# Patient Record
Sex: FEMALE | Race: WHITE | NOT HISPANIC OR LATINO | Employment: OTHER | URBAN - METROPOLITAN AREA
[De-identification: names, ages, dates, MRNs, and addresses within clinical notes are randomized per-mention and may not be internally consistent; named-entity substitution may affect disease eponyms.]

---

## 2017-03-14 ENCOUNTER — LAB (OUTPATIENT)
Dept: LAB | Facility: HOSPITAL | Age: 62
End: 2017-03-14

## 2017-03-14 ENCOUNTER — OFFICE VISIT (OUTPATIENT)
Dept: NEUROLOGY | Facility: CLINIC | Age: 62
End: 2017-03-14

## 2017-03-14 VITALS
SYSTOLIC BLOOD PRESSURE: 132 MMHG | BODY MASS INDEX: 39.68 KG/M2 | HEART RATE: 78 BPM | DIASTOLIC BLOOD PRESSURE: 86 MMHG | WEIGHT: 293 LBS | HEIGHT: 72 IN | OXYGEN SATURATION: 99 %

## 2017-03-14 DIAGNOSIS — G43.C0 PERIODIC HEADACHE SYNDROME, NOT INTRACTABLE: ICD-10-CM

## 2017-03-14 DIAGNOSIS — G35 MULTIPLE SCLEROSIS (HCC): Primary | ICD-10-CM

## 2017-03-14 DIAGNOSIS — F33.1 MODERATE EPISODE OF RECURRENT MAJOR DEPRESSIVE DISORDER (HCC): ICD-10-CM

## 2017-03-14 DIAGNOSIS — G25.81 RESTLESS LEGS SYNDROME: ICD-10-CM

## 2017-03-14 LAB
ALBUMIN SERPL-MCNC: 4.3 G/DL (ref 3.2–4.8)
ALBUMIN/GLOB SERPL: 1.4 G/DL (ref 1.5–2.5)
ALP SERPL-CCNC: 133 U/L (ref 25–100)
ALT SERPL W P-5'-P-CCNC: 22 U/L (ref 7–40)
ANION GAP SERPL CALCULATED.3IONS-SCNC: 10 MMOL/L (ref 3–11)
AST SERPL-CCNC: 25 U/L (ref 0–33)
BACTERIA UR QL AUTO: ABNORMAL /HPF
BASOPHILS # BLD AUTO: 0.05 10*3/MM3 (ref 0–0.2)
BASOPHILS NFR BLD AUTO: 0.8 % (ref 0–1)
BILIRUB SERPL-MCNC: 0.4 MG/DL (ref 0.3–1.2)
BILIRUB UR QL STRIP: NEGATIVE
BUN BLD-MCNC: 15 MG/DL (ref 9–23)
BUN/CREAT SERPL: 25 (ref 7–25)
CALCIUM SPEC-SCNC: 10.3 MG/DL (ref 8.7–10.4)
CHLORIDE SERPL-SCNC: 104 MMOL/L (ref 99–109)
CLARITY UR: CLEAR
CO2 SERPL-SCNC: 25 MMOL/L (ref 20–31)
COLOR UR: YELLOW
CREAT BLD-MCNC: 0.6 MG/DL (ref 0.6–1.3)
DEPRECATED RDW RBC AUTO: 50.1 FL (ref 37–54)
EOSINOPHIL # BLD AUTO: 0.19 10*3/MM3 (ref 0.1–0.3)
EOSINOPHIL NFR BLD AUTO: 3 % (ref 0–3)
ERYTHROCYTE [DISTWIDTH] IN BLOOD BY AUTOMATED COUNT: 15.3 % (ref 11.3–14.5)
GFR SERPL CREATININE-BSD FRML MDRD: 102 ML/MIN/1.73
GLOBULIN UR ELPH-MCNC: 3.1 GM/DL
GLUCOSE BLD-MCNC: 82 MG/DL (ref 70–100)
GLUCOSE UR STRIP-MCNC: NEGATIVE MG/DL
HAV IGM SERPL QL IA: NORMAL
HBV CORE IGM SERPL QL IA: NORMAL
HBV SURFACE AG SERPL QL IA: NORMAL
HCT VFR BLD AUTO: 41 % (ref 34.5–44)
HCV AB SER DONR QL: NORMAL
HGB BLD-MCNC: 13.2 G/DL (ref 11.5–15.5)
HGB UR QL STRIP.AUTO: NEGATIVE
HIV1+2 AB SER QL: NORMAL
HYALINE CASTS UR QL AUTO: ABNORMAL /LPF
IMM GRANULOCYTES # BLD: 0.01 10*3/MM3 (ref 0–0.03)
IMM GRANULOCYTES NFR BLD: 0.2 % (ref 0–0.6)
KETONES UR QL STRIP: NEGATIVE
LEUKOCYTE ESTERASE UR QL STRIP.AUTO: ABNORMAL
LYMPHOCYTES # BLD AUTO: 2.58 10*3/MM3 (ref 0.6–4.8)
LYMPHOCYTES NFR BLD AUTO: 40.3 % (ref 24–44)
MCH RBC QN AUTO: 28.9 PG (ref 27–31)
MCHC RBC AUTO-ENTMCNC: 32.2 G/DL (ref 32–36)
MCV RBC AUTO: 89.7 FL (ref 80–99)
MONOCYTES # BLD AUTO: 0.58 10*3/MM3 (ref 0–1)
MONOCYTES NFR BLD AUTO: 9.1 % (ref 0–12)
NEUTROPHILS # BLD AUTO: 2.99 10*3/MM3 (ref 1.5–8.3)
NEUTROPHILS NFR BLD AUTO: 46.6 % (ref 41–71)
NITRITE UR QL STRIP: NEGATIVE
PH UR STRIP.AUTO: 5.5 [PH] (ref 5–8)
PLATELET # BLD AUTO: 202 10*3/MM3 (ref 150–450)
PMV BLD AUTO: 11.1 FL (ref 6–12)
POTASSIUM BLD-SCNC: 3.7 MMOL/L (ref 3.5–5.5)
PROT SERPL-MCNC: 7.4 G/DL (ref 5.7–8.2)
PROT UR QL STRIP: NEGATIVE
RBC # BLD AUTO: 4.57 10*6/MM3 (ref 3.89–5.14)
RBC # UR: ABNORMAL /HPF
REF LAB TEST METHOD: ABNORMAL
SODIUM BLD-SCNC: 139 MMOL/L (ref 132–146)
SP GR UR STRIP: 1.01 (ref 1–1.03)
SQUAMOUS #/AREA URNS HPF: ABNORMAL /HPF
TSH SERPL DL<=0.05 MIU/L-ACNC: 1.67 MIU/ML (ref 0.35–5.35)
UROBILINOGEN UR QL STRIP: ABNORMAL
WBC NRBC COR # BLD: 6.4 10*3/MM3 (ref 3.5–10.8)
WBC UR QL AUTO: ABNORMAL /HPF

## 2017-03-14 PROCEDURE — 84443 ASSAY THYROID STIM HORMONE: CPT | Performed by: PSYCHIATRY & NEUROLOGY

## 2017-03-14 PROCEDURE — 86480 TB TEST CELL IMMUN MEASURE: CPT | Performed by: PSYCHIATRY & NEUROLOGY

## 2017-03-14 PROCEDURE — 99214 OFFICE O/P EST MOD 30 MIN: CPT | Performed by: PSYCHIATRY & NEUROLOGY

## 2017-03-14 PROCEDURE — 80074 ACUTE HEPATITIS PANEL: CPT | Performed by: PSYCHIATRY & NEUROLOGY

## 2017-03-14 PROCEDURE — G0432 EIA HIV-1/HIV-2 SCREEN: HCPCS | Performed by: PSYCHIATRY & NEUROLOGY

## 2017-03-14 PROCEDURE — 80053 COMPREHEN METABOLIC PANEL: CPT | Performed by: PSYCHIATRY & NEUROLOGY

## 2017-03-14 PROCEDURE — 36415 COLL VENOUS BLD VENIPUNCTURE: CPT | Performed by: PSYCHIATRY & NEUROLOGY

## 2017-03-14 PROCEDURE — 81001 URINALYSIS AUTO W/SCOPE: CPT | Performed by: PSYCHIATRY & NEUROLOGY

## 2017-03-14 PROCEDURE — 85025 COMPLETE CBC W/AUTO DIFF WBC: CPT | Performed by: PSYCHIATRY & NEUROLOGY

## 2017-03-14 RX ORDER — TIZANIDINE HYDROCHLORIDE 2 MG/1
CAPSULE, GELATIN COATED ORAL
Refills: 11 | COMMUNITY
Start: 2016-12-23 | End: 2018-01-23

## 2017-03-14 NOTE — ASSESSMENT & PLAN NOTE
Discussed change in JCV index and increased risk to 1.1 1000 for PML.  Provided with information on Lemtrada and risks and benefits discussed.      Continue Tysabri.  Draw screening labs for Lemtrada

## 2017-03-14 NOTE — PROGRESS NOTES
Subjective:     Patient ID: Rashad Carrillo is a 61 y.o. female.    History of Present Illness     59 yo woman with RRMS, migraines, MDD and RLS returns in follow up.  Last visit on 11/11/16 stopped TPM and renewed GBP, Tysabri, Cymbalta and Ampyra.  Referred to PT and for MBS and ordered labs.       MRI Brain, 6/9/16, no change in T2 lesion load since MRI 2015 but has moderate to severe T2 lesion burden and moderate atrophy.     JCV ab - 11/11/16 - 0.97; started 11/2014    MBS- regular diet, thin liquids    RRMS    Traveled to [place] end of Dec and upon return extreme exhaustion.  Slept 20 hours a day and vision was blurry.  Increased spasticity in limbs.  Rested for 2 weeks.  Eye sight improved but increased discomfort in legs lying down. Fatigue is severe.   mg po TID, 300 mg 1 to 3 times overnight for discomfort.    Ampyra helping walking endurance and speed.     Problem history:    25 ft timed walk increaesed 15.99 from 9.94.  Left hand 9 hole peg worsened.   Increasing swallowing difficulty.    Tolerating Tysabri.      Immediate and working memory are declining with SDMT 27/110..       MDD    Mood is stable on Cymbalta.    RLS    RLS controlled GBP.    MIgraines    Stopped TPM and HA are unchanged.  HA every other day.  Moderate severity.  Dizziness has decreased.       The following portions of the patient's history were reviewed and updated as appropriate: allergies, current medications, past medical history, past surgical history and problem list.    Review of Systems   Constitutional: Negative for activity change and unexpected weight change.   HENT: Negative for tinnitus and trouble swallowing.    Eyes: Negative for photophobia and visual disturbance.   Respiratory: Negative for apnea and choking.    Cardiovascular: Negative for leg swelling.   Endocrine: Positive for heat intolerance. Negative for cold intolerance.   Genitourinary: Negative for difficulty urinating, frequency, menstrual problem and  "urgency.   Musculoskeletal: Positive for gait problem. Negative for back pain.   Skin: Negative for color change.   Allergic/Immunologic: Negative for immunocompromised state.   Neurological: Positive for dizziness and tremors. Negative for seizures, syncope, facial asymmetry, speech difficulty and light-headedness.   Hematological: Negative for adenopathy. Does not bruise/bleed easily.   Psychiatric/Behavioral: Positive for decreased concentration, dysphoric mood and sleep disturbance. Negative for behavioral problems, confusion and hallucinations. The patient is nervous/anxious.         Objective:  Vitals:    03/14/17 1025   BP: 132/86   Pulse: 78   SpO2: 99%   Weight: (!) 310 lb 3.2 oz (141 kg)   Height: 72\" (182.9 cm)        Neurologic Exam     Mental Status   Registration: recalls 3 of 3 objects. Recall at 5 minutes: recalls 3 of 3 objects. Follows 3 step commands.   Attention: normal. Concentration: normal.   Level of consciousness: alert  Knowledge: good and consistent with education.   Able to name object. Able to read. Able to repeat. Able to write. Normal comprehension.        SDMT 27     Cranial Nerves     CN II   Visual fields full to confrontation.   Visual acuity: normal  Right visual field deficit: none  Left visual field deficit: none     CN III, IV, VI   Nystagmus: none   Diplopia: none  Ophthalmoparesis: none  Upgaze: normal  Downgaze: normal  Conjugate gaze: present  Vestibulo-ocular reflex: present    CN V   Facial sensation intact.   Right corneal reflex: normal  Left corneal reflex: normal    CN VII   Right facial weakness: none  Left facial weakness: none    CN VIII   Hearing: intact    CN IX, X   Palate: symmetric  Right gag reflex: normal  Left gag reflex: normal    CN XI   Right sternocleidomastoid strength: normal  Left sternocleidomastoid strength: normal    CN XII   Tongue: not atrophic  Fasciculations: absent  Tongue deviation: none    Motor Exam   Muscle bulk: normal  Overall muscle " tone: normal  Right arm tone: normal  Left arm tone: normal  Right leg tone: increased  Left leg tone: increased    Strength   Strength 5/5 except as noted.   Left quadriceps: 4/5  Left hamstrin/5  Left glutei: 4/5  Left anterior tibial: 4/5  Left posterior tibial: 4/5  Left peroneal: 4/5  Left gastroc: 4/5    Sensory Exam   Right arm light touch: normal  Right leg light touch: normal  Right arm vibration: normal  Right leg vibration: normal  Right arm proprioception: normal  Right leg proprioception: normal  Right arm pinprick: normal  Right leg pinprick: normal  Sensory deficit distribution on right: non-dermatomal distribution (decreased left face, arm and leg)    Gait, Coordination, and Reflexes     Gait  Gait: circumduction, shuffling and wide-based (left leg)    Coordination   Romberg: positive  Heel to shin coordination: abnormal  Tandem walking coordination: abnormal    Tremor   Resting tremor: absent  Intention tremor: absent  Action tremor: left arm and right arm    Reflexes   Right brachioradialis: 3+  Left brachioradialis: 3+  Right biceps: 3+  Left biceps: 3+  Right triceps: 3+  Left triceps: 3+  Right patellar: 3+  Left patellar: 3+  Right achilles: 3+  Left achilles: 3+  Right : 3+  Left : 3+  Right plantar: normal  Left plantar: normal      Physical Exam   Neurological: She has an abnormal Heel to Brown Test, an abnormal Romberg Test and an abnormal Tandem Gait Test.   Reflex Scores:       Tricep reflexes are 3+ on the right side and 3+ on the left side.       Bicep reflexes are 3+ on the right side and 3+ on the left side.       Brachioradialis reflexes are 3+ on the right side and 3+ on the left side.       Patellar reflexes are 3+ on the right side and 3+ on the left side.       Achilles reflexes are 3+ on the right side and 3+ on the left side.      Assessment/Plan:       Problems Addressed this Visit        Cardiovascular and Mediastinum    Periodic headache syndrome, not intractable      Headaches are unchanged.  Continue current treatment regimen.             Relevant Medications    TiZANidine (ZANAFLEX) 2 MG capsule    Other Relevant Orders    Hepatitis Panel, Acute    HIV-1 & HIV-2 Antibodies    QuantiFERON TB Client Incubated    Varicella Zoster Antibody, IgG    CBC & Differential    Comprehensive Metabolic Panel    TSH    Urinalysis With Microscopic       Nervous and Auditory    Multiple sclerosis - Primary     Discussed change in JCV index and increased risk to 1.1 1000 for PML.  Provided with information on Lemtrada and risks and benefits discussed.      Continue Tysabri.  Draw screening labs for Lemtrada         Relevant Orders    Hepatitis Panel, Acute    HIV-1 & HIV-2 Antibodies    QuantiFERON TB Client Incubated    Varicella Zoster Antibody, IgG    CBC & Differential    Comprehensive Metabolic Panel    TSH    Urinalysis With Microscopic       Other    Depression     Psychological condition is improving with treatment.  Continue current treatment regimen.  Psychological condition  will be reassessed at the next regular appointment.    Continue Cymbalta 60 mg qday         Relevant Orders    Hepatitis Panel, Acute    HIV-1 & HIV-2 Antibodies    QuantiFERON TB Client Incubated    Varicella Zoster Antibody, IgG    CBC & Differential    Comprehensive Metabolic Panel    TSH    Urinalysis With Microscopic    Restless legs syndrome     Continue  mg TID and 300 - 900 mg qhs         Relevant Orders    Hepatitis Panel, Acute    HIV-1 & HIV-2 Antibodies    QuantiFERON TB Client Incubated    Varicella Zoster Antibody, IgG    CBC & Differential    Comprehensive Metabolic Panel    TSH    Urinalysis With Microscopic

## 2017-03-14 NOTE — ASSESSMENT & PLAN NOTE
Psychological condition is improving with treatment.  Continue current treatment regimen.  Psychological condition  will be reassessed at the next regular appointment.    Continue Cymbalta 60 mg qday

## 2017-03-15 ENCOUNTER — TELEPHONE (OUTPATIENT)
Dept: NEUROLOGY | Facility: CLINIC | Age: 62
End: 2017-03-15

## 2017-03-15 LAB — VZV IGG SER IA-ACNC: 2207 INDEX

## 2017-03-15 RX ORDER — LEVOFLOXACIN 500 MG/1
500 TABLET, FILM COATED ORAL DAILY
Qty: 5 TABLET | Refills: 0 | Status: SHIPPED | OUTPATIENT
Start: 2017-03-15 | End: 2022-02-16 | Stop reason: SDUPTHER

## 2017-03-15 NOTE — TELEPHONE ENCOUNTER
----- Message from Adonay Gilbert MD sent at 3/15/2017  8:00 AM EDT -----  Notify pt she has a UTI and called in antibx

## 2017-03-18 LAB
INTERPRETATION: ABNORMAL
M TB TUBERC IFN-G BLD QL: POSITIVE
QFT TB AG MINUS NIL VALUE: 0.52 IU/ML
QUANTIFERON CRITERIA: ABNORMAL
QUANTIFERON MITOGEN VALUE: >10 IU/ML
QUANTIFERON NIL VALUE: 0.08 IU/ML
QUANTIFERON TB AG VALUE: 0.6 IU/ML

## 2017-05-23 ENCOUNTER — TELEPHONE (OUTPATIENT)
Dept: NEUROLOGY | Facility: CLINIC | Age: 62
End: 2017-05-23

## 2017-05-23 ENCOUNTER — OFFICE VISIT (OUTPATIENT)
Dept: NEUROLOGY | Facility: CLINIC | Age: 62
End: 2017-05-23

## 2017-05-23 VITALS
BODY MASS INDEX: 39.68 KG/M2 | HEART RATE: 69 BPM | OXYGEN SATURATION: 95 % | SYSTOLIC BLOOD PRESSURE: 126 MMHG | HEIGHT: 72 IN | DIASTOLIC BLOOD PRESSURE: 80 MMHG | WEIGHT: 293 LBS

## 2017-05-23 DIAGNOSIS — G25.81 RESTLESS LEGS SYNDROME: ICD-10-CM

## 2017-05-23 DIAGNOSIS — F33.1 MODERATE EPISODE OF RECURRENT MAJOR DEPRESSIVE DISORDER (HCC): ICD-10-CM

## 2017-05-23 DIAGNOSIS — G47.61 PERIODIC LIMB MOVEMENT DISORDER: ICD-10-CM

## 2017-05-23 DIAGNOSIS — G35 MULTIPLE SCLEROSIS (HCC): ICD-10-CM

## 2017-05-23 DIAGNOSIS — G35 MS (MULTIPLE SCLEROSIS) (HCC): Primary | ICD-10-CM

## 2017-05-23 PROCEDURE — 99214 OFFICE O/P EST MOD 30 MIN: CPT | Performed by: PSYCHIATRY & NEUROLOGY

## 2017-05-23 RX ORDER — METHYLPREDNISOLONE 4 MG/1
TABLET ORAL
Qty: 1 EACH | Refills: 0 | Status: SHIPPED | OUTPATIENT
Start: 2017-05-23 | End: 2017-09-20

## 2017-05-31 ENCOUNTER — HOSPITAL ENCOUNTER (OUTPATIENT)
Dept: PHYSICAL THERAPY | Facility: HOSPITAL | Age: 62
Setting detail: THERAPIES SERIES
Discharge: HOME | End: 2017-05-31
Attending: PSYCHIATRY & NEUROLOGY

## 2017-05-31 ENCOUNTER — TRANSCRIBE ORDERS (OUTPATIENT)
Dept: PHYSICAL THERAPY | Facility: HOSPITAL | Age: 62
End: 2017-05-31

## 2017-05-31 DIAGNOSIS — G35 MULTIPLE SCLEROSIS (HCC): Primary | ICD-10-CM

## 2017-05-31 PROCEDURE — 97163 PT EVAL HIGH COMPLEX 45 MIN: CPT | Performed by: PHYSICAL THERAPIST

## 2017-06-06 DIAGNOSIS — A31.9: Primary | ICD-10-CM

## 2017-06-08 ENCOUNTER — TELEPHONE (OUTPATIENT)
Dept: NEUROLOGY | Facility: CLINIC | Age: 62
End: 2017-06-08

## 2017-06-08 DIAGNOSIS — A15.9 TUBERCULOSIS: Primary | ICD-10-CM

## 2017-06-08 NOTE — TELEPHONE ENCOUNTER
Pt needs to have Chest Xray before referral can be made.  Can you put in order and I will notified pt.  Thanks

## 2017-06-12 ENCOUNTER — HOSPITAL ENCOUNTER (OUTPATIENT)
Dept: GENERAL RADIOLOGY | Facility: HOSPITAL | Age: 62
Discharge: HOME | End: 2017-06-12
Attending: PSYCHIATRY & NEUROLOGY | Admitting: PSYCHIATRY & NEUROLOGY

## 2017-06-12 DIAGNOSIS — A15.9 TUBERCULOSIS: ICD-10-CM

## 2017-06-12 PROCEDURE — 71020 HC CHEST PA AND LATERAL: CPT

## 2017-06-15 ENCOUNTER — HOSPITAL ENCOUNTER (OUTPATIENT)
Dept: OCCUPATIONAL THERAPY | Facility: HOSPITAL | Age: 62
Setting detail: THERAPIES SERIES
Discharge: HOME | End: 2017-06-15
Attending: PSYCHIATRY & NEUROLOGY

## 2017-06-15 ENCOUNTER — HOSPITAL ENCOUNTER (OUTPATIENT)
Dept: PHYSICAL THERAPY | Facility: HOSPITAL | Age: 62
Setting detail: THERAPIES SERIES
Discharge: HOME | End: 2017-06-15
Attending: PSYCHIATRY & NEUROLOGY

## 2017-06-15 DIAGNOSIS — G35 MULTIPLE SCLEROSIS (HCC): Primary | ICD-10-CM

## 2017-06-15 DIAGNOSIS — R26.89 POOR BALANCE: ICD-10-CM

## 2017-06-15 DIAGNOSIS — R27.8 DECREASED COORDINATION: Primary | ICD-10-CM

## 2017-06-15 DIAGNOSIS — R29.898 WEAKNESS OF BOTH UPPER EXTREMITIES: ICD-10-CM

## 2017-06-15 DIAGNOSIS — G35 MULTIPLE SCLEROSIS (HCC): ICD-10-CM

## 2017-06-15 PROCEDURE — 97167 OT EVAL HIGH COMPLEX 60 MIN: CPT | Performed by: OCCUPATIONAL THERAPIST

## 2017-06-15 PROCEDURE — 97112 NEUROMUSCULAR REEDUCATION: CPT | Performed by: PHYSICAL THERAPIST

## 2017-06-15 PROCEDURE — 97110 THERAPEUTIC EXERCISES: CPT | Performed by: PHYSICAL THERAPIST

## 2017-06-15 NOTE — THERAPY TREATMENT NOTE
Outpatient Physical Therapy Neuro Treatment Note  [facility]     Patient Name: Rashad Carrillo  : 1955  MRN: [id number]  Today's Date: 6/15/2017      Visit Date: 06/15/2017    Visit Dx:    ICD-10-CM ICD-9-CM   1. Multiple sclerosis G35 340       Patient Active Problem List   Diagnosis   • Vitamin D deficiency   • Depression   • Multiple sclerosis   • Periodic limb movement disorder   • Restless legs syndrome   • Muscle spasticity   • Periodic headache syndrome, not intractable                 PT Neuro       06/15/17 0830          Transfers    Transfer, Comment Sit to stands from hi-lo table with cues for scooting out, feet underneath and pushing from table to stand x 6   -KL      Balance Skills Training    Training Strategies (Balance Skills) In // bars: attempting to take UE's off of  bars in order to work on standing balance, however too unstable this treatment to remove hands  -KL        User Key  (r) = Recorded By, (t) = Taken By, (c) = Cosigned By    Initials Name Provider Type    STANTON Aguilar, PT Physical Therapist                        PT Assessment/Plan       06/15/17 1047       PT Assessment    Assessment Comments Patient with decreased balance today from L LE fatigue. She was given prescription from physician for rollator, as well as LE and core strengthening HEP.   -KL     PT Plan    PT Plan Comments Continue per POC.   -KL       User Key  (r) = Recorded By, (t) = Taken By, (c) = Cosigned By    Initials Name Provider Type    STANTON Aguilar, PT Physical Therapist                     Exercises       06/15/17 0830          Exercise 1    Exercise Name 1 Nu-Step L5  -KL      Time (Minutes) 1 6  -KL      Exercise 2    Exercise Name 2 PPT  -KL      Reps 2 10  -KL      Exercise 3    Exercise Name 3 bridges  -KL      Reps 3 8  -KL      Exercise 4    Exercise Name 4 SLR  -KL      Reps 4 --   5 on L and 10 on R   -KL      Exercise 5    Exercise Name 5 supine hip abd   -KL      Resistance  5 Red  -KL      Reps 5 10  -KL        User Key  (r) = Recorded By, (t) = Taken By, (c) = Cosigned By    Initials Name Provider Type    STANTON Aguilar PT Physical Therapist                                  Therapy Education       06/15/17 1046          Therapy Education    Given HEP  -KL      Program New  -KL      How Provided Verbal;Demonstration;Written  -KL      Provided to Patient  -KL      Level of Understanding Verbalized;Demonstrated  -KL        User Key  (r) = Recorded By, (t) = Taken By, (c) = Cosigned By    Initials Name Provider Type    STANTON Aguilar PT Physical Therapist                Time Calculation:   Start Time: 0830  Total Timed Code Minutes- PT: 45 minute(s)     Therapy Charges for Today     Code Description Service Date Service Provider Modifiers Qty    51831492973  PT THER PROC EA 15 MIN 6/15/2017 Kat Aguilar PT GP 2    48815753291 HC PT NEUROMUSC RE EDUCATION EA 15 MIN 6/15/2017 Kat Aguilar PT GP 1                    Kat Aguilar PT  6/15/2017

## 2017-06-15 NOTE — THERAPY EVALUATION
Outpatient Occupational Therapy Neuro Initial Evaluation  [facility]     Patient Name: Rashad Carrillo  : 1955  MRN: [id number]  Today's Date: 6/15/2017      Visit Date: 06/15/2017    Patient Active Problem List   Diagnosis   • Vitamin D deficiency   • Depression   • Multiple sclerosis   • Periodic limb movement disorder   • Restless legs syndrome   • Muscle spasticity   • Periodic headache syndrome, not intractable        Past Medical History:   Diagnosis Date   • Hypertension    • Multiple sclerosis         History reviewed. No pertinent surgical history.      Visit Dx:      ICD-10-CM ICD-9-CM   1. Decreased coordination R27.9 781.3   2. Weakness of both upper extremities M62.81 729.89   3. Multiple sclerosis G35 340   4. Poor balance R26.89 781.99             Patient History       06/15/17 0945          History    Chief Complaint Balance Problems;Difficulty Walking;Difficulty with daily activities;Dizziness;Falls/history of falls;Fatigue/poor endurance;Impaired sensation;Muscle weakness;Numbness;Other 1 (comment)   difficulty with coordination  -EM      Date Current Problem(s) Began 17  -EM      Brief Description of Current Complaint Pt presents to clinic with complaints related to MS. She reports she falls everyday and has seriously injured herself. She uses 1-2 trekking poles for ambulation and does not own a walker. She recently had a pelvic exam and her MD stated she had very weak pelvic muscles, along with incontinence. She states that she walks everyday and does stretches everyday. Pt has random numbness and tingling throughout the day on the L side but most is at night. Pt realizes she needs to do something in order to prevent falls everyday.  She tries to be as independent as she can, but struggles.   -EM      Onset Date- OT 6/15/2017  -EM      Patient/Caregiver Goals Improve strength;Improve mobility;Other (comment)   improve functional use of B hands  -EM      Current Tobacco Use no  -EM       Smoking Status no  -EM      Hand Dominance right-handed  -EM      Occupation/sports/leisure activities garden, used to love to dance, walk, considering swimming because she misses exercising  -EM      How has patient tried to help current problem? rest, exercise, medication  -EM      Pain     Pain Location Leg   neuropathy pain  -EM      Pain at Present 0  -EM      Pain at Best 0  -EM      Pain at Worst 10  -EM      Fall Risk Assessment    Any falls in the past year: Yes  -EM      Number of falls reported in the last 12 months everyday  -EM      Factors that contributed to the fall: Lost balance;Fatigue;Tripped;Didn't use assistive device;Uneven surface  -EM      Does patient have a fear of falling Yes (comment)   avoids activities  -EM      Daily Activities    Primary Language English  -EM      Are you able to read Yes  -EM      Are you able to write Yes  -EM      How does patient learn best? Demonstration  -EM      Teaching needs identified Home Exercise Program;Falls Prevention;Home Safety  -EM      Patient is concerned about/has problems with Difficulty with self care (i.e. bathing, dressing, toileting:;Coordination;Grasping objects lifting;Performing home management (household chores, shopping, care of dependents);Performing sports, recreation, and play activities;Walking;Writing/grasping items with hand(s)  -EM      Does patient have problems with the following? Depression  -EM      Recommended Referrals --  -EM      Pt Participated in POC and Goals Yes  -EM      Safety    Are you being hurt, hit, or frightened by anyone at home or in your life? No  -EM      Are you being neglected by a caregiver No  -EM        User Key  (r) = Recorded By, (t) = Taken By, (c) = Cosigned By    Initials Name Provider Type    EM Christa Villanueva OTR Occupational Therapist                OT Neuro       06/15/17 9777          Home Living    Living Arrangements house  -EM      Home Accessibility stairs to enter home;tub/shower is  not walk in;bed and bath on same level  -EM      Number of Stairs to Enter Home 2  -EM      Home Equipment Cane   trekking poles  -EM      Living Environment Comment Pt lives alone and contacts family several times a day to check-in for safety  -EM      Vision- Basic    Current Vision Wears glasses only for reading  -EM      Sensation    Sensation WNL? WFL   in UEs  -EM      Additional Comments neuropathy in LEs and genital/pelvic area  -EM      ROM (Range of Motion)    General ROM no range of motion deficits identified  -EM      MMT (Manual Muscle Testing)    General MMT Assessment upper extremity strength deficits identified  -EM      Left Shoulder    Flexion Gross Movement (3/5) fair  -EM      ABduction Gross Movement (3/5) fair  -EM      Right Shoulder    Flexion Gross Movement (3+/5) fair plus  -EM      ABduction Gross Movement (3+/5) fair plus  -EM      Upper Extremity    Upper Ext Manual Muscle Testing left shoulder strength deficit;right shoulder strength deficit;left elbow/forearm strength deficit;right elbow/forearm strength deficit;left wrist strength deficit;right wrist strength deficit  -EM      Left Elbow/Forearm    Elbow Flexion Gross Movement (3/5) fair  -EM      Elbow Extension Gross Movement (3-/5) fair minus  -EM      Right Elbow/Forearm    Elbow Flexion Gross Movement (3/5) fair  -EM      Elbow Extension Gross Movement (3/5) fair  -EM      Left Wrist    Wrist Flexion Gross Movement (4/5) good  -EM      Wrist Extension Gross Movement (4/5) good  -EM      Right Wrist    Wrist Flexion Gross Movement (4/5) good  -EM      Wrist Extension Gross Movement (4/5) good  -EM      ADL Assessment/Intervention    IADL Assess/Train, Comment Pt reports she has to really take her time with ADL tasks. She uses freezer meals and protein shakes and does very small grocery trips.   -EM      Additional Documentation IADL Assess/Train, Comment (Row)  -EM        User Key  (r) = Recorded By, (t) = Taken By, (c) = Cosigned  By    Initials Name Provider Type    EM Christa GILBERT , OTR Occupational Therapist             Hand Therapy (last 24 hours)      Hand Eval       06/15/17 1105 06/15/17 0945       Subjective Comments    Subjective Comments ,  -EM      Hand  Strength     Strength Affected Side  Bilateral  -EM      Strength Right    # Reps  3  -EM     Right Rung  2  -EM     Right  Test 1  55  -EM     Right  Test 2  55  -EM     Right  Test 3  54  -EM      Strength Average Right  54.67  -EM      Strength Left    # Reps  3  -EM     Left Rung  2  -EM     Left  Test 1  30  -EM     Left  Test 2  35  -EM     Left  Test 3  34  -EM      Strength Average Left  33  -EM     Pinch Strength    Number of Reps  3  -EM     Affected Side  Bilateral  -EM     Right Hand Strength - Pinch (lbs)    Lateral  14.7 lbs  -EM     Left Hand Strength - Pinch (lbs)    Lateral  8.7 lbs  -EM       User Key  (r) = Recorded By, (t) = Taken By, (c) = Cosigned By    Initials Name Provider Type    EM Christa GILBERT GODFREY Villanueva Occupational Therapist                    Therapy Education       06/15/17 1105 06/15/17 1046       Therapy Education    Given Other (comment)   role of OT and POC  -EM HEP  -KL     Program New  -EM New  -KL     How Provided Verbal  -EM Verbal;Demonstration;Written  -KL     Provided to Patient  -EM Patient  -KL     Level of Understanding Verbalized  -EM Verbalized;Demonstrated  -KL       User Key  (r) = Recorded By, (t) = Taken By, (c) = Cosigned By    Initials Name Provider Type    STANTON Aguilar, PT Physical Therapist    EM Christa GILBERT , OTR Occupational Therapist                OT Goals       06/15/17 0945       OT Short Term Goals    STG Date to Achieve 07/13/17  -EM     STG 1 Patient will participate in hand and UE strengthening and be educated in HEP to increase functional use  -EM     STG 1 Progress New  -EM     STG 2 Patient will participate in education for adapted technique and AE for increased  safety and satisfaction with ADL tasks.   -EM     STG 2 Progress New  -EM     STG 3 Patient will participate and be educated in HEP to increase coordination for ADL tasks bilaterally  -EM     STG 3 Progress New  -EM     Long Term Goals    LTG Date to Achieve 08/10/17  -EM     LTG 1 Patient will be independent with UE and hand strengthening HEP  -EM     LTG 1 Progress New  -EM     LTG 2 Pt will score average >5 on Modified falls efficacy scale indicating greater confidence with balance during ADL tasks  -EM     LTG 2 Progress New  -EM     LTG 3 Patient will score <60 on Modified Fatigue Impact Scale to indicate greater energy for ADL tasks.  -EM     LTG 3 Progress New  -EM     Time Calculation    OT Goal Re-Cert Due Date 07/13/17  -EM       User Key  (r) = Recorded By, (t) = Taken By, (c) = Cosigned By    Initials Name Provider Type    EM Christa Villanueva OTR Occupational Therapist                OT Assessment/Plan       06/15/17 1116       OT Assessment    Functional Limitations Decreased safety during functional activities;Limitation in home management;Limitations in community activities;Limitations in functional capacity and performance;Performance in leisure activities;Performance in self-care ADL  -EM     Impairments Balance;Coordination;Dexterity;Endurance;Sensation;Muscle strength;Motor function  -EM     Assessment Comments Decreased independence, safety, and satisfaction with ADL tasks and engaging in desired occupations d/t above listed impairments.  -EM     Please refer to paper survey for additional self-reported information Yes  -EM     OT Rehab Potential Good  -EM     Patient/caregiver participated in establishment of treatment plan and goals Yes  -EM     Patient would benefit from skilled therapy intervention Yes  -EM     OT Plan    OT Frequency 2x/week  -EM     Predicted Duration of Therapy Intervention (days/wks) 12 weeks  -EM     Planned CPT's? OT EVAL HIGH COMPLEXITY: 13511;OT THER ACT EA 15 MIN:  [id number];OT THER PROC EA 15 MIN: [id number];OT NEUROMUSC RE EDUCATION EA 15 MIN: 40369;OT SELF CARE/MGMT/TRAIN 15 MIN: 18075  -EM     Planned Therapy Interventions (Optional Details) balance training;home exercise program;motor coordination training;neuromuscular re-education;patient/family education;strengthening;other (see comments)   ADL retraining and AE/Adaptive techniques, WS/EC techniques  -EM     OT Plan Comments Recommend skilled OT services as specified to address established goals.  -EM       User Key  (r) = Recorded By, (t) = Taken By, (c) = Cosigned By    Initials Name Provider Type    EM GODFREY Villarreal Occupational Therapist                        Outcome Measures       06/15/17 0945          9 Hole Peg    9-Hole Peg Left 36.86  -EM      9-Hole Peg Right 26.61  -EM      Other Outcome Measure Tool Used    Other Outcome Measure Tool Comments Falls - 4.9 avg; MFIS total 65/84  -EM      Functional Assessment    Outcome Measure Options 9 Hole Peg;Other Outcome Measure   MFIS, Modified Falls Efficacy Scale  -EM        User Key  (r) = Recorded By, (t) = Taken By, (c) = Cosigned By    Initials Name Provider Type    EM GODFREY Villarreal Occupational Therapist            Time Calculation:   OT Start Time: 0945     Therapy Charges for Today     Code Description Service Date Service Provider Modifiers Qty    [id number]  OT EVAL HIGH COMPLEXITY 4 6/15/2017 GODFREY Villarreal GO 1                     GODFREY Eller  6/15/2017

## 2017-06-19 ENCOUNTER — TRANSCRIBE ORDERS (OUTPATIENT)
Dept: ADMINISTRATIVE | Facility: HOSPITAL | Age: 62
End: 2017-06-19

## 2017-06-19 ENCOUNTER — LAB (OUTPATIENT)
Dept: LAB | Facility: HOSPITAL | Age: 62
End: 2017-06-19
Attending: INTERNAL MEDICINE

## 2017-06-19 ENCOUNTER — HOSPITAL ENCOUNTER (OUTPATIENT)
Dept: PHYSICAL THERAPY | Facility: HOSPITAL | Age: 62
Setting detail: THERAPIES SERIES
Discharge: HOME | End: 2017-06-19
Attending: PSYCHIATRY & NEUROLOGY

## 2017-06-19 ENCOUNTER — HOSPITAL ENCOUNTER (OUTPATIENT)
Dept: OCCUPATIONAL THERAPY | Facility: HOSPITAL | Age: 62
Setting detail: THERAPIES SERIES
Discharge: HOME | End: 2017-06-19
Attending: PSYCHIATRY & NEUROLOGY

## 2017-06-19 DIAGNOSIS — G35 MULTIPLE SCLEROSIS (HCC): ICD-10-CM

## 2017-06-19 DIAGNOSIS — G35 MULTIPLE SCLEROSIS (HCC): Primary | ICD-10-CM

## 2017-06-19 DIAGNOSIS — R05.9 COUGH: Primary | ICD-10-CM

## 2017-06-19 DIAGNOSIS — R76.12 NONSPECIFIC REACTION TO CELL MEDIATED IMMUNITY MEASUREMENT OF GAMMA INTERFERON ANTIGEN RESPONSE WITHOUT ACTIVE TUBERCULOSIS: ICD-10-CM

## 2017-06-19 DIAGNOSIS — R26.89 POOR BALANCE: ICD-10-CM

## 2017-06-19 DIAGNOSIS — R29.898 WEAKNESS OF BOTH UPPER EXTREMITIES: ICD-10-CM

## 2017-06-19 DIAGNOSIS — R05.3 CHRONIC COUGH: Primary | ICD-10-CM

## 2017-06-19 DIAGNOSIS — R27.8 DECREASED COORDINATION: Primary | ICD-10-CM

## 2017-06-19 PROCEDURE — 97112 NEUROMUSCULAR REEDUCATION: CPT | Performed by: OCCUPATIONAL THERAPIST

## 2017-06-19 PROCEDURE — 86481 TB AG RESPONSE T-CELL SUSP: CPT | Performed by: INTERNAL MEDICINE

## 2017-06-19 PROCEDURE — 97110 THERAPEUTIC EXERCISES: CPT | Performed by: PHYSICAL THERAPIST

## 2017-06-19 PROCEDURE — 97112 NEUROMUSCULAR REEDUCATION: CPT | Performed by: PHYSICAL THERAPIST

## 2017-06-19 PROCEDURE — 97110 THERAPEUTIC EXERCISES: CPT | Performed by: OCCUPATIONAL THERAPIST

## 2017-06-19 PROCEDURE — 36415 COLL VENOUS BLD VENIPUNCTURE: CPT

## 2017-06-19 NOTE — THERAPY TREATMENT NOTE
Outpatient Occupational Therapy Neuro Treatment Note  [facility]     Patient Name: Rashad Carrillo  : 1955  MRN: [id number]  Today's Date: 2017       Visit Date: 2017    Patient Active Problem List   Diagnosis   • Vitamin D deficiency   • Depression   • Multiple sclerosis   • Periodic limb movement disorder   • Restless legs syndrome   • Muscle spasticity   • Periodic headache syndrome, not intractable        Past Medical History:   Diagnosis Date   • Hypertension    • Multiple sclerosis         No past surgical history on file.      Visit Dx:    ICD-10-CM ICD-9-CM   1. Decreased coordination R27.9 781.3   2. Weakness of both upper extremities M62.81 729.89   3. Multiple sclerosis G35 340   4. Poor balance R26.89 781.99                         OT Assessment/Plan       17 1421 17 1419    OT Assessment    Assessment Comments  Good motivation and participation throughout.  Good understanding of new HEPs.   -EM    OT Plan    OT Plan Comments Continue per POC  -EM       User Key  (r) = Recorded By, (t) = Taken By, (c) = Cosigned By    Initials Name Provider Type    LAMONT Villanueva, OTR Occupational Therapist                    Therapy Education       17 1419          Therapy Education    Given HEP  -EM      Program New  -EM      How Provided Verbal;Demonstration;Written  -EM      Provided to Patient  -EM      Level of Understanding Verbalized;Demonstrated  -EM        User Key  (r) = Recorded By, (t) = Taken By, (c) = Cosigned By    Initials Name Provider Type    LAMONT Villanueva, OTR Occupational Therapist                    OT Exercises       17 0935          Subjective Comments    Subjective Comments Pt states she has to use her hands a lot during transfers  -EM      Subjective Pain    Able to rate subjective pain? yes  -EM      Pre-Treatment Pain Level 0  -EM      Post-Treatment Pain Level 0  -EM      Exercise 1    Exercise Name 1 Energy and Strength in UEs  -EM      Equipment  1 UE Ergometer  -EM      Time (Minutes) 1 3 mins each direction at level 4  -EM      Exercise 2    Exercise Name 2 Educated and completed theraputty HEP using firm putty.  See HEP for details.   -EM      Exercise 3    Exercise Name 3 Educated and  completed theraband HEP using green theraband with min VC for new exercises and techniques - See HEP for details.   -EM      Exercise 4    Exercise Name 4 Educated and completed rolling in bed to increase independence with bed mobility.  Pt demo good technique; however weakness noted.  Discussed with PT and PT is addressing weakness in hips.   -EM        User Key  (r) = Recorded By, (t) = Taken By, (c) = Cosigned By    Initials Name Provider Type    EM GODFREY Villarreal Occupational Therapist                    Time Calculation:   OT Start Time: 0945  Total Timed Code Minutes- OT: 45 minute(s)     Therapy Charges for Today     Code Description Service Date Service Provider Modifiers Qty    86575467506  OT THER PROC EA 15 MIN 6/19/2017 GODFREY Villarreal GO 1    [id number]  OT NEUROMUSC RE EDUCATION EA 15 MIN 6/19/2017 GODFREY Villarreal GO 2                    GODFREY Eller  6/19/2017

## 2017-06-19 NOTE — THERAPY PROGRESS REPORT/RE-CERT
Outpatient Physical Therapy Neuro Progress Note  [facility]     Patient Name: Rashad Carrillo  : 1955  MRN: [id number]  Today's Date: 2017      Visit Date: 2017    Patient Active Problem List   Diagnosis   • Vitamin D deficiency   • Depression   • Multiple sclerosis   • Periodic limb movement disorder   • Restless legs syndrome   • Muscle spasticity   • Periodic headache syndrome, not intractable        Past Medical History:   Diagnosis Date   • Hypertension    • Multiple sclerosis         No past surgical history on file.      Visit Dx:     ICD-10-CM ICD-9-CM   1. Multiple sclerosis G35 340                     PT Neuro       17 0832          Subjective Comments    Subjective Comments Pt said she is so tired she could sleep all day. She will be seeing an infectious disease doctor soon.   -KL      Subjective Pain    Able to rate subjective pain? yes  -KL      Pre-Treatment Pain Level 7  -KL      Post-Treatment Pain Level 5  -KL      Transfers    Transfer, Comment Discussion of sit to stands - decreasing width of LE's with increased forward lean  -      Balance Skills Training    Training Strategies (Balance Skills) In // bars: lateral weight shifting and forward step over half foam roll, progressed to alternating with as minimal use of UE's as possible. This was also advanced to stepping over 2 consecutive foam rolls   -        User Key  (r) = Recorded By, (t) = Taken By, (c) = Cosigned By    Initials Name Provider Type    STANTON Aguilar PT Physical Therapist                                PT Assessment/Plan       17 6584       PT Assessment    Assessment Comments Patient with decreased left knee flexion and extension during standing dyanmic activities. Hip extensors also notably weak in gravity eliminated positioning. Next visit continue with hip extension strengthening with addition of habituation exercises for dizziness. Pt would like to come 2x/wk until her new  insurance goes into effect.   -KL     PT Plan    PT Frequency 2x/week  -KL     PT Plan Comments Continue with current treatment plan with 2x/wk until new insurance is effective.   -KL       User Key  (r) = Recorded By, (t) = Taken By, (c) = Cosigned By    Initials Name Provider Type    STANTON Aguilar PT Physical Therapist                   Exercises       06/19/17 0900 06/19/17 0832       Subjective Comments    Subjective Comments  Pt said she is so tired she could sleep all day. She will be seeing an infectious disease doctor soon.   -     Subjective Pain    Able to rate subjective pain?  yes  -KL     Pre-Treatment Pain Level  7  -KL     Post-Treatment Pain Level  5  -KL     Exercise 1    Exercise Name 1 Nu-Step L5  -KL      Time (Minutes) 1 8  -KL      Exercise 2    Exercise Name 2 sidelying hip flexion-->extension  -KL      Reps 2 12  -KL        User Key  (r) = Recorded By, (t) = Taken By, (c) = Cosigned By    Initials Name Provider Type    STANTON Aguilar PT Physical Therapist                          Time Calculation:   Start Time: 0832  Total Timed Code Minutes- PT: 43 minute(s)     Therapy Charges for Today     Code Description Service Date Service Provider Modifiers Qty    47340719273 HC PT NEUROMUSC RE EDUCATION EA 15 MIN 6/19/2017 Kat Aguilar, PT GP 1    17947504056 HC PT THER PROC EA 15 MIN 6/19/2017 Kat Aguilar PT GP 2                    Kat Aguilar, PT  6/19/2017

## 2017-06-20 ENCOUNTER — HOSPITAL ENCOUNTER (OUTPATIENT)
Dept: PHYSICAL THERAPY | Facility: HOSPITAL | Age: 62
Setting detail: THERAPIES SERIES
Discharge: HOME | End: 2017-06-20
Attending: PSYCHIATRY & NEUROLOGY

## 2017-06-20 DIAGNOSIS — G35 MULTIPLE SCLEROSIS (HCC): Primary | ICD-10-CM

## 2017-06-20 PROCEDURE — 97110 THERAPEUTIC EXERCISES: CPT | Performed by: PHYSICAL THERAPIST

## 2017-06-20 PROCEDURE — 97112 NEUROMUSCULAR REEDUCATION: CPT | Performed by: PHYSICAL THERAPIST

## 2017-06-20 NOTE — THERAPY TREATMENT NOTE
Outpatient Physical Therapy Neuro Treatment Note  [facility]     Patient Name: Rashad Carrillo  : 1955  MRN: [id number]  Today's Date: 2017      Visit Date: 2017    Visit Dx:    ICD-10-CM ICD-9-CM   1. Multiple sclerosis G35 340       Patient Active Problem List   Diagnosis   • Vitamin D deficiency   • Depression   • Multiple sclerosis   • Periodic limb movement disorder   • Restless legs syndrome   • Muscle spasticity   • Periodic headache syndrome, not intractable                 PT Neuro       17 0899          Subjective Comments    Subjective Comments Pt reports she is feeling better today.   -      Subjective Pain    Able to rate subjective pain? yes  -KL      Pre-Treatment Pain Level 4  -KL      Post-Treatment Pain Level 4  -KL      Bed Mobility, Assessment/Treatment    Bed Mobility, Comment quadruped activities on mat table, as well as prone hip extension with and without knee flexed. Quadruped to tall kneeling, along with kick backs  -      Balance Skills Training    Training Strategies (Balance Skills) Vestibular exercises in sitting: fixed gaze with horizontal and vertical head movements, targets horizontally and vertically, head circles eyes open and eyes closed  -        User Key  (r) = Recorded By, (t) = Taken By, (c) = Cosigned By    Initials Name Provider Type    STANTON Aguilar, PT Physical Therapist                        PT Assessment/Plan       17 0294       PT Assessment    Assessment Comments Patient with substantial amount of dizziness with vertical head movements with and without fixed gaze. Eyes closed does make sensation of dizziness more intense. She was administered 3 habituation exercises to perform at home to improve these symptoms.   -     PT Plan    PT Plan Comments Continue per POC.   -       User Key  (r) = Recorded By, (t) = Taken By, (c) = Cosigned By    Initials Name Provider Type    STANTON Aguilar, PT Physical Therapist                      Exercises       06/20/17 0827          Subjective Comments    Subjective Comments Pt reports she is feeling better today.   -KL      Subjective Pain    Able to rate subjective pain? yes  -KL      Pre-Treatment Pain Level 4  -KL      Post-Treatment Pain Level 4  -KL      Exercise 1    Exercise Name 1 Nu-Step L5  -KL      Time (Minutes) 1 8  -KL        User Key  (r) = Recorded By, (t) = Taken By, (c) = Cosigned By    Initials Name Provider Type    STANTON Aguilar, ADONIS Physical Therapist                                  Therapy Education       06/20/17 0949 06/19/17 1419       Therapy Education    Given HEP  -KL HEP  -EM     Program New  -KL New  -EM     How Provided Verbal;Demonstration;Written  -KL Verbal;Demonstration;Written  -EM     Provided to Patient  -KL Patient  -EM     Level of Understanding Verbalized;Demonstrated  -KL Verbalized;Demonstrated  -EM       User Key  (r) = Recorded By, (t) = Taken By, (c) = Cosigned By    Initials Name Provider Type    STANTON Aguilar PT Physical Therapist    EM Christa Villanueva, OTR Occupational Therapist                Time Calculation:   Start Time: 0827  Total Timed Code Minutes- PT: 45 minute(s)     Therapy Charges for Today     Code Description Service Date Service Provider Modifiers Qty    44862629718 HC PT THER PROC EA 15 MIN 6/20/2017 Kat Aguilar, PT GP 1    53257461667 HC PT NEUROMUSC RE EDUCATION EA 15 MIN 6/20/2017 Kat Aguilar, PT GP 2                    Kat Aguilar, PT  6/20/2017

## 2017-06-21 ENCOUNTER — HOSPITAL ENCOUNTER (OUTPATIENT)
Dept: CT IMAGING | Facility: HOSPITAL | Age: 62
Discharge: HOME | End: 2017-06-21
Attending: INTERNAL MEDICINE | Admitting: INTERNAL MEDICINE

## 2017-06-21 ENCOUNTER — TELEPHONE (OUTPATIENT)
Dept: NEUROLOGY | Facility: CLINIC | Age: 62
End: 2017-06-21

## 2017-06-21 DIAGNOSIS — R05.3 CHRONIC COUGH: ICD-10-CM

## 2017-06-21 PROCEDURE — 71260 CT THORAX DX C+: CPT

## 2017-06-21 PROCEDURE — 82565 ASSAY OF CREATININE: CPT

## 2017-06-21 PROCEDURE — 0 IOPAMIDOL 61 % SOLUTION: Performed by: INTERNAL MEDICINE

## 2017-06-21 RX ADMIN — IOPAMIDOL 100 ML: 612 INJECTION, SOLUTION INTRAVENOUS at 10:29

## 2017-06-21 NOTE — TELEPHONE ENCOUNTER
----- Message from Nuvia Lynn sent at 6/21/2017 11:05 AM EDT -----  Regarding: SCRIPT  Contact: [phone]  Patient request script for walker.

## 2017-06-22 LAB
CREAT BLDA-MCNC: 0.8 MG/DL (ref 0.6–1.3)
REF LAB TEST METHOD: NORMAL

## 2017-06-28 ENCOUNTER — HOSPITAL ENCOUNTER (OUTPATIENT)
Dept: PHYSICAL THERAPY | Facility: HOSPITAL | Age: 62
Setting detail: THERAPIES SERIES
Discharge: HOME | End: 2017-06-28
Attending: PSYCHIATRY & NEUROLOGY

## 2017-06-28 ENCOUNTER — APPOINTMENT (OUTPATIENT)
Dept: LAB | Facility: HOSPITAL | Age: 62
End: 2017-06-28

## 2017-06-28 ENCOUNTER — TRANSCRIBE ORDERS (OUTPATIENT)
Dept: LAB | Facility: HOSPITAL | Age: 62
End: 2017-06-28

## 2017-06-28 DIAGNOSIS — G35 MULTIPLE SCLEROSIS (HCC): Primary | ICD-10-CM

## 2017-06-28 DIAGNOSIS — R05.9 COUGH: Primary | ICD-10-CM

## 2017-06-28 LAB
ALBUMIN SERPL-MCNC: 4.2 G/DL (ref 3.2–4.8)
ALBUMIN/GLOB SERPL: 1.4 G/DL (ref 1.5–2.5)
ALP SERPL-CCNC: 131 U/L (ref 25–100)
ALT SERPL W P-5'-P-CCNC: 20 U/L (ref 7–40)
ANION GAP SERPL CALCULATED.3IONS-SCNC: 8 MMOL/L (ref 3–11)
AST SERPL-CCNC: 24 U/L (ref 0–33)
BILIRUB SERPL-MCNC: 0.6 MG/DL (ref 0.3–1.2)
BUN BLD-MCNC: 16 MG/DL (ref 9–23)
BUN/CREAT SERPL: 20 (ref 7–25)
CALCIUM SPEC-SCNC: 10.1 MG/DL (ref 8.7–10.4)
CHLORIDE SERPL-SCNC: 103 MMOL/L (ref 99–109)
CO2 SERPL-SCNC: 31 MMOL/L (ref 20–31)
CREAT BLD-MCNC: 0.8 MG/DL (ref 0.6–1.3)
GFR SERPL CREATININE-BSD FRML MDRD: 73 ML/MIN/1.73
GLOBULIN UR ELPH-MCNC: 3 GM/DL
GLUCOSE BLD-MCNC: 86 MG/DL (ref 70–100)
POTASSIUM BLD-SCNC: 4.6 MMOL/L (ref 3.5–5.5)
PROT SERPL-MCNC: 7.2 G/DL (ref 5.7–8.2)
SODIUM BLD-SCNC: 142 MMOL/L (ref 132–146)

## 2017-06-28 PROCEDURE — 97110 THERAPEUTIC EXERCISES: CPT | Performed by: PHYSICAL THERAPIST

## 2017-06-28 PROCEDURE — 80053 COMPREHEN METABOLIC PANEL: CPT | Performed by: INTERNAL MEDICINE

## 2017-06-28 PROCEDURE — 36415 COLL VENOUS BLD VENIPUNCTURE: CPT | Performed by: INTERNAL MEDICINE

## 2017-06-28 PROCEDURE — 97112 NEUROMUSCULAR REEDUCATION: CPT | Performed by: PHYSICAL THERAPIST

## 2017-06-29 ENCOUNTER — HOSPITAL ENCOUNTER (OUTPATIENT)
Dept: OCCUPATIONAL THERAPY | Facility: HOSPITAL | Age: 62
Setting detail: THERAPIES SERIES
Discharge: HOME | End: 2017-06-29
Attending: PSYCHIATRY & NEUROLOGY

## 2017-06-29 ENCOUNTER — HOSPITAL ENCOUNTER (OUTPATIENT)
Dept: PHYSICAL THERAPY | Facility: HOSPITAL | Age: 62
Setting detail: THERAPIES SERIES
Discharge: HOME | End: 2017-06-29
Attending: PSYCHIATRY & NEUROLOGY

## 2017-06-29 DIAGNOSIS — G35 MULTIPLE SCLEROSIS (HCC): ICD-10-CM

## 2017-06-29 DIAGNOSIS — R29.898 WEAKNESS OF BOTH UPPER EXTREMITIES: ICD-10-CM

## 2017-06-29 DIAGNOSIS — R26.89 POOR BALANCE: ICD-10-CM

## 2017-06-29 DIAGNOSIS — R27.8 DECREASED COORDINATION: Primary | ICD-10-CM

## 2017-06-29 DIAGNOSIS — G35 MULTIPLE SCLEROSIS (HCC): Primary | ICD-10-CM

## 2017-06-29 PROCEDURE — 97112 NEUROMUSCULAR REEDUCATION: CPT | Performed by: OCCUPATIONAL THERAPIST

## 2017-06-29 PROCEDURE — 97110 THERAPEUTIC EXERCISES: CPT

## 2017-06-29 PROCEDURE — 97530 THERAPEUTIC ACTIVITIES: CPT | Performed by: OCCUPATIONAL THERAPIST

## 2017-06-29 NOTE — THERAPY DISCHARGE NOTE
Outpatient Occupational Therapy Neuro Treatment Note/Discharge Summary   [facility]     Patient Name: Rashad Carrillo  : 1955  MRN: [id number]  Today's Date: 2017      Visit Date: 2017    Patient Active Problem List   Diagnosis   • Vitamin D deficiency   • Depression   • Multiple sclerosis   • Periodic limb movement disorder   • Restless legs syndrome   • Muscle spasticity   • Periodic headache syndrome, not intractable        Past Medical History:   Diagnosis Date   • Diabetes mellitus    • Hypertension    • Multiple sclerosis         Past Surgical History:   Procedure Laterality Date   • CHOLECYSTECTOMY           Visit Dx:    ICD-10-CM ICD-9-CM   1. Decreased coordination R27.9 781.3   2. Weakness of both upper extremities M62.81 729.89   3. Multiple sclerosis G35 340   4. Poor balance R26.89 781.99             OT Neuro       17 0915          Subjective Comments    Subjective Comments Pt states she is feeling really fatigued  -EM      Subjective Pain    Able to rate subjective pain? yes  -EM      Post-Treatment Pain Level 0  -EM      Subjective Pain Comment 0  -EM      Coordination    9-Hole Peg Left 30.63  -EM      9-Hole Peg Right 27.01  -EM      Left Shoulder    Flexion Gross Movement (3/5) fair  -EM      ABduction Gross Movement (3/5) fair  -EM      Right Shoulder    Flexion Gross Movement (3/5) fair  -EM      ABduction Gross Movement (3/5) fair  -EM      Left Elbow/Forearm    Elbow Flexion Gross Movement (3/5) fair  -EM      Elbow Extension Gross Movement (3/5) fair  -EM      Right Elbow/Forearm    Elbow Flexion Gross Movement (3/5) fair  -EM      Elbow Extension Gross Movement (3/5) fair  -EM      Left Wrist    Wrist Flexion Gross Movement (3/5) fair  -EM      Wrist Extension Gross Movement (3/5) fair  -EM      Right Wrist    Wrist Flexion Gross Movement (3/5) fair  -EM      Wrist Extension Gross Movement (3/5) fair  -EM      ADL Assessment/Intervention    IADL Assess/Train, Comment  no changes since initial evaluation  -EM        User Key  (r) = Recorded By, (t) = Taken By, (c) = Cosigned By    Initials Name Provider Type    EM Christa OFELIA Villanueva OTR Occupational Therapist             Hand Therapy (last 24 hours)      Hand Eval       06/29/17 0915           Strength Right    # Reps 3  -EM      Right Rung 2  -EM      Right  Test 1 41  -EM      Right  Test 2 46  -EM      Right  Test 3 45  -EM       Strength Average Right 44  -EM       Strength Left    # Reps 3  -EM      Left Rung 2  -EM      Left  Test 1 31  -EM      Left  Test 2 26  -EM      Left  Test 3 25  -EM       Strength Average Left 27.33  -EM      Right Hand Strength - Pinch (lbs)    Lateral 8 lbs  -EM      Left Hand Strength - Pinch (lbs)    Lateral 6.3 lbs  -EM        User Key  (r) = Recorded By, (t) = Taken By, (c) = Cosigned By    Initials Name Provider Type    EM Christa OFELIA Villanueva OTR Occupational Therapist                    OT Assessment/Plan       06/29/17 1523       OT Assessment    Assessment Comments Pt very fatigued this date and discharging d/t insurance.  Limited changes with some declines in scores.   -EM     OT Plan    OT Plan Comments discharge this date  -EM       User Key  (r) = Recorded By, (t) = Taken By, (c) = Cosigned By    Initials Name Provider Type    EM Christa M , OTR Occupational Therapist                 OT Goals       06/29/17 0915       OT Short Term Goals    STG Date to Achieve 07/13/17  -EM     STG 1 Patient will participate in hand and UE strengthening and be educated in HEP to increase functional use  -EM     STG 1 Progress Met  -EM     STG 2 Patient will participate in education for adapted technique and AE for increased safety and satisfaction with ADL tasks.   -EM     STG 2 Progress Met  -EM     STG 3 Patient will participate and be educated in HEP to increase coordination for ADL tasks bilaterally  -EM     STG 3 Progress Met  -EM     Long Term Goals    LTG Date to  Achieve 08/10/17  -EM     LTG 1 Patient will be independent with UE and hand strengthening HEP  -EM     LTG 1 Progress Met  -EM     LTG 2 Pt will score average >5 on Modified falls efficacy scale indicating greater confidence with balance during ADL tasks  -EM     LTG 2 Progress Not Met  -EM     LTG 2 Progress Comments dropped to 3.9  -EM     LTG 3 Patient will score <60 on Modified Fatigue Impact Scale to indicate greater energy for ADL tasks.  -EM     LTG 3 Progress Not Met  -EM     LTG 3 Progress Comments improved to 60; however not less than  -EM       User Key  (r) = Recorded By, (t) = Taken By, (c) = Cosigned By    Initials Name Provider Type    EM Christa M GODFREY Villanueva Occupational Therapist                Therapy Education       06/29/17 1516 06/29/17 0919       Therapy Education    Given HEP  -EM HEP  -KL (r) AB (t) KL (c)     Program Reinforced  -EM Reinforced  -KL (r) AB (t) KL (c)     How Provided Verbal  -EM Verbal;Demonstration  -KL (r) AB (t) KL (c)     Provided to Patient  -EM Patient  -KL (r) AB (t) KL (c)     Level of Understanding Verbalized  -EM Verbalized;Demonstrated  -KL (r) AB (t) KL (c)       User Key  (r) = Recorded By, (t) = Taken By, (c) = Cosigned By    Initials Name Provider Type    STANTON Aguilar, PT Physical Therapist    EM Christa GODFREY Cage Occupational Therapist    AB Stephanie Pérez, PT Student PT Student                    OT Exercises       06/29/17 0915          Exercise 1    Exercise Name 1 wrist and forearm strengthening for ext/flexion and supination/pronation  -EM      Equipment 1 Dumbell  -EM      Weights/Plates 1 3  -EM      Sets 1 2  -EM      Reps 1 10  -EM        User Key  (r) = Recorded By, (t) = Taken By, (c) = Cosigned By    Initials Name Provider Type    EM Christa GILBERT GODFREY Villanueva Occupational Therapist                    Outcome Measures       06/29/17 0915 06/29/17 0800       25 Foot Walk    Walk #1  11.7 seconds  -KL (r) AB (t) KL (c)     Timed Up and Go (TUG)     TUG Test 1  17.25 seconds  -STANTON (r) AB (t) STANTON (c)     Other Outcome Measure Tool Used    Other Outcome Measure Tool Comments Falls - 3.9; Modified Fatigue Impact Scale - total 60/84  -EM        User Key  (r) = Recorded By, (t) = Taken By, (c) = Cosigned By    Initials Name Provider Type    STANTON Aguilar, PT Physical Therapist    LAMONT Villanueva, OTR Occupational Therapist    AB Stephanie Pérez, PT Student PT Student            Time Calculation:   OT Start Time: 0915  Total Timed Code Minutes- OT: 45 minute(s)     Therapy Charges for Today     Code Description Service Date Service Provider Modifiers Qty    17504770473  OT NEUROMUSC RE EDUCATION EA 15 MIN 6/29/2017 GODFREY Villarreal GO 2    [id number]  OT THERAPEUTIC ACT EA 15 MIN 6/29/2017 GODFREY Villarreal GO 1                OP OT Discharge Summary  Date of Discharge: 06/29/17  Reason for Discharge: Patient/Caregiver request, Unable to pay/insurance denied care  Outcomes Achieved: Patient able to partially acheive established goals  Discharge Destination: Home with home program  Discharge Instructions: Continue with HEPs daily        GODFREY Eller  6/29/2017

## 2017-06-29 NOTE — THERAPY DISCHARGE NOTE
Outpatient Physical Therapy Neuro Treatment Note/Discharge Summary  [facility]     Patient Name: Rashad Carrillo  : 1955  MRN: [id number]  Today's Date: 2017      Visit Date: 2017    Visit Dx:    ICD-10-CM ICD-9-CM   1. Multiple sclerosis G35 340       Patient Active Problem List   Diagnosis   • Vitamin D deficiency   • Depression   • Multiple sclerosis   • Periodic limb movement disorder   • Restless legs syndrome   • Muscle spasticity   • Periodic headache syndrome, not intractable                  PT Neuro       17 0800          Subjective Comments    Subjective Comments (P)  Patient reports she is a little sore today. States she is going to get her rollator today.   -AB      Subjective Pain    Able to rate subjective pain? (P)  yes  -AB      Pre-Treatment Pain Level (P)  6  -AB      Subjective Pain Comment (P)  whole body  -AB        User Key  (r) = Recorded By, (t) = Taken By, (c) = Cosigned By    Initials Name Provider Type    AB Stephanie Pérez, PT Student PT Student                        PT Assessment/Plan       17 0920 17 0935    PT Assessment    Assessment Comments (P)  Patient met LTG for the TUG and 25 feet walk with the use of one trekking pole. Reviewed HEP with patient on this date. Patient will continue with HEP, instructed to call if she has any questions.   -AB Patient with improved sit to  clinic, however complaints of upper gluteal soreness. She was administered several stretches to perform at home to decrease the soreness. Next visit will be her DC due to insurance change on .   -KL    Please refer to paper survey for additional self-reported information (P)  Yes  -AB     PT Plan    PT Plan Comments (P)  Discharge on this date, patient will continue with HEP.   -AB Continue per POC.   -KL      User Key  (r) = Recorded By, (t) = Taken By, (c) = Cosigned By    Initials Name Provider Type    STANTON Aguilar, PT Physical Therapist    AB  Stephanie Pérez, PT Student PT Student                     Exercises       06/29/17 0800          Subjective Comments    Subjective Comments (P)  Patient reports she is a little sore today. States she is going to get her rollator today.   -AB      Subjective Pain    Able to rate subjective pain? (P)  yes  -AB      Pre-Treatment Pain Level (P)  6  -AB      Subjective Pain Comment (P)  whole body  -AB      Exercise 1    Exercise Name 1 (P)  NuStep L6  -AB      Time (Minutes) 1 (P)  10  -AB      Exercise 2    Exercise Name 2 (P)  Review HEP see for details  -AB        User Key  (r) = Recorded By, (t) = Taken By, (c) = Cosigned By    Initials Name Provider Type    AB Stephanie Pérez, PT Student PT Student                              PT OP Goals       06/29/17 0800       PT Short Term Goals    STG Date to Achieve (P)  06/29/17  -AB     STG 1 (P)  Pt will be compliant with HEP.   -AB     STG 1 Progress (P)  Met  -AB     STG 2 (P)  Patient to improve GRIMALDO balance score to >/= 20/56 to decrease client's risk of falls.  -AB     STG 2 Progress (P)  Not Met  -AB     STG 3 (P)  Patient to ambulate 25 feet within 14 sec without LOB at a regular pace for improved gait cinthia.  -AB     STG 3 Progress (P)  Met  -AB     STG 4 (P)  Patient to perform TUG within 27 sec without LOB for improved functional mobility.  -AB     STG 4 Progress (P)  Met  -AB     Long Term Goals    LTG Date to Achieve (P)  07/27/17  -AB     LTG 1 (P)  Pt will be I with HEP.   -AB     LTG 1 Progress (P)  Met  -AB     LTG 2 (P)  Patient to improve GRIMALDO balance score to >/= 24/56 to decrease client's risk of falls.  -AB     LTG 2 Progress (P)  Not Met  -AB     LTG 3 (P)  Patient to ambulate 25 feet within 12 sec without LOB at a regular pace for improved gait cinthia.  -AB     LTG 3 Progress (P)  Met  -AB     LTG 4 (P)  Patient to perform TUG within 22 sec without LOB for improved functional mobility.  -AB     LTG 4 Progress (P)  Met  -AB       User Key   (r) = Recorded By, (t) = Taken By, (c) = Cosigned By    Initials Name Provider Type    AB Stephanie Pérez PT Student PT Student                Therapy Education       06/29/17 0919 06/28/17 0935       Therapy Education    Given (P)  HEP  -AB HEP  -KL     Program (P)  Reinforced  -AB New  -KL     How Provided (P)  Verbal;Demonstration  -AB Verbal;Demonstration;Written  -KL     Provided to (P)  Patient  -AB Patient  -KL     Level of Understanding (P)  Verbalized;Demonstrated  -AB Verbalized;Demonstrated  -KL       User Key  (r) = Recorded By, (t) = Taken By, (c) = Cosigned By    Initials Name Provider Type    STANTON Aguilar, PT Physical Therapist    AB Stephanie Pérez, PT Student PT Student                Outcome Measures       06/29/17 0800          25 Foot Walk    Walk #1 (P)  11.7 seconds  -AB      Timed Up and Go (TUG)    TUG Test 1 (P)  17.25 seconds  -AB        User Key  (r) = Recorded By, (t) = Taken By, (c) = Cosigned By    Initials Name Provider Type    AB Stephanie Pérez, PT Student PT Student          Time Calculation:   Start Time: (P) 0830  Total Timed Code Minutes- PT: (P) 45 minute(s)     Therapy Charges for Today     Code Description Service Date Service Provider Modifiers Qty    80470000027 HC PT THER PROC EA 15 MIN 6/29/2017 Stephanie Pérez PT Student GP 3                OP PT Discharge Summary  Date of Discharge: (P) 06/29/17  Reason for Discharge: (P)  (Changing insurance at the end of the month)  Outcomes Achieved: (P) Patient able to partially acheive established goals  Discharge Destination: (P) Home with home program  Discharge Instructions: (P) Patient instructed to call if she has any questions.        Stephanie Pérez PT Student  6/29/2017

## 2017-07-26 DIAGNOSIS — G35 MULTIPLE SCLEROSIS (HCC): ICD-10-CM

## 2017-07-26 RX ORDER — DALFAMPRIDINE 10 MG/1
TABLET, FILM COATED, EXTENDED RELEASE ORAL
Qty: 180 TABLET | Refills: 0 | Status: SHIPPED | OUTPATIENT
Start: 2017-07-26 | End: 2017-07-28 | Stop reason: SDUPTHER

## 2017-07-28 ENCOUNTER — TELEPHONE (OUTPATIENT)
Dept: NEUROLOGY | Facility: CLINIC | Age: 62
End: 2017-07-28

## 2017-07-28 DIAGNOSIS — G35 MULTIPLE SCLEROSIS (HCC): Primary | ICD-10-CM

## 2017-07-28 NOTE — TELEPHONE ENCOUNTER
----- Message from Nuvia Lynn sent at 7/27/2017  2:23 PM EDT -----  Regarding: refill  Patient has new insuranc. Please send new script for Ampyra to [facility]

## 2017-07-28 NOTE — TELEPHONE ENCOUNTER
Let pt know that we have updated her insurance and pharmacy in the system and that she will have to call [facility] at [phone] and see what type of discount programs they have and to make sure that she updates her insurance with them. Pt stated understanding.

## 2017-07-28 NOTE — TELEPHONE ENCOUNTER
----- Message from Nuvia Lynn sent at 7/28/2017 11:55 AM EDT -----  Regarding: NEW SCRIPT  Please have Dr. Gilbert, write a new script for Ampyra.     Please fax it to [phone]

## 2017-07-31 RX ORDER — DALFAMPRIDINE 10 MG/1
10 TABLET, FILM COATED, EXTENDED RELEASE ORAL EVERY 12 HOURS
Qty: 180 TABLET | Refills: 3 | Status: SHIPPED | OUTPATIENT
Start: 2017-07-31 | End: 2017-07-31 | Stop reason: SDUPTHER

## 2017-07-31 RX ORDER — DALFAMPRIDINE 10 MG/1
10 TABLET, FILM COATED, EXTENDED RELEASE ORAL EVERY 12 HOURS
Qty: 180 TABLET | Refills: 3 | Status: SHIPPED | OUTPATIENT
Start: 2017-07-31 | End: 2018-03-20 | Stop reason: SDUPTHER

## 2017-08-25 DIAGNOSIS — G43.C0 PERIODIC HEADACHE SYNDROME, NOT INTRACTABLE: Primary | ICD-10-CM

## 2017-08-25 RX ORDER — TOPIRAMATE 100 MG/1
100 TABLET, FILM COATED ORAL 2 TIMES DAILY
Qty: 60 TABLET | Refills: 11 | Status: SHIPPED | OUTPATIENT
Start: 2017-08-25 | End: 2017-12-12

## 2017-09-18 ENCOUNTER — LAB REQUISITION (OUTPATIENT)
Dept: LAB | Facility: HOSPITAL | Age: 62
End: 2017-09-18

## 2017-09-18 ENCOUNTER — TRANSCRIBE ORDERS (OUTPATIENT)
Dept: LAB | Facility: HOSPITAL | Age: 62
End: 2017-09-18

## 2017-09-18 ENCOUNTER — APPOINTMENT (OUTPATIENT)
Dept: LAB | Facility: HOSPITAL | Age: 62
End: 2017-09-18

## 2017-09-18 DIAGNOSIS — R05.9 COUGH: Primary | ICD-10-CM

## 2017-09-18 DIAGNOSIS — R76.12 NONSPECIFIC REACTION TO CELL MEDIATED IMMUNITY MEASUREMENT OF GAMMA INTERFERON ANTIGEN RESPONSE WITHOUT ACTIVE TUBERCULOSIS: ICD-10-CM

## 2017-09-18 DIAGNOSIS — R76.11 NONSPECIFIC REACTION TO TUBERCULIN SKIN TEST WITHOUT ACTIVE TUBERCULOSIS: ICD-10-CM

## 2017-09-18 DIAGNOSIS — R05.9 COUGH: ICD-10-CM

## 2017-09-18 DIAGNOSIS — R76.12 RESPONSE TO CELL-MEDIATED GAMMA INTERFERON ANTIGEN WITHOUT ACTIVE TUBERCULOSIS: ICD-10-CM

## 2017-09-18 DIAGNOSIS — G35 MULTIPLE SCLEROSIS (HCC): ICD-10-CM

## 2017-09-18 LAB
ALBUMIN SERPL-MCNC: 4 G/DL (ref 3.2–4.8)
ALBUMIN/GLOB SERPL: 1.3 G/DL (ref 1.5–2.5)
ALP SERPL-CCNC: 146 U/L (ref 25–100)
ALT SERPL W P-5'-P-CCNC: 23 U/L (ref 7–40)
ANION GAP SERPL CALCULATED.3IONS-SCNC: 6 MMOL/L (ref 3–11)
AST SERPL-CCNC: 28 U/L (ref 0–33)
BILIRUB SERPL-MCNC: 0.6 MG/DL (ref 0.3–1.2)
BILIRUB UR QL STRIP: NEGATIVE
BILIRUB UR QL STRIP: NEGATIVE
BUN BLD-MCNC: 12 MG/DL (ref 9–23)
BUN/CREAT SERPL: 15 (ref 7–25)
CALCIUM SPEC-SCNC: 9.5 MG/DL (ref 8.7–10.4)
CHLORIDE SERPL-SCNC: 104 MMOL/L (ref 99–109)
CLARITY UR: CLEAR
CLARITY UR: CLEAR
CO2 SERPL-SCNC: 30 MMOL/L (ref 20–31)
COLOR UR: YELLOW
COLOR UR: YELLOW
CREAT BLD-MCNC: 0.8 MG/DL (ref 0.6–1.3)
GFR SERPL CREATININE-BSD FRML MDRD: 73 ML/MIN/1.73
GLOBULIN UR ELPH-MCNC: 3.1 GM/DL
GLUCOSE BLD-MCNC: 81 MG/DL (ref 70–100)
GLUCOSE UR STRIP-MCNC: NEGATIVE MG/DL
GLUCOSE UR STRIP-MCNC: NEGATIVE MG/DL
HGB UR QL STRIP.AUTO: NEGATIVE
HGB UR QL STRIP.AUTO: NEGATIVE
KETONES UR QL STRIP: NEGATIVE
KETONES UR QL STRIP: NEGATIVE
LEUKOCYTE ESTERASE UR QL STRIP.AUTO: NEGATIVE
LEUKOCYTE ESTERASE UR QL STRIP.AUTO: NEGATIVE
NITRITE UR QL STRIP: NEGATIVE
NITRITE UR QL STRIP: NEGATIVE
PH UR STRIP.AUTO: 7 [PH] (ref 5–8)
PH UR STRIP.AUTO: 7 [PH] (ref 5–8)
POTASSIUM BLD-SCNC: 4.3 MMOL/L (ref 3.5–5.5)
PROT SERPL-MCNC: 7.1 G/DL (ref 5.7–8.2)
PROT UR QL STRIP: NEGATIVE
PROT UR QL STRIP: NEGATIVE
SODIUM BLD-SCNC: 140 MMOL/L (ref 132–146)
SP GR UR STRIP: 1.01 (ref 1–1.03)
SP GR UR STRIP: 1.01 (ref 1–1.03)
UROBILINOGEN UR QL STRIP: NORMAL
UROBILINOGEN UR QL STRIP: NORMAL

## 2017-09-18 PROCEDURE — 87086 URINE CULTURE/COLONY COUNT: CPT | Performed by: INTERNAL MEDICINE

## 2017-09-18 PROCEDURE — 36415 COLL VENOUS BLD VENIPUNCTURE: CPT | Performed by: INTERNAL MEDICINE

## 2017-09-18 PROCEDURE — 80053 COMPREHEN METABOLIC PANEL: CPT | Performed by: INTERNAL MEDICINE

## 2017-09-18 PROCEDURE — 81015 MICROSCOPIC EXAM OF URINE: CPT | Performed by: INTERNAL MEDICINE

## 2017-09-18 PROCEDURE — 81003 URINALYSIS AUTO W/O SCOPE: CPT | Performed by: INTERNAL MEDICINE

## 2017-09-19 LAB
BACTERIA UR QL AUTO: NORMAL /HPF
HYALINE CASTS UR QL AUTO: NORMAL /LPF
RBC # UR: NORMAL /HPF
REF LAB TEST METHOD: NORMAL
SQUAMOUS #/AREA URNS HPF: NORMAL /HPF
WBC UR QL AUTO: NORMAL /HPF

## 2017-09-20 ENCOUNTER — OFFICE VISIT (OUTPATIENT)
Dept: NEUROLOGY | Facility: CLINIC | Age: 62
End: 2017-09-20

## 2017-09-20 VITALS
WEIGHT: 293 LBS | HEIGHT: 72 IN | DIASTOLIC BLOOD PRESSURE: 80 MMHG | HEART RATE: 69 BPM | BODY MASS INDEX: 39.68 KG/M2 | OXYGEN SATURATION: 99 % | SYSTOLIC BLOOD PRESSURE: 130 MMHG

## 2017-09-20 DIAGNOSIS — G35 MULTIPLE SCLEROSIS (HCC): Primary | ICD-10-CM

## 2017-09-20 DIAGNOSIS — G25.81 RESTLESS LEGS SYNDROME: ICD-10-CM

## 2017-09-20 DIAGNOSIS — G43.C0 PERIODIC HEADACHE SYNDROME, NOT INTRACTABLE: ICD-10-CM

## 2017-09-20 DIAGNOSIS — F33.1 MODERATE EPISODE OF RECURRENT MAJOR DEPRESSIVE DISORDER (HCC): ICD-10-CM

## 2017-09-20 LAB
BACTERIA SPEC AEROBE CULT: NORMAL
BACTERIA SPEC AEROBE CULT: NORMAL

## 2017-09-20 PROCEDURE — 99214 OFFICE O/P EST MOD 30 MIN: CPT | Performed by: PSYCHIATRY & NEUROLOGY

## 2017-09-20 RX ORDER — ISONIAZID 300 MG/1
TABLET ORAL
Refills: 8 | COMMUNITY
Start: 2017-08-21 | End: 2018-01-25 | Stop reason: HOSPADM

## 2017-09-20 RX ORDER — OMEPRAZOLE 20 MG/1
CAPSULE, DELAYED RELEASE ORAL
Refills: 11 | COMMUNITY
Start: 2017-08-21 | End: 2022-11-11 | Stop reason: HOSPADM

## 2017-09-20 RX ORDER — GABAPENTIN 300 MG/1
CAPSULE ORAL
Refills: 1 | COMMUNITY
Start: 2017-09-07 | End: 2018-01-23

## 2017-09-20 RX ORDER — DULOXETIN HYDROCHLORIDE 30 MG/1
CAPSULE, DELAYED RELEASE ORAL
Refills: 5 | COMMUNITY
Start: 2017-08-21 | End: 2020-04-27

## 2017-09-20 RX ORDER — AZELASTINE HCL 205.5 UG/1
SPRAY NASAL
Refills: 3 | COMMUNITY
Start: 2017-06-20

## 2017-09-20 RX ORDER — GABAPENTIN 100 MG/1
CAPSULE ORAL
Refills: 1 | COMMUNITY
Start: 2017-09-07 | End: 2018-01-23

## 2017-09-20 RX ORDER — BUPROPION HYDROCHLORIDE 150 MG/1
TABLET ORAL
Refills: 3 | COMMUNITY
Start: 2017-08-30 | End: 2018-11-21

## 2017-09-20 NOTE — ASSESSMENT & PLAN NOTE
Slight worsening off Tysabri for 3 months    Continue on Tysabri for next 6 months until treatment for TB is finished    JCV ab index  Continue Ampyra

## 2017-09-20 NOTE — PROGRESS NOTES
Subjective:     Patient ID: Rashad Carrillo is a 61 y.o. female.    History of Present Illness     61 y.o.  woman with RRMS, migraines, MDD and RLS returns in follow up.  Last visit on 5/23/17 renewed GBP, Tysabri, Cymbalta and Ampyra.  Discussed changing to [place] and but had low positive TB.      Referred to ID and dx with latent TB.  Recommended isoniazid and pyridoxine for 9 months.       MRI Brain, 6/9/16, no change in T2 lesion load since MRI 2015 but has moderate to severe T2 lesion burden and moderate atrophy.     JCV ab - 11/11/16 - 0.97; started 11/2014 33 total infusions     RRMS    Unsteady gait continues and using walking stick.  Heat intolerance and fatigue are severe.  Memory and att/concentration are worsening.  3 months without an infusion. Last infusion in August.  Sx worsened off treatment.      Ampyra helping walking endurance and speed.     Problem history:    25 ft timed walk increaesed 15.99 from 9.94.  Left hand 9 hole peg worsened.   Increasing swallowing difficulty.        Immediate and working memory are declining with SDMT 27/110..       MDD    Cymbalta helping mood.     RLS    RLS decreased with GBP.   mg po TID, 300 mg 1 to 3 times overnight for discomfort.    MIgraines    HA frequency is daily.  Moderate severity.  TPM of minimal benefit.       The following portions of the patient's history were reviewed and updated as appropriate: allergies, current medications, past medical history, past surgical history and problem list.    Review of Systems   Constitutional: Positive for fatigue. Negative for activity change and unexpected weight change.   HENT: Negative for tinnitus and trouble swallowing.    Eyes: Negative for photophobia and visual disturbance.   Respiratory: Negative for apnea and choking.    Cardiovascular: Negative for leg swelling.   Endocrine: Positive for heat intolerance. Negative for cold intolerance.   Genitourinary: Negative for difficulty urinating, frequency,  "menstrual problem and urgency.   Musculoskeletal: Positive for gait problem. Negative for back pain.   Skin: Negative for color change.   Allergic/Immunologic: Negative for immunocompromised state.   Neurological: Positive for dizziness, tremors, numbness and headaches. Negative for seizures, syncope, facial asymmetry, speech difficulty and light-headedness.   Hematological: Negative for adenopathy. Does not bruise/bleed easily.   Psychiatric/Behavioral: Positive for decreased concentration, dysphoric mood and sleep disturbance. Negative for behavioral problems, confusion and hallucinations. The patient is nervous/anxious.         Objective:  Vitals:    09/20/17 0947   BP: 130/80   Pulse: 69   SpO2: 99%   Weight: (!) 323 lb (147 kg)   Height: 72\" (182.9 cm)        Neurologic Exam     Mental Status   Registration: recalls 3 of 3 objects. Recall at 5 minutes: recalls 3 of 3 objects. Follows 3 step commands.   Attention: normal. Concentration: normal.   Level of consciousness: alert  Knowledge: good and consistent with education.   Able to name object. Able to read. Able to repeat. Able to write. Normal comprehension.              Cranial Nerves     CN II   Visual fields full to confrontation.   Visual acuity: normal  Right visual field deficit: none  Left visual field deficit: none     CN III, IV, VI   Nystagmus: none   Diplopia: none  Ophthalmoparesis: none  Upgaze: normal  Downgaze: normal  Conjugate gaze: present  Vestibulo-ocular reflex: present    CN V   Facial sensation intact.   Right corneal reflex: normal  Left corneal reflex: normal    CN VII   Right facial weakness: none  Left facial weakness: none    CN VIII   Hearing: intact    CN IX, X   Palate: symmetric  Right gag reflex: normal  Left gag reflex: normal    CN XI   Right sternocleidomastoid strength: normal  Left sternocleidomastoid strength: normal    CN XII   Tongue: not atrophic  Fasciculations: absent  Tongue deviation: none    Motor Exam   Muscle " bulk: normal  Overall muscle tone: normal  Right arm tone: normal  Left arm tone: normal  Right leg tone: increased  Left leg tone: increased    Strength   Strength 5/5 except as noted.   Left quadriceps: 4/5  Left hamstrin/5  Left glutei: 4/5  Left anterior tibial: 4/5  Left posterior tibial: 4/5  Left peroneal: 4/5  Left gastroc: 4/5    Sensory Exam   Right arm light touch: normal  Right leg light touch: normal  Right arm vibration: normal  Right leg vibration: normal  Right arm proprioception: normal  Right leg proprioception: normal  Right arm pinprick: normal  Right leg pinprick: normal  Sensory deficit distribution on right: non-dermatomal distribution (decreased left face, arm and leg)    Gait, Coordination, and Reflexes     Gait  Gait: circumduction, shuffling and wide-based (left leg)    Coordination   Romberg: positive  Heel to shin coordination: abnormal  Tandem walking coordination: abnormal    Tremor   Resting tremor: absent  Intention tremor: absent  Action tremor: left arm and right arm    Reflexes   Right brachioradialis: 3+  Left brachioradialis: 3+  Right biceps: 3+  Left biceps: 3+  Right triceps: 3+  Left triceps: 3+  Right patellar: 3+  Left patellar: 3+  Right achilles: 3+  Left achilles: 3+  Right : 3+  Left : 3+  Right plantar: normal  Left plantar: normal      Physical Exam   Constitutional: She appears well-developed and well-nourished.   Neurological: She has an abnormal Heel to Brown Test, an abnormal Romberg Test and an abnormal Tandem Gait Test.   Reflex Scores:       Tricep reflexes are 3+ on the right side and 3+ on the left side.       Bicep reflexes are 3+ on the right side and 3+ on the left side.       Brachioradialis reflexes are 3+ on the right side and 3+ on the left side.       Patellar reflexes are 3+ on the right side and 3+ on the left side.       Achilles reflexes are 3+ on the right side and 3+ on the left side.  Nursing note and vitals reviewed.    Lab  Requisition on 09/18/2017   Component Date Value Ref Range Status   • Color, UA 09/18/2017 Yellow  Yellow, Straw Final   • Appearance, UA 09/18/2017 Clear  Clear Final   • pH, UA 09/18/2017 7.0  5.0 - 8.0 Final   • Specific Gravity, UA 09/18/2017 1.007  1.001 - 1.030 Final   • Glucose, UA 09/18/2017 Negative  Negative Final   • Ketones, UA 09/18/2017 Negative  Negative Final   • Bilirubin, UA 09/18/2017 Negative  Negative Final   • Blood, UA 09/18/2017 Negative  Negative Final   • Protein, UA 09/18/2017 Negative  Negative Final   • Leuk Esterase, UA 09/18/2017 Negative  Negative Final   • Nitrite, UA 09/18/2017 Negative  Negative Final   • Urobilinogen, UA 09/18/2017 1.0 E.U./dL  0.2 - 1.0 E.U./dL Final   • Urine Culture 09/18/2017 >100,000 CFU/mL Normal Urogenital Veronika   Final   • Color, UA 09/18/2017 Yellow  Yellow, Straw Final   • Appearance, UA 09/18/2017 Clear  Clear Final   • pH, UA 09/18/2017 7.0  5.0 - 8.0 Final   • Specific Gravity, UA 09/18/2017 1.007  1.001 - 1.030 Final   • Glucose, UA 09/18/2017 Negative  Negative Final   • Ketones, UA 09/18/2017 Negative  Negative Final   • Bilirubin, UA 09/18/2017 Negative  Negative Final   • Blood, UA 09/18/2017 Negative  Negative Final   • Protein, UA 09/18/2017 Negative  Negative Final   • Leuk Esterase, UA 09/18/2017 Negative  Negative Final   • Nitrite, UA 09/18/2017 Negative  Negative Final   • Urobilinogen, UA 09/18/2017 1.0 E.U./dL  0.2 - 1.0 E.U./dL Final   • RBC, UA 09/18/2017 0-2  None Seen, 0-2 /HPF Final   • WBC, UA 09/18/2017 None Seen  None Seen /HPF Final   • Bacteria, UA 09/18/2017 None Seen  None Seen, Trace /HPF Final   • Squamous Epithelial Cells, UA 09/18/2017 0-2  None Seen, 0-2 /HPF Final   • Hyaline Casts, UA 09/18/2017 None Seen  0 - 6 /LPF Final   • Methodology 09/18/2017 Automated Microscopy   Final       Assessment/Plan:       Problems Addressed this Visit        Cardiovascular and Mediastinum    Periodic headache syndrome, not intractable      Headaches are unchanged.  Continue current treatment regimen.             Relevant Medications    gabapentin (NEURONTIN) 100 MG capsule    gabapentin (NEURONTIN) 300 MG capsule    buPROPion XL (WELLBUTRIN XL) 150 MG 24 hr tablet    DULoxetine (CYMBALTA) 30 MG capsule       Nervous and Auditory    Multiple sclerosis - Primary     Slight worsening off Tysabri for 3 months    Continue on Tysabri for next 6 months until treatment for TB is finished    JCV ab index  Continue Ampyra          Relevant Orders    Stratify JCV, Antibody ISSA With / Reflex To Inhibition Assay    MRI Brain With & Without Contrast       Other    Depression    Relevant Medications    buPROPion XL (WELLBUTRIN XL) 150 MG 24 hr tablet    DULoxetine (CYMBALTA) 30 MG capsule    Restless legs syndrome     Controlled on GBP

## 2017-09-25 ENCOUNTER — RESULTS ENCOUNTER (OUTPATIENT)
Dept: NEUROLOGY | Facility: CLINIC | Age: 62
End: 2017-09-25

## 2017-09-25 DIAGNOSIS — G35 MULTIPLE SCLEROSIS (HCC): ICD-10-CM

## 2017-09-27 ENCOUNTER — APPOINTMENT (OUTPATIENT)
Dept: MRI IMAGING | Facility: HOSPITAL | Age: 62
End: 2017-09-27
Attending: PSYCHIATRY & NEUROLOGY

## 2017-10-10 ENCOUNTER — APPOINTMENT (OUTPATIENT)
Dept: MRI IMAGING | Facility: HOSPITAL | Age: 62
End: 2017-10-10
Attending: PSYCHIATRY & NEUROLOGY

## 2017-10-30 ENCOUNTER — TRANSCRIBE ORDERS (OUTPATIENT)
Dept: LAB | Facility: HOSPITAL | Age: 62
End: 2017-10-30

## 2017-10-30 ENCOUNTER — LAB (OUTPATIENT)
Dept: LAB | Facility: HOSPITAL | Age: 62
End: 2017-10-30

## 2017-10-30 DIAGNOSIS — Z22.7 INACTIVE TUBERCULOSIS: ICD-10-CM

## 2017-10-30 DIAGNOSIS — R76.12 NONSPECIFIC REACTION TO CELL MEDIATED IMMUNITY MEASUREMENT OF GAMMA INTERFERON ANTIGEN RESPONSE WITHOUT ACTIVE TUBERCULOSIS: ICD-10-CM

## 2017-10-30 DIAGNOSIS — G35 MULTIPLE SCLEROSIS (HCC): ICD-10-CM

## 2017-10-30 DIAGNOSIS — R05.9 COUGH: ICD-10-CM

## 2017-10-30 DIAGNOSIS — Z22.7 INACTIVE TUBERCULOSIS: Primary | ICD-10-CM

## 2017-10-30 LAB
ALBUMIN SERPL-MCNC: 4.1 G/DL (ref 3.2–4.8)
ALBUMIN/GLOB SERPL: 1.4 G/DL (ref 1.5–2.5)
ALP SERPL-CCNC: 147 U/L (ref 25–100)
ALT SERPL W P-5'-P-CCNC: 34 U/L (ref 7–40)
ANION GAP SERPL CALCULATED.3IONS-SCNC: 4 MMOL/L (ref 3–11)
AST SERPL-CCNC: 30 U/L (ref 0–33)
BACTERIA UR QL AUTO: ABNORMAL /HPF
BILIRUB SERPL-MCNC: 0.7 MG/DL (ref 0.3–1.2)
BILIRUB UR QL STRIP: NEGATIVE
BUN BLD-MCNC: 19 MG/DL (ref 9–23)
BUN/CREAT SERPL: 23.8 (ref 7–25)
CALCIUM SPEC-SCNC: 9.4 MG/DL (ref 8.7–10.4)
CHLORIDE SERPL-SCNC: 105 MMOL/L (ref 99–109)
CLARITY UR: CLEAR
CO2 SERPL-SCNC: 31 MMOL/L (ref 20–31)
COLOR UR: YELLOW
CREAT BLD-MCNC: 0.8 MG/DL (ref 0.6–1.3)
GFR SERPL CREATININE-BSD FRML MDRD: 73 ML/MIN/1.73
GLOBULIN UR ELPH-MCNC: 2.9 GM/DL
GLUCOSE BLD-MCNC: 106 MG/DL (ref 70–100)
GLUCOSE UR STRIP-MCNC: NEGATIVE MG/DL
HGB UR QL STRIP.AUTO: NEGATIVE
HYALINE CASTS UR QL AUTO: ABNORMAL /LPF
KETONES UR QL STRIP: NEGATIVE
LEUKOCYTE ESTERASE UR QL STRIP.AUTO: ABNORMAL
NITRITE UR QL STRIP: NEGATIVE
PH UR STRIP.AUTO: 6.5 [PH] (ref 5–8)
POTASSIUM BLD-SCNC: 4.2 MMOL/L (ref 3.5–5.5)
PROT SERPL-MCNC: 7 G/DL (ref 5.7–8.2)
PROT UR QL STRIP: NEGATIVE
RBC # UR: ABNORMAL /HPF
REF LAB TEST METHOD: ABNORMAL
SODIUM BLD-SCNC: 140 MMOL/L (ref 132–146)
SP GR UR STRIP: 1.01 (ref 1–1.03)
SQUAMOUS #/AREA URNS HPF: ABNORMAL /HPF
TRANS CELLS #/AREA URNS HPF: ABNORMAL /HPF
UROBILINOGEN UR QL STRIP: ABNORMAL
WAXY CASTS #/AREA URNS LPF: ABNORMAL /LPF
WBC UR QL AUTO: ABNORMAL /HPF

## 2017-10-30 PROCEDURE — 36415 COLL VENOUS BLD VENIPUNCTURE: CPT | Performed by: INTERNAL MEDICINE

## 2017-10-30 PROCEDURE — 81001 URINALYSIS AUTO W/SCOPE: CPT | Performed by: INTERNAL MEDICINE

## 2017-10-30 PROCEDURE — 80053 COMPREHEN METABOLIC PANEL: CPT | Performed by: INTERNAL MEDICINE

## 2017-10-30 PROCEDURE — 87147 CULTURE TYPE IMMUNOLOGIC: CPT | Performed by: INTERNAL MEDICINE

## 2017-10-30 PROCEDURE — 87086 URINE CULTURE/COLONY COUNT: CPT | Performed by: INTERNAL MEDICINE

## 2017-10-31 LAB
BACTERIA SPEC AEROBE CULT: ABNORMAL
STREP GROUPING: ABNORMAL

## 2017-11-21 ENCOUNTER — TELEPHONE (OUTPATIENT)
Dept: NEUROLOGY | Facility: CLINIC | Age: 62
End: 2017-11-21

## 2017-11-21 DIAGNOSIS — G35 MULTIPLE SCLEROSIS (HCC): Primary | ICD-10-CM

## 2017-11-21 NOTE — TELEPHONE ENCOUNTER
----- Message from Nuvia Lynn sent at 11/21/2017  1:03 PM EST -----  Regarding: MRI  Contact: [phone]  Patient states insurance will pay for MRI. Please place order for MRI back in system.

## 2017-11-22 ENCOUNTER — TRANSCRIBE ORDERS (OUTPATIENT)
Dept: ADMINISTRATIVE | Facility: HOSPITAL | Age: 62
End: 2017-11-22

## 2017-11-22 DIAGNOSIS — Z12.31 VISIT FOR SCREENING MAMMOGRAM: Primary | ICD-10-CM

## 2017-11-27 ENCOUNTER — TRANSCRIBE ORDERS (OUTPATIENT)
Dept: ADMINISTRATIVE | Facility: HOSPITAL | Age: 62
End: 2017-11-27

## 2017-11-27 ENCOUNTER — TRANSCRIBE ORDERS (OUTPATIENT)
Dept: LAB | Facility: HOSPITAL | Age: 62
End: 2017-11-27

## 2017-11-27 ENCOUNTER — HOSPITAL ENCOUNTER (OUTPATIENT)
Dept: GENERAL RADIOLOGY | Facility: HOSPITAL | Age: 62
Discharge: HOME | End: 2017-11-27
Attending: INTERNAL MEDICINE | Admitting: INTERNAL MEDICINE

## 2017-11-27 ENCOUNTER — APPOINTMENT (OUTPATIENT)
Dept: MRI IMAGING | Facility: HOSPITAL | Age: 62
End: 2017-11-27
Attending: PSYCHIATRY & NEUROLOGY

## 2017-11-27 ENCOUNTER — APPOINTMENT (OUTPATIENT)
Dept: LAB | Facility: HOSPITAL | Age: 62
End: 2017-11-27

## 2017-11-27 ENCOUNTER — LAB (OUTPATIENT)
Dept: LAB | Facility: HOSPITAL | Age: 62
End: 2017-11-27

## 2017-11-27 DIAGNOSIS — Z22.7 INACTIVE TUBERCULOSIS: Primary | ICD-10-CM

## 2017-11-27 DIAGNOSIS — G35 MULTIPLE SCLEROSIS (HCC): ICD-10-CM

## 2017-11-27 DIAGNOSIS — R05.9 COUGH: Primary | ICD-10-CM

## 2017-11-27 DIAGNOSIS — R05.9 COUGH: ICD-10-CM

## 2017-11-27 DIAGNOSIS — Z22.7 INACTIVE TUBERCULOSIS: ICD-10-CM

## 2017-11-27 DIAGNOSIS — R76.12 NONSPECIFIC REACTION TO CELL MEDIATED IMMUNITY MEASUREMENT OF GAMMA INTERFERON ANTIGEN RESPONSE WITHOUT ACTIVE TUBERCULOSIS: ICD-10-CM

## 2017-11-27 LAB
ALBUMIN SERPL-MCNC: 4.1 G/DL (ref 3.2–4.8)
ALBUMIN/GLOB SERPL: 1.4 G/DL (ref 1.5–2.5)
ALP SERPL-CCNC: 164 U/L (ref 25–100)
ALT SERPL W P-5'-P-CCNC: 58 U/L (ref 7–40)
ANION GAP SERPL CALCULATED.3IONS-SCNC: 3 MMOL/L (ref 3–11)
AST SERPL-CCNC: 32 U/L (ref 0–33)
BILIRUB SERPL-MCNC: 0.4 MG/DL (ref 0.3–1.2)
BUN BLD-MCNC: 22 MG/DL (ref 9–23)
BUN/CREAT SERPL: 24.4 (ref 7–25)
CALCIUM SPEC-SCNC: 9.1 MG/DL (ref 8.7–10.4)
CHLORIDE SERPL-SCNC: 109 MMOL/L (ref 99–109)
CO2 SERPL-SCNC: 31 MMOL/L (ref 20–31)
CREAT BLD-MCNC: 0.9 MG/DL (ref 0.6–1.3)
GFR SERPL CREATININE-BSD FRML MDRD: 64 ML/MIN/1.73
GLOBULIN UR ELPH-MCNC: 2.9 GM/DL
GLUCOSE BLD-MCNC: 93 MG/DL (ref 70–100)
POTASSIUM BLD-SCNC: 4.4 MMOL/L (ref 3.5–5.5)
PROT SERPL-MCNC: 7 G/DL (ref 5.7–8.2)
SODIUM BLD-SCNC: 143 MMOL/L (ref 132–146)

## 2017-11-27 PROCEDURE — 71020 HC CHEST PA AND LATERAL: CPT

## 2017-11-27 PROCEDURE — 87798 DETECT AGENT NOS DNA AMP: CPT

## 2017-11-27 PROCEDURE — 80053 COMPREHEN METABOLIC PANEL: CPT | Performed by: INTERNAL MEDICINE

## 2017-11-27 PROCEDURE — 87581 M.PNEUMON DNA AMP PROBE: CPT

## 2017-11-27 PROCEDURE — 87633 RESP VIRUS 12-25 TARGETS: CPT

## 2017-11-27 PROCEDURE — 36415 COLL VENOUS BLD VENIPUNCTURE: CPT | Performed by: INTERNAL MEDICINE

## 2017-11-27 PROCEDURE — 87486 CHLMYD PNEUM DNA AMP PROBE: CPT

## 2017-11-28 LAB — REF LAB TEST RESULTS: NORMAL

## 2017-12-04 ENCOUNTER — HOSPITAL ENCOUNTER (OUTPATIENT)
Dept: MRI IMAGING | Facility: HOSPITAL | Age: 62
Discharge: HOME | End: 2017-12-04
Attending: PSYCHIATRY & NEUROLOGY | Admitting: PSYCHIATRY & NEUROLOGY

## 2017-12-04 DIAGNOSIS — G35 MULTIPLE SCLEROSIS (HCC): ICD-10-CM

## 2017-12-04 PROCEDURE — A9577 INJ MULTIHANCE: HCPCS | Performed by: PSYCHIATRY & NEUROLOGY

## 2017-12-04 PROCEDURE — 70553 MRI BRAIN STEM W/O & W/DYE: CPT

## 2017-12-04 PROCEDURE — 0 GADOBENATE DIMEGLUMINE 529 MG/ML SOLUTION: Performed by: PSYCHIATRY & NEUROLOGY

## 2017-12-04 RX ADMIN — GADOBENATE DIMEGLUMINE 20 ML: 529 INJECTION, SOLUTION INTRAVENOUS at 09:20

## 2017-12-12 ENCOUNTER — OFFICE VISIT (OUTPATIENT)
Dept: NEUROLOGY | Facility: CLINIC | Age: 62
End: 2017-12-12

## 2017-12-12 VITALS
SYSTOLIC BLOOD PRESSURE: 122 MMHG | BODY MASS INDEX: 39.28 KG/M2 | WEIGHT: 290 LBS | DIASTOLIC BLOOD PRESSURE: 74 MMHG | HEART RATE: 80 BPM | HEIGHT: 72 IN | RESPIRATION RATE: 20 BRPM

## 2017-12-12 DIAGNOSIS — G47.61 PERIODIC LIMB MOVEMENT DISORDER: ICD-10-CM

## 2017-12-12 DIAGNOSIS — G43.C0 PERIODIC HEADACHE SYNDROME, NOT INTRACTABLE: ICD-10-CM

## 2017-12-12 DIAGNOSIS — G35 MULTIPLE SCLEROSIS (HCC): Primary | ICD-10-CM

## 2017-12-12 DIAGNOSIS — G25.81 RESTLESS LEGS SYNDROME: ICD-10-CM

## 2017-12-12 DIAGNOSIS — F33.1 MODERATE EPISODE OF RECURRENT MAJOR DEPRESSIVE DISORDER (HCC): ICD-10-CM

## 2017-12-12 PROCEDURE — 99214 OFFICE O/P EST MOD 30 MIN: CPT | Performed by: PSYCHIATRY & NEUROLOGY

## 2017-12-12 NOTE — PROGRESS NOTES
Subjective:     Patient ID: Rashad Carrillo is a 61 y.o. female.    History of Present Illness     61 y.o.  woman with RRMS, migraines, MDD and RLS returns in follow up.  Last visit on 9/20/17 renewed GBP, Tysabri, Cymbalta and Ampyra, ordered MRI Brain and labs..       Referred to ID and dx with latent TB.  Recommended isoniazid and pyridoxine for 9 months.     MRI Brain, my review of films 12/4/17 as compared to 6/9/16, no change in T2 lesion load, moderate to severe T2 lesion burden and moderate atrophy.     JCV ab - 10/17/17 - 0.76; started 11/2014 11/27/17:  CMP - ALT 58,     RRMS    Unsteady gait using walking stick.  Falls frequently.  Fatigue is severe.  Memory and att/concentration are worsening.  Continues on Tysabri without side effects.  5 months remain of treatment for TB.       Ampyra helping walking endurance and speed.     Problem history:    25 ft timed walk increaesed 15.99 from 9.94.  Left hand 9 hole peg worsened.   Increasing swallowing difficulty.        Immediate and working memory are declining with SDMT 27/110..       MDD    Cymbalta is keeping mood stable.     RLS    RLS controlled with GBP.   mg po TID, 300 mg 1 to 3 times overnight for discomfort.  Dr Newman taking over prescription.      MIgraines    Stopped TPM as she did not feel like it was helping.  HA frequency is daily.  Moderate to  Severe intensity.   Location moves around head.      The following portions of the patient's history were reviewed and updated as appropriate: allergies, current medications, past medical history, past surgical history and problem list.    Review of Systems   Constitutional: Positive for fatigue. Negative for activity change and unexpected weight change.   HENT: Negative for tinnitus and trouble swallowing.    Eyes: Negative for photophobia and visual disturbance.   Respiratory: Negative for apnea and choking.    Cardiovascular: Negative for leg swelling.   Endocrine: Positive for heat  "intolerance. Negative for cold intolerance.   Genitourinary: Negative for difficulty urinating, frequency, menstrual problem and urgency.   Musculoskeletal: Positive for gait problem. Negative for back pain.   Skin: Negative for color change.   Allergic/Immunologic: Negative for immunocompromised state.   Neurological: Positive for dizziness, tremors, numbness and headaches. Negative for seizures, syncope, facial asymmetry, speech difficulty and light-headedness.   Hematological: Negative for adenopathy. Does not bruise/bleed easily.   Psychiatric/Behavioral: Positive for decreased concentration, dysphoric mood and sleep disturbance. Negative for behavioral problems, confusion and hallucinations. The patient is nervous/anxious.         Objective:  Vitals:    12/12/17 0852   BP: 122/74   BP Location: Left arm   Patient Position: Sitting   Cuff Size: Adult   Pulse: 80   Resp: 20   Weight: 132 kg (290 lb)   Height: 182.9 cm (72\")        Neurologic Exam     Mental Status   Registration: recalls 3 of 3 objects. Recall at 5 minutes: recalls 3 of 3 objects. Follows 3 step commands.   Attention: normal. Concentration: normal.   Level of consciousness: alert  Knowledge: good and consistent with education.   Able to name object. Able to read. Able to repeat. Able to write. Normal comprehension.              Cranial Nerves     CN II   Visual fields full to confrontation.   Visual acuity: normal  Right visual field deficit: none  Left visual field deficit: none     CN III, IV, VI   Nystagmus: none   Diplopia: none  Ophthalmoparesis: none  Upgaze: normal  Downgaze: normal  Conjugate gaze: present  Vestibulo-ocular reflex: present    CN V   Facial sensation intact.   Right corneal reflex: normal  Left corneal reflex: normal    CN VII   Right facial weakness: none  Left facial weakness: none    CN VIII   Hearing: intact    CN IX, X   Palate: symmetric  Right gag reflex: normal  Left gag reflex: normal    CN XI   Right " sternocleidomastoid strength: normal  Left sternocleidomastoid strength: normal    CN XII   Tongue: not atrophic  Fasciculations: absent  Tongue deviation: none    Motor Exam   Muscle bulk: normal  Overall muscle tone: normal  Right arm tone: normal  Left arm tone: normal  Right leg tone: increased  Left leg tone: increased    Strength   Strength 5/5 except as noted.   Left quadriceps: 4/5  Left hamstrin/5  Left glutei: 4/5  Left anterior tibial: 4/5  Left posterior tibial: 4/5  Left peroneal: 4/5  Left gastroc: 4/5    Sensory Exam   Right arm light touch: normal  Right leg light touch: normal  Right arm vibration: normal  Right leg vibration: normal  Right arm proprioception: normal  Right leg proprioception: normal  Right arm pinprick: normal  Right leg pinprick: normal  Sensory deficit distribution on right: non-dermatomal distribution (decreased left face, arm and leg)    Gait, Coordination, and Reflexes     Gait  Gait: circumduction, shuffling and wide-based (left leg)    Coordination   Romberg: positive  Heel to shin coordination: abnormal  Tandem walking coordination: abnormal    Tremor   Resting tremor: absent  Intention tremor: absent  Action tremor: left arm and right arm    Reflexes   Right brachioradialis: 3+  Left brachioradialis: 3+  Right biceps: 3+  Left biceps: 3+  Right triceps: 3+  Left triceps: 3+  Right patellar: 3+  Left patellar: 3+  Right achilles: 3+  Left achilles: 3+  Right : 3+  Left : 3+  Right plantar: normal  Left plantar: normal      Physical Exam   Constitutional: She appears well-developed and well-nourished.   Neurological: She has an abnormal Heel to Brown Test, an abnormal Romberg Test and an abnormal Tandem Gait Test.   Reflex Scores:       Tricep reflexes are 3+ on the right side and 3+ on the left side.       Bicep reflexes are 3+ on the right side and 3+ on the left side.       Brachioradialis reflexes are 3+ on the right side and 3+ on the left side.        Patellar reflexes are 3+ on the right side and 3+ on the left side.       Achilles reflexes are 3+ on the right side and 3+ on the left side.  Nursing note and vitals reviewed.    Lab on 11/27/2017   Component Date Value Ref Range Status   • Miscellaneous Lab Test Result 11/27/2017 See attached report   Final       Assessment/Plan:       Problems Addressed this Visit        Cardiovascular and Mediastinum    Periodic headache syndrome, not intractable     Headaches are unchanged.  Continue current treatment regimen.                Nervous and Auditory    Multiple sclerosis - Primary     Sx stable on Tysabri    JCV 0.76  MRI Brain is unchanged    Continue Tysabri until Tb tx is completed            Other    Depression     Psychological condition is improving with treatment.  Continue current treatment regimen.  Psychological condition  will be reassessed at the next regular appointment.         Restless legs syndrome     Dr Newman taking over rx for GBP     mg TID and  mg 1 to 3 tablets overnight            RESOLVED: Periodic limb movement disorder

## 2017-12-12 NOTE — ASSESSMENT & PLAN NOTE
Sx stable on Tysabri    JCV 0.76  MRI Brain is unchanged    Continue Tysabri until Tb tx is completed

## 2018-01-23 ENCOUNTER — HOSPITAL ENCOUNTER (INPATIENT)
Facility: HOSPITAL | Age: 63
LOS: 2 days | Discharge: HOME | End: 2018-01-25
Attending: EMERGENCY MEDICINE | Admitting: INTERNAL MEDICINE

## 2018-01-23 ENCOUNTER — APPOINTMENT (OUTPATIENT)
Dept: GENERAL RADIOLOGY | Facility: HOSPITAL | Age: 63
End: 2018-01-23

## 2018-01-23 DIAGNOSIS — Z78.9 IMPAIRED MOBILITY AND ADLS: ICD-10-CM

## 2018-01-23 DIAGNOSIS — Z74.09 IMPAIRED MOBILITY AND ADLS: ICD-10-CM

## 2018-01-23 DIAGNOSIS — Z74.09 IMPAIRED FUNCTIONAL MOBILITY, BALANCE, GAIT, AND ENDURANCE: ICD-10-CM

## 2018-01-23 DIAGNOSIS — R06.02 SHORTNESS OF BREATH: ICD-10-CM

## 2018-01-23 DIAGNOSIS — D64.9 SYMPTOMATIC ANEMIA: Primary | ICD-10-CM

## 2018-01-23 DIAGNOSIS — G35 MULTIPLE SCLEROSIS (HCC): ICD-10-CM

## 2018-01-23 PROBLEM — E11.9 T2DM (TYPE 2 DIABETES MELLITUS): Status: ACTIVE | Noted: 2018-01-23

## 2018-01-23 PROBLEM — Z22.7 TB LUNG, LATENT: Status: ACTIVE | Noted: 2018-01-23

## 2018-01-23 PROBLEM — R29.6 FREQUENT FALLS: Status: ACTIVE | Noted: 2018-01-23

## 2018-01-23 PROBLEM — I10 ESSENTIAL HYPERTENSION: Status: ACTIVE | Noted: 2018-01-23

## 2018-01-23 PROBLEM — Z98.890 STATUS POST BALLOON DILATATION OF ESOPHAGEAL STRICTURE: Status: ACTIVE | Noted: 2018-01-23

## 2018-01-23 LAB
ABO GROUP BLD: NORMAL
ABO GROUP BLD: NORMAL
ALBUMIN SERPL-MCNC: 4.3 G/DL (ref 3.2–4.8)
ALBUMIN/GLOB SERPL: 1.6 G/DL (ref 1.5–2.5)
ALP SERPL-CCNC: 136 U/L (ref 25–100)
ALT SERPL W P-5'-P-CCNC: 33 U/L (ref 7–40)
ANION GAP SERPL CALCULATED.3IONS-SCNC: 7 MMOL/L (ref 3–11)
APTT PPP: 24.1 SECONDS (ref 24–31)
AST SERPL-CCNC: 26 U/L (ref 0–33)
BASOPHILS # BLD AUTO: 0.12 10*3/MM3 (ref 0–0.2)
BASOPHILS NFR BLD AUTO: 1.4 % (ref 0–1)
BILIRUB SERPL-MCNC: 0.3 MG/DL (ref 0.3–1.2)
BILIRUB UR QL STRIP: NEGATIVE
BLD GP AB SCN SERPL QL: NEGATIVE
BNP SERPL-MCNC: 33 PG/ML (ref 0–100)
BUN BLD-MCNC: 24 MG/DL (ref 9–23)
BUN/CREAT SERPL: 30 (ref 7–25)
CALCIUM SPEC-SCNC: 9.3 MG/DL (ref 8.7–10.4)
CHLORIDE SERPL-SCNC: 103 MMOL/L (ref 99–109)
CLARITY UR: CLEAR
CO2 SERPL-SCNC: 27 MMOL/L (ref 20–31)
COLOR UR: YELLOW
CREAT BLD-MCNC: 0.8 MG/DL (ref 0.6–1.3)
CRP SERPL-MCNC: 0.4 MG/DL (ref 0–1)
DEPRECATED RDW RBC AUTO: 46.4 FL (ref 37–54)
DEVELOPER EXPIRATION DATE: NORMAL
DEVELOPER LOT NUMBER: NORMAL
EOSINOPHIL # BLD AUTO: 0.39 10*3/MM3 (ref 0–0.3)
EOSINOPHIL NFR BLD AUTO: 4.6 % (ref 0–3)
ERYTHROCYTE [DISTWIDTH] IN BLOOD BY AUTOMATED COUNT: 16 % (ref 11.3–14.5)
ERYTHROCYTE [SEDIMENTATION RATE] IN BLOOD: 32 MM/HR (ref 0–30)
EXPIRATION DATE: NORMAL
FECAL OCCULT BLOOD SCREEN, POC: NEGATIVE
FOLATE SERPL-MCNC: 16.89 NG/ML (ref 3.2–20)
GFR SERPL CREATININE-BSD FRML MDRD: 73 ML/MIN/1.73
GLOBULIN UR ELPH-MCNC: 2.7 GM/DL
GLUCOSE BLD-MCNC: 111 MG/DL (ref 70–100)
GLUCOSE UR STRIP-MCNC: NEGATIVE MG/DL
HCT VFR BLD AUTO: 16.9 % (ref 34.5–44)
HGB BLD-MCNC: 5.6 G/DL (ref 11.5–15.5)
HGB UR QL STRIP.AUTO: NEGATIVE
HOLD SPECIMEN: NORMAL
HOLD SPECIMEN: NORMAL
IMM GRANULOCYTES # BLD: 0.03 10*3/MM3 (ref 0–0.03)
IMM GRANULOCYTES NFR BLD: 0.4 % (ref 0–0.6)
INR PPP: 0.97
IRON 24H UR-MRATE: 268 MCG/DL (ref 50–175)
IRON SATN MFR SERPL: 81 % (ref 15–50)
KETONES UR QL STRIP: NEGATIVE
LEUKOCYTE ESTERASE UR QL STRIP.AUTO: NEGATIVE
LYMPHOCYTES # BLD AUTO: 2.64 10*3/MM3 (ref 0.6–4.8)
LYMPHOCYTES NFR BLD AUTO: 31.5 % (ref 24–44)
Lab: NORMAL
MCH RBC QN AUTO: 28 PG (ref 27–31)
MCHC RBC AUTO-ENTMCNC: 33.1 G/DL (ref 32–36)
MCV RBC AUTO: 84.5 FL (ref 80–99)
MONOCYTES # BLD AUTO: 0.74 10*3/MM3 (ref 0–1)
MONOCYTES NFR BLD AUTO: 8.8 % (ref 0–12)
NEGATIVE CONTROL: NEGATIVE
NEUTROPHILS # BLD AUTO: 4.47 10*3/MM3 (ref 1.5–8.3)
NEUTROPHILS NFR BLD AUTO: 53.3 % (ref 41–71)
NITRITE UR QL STRIP: NEGATIVE
PH UR STRIP.AUTO: 6 [PH] (ref 5–8)
PLATELET # BLD AUTO: 417 10*3/MM3 (ref 150–450)
PMV BLD AUTO: 9.7 FL (ref 6–12)
POSITIVE CONTROL: POSITIVE
POTASSIUM BLD-SCNC: 3.9 MMOL/L (ref 3.5–5.5)
PROT SERPL-MCNC: 7 G/DL (ref 5.7–8.2)
PROT UR QL STRIP: NEGATIVE
PROTHROMBIN TIME: 10.6 SECONDS (ref 9.6–11.5)
RBC # BLD AUTO: 2 10*6/MM3 (ref 3.89–5.14)
RH BLD: POSITIVE
RH BLD: POSITIVE
SODIUM BLD-SCNC: 137 MMOL/L (ref 132–146)
SP GR UR STRIP: 1.02 (ref 1–1.03)
TIBC SERPL-MCNC: 329 MCG/DL (ref 250–450)
TROPONIN I SERPL-MCNC: <0.006 NG/ML
UROBILINOGEN UR QL STRIP: NORMAL
VIT B12 BLD-MCNC: 487 PG/ML (ref 211–911)
WBC NRBC COR # BLD: 8.39 10*3/MM3 (ref 3.5–10.8)
WHOLE BLOOD HOLD SPECIMEN: NORMAL
WHOLE BLOOD HOLD SPECIMEN: NORMAL

## 2018-01-23 PROCEDURE — 86923 COMPATIBILITY TEST ELECTRIC: CPT

## 2018-01-23 PROCEDURE — 85730 THROMBOPLASTIN TIME PARTIAL: CPT | Performed by: EMERGENCY MEDICINE

## 2018-01-23 PROCEDURE — 86900 BLOOD TYPING SEROLOGIC ABO: CPT

## 2018-01-23 PROCEDURE — 99223 1ST HOSP IP/OBS HIGH 75: CPT | Performed by: HOSPITALIST

## 2018-01-23 PROCEDURE — 99285 EMERGENCY DEPT VISIT HI MDM: CPT

## 2018-01-23 PROCEDURE — 85610 PROTHROMBIN TIME: CPT | Performed by: EMERGENCY MEDICINE

## 2018-01-23 PROCEDURE — 36430 TRANSFUSION BLD/BLD COMPNT: CPT

## 2018-01-23 PROCEDURE — 85652 RBC SED RATE AUTOMATED: CPT | Performed by: HOSPITALIST

## 2018-01-23 PROCEDURE — 83880 ASSAY OF NATRIURETIC PEPTIDE: CPT | Performed by: EMERGENCY MEDICINE

## 2018-01-23 PROCEDURE — 82607 VITAMIN B-12: CPT | Performed by: EMERGENCY MEDICINE

## 2018-01-23 PROCEDURE — 85025 COMPLETE CBC W/AUTO DIFF WBC: CPT | Performed by: EMERGENCY MEDICINE

## 2018-01-23 PROCEDURE — P9016 RBC LEUKOCYTES REDUCED: HCPCS

## 2018-01-23 PROCEDURE — 86900 BLOOD TYPING SEROLOGIC ABO: CPT | Performed by: EMERGENCY MEDICINE

## 2018-01-23 PROCEDURE — 86901 BLOOD TYPING SEROLOGIC RH(D): CPT

## 2018-01-23 PROCEDURE — 83540 ASSAY OF IRON: CPT | Performed by: EMERGENCY MEDICINE

## 2018-01-23 PROCEDURE — 93005 ELECTROCARDIOGRAM TRACING: CPT

## 2018-01-23 PROCEDURE — 71045 X-RAY EXAM CHEST 1 VIEW: CPT

## 2018-01-23 PROCEDURE — 84484 ASSAY OF TROPONIN QUANT: CPT | Performed by: EMERGENCY MEDICINE

## 2018-01-23 PROCEDURE — 80053 COMPREHEN METABOLIC PANEL: CPT | Performed by: EMERGENCY MEDICINE

## 2018-01-23 PROCEDURE — 81003 URINALYSIS AUTO W/O SCOPE: CPT | Performed by: EMERGENCY MEDICINE

## 2018-01-23 PROCEDURE — 82746 ASSAY OF FOLIC ACID SERUM: CPT | Performed by: EMERGENCY MEDICINE

## 2018-01-23 PROCEDURE — 86901 BLOOD TYPING SEROLOGIC RH(D): CPT | Performed by: EMERGENCY MEDICINE

## 2018-01-23 PROCEDURE — 85060 BLOOD SMEAR INTERPRETATION: CPT | Performed by: NURSE PRACTITIONER

## 2018-01-23 PROCEDURE — 82270 OCCULT BLOOD FECES: CPT | Performed by: EMERGENCY MEDICINE

## 2018-01-23 PROCEDURE — 86140 C-REACTIVE PROTEIN: CPT | Performed by: EMERGENCY MEDICINE

## 2018-01-23 PROCEDURE — 86850 RBC ANTIBODY SCREEN: CPT | Performed by: EMERGENCY MEDICINE

## 2018-01-23 PROCEDURE — 83550 IRON BINDING TEST: CPT | Performed by: EMERGENCY MEDICINE

## 2018-01-23 RX ORDER — SODIUM CHLORIDE AND POTASSIUM CHLORIDE 150; 900 MG/100ML; MG/100ML
75 INJECTION, SOLUTION INTRAVENOUS CONTINUOUS
Status: ACTIVE | OUTPATIENT
Start: 2018-01-23 | End: 2018-01-24

## 2018-01-23 RX ORDER — DULOXETIN HYDROCHLORIDE 30 MG/1
30 CAPSULE, DELAYED RELEASE ORAL DAILY
Status: DISCONTINUED | OUTPATIENT
Start: 2018-01-24 | End: 2018-01-25 | Stop reason: HOSPADM

## 2018-01-23 RX ORDER — ONDANSETRON 4 MG/1
4 TABLET, FILM COATED ORAL EVERY 6 HOURS PRN
Status: DISCONTINUED | OUTPATIENT
Start: 2018-01-23 | End: 2018-01-25 | Stop reason: HOSPADM

## 2018-01-23 RX ORDER — GABAPENTIN 100 MG/1
300 CAPSULE ORAL EVERY MORNING
COMMUNITY
End: 2018-01-23

## 2018-01-23 RX ORDER — PANTOPRAZOLE SODIUM 40 MG/1
40 TABLET, DELAYED RELEASE ORAL EVERY MORNING
Status: DISCONTINUED | OUTPATIENT
Start: 2018-01-24 | End: 2018-01-25 | Stop reason: HOSPADM

## 2018-01-23 RX ORDER — GABAPENTIN 300 MG/1
600 CAPSULE ORAL NIGHTLY
Status: DISCONTINUED | OUTPATIENT
Start: 2018-01-23 | End: 2018-01-25 | Stop reason: HOSPADM

## 2018-01-23 RX ORDER — TIZANIDINE 4 MG/1
2 TABLET ORAL EVERY 6 HOURS PRN
Status: DISCONTINUED | OUTPATIENT
Start: 2018-01-23 | End: 2018-01-25 | Stop reason: HOSPADM

## 2018-01-23 RX ORDER — HYDROCODONE BITARTRATE AND ACETAMINOPHEN 5; 325 MG/1; MG/1
1 TABLET ORAL EVERY 6 HOURS PRN
Status: DISCONTINUED | OUTPATIENT
Start: 2018-01-23 | End: 2018-01-25 | Stop reason: HOSPADM

## 2018-01-23 RX ORDER — ATORVASTATIN CALCIUM 10 MG/1
10 TABLET, FILM COATED ORAL NIGHTLY
Status: DISCONTINUED | OUTPATIENT
Start: 2018-01-23 | End: 2018-01-25 | Stop reason: HOSPADM

## 2018-01-23 RX ORDER — GABAPENTIN 100 MG/1
100 CAPSULE ORAL EVERY 12 HOURS SCHEDULED
Status: DISCONTINUED | OUTPATIENT
Start: 2018-01-24 | End: 2018-01-25 | Stop reason: HOSPADM

## 2018-01-23 RX ORDER — ONDANSETRON 2 MG/ML
4 INJECTION INTRAMUSCULAR; INTRAVENOUS EVERY 6 HOURS PRN
Status: DISCONTINUED | OUTPATIENT
Start: 2018-01-23 | End: 2018-01-25 | Stop reason: HOSPADM

## 2018-01-23 RX ORDER — AZELASTINE 1 MG/ML
2 SPRAY, METERED NASAL DAILY
Status: DISCONTINUED | OUTPATIENT
Start: 2018-01-24 | End: 2018-01-25 | Stop reason: HOSPADM

## 2018-01-23 RX ORDER — GABAPENTIN 100 MG/1
100 CAPSULE ORAL EVERY 12 HOURS SCHEDULED
Status: DISCONTINUED | OUTPATIENT
Start: 2018-01-23 | End: 2018-01-23

## 2018-01-23 RX ORDER — ACETAMINOPHEN 500 MG
1000 TABLET ORAL ONCE
Status: COMPLETED | OUTPATIENT
Start: 2018-01-23 | End: 2018-01-23

## 2018-01-23 RX ORDER — BISACODYL 10 MG
10 SUPPOSITORY, RECTAL RECTAL DAILY PRN
Status: DISCONTINUED | OUTPATIENT
Start: 2018-01-23 | End: 2018-01-25 | Stop reason: HOSPADM

## 2018-01-23 RX ORDER — POTASSIUM CHLORIDE 750 MG/1
10 CAPSULE, EXTENDED RELEASE ORAL DAILY
Status: DISCONTINUED | OUTPATIENT
Start: 2018-01-24 | End: 2018-01-25 | Stop reason: HOSPADM

## 2018-01-23 RX ORDER — DALFAMPRIDINE 10 MG/1
10 TABLET, FILM COATED, EXTENDED RELEASE ORAL EVERY 12 HOURS SCHEDULED
Status: DISCONTINUED | OUTPATIENT
Start: 2018-01-23 | End: 2018-01-25 | Stop reason: HOSPADM

## 2018-01-23 RX ORDER — SODIUM CHLORIDE 0.9 % (FLUSH) 0.9 %
10 SYRINGE (ML) INJECTION AS NEEDED
Status: DISCONTINUED | OUTPATIENT
Start: 2018-01-23 | End: 2018-01-25 | Stop reason: HOSPADM

## 2018-01-23 RX ORDER — IRBESARTAN 150 MG/1
150 TABLET ORAL DAILY
Status: DISCONTINUED | OUTPATIENT
Start: 2018-01-24 | End: 2018-01-25 | Stop reason: HOSPADM

## 2018-01-23 RX ORDER — BISACODYL 5 MG/1
5 TABLET, DELAYED RELEASE ORAL DAILY PRN
Status: DISCONTINUED | OUTPATIENT
Start: 2018-01-23 | End: 2018-01-25 | Stop reason: HOSPADM

## 2018-01-23 RX ORDER — ACETAMINOPHEN 325 MG/1
650 TABLET ORAL EVERY 4 HOURS PRN
Status: DISCONTINUED | OUTPATIENT
Start: 2018-01-23 | End: 2018-01-25 | Stop reason: HOSPADM

## 2018-01-23 RX ORDER — BUPROPION HYDROCHLORIDE 150 MG/1
150 TABLET ORAL DAILY
Status: DISCONTINUED | OUTPATIENT
Start: 2018-01-24 | End: 2018-01-25 | Stop reason: HOSPADM

## 2018-01-23 RX ORDER — DIPHENHYDRAMINE HCL 25 MG
25 CAPSULE ORAL EVERY 6 HOURS PRN
Status: DISCONTINUED | OUTPATIENT
Start: 2018-01-23 | End: 2018-01-25 | Stop reason: HOSPADM

## 2018-01-23 RX ORDER — SODIUM CHLORIDE 0.9 % (FLUSH) 0.9 %
1-10 SYRINGE (ML) INJECTION AS NEEDED
Status: DISCONTINUED | OUTPATIENT
Start: 2018-01-23 | End: 2018-01-25 | Stop reason: HOSPADM

## 2018-01-23 RX ORDER — GABAPENTIN 100 MG/1
300 CAPSULE ORAL EVERY MORNING
COMMUNITY
End: 2018-06-08 | Stop reason: SDUPTHER

## 2018-01-23 RX ORDER — DIPHENOXYLATE HYDROCHLORIDE AND ATROPINE SULFATE 2.5; .025 MG/1; MG/1
1 TABLET ORAL DAILY
Status: DISCONTINUED | OUTPATIENT
Start: 2018-01-24 | End: 2018-01-25 | Stop reason: HOSPADM

## 2018-01-23 RX ADMIN — HYDROCODONE BITARTRATE AND ACETAMINOPHEN 1 TABLET: 5; 325 TABLET ORAL at 22:24

## 2018-01-23 RX ADMIN — GABAPENTIN 600 MG: 300 CAPSULE ORAL at 22:23

## 2018-01-23 RX ADMIN — ACETAMINOPHEN 1000 MG: 500 TABLET ORAL at 18:02

## 2018-01-23 RX ADMIN — ATORVASTATIN CALCIUM 10 MG: 10 TABLET, FILM COATED ORAL at 22:24

## 2018-01-24 DIAGNOSIS — D61.1 APLASTIC ANEMIA DUE TO DRUGS (HCC): Primary | ICD-10-CM

## 2018-01-24 LAB
ABO + RH BLD: NORMAL
ABO + RH BLD: NORMAL
ALBUMIN SERPL-MCNC: 3.5 G/DL (ref 3.2–4.8)
ALBUMIN/GLOB SERPL: 1.3 G/DL (ref 1.5–2.5)
ALP SERPL-CCNC: 118 U/L (ref 25–100)
ALT SERPL W P-5'-P-CCNC: 30 U/L (ref 7–40)
ANION GAP SERPL CALCULATED.3IONS-SCNC: 2 MMOL/L (ref 3–11)
AST SERPL-CCNC: 27 U/L (ref 0–33)
BASOPHILS # BLD AUTO: 0.1 10*3/MM3 (ref 0–0.2)
BASOPHILS NFR BLD AUTO: 1.5 % (ref 0–1)
BH BB BLOOD EXPIRATION DATE: NORMAL
BH BB BLOOD EXPIRATION DATE: NORMAL
BH BB BLOOD TYPE BARCODE: 6200
BH BB BLOOD TYPE BARCODE: 6200
BH BB DISPENSE STATUS: NORMAL
BH BB DISPENSE STATUS: NORMAL
BH BB PRODUCT CODE: NORMAL
BH BB PRODUCT CODE: NORMAL
BH BB UNIT NUMBER: NORMAL
BH BB UNIT NUMBER: NORMAL
BILIRUB SERPL-MCNC: 0.9 MG/DL (ref 0.3–1.2)
BUN BLD-MCNC: 21 MG/DL (ref 9–23)
BUN/CREAT SERPL: 35 (ref 7–25)
CALCIUM SPEC-SCNC: 8.9 MG/DL (ref 8.7–10.4)
CHLORIDE SERPL-SCNC: 111 MMOL/L (ref 99–109)
CO2 SERPL-SCNC: 26 MMOL/L (ref 20–31)
CREAT BLD-MCNC: 0.6 MG/DL (ref 0.6–1.3)
CYTOLOGIST CVX/VAG CYTO: NORMAL
DEPRECATED RDW RBC AUTO: 47.1 FL (ref 37–54)
EOSINOPHIL # BLD AUTO: 0.31 10*3/MM3 (ref 0–0.3)
EOSINOPHIL NFR BLD AUTO: 4.8 % (ref 0–3)
ERYTHROCYTE [DISTWIDTH] IN BLOOD BY AUTOMATED COUNT: 15.5 % (ref 11.3–14.5)
GFR SERPL CREATININE-BSD FRML MDRD: 102 ML/MIN/1.73
GLOBULIN UR ELPH-MCNC: 2.7 GM/DL
GLUCOSE BLD-MCNC: 91 MG/DL (ref 70–100)
HCT VFR BLD AUTO: 20.2 % (ref 34.5–44)
HCT VFR BLD AUTO: 20.2 % (ref 34.5–44)
HCT VFR BLD AUTO: 21.6 % (ref 34.5–44)
HGB BLD-MCNC: 6.7 G/DL (ref 11.5–15.5)
HGB BLD-MCNC: 6.7 G/DL (ref 11.5–15.5)
HGB BLD-MCNC: 7.3 G/DL (ref 11.5–15.5)
IMM GRANULOCYTES # BLD: 0.01 10*3/MM3 (ref 0–0.03)
IMM GRANULOCYTES NFR BLD: 0.2 % (ref 0–0.6)
LYMPHOCYTES # BLD AUTO: 2.95 10*3/MM3 (ref 0.6–4.8)
LYMPHOCYTES NFR BLD AUTO: 45.5 % (ref 24–44)
MCH RBC QN AUTO: 28.6 PG (ref 27–31)
MCHC RBC AUTO-ENTMCNC: 33.2 G/DL (ref 32–36)
MCV RBC AUTO: 86.3 FL (ref 80–99)
MONOCYTES # BLD AUTO: 0.56 10*3/MM3 (ref 0–1)
MONOCYTES NFR BLD AUTO: 8.6 % (ref 0–12)
NEUTROPHILS # BLD AUTO: 2.56 10*3/MM3 (ref 1.5–8.3)
NEUTROPHILS NFR BLD AUTO: 39.4 % (ref 41–71)
PATH INTERP BLD-IMP: NORMAL
PLATELET # BLD AUTO: 325 10*3/MM3 (ref 150–450)
PMV BLD AUTO: 10.4 FL (ref 6–12)
POTASSIUM BLD-SCNC: 4 MMOL/L (ref 3.5–5.5)
PROT SERPL-MCNC: 6.2 G/DL (ref 5.7–8.2)
RBC # BLD AUTO: 2.34 10*6/MM3 (ref 3.89–5.14)
RETICS/RBC NFR AUTO: 0.49 % (ref 0.5–1.5)
SODIUM BLD-SCNC: 139 MMOL/L (ref 132–146)
UNIT  ABO: NORMAL
UNIT  ABO: NORMAL
UNIT  RH: NORMAL
UNIT  RH: NORMAL
WBC NRBC COR # BLD: 6.49 10*3/MM3 (ref 3.5–10.8)

## 2018-01-24 PROCEDURE — 99221 1ST HOSP IP/OBS SF/LOW 40: CPT | Performed by: PHYSICIAN ASSISTANT

## 2018-01-24 PROCEDURE — 85018 HEMOGLOBIN: CPT | Performed by: NURSE PRACTITIONER

## 2018-01-24 PROCEDURE — 97162 PT EVAL MOD COMPLEX 30 MIN: CPT

## 2018-01-24 PROCEDURE — P9016 RBC LEUKOCYTES REDUCED: HCPCS

## 2018-01-24 PROCEDURE — 86747 PARVOVIRUS ANTIBODY: CPT | Performed by: INTERNAL MEDICINE

## 2018-01-24 PROCEDURE — 83010 ASSAY OF HAPTOGLOBIN QUANT: CPT | Performed by: INTERNAL MEDICINE

## 2018-01-24 PROCEDURE — 99233 SBSQ HOSP IP/OBS HIGH 50: CPT | Performed by: INTERNAL MEDICINE

## 2018-01-24 PROCEDURE — 97116 GAIT TRAINING THERAPY: CPT

## 2018-01-24 PROCEDURE — 25810000003 SODIUM CHLORIDE 0.9 % WITH KCL 20 MEQ 20-0.9 MEQ/L-% SOLUTION: Performed by: NURSE PRACTITIONER

## 2018-01-24 PROCEDURE — 97110 THERAPEUTIC EXERCISES: CPT

## 2018-01-24 PROCEDURE — 85045 AUTOMATED RETICULOCYTE COUNT: CPT | Performed by: PHYSICIAN ASSISTANT

## 2018-01-24 PROCEDURE — 85025 COMPLETE CBC W/AUTO DIFF WBC: CPT | Performed by: NURSE PRACTITIONER

## 2018-01-24 PROCEDURE — 36430 TRANSFUSION BLD/BLD COMPNT: CPT

## 2018-01-24 PROCEDURE — 97166 OT EVAL MOD COMPLEX 45 MIN: CPT

## 2018-01-24 PROCEDURE — 07DR3ZX EXTRACTION OF ILIAC BONE MARROW, PERCUTANEOUS APPROACH, DIAGNOSTIC: ICD-10-PCS | Performed by: PATHOLOGY

## 2018-01-24 PROCEDURE — 99254 IP/OBS CNSLTJ NEW/EST MOD 60: CPT | Performed by: INTERNAL MEDICINE

## 2018-01-24 PROCEDURE — 85014 HEMATOCRIT: CPT | Performed by: NURSE PRACTITIONER

## 2018-01-24 PROCEDURE — 80053 COMPREHEN METABOLIC PANEL: CPT | Performed by: NURSE PRACTITIONER

## 2018-01-24 PROCEDURE — 86900 BLOOD TYPING SEROLOGIC ABO: CPT

## 2018-01-24 PROCEDURE — 83036 HEMOGLOBIN GLYCOSYLATED A1C: CPT | Performed by: NURSE PRACTITIONER

## 2018-01-24 RX ADMIN — BUPROPION HYDROCHLORIDE 150 MG: 150 TABLET, FILM COATED, EXTENDED RELEASE ORAL at 08:40

## 2018-01-24 RX ADMIN — ATORVASTATIN CALCIUM 10 MG: 10 TABLET, FILM COATED ORAL at 20:18

## 2018-01-24 RX ADMIN — GABAPENTIN 100 MG: 100 CAPSULE ORAL at 05:52

## 2018-01-24 RX ADMIN — GABAPENTIN 600 MG: 300 CAPSULE ORAL at 20:18

## 2018-01-24 RX ADMIN — ACETAMINOPHEN 650 MG: 325 TABLET, FILM COATED ORAL at 08:41

## 2018-01-24 RX ADMIN — HYDROCODONE BITARTRATE AND ACETAMINOPHEN 1 TABLET: 5; 325 TABLET ORAL at 14:53

## 2018-01-24 RX ADMIN — Medication 1 TABLET: at 08:42

## 2018-01-24 RX ADMIN — POTASSIUM CHLORIDE 10 MEQ: 750 CAPSULE, EXTENDED RELEASE ORAL at 08:41

## 2018-01-24 RX ADMIN — POTASSIUM CHLORIDE AND SODIUM CHLORIDE 75 ML/HR: 900; 150 INJECTION, SOLUTION INTRAVENOUS at 01:28

## 2018-01-24 RX ADMIN — DULOXETINE HYDROCHLORIDE 30 MG: 30 CAPSULE, DELAYED RELEASE ORAL at 08:40

## 2018-01-24 RX ADMIN — PANTOPRAZOLE SODIUM 40 MG: 40 TABLET, DELAYED RELEASE ORAL at 05:50

## 2018-01-25 VITALS
OXYGEN SATURATION: 97 % | HEART RATE: 80 BPM | DIASTOLIC BLOOD PRESSURE: 53 MMHG | HEIGHT: 72 IN | WEIGHT: 293 LBS | BODY MASS INDEX: 39.68 KG/M2 | RESPIRATION RATE: 16 BRPM | SYSTOLIC BLOOD PRESSURE: 119 MMHG | TEMPERATURE: 97.9 F

## 2018-01-25 PROBLEM — D64.9 SYMPTOMATIC ANEMIA: Status: RESOLVED | Noted: 2018-01-23 | Resolved: 2018-01-25

## 2018-01-25 LAB
ABO + RH BLD: NORMAL
ABO + RH BLD: NORMAL
ANION GAP SERPL CALCULATED.3IONS-SCNC: 6 MMOL/L (ref 3–11)
BH BB BLOOD EXPIRATION DATE: NORMAL
BH BB BLOOD EXPIRATION DATE: NORMAL
BH BB BLOOD TYPE BARCODE: 6200
BH BB BLOOD TYPE BARCODE: 6200
BH BB DISPENSE STATUS: NORMAL
BH BB DISPENSE STATUS: NORMAL
BH BB PRODUCT CODE: NORMAL
BH BB PRODUCT CODE: NORMAL
BH BB UNIT NUMBER: NORMAL
BH BB UNIT NUMBER: NORMAL
BUN BLD-MCNC: 24 MG/DL (ref 9–23)
BUN/CREAT SERPL: 34.3 (ref 7–25)
CALCIUM SPEC-SCNC: 9 MG/DL (ref 8.7–10.4)
CHLORIDE SERPL-SCNC: 104 MMOL/L (ref 99–109)
CO2 SERPL-SCNC: 24 MMOL/L (ref 20–31)
CREAT BLD-MCNC: 0.7 MG/DL (ref 0.6–1.3)
DEPRECATED RDW RBC AUTO: 46.7 FL (ref 37–54)
ERYTHROCYTE [DISTWIDTH] IN BLOOD BY AUTOMATED COUNT: 15.2 % (ref 11.3–14.5)
EST. AVERAGE GLUCOSE BLD GHB EST-MCNC: 140 MG/DL
GFR SERPL CREATININE-BSD FRML MDRD: 85 ML/MIN/1.73
GLUCOSE BLD-MCNC: 90 MG/DL (ref 70–100)
HAPTOGLOB SERPL-MCNC: 157 MG/DL (ref 34–200)
HBA1C MFR BLD: 6.5 % (ref 4.8–5.6)
HCT VFR BLD AUTO: 24.4 % (ref 34.5–44)
HCT VFR BLD AUTO: 24.4 % (ref 34.5–44)
HGB BLD-MCNC: 8.3 G/DL (ref 11.5–15.5)
HGB BLD-MCNC: 8.3 G/DL (ref 11.5–15.5)
MCH RBC QN AUTO: 29.6 PG (ref 27–31)
MCHC RBC AUTO-ENTMCNC: 34 G/DL (ref 32–36)
MCV RBC AUTO: 87.1 FL (ref 80–99)
PLATELET # BLD AUTO: 291 10*3/MM3 (ref 150–450)
PMV BLD AUTO: 10.2 FL (ref 6–12)
POTASSIUM BLD-SCNC: 4.9 MMOL/L (ref 3.5–5.5)
RBC # BLD AUTO: 2.8 10*6/MM3 (ref 3.89–5.14)
SODIUM BLD-SCNC: 134 MMOL/L (ref 132–146)
UNIT  ABO: NORMAL
UNIT  ABO: NORMAL
UNIT  RH: NORMAL
UNIT  RH: NORMAL
WBC NRBC COR # BLD: 7.05 10*3/MM3 (ref 3.5–10.8)

## 2018-01-25 PROCEDURE — 88341 IMHCHEM/IMCYTCHM EA ADD ANTB: CPT | Performed by: INTERNAL MEDICINE

## 2018-01-25 PROCEDURE — 88305 TISSUE EXAM BY PATHOLOGIST: CPT | Performed by: INTERNAL MEDICINE

## 2018-01-25 PROCEDURE — 88313 SPECIAL STAINS GROUP 2: CPT | Performed by: INTERNAL MEDICINE

## 2018-01-25 PROCEDURE — 99239 HOSP IP/OBS DSCHRG MGMT >30: CPT | Performed by: NURSE PRACTITIONER

## 2018-01-25 PROCEDURE — 85014 HEMATOCRIT: CPT | Performed by: NURSE PRACTITIONER

## 2018-01-25 PROCEDURE — 88311 DECALCIFY TISSUE: CPT | Performed by: INTERNAL MEDICINE

## 2018-01-25 PROCEDURE — 85097 BONE MARROW INTERPRETATION: CPT | Performed by: INTERNAL MEDICINE

## 2018-01-25 PROCEDURE — 88342 IMHCHEM/IMCYTCHM 1ST ANTB: CPT | Performed by: INTERNAL MEDICINE

## 2018-01-25 PROCEDURE — 88365 INSITU HYBRIDIZATION (FISH): CPT

## 2018-01-25 PROCEDURE — 85018 HEMOGLOBIN: CPT | Performed by: NURSE PRACTITIONER

## 2018-01-25 PROCEDURE — 80048 BASIC METABOLIC PNL TOTAL CA: CPT | Performed by: INTERNAL MEDICINE

## 2018-01-25 PROCEDURE — 88364 INSITU HYBRIDIZATION (FISH): CPT

## 2018-01-25 PROCEDURE — 85027 COMPLETE CBC AUTOMATED: CPT | Performed by: INTERNAL MEDICINE

## 2018-01-25 RX ORDER — TIZANIDINE 2 MG/1
2 TABLET ORAL EVERY 6 HOURS PRN
Start: 2018-01-25 | End: 2018-11-21

## 2018-01-25 RX ADMIN — ACETAMINOPHEN 650 MG: 325 TABLET, FILM COATED ORAL at 09:01

## 2018-01-25 RX ADMIN — PANTOPRAZOLE SODIUM 40 MG: 40 TABLET, DELAYED RELEASE ORAL at 05:14

## 2018-01-25 RX ADMIN — AZELASTINE HYDROCHLORIDE 2 SPRAY: 137 SPRAY, METERED NASAL at 08:41

## 2018-01-25 RX ADMIN — IRBESARTAN 150 MG: 150 TABLET ORAL at 08:40

## 2018-01-25 RX ADMIN — DULOXETINE HYDROCHLORIDE 30 MG: 30 CAPSULE, DELAYED RELEASE ORAL at 08:40

## 2018-01-25 RX ADMIN — POTASSIUM CHLORIDE 10 MEQ: 750 CAPSULE, EXTENDED RELEASE ORAL at 08:40

## 2018-01-25 RX ADMIN — Medication 1 TABLET: at 08:50

## 2018-01-25 RX ADMIN — GABAPENTIN 100 MG: 100 CAPSULE ORAL at 05:14

## 2018-01-25 RX ADMIN — BUPROPION HYDROCHLORIDE 150 MG: 150 TABLET, FILM COATED, EXTENDED RELEASE ORAL at 08:40

## 2018-01-29 ENCOUNTER — TRANSCRIBE ORDERS (OUTPATIENT)
Dept: LAB | Facility: HOSPITAL | Age: 63
End: 2018-01-29

## 2018-01-29 ENCOUNTER — LAB (OUTPATIENT)
Dept: LAB | Facility: HOSPITAL | Age: 63
End: 2018-01-29

## 2018-01-29 DIAGNOSIS — Z00.00 ROUTINE GENERAL MEDICAL EXAMINATION AT A HEALTH CARE FACILITY: ICD-10-CM

## 2018-01-29 DIAGNOSIS — Z00.00 ROUTINE GENERAL MEDICAL EXAMINATION AT A HEALTH CARE FACILITY: Primary | ICD-10-CM

## 2018-01-29 LAB
B19V IGG SER IA-ACNC: 7.8 INDEX (ref 0–0.8)
B19V IGM SER IA-ACNC: 0.4 INDEX (ref 0–0.8)
DEPRECATED RDW RBC AUTO: 53.5 FL (ref 37–54)
ERYTHROCYTE [DISTWIDTH] IN BLOOD BY AUTOMATED COUNT: 16.5 % (ref 11.3–14.5)
HCT VFR BLD AUTO: 28.6 % (ref 34.5–44)
HGB BLD-MCNC: 9.2 G/DL (ref 11.5–15.5)
MCH RBC QN AUTO: 28.9 PG (ref 27–31)
MCHC RBC AUTO-ENTMCNC: 32.2 G/DL (ref 32–36)
MCV RBC AUTO: 89.9 FL (ref 80–99)
PLATELET # BLD AUTO: 360 10*3/MM3 (ref 150–450)
PMV BLD AUTO: 11.2 FL (ref 6–12)
RBC # BLD AUTO: 3.18 10*6/MM3 (ref 3.89–5.14)
WBC NRBC COR # BLD: 6.7 10*3/MM3 (ref 3.5–10.8)

## 2018-01-29 PROCEDURE — 36415 COLL VENOUS BLD VENIPUNCTURE: CPT

## 2018-01-29 PROCEDURE — 85027 COMPLETE CBC AUTOMATED: CPT

## 2018-01-30 ENCOUNTER — TELEPHONE (OUTPATIENT)
Dept: ONCOLOGY | Facility: CLINIC | Age: 63
End: 2018-01-30

## 2018-01-30 NOTE — TELEPHONE ENCOUNTER
Returned patient call.  Explained that Dr Valentin will go over lab work at follow up appointment on Friday 2/2/18.  Patient verbalized understanding.

## 2018-01-30 NOTE — TELEPHONE ENCOUNTER
----- Message from Tara Alvarez sent at 1/29/2018  3:34 PM EST -----  Regarding: JERRY-LAB RESULTS  Contact: [phone]  Patient would like to get lab results.

## 2018-01-31 ENCOUNTER — LAB (OUTPATIENT)
Dept: LAB | Facility: HOSPITAL | Age: 63
End: 2018-01-31

## 2018-01-31 ENCOUNTER — TELEPHONE (OUTPATIENT)
Dept: ONCOLOGY | Facility: CLINIC | Age: 63
End: 2018-01-31

## 2018-01-31 ENCOUNTER — OFFICE VISIT (OUTPATIENT)
Dept: ONCOLOGY | Facility: CLINIC | Age: 63
End: 2018-01-31

## 2018-01-31 VITALS
TEMPERATURE: 97.6 F | SYSTOLIC BLOOD PRESSURE: 169 MMHG | WEIGHT: 293 LBS | HEART RATE: 98 BPM | OXYGEN SATURATION: 100 % | DIASTOLIC BLOOD PRESSURE: 74 MMHG | RESPIRATION RATE: 18 BRPM | HEIGHT: 72 IN | BODY MASS INDEX: 39.68 KG/M2

## 2018-01-31 DIAGNOSIS — R07.1 CHEST PAIN ON BREATHING: Primary | ICD-10-CM

## 2018-01-31 DIAGNOSIS — R07.1 CHEST PAIN ON BREATHING: ICD-10-CM

## 2018-01-31 DIAGNOSIS — D61.1 APLASTIC ANEMIA DUE TO DRUGS (HCC): Primary | ICD-10-CM

## 2018-01-31 DIAGNOSIS — D61.1 APLASTIC ANEMIA DUE TO DRUGS (HCC): ICD-10-CM

## 2018-01-31 PROBLEM — R07.9 CHEST PAIN: Status: ACTIVE | Noted: 2018-01-31

## 2018-01-31 LAB
ERYTHROCYTE [DISTWIDTH] IN BLOOD BY AUTOMATED COUNT: 17.8 % (ref 11.3–14.5)
HCT VFR BLD AUTO: 27 % (ref 34.5–44)
HGB BLD-MCNC: 8.7 G/DL (ref 11.5–15.5)
LYMPHOCYTES # BLD AUTO: 3.1 10*3/MM3 (ref 0.6–4.8)
LYMPHOCYTES NFR BLD AUTO: 32.8 % (ref 24–44)
MCH RBC QN AUTO: 28.8 PG (ref 27–31)
MCHC RBC AUTO-ENTMCNC: 32.3 G/DL (ref 32–36)
MCV RBC AUTO: 89.2 FL (ref 80–99)
MONOCYTES # BLD AUTO: 0.7 10*3/MM3 (ref 0–1)
MONOCYTES NFR BLD AUTO: 7.5 % (ref 0–12)
NEUTROPHILS # BLD AUTO: 5.7 10*3/MM3 (ref 1.5–8.3)
NEUTROPHILS NFR BLD AUTO: 59.7 % (ref 41–71)
PLATELET # BLD AUTO: 368 10*3/MM3 (ref 150–450)
PMV BLD AUTO: 8.6 FL (ref 6–12)
RBC # BLD AUTO: 3.03 10*6/MM3 (ref 3.89–5.14)
TROPONIN I SERPL-MCNC: <0.006 NG/ML
WBC NRBC COR # BLD: 9.5 10*3/MM3 (ref 3.5–10.8)

## 2018-01-31 PROCEDURE — 85025 COMPLETE CBC W/AUTO DIFF WBC: CPT

## 2018-01-31 PROCEDURE — 36415 COLL VENOUS BLD VENIPUNCTURE: CPT

## 2018-01-31 PROCEDURE — 84484 ASSAY OF TROPONIN QUANT: CPT

## 2018-01-31 PROCEDURE — 99214 OFFICE O/P EST MOD 30 MIN: CPT | Performed by: INTERNAL MEDICINE

## 2018-01-31 NOTE — PROGRESS NOTES
"PROBLEM LIST:    1.  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  2.  Bone marrow biopsy shows no erythroid precursors.  3.  Multiple sclerosis undertreatment with Dr. Gilbert  4.  Latent TB treated with isoniazid by Dr. Lora    REASON FOR VISIT: Anemia    HISTORY OF PRESENT ILLNESS:   62 y.o.  female presents today for follow-up of her transfusion requiring anemia.  She was admitted to the hospital recently where I had seen her.  I was suspecting.  Blood cell aplasia secondary to isoniazid.  She underwent a bone marrow biopsy which revealed no red blood cell precursors.  Clinically she is fatigued but overall is doing reasonably well.  She had some symptoms of epigastric pain radiating to her neck.  Likely related to reflux.  She denies any recent infection.    Past medical history, social history and family history was reviewed and unchanged from prior visit.    Review of Systems:    Review of Systems - Oncology   A comprehensive 14 point review of systems was performed and was negative except as mentioned.      Medications:  The current medication list was reviewed in the EMR    ALLERGIES:    Allergies   Allergen Reactions   • Sulfa Antibiotics    • Tetanus Toxoid          Physical Exam    VITAL SIGNS:  /74  Pulse 98  Temp 97.6 °F (36.4 °C) (Temporal Artery )   Resp 18  Ht 182.9 cm (72.01\")  Wt (!) 151 kg (332 lb 3.2 oz)  SpO2 100%  BMI 45.04 kg/m2    Wt Readings from Last 3 Encounters:   01/31/18 (!) 151 kg (332 lb 3.2 oz)   01/23/18 (!) 151 kg (333 lb 3.2 oz)   12/12/17 132 kg (290 lb)        Performance Status: 1    General: well appearing, in no acute distress  HEENT: sclera anicteric, oropharynx clear, neck is supple  Lymphatics: no cervical, supraclavicular, or axillary adenopathy  Cardiovascular: regular rate and rhythm, no murmurs, rubs or gallops  Lungs: clear to auscultation bilaterally  Abdomen: soft, nontender, nondistended.  No palpable organomegaly  Extremities: no lower " extremity edema  Skin: no rashes, lesions, bruising, or petechiae  Msk:  Weakness in her legs  Psych: Mood is stable        RECENT LABS:    Lab Results   Component Value Date    HGB 8.7 (L) 01/31/2018    HCT 27.0 (L) 01/31/2018    MCV 89.2 01/31/2018     01/31/2018    WBC 9.50 01/31/2018    NEUTROABS 5.70 01/31/2018    LYMPHSABS 3.10 01/31/2018    MONOSABS 0.70 01/31/2018    EOSABS 0.31 (H) 01/24/2018    BASOSABS 0.10 01/24/2018       Lab Results   Component Value Date    GLUCOSE 90 01/25/2018    BUN 24 (H) 01/25/2018    CREATININE 0.70 01/25/2018     01/25/2018    K 4.9 01/25/2018     01/25/2018    CO2 24.0 01/25/2018    CALCIUM 9.0 01/25/2018    PROTEINTOT 6.2 01/24/2018    ALBUMIN 3.50 01/24/2018    BILITOT 0.9 01/24/2018    ALKPHOS 118 (H) 01/24/2018    AST 27 01/24/2018    ALT 30 01/24/2018       Final Diagnosis   PERIPHERAL BLOOD SMEAR, BONE MARROW ASPIRATE, CLOT SECTION BIOPSY WITH FLOW CYTOMETRY,     IMMUNOHISTOCHEMISTRY, AND IN-SITU HYBRIDIZATION STUDIES:     PERIPHERAL BLOOD SMEAR AND CBC DATA:  Moderate/severe anemia.     BONE MARROW ASPIRATE, CLOT SECTION AND BIOPSY:  Normocellular marrow with numerous lymphoid aggregates appearing reactive.  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  Stainable iron present.  Negative for tumor and granulomata.  PCC/dlb      Electronically signed by Jimmy Olivia MD on 1/31/2018 at 1644   Clinical Information See result below   The working history is anemia. Procedure performed by Dr. Mcgarry.      Bone Marrow Procedure Note  Date: 1/25/18.               No known allergy to Betadine or Lidocaine     The patient was identified by wrist band, verbally and by the nurse. The bone marrow procedure was explained and the potential complications of bleeding, bruising and infection were discussed. The patient agrees to proceed. The patient is rolled onto the right side with knees tucked toward their chin and  the left posterior iliac crest is palpated and the bone prominence located for the puncture site. The area was cleaned 3x with betadine then draped with sterile towels, anesthetized with Lidocaine by infiltration to the periosteal surface. A small incision made through which the marrow needle is introduced. Marrow aspiration was collected and a marrow core was obtained. The needle was withdrawn and pressure applied to the site. The area was cleaned with alcohol after drapes were removed and a sterile bandage placed over the site. The patient was rolled onto their back with a towel roll placed to provide pressure to the site for hemostasis. The patient tolerated the procedure well and no complications were encountered.       The marrow was submitted for:  Routine exam                                                     Flow cytometry                                                     Cytogenetics      Dr. Olivia for Dr. Mcgarry, Department of Pathology    Comment See result below   There is a marked decrease in erythropoiesis.  Findings are consistent with red cell aplasia.  Correlation with clinical history including medication history would be essential.  If this has been a longstanding process, a diagnosis of pure red cell aplasia may be warranted.  There is no evidence of a lymphoid or myeloid neoplasm.     These results were discussed with Dr. Kate by Dr. Olivia on 1/30/18.   Gross Description See result below   Received are bone aspirate smears.      Received in formalin labeled as bone marrow is a portion of blood clot and possible embedded marrow particles.  The specimen is filtered and submitted entirely in cassette 1.     Received in formalin labeled as bone marrow biopsy is an apparent core biopsy of bone and associated blood which is submitted in toto in cassette 2, following decalcification. PCC   CBC and Differential See result below   Santizo bone marrow aspirate smear and accompanying CBC data is  available for review.  There is a normal total white blood cell count with differential as indicated.  No abnormal/immature white blood cells are noted.  Red blood cells show mild anisopoikilocytosis including occasional elliptocytes.  There is moderate/severe anemia.  Platelets are present in adequate numbers with normal morphologically.   Aspirate Smear See result below   Santizo stained bone marrow aspirate smears containing adequate cellularity and particles are available for review.    Myelopoiesis appears overall unremarkable.  Erythropoiesis is markedly suppressed with at most rare erythroid  precursors identified.  Myeloid to erythroid ratio is therefore markedly increased estimated at greater than 100:1.   Megakaryocytes showing normal cytologic features are present in adequate numbers.  No cellular elements foreign to the  marrow are identified.  No increase in histiocytes or evidence of hemaphagocytosis is noted.  No increase in blasts, lymphocytes or plasma cells is noted.  Iron stain, with appropriate control, shows stainable iron to  be present with no ringed sideroblasts identified.        Core Biopsy  See result below   Bone marrow core biopsy containing approximately 5mm of marrow space is available for review.  The marrow is approximately 33% cellular (roughly normocellular for patient's age).  Cellular composition is similar to that seen in the clot section including a small lymphoid aggregate consisting of small cytologically unremarkable lymphocytes.  Again, essentially no erythroid precursors are noted on routine stain.  Iron stain, with appropriate control, shows stainable iron to be present with no ring sideroblasts identified.      Clot Section See result below   Bone marrow clot section containing numerous particles is available for review.  Particles are overall 33% cellular   (roughly normocellular for patient's age).  There are several medium sized lymphoid aggregates present consisting  of small   cytologically unremarkable lymphocytes.  The aggregates appear well circumscribed on routine stains.  No definitive   erythroid precursors are seen on routine stains.  To further evaluate the marrow composition, including the lymphoid   aggregates, immunohistochemical staining was undertaken.  Stains for CD3 and CD20 show the lymphoid aggregates to  be a mixture of CD3+ T-cells and CD20+ B-cells, although the largest aggregate seen on routine stains are  not present on the immunostain section.   stain highlights plasma cells which are not significantly increased  (approximately 2% of marrow cellularity) and show a normal distribution.  In-situ hybridization studies for  kappa and lambda light chain show the plasma cells present to polytypic.  Glycophorin A stain shows essentially no  identifiable erythroid precursors.  Iron stain, with appropriate control, shows stainable iron to be present with no ring  sideroblasts identified.     Flow Cytometry Summary See result below   Flow Cytometry Summary   INTERPRETATION:  No increase in blasts.  No specific immunophenotypic abnormalities identified.  No clonal B-cell population or aberrant T-cell population identified.       The following antibodies were evaluated by flow cytometry: CD2, CD3, CD4, CD5, CD7, CD8, CD10, CD11b, CD13, CD14, CD16, CD19, CD20, CD33, CD34, CD38, CD45, CD56, CD57, CD64, , HLA-DR, kappa, lambda     Gaiting on CD45 versus side scatter as well as incorporation other immunophenotypic data shows a distribution of cells of 6.4% lymphocytes, 0.4% hematogones, 80.2% neutrophils and neutrophil precursors, 4.9% eosinophils, 0.5% basophils, 0.6% blasts, 3.0% monocytes and monocytic precursors, 0.1% plasma cells and 1.6% CD45 negative events including nucleated erythroid precursors and debris.  No specific immunophenotypic abnormalities are identified in the maturing myeloid or monocytic elements.  There is no increase in blasts.  A  population of CD10+/CD19+ cells is identified showing a spectrum of CD20 expression compatible with maturing hematogones.  In the lymphocyte population, T-cells are 75% of cellularity, B-cells 15% of cellularity and natural killer cells 12% of cellularity.  The T-cells show a CD4:CD8 ratio of 1.6:1.  No aberrant T-cell antigen expression is detected.  There is no evidence of clonality in the B-cell population.     Technical component performed at [facility], [address], [address], [place] [address].      These tests use analyte specific reagents. The performance of these analyte specific reagents was determined by [facility]. These tests have not been cleared or approved by the U.S. Food & Drug Administration (FDA). The FDA has determined that such approval is not necessary. These tests are used for clinical purposes and should not be considered experimental or research.     Professional interpretation of flow cytometric results performed by Dr. Jimmy Olivia.       Resulting Agency Count includes the [facility]      Specimen Collected: 01/25/18  1:57 PM Last Resulted: 01/31/18  4:44 PM                         Related Result Highlights           Assessment/Plan    62-year-old lady with pure red cell aplasia.  I suspect this is medication related.  Though chronicity is going to tell us if this is going to improve on its own or require admission for ATG infusion at [facility].  I like to check her labs once a week.  I suspect she is going to require transfusion support.  If she does not recall her in 1 month and I'm going to refer her to [facility].  I went over the differential with her today and answered all the questions and expectations going forward.      I will see her in my clinic in 3 weeks.  My parameters for transfusion would be hgb less than 7.        Aj Kate MD  [facility]    1/31/2018         Please note that portions  of this note may have been completed with a voice recognition program. Efforts were made to edit the dictations, but occasionally words are mistranscribed.

## 2018-01-31 NOTE — TELEPHONE ENCOUNTER
----- Message from Tara Alvarez sent at 1/31/2018  4:11 PM EST -----  Regarding: [facility]-LABS  Contact: [phone]  Patient was told to call back to today about her labs? It was the lab text about her heart.

## 2018-01-31 NOTE — TELEPHONE ENCOUNTER
Returned patient call.  Reported that lab were still processing.  Patient verbalized understanding.

## 2018-02-06 ENCOUNTER — LAB (OUTPATIENT)
Dept: LAB | Facility: HOSPITAL | Age: 63
End: 2018-02-06

## 2018-02-06 DIAGNOSIS — D50.9 IRON DEFICIENCY ANEMIA, UNSPECIFIED IRON DEFICIENCY ANEMIA TYPE: ICD-10-CM

## 2018-02-06 DIAGNOSIS — R07.1 CHEST PAIN ON BREATHING: ICD-10-CM

## 2018-02-06 LAB
BNP SERPL-MCNC: 52 PG/ML (ref 0–100)
ERYTHROCYTE [DISTWIDTH] IN BLOOD BY AUTOMATED COUNT: 16.8 % (ref 11.3–14.5)
HCT VFR BLD AUTO: 24.6 % (ref 34.5–44)
HGB BLD-MCNC: 8 G/DL (ref 11.5–15.5)
LAB AP ASPIRATE SMEAR: NORMAL
LAB AP CASE REPORT: NORMAL
LAB AP CBC AND DIFFERENTIAL: NORMAL
LAB AP CLINICAL INFORMATION: NORMAL
LAB AP CLOT SECTION: NORMAL
LAB AP CORE BIOPSY: NORMAL
LAB AP DIAGNOSIS COMMENT: NORMAL
LAB AP FLOW CYTOMETRY REPORT,ADDENDUM: NORMAL
LAB AP FLOW CYTOMETRY SUMMARY: NORMAL
LYMPHOCYTES # BLD AUTO: 3 10*3/MM3 (ref 0.6–4.8)
LYMPHOCYTES NFR BLD AUTO: 39.1 % (ref 24–44)
Lab: NORMAL
MCH RBC QN AUTO: 28.6 PG (ref 27–31)
MCHC RBC AUTO-ENTMCNC: 32.5 G/DL (ref 32–36)
MCV RBC AUTO: 88.1 FL (ref 80–99)
MONOCYTES # BLD AUTO: 0.7 10*3/MM3 (ref 0–1)
MONOCYTES NFR BLD AUTO: 9 % (ref 0–12)
NEUTROPHILS # BLD AUTO: 3.9 10*3/MM3 (ref 1.5–8.3)
NEUTROPHILS NFR BLD AUTO: 51.9 % (ref 41–71)
PATH REPORT.FINAL DX SPEC: NORMAL
PATH REPORT.GROSS SPEC: NORMAL
PLATELET # BLD AUTO: 429 10*3/MM3 (ref 150–450)
PMV BLD AUTO: 8.5 FL (ref 6–12)
RBC # BLD AUTO: 2.79 10*6/MM3 (ref 3.89–5.14)
WBC NRBC COR # BLD: 7.6 10*3/MM3 (ref 3.5–10.8)

## 2018-02-06 PROCEDURE — 83880 ASSAY OF NATRIURETIC PEPTIDE: CPT

## 2018-02-06 PROCEDURE — 85025 COMPLETE CBC W/AUTO DIFF WBC: CPT

## 2018-02-06 PROCEDURE — 36415 COLL VENOUS BLD VENIPUNCTURE: CPT

## 2018-02-07 ENCOUNTER — TELEPHONE (OUTPATIENT)
Dept: ONCOLOGY | Facility: CLINIC | Age: 63
End: 2018-02-07

## 2018-02-07 NOTE — TELEPHONE ENCOUNTER
----- Message from Lisa Short sent at 2/7/2018 10:28 AM EST -----  Regarding: [facility] - LAB RESULTS  Contact: [phone]  PATIENT CALLED FOR LAB RESULTS

## 2018-02-08 ENCOUNTER — APPOINTMENT (OUTPATIENT)
Dept: ONCOLOGY | Facility: HOSPITAL | Age: 63
End: 2018-02-08

## 2018-02-12 ENCOUNTER — LAB (OUTPATIENT)
Dept: LAB | Facility: HOSPITAL | Age: 63
End: 2018-02-12

## 2018-02-12 DIAGNOSIS — D61.1 APLASTIC ANEMIA DUE TO DRUGS (HCC): Primary | ICD-10-CM

## 2018-02-12 DIAGNOSIS — R07.1 CHEST PAIN ON BREATHING: ICD-10-CM

## 2018-02-12 LAB
ERYTHROCYTE [DISTWIDTH] IN BLOOD BY AUTOMATED COUNT: 17 % (ref 11.3–14.5)
HCT VFR BLD AUTO: 22.9 % (ref 34.5–44)
HGB BLD-MCNC: 7.5 G/DL (ref 11.5–15.5)
LYMPHOCYTES # BLD AUTO: 3.5 10*3/MM3 (ref 0.6–4.8)
LYMPHOCYTES NFR BLD AUTO: 41 % (ref 24–44)
MCH RBC QN AUTO: 29.1 PG (ref 27–31)
MCHC RBC AUTO-ENTMCNC: 32.6 G/DL (ref 32–36)
MCV RBC AUTO: 89.2 FL (ref 80–99)
MONOCYTES # BLD AUTO: 0.6 10*3/MM3 (ref 0–1)
MONOCYTES NFR BLD AUTO: 6.8 % (ref 0–12)
NEUTROPHILS # BLD AUTO: 4.4 10*3/MM3 (ref 1.5–8.3)
NEUTROPHILS NFR BLD AUTO: 52.2 % (ref 41–71)
PLATELET # BLD AUTO: 469 10*3/MM3 (ref 150–450)
PMV BLD AUTO: 8.6 FL (ref 6–12)
RBC # BLD AUTO: 2.57 10*6/MM3 (ref 3.89–5.14)
WBC NRBC COR # BLD: 8.5 10*3/MM3 (ref 3.5–10.8)

## 2018-02-12 PROCEDURE — 36415 COLL VENOUS BLD VENIPUNCTURE: CPT

## 2018-02-12 PROCEDURE — 85025 COMPLETE CBC W/AUTO DIFF WBC: CPT

## 2018-02-12 RX ORDER — SODIUM CHLORIDE 9 MG/ML
250 INJECTION, SOLUTION INTRAVENOUS AS NEEDED
Status: CANCELLED | OUTPATIENT
Start: 2018-02-12

## 2018-02-12 RX ORDER — ACETAMINOPHEN 325 MG/1
650 TABLET ORAL ONCE
Status: CANCELLED | OUTPATIENT
Start: 2018-02-12 | End: 2018-02-12

## 2018-02-12 RX ORDER — DIPHENHYDRAMINE HCL 25 MG
25 CAPSULE ORAL ONCE
Status: CANCELLED | OUTPATIENT
Start: 2018-02-12 | End: 2018-02-12

## 2018-02-14 ENCOUNTER — INFUSION (OUTPATIENT)
Dept: ONCOLOGY | Facility: HOSPITAL | Age: 63
End: 2018-02-14

## 2018-02-14 VITALS
BODY MASS INDEX: 39.32 KG/M2 | RESPIRATION RATE: 18 BRPM | HEART RATE: 85 BPM | SYSTOLIC BLOOD PRESSURE: 117 MMHG | WEIGHT: 290 LBS | TEMPERATURE: 98.1 F | DIASTOLIC BLOOD PRESSURE: 41 MMHG

## 2018-02-14 DIAGNOSIS — D61.1 APLASTIC ANEMIA DUE TO DRUGS (HCC): ICD-10-CM

## 2018-02-14 LAB
ABO GROUP BLD: NORMAL
BLD GP AB SCN SERPL QL: NEGATIVE
RETICS/RBC NFR AUTO: 0.45 % (ref 0.5–1.5)
RH BLD: POSITIVE

## 2018-02-14 PROCEDURE — 36415 COLL VENOUS BLD VENIPUNCTURE: CPT

## 2018-02-14 PROCEDURE — 86900 BLOOD TYPING SEROLOGIC ABO: CPT

## 2018-02-14 PROCEDURE — P9016 RBC LEUKOCYTES REDUCED: HCPCS

## 2018-02-14 PROCEDURE — 85045 AUTOMATED RETICULOCYTE COUNT: CPT

## 2018-02-14 PROCEDURE — 86850 RBC ANTIBODY SCREEN: CPT

## 2018-02-14 PROCEDURE — 36430 TRANSFUSION BLD/BLD COMPNT: CPT

## 2018-02-14 PROCEDURE — 63710000001 DIPHENHYDRAMINE PER 50 MG: Performed by: INTERNAL MEDICINE

## 2018-02-14 PROCEDURE — 86901 BLOOD TYPING SEROLOGIC RH(D): CPT

## 2018-02-14 PROCEDURE — 86923 COMPATIBILITY TEST ELECTRIC: CPT

## 2018-02-14 RX ORDER — DIPHENHYDRAMINE HCL 25 MG
25 CAPSULE ORAL ONCE
Status: COMPLETED | OUTPATIENT
Start: 2018-02-14 | End: 2018-02-14

## 2018-02-14 RX ORDER — SODIUM CHLORIDE 9 MG/ML
250 INJECTION, SOLUTION INTRAVENOUS AS NEEDED
Status: DISCONTINUED | OUTPATIENT
Start: 2018-02-14 | End: 2018-02-14 | Stop reason: HOSPADM

## 2018-02-14 RX ORDER — ACETAMINOPHEN 325 MG/1
650 TABLET ORAL ONCE
Status: COMPLETED | OUTPATIENT
Start: 2018-02-14 | End: 2018-02-14

## 2018-02-14 RX ADMIN — ACETAMINOPHEN 650 MG: 325 TABLET, FILM COATED ORAL at 08:46

## 2018-02-14 RX ADMIN — DIPHENHYDRAMINE HYDROCHLORIDE 25 MG: 25 CAPSULE ORAL at 08:46

## 2018-02-15 ENCOUNTER — APPOINTMENT (OUTPATIENT)
Dept: ONCOLOGY | Facility: HOSPITAL | Age: 63
End: 2018-02-15

## 2018-02-15 LAB
ABO + RH BLD: NORMAL
ABO + RH BLD: NORMAL
BH BB BLOOD EXPIRATION DATE: NORMAL
BH BB BLOOD EXPIRATION DATE: NORMAL
BH BB BLOOD TYPE BARCODE: 6200
BH BB BLOOD TYPE BARCODE: 6200
BH BB DISPENSE STATUS: NORMAL
BH BB DISPENSE STATUS: NORMAL
BH BB PRODUCT CODE: NORMAL
BH BB PRODUCT CODE: NORMAL
BH BB UNIT NUMBER: NORMAL
BH BB UNIT NUMBER: NORMAL
UNIT  ABO: NORMAL
UNIT  ABO: NORMAL
UNIT  RH: NORMAL
UNIT  RH: NORMAL

## 2018-02-20 ENCOUNTER — LAB (OUTPATIENT)
Dept: LAB | Facility: HOSPITAL | Age: 63
End: 2018-02-20

## 2018-02-20 DIAGNOSIS — R07.1 CHEST PAIN ON BREATHING: ICD-10-CM

## 2018-02-20 LAB
ERYTHROCYTE [DISTWIDTH] IN BLOOD BY AUTOMATED COUNT: 15.7 % (ref 11.3–14.5)
HCT VFR BLD AUTO: 25.7 % (ref 34.5–44)
HGB BLD-MCNC: 8.2 G/DL (ref 11.5–15.5)
LYMPHOCYTES # BLD AUTO: 2.9 10*3/MM3 (ref 0.6–4.8)
LYMPHOCYTES NFR BLD AUTO: 37.3 % (ref 24–44)
MCH RBC QN AUTO: 28.3 PG (ref 27–31)
MCHC RBC AUTO-ENTMCNC: 31.8 G/DL (ref 32–36)
MCV RBC AUTO: 89.1 FL (ref 80–99)
MONOCYTES # BLD AUTO: 0.5 10*3/MM3 (ref 0–1)
MONOCYTES NFR BLD AUTO: 7 % (ref 0–12)
NEUTROPHILS # BLD AUTO: 4.3 10*3/MM3 (ref 1.5–8.3)
NEUTROPHILS NFR BLD AUTO: 55.7 % (ref 41–71)
PLATELET # BLD AUTO: 362 10*3/MM3 (ref 150–450)
PMV BLD AUTO: 8 FL (ref 6–12)
RBC # BLD AUTO: 2.89 10*6/MM3 (ref 3.89–5.14)
WBC NRBC COR # BLD: 7.8 10*3/MM3 (ref 3.5–10.8)

## 2018-02-20 PROCEDURE — 85025 COMPLETE CBC W/AUTO DIFF WBC: CPT

## 2018-02-20 PROCEDURE — 36415 COLL VENOUS BLD VENIPUNCTURE: CPT

## 2018-02-23 ENCOUNTER — LAB (OUTPATIENT)
Dept: LAB | Facility: HOSPITAL | Age: 63
End: 2018-02-23

## 2018-02-23 ENCOUNTER — OFFICE VISIT (OUTPATIENT)
Dept: ONCOLOGY | Facility: CLINIC | Age: 63
End: 2018-02-23

## 2018-02-23 VITALS
DIASTOLIC BLOOD PRESSURE: 55 MMHG | TEMPERATURE: 97.3 F | RESPIRATION RATE: 18 BRPM | BODY MASS INDEX: 39.68 KG/M2 | SYSTOLIC BLOOD PRESSURE: 141 MMHG | WEIGHT: 293 LBS | HEIGHT: 72 IN | HEART RATE: 87 BPM

## 2018-02-23 DIAGNOSIS — D61.1 APLASTIC ANEMIA DUE TO DRUGS (HCC): Primary | ICD-10-CM

## 2018-02-23 DIAGNOSIS — D61.1 APLASTIC ANEMIA DUE TO DRUGS (HCC): ICD-10-CM

## 2018-02-23 LAB
ERYTHROCYTE [DISTWIDTH] IN BLOOD BY AUTOMATED COUNT: 16.7 % (ref 11.3–14.5)
HCT VFR BLD AUTO: 24.8 % (ref 34.5–44)
HGB BLD-MCNC: 7.9 G/DL (ref 11.5–15.5)
LYMPHOCYTES # BLD AUTO: 2.8 10*3/MM3 (ref 0.6–4.8)
LYMPHOCYTES NFR BLD AUTO: 36.7 % (ref 24–44)
MCH RBC QN AUTO: 28.5 PG (ref 27–31)
MCHC RBC AUTO-ENTMCNC: 31.7 G/DL (ref 32–36)
MCV RBC AUTO: 90 FL (ref 80–99)
MONOCYTES # BLD AUTO: 0.7 10*3/MM3 (ref 0–1)
MONOCYTES NFR BLD AUTO: 9.5 % (ref 0–12)
NEUTROPHILS # BLD AUTO: 4 10*3/MM3 (ref 1.5–8.3)
NEUTROPHILS NFR BLD AUTO: 53.8 % (ref 41–71)
PLATELET # BLD AUTO: 358 10*3/MM3 (ref 150–450)
PMV BLD AUTO: 8 FL (ref 6–12)
RBC # BLD AUTO: 2.76 10*6/MM3 (ref 3.89–5.14)
RETICS/RBC NFR AUTO: 3.24 % (ref 0.5–1.5)
WBC NRBC COR # BLD: 7.5 10*3/MM3 (ref 3.5–10.8)

## 2018-02-23 PROCEDURE — 36415 COLL VENOUS BLD VENIPUNCTURE: CPT

## 2018-02-23 PROCEDURE — 85045 AUTOMATED RETICULOCYTE COUNT: CPT

## 2018-02-23 PROCEDURE — 85025 COMPLETE CBC W/AUTO DIFF WBC: CPT

## 2018-02-23 PROCEDURE — 99214 OFFICE O/P EST MOD 30 MIN: CPT | Performed by: INTERNAL MEDICINE

## 2018-02-23 RX ORDER — SODIUM CHLORIDE 9 MG/ML
250 INJECTION, SOLUTION INTRAVENOUS AS NEEDED
Status: CANCELLED | OUTPATIENT
Start: 2018-02-23

## 2018-02-23 RX ORDER — DIPHENHYDRAMINE HCL 25 MG
25 CAPSULE ORAL ONCE
Status: CANCELLED | OUTPATIENT
Start: 2018-02-23 | End: 2018-02-23

## 2018-02-23 RX ORDER — ACETAMINOPHEN 325 MG/1
650 TABLET ORAL ONCE
Status: CANCELLED | OUTPATIENT
Start: 2018-02-23 | End: 2018-02-23

## 2018-02-23 NOTE — PROGRESS NOTES
"PROBLEM LIST:    1.  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  2.  Bone marrow biopsy shows no erythroid precursors.  3.  Multiple sclerosis undertreatment with Dr. Gilbert  4.  Latent TB treated with isoniazid by Dr. Lora    REASON FOR VISIT: Anemia    HISTORY OF PRESENT ILLNESS:   62 y.o.  female presents today for follow-up of her transfusion requiring anemia.  She has been requiring transfusion.  She continues to have significant fatigue.  Overall she seems to be doing reasonably well.  It's been almost 4 weeks since she stopped isoniazid.  She is also stopped the MS drug.    Past medical history, social history and family history was reviewed and unchanged from prior visit.    Review of Systems:    Review of Systems   Constitutional: Positive for fatigue.   HENT:  Negative.    Eyes: Negative.    Respiratory: Positive for shortness of breath.    Cardiovascular: Negative.    Gastrointestinal: Negative.    Endocrine: Negative.    Genitourinary: Negative.     Neurological: Positive for extremity weakness and numbness.   Hematological: Negative.    Psychiatric/Behavioral: Negative.       A comprehensive 14 point review of systems was performed and was negative except as mentioned.      Medications:  The current medication list was reviewed in the EMR    ALLERGIES:    Allergies   Allergen Reactions   • Sulfa Antibiotics    • Tetanus Toxoid          Physical Exam    VITAL SIGNS:  /55 Comment: LUE  Pulse 87  Temp 97.3 °F (36.3 °C) (Temporal Artery )   Resp 18  Ht 182.9 cm (72\")  Wt (!) 152 kg (334 lb)  BMI 45.3 kg/m2    Wt Readings from Last 3 Encounters:   02/23/18 (!) 152 kg (334 lb)   02/14/18 132 kg (290 lb)   01/31/18 (!) 151 kg (332 lb 3.2 oz)        Performance Status: 1    General: well appearing, in no acute distress  HEENT: sclera anicteric, oropharynx clear, neck is supple  Lymphatics: no cervical, supraclavicular, or axillary adenopathy  Cardiovascular: regular rate and rhythm, " no murmurs, rubs or gallops  Lungs: clear to auscultation bilaterally  Abdomen: soft, nontender, nondistended.  No palpable organomegaly  Extremities: no lower extremity edema  Skin: no rashes, lesions, bruising, or petechiae  Msk:  Weakness in her legs  Psych: Mood is stable        RECENT LABS:    Lab Results   Component Value Date    HGB 7.9 (L) 02/23/2018    HCT 24.8 (L) 02/23/2018    MCV 90.0 02/23/2018     02/23/2018    WBC 7.50 02/23/2018    NEUTROABS 4.00 02/23/2018    LYMPHSABS 2.80 02/23/2018    MONOSABS 0.70 02/23/2018    EOSABS 0.31 (H) 01/24/2018    BASOSABS 0.10 01/24/2018       Lab Results   Component Value Date    GLUCOSE 90 01/25/2018    BUN 24 (H) 01/25/2018    CREATININE 0.70 01/25/2018     01/25/2018    K 4.9 01/25/2018     01/25/2018    CO2 24.0 01/25/2018    CALCIUM 9.0 01/25/2018    PROTEINTOT 6.2 01/24/2018    ALBUMIN 3.50 01/24/2018    BILITOT 0.9 01/24/2018    ALKPHOS 118 (H) 01/24/2018    AST 27 01/24/2018    ALT 30 01/24/2018     Component      Latest Ref Rng & Units 1/24/2018 2/14/2018 2/23/2018   Reticulocyte %      0.50 - 1.50 % 0.49 (L) 0.45 (L) 3.24 (H)       Final Diagnosis   PERIPHERAL BLOOD SMEAR, BONE MARROW ASPIRATE, CLOT SECTION BIOPSY WITH FLOW CYTOMETRY,     IMMUNOHISTOCHEMISTRY, AND IN-SITU HYBRIDIZATION STUDIES:     PERIPHERAL BLOOD SMEAR AND CBC DATA:  Moderate/severe anemia.     BONE MARROW ASPIRATE, CLOT SECTION AND BIOPSY:  Normocellular marrow with numerous lymphoid aggregates appearing reactive.  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  Stainable iron present.  Negative for tumor and granulomata.  PCC/dlb      Electronically signed by Jimmy Olivia MD on 1/31/2018 at 1644   Clinical Information See result below   The working history is anemia. Procedure performed by Dr. Mcgarry.      Bone Marrow Procedure Note  Date: 1/25/18.               No known allergy to Betadine or Lidocaine     The  patient was identified by wrist band, verbally and by the nurse. The bone marrow procedure was explained and the potential complications of bleeding, bruising and infection were discussed. The patient agrees to proceed. The patient is rolled onto the right side with knees tucked toward their chin and the left posterior iliac crest is palpated and the bone prominence located for the puncture site. The area was cleaned 3x with betadine then draped with sterile towels, anesthetized with Lidocaine by infiltration to the periosteal surface. A small incision made through which the marrow needle is introduced. Marrow aspiration was collected and a marrow core was obtained. The needle was withdrawn and pressure applied to the site. The area was cleaned with alcohol after drapes were removed and a sterile bandage placed over the site. The patient was rolled onto their back with a towel roll placed to provide pressure to the site for hemostasis. The patient tolerated the procedure well and no complications were encountered.       The marrow was submitted for:  Routine exam                                                     Flow cytometry                                                     Cytogenetics      Dr. Olivia for Dr. Mcgarry, Department of Pathology    Comment See result below   There is a marked decrease in erythropoiesis.  Findings are consistent with red cell aplasia.  Correlation with clinical history including medication history would be essential.  If this has been a longstanding process, a diagnosis of pure red cell aplasia may be warranted.  There is no evidence of a lymphoid or myeloid neoplasm.     These results were discussed with Dr. Kate by Dr. Olivia on 1/30/18.   Gross Description See result below   Received are bone aspirate smears.      Received in formalin labeled as bone marrow is a portion of blood clot and possible embedded marrow particles.  The specimen is filtered and submitted entirely in  cassette 1.     Received in formalin labeled as bone marrow biopsy is an apparent core biopsy of bone and associated blood which is submitted in toto in cassette 2, following decalcification. PCC   CBC and Differential See result below   Santizo bone marrow aspirate smear and accompanying CBC data is available for review.  There is a normal total white blood cell count with differential as indicated.  No abnormal/immature white blood cells are noted.  Red blood cells show mild anisopoikilocytosis including occasional elliptocytes.  There is moderate/severe anemia.  Platelets are present in adequate numbers with normal morphologically.   Aspirate Smear See result below   Santizo stained bone marrow aspirate smears containing adequate cellularity and particles are available for review.    Myelopoiesis appears overall unremarkable.  Erythropoiesis is markedly suppressed with at most rare erythroid  precursors identified.  Myeloid to erythroid ratio is therefore markedly increased estimated at greater than 100:1.   Megakaryocytes showing normal cytologic features are present in adequate numbers.  No cellular elements foreign to the  marrow are identified.  No increase in histiocytes or evidence of hemaphagocytosis is noted.  No increase in blasts, lymphocytes or plasma cells is noted.  Iron stain, with appropriate control, shows stainable iron to  be present with no ringed sideroblasts identified.        Core Biopsy  See result below   Bone marrow core biopsy containing approximately 5mm of marrow space is available for review.  The marrow is approximately 33% cellular (roughly normocellular for patient's age).  Cellular composition is similar to that seen in the clot section including a small lymphoid aggregate consisting of small cytologically unremarkable lymphocytes.  Again, essentially no erythroid precursors are noted on routine stain.  Iron stain, with appropriate control, shows stainable iron to be present with no  ring sideroblasts identified.      Clot Section See result below   Bone marrow clot section containing numerous particles is available for review.  Particles are overall 33% cellular   (roughly normocellular for patient's age).  There are several medium sized lymphoid aggregates present consisting of small   cytologically unremarkable lymphocytes.  The aggregates appear well circumscribed on routine stains.  No definitive   erythroid precursors are seen on routine stains.  To further evaluate the marrow composition, including the lymphoid   aggregates, immunohistochemical staining was undertaken.  Stains for CD3 and CD20 show the lymphoid aggregates to  be a mixture of CD3+ T-cells and CD20+ B-cells, although the largest aggregate seen on routine stains are  not present on the immunostain section.   stain highlights plasma cells which are not significantly increased  (approximately 2% of marrow cellularity) and show a normal distribution.  In-situ hybridization studies for  kappa and lambda light chain show the plasma cells present to polytypic.  Glycophorin A stain shows essentially no  identifiable erythroid precursors.  Iron stain, with appropriate control, shows stainable iron to be present with no ring  sideroblasts identified.     Flow Cytometry Summary See result below   Flow Cytometry Summary   INTERPRETATION:  No increase in blasts.  No specific immunophenotypic abnormalities identified.  No clonal B-cell population or aberrant T-cell population identified.       The following antibodies were evaluated by flow cytometry: CD2, CD3, CD4, CD5, CD7, CD8, CD10, CD11b, CD13, CD14, CD16, CD19, CD20, CD33, CD34, CD38, CD45, CD56, CD57, CD64, , HLA-DR, kappa, lambda     Gaiting on CD45 versus side scatter as well as incorporation other immunophenotypic data shows a distribution of cells of 6.4% lymphocytes, 0.4% hematogones, 80.2% neutrophils and neutrophil precursors, 4.9% eosinophils, 0.5% basophils,  0.6% blasts, 3.0% monocytes and monocytic precursors, 0.1% plasma cells and 1.6% CD45 negative events including nucleated erythroid precursors and debris.  No specific immunophenotypic abnormalities are identified in the maturing myeloid or monocytic elements.  There is no increase in blasts.  A population of CD10+/CD19+ cells is identified showing a spectrum of CD20 expression compatible with maturing hematogones.  In the lymphocyte population, T-cells are 75% of cellularity, B-cells 15% of cellularity and natural killer cells 12% of cellularity.  The T-cells show a CD4:CD8 ratio of 1.6:1.  No aberrant T-cell antigen expression is detected.  There is no evidence of clonality in the B-cell population.     Technical component performed at [facility], [address] [place] [address], [facility] [address] [address].      These tests use analyte specific reagents. The performance of these analyte specific reagents was determined by [facility]. These tests have not been cleared or approved by the U.S. Food & Drug Administration (FDA). The FDA has determined that such approval is not necessary. These tests are used for clinical purposes and should not be considered experimental or research.     Professional interpretation of flow cytometric results performed by Dr. Jimmy Olivia.       [facility]      Specimen Collected: 01/25/18  1:57 PM Last Resulted: 01/31/18  4:44 PM                         Related Result Highlights           Assessment/Plan    62-year-old lady with pure red cell aplasia.  I suspect this is medication related. Her reticulocyte count has improved.  She continues be fairly anemic.  I'll give her 1 unit of blood since she is so symptomatic.  Otherwise I like to continue with weekly labs to see if there is any recovery.  I'm going to give her another month since the reticulocyte count is slowly increasing.  If she has no changes in that time and  may have to refer her to  for possible treatment for aplastic anemia.    I will see her in my clinic in 3 weeks.          Aj Kate MD  [facility]    2/23/2018         Please note that portions of this note may have been completed with a voice recognition program. Efforts were made to edit the dictations, but occasionally words are mistranscribed.

## 2018-03-01 ENCOUNTER — INFUSION (OUTPATIENT)
Dept: ONCOLOGY | Facility: HOSPITAL | Age: 63
End: 2018-03-01

## 2018-03-01 VITALS
SYSTOLIC BLOOD PRESSURE: 118 MMHG | BODY MASS INDEX: 39.68 KG/M2 | RESPIRATION RATE: 16 BRPM | TEMPERATURE: 97.7 F | HEIGHT: 72 IN | WEIGHT: 293 LBS | HEART RATE: 69 BPM | DIASTOLIC BLOOD PRESSURE: 50 MMHG

## 2018-03-01 DIAGNOSIS — R07.9 CHEST PAIN, UNSPECIFIED TYPE: ICD-10-CM

## 2018-03-01 DIAGNOSIS — D50.8 OTHER IRON DEFICIENCY ANEMIA: ICD-10-CM

## 2018-03-01 DIAGNOSIS — D61.1 APLASTIC ANEMIA DUE TO DRUGS (HCC): ICD-10-CM

## 2018-03-01 DIAGNOSIS — E66.01 MORBID OBESITY (HCC): Primary | ICD-10-CM

## 2018-03-01 LAB
ABO GROUP BLD: NORMAL
ANION GAP SERPL CALCULATED.3IONS-SCNC: 6 MMOL/L (ref 3–11)
BLD GP AB SCN SERPL QL: NEGATIVE
BUN BLD-MCNC: 16 MG/DL (ref 9–23)
BUN/CREAT SERPL: 17.8 (ref 7–25)
CALCIUM SPEC-SCNC: 9.2 MG/DL (ref 8.7–10.4)
CHLORIDE SERPL-SCNC: 106 MMOL/L (ref 99–109)
CO2 SERPL-SCNC: 28 MMOL/L (ref 20–31)
CREAT BLD-MCNC: 0.9 MG/DL (ref 0.6–1.3)
ERYTHROCYTE [DISTWIDTH] IN BLOOD BY AUTOMATED COUNT: 21 % (ref 11.3–14.5)
GFR SERPL CREATININE-BSD FRML MDRD: 64 ML/MIN/1.73
GLUCOSE BLD-MCNC: 128 MG/DL (ref 70–100)
HCT VFR BLD AUTO: 26.1 % (ref 34.5–44)
HGB BLD-MCNC: 7.9 G/DL (ref 11.5–15.5)
LYMPHOCYTES # BLD AUTO: 2.1 10*3/MM3 (ref 0.6–4.8)
LYMPHOCYTES NFR BLD AUTO: 34.3 % (ref 24–44)
MCH RBC QN AUTO: 28.1 PG (ref 27–31)
MCHC RBC AUTO-ENTMCNC: 30.3 G/DL (ref 32–36)
MCV RBC AUTO: 92.6 FL (ref 80–99)
MONOCYTES # BLD AUTO: 0.6 10*3/MM3 (ref 0–1)
MONOCYTES NFR BLD AUTO: 9.5 % (ref 0–12)
NEUTROPHILS # BLD AUTO: 3.5 10*3/MM3 (ref 1.5–8.3)
NEUTROPHILS NFR BLD AUTO: 56.2 % (ref 41–71)
PLATELET # BLD AUTO: 257 10*3/MM3 (ref 150–450)
PMV BLD AUTO: 7.7 FL (ref 6–12)
POTASSIUM BLD-SCNC: 3.8 MMOL/L (ref 3.5–5.5)
RBC # BLD AUTO: 2.82 10*6/MM3 (ref 3.89–5.14)
RH BLD: POSITIVE
SODIUM BLD-SCNC: 140 MMOL/L (ref 132–146)
WBC NRBC COR # BLD: 6.2 10*3/MM3 (ref 3.5–10.8)

## 2018-03-01 PROCEDURE — 85025 COMPLETE CBC W/AUTO DIFF WBC: CPT

## 2018-03-01 PROCEDURE — 86923 COMPATIBILITY TEST ELECTRIC: CPT

## 2018-03-01 PROCEDURE — 86850 RBC ANTIBODY SCREEN: CPT

## 2018-03-01 PROCEDURE — 86900 BLOOD TYPING SEROLOGIC ABO: CPT

## 2018-03-01 PROCEDURE — 63710000001 DIPHENHYDRAMINE PER 50 MG: Performed by: INTERNAL MEDICINE

## 2018-03-01 PROCEDURE — 36415 COLL VENOUS BLD VENIPUNCTURE: CPT

## 2018-03-01 PROCEDURE — 86901 BLOOD TYPING SEROLOGIC RH(D): CPT

## 2018-03-01 PROCEDURE — 80048 BASIC METABOLIC PNL TOTAL CA: CPT

## 2018-03-01 PROCEDURE — 36430 TRANSFUSION BLD/BLD COMPNT: CPT

## 2018-03-01 PROCEDURE — P9016 RBC LEUKOCYTES REDUCED: HCPCS

## 2018-03-01 RX ORDER — DIPHENHYDRAMINE HCL 25 MG
25 CAPSULE ORAL ONCE
Status: COMPLETED | OUTPATIENT
Start: 2018-03-01 | End: 2018-03-01

## 2018-03-01 RX ORDER — SODIUM CHLORIDE 9 MG/ML
250 INJECTION, SOLUTION INTRAVENOUS AS NEEDED
Status: DISCONTINUED | OUTPATIENT
Start: 2018-03-01 | End: 2018-03-01 | Stop reason: HOSPADM

## 2018-03-01 RX ORDER — ACETAMINOPHEN 325 MG/1
650 TABLET ORAL ONCE
Status: COMPLETED | OUTPATIENT
Start: 2018-03-01 | End: 2018-03-01

## 2018-03-01 RX ADMIN — ACETAMINOPHEN 650 MG: 325 TABLET, FILM COATED ORAL at 10:56

## 2018-03-01 RX ADMIN — DIPHENHYDRAMINE HYDROCHLORIDE 25 MG: 25 CAPSULE ORAL at 10:56

## 2018-03-02 LAB
ABO + RH BLD: NORMAL
BH BB BLOOD EXPIRATION DATE: NORMAL
BH BB BLOOD TYPE BARCODE: 6200
BH BB DISPENSE STATUS: NORMAL
BH BB PRODUCT CODE: NORMAL
BH BB UNIT NUMBER: NORMAL
UNIT  ABO: NORMAL
UNIT  RH: NORMAL

## 2018-03-06 ENCOUNTER — LAB (OUTPATIENT)
Dept: LAB | Facility: HOSPITAL | Age: 63
End: 2018-03-06

## 2018-03-06 DIAGNOSIS — D61.1 APLASTIC ANEMIA DUE TO DRUGS (HCC): ICD-10-CM

## 2018-03-06 LAB
ERYTHROCYTE [DISTWIDTH] IN BLOOD BY AUTOMATED COUNT: 20.5 % (ref 11.3–14.5)
HCT VFR BLD AUTO: 34 % (ref 34.5–44)
HGB BLD-MCNC: 10.6 G/DL (ref 11.5–15.5)
LYMPHOCYTES # BLD AUTO: 2.6 10*3/MM3 (ref 0.6–4.8)
LYMPHOCYTES NFR BLD AUTO: 40.1 % (ref 24–44)
MCH RBC QN AUTO: 29 PG (ref 27–31)
MCHC RBC AUTO-ENTMCNC: 31.2 G/DL (ref 32–36)
MCV RBC AUTO: 93.1 FL (ref 80–99)
MONOCYTES # BLD AUTO: 0.6 10*3/MM3 (ref 0–1)
MONOCYTES NFR BLD AUTO: 8.9 % (ref 0–12)
NEUTROPHILS # BLD AUTO: 3.4 10*3/MM3 (ref 1.5–8.3)
NEUTROPHILS NFR BLD AUTO: 51 % (ref 41–71)
PLATELET # BLD AUTO: 288 10*3/MM3 (ref 150–450)
PMV BLD AUTO: 8 FL (ref 6–12)
RBC # BLD AUTO: 3.66 10*6/MM3 (ref 3.89–5.14)
RETICS/RBC NFR AUTO: 5.23 % (ref 0.5–1.5)
WBC NRBC COR # BLD: 6.6 10*3/MM3 (ref 3.5–10.8)

## 2018-03-06 PROCEDURE — 85045 AUTOMATED RETICULOCYTE COUNT: CPT

## 2018-03-06 PROCEDURE — 36415 COLL VENOUS BLD VENIPUNCTURE: CPT

## 2018-03-06 PROCEDURE — 85025 COMPLETE CBC W/AUTO DIFF WBC: CPT

## 2018-03-14 ENCOUNTER — LAB (OUTPATIENT)
Dept: LAB | Facility: HOSPITAL | Age: 63
End: 2018-03-14

## 2018-03-14 DIAGNOSIS — D61.1 APLASTIC ANEMIA DUE TO DRUGS (HCC): ICD-10-CM

## 2018-03-14 LAB
ERYTHROCYTE [DISTWIDTH] IN BLOOD BY AUTOMATED COUNT: 19.9 % (ref 11.3–14.5)
HCT VFR BLD AUTO: 38.2 % (ref 34.5–44)
HGB BLD-MCNC: 11.6 G/DL (ref 11.5–15.5)
LYMPHOCYTES # BLD AUTO: 3.9 10*3/MM3 (ref 0.6–4.8)
LYMPHOCYTES NFR BLD AUTO: 41.3 % (ref 24–44)
MCH RBC QN AUTO: 28.7 PG (ref 27–31)
MCHC RBC AUTO-ENTMCNC: 30.4 G/DL (ref 32–36)
MCV RBC AUTO: 94.4 FL (ref 80–99)
MONOCYTES # BLD AUTO: 0.5 10*3/MM3 (ref 0–1)
MONOCYTES NFR BLD AUTO: 5.4 % (ref 0–12)
NEUTROPHILS # BLD AUTO: 5.1 10*3/MM3 (ref 1.5–8.3)
NEUTROPHILS NFR BLD AUTO: 53.3 % (ref 41–71)
PLATELET # BLD AUTO: 292 10*3/MM3 (ref 150–450)
PMV BLD AUTO: 8.1 FL (ref 6–12)
RBC # BLD AUTO: 4.05 10*6/MM3 (ref 3.89–5.14)
RETICS/RBC NFR AUTO: 4.35 % (ref 0.5–1.5)
WBC NRBC COR # BLD: 9.5 10*3/MM3 (ref 3.5–10.8)

## 2018-03-14 PROCEDURE — 85045 AUTOMATED RETICULOCYTE COUNT: CPT

## 2018-03-14 PROCEDURE — 85025 COMPLETE CBC W/AUTO DIFF WBC: CPT

## 2018-03-14 PROCEDURE — 36415 COLL VENOUS BLD VENIPUNCTURE: CPT

## 2018-03-19 ENCOUNTER — LAB (OUTPATIENT)
Dept: LAB | Facility: HOSPITAL | Age: 63
End: 2018-03-19

## 2018-03-19 ENCOUNTER — OFFICE VISIT (OUTPATIENT)
Dept: ONCOLOGY | Facility: CLINIC | Age: 63
End: 2018-03-19

## 2018-03-19 VITALS
BODY MASS INDEX: 39.68 KG/M2 | SYSTOLIC BLOOD PRESSURE: 124 MMHG | DIASTOLIC BLOOD PRESSURE: 73 MMHG | WEIGHT: 293 LBS | TEMPERATURE: 97.6 F | HEART RATE: 87 BPM | HEIGHT: 72 IN | RESPIRATION RATE: 18 BRPM

## 2018-03-19 DIAGNOSIS — D61.1 APLASTIC ANEMIA DUE TO DRUGS (HCC): Primary | ICD-10-CM

## 2018-03-19 DIAGNOSIS — D61.1 APLASTIC ANEMIA DUE TO DRUGS (HCC): ICD-10-CM

## 2018-03-19 LAB
ERYTHROCYTE [DISTWIDTH] IN BLOOD BY AUTOMATED COUNT: 18 % (ref 11.3–14.5)
HCT VFR BLD AUTO: 37.9 % (ref 34.5–44)
HGB BLD-MCNC: 11.4 G/DL (ref 11.5–15.5)
LYMPHOCYTES # BLD AUTO: 3 10*3/MM3 (ref 0.6–4.8)
LYMPHOCYTES NFR BLD AUTO: 42.3 % (ref 24–44)
MCH RBC QN AUTO: 27.9 PG (ref 27–31)
MCHC RBC AUTO-ENTMCNC: 30.1 G/DL (ref 32–36)
MCV RBC AUTO: 92.8 FL (ref 80–99)
MONOCYTES # BLD AUTO: 0.4 10*3/MM3 (ref 0–1)
MONOCYTES NFR BLD AUTO: 6 % (ref 0–12)
NEUTROPHILS # BLD AUTO: 3.6 10*3/MM3 (ref 1.5–8.3)
NEUTROPHILS NFR BLD AUTO: 51.7 % (ref 41–71)
PLATELET # BLD AUTO: 270 10*3/MM3 (ref 150–450)
PMV BLD AUTO: 7.9 FL (ref 6–12)
RBC # BLD AUTO: 4.08 10*6/MM3 (ref 3.89–5.14)
RETICS/RBC NFR AUTO: 3.42 % (ref 0.5–1.5)
WBC NRBC COR # BLD: 7 10*3/MM3 (ref 3.5–10.8)

## 2018-03-19 PROCEDURE — 36415 COLL VENOUS BLD VENIPUNCTURE: CPT

## 2018-03-19 PROCEDURE — 85025 COMPLETE CBC W/AUTO DIFF WBC: CPT

## 2018-03-19 PROCEDURE — 85045 AUTOMATED RETICULOCYTE COUNT: CPT

## 2018-03-19 PROCEDURE — 99213 OFFICE O/P EST LOW 20 MIN: CPT | Performed by: INTERNAL MEDICINE

## 2018-03-20 DIAGNOSIS — G35 MULTIPLE SCLEROSIS (HCC): ICD-10-CM

## 2018-03-20 RX ORDER — DALFAMPRIDINE 10 MG/1
10 TABLET, FILM COATED, EXTENDED RELEASE ORAL EVERY 12 HOURS
Qty: 180 TABLET | Refills: 3 | Status: SHIPPED | OUTPATIENT
Start: 2018-03-20 | End: 2018-06-08

## 2018-03-20 NOTE — TELEPHONE ENCOUNTER
----- Message from Nuvia Lynn sent at 3/20/2018  3:02 PM EDT -----  Regarding: refill  Contact: [phone]  Patient needs new script sent to [facility] 10 mg    [phone] fax number

## 2018-03-23 ENCOUNTER — TELEPHONE (OUTPATIENT)
Dept: NEUROLOGY | Facility: CLINIC | Age: 63
End: 2018-03-23

## 2018-03-23 RX ORDER — METHYLPREDNISOLONE 4 MG/1
TABLET ORAL
Qty: 1 EACH | Refills: 0 | Status: SHIPPED | OUTPATIENT
Start: 2018-03-23 | End: 2018-06-08

## 2018-03-23 NOTE — TELEPHONE ENCOUNTER
----- Message from Nuviajacques Lynn sent at 3/23/2018  1:24 PM EDT -----  Regarding: DIFFICULTY WALKING  Contact: [phone]  Patient request steroids to help with walking. She is out of her ampyra due to, no available funding.    [facility]

## 2018-03-23 NOTE — TELEPHONE ENCOUNTER
Spoke with the provider, gave verbal order to send in a medrolpak for the patient. Sent in the script to the patient pharmacy listed in the chart.

## 2018-04-10 ENCOUNTER — TELEPHONE (OUTPATIENT)
Dept: NEUROLOGY | Facility: CLINIC | Age: 63
End: 2018-04-10

## 2018-04-10 NOTE — TELEPHONE ENCOUNTER
Patient contacted our office on 3/29/18 to report that oncology has released her to resume her Tysabri infusions.  Patient to contact [facility] to set up infusion.  Patient infused as of 4/2/18.

## 2018-04-23 ENCOUNTER — LAB (OUTPATIENT)
Dept: LAB | Facility: HOSPITAL | Age: 63
End: 2018-04-23

## 2018-04-23 ENCOUNTER — OFFICE VISIT (OUTPATIENT)
Dept: ONCOLOGY | Facility: CLINIC | Age: 63
End: 2018-04-23

## 2018-04-23 VITALS
TEMPERATURE: 97.3 F | HEIGHT: 72 IN | BODY MASS INDEX: 39.68 KG/M2 | RESPIRATION RATE: 20 BRPM | HEART RATE: 76 BPM | SYSTOLIC BLOOD PRESSURE: 160 MMHG | DIASTOLIC BLOOD PRESSURE: 74 MMHG | OXYGEN SATURATION: 98 % | WEIGHT: 293 LBS

## 2018-04-23 DIAGNOSIS — D61.1 APLASTIC ANEMIA DUE TO DRUGS (HCC): ICD-10-CM

## 2018-04-23 DIAGNOSIS — D61.1 APLASTIC ANEMIA DUE TO DRUGS (HCC): Primary | ICD-10-CM

## 2018-04-23 LAB
ERYTHROCYTE [DISTWIDTH] IN BLOOD BY AUTOMATED COUNT: 16.1 % (ref 11.3–14.5)
HCT VFR BLD AUTO: 40.2 % (ref 34.5–44)
HGB BLD-MCNC: 12.2 G/DL (ref 11.5–15.5)
LYMPHOCYTES # BLD AUTO: 2.6 10*3/MM3 (ref 0.6–4.8)
LYMPHOCYTES NFR BLD AUTO: 34.7 % (ref 24–44)
MCH RBC QN AUTO: 27 PG (ref 27–31)
MCHC RBC AUTO-ENTMCNC: 30.4 G/DL (ref 32–36)
MCV RBC AUTO: 88.8 FL (ref 80–99)
MONOCYTES # BLD AUTO: 0.6 10*3/MM3 (ref 0–1)
MONOCYTES NFR BLD AUTO: 8.6 % (ref 0–12)
NEUTROPHILS # BLD AUTO: 4.2 10*3/MM3 (ref 1.5–8.3)
NEUTROPHILS NFR BLD AUTO: 56.7 % (ref 41–71)
PLATELET # BLD AUTO: 260 10*3/MM3 (ref 150–450)
PMV BLD AUTO: 8 FL (ref 6–12)
RBC # BLD AUTO: 4.53 10*6/MM3 (ref 3.89–5.14)
RETICS/RBC NFR AUTO: 2.11 % (ref 0.5–1.5)
WBC NRBC COR # BLD: 7.4 10*3/MM3 (ref 3.5–10.8)

## 2018-04-23 PROCEDURE — 85045 AUTOMATED RETICULOCYTE COUNT: CPT

## 2018-04-23 PROCEDURE — 85025 COMPLETE CBC W/AUTO DIFF WBC: CPT

## 2018-04-23 PROCEDURE — 36415 COLL VENOUS BLD VENIPUNCTURE: CPT

## 2018-04-23 PROCEDURE — 99213 OFFICE O/P EST LOW 20 MIN: CPT | Performed by: INTERNAL MEDICINE

## 2018-04-23 NOTE — PROGRESS NOTES
PROBLEM LIST:    1.  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  2.  Bone marrow biopsy shows no erythroid precursors.  3.  Multiple sclerosis undertreatment with Dr. Gilbert  4.  Latent TB treated with isoniazid by Dr. Lora    REASON FOR VISIT: Anemia    HISTORY OF PRESENT ILLNESS:   62 y.o.  female presents today for follow-up of her Aplatic anemia.  She has done remarkably well in the last 2 months.  Clinically doing reasonably well.  No worsening of her symptoms.  She does have MS that has always a fair bit of difficulty with ambulation.    Past medical history, social history and family history was reviewed and unchanged from prior visit.    Review of Systems:    Review of Systems   Constitutional: Positive for fatigue.   HENT:  Negative.    Eyes: Negative.    Respiratory: Negative.    Cardiovascular: Negative.    Gastrointestinal: Negative.    Endocrine: Negative.    Genitourinary: Negative.     Neurological: Positive for extremity weakness and numbness.   Hematological: Negative.    Psychiatric/Behavioral: Negative.       A comprehensive 14 point review of systems was performed and was negative except as mentioned.      Medications:  The current medication list was reviewed in the EMR    ALLERGIES:    Allergies   Allergen Reactions   • Sulfa Antibiotics    • Tetanus Toxoid          Physical Exam    VITAL SIGNS:  There were no vitals taken for this visit.    Wt Readings from Last 3 Encounters:   03/19/18 (!) 151 kg (332 lb)   03/01/18 (!) 152 kg (334 lb)   02/23/18 (!) 152 kg (334 lb)        Performance Status: 1    General: well appearing, in no acute distress  HEENT: sclera anicteric, oropharynx clear, neck is supple  Lymphatics: no cervical, supraclavicular, or axillary adenopathy  Cardiovascular: regular rate and rhythm, no murmurs, rubs or gallops  Lungs: clear to auscultation bilaterally  Abdomen: soft, nontender, nondistended.  No palpable organomegaly  Extremities: no lower extremity  edema  Skin: no rashes, lesions, bruising, or petechiae  Msk:  Weakness in her legs  Psych: Mood is stable        RECENT LABS:    Lab Results   Component Value Date    HGB 12.2 04/23/2018    HCT 40.2 04/23/2018    MCV 88.8 04/23/2018     04/23/2018    WBC 7.40 04/23/2018    NEUTROABS 4.20 04/23/2018    LYMPHSABS 2.60 04/23/2018    MONOSABS 0.60 04/23/2018    EOSABS 0.31 (H) 01/24/2018    BASOSABS 0.10 01/24/2018       Lab Results   Component Value Date    GLUCOSE 128 (H) 03/01/2018    BUN 16 03/01/2018    CREATININE 0.90 03/01/2018     03/01/2018    K 3.8 03/01/2018     03/01/2018    CO2 28.0 03/01/2018    CALCIUM 9.2 03/01/2018    PROTEINTOT 6.2 01/24/2018    ALBUMIN 3.50 01/24/2018    BILITOT 0.9 01/24/2018    ALKPHOS 118 (H) 01/24/2018    AST 27 01/24/2018    ALT 30 01/24/2018         Final Diagnosis   PERIPHERAL BLOOD SMEAR, BONE MARROW ASPIRATE, CLOT SECTION BIOPSY WITH FLOW CYTOMETRY,     IMMUNOHISTOCHEMISTRY, AND IN-SITU HYBRIDIZATION STUDIES:     PERIPHERAL BLOOD SMEAR AND CBC DATA:  Moderate/severe anemia.     BONE MARROW ASPIRATE, CLOT SECTION AND BIOPSY:  Normocellular marrow with numerous lymphoid aggregates appearing reactive.  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  Stainable iron present.  Negative for tumor and granulomata.  PCC/dlb      Electronically signed by Jimmy Olivia MD on 1/31/2018 at 1644   Clinical Information See result below   The working history is anemia. Procedure performed by Dr. Mcgarry.      Bone Marrow Procedure Note  Date: 1/25/18.               No known allergy to Betadine or Lidocaine     The patient was identified by wrist band, verbally and by the nurse. The bone marrow procedure was explained and the potential complications of bleeding, bruising and infection were discussed. The patient agrees to proceed. The patient is rolled onto the right side with knees tucked toward their chin and the left  posterior iliac crest is palpated and the bone prominence located for the puncture site. The area was cleaned 3x with betadine then draped with sterile towels, anesthetized with Lidocaine by infiltration to the periosteal surface. A small incision made through which the marrow needle is introduced. Marrow aspiration was collected and a marrow core was obtained. The needle was withdrawn and pressure applied to the site. The area was cleaned with alcohol after drapes were removed and a sterile bandage placed over the site. The patient was rolled onto their back with a towel roll placed to provide pressure to the site for hemostasis. The patient tolerated the procedure well and no complications were encountered.       The marrow was submitted for:  Routine exam                                                     Flow cytometry                                                     Cytogenetics      Dr. Olivia for Dr. Mcgarry, Department of Pathology    Comment See result below   There is a marked decrease in erythropoiesis.  Findings are consistent with red cell aplasia.  Correlation with clinical history including medication history would be essential.  If this has been a longstanding process, a diagnosis of pure red cell aplasia may be warranted.  There is no evidence of a lymphoid or myeloid neoplasm.     These results were discussed with Dr. Kate by Dr. Olivia on 1/30/18.   Gross Description See result below   Received are bone aspirate smears.      Received in formalin labeled as bone marrow is a portion of blood clot and possible embedded marrow particles.  The specimen is filtered and submitted entirely in cassette 1.     Received in formalin labeled as bone marrow biopsy is an apparent core biopsy of bone and associated blood which is submitted in toto in cassette 2, following decalcification. PCC   CBC and Differential See result below   Santizo bone marrow aspirate smear and accompanying CBC data is available  for review.  There is a normal total white blood cell count with differential as indicated.  No abnormal/immature white blood cells are noted.  Red blood cells show mild anisopoikilocytosis including occasional elliptocytes.  There is moderate/severe anemia.  Platelets are present in adequate numbers with normal morphologically.   Aspirate Smear See result below   Santizo stained bone marrow aspirate smears containing adequate cellularity and particles are available for review.    Myelopoiesis appears overall unremarkable.  Erythropoiesis is markedly suppressed with at most rare erythroid  precursors identified.  Myeloid to erythroid ratio is therefore markedly increased estimated at greater than 100:1.   Megakaryocytes showing normal cytologic features are present in adequate numbers.  No cellular elements foreign to the  marrow are identified.  No increase in histiocytes or evidence of hemaphagocytosis is noted.  No increase in blasts, lymphocytes or plasma cells is noted.  Iron stain, with appropriate control, shows stainable iron to  be present with no ringed sideroblasts identified.        Core Biopsy  See result below   Bone marrow core biopsy containing approximately 5mm of marrow space is available for review.  The marrow is approximately 33% cellular (roughly normocellular for patient's age).  Cellular composition is similar to that seen in the clot section including a small lymphoid aggregate consisting of small cytologically unremarkable lymphocytes.  Again, essentially no erythroid precursors are noted on routine stain.  Iron stain, with appropriate control, shows stainable iron to be present with no ring sideroblasts identified.      Clot Section See result below   Bone marrow clot section containing numerous particles is available for review.  Particles are overall 33% cellular   (roughly normocellular for patient's age).  There are several medium sized lymphoid aggregates present consisting of small    cytologically unremarkable lymphocytes.  The aggregates appear well circumscribed on routine stains.  No definitive   erythroid precursors are seen on routine stains.  To further evaluate the marrow composition, including the lymphoid   aggregates, immunohistochemical staining was undertaken.  Stains for CD3 and CD20 show the lymphoid aggregates to  be a mixture of CD3+ T-cells and CD20+ B-cells, although the largest aggregate seen on routine stains are  not present on the immunostain section.   stain highlights plasma cells which are not significantly increased  (approximately 2% of marrow cellularity) and show a normal distribution.  In-situ hybridization studies for  kappa and lambda light chain show the plasma cells present to polytypic.  Glycophorin A stain shows essentially no  identifiable erythroid precursors.  Iron stain, with appropriate control, shows stainable iron to be present with no ring  sideroblasts identified.     Flow Cytometry Summary See result below   Flow Cytometry Summary   INTERPRETATION:  No increase in blasts.  No specific immunophenotypic abnormalities identified.  No clonal B-cell population or aberrant T-cell population identified.       The following antibodies were evaluated by flow cytometry: CD2, CD3, CD4, CD5, CD7, CD8, CD10, CD11b, CD13, CD14, CD16, CD19, CD20, CD33, CD34, CD38, CD45, CD56, CD57, CD64, , HLA-DR, kappa, lambda     Gaiting on CD45 versus side scatter as well as incorporation other immunophenotypic data shows a distribution of cells of 6.4% lymphocytes, 0.4% hematogones, 80.2% neutrophils and neutrophil precursors, 4.9% eosinophils, 0.5% basophils, 0.6% blasts, 3.0% monocytes and monocytic precursors, 0.1% plasma cells and 1.6% CD45 negative events including nucleated erythroid precursors and debris.  No specific immunophenotypic abnormalities are identified in the maturing myeloid or monocytic elements.  There is no increase in blasts.  A population of  CD10+/CD19+ cells is identified showing a spectrum of CD20 expression compatible with maturing hematogones.  In the lymphocyte population, T-cells are 75% of cellularity, B-cells 15% of cellularity and natural killer cells 12% of cellularity.  The T-cells show a CD4:CD8 ratio of 1.6:1.  No aberrant T-cell antigen expression is detected.  There is no evidence of clonality in the B-cell population.     Technical component performed at [email or web address], [address], [address] [address].      These tests use analyte specific reagents. The performance of these analyte specific reagents was determined by [email or web address]. These tests have not been cleared or approved by the U.S. Food & Drug Administration (FDA). The FDA has determined that such approval is not necessary. These tests are used for clinical purposes and should not be considered experimental or research.     Professional interpretation of flow cytometric results performed by Dr. Jimmy Olivia.       Resulting Agency [facility]      Specimen Collected: 01/25/18  1:57 PM Last Resulted: 01/31/18  4:44 PM                         Related Result Highlights           Assessment/Plan    1.  Pure red cell aplasia likely due to her isoniazide.  This has resolved at this time.  I like to see how she does over the next  3 month or so.  She has reinitiated her Tysambri for her MS.  She does not need any transfusions.    2. Multiple Sclerosis: She can resume her tysambri with Dr. Gilbert    If her subsequent labs are stable then can be discharged from the clinic.      I spent 15 minutes on the patient's plan and care with more than 50% spent on counseling the patient.    Return to clinic in 6 weeks.        Aj Kate MD  [facility]    4/23/2018         Please note that portions of this note may have been completed with a voice recognition program. Efforts were made to edit  the dictations, but occasionally words are mistranscribed.

## 2018-06-08 ENCOUNTER — OFFICE VISIT (OUTPATIENT)
Dept: NEUROLOGY | Facility: CLINIC | Age: 63
End: 2018-06-08

## 2018-06-08 VITALS
DIASTOLIC BLOOD PRESSURE: 74 MMHG | WEIGHT: 293 LBS | SYSTOLIC BLOOD PRESSURE: 122 MMHG | HEIGHT: 72 IN | BODY MASS INDEX: 39.68 KG/M2 | HEART RATE: 70 BPM | RESPIRATION RATE: 18 BRPM

## 2018-06-08 DIAGNOSIS — G43.C0 PERIODIC HEADACHE SYNDROME, NOT INTRACTABLE: ICD-10-CM

## 2018-06-08 DIAGNOSIS — F33.1 MODERATE EPISODE OF RECURRENT MAJOR DEPRESSIVE DISORDER (HCC): ICD-10-CM

## 2018-06-08 DIAGNOSIS — G25.81 RESTLESS LEGS SYNDROME: ICD-10-CM

## 2018-06-08 DIAGNOSIS — G35 MULTIPLE SCLEROSIS (HCC): Primary | ICD-10-CM

## 2018-06-08 PROCEDURE — 99214 OFFICE O/P EST MOD 30 MIN: CPT | Performed by: PSYCHIATRY & NEUROLOGY

## 2018-06-08 RX ORDER — GABAPENTIN 100 MG/1
300 CAPSULE ORAL 3 TIMES DAILY
Qty: 90 CAPSULE | Refills: 5 | Status: SHIPPED | OUTPATIENT
Start: 2018-06-08

## 2018-06-08 RX ORDER — RIFAMPIN 300 MG/1
CAPSULE ORAL
COMMUNITY
Start: 2018-04-30 | End: 2018-10-01

## 2018-06-08 NOTE — PROGRESS NOTES
Subjective:   Chief Complaint   Patient presents with   • Multiple Sclerosis       Patient ID: Rashad Carrillo is a 62 y.o. female.    History of Present Illness     62 y.o.  woman with RRMS, migraines, MDD and RLS returns in follow up.  Last visit on 9/20/17 renewed GBP, Tysabri, Cymbalta and Ampyra, ordered MRI Brain and labs..       Referred to ID and dx with latent TB.  Recommended isoniazid and pyridoxine for 9 months.  Subsequently developed aplastic anemia secondary to INH and followed by Dr Valentin.  Received multiple transfusions.  3/29/18 able to resume Tysabri and has had 3 infusions  Treated with rifampin.      4/23/18 Retic 2.11, CBC ncs    MRI Brain 12/4/17 as compared to 6/9/16, no change in T2 lesion load, moderate to severe T2 lesion burden and moderate atrophy.     JCV ab - 10/17/17 - 0.76; started 11/2014      RRMS    Restarted Tysabri and had 3 infusions and tolerating.      MSFC T25FW slowed from 9.94 sec to 24.72 stopping Ampyra noticeable worsening.  9 hole PEG stable    SDMT 36/110  Decreased attention and concentration becoming more bothersome.       Fatigue is severe.  Heat intolerance is severe.      Problem history:    25 ft timed walk increaesed 15.99 from 9.94.  Left hand 9 hole peg worsened.   Increasing swallowing difficulty.        Immediate and working memory are declining with SDMT 27/110..       MDD    Cymbalta and buproprion is keeping mood stable.     RLS    Increasing  mg po TID, 300 mg 2 times overnight for discomfort.  Dr Newman taking over prescription.      MIgraines    HA frequency is 3 times a week.  Moderate to severe intensity.   Location moves around head.  Last for up to a day.  Tx with OTC meds.     The following portions of the patient's history were reviewed and updated as appropriate: allergies, current medications, past medical history, past surgical history and problem list.    Review of Systems   Constitutional: Positive for fatigue. Negative for activity change  "and unexpected weight change.   HENT: Negative for tinnitus and trouble swallowing.    Eyes: Negative for photophobia and visual disturbance.   Respiratory: Negative for apnea and choking.    Cardiovascular: Negative for leg swelling.   Endocrine: Positive for heat intolerance. Negative for cold intolerance.   Genitourinary: Negative for difficulty urinating, frequency, menstrual problem and urgency.   Musculoskeletal: Positive for gait problem. Negative for back pain.   Skin: Negative for color change.   Allergic/Immunologic: Negative for immunocompromised state.   Neurological: Positive for dizziness, tremors, numbness and headaches. Negative for seizures, syncope, facial asymmetry, speech difficulty and light-headedness.   Hematological: Negative for adenopathy. Does not bruise/bleed easily.   Psychiatric/Behavioral: Positive for decreased concentration, dysphoric mood and sleep disturbance. Negative for behavioral problems, confusion and hallucinations. The patient is nervous/anxious.         Objective:  Vitals:    06/08/18 0859   BP: 122/74   BP Location: Left arm   Patient Position: Sitting   Cuff Size: Adult   Pulse: 70   Resp: 18   Weight: (!) 155 kg (342 lb)   Height: 182.9 cm (72\")        Neurologic Exam     Mental Status   Registration: recalls 3 of 3 objects. Recall at 5 minutes: recalls 3 of 3 objects. Follows 3 step commands.   Attention: normal. Concentration: normal.   Level of consciousness: alert  Knowledge: good and consistent with education.   Able to name object. Able to read. Able to repeat. Able to write. Normal comprehension.         Cranial Nerves     CN II   Visual fields full to confrontation.   Visual acuity: normal  Right visual field deficit: none  Left visual field deficit: none     CN III, IV, VI   Nystagmus: none   Diplopia: none  Ophthalmoparesis: none  Upgaze: normal  Downgaze: normal  Conjugate gaze: present  Vestibulo-ocular reflex: present    CN V   Facial sensation intact. "   Right corneal reflex: normal  Left corneal reflex: normal    CN VII   Right facial weakness: none  Left facial weakness: none    CN VIII   Hearing: intact    CN IX, X   Palate: symmetric  Right gag reflex: normal  Left gag reflex: normal    CN XI   Right sternocleidomastoid strength: normal  Left sternocleidomastoid strength: normal    CN XII   Tongue: not atrophic  Fasciculations: absent  Tongue deviation: none    Motor Exam   Muscle bulk: normal  Overall muscle tone: normal  Right arm tone: normal  Left arm tone: normal  Right leg tone: increased  Left leg tone: increased    Strength   Strength 5/5 except as noted.   Left quadriceps: 4/5  Left hamstrin/5  Left glutei: 4/5  Left anterior tibial: 4/5  Left posterior tibial: 4/5  Left peroneal: 4/5  Left gastroc: 4/5    Sensory Exam   Right arm light touch: normal  Right leg light touch: normal  Right arm vibration: normal  Right leg vibration: normal  Right arm proprioception: normal  Right leg proprioception: normal  Right arm pinprick: normal  Right leg pinprick: normal  Sensory deficit distribution on right: non-dermatomal distribution (decreased left face, arm and leg)    Gait, Coordination, and Reflexes     Gait  Gait: circumduction, shuffling and wide-based (left leg)    Coordination   Romberg: positive  Heel to shin coordination: abnormal  Tandem walking coordination: abnormal    Tremor   Resting tremor: absent  Intention tremor: absent  Action tremor: left arm and right arm    Reflexes   Right brachioradialis: 3+  Left brachioradialis: 3+  Right biceps: 3+  Left biceps: 3+  Right triceps: 3+  Left triceps: 3+  Right patellar: 3+  Left patellar: 3+  Right achilles: 3+  Left achilles: 3+  Right : 3+  Left : 3+  Right plantar: normal  Left plantar: normal      Physical Exam   Constitutional: She appears well-developed and well-nourished.   Neurological: She has an abnormal Heel to Brown Test, an abnormal Romberg Test and an abnormal Tandem Gait  Test.   Reflex Scores:       Tricep reflexes are 3+ on the right side and 3+ on the left side.       Bicep reflexes are 3+ on the right side and 3+ on the left side.       Brachioradialis reflexes are 3+ on the right side and 3+ on the left side.       Patellar reflexes are 3+ on the right side and 3+ on the left side.       Achilles reflexes are 3+ on the right side and 3+ on the left side.  Nursing note and vitals reviewed.    Lab on 04/23/2018   Component Date Value Ref Range Status   • Reticulocyte % 04/23/2018 2.11* 0.50 - 1.50 % Final   • WBC 04/23/2018 7.40  3.50 - 10.80 10*3/mm3 Final   • RBC 04/23/2018 4.53  3.89 - 5.14 10*6/mm3 Final   • Hemoglobin 04/23/2018 12.2  11.5 - 15.5 g/dL Final   • Hematocrit 04/23/2018 40.2  34.5 - 44.0 % Final   • RDW 04/23/2018 16.1* 11.3 - 14.5 % Final   • MCV 04/23/2018 88.8  80.0 - 99.0 fL Final   • MCH 04/23/2018 27.0  27.0 - 31.0 pg Final   • MCHC 04/23/2018 30.4* 32.0 - 36.0 g/dL Final   • MPV 04/23/2018 8.0  6.0 - 12.0 fL Final   • Platelets 04/23/2018 260  150 - 450 10*3/mm3 Final   • Neutrophil % 04/23/2018 56.7  41.0 - 71.0 % Final   • Lymphocyte % 04/23/2018 34.7  24.0 - 44.0 % Final   • Monocyte % 04/23/2018 8.6  0.0 - 12.0 % Final   • Neutrophils, Absolute 04/23/2018 4.20  1.50 - 8.30 10*3/mm3 Final   • Lymphocytes, Absolute 04/23/2018 2.60  0.60 - 4.80 10*3/mm3 Final   • Monocytes, Absolute 04/23/2018 0.60  0.00 - 1.00 10*3/mm3 Final       Assessment/Plan:       Problems Addressed this Visit        Cardiovascular and Mediastinum    Periodic headache syndrome, not intractable     Headaches are improving with treatment.  Continue current treatment regimen.             Relevant Medications    gabapentin (NEURONTIN) 100 MG capsule       Nervous and Auditory    Multiple sclerosis - Primary     Continue Tysabri until TB treatment finished then switch to Lemtrada                Other    Depression     Psychological condition is unchanged.  Continue current treatment  regimen.  Psychological condition  will be reassessed at the next regular appointment.         Restless legs syndrome     Sx controlled on  mg TID and  mg qhs

## 2018-06-11 ENCOUNTER — APPOINTMENT (OUTPATIENT)
Dept: LAB | Facility: HOSPITAL | Age: 63
End: 2018-06-11

## 2018-06-11 ENCOUNTER — TRANSCRIBE ORDERS (OUTPATIENT)
Dept: LAB | Facility: HOSPITAL | Age: 63
End: 2018-06-11

## 2018-06-11 ENCOUNTER — LAB (OUTPATIENT)
Dept: LAB | Facility: HOSPITAL | Age: 63
End: 2018-06-11

## 2018-06-11 DIAGNOSIS — E66.09 EXOGENOUS OBESITY: ICD-10-CM

## 2018-06-11 DIAGNOSIS — Z22.7 INACTIVE TUBERCULOSIS: ICD-10-CM

## 2018-06-11 DIAGNOSIS — E11.8 TYPE 2 DIABETES MELLITUS WITH COMPLICATION, UNSPECIFIED LONG TERM INSULIN USE STATUS: ICD-10-CM

## 2018-06-11 DIAGNOSIS — D64.9 ANEMIA, UNSPECIFIED TYPE: ICD-10-CM

## 2018-06-11 DIAGNOSIS — E13.8 DIABETES MELLITUS OF OTHER TYPE WITH COMPLICATION, UNSPECIFIED LONG TERM INSULIN USE STATUS: ICD-10-CM

## 2018-06-11 DIAGNOSIS — R76.12 NONSPECIFIC REACTION TO CELL MEDIATED IMMUNITY MEASUREMENT OF GAMMA INTERFERON ANTIGEN RESPONSE WITHOUT ACTIVE TUBERCULOSIS: ICD-10-CM

## 2018-06-11 DIAGNOSIS — I10 ESSENTIAL HYPERTENSION, MALIGNANT: ICD-10-CM

## 2018-06-11 DIAGNOSIS — R11.0 NAUSEA: ICD-10-CM

## 2018-06-11 DIAGNOSIS — R05.9 COUGH: ICD-10-CM

## 2018-06-11 DIAGNOSIS — G35 MULTIPLE SCLEROSIS (HCC): ICD-10-CM

## 2018-06-11 DIAGNOSIS — R11.0 NAUSEA: Primary | ICD-10-CM

## 2018-06-11 DIAGNOSIS — Z22.7 INACTIVE TUBERCULOSIS: Primary | ICD-10-CM

## 2018-06-11 LAB
ALBUMIN SERPL-MCNC: 4.15 G/DL (ref 3.2–4.8)
ALBUMIN/GLOB SERPL: 1.6 G/DL (ref 1.5–2.5)
ALP SERPL-CCNC: 132 U/L (ref 25–100)
ALT SERPL W P-5'-P-CCNC: 22 U/L (ref 7–40)
ANION GAP SERPL CALCULATED.3IONS-SCNC: 6 MMOL/L (ref 3–11)
AST SERPL-CCNC: 22 U/L (ref 0–33)
BILIRUB SERPL-MCNC: 0.6 MG/DL (ref 0.3–1.2)
BUN BLD-MCNC: 17 MG/DL (ref 9–23)
BUN/CREAT SERPL: 22.4 (ref 7–25)
C DIFF TOX GENS STL QL NAA+PROBE: NOT DETECTED
CALCIUM SPEC-SCNC: 9.2 MG/DL (ref 8.7–10.4)
CHLORIDE SERPL-SCNC: 109 MMOL/L (ref 99–109)
CO2 SERPL-SCNC: 30 MMOL/L (ref 20–31)
CREAT BLD-MCNC: 0.76 MG/DL (ref 0.6–1.3)
DEPRECATED RDW RBC AUTO: 46.9 FL (ref 37–54)
ERYTHROCYTE [DISTWIDTH] IN BLOOD BY AUTOMATED COUNT: 14.9 % (ref 11.3–14.5)
GFR SERPL CREATININE-BSD FRML MDRD: 78 ML/MIN/1.73
GLOBULIN UR ELPH-MCNC: 2.7 GM/DL
GLUCOSE BLD-MCNC: 95 MG/DL (ref 70–100)
HCT VFR BLD AUTO: 38.9 % (ref 34.5–44)
HGB BLD-MCNC: 12.2 G/DL (ref 11.5–15.5)
MCH RBC QN AUTO: 26.9 PG (ref 27–31)
MCHC RBC AUTO-ENTMCNC: 31.4 G/DL (ref 32–36)
MCV RBC AUTO: 85.9 FL (ref 80–99)
PLATELET # BLD AUTO: 207 10*3/MM3 (ref 150–450)
PMV BLD AUTO: 10.5 FL (ref 6–12)
POTASSIUM BLD-SCNC: 4.2 MMOL/L (ref 3.5–5.5)
PROT SERPL-MCNC: 6.8 G/DL (ref 5.7–8.2)
RBC # BLD AUTO: 4.53 10*6/MM3 (ref 3.89–5.14)
SODIUM BLD-SCNC: 145 MMOL/L (ref 132–146)
WBC NRBC COR # BLD: 6.87 10*3/MM3 (ref 3.5–10.8)

## 2018-06-11 PROCEDURE — 80053 COMPREHEN METABOLIC PANEL: CPT | Performed by: INTERNAL MEDICINE

## 2018-06-11 PROCEDURE — 36415 COLL VENOUS BLD VENIPUNCTURE: CPT | Performed by: INTERNAL MEDICINE

## 2018-06-11 PROCEDURE — 85027 COMPLETE CBC AUTOMATED: CPT | Performed by: INTERNAL MEDICINE

## 2018-06-11 PROCEDURE — 87493 C DIFF AMPLIFIED PROBE: CPT

## 2018-07-10 ENCOUNTER — TELEPHONE (OUTPATIENT)
Dept: NEUROLOGY | Facility: CLINIC | Age: 63
End: 2018-07-10

## 2018-07-10 NOTE — TELEPHONE ENCOUNTER
----- Message from Nuvia Lynn sent at 7/9/2018  3:20 PM EDT -----  Regarding: GABAPENTIN  Contact: [phone]  Patient states she signed a contract with Dr. Newman and he will take over writing her gabapentin. However, [facility] will need to phone their office and give them the script info.    [phone] office number

## 2018-07-17 NOTE — TELEPHONE ENCOUNTER
Called Dr Newman office, was transferred to [facility], left message for a call back.

## 2018-07-19 NOTE — TELEPHONE ENCOUNTER
CALLED DR LUCIO OFFICE, WAS TRANSFERRED TO [facility] LINE DUE TO THEM BEING BUSY IN [facility], LEFT MESSAGE FOR A CALL BACK. 3RD ATTEMPT.

## 2018-07-23 ENCOUNTER — LAB (OUTPATIENT)
Dept: LAB | Facility: HOSPITAL | Age: 63
End: 2018-07-23

## 2018-07-23 ENCOUNTER — TRANSCRIBE ORDERS (OUTPATIENT)
Dept: LAB | Facility: HOSPITAL | Age: 63
End: 2018-07-23

## 2018-07-23 ENCOUNTER — OFFICE VISIT (OUTPATIENT)
Dept: ONCOLOGY | Facility: CLINIC | Age: 63
End: 2018-07-23

## 2018-07-23 VITALS
WEIGHT: 293 LBS | DIASTOLIC BLOOD PRESSURE: 71 MMHG | BODY MASS INDEX: 39.68 KG/M2 | RESPIRATION RATE: 20 BRPM | HEIGHT: 72 IN | TEMPERATURE: 97 F | HEART RATE: 80 BPM | SYSTOLIC BLOOD PRESSURE: 137 MMHG

## 2018-07-23 DIAGNOSIS — G35 MULTIPLE SCLEROSIS (HCC): ICD-10-CM

## 2018-07-23 DIAGNOSIS — D61.1 APLASTIC ANEMIA DUE TO DRUGS (HCC): Primary | ICD-10-CM

## 2018-07-23 DIAGNOSIS — R76.12 NONSPECIFIC REACTION TO CELL MEDIATED IMMUNITY MEASUREMENT OF GAMMA INTERFERON ANTIGEN RESPONSE WITHOUT ACTIVE TUBERCULOSIS: ICD-10-CM

## 2018-07-23 DIAGNOSIS — Z22.7 INACTIVE TUBERCULOSIS: Primary | ICD-10-CM

## 2018-07-23 DIAGNOSIS — Z22.7 INACTIVE TUBERCULOSIS: ICD-10-CM

## 2018-07-23 DIAGNOSIS — D64.9 ANEMIA, UNSPECIFIED TYPE: ICD-10-CM

## 2018-07-23 DIAGNOSIS — D61.1 APLASTIC ANEMIA DUE TO DRUGS (HCC): ICD-10-CM

## 2018-07-23 LAB
ALBUMIN SERPL-MCNC: 4.09 G/DL (ref 3.2–4.8)
ALBUMIN/GLOB SERPL: 1.6 G/DL (ref 1.5–2.5)
ALP SERPL-CCNC: 132 U/L (ref 25–100)
ALT SERPL W P-5'-P-CCNC: 20 U/L (ref 7–40)
ANION GAP SERPL CALCULATED.3IONS-SCNC: 8 MMOL/L (ref 3–11)
AST SERPL-CCNC: 19 U/L (ref 0–33)
BILIRUB SERPL-MCNC: 1.1 MG/DL (ref 0.3–1.2)
BUN BLD-MCNC: 16 MG/DL (ref 9–23)
BUN/CREAT SERPL: 21.9 (ref 7–25)
CALCIUM SPEC-SCNC: 9.1 MG/DL (ref 8.7–10.4)
CHLORIDE SERPL-SCNC: 108 MMOL/L (ref 99–109)
CO2 SERPL-SCNC: 23 MMOL/L (ref 20–31)
CREAT BLD-MCNC: 0.73 MG/DL (ref 0.6–1.3)
ERYTHROCYTE [DISTWIDTH] IN BLOOD BY AUTOMATED COUNT: 16.9 % (ref 11.3–14.5)
GFR SERPL CREATININE-BSD FRML MDRD: 81 ML/MIN/1.73
GLOBULIN UR ELPH-MCNC: 2.6 GM/DL
GLUCOSE BLD-MCNC: 104 MG/DL (ref 70–100)
HCT VFR BLD AUTO: 37.1 % (ref 34.5–44)
HGB BLD-MCNC: 11.6 G/DL (ref 11.5–15.5)
LYMPHOCYTES # BLD AUTO: 3.2 10*3/MM3 (ref 0.6–4.8)
LYMPHOCYTES NFR BLD AUTO: 36.4 % (ref 24–44)
MCH RBC QN AUTO: 25.6 PG (ref 27–31)
MCHC RBC AUTO-ENTMCNC: 31.4 G/DL (ref 32–36)
MCV RBC AUTO: 81.6 FL (ref 80–99)
MONOCYTES # BLD AUTO: 0.8 10*3/MM3 (ref 0–1)
MONOCYTES NFR BLD AUTO: 9.1 % (ref 0–12)
NEUTROPHILS # BLD AUTO: 4.7 10*3/MM3 (ref 1.5–8.3)
NEUTROPHILS NFR BLD AUTO: 54.5 % (ref 41–71)
PLATELET # BLD AUTO: 240 10*3/MM3 (ref 150–450)
PMV BLD AUTO: 8.2 FL (ref 6–12)
POTASSIUM BLD-SCNC: 3.8 MMOL/L (ref 3.5–5.5)
PROT SERPL-MCNC: 6.7 G/DL (ref 5.7–8.2)
RBC # BLD AUTO: 4.54 10*6/MM3 (ref 3.89–5.14)
RETICS/RBC NFR AUTO: 2.17 % (ref 0.5–1.5)
SODIUM BLD-SCNC: 139 MMOL/L (ref 132–146)
WBC NRBC COR # BLD: 8.7 10*3/MM3 (ref 3.5–10.8)

## 2018-07-23 PROCEDURE — 85045 AUTOMATED RETICULOCYTE COUNT: CPT

## 2018-07-23 PROCEDURE — 80053 COMPREHEN METABOLIC PANEL: CPT

## 2018-07-23 PROCEDURE — 99213 OFFICE O/P EST LOW 20 MIN: CPT | Performed by: INTERNAL MEDICINE

## 2018-07-23 PROCEDURE — 36415 COLL VENOUS BLD VENIPUNCTURE: CPT

## 2018-07-23 PROCEDURE — 85025 COMPLETE CBC W/AUTO DIFF WBC: CPT

## 2018-07-23 NOTE — PROGRESS NOTES
"PROBLEM LIST:    1.  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  2.  Bone marrow biopsy shows no erythroid precursors.  3.  Multiple sclerosis undertreatment with Dr. Gilbert  4.  Latent TB treated with isoniazid by Dr. Lora    REASON FOR VISIT: Anemia    HISTORY OF PRESENT ILLNESS:   62 y.o.  female presents today for follow-up of her Aplatic anemia.  She has done remarkably well in the last 3 months.  Clinically doing reasonably well.  No worsening of her symptoms.  She does have MS that has always a fair bit of difficulty with ambulation.    Past medical history, social history and family history was reviewed and unchanged from prior visit.    Review of Systems:    Review of Systems   Constitutional: Positive for fatigue.   HENT:  Negative.    Eyes: Negative.    Respiratory: Negative.    Cardiovascular: Negative.    Gastrointestinal: Negative.    Endocrine: Negative.    Genitourinary: Negative.     Neurological: Positive for extremity weakness and numbness.   Hematological: Negative.    Psychiatric/Behavioral: Negative.       A comprehensive 14 point review of systems was performed and was negative except as mentioned.      Medications:  The current medication list was reviewed in the EMR    ALLERGIES:    Allergies   Allergen Reactions   • Isoniazid Other (See Comments)     Body stopped producing blood   • Sulfa Antibiotics    • Tetanus Toxoid          Physical Exam    VITAL SIGNS:  /71 Comment: Right Wrist  Pulse 80   Temp 97 °F (36.1 °C) (Temporal Artery )   Resp 20   Ht 182.9 cm (72\")   Wt (!) 159 kg (351 lb)   BMI 47.60 kg/m²     Wt Readings from Last 3 Encounters:   07/23/18 (!) 159 kg (351 lb)   06/08/18 (!) 155 kg (342 lb)   04/23/18 (!) 153 kg (338 lb)        Performance Status: 1    General: well appearing, in no acute distress  HEENT: sclera anicteric, oropharynx clear, neck is supple  Lymphatics: no cervical, supraclavicular, or axillary adenopathy  Cardiovascular: regular " rate and rhythm, no murmurs, rubs or gallops  Lungs: clear to auscultation bilaterally  Abdomen: soft, nontender, nondistended.  No palpable organomegaly  Extremities: no lower extremity edema  Skin: no rashes, lesions, bruising, or petechiae  Msk:  Weakness in her legs  Psych: Mood is stable        RECENT LABS:    Lab Results   Component Value Date    HGB 11.6 07/23/2018    HCT 37.1 07/23/2018    MCV 81.6 07/23/2018     07/23/2018    WBC 8.70 07/23/2018    NEUTROABS 4.70 07/23/2018    LYMPHSABS 3.20 07/23/2018    MONOSABS 0.80 07/23/2018    EOSABS 0.31 (H) 01/24/2018    BASOSABS 0.10 01/24/2018       Lab Results   Component Value Date    GLUCOSE 95 06/11/2018    BUN 17 06/11/2018    CREATININE 0.76 06/11/2018     06/11/2018    K 4.2 06/11/2018     06/11/2018    CO2 30.0 06/11/2018    CALCIUM 9.2 06/11/2018    PROTEINTOT 6.8 06/11/2018    ALBUMIN 4.15 06/11/2018    BILITOT 0.6 06/11/2018    ALKPHOS 132 (H) 06/11/2018    AST 22 06/11/2018    ALT 22 06/11/2018         Final Diagnosis   PERIPHERAL BLOOD SMEAR, BONE MARROW ASPIRATE, CLOT SECTION BIOPSY WITH FLOW CYTOMETRY,     IMMUNOHISTOCHEMISTRY, AND IN-SITU HYBRIDIZATION STUDIES:     PERIPHERAL BLOOD SMEAR AND CBC DATA:  Moderate/severe anemia.     BONE MARROW ASPIRATE, CLOT SECTION AND BIOPSY:  Normocellular marrow with numerous lymphoid aggregates appearing reactive.  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  Stainable iron present.  Negative for tumor and granulomata.  PCC/dlb      Electronically signed by Jimmy Olivia MD on 1/31/2018 at 1644   Clinical Information See result below   The working history is anemia. Procedure performed by Dr. Mcgarry.      Bone Marrow Procedure Note  Date: 1/25/18.               No known allergy to Betadine or Lidocaine     The patient was identified by wrist band, verbally and by the nurse. The bone marrow procedure was explained and the potential  complications of bleeding, bruising and infection were discussed. The patient agrees to proceed. The patient is rolled onto the right side with knees tucked toward their chin and the left posterior iliac crest is palpated and the bone prominence located for the puncture site. The area was cleaned 3x with betadine then draped with sterile towels, anesthetized with Lidocaine by infiltration to the periosteal surface. A small incision made through which the marrow needle is introduced. Marrow aspiration was collected and a marrow core was obtained. The needle was withdrawn and pressure applied to the site. The area was cleaned with alcohol after drapes were removed and a sterile bandage placed over the site. The patient was rolled onto their back with a towel roll placed to provide pressure to the site for hemostasis. The patient tolerated the procedure well and no complications were encountered.       The marrow was submitted for:  Routine exam                                                     Flow cytometry                                                     Cytogenetics      Dr. Olivia for Dr. Mcgarry, Department of Pathology    Comment See result below   There is a marked decrease in erythropoiesis.  Findings are consistent with red cell aplasia.  Correlation with clinical history including medication history would be essential.  If this has been a longstanding process, a diagnosis of pure red cell aplasia may be warranted.  There is no evidence of a lymphoid or myeloid neoplasm.     These results were discussed with Dr. Kate by Dr. Olivia on 1/30/18.   Gross Description See result below   Received are bone aspirate smears.      Received in formalin labeled as bone marrow is a portion of blood clot and possible embedded marrow particles.  The specimen is filtered and submitted entirely in cassette 1.     Received in formalin labeled as bone marrow biopsy is an apparent core biopsy of bone and associated blood  which is submitted in toto in cassette 2, following decalcification. PCC   CBC and Differential See result below   Santizo bone marrow aspirate smear and accompanying CBC data is available for review.  There is a normal total white blood cell count with differential as indicated.  No abnormal/immature white blood cells are noted.  Red blood cells show mild anisopoikilocytosis including occasional elliptocytes.  There is moderate/severe anemia.  Platelets are present in adequate numbers with normal morphologically.   Aspirate Smear See result below   Santizo stained bone marrow aspirate smears containing adequate cellularity and particles are available for review.    Myelopoiesis appears overall unremarkable.  Erythropoiesis is markedly suppressed with at most rare erythroid  precursors identified.  Myeloid to erythroid ratio is therefore markedly increased estimated at greater than 100:1.   Megakaryocytes showing normal cytologic features are present in adequate numbers.  No cellular elements foreign to the  marrow are identified.  No increase in histiocytes or evidence of hemaphagocytosis is noted.  No increase in blasts, lymphocytes or plasma cells is noted.  Iron stain, with appropriate control, shows stainable iron to  be present with no ringed sideroblasts identified.        Core Biopsy  See result below   Bone marrow core biopsy containing approximately 5mm of marrow space is available for review.  The marrow is approximately 33% cellular (roughly normocellular for patient's age).  Cellular composition is similar to that seen in the clot section including a small lymphoid aggregate consisting of small cytologically unremarkable lymphocytes.  Again, essentially no erythroid precursors are noted on routine stain.  Iron stain, with appropriate control, shows stainable iron to be present with no ring sideroblasts identified.      Clot Section See result below   Bone marrow clot section containing numerous particles  is available for review.  Particles are overall 33% cellular   (roughly normocellular for patient's age).  There are several medium sized lymphoid aggregates present consisting of small   cytologically unremarkable lymphocytes.  The aggregates appear well circumscribed on routine stains.  No definitive   erythroid precursors are seen on routine stains.  To further evaluate the marrow composition, including the lymphoid   aggregates, immunohistochemical staining was undertaken.  Stains for CD3 and CD20 show the lymphoid aggregates to  be a mixture of CD3+ T-cells and CD20+ B-cells, although the largest aggregate seen on routine stains are  not present on the immunostain section.   stain highlights plasma cells which are not significantly increased  (approximately 2% of marrow cellularity) and show a normal distribution.  In-situ hybridization studies for  kappa and lambda light chain show the plasma cells present to polytypic.  Glycophorin A stain shows essentially no  identifiable erythroid precursors.  Iron stain, with appropriate control, shows stainable iron to be present with no ring  sideroblasts identified.     Flow Cytometry Summary See result below   Flow Cytometry Summary   INTERPRETATION:  No increase in blasts.  No specific immunophenotypic abnormalities identified.  No clonal B-cell population or aberrant T-cell population identified.       The following antibodies were evaluated by flow cytometry: CD2, CD3, CD4, CD5, CD7, CD8, CD10, CD11b, CD13, CD14, CD16, CD19, CD20, CD33, CD34, CD38, CD45, CD56, CD57, CD64, , HLA-DR, kappa, lambda     Gaiting on CD45 versus side scatter as well as incorporation other immunophenotypic data shows a distribution of cells of 6.4% lymphocytes, 0.4% hematogones, 80.2% neutrophils and neutrophil precursors, 4.9% eosinophils, 0.5% basophils, 0.6% blasts, 3.0% monocytes and monocytic precursors, 0.1% plasma cells and 1.6% CD45 negative events including nucleated  erythroid precursors and debris.  No specific immunophenotypic abnormalities are identified in the maturing myeloid or monocytic elements.  There is no increase in blasts.  A population of CD10+/CD19+ cells is identified showing a spectrum of CD20 expression compatible with maturing hematogones.  In the lymphocyte population, T-cells are 75% of cellularity, B-cells 15% of cellularity and natural killer cells 12% of cellularity.  The T-cells show a CD4:CD8 ratio of 1.6:1.  No aberrant T-cell antigen expression is detected.  There is no evidence of clonality in the B-cell population.     Technical component performed at [facility], [address], [address], [place] [address].      These tests use analyte specific reagents. The performance of these analyte specific reagents was determined by [facility]. These tests have not been cleared or approved by the U.S. Food & Drug Administration (FDA). The FDA has determined that such approval is not necessary. These tests are used for clinical purposes and should not be considered experimental or research.     Professional interpretation of flow cytometric results performed by Dr. Jimmy Olivia.       Resulting Agency [facility]      Specimen Collected: 01/25/18  1:57 PM Last Resulted: 01/31/18  4:44 PM                         Related Result Highlights           Assessment/Plan    1.  Pure red cell aplasia likely due to her isoniazide.  This has resolved at this time. No further intervention at this time. She may be getting a little iron deficient.  Recommend increase iron containing foods.      2. Multiple Sclerosis: She can resume her tysambri with Dr. Gilbert    She can be discharged from my clinic for now.  Return if her symptoms recur.      I spent 15 minutes on the patient's plan and care with more than 50% spent on counseling the patient.    Return to clinic in 6 weeks.        Aj Kate MD  [facility], [place], [facility]  [facility]ematology [facility] On[facility]    7/23/2018     Return in (Approximately): PRN    No orders of the defined types were placed in this encounter.        Please note that portions of this note may have been completed with a voice recognition program. Efforts were made to edit the dictations, but occasionally words are mistranscribed.

## 2018-08-28 ENCOUNTER — TRANSCRIBE ORDERS (OUTPATIENT)
Dept: ADMINISTRATIVE | Facility: HOSPITAL | Age: 63
End: 2018-08-28

## 2018-08-28 DIAGNOSIS — Z12.31 VISIT FOR SCREENING MAMMOGRAM: Primary | ICD-10-CM

## 2018-09-04 ENCOUNTER — HOSPITAL ENCOUNTER (OUTPATIENT)
Dept: MAMMOGRAPHY | Facility: HOSPITAL | Age: 63
Discharge: HOME | End: 2018-09-04
Attending: INTERNAL MEDICINE

## 2018-09-12 ENCOUNTER — TRANSCRIBE ORDERS (OUTPATIENT)
Dept: LAB | Facility: HOSPITAL | Age: 63
End: 2018-09-12

## 2018-09-12 ENCOUNTER — LAB (OUTPATIENT)
Dept: LAB | Facility: HOSPITAL | Age: 63
End: 2018-09-12

## 2018-09-12 DIAGNOSIS — Z22.7 INACTIVE TUBERCULOSIS: ICD-10-CM

## 2018-09-12 DIAGNOSIS — R11.0 NAUSEA: ICD-10-CM

## 2018-09-12 DIAGNOSIS — R05.9 COUGH: ICD-10-CM

## 2018-09-12 DIAGNOSIS — E11.8 TYPE 2 DIABETES MELLITUS WITH COMPLICATION, UNSPECIFIED LONG TERM INSULIN USE STATUS: ICD-10-CM

## 2018-09-12 DIAGNOSIS — R11.0 NAUSEA: Primary | ICD-10-CM

## 2018-09-12 LAB
ALBUMIN SERPL-MCNC: 4.27 G/DL (ref 3.2–4.8)
ALBUMIN/GLOB SERPL: 1.8 G/DL (ref 1.5–2.5)
ALP SERPL-CCNC: 131 U/L (ref 25–100)
ALT SERPL W P-5'-P-CCNC: 20 U/L (ref 7–40)
ANION GAP SERPL CALCULATED.3IONS-SCNC: 7 MMOL/L (ref 3–11)
AST SERPL-CCNC: 21 U/L (ref 0–33)
BASOPHILS # BLD AUTO: 0.04 10*3/MM3 (ref 0–0.2)
BASOPHILS NFR BLD AUTO: 0.6 % (ref 0–1)
BILIRUB SERPL-MCNC: 0.6 MG/DL (ref 0.3–1.2)
BUN BLD-MCNC: 13 MG/DL (ref 9–23)
BUN/CREAT SERPL: 16.5 (ref 7–25)
CALCIUM SPEC-SCNC: 9.3 MG/DL (ref 8.7–10.4)
CHLORIDE SERPL-SCNC: 104 MMOL/L (ref 99–109)
CO2 SERPL-SCNC: 28 MMOL/L (ref 20–31)
CREAT BLD-MCNC: 0.79 MG/DL (ref 0.6–1.3)
DEPRECATED RDW RBC AUTO: 52.3 FL (ref 37–54)
EOSINOPHIL # BLD AUTO: 0.26 10*3/MM3 (ref 0–0.3)
EOSINOPHIL NFR BLD AUTO: 3.7 % (ref 0–3)
ERYTHROCYTE [DISTWIDTH] IN BLOOD BY AUTOMATED COUNT: 16.6 % (ref 11.3–14.5)
GFR SERPL CREATININE-BSD FRML MDRD: 74 ML/MIN/1.73
GLOBULIN UR ELPH-MCNC: 2.3 GM/DL
GLUCOSE BLD-MCNC: 102 MG/DL (ref 70–100)
HCT VFR BLD AUTO: 38.8 % (ref 34.5–44)
HGB BLD-MCNC: 12.1 G/DL (ref 11.5–15.5)
IMM GRANULOCYTES # BLD: 0.02 10*3/MM3 (ref 0–0.03)
IMM GRANULOCYTES NFR BLD: 0.3 % (ref 0–0.6)
LYMPHOCYTES # BLD AUTO: 2.98 10*3/MM3 (ref 0.6–4.8)
LYMPHOCYTES NFR BLD AUTO: 42.6 % (ref 24–44)
MCH RBC QN AUTO: 26.7 PG (ref 27–31)
MCHC RBC AUTO-ENTMCNC: 31.2 G/DL (ref 32–36)
MCV RBC AUTO: 85.5 FL (ref 80–99)
MONOCYTES # BLD AUTO: 0.57 10*3/MM3 (ref 0–1)
MONOCYTES NFR BLD AUTO: 8.2 % (ref 0–12)
NEUTROPHILS # BLD AUTO: 3.14 10*3/MM3 (ref 1.5–8.3)
NEUTROPHILS NFR BLD AUTO: 44.9 % (ref 41–71)
PLATELET # BLD AUTO: 208 10*3/MM3 (ref 150–450)
PMV BLD AUTO: 10.4 FL (ref 6–12)
POTASSIUM BLD-SCNC: 4.1 MMOL/L (ref 3.5–5.5)
PROT SERPL-MCNC: 6.6 G/DL (ref 5.7–8.2)
RBC # BLD AUTO: 4.54 10*6/MM3 (ref 3.89–5.14)
SODIUM BLD-SCNC: 139 MMOL/L (ref 132–146)
WBC NRBC COR # BLD: 6.99 10*3/MM3 (ref 3.5–10.8)

## 2018-09-12 PROCEDURE — 85025 COMPLETE CBC W/AUTO DIFF WBC: CPT

## 2018-09-12 PROCEDURE — 80053 COMPREHEN METABOLIC PANEL: CPT

## 2018-09-12 PROCEDURE — 36415 COLL VENOUS BLD VENIPUNCTURE: CPT

## 2018-09-28 ENCOUNTER — TELEPHONE (OUTPATIENT)
Dept: NEUROLOGY | Facility: CLINIC | Age: 63
End: 2018-09-28

## 2018-09-28 RX ORDER — METHYLPREDNISOLONE 4 MG/1
TABLET ORAL
Qty: 1 EACH | Refills: 0 | Status: SHIPPED | OUTPATIENT
Start: 2018-09-28 | End: 2018-10-01

## 2018-10-01 ENCOUNTER — LAB (OUTPATIENT)
Dept: LAB | Facility: HOSPITAL | Age: 63
End: 2018-10-01

## 2018-10-01 ENCOUNTER — OFFICE VISIT (OUTPATIENT)
Dept: NEUROLOGY | Facility: CLINIC | Age: 63
End: 2018-10-01

## 2018-10-01 VITALS
WEIGHT: 293 LBS | HEIGHT: 72 IN | SYSTOLIC BLOOD PRESSURE: 138 MMHG | OXYGEN SATURATION: 97 % | HEART RATE: 85 BPM | BODY MASS INDEX: 39.68 KG/M2 | DIASTOLIC BLOOD PRESSURE: 78 MMHG

## 2018-10-01 DIAGNOSIS — G25.81 RESTLESS LEGS SYNDROME: ICD-10-CM

## 2018-10-01 DIAGNOSIS — G35 MULTIPLE SCLEROSIS (HCC): Primary | ICD-10-CM

## 2018-10-01 DIAGNOSIS — G93.31 POSTVIRAL FATIGUE SYNDROME: ICD-10-CM

## 2018-10-01 DIAGNOSIS — G35 MULTIPLE SCLEROSIS (HCC): ICD-10-CM

## 2018-10-01 DIAGNOSIS — F33.1 MODERATE EPISODE OF RECURRENT MAJOR DEPRESSIVE DISORDER (HCC): ICD-10-CM

## 2018-10-01 DIAGNOSIS — G43.C0 PERIODIC HEADACHE SYNDROME, NOT INTRACTABLE: ICD-10-CM

## 2018-10-01 LAB
ALBUMIN SERPL-MCNC: 4.2 G/DL (ref 3.2–4.8)
ALBUMIN/GLOB SERPL: 1.8 G/DL (ref 1.5–2.5)
ALP SERPL-CCNC: 126 U/L (ref 25–100)
ALT SERPL W P-5'-P-CCNC: 17 U/L (ref 7–40)
ANION GAP SERPL CALCULATED.3IONS-SCNC: 6 MMOL/L (ref 3–11)
AST SERPL-CCNC: 21 U/L (ref 0–33)
BACTERIA UR QL AUTO: NORMAL /HPF
BASOPHILS # BLD AUTO: 0.05 10*3/MM3 (ref 0–0.2)
BASOPHILS NFR BLD AUTO: 0.6 % (ref 0–1)
BILIRUB SERPL-MCNC: 0.3 MG/DL (ref 0.3–1.2)
BILIRUB UR QL STRIP: NEGATIVE
BUN BLD-MCNC: 20 MG/DL (ref 9–23)
BUN/CREAT SERPL: 25.6 (ref 7–25)
CALCIUM SPEC-SCNC: 9.1 MG/DL (ref 8.7–10.4)
CHLORIDE SERPL-SCNC: 108 MMOL/L (ref 99–109)
CLARITY UR: CLEAR
CO2 SERPL-SCNC: 28 MMOL/L (ref 20–31)
COD CRY URNS QL: NORMAL /HPF
COLOR UR: ABNORMAL
CREAT BLD-MCNC: 0.78 MG/DL (ref 0.6–1.3)
DEPRECATED RDW RBC AUTO: 51.9 FL (ref 37–54)
EOSINOPHIL # BLD AUTO: 0.28 10*3/MM3 (ref 0–0.3)
EOSINOPHIL NFR BLD AUTO: 3.2 % (ref 0–3)
ERYTHROCYTE [DISTWIDTH] IN BLOOD BY AUTOMATED COUNT: 16.5 % (ref 11.3–14.5)
GFR SERPL CREATININE-BSD FRML MDRD: 75 ML/MIN/1.73
GLOBULIN UR ELPH-MCNC: 2.4 GM/DL
GLUCOSE BLD-MCNC: 78 MG/DL (ref 70–100)
GLUCOSE UR STRIP-MCNC: NEGATIVE MG/DL
HCT VFR BLD AUTO: 39.8 % (ref 34.5–44)
HGB BLD-MCNC: 12.4 G/DL (ref 11.5–15.5)
HGB UR QL STRIP.AUTO: NEGATIVE
HYALINE CASTS UR QL AUTO: NORMAL /LPF
IMM GRANULOCYTES # BLD: 0.03 10*3/MM3 (ref 0–0.03)
IMM GRANULOCYTES NFR BLD: 0.3 % (ref 0–0.6)
KETONES UR QL STRIP: NEGATIVE
LEUKOCYTE ESTERASE UR QL STRIP.AUTO: NEGATIVE
LYMPHOCYTES # BLD AUTO: 2.69 10*3/MM3 (ref 0.6–4.8)
LYMPHOCYTES NFR BLD AUTO: 31.1 % (ref 24–44)
MCH RBC QN AUTO: 27 PG (ref 27–31)
MCHC RBC AUTO-ENTMCNC: 31.2 G/DL (ref 32–36)
MCV RBC AUTO: 86.5 FL (ref 80–99)
MONOCYTES # BLD AUTO: 0.88 10*3/MM3 (ref 0–1)
MONOCYTES NFR BLD AUTO: 10.2 % (ref 0–12)
MUCOUS THREADS URNS QL MICRO: NORMAL /HPF
NEUTROPHILS # BLD AUTO: 4.74 10*3/MM3 (ref 1.5–8.3)
NEUTROPHILS NFR BLD AUTO: 54.9 % (ref 41–71)
NITRITE UR QL STRIP: NEGATIVE
PH UR STRIP.AUTO: <=5 [PH] (ref 5–8)
PLATELET # BLD AUTO: 239 10*3/MM3 (ref 150–450)
PMV BLD AUTO: 11.3 FL (ref 6–12)
POTASSIUM BLD-SCNC: 3.9 MMOL/L (ref 3.5–5.5)
PROT SERPL-MCNC: 6.6 G/DL (ref 5.7–8.2)
PROT UR QL STRIP: NEGATIVE
RBC # BLD AUTO: 4.6 10*6/MM3 (ref 3.89–5.14)
RBC # UR: NORMAL /HPF
REF LAB TEST METHOD: NORMAL
SODIUM BLD-SCNC: 142 MMOL/L (ref 132–146)
SP GR UR STRIP: >=1.03 (ref 1–1.03)
SQUAMOUS #/AREA URNS HPF: NORMAL /HPF
TSH SERPL DL<=0.05 MIU/L-ACNC: 1.39 MIU/ML (ref 0.35–5.35)
UROBILINOGEN UR QL STRIP: ABNORMAL
WBC NRBC COR # BLD: 8.64 10*3/MM3 (ref 3.5–10.8)
WBC UR QL AUTO: NORMAL /HPF

## 2018-10-01 PROCEDURE — 85025 COMPLETE CBC W/AUTO DIFF WBC: CPT

## 2018-10-01 PROCEDURE — 84703 CHORIONIC GONADOTROPIN ASSAY: CPT

## 2018-10-01 PROCEDURE — 99214 OFFICE O/P EST MOD 30 MIN: CPT | Performed by: PSYCHIATRY & NEUROLOGY

## 2018-10-01 PROCEDURE — 36415 COLL VENOUS BLD VENIPUNCTURE: CPT

## 2018-10-01 PROCEDURE — 84443 ASSAY THYROID STIM HORMONE: CPT

## 2018-10-01 PROCEDURE — 80053 COMPREHEN METABOLIC PANEL: CPT

## 2018-10-01 PROCEDURE — 81001 URINALYSIS AUTO W/SCOPE: CPT

## 2018-10-01 RX ORDER — ONDANSETRON 2 MG/ML
8 INJECTION INTRAMUSCULAR; INTRAVENOUS EVERY 8 HOURS PRN
Status: CANCELLED | OUTPATIENT
Start: 2018-10-01

## 2018-10-01 RX ORDER — DIPHENHYDRAMINE HYDROCHLORIDE 50 MG/ML
25 INJECTION INTRAMUSCULAR; INTRAVENOUS ONCE
Status: CANCELLED | OUTPATIENT
Start: 2018-10-01

## 2018-10-01 RX ORDER — FAMOTIDINE 10 MG/ML
20 INJECTION, SOLUTION INTRAVENOUS EVERY 6 HOURS PRN
Status: CANCELLED | OUTPATIENT
Start: 2018-10-01

## 2018-10-01 RX ORDER — SODIUM CHLORIDE 9 MG/ML
250 INJECTION, SOLUTION INTRAVENOUS ONCE
Status: CANCELLED | OUTPATIENT
Start: 2018-10-01

## 2018-10-01 RX ORDER — ACETAMINOPHEN 325 MG/1
650 TABLET ORAL ONCE
Status: CANCELLED | OUTPATIENT
Start: 2018-10-01

## 2018-10-01 NOTE — PROGRESS NOTES
Subjective:   Chief Complaint   Patient presents with   • Multiple Sclerosis     f/u       Patient ID: Rashad Carrillo is a 62 y.o. female.    History of Present Illness     62 y.o.  woman with RRMS, migraines, MDD and RLS returns in follow up.  Last visit on 6/8/18 renewed GBP, Tysabri, Cymbalta and Ampyra.    9/12/18   CBC, CMP ncs    MRI Brain 12/4/17 as compared to 6/9/16, no change in T2 lesion load, moderate to severe T2 lesion burden and moderate atrophy.     JCV ab - 6/11/18 - 0.45; started 11/2014      RRMS    Called on 9/28/18 states she feels weak and fatigued, right leg with pins and needles.  Called in a medrol dose pack.      Aplastic anemia resolved followed by Dr Valentin.      TB treatment completed by Dr Lora.  Stopped Rifampin on 9/30/18.     Recent steroid pack helped with walking.     Continued Tysabri and tolerating.  Last Tysabri 9/20/18.       Fatigue is severe.  Heat intolerance is severe.      Problem history:    25 ft timed walk increaesed 15.99 from 9.94.  Left hand 9 hole peg worsened.   Increasing swallowing difficulty.        Immediate and working memory are declining with SDMT 27/110..       Referred to ID and dx with latent TB.  Recommended isoniazid and pyridoxine for 9 months.  Subsequently developed aplastic anemia secondary to INH and followed by Dr Valentin.  Received multiple transfusions.  3/29/18 able to resume Tysabri and has had 3 infusions  Treated with rifampin.      MDD    Cymbalta and buproprion is keeping mood stable.     RLS     mg po BID, 300 mg 2 times overnight for discomfort.       MIgraines    HA frequency is 4 times a week.  Moderate to severe intensity.   Location moves around head.  Last for up to a day.  Tx with OTC meds.     The following portions of the patient's history were reviewed and updated as appropriate: allergies, current medications, past medical history, past surgical history and problem list.    Review of Systems   Constitutional: Positive for  "fatigue. Negative for activity change and unexpected weight change.   HENT: Negative for tinnitus and trouble swallowing.    Eyes: Negative for photophobia and visual disturbance.   Respiratory: Negative for apnea and choking.    Cardiovascular: Negative for leg swelling.   Endocrine: Positive for heat intolerance. Negative for cold intolerance.   Genitourinary: Negative for difficulty urinating, frequency, menstrual problem and urgency.   Musculoskeletal: Positive for gait problem. Negative for back pain.   Skin: Negative for color change.   Allergic/Immunologic: Negative for immunocompromised state.   Neurological: Positive for dizziness, tremors, numbness and headaches. Negative for seizures, syncope, facial asymmetry, speech difficulty and light-headedness.   Hematological: Negative for adenopathy. Does not bruise/bleed easily.   Psychiatric/Behavioral: Positive for decreased concentration, dysphoric mood and sleep disturbance. Negative for behavioral problems, confusion and hallucinations. The patient is nervous/anxious.         Objective:  Vitals:    10/01/18 1049   BP: 138/78   Pulse: 85   SpO2: 97%   Weight: (!) 160 kg (353 lb)   Height: 182.9 cm (72.01\")        Neurologic Exam     Mental Status   Registration: recalls 3 of 3 objects. Recall at 5 minutes: recalls 3 of 3 objects. Follows 3 step commands.   Attention: normal. Concentration: normal.   Level of consciousness: alert  Knowledge: good and consistent with education.   Able to name object. Able to read. Able to repeat. Able to write. Normal comprehension.         Cranial Nerves     CN II   Visual fields full to confrontation.   Visual acuity: normal  Right visual field deficit: none  Left visual field deficit: none     CN III, IV, VI   Nystagmus: none   Diplopia: none  Ophthalmoparesis: none  Upgaze: normal  Downgaze: normal  Conjugate gaze: present  Vestibulo-ocular reflex: present    CN V   Facial sensation intact.   Right corneal reflex: " normal  Left corneal reflex: normal    CN VII   Right facial weakness: none  Left facial weakness: none    CN VIII   Hearing: intact    CN IX, X   Palate: symmetric  Right gag reflex: normal  Left gag reflex: normal    CN XI   Right sternocleidomastoid strength: normal  Left sternocleidomastoid strength: normal    CN XII   Tongue: not atrophic  Fasciculations: absent  Tongue deviation: none    Motor Exam   Muscle bulk: normal  Overall muscle tone: normal  Right arm tone: normal  Left arm tone: normal  Right leg tone: increased  Left leg tone: increased    Strength   Strength 5/5 except as noted.   Left iliopsoas: 3/5  Left quadriceps: 3/5  Left hamstring: 3/5  Left glutei: 3/5  Left anterior tibial: 3/5  Left posterior tibial: 3/5  Left peroneal: 3/5  Left gastroc: 3/5    Sensory Exam   Right arm light touch: normal  Right leg light touch: normal  Right arm vibration: normal  Right leg vibration: normal  Right arm proprioception: normal  Right leg proprioception: normal  Right arm pinprick: normal  Right leg pinprick: normal  Sensory deficit distribution on right: non-dermatomal distribution (decreased left face, arm and leg)    Gait, Coordination, and Reflexes     Gait  Gait: circumduction, shuffling and wide-based (left leg)    Coordination   Romberg: positive  Heel to shin coordination: abnormal  Tandem walking coordination: abnormal    Tremor   Resting tremor: absent  Intention tremor: absent  Action tremor: left arm and right arm    Reflexes   Right brachioradialis: 3+  Left brachioradialis: 3+  Right biceps: 3+  Left biceps: 3+  Right triceps: 3+  Left triceps: 3+  Right patellar: 3+  Left patellar: 3+  Right achilles: 3+  Left achilles: 3+  Right : 3+  Left : 3+  Right plantar: normal  Left plantar: normal      Physical Exam   Constitutional: She appears well-developed and well-nourished.   Neurological: She has an abnormal Heel to Brown Test, an abnormal Romberg Test and an abnormal Tandem Gait Test.    Reflex Scores:       Tricep reflexes are 3+ on the right side and 3+ on the left side.       Bicep reflexes are 3+ on the right side and 3+ on the left side.       Brachioradialis reflexes are 3+ on the right side and 3+ on the left side.       Patellar reflexes are 3+ on the right side and 3+ on the left side.       Achilles reflexes are 3+ on the right side and 3+ on the left side.  Nursing note and vitals reviewed.    Lab on 09/12/2018   Component Date Value Ref Range Status   • Glucose 09/12/2018 102* 70 - 100 mg/dL Final   • BUN 09/12/2018 13  9 - 23 mg/dL Final   • Creatinine 09/12/2018 0.79  0.60 - 1.30 mg/dL Final   • Sodium 09/12/2018 139  132 - 146 mmol/L Final   • Potassium 09/12/2018 4.1  3.5 - 5.5 mmol/L Final   • Chloride 09/12/2018 104  99 - 109 mmol/L Final   • CO2 09/12/2018 28.0  20.0 - 31.0 mmol/L Final   • Calcium 09/12/2018 9.3  8.7 - 10.4 mg/dL Final   • Total Protein 09/12/2018 6.6  5.7 - 8.2 g/dL Final   • Albumin 09/12/2018 4.27  3.20 - 4.80 g/dL Final   • ALT (SGPT) 09/12/2018 20  7 - 40 U/L Final   • AST (SGOT) 09/12/2018 21  0 - 33 U/L Final   • Alkaline Phosphatase 09/12/2018 131* 25 - 100 U/L Final   • Total Bilirubin 09/12/2018 0.6  0.3 - 1.2 mg/dL Final   • eGFR Non African Amer 09/12/2018 74  >60 mL/min/1.73 Final   • Globulin 09/12/2018 2.3  gm/dL Final   • A/G Ratio 09/12/2018 1.8  1.5 - 2.5 g/dL Final   • BUN/Creatinine Ratio 09/12/2018 16.5  7.0 - 25.0 Final   • Anion Gap 09/12/2018 7.0  3.0 - 11.0 mmol/L Final   • WBC 09/12/2018 6.99  3.50 - 10.80 10*3/mm3 Final   • RBC 09/12/2018 4.54  3.89 - 5.14 10*6/mm3 Final   • Hemoglobin 09/12/2018 12.1  11.5 - 15.5 g/dL Final   • Hematocrit 09/12/2018 38.8  34.5 - 44.0 % Final   • MCV 09/12/2018 85.5  80.0 - 99.0 fL Final   • MCH 09/12/2018 26.7* 27.0 - 31.0 pg Final   • MCHC 09/12/2018 31.2* 32.0 - 36.0 g/dL Final   • RDW 09/12/2018 16.6* 11.3 - 14.5 % Final   • RDW-SD 09/12/2018 52.3  37.0 - 54.0 fl Final   • MPV 09/12/2018 10.4  6.0  - 12.0 fL Final   • Platelets 09/12/2018 208  150 - 450 10*3/mm3 Final   • Neutrophil % 09/12/2018 44.9  41.0 - 71.0 % Final   • Lymphocyte % 09/12/2018 42.6  24.0 - 44.0 % Final   • Monocyte % 09/12/2018 8.2  0.0 - 12.0 % Final   • Eosinophil % 09/12/2018 3.7* 0.0 - 3.0 % Final   • Basophil % 09/12/2018 0.6  0.0 - 1.0 % Final   • Immature Grans % 09/12/2018 0.3  0.0 - 0.6 % Final   • Neutrophils, Absolute 09/12/2018 3.14  1.50 - 8.30 10*3/mm3 Final   • Lymphocytes, Absolute 09/12/2018 2.98  0.60 - 4.80 10*3/mm3 Final   • Monocytes, Absolute 09/12/2018 0.57  0.00 - 1.00 10*3/mm3 Final   • Eosinophils, Absolute 09/12/2018 0.26  0.00 - 0.30 10*3/mm3 Final   • Basophils, Absolute 09/12/2018 0.04  0.00 - 0.20 10*3/mm3 Final   • Immature Grans, Absolute 09/12/2018 0.02  0.00 - 0.03 10*3/mm3 Final       Assessment/Plan:       Problems Addressed this Visit        Cardiovascular and Mediastinum    Periodic headache syndrome, not intractable     Headaches are unchanged.  Continue current treatment regimen.                Nervous and Auditory    Multiple sclerosis (CMS/HCC) - Primary     Worsening gait disability on Tysabri   JCV positive     Finished tx for TB    Willing to proceed with Lemtrada           Relevant Orders    MRI Brain With & Without Contrast    CBC & Differential    Comprehensive Metabolic Panel    hCG, Serum, Qualitative    TSH    Urinalysis With Microscopic - Urine, Clean Catch       Other    Depression     Psychological condition is improving with treatment.  Continue current treatment regimen.  Psychological condition  will be reassessed at the next regular appointment.         Restless legs syndrome     Sx controlled on GBP            Other Visit Diagnoses     Postviral fatigue syndrome         Relevant Orders    TSH

## 2018-10-01 NOTE — ASSESSMENT & PLAN NOTE
Worsening gait disability on Tysabri   JCV positive     Finished tx for TB    Willing to proceed with Lemtrada

## 2018-10-02 ENCOUNTER — TRANSCRIBE ORDERS (OUTPATIENT)
Dept: ADMINISTRATIVE | Facility: HOSPITAL | Age: 63
End: 2018-10-02

## 2018-10-02 DIAGNOSIS — Z12.31 VISIT FOR SCREENING MAMMOGRAM: Primary | ICD-10-CM

## 2018-10-02 LAB — B-HCG SERPL QL: NEGATIVE MIU/ML

## 2018-10-08 ENCOUNTER — HOSPITAL ENCOUNTER (OUTPATIENT)
Dept: MRI IMAGING | Facility: HOSPITAL | Age: 63
Discharge: HOME | End: 2018-10-08
Attending: PSYCHIATRY & NEUROLOGY | Admitting: PSYCHIATRY & NEUROLOGY

## 2018-10-08 DIAGNOSIS — G35 MULTIPLE SCLEROSIS (HCC): ICD-10-CM

## 2018-10-08 PROCEDURE — A9577 INJ MULTIHANCE: HCPCS | Performed by: PSYCHIATRY & NEUROLOGY

## 2018-10-08 PROCEDURE — 70553 MRI BRAIN STEM W/O & W/DYE: CPT

## 2018-10-08 PROCEDURE — 0 GADOBENATE DIMEGLUMINE 529 MG/ML SOLUTION: Performed by: PSYCHIATRY & NEUROLOGY

## 2018-10-08 RX ADMIN — GADOBENATE DIMEGLUMINE 20 ML: 529 INJECTION, SOLUTION INTRAVENOUS at 09:05

## 2018-10-22 ENCOUNTER — HOSPITAL ENCOUNTER (OUTPATIENT)
Dept: MAMMOGRAPHY | Facility: HOSPITAL | Age: 63
Discharge: HOME | End: 2018-10-22
Attending: INTERNAL MEDICINE | Admitting: INTERNAL MEDICINE

## 2018-10-22 DIAGNOSIS — Z12.31 VISIT FOR SCREENING MAMMOGRAM: ICD-10-CM

## 2018-10-22 DIAGNOSIS — G35 MULTIPLE SCLEROSIS (HCC): ICD-10-CM

## 2018-10-22 PROCEDURE — 77067 SCR MAMMO BI INCL CAD: CPT

## 2018-10-22 PROCEDURE — 77063 BREAST TOMOSYNTHESIS BI: CPT

## 2018-10-22 PROCEDURE — 77063 BREAST TOMOSYNTHESIS BI: CPT | Performed by: RADIOLOGY

## 2018-10-22 PROCEDURE — 77067 SCR MAMMO BI INCL CAD: CPT | Performed by: RADIOLOGY

## 2018-10-22 RX ORDER — DALFAMPRIDINE 10 MG/1
10 TABLET, FILM COATED, EXTENDED RELEASE ORAL EVERY 12 HOURS
Qty: 60 TABLET | Refills: 11 | Status: SHIPPED | OUTPATIENT
Start: 2018-10-22 | End: 2018-10-24

## 2018-10-24 ENCOUNTER — SPECIALTY PHARMACY (OUTPATIENT)
Dept: ONCOLOGY | Facility: HOSPITAL | Age: 63
End: 2018-10-24

## 2018-10-24 DIAGNOSIS — G35 MULTIPLE SCLEROSIS (HCC): ICD-10-CM

## 2018-10-24 RX ORDER — DALFAMPRIDINE 10 MG/1
10 TABLET, FILM COATED, EXTENDED RELEASE ORAL 2 TIMES DAILY
Qty: 60 TABLET | Refills: 11 | Status: SHIPPED | OUTPATIENT
Start: 2018-10-24 | End: 2019-01-10

## 2018-10-25 ENCOUNTER — TELEPHONE (OUTPATIENT)
Dept: NEUROLOGY | Facility: CLINIC | Age: 63
End: 2018-10-25

## 2018-10-25 RX ORDER — ACYCLOVIR 400 MG/1
400 TABLET ORAL 2 TIMES DAILY
Qty: 60 TABLET | Refills: 11 | Status: SHIPPED | OUTPATIENT
Start: 2018-10-25 | End: 2018-11-24

## 2018-10-25 RX ORDER — METHYLPREDNISOLONE 4 MG/1
TABLET ORAL
Qty: 1 EACH | Refills: 0 | Status: SHIPPED | OUTPATIENT
Start: 2018-10-25 | End: 2018-11-21

## 2018-10-25 NOTE — TELEPHONE ENCOUNTER
----- Message from Cuca Perez RN sent at 10/25/2018  9:43 AM EDT -----  Regarding: Lemtrada pre-meds  Please send in acyclovir and medrol giovany for Lemtrada infusion on 10/29.  Thanks

## 2018-10-29 ENCOUNTER — INFUSION (OUTPATIENT)
Dept: ONCOLOGY | Facility: HOSPITAL | Age: 63
End: 2018-10-29

## 2018-10-29 ENCOUNTER — TELEPHONE (OUTPATIENT)
Dept: NEUROLOGY | Facility: CLINIC | Age: 63
End: 2018-10-29

## 2018-10-29 VITALS
HEART RATE: 88 BPM | WEIGHT: 293 LBS | SYSTOLIC BLOOD PRESSURE: 157 MMHG | TEMPERATURE: 98.9 F | DIASTOLIC BLOOD PRESSURE: 69 MMHG | HEIGHT: 72 IN | RESPIRATION RATE: 20 BRPM | BODY MASS INDEX: 39.68 KG/M2

## 2018-10-29 DIAGNOSIS — G35 MULTIPLE SCLEROSIS (HCC): ICD-10-CM

## 2018-10-29 DIAGNOSIS — G35 MULTIPLE SCLEROSIS (HCC): Primary | ICD-10-CM

## 2018-10-29 DIAGNOSIS — D61.1 APLASTIC ANEMIA DUE TO DRUGS (HCC): ICD-10-CM

## 2018-10-29 LAB
ERYTHROCYTE [DISTWIDTH] IN BLOOD BY AUTOMATED COUNT: 17.4 % (ref 11.3–14.5)
HCT VFR BLD AUTO: 36.9 % (ref 34.5–44)
HGB BLD-MCNC: 11.9 G/DL (ref 11.5–15.5)
LYMPHOCYTES # BLD AUTO: 0.6 10*3/MM3 (ref 0.6–4.8)
LYMPHOCYTES NFR BLD AUTO: 10.2 % (ref 24–44)
MCH RBC QN AUTO: 27.4 PG (ref 27–31)
MCHC RBC AUTO-ENTMCNC: 32.2 G/DL (ref 32–36)
MCV RBC AUTO: 85 FL (ref 80–99)
MONOCYTES # BLD AUTO: 0.1 10*3/MM3 (ref 0–1)
MONOCYTES NFR BLD AUTO: 1.7 % (ref 0–12)
NEUTROPHILS # BLD AUTO: 4.8 10*3/MM3 (ref 1.5–8.3)
NEUTROPHILS NFR BLD AUTO: 88.1 % (ref 41–71)
PLATELET # BLD AUTO: 171 10*3/MM3 (ref 150–450)
PMV BLD AUTO: 8.1 FL (ref 6–12)
RBC # BLD AUTO: 4.34 10*6/MM3 (ref 3.89–5.14)
WBC NRBC COR # BLD: 5.4 10*3/MM3 (ref 3.5–10.8)

## 2018-10-29 PROCEDURE — 96367 TX/PROPH/DG ADDL SEQ IV INF: CPT

## 2018-10-29 PROCEDURE — 25010000002 ALEMTUZUMAB 12 MG/1.2ML SOLUTION 1.2 ML VIAL: Performed by: PSYCHIATRY & NEUROLOGY

## 2018-10-29 PROCEDURE — 96375 TX/PRO/DX INJ NEW DRUG ADDON: CPT

## 2018-10-29 PROCEDURE — 25010000003 METHYLPREDNISOLONE PER 125 MG: Performed by: PSYCHIATRY & NEUROLOGY

## 2018-10-29 PROCEDURE — 96413 CHEMO IV INFUSION 1 HR: CPT

## 2018-10-29 PROCEDURE — 25010000002 DIPHENHYDRAMINE PER 50 MG: Performed by: PSYCHIATRY & NEUROLOGY

## 2018-10-29 PROCEDURE — 96415 CHEMO IV INFUSION ADDL HR: CPT

## 2018-10-29 PROCEDURE — 25010000002 ONDANSETRON PER 1 MG: Performed by: PSYCHIATRY & NEUROLOGY

## 2018-10-29 PROCEDURE — 96366 THER/PROPH/DIAG IV INF ADDON: CPT

## 2018-10-29 PROCEDURE — 85025 COMPLETE CBC W/AUTO DIFF WBC: CPT

## 2018-10-29 PROCEDURE — 96365 THER/PROPH/DIAG IV INF INIT: CPT

## 2018-10-29 RX ORDER — ONDANSETRON 2 MG/ML
8 INJECTION INTRAMUSCULAR; INTRAVENOUS EVERY 8 HOURS PRN
Status: CANCELLED | OUTPATIENT
Start: 2018-10-29

## 2018-10-29 RX ORDER — FAMOTIDINE 10 MG/ML
20 INJECTION, SOLUTION INTRAVENOUS EVERY 6 HOURS PRN
Status: CANCELLED | OUTPATIENT
Start: 2018-10-29

## 2018-10-29 RX ORDER — FAMOTIDINE 10 MG/ML
20 INJECTION, SOLUTION INTRAVENOUS EVERY 6 HOURS PRN
Status: DISCONTINUED | OUTPATIENT
Start: 2018-10-29 | End: 2018-10-29 | Stop reason: HOSPADM

## 2018-10-29 RX ORDER — SODIUM CHLORIDE 9 MG/ML
250 INJECTION, SOLUTION INTRAVENOUS ONCE
Status: DISCONTINUED | OUTPATIENT
Start: 2018-10-29 | End: 2018-10-29 | Stop reason: HOSPADM

## 2018-10-29 RX ORDER — ACETAMINOPHEN 325 MG/1
650 TABLET ORAL ONCE
Status: COMPLETED | OUTPATIENT
Start: 2018-10-29 | End: 2018-10-29

## 2018-10-29 RX ORDER — SODIUM CHLORIDE 9 MG/ML
250 INJECTION, SOLUTION INTRAVENOUS ONCE
Status: CANCELLED | OUTPATIENT
Start: 2018-10-29

## 2018-10-29 RX ORDER — ACETAMINOPHEN 325 MG/1
650 TABLET ORAL ONCE
Status: CANCELLED | OUTPATIENT
Start: 2018-10-29

## 2018-10-29 RX ORDER — HYDROCODONE BITARTRATE AND ACETAMINOPHEN 10; 325 MG/1; MG/1
1 TABLET ORAL EVERY 6 HOURS PRN
Qty: 20 TABLET | Refills: 0 | Status: SHIPPED | OUTPATIENT
Start: 2018-10-29 | End: 2022-04-11

## 2018-10-29 RX ORDER — HYDROCODONE BITARTRATE AND ACETAMINOPHEN 10; 325 MG/1; MG/1
1 TABLET ORAL ONCE
Status: COMPLETED | OUTPATIENT
Start: 2018-10-29 | End: 2018-10-29

## 2018-10-29 RX ORDER — DIPHENHYDRAMINE HYDROCHLORIDE 50 MG/ML
25 INJECTION INTRAMUSCULAR; INTRAVENOUS ONCE
Status: COMPLETED | OUTPATIENT
Start: 2018-10-29 | End: 2018-10-29

## 2018-10-29 RX ORDER — HYDROCODONE BITARTRATE AND ACETAMINOPHEN 10; 325 MG/1; MG/1
1 TABLET ORAL EVERY 6 HOURS PRN
Qty: 10 TABLET | Refills: 0 | Status: SHIPPED | OUTPATIENT
Start: 2018-10-29 | End: 2018-10-29 | Stop reason: SDUPTHER

## 2018-10-29 RX ORDER — ACETAMINOPHEN 325 MG/1
650 TABLET ORAL EVERY 6 HOURS PRN
Status: DISCONTINUED | OUTPATIENT
Start: 2018-10-29 | End: 2018-10-29 | Stop reason: HOSPADM

## 2018-10-29 RX ORDER — ONDANSETRON 2 MG/ML
8 INJECTION INTRAMUSCULAR; INTRAVENOUS EVERY 8 HOURS PRN
Status: DISCONTINUED | OUTPATIENT
Start: 2018-10-29 | End: 2018-10-29 | Stop reason: HOSPADM

## 2018-10-29 RX ORDER — DIPHENHYDRAMINE HYDROCHLORIDE 50 MG/ML
25 INJECTION INTRAMUSCULAR; INTRAVENOUS ONCE
Status: CANCELLED | OUTPATIENT
Start: 2018-10-29

## 2018-10-29 RX ADMIN — RENAGEL 12 MG: 400 TABLET ORAL at 11:01

## 2018-10-29 RX ADMIN — FAMOTIDINE 20 MG: 10 INJECTION, SOLUTION INTRAVENOUS at 12:50

## 2018-10-29 RX ADMIN — SODIUM CHLORIDE 1000 MG: 900 INJECTION INTRAVENOUS at 09:29

## 2018-10-29 RX ADMIN — HYDROCODONE BITARTRATE AND ACETAMINOPHEN 1 TABLET: 10; 325 TABLET ORAL at 15:45

## 2018-10-29 RX ADMIN — DIPHENHYDRAMINE HYDROCHLORIDE 25 MG: 50 INJECTION INTRAMUSCULAR; INTRAVENOUS at 09:15

## 2018-10-29 RX ADMIN — ACETAMINOPHEN 650 MG: 325 TABLET ORAL at 08:42

## 2018-10-29 RX ADMIN — ACETAMINOPHEN 650 MG: 325 TABLET ORAL at 13:05

## 2018-10-29 RX ADMIN — ONDANSETRON HYDROCHLORIDE 8 MG: 2 INJECTION, SOLUTION INTRAMUSCULAR; INTRAVENOUS at 12:49

## 2018-10-29 NOTE — TELEPHONE ENCOUNTER
----- Message from Nuvia Lynn sent at 10/29/2018 12:42 PM EDT -----  Regarding: order  Contact: [phone]  Patient request we send an order to [facility] [phone] (fax) for a commode chair. ORDER MUST STATE PATIENT IS OVER 300 LBS.

## 2018-10-30 ENCOUNTER — INFUSION (OUTPATIENT)
Dept: ONCOLOGY | Facility: HOSPITAL | Age: 63
End: 2018-10-30

## 2018-10-30 ENCOUNTER — TELEPHONE (OUTPATIENT)
Dept: NEUROLOGY | Facility: CLINIC | Age: 63
End: 2018-10-30

## 2018-10-30 VITALS
SYSTOLIC BLOOD PRESSURE: 168 MMHG | HEART RATE: 58 BPM | TEMPERATURE: 97.4 F | RESPIRATION RATE: 20 BRPM | DIASTOLIC BLOOD PRESSURE: 86 MMHG

## 2018-10-30 DIAGNOSIS — G35 MULTIPLE SCLEROSIS (HCC): Primary | ICD-10-CM

## 2018-10-30 PROCEDURE — 96415 CHEMO IV INFUSION ADDL HR: CPT

## 2018-10-30 PROCEDURE — 96413 CHEMO IV INFUSION 1 HR: CPT

## 2018-10-30 PROCEDURE — 25010000002 DIPHENHYDRAMINE PER 50 MG: Performed by: PSYCHIATRY & NEUROLOGY

## 2018-10-30 PROCEDURE — 25010000003 METHYLPREDNISOLONE PER 125 MG: Performed by: PSYCHIATRY & NEUROLOGY

## 2018-10-30 PROCEDURE — 96375 TX/PRO/DX INJ NEW DRUG ADDON: CPT

## 2018-10-30 PROCEDURE — 25010000002 ALEMTUZUMAB 12 MG/1.2ML SOLUTION 1.2 ML VIAL: Performed by: PSYCHIATRY & NEUROLOGY

## 2018-10-30 PROCEDURE — 96367 TX/PROPH/DG ADDL SEQ IV INF: CPT

## 2018-10-30 RX ORDER — ACETAMINOPHEN 325 MG/1
650 TABLET ORAL ONCE
Status: CANCELLED | OUTPATIENT
Start: 2018-10-30

## 2018-10-30 RX ORDER — DIPHENHYDRAMINE HYDROCHLORIDE 50 MG/ML
25 INJECTION INTRAMUSCULAR; INTRAVENOUS ONCE
Status: CANCELLED | OUTPATIENT
Start: 2018-10-30

## 2018-10-30 RX ORDER — ACETAMINOPHEN 325 MG/1
650 TABLET ORAL ONCE
Status: COMPLETED | OUTPATIENT
Start: 2018-10-30 | End: 2018-10-30

## 2018-10-30 RX ORDER — ACETAMINOPHEN 325 MG/1
650 TABLET ORAL ONCE
Status: CANCELLED
Start: 2018-10-31 | End: 2018-10-31

## 2018-10-30 RX ORDER — FAMOTIDINE 10 MG/ML
20 INJECTION, SOLUTION INTRAVENOUS EVERY 6 HOURS PRN
Status: CANCELLED | OUTPATIENT
Start: 2018-10-30

## 2018-10-30 RX ORDER — SODIUM CHLORIDE 9 MG/ML
250 INJECTION, SOLUTION INTRAVENOUS ONCE
Status: CANCELLED | OUTPATIENT
Start: 2018-10-30

## 2018-10-30 RX ORDER — DIPHENHYDRAMINE HYDROCHLORIDE 50 MG/ML
25 INJECTION INTRAMUSCULAR; INTRAVENOUS ONCE
Status: COMPLETED | OUTPATIENT
Start: 2018-10-30 | End: 2018-10-30

## 2018-10-30 RX ORDER — ONDANSETRON 2 MG/ML
8 INJECTION INTRAMUSCULAR; INTRAVENOUS EVERY 8 HOURS PRN
Status: CANCELLED | OUTPATIENT
Start: 2018-10-30

## 2018-10-30 RX ADMIN — RENAGEL 12 MG: 400 TABLET ORAL at 09:10

## 2018-10-30 RX ADMIN — SODIUM CHLORIDE 1000 MG: 9 INJECTION, SOLUTION INTRAVENOUS at 08:35

## 2018-10-30 RX ADMIN — ACETAMINOPHEN 650 MG: 325 TABLET ORAL at 08:30

## 2018-10-30 RX ADMIN — ACETAMINOPHEN 650 MG: 325 TABLET ORAL at 11:18

## 2018-10-30 RX ADMIN — DIPHENHYDRAMINE HYDROCHLORIDE 25 MG: 50 INJECTION INTRAMUSCULAR; INTRAVENOUS at 08:30

## 2018-10-31 ENCOUNTER — INFUSION (OUTPATIENT)
Dept: ONCOLOGY | Facility: HOSPITAL | Age: 63
End: 2018-10-31

## 2018-10-31 VITALS
DIASTOLIC BLOOD PRESSURE: 70 MMHG | RESPIRATION RATE: 20 BRPM | TEMPERATURE: 97.5 F | HEART RATE: 72 BPM | SYSTOLIC BLOOD PRESSURE: 147 MMHG

## 2018-10-31 DIAGNOSIS — G35 MULTIPLE SCLEROSIS (HCC): Primary | ICD-10-CM

## 2018-10-31 PROCEDURE — 96367 TX/PROPH/DG ADDL SEQ IV INF: CPT

## 2018-10-31 PROCEDURE — 96375 TX/PRO/DX INJ NEW DRUG ADDON: CPT

## 2018-10-31 PROCEDURE — 25010000002 ALEMTUZUMAB 12 MG/1.2ML SOLUTION 1.2 ML VIAL: Performed by: PSYCHIATRY & NEUROLOGY

## 2018-10-31 PROCEDURE — 96376 TX/PRO/DX INJ SAME DRUG ADON: CPT

## 2018-10-31 PROCEDURE — 25010000002 DIPHENHYDRAMINE PER 50 MG: Performed by: PSYCHIATRY & NEUROLOGY

## 2018-10-31 PROCEDURE — 25010000003 METHYLPREDNISOLONE PER 125 MG: Performed by: PSYCHIATRY & NEUROLOGY

## 2018-10-31 PROCEDURE — 96413 CHEMO IV INFUSION 1 HR: CPT

## 2018-10-31 PROCEDURE — 96415 CHEMO IV INFUSION ADDL HR: CPT

## 2018-10-31 PROCEDURE — 25010000002 ONDANSETRON PER 1 MG: Performed by: PSYCHIATRY & NEUROLOGY

## 2018-10-31 PROCEDURE — 96411 CHEMO IV PUSH ADDL DRUG: CPT

## 2018-10-31 RX ORDER — DIPHENHYDRAMINE HYDROCHLORIDE 50 MG/ML
25 INJECTION INTRAMUSCULAR; INTRAVENOUS ONCE
Status: COMPLETED | OUTPATIENT
Start: 2018-10-31 | End: 2018-10-31

## 2018-10-31 RX ORDER — FAMOTIDINE 10 MG/ML
20 INJECTION, SOLUTION INTRAVENOUS ONCE
Status: CANCELLED
Start: 2018-11-01 | End: 2018-11-01

## 2018-10-31 RX ORDER — ACETAMINOPHEN 325 MG/1
650 TABLET ORAL ONCE
Status: COMPLETED | OUTPATIENT
Start: 2018-10-31 | End: 2018-10-31

## 2018-10-31 RX ORDER — SODIUM CHLORIDE 9 MG/ML
250 INJECTION, SOLUTION INTRAVENOUS ONCE
Status: DISCONTINUED | OUTPATIENT
Start: 2018-10-31 | End: 2018-10-31 | Stop reason: HOSPADM

## 2018-10-31 RX ORDER — ONDANSETRON 2 MG/ML
8 INJECTION INTRAMUSCULAR; INTRAVENOUS EVERY 8 HOURS PRN
Status: CANCELLED | OUTPATIENT
Start: 2018-10-31

## 2018-10-31 RX ORDER — SODIUM CHLORIDE 9 MG/ML
250 INJECTION, SOLUTION INTRAVENOUS ONCE
Status: CANCELLED | OUTPATIENT
Start: 2018-10-31

## 2018-10-31 RX ORDER — FAMOTIDINE 10 MG/ML
20 INJECTION, SOLUTION INTRAVENOUS EVERY 6 HOURS PRN
Status: CANCELLED | OUTPATIENT
Start: 2018-10-31

## 2018-10-31 RX ORDER — FAMOTIDINE 10 MG/ML
20 INJECTION, SOLUTION INTRAVENOUS EVERY 6 HOURS PRN
Status: DISCONTINUED | OUTPATIENT
Start: 2018-10-31 | End: 2018-10-31 | Stop reason: HOSPADM

## 2018-10-31 RX ORDER — ONDANSETRON 2 MG/ML
8 INJECTION INTRAMUSCULAR; INTRAVENOUS EVERY 8 HOURS PRN
Status: DISCONTINUED | OUTPATIENT
Start: 2018-10-31 | End: 2018-10-31 | Stop reason: HOSPADM

## 2018-10-31 RX ORDER — DIPHENHYDRAMINE HYDROCHLORIDE 50 MG/ML
25 INJECTION INTRAMUSCULAR; INTRAVENOUS ONCE
Status: CANCELLED | OUTPATIENT
Start: 2018-10-31

## 2018-10-31 RX ORDER — ACETAMINOPHEN 325 MG/1
650 TABLET ORAL ONCE
Status: CANCELLED
Start: 2018-10-31 | End: 2018-10-31

## 2018-10-31 RX ORDER — FAMOTIDINE 10 MG/ML
20 INJECTION, SOLUTION INTRAVENOUS ONCE
Status: COMPLETED | OUTPATIENT
Start: 2018-10-31 | End: 2018-10-31

## 2018-10-31 RX ORDER — ACETAMINOPHEN 325 MG/1
650 TABLET ORAL ONCE
Status: CANCELLED | OUTPATIENT
Start: 2018-10-31

## 2018-10-31 RX ADMIN — DIPHENHYDRAMINE HYDROCHLORIDE 25 MG: 50 INJECTION INTRAMUSCULAR; INTRAVENOUS at 08:29

## 2018-10-31 RX ADMIN — FAMOTIDINE 20 MG: 10 INJECTION, SOLUTION INTRAVENOUS at 14:24

## 2018-10-31 RX ADMIN — ACETAMINOPHEN 650 MG: 325 TABLET ORAL at 08:37

## 2018-10-31 RX ADMIN — ACETAMINOPHEN 650 MG: 325 TABLET ORAL at 13:06

## 2018-10-31 RX ADMIN — RENAGEL 12 MG: 400 TABLET ORAL at 09:15

## 2018-10-31 RX ADMIN — ONDANSETRON 8 MG: 2 INJECTION INTRAMUSCULAR; INTRAVENOUS at 08:29

## 2018-10-31 RX ADMIN — SODIUM CHLORIDE 1000 MG: 9 INJECTION, SOLUTION INTRAVENOUS at 08:45

## 2018-10-31 RX ADMIN — FAMOTIDINE 20 MG: 10 INJECTION, SOLUTION INTRAVENOUS at 08:29

## 2018-11-01 ENCOUNTER — INFUSION (OUTPATIENT)
Dept: ONCOLOGY | Facility: HOSPITAL | Age: 63
End: 2018-11-01

## 2018-11-01 VITALS
TEMPERATURE: 97.6 F | SYSTOLIC BLOOD PRESSURE: 162 MMHG | RESPIRATION RATE: 20 BRPM | DIASTOLIC BLOOD PRESSURE: 89 MMHG | HEART RATE: 69 BPM

## 2018-11-01 DIAGNOSIS — G35 MULTIPLE SCLEROSIS (HCC): Primary | ICD-10-CM

## 2018-11-01 PROCEDURE — 96367 TX/PROPH/DG ADDL SEQ IV INF: CPT

## 2018-11-01 PROCEDURE — 96376 TX/PRO/DX INJ SAME DRUG ADON: CPT

## 2018-11-01 PROCEDURE — 25010000002 DIPHENHYDRAMINE PER 50 MG: Performed by: PSYCHIATRY & NEUROLOGY

## 2018-11-01 PROCEDURE — 96375 TX/PRO/DX INJ NEW DRUG ADDON: CPT

## 2018-11-01 PROCEDURE — 25010000003 METHYLPREDNISOLONE PER 125 MG: Performed by: PSYCHIATRY & NEUROLOGY

## 2018-11-01 PROCEDURE — 25010000002 ALEMTUZUMAB 12 MG/1.2ML SOLUTION 1.2 ML VIAL: Performed by: PSYCHIATRY & NEUROLOGY

## 2018-11-01 PROCEDURE — 96415 CHEMO IV INFUSION ADDL HR: CPT

## 2018-11-01 PROCEDURE — 96413 CHEMO IV INFUSION 1 HR: CPT

## 2018-11-01 RX ORDER — FAMOTIDINE 10 MG/ML
20 INJECTION, SOLUTION INTRAVENOUS EVERY 6 HOURS PRN
Status: COMPLETED | OUTPATIENT
Start: 2018-11-01 | End: 2018-11-01

## 2018-11-01 RX ORDER — ACETAMINOPHEN 325 MG/1
650 TABLET ORAL ONCE
Status: CANCELLED
Start: 2018-11-01 | End: 2018-11-01

## 2018-11-01 RX ORDER — FAMOTIDINE 10 MG/ML
20 INJECTION, SOLUTION INTRAVENOUS EVERY 12 HOURS SCHEDULED
Status: DISCONTINUED | OUTPATIENT
Start: 2018-11-01 | End: 2018-11-01 | Stop reason: HOSPADM

## 2018-11-01 RX ORDER — FAMOTIDINE 10 MG/ML
20 INJECTION, SOLUTION INTRAVENOUS EVERY 6 HOURS PRN
Status: CANCELLED | OUTPATIENT
Start: 2018-11-01

## 2018-11-01 RX ORDER — ACETAMINOPHEN 325 MG/1
650 TABLET ORAL ONCE
Status: CANCELLED | OUTPATIENT
Start: 2018-11-01

## 2018-11-01 RX ORDER — DIPHENHYDRAMINE HYDROCHLORIDE 50 MG/ML
25 INJECTION INTRAMUSCULAR; INTRAVENOUS ONCE
Status: CANCELLED | OUTPATIENT
Start: 2018-11-01

## 2018-11-01 RX ORDER — ACETAMINOPHEN 325 MG/1
650 TABLET ORAL ONCE
Status: COMPLETED | OUTPATIENT
Start: 2018-11-01 | End: 2018-11-01

## 2018-11-01 RX ORDER — DIPHENHYDRAMINE HYDROCHLORIDE 50 MG/ML
25 INJECTION INTRAMUSCULAR; INTRAVENOUS ONCE
Status: COMPLETED | OUTPATIENT
Start: 2018-11-01 | End: 2018-11-01

## 2018-11-01 RX ORDER — SODIUM CHLORIDE 9 MG/ML
250 INJECTION, SOLUTION INTRAVENOUS ONCE
Status: CANCELLED | OUTPATIENT
Start: 2018-11-01

## 2018-11-01 RX ORDER — ONDANSETRON 2 MG/ML
8 INJECTION INTRAMUSCULAR; INTRAVENOUS EVERY 8 HOURS PRN
Status: CANCELLED | OUTPATIENT
Start: 2018-11-01

## 2018-11-01 RX ADMIN — SODIUM CHLORIDE 1000 MG: 9 INJECTION, SOLUTION INTRAVENOUS at 08:37

## 2018-11-01 RX ADMIN — RENAGEL 12 MG: 400 TABLET ORAL at 09:20

## 2018-11-01 RX ADMIN — DIPHENHYDRAMINE HYDROCHLORIDE 25 MG: 50 INJECTION INTRAMUSCULAR; INTRAVENOUS at 08:30

## 2018-11-01 RX ADMIN — ACETAMINOPHEN 650 MG: 325 TABLET ORAL at 08:30

## 2018-11-01 RX ADMIN — FAMOTIDINE 20 MG: 10 INJECTION, SOLUTION INTRAVENOUS at 15:12

## 2018-11-01 RX ADMIN — FAMOTIDINE 20 MG: 10 INJECTION, SOLUTION INTRAVENOUS at 09:13

## 2018-11-02 ENCOUNTER — INFUSION (OUTPATIENT)
Dept: ONCOLOGY | Facility: HOSPITAL | Age: 63
End: 2018-11-02

## 2018-11-02 VITALS
HEART RATE: 75 BPM | TEMPERATURE: 97.5 F | DIASTOLIC BLOOD PRESSURE: 83 MMHG | SYSTOLIC BLOOD PRESSURE: 165 MMHG | RESPIRATION RATE: 20 BRPM

## 2018-11-02 DIAGNOSIS — G35 MULTIPLE SCLEROSIS (HCC): Primary | ICD-10-CM

## 2018-11-02 PROCEDURE — 96366 THER/PROPH/DIAG IV INF ADDON: CPT

## 2018-11-02 PROCEDURE — 96376 TX/PRO/DX INJ SAME DRUG ADON: CPT

## 2018-11-02 PROCEDURE — 96413 CHEMO IV INFUSION 1 HR: CPT

## 2018-11-02 PROCEDURE — 96375 TX/PRO/DX INJ NEW DRUG ADDON: CPT

## 2018-11-02 PROCEDURE — 25010000002 DIPHENHYDRAMINE PER 50 MG: Performed by: PSYCHIATRY & NEUROLOGY

## 2018-11-02 PROCEDURE — 96367 TX/PROPH/DG ADDL SEQ IV INF: CPT

## 2018-11-02 PROCEDURE — 96415 CHEMO IV INFUSION ADDL HR: CPT

## 2018-11-02 PROCEDURE — 25010000002 ALEMTUZUMAB 12 MG/1.2ML SOLUTION 1.2 ML VIAL: Performed by: PSYCHIATRY & NEUROLOGY

## 2018-11-02 PROCEDURE — 25010000003 METHYLPREDNISOLONE PER 125 MG: Performed by: PSYCHIATRY & NEUROLOGY

## 2018-11-02 RX ORDER — FAMOTIDINE 10 MG/ML
20 INJECTION, SOLUTION INTRAVENOUS EVERY 6 HOURS PRN
Status: CANCELLED | OUTPATIENT
Start: 2018-11-02

## 2018-11-02 RX ORDER — DIPHENHYDRAMINE HYDROCHLORIDE 50 MG/ML
25 INJECTION INTRAMUSCULAR; INTRAVENOUS ONCE
Status: CANCELLED | OUTPATIENT
Start: 2018-11-02

## 2018-11-02 RX ORDER — FAMOTIDINE 10 MG/ML
20 INJECTION, SOLUTION INTRAVENOUS EVERY 6 HOURS PRN
Status: COMPLETED | OUTPATIENT
Start: 2018-11-02 | End: 2018-11-02

## 2018-11-02 RX ORDER — SODIUM CHLORIDE 9 MG/ML
250 INJECTION, SOLUTION INTRAVENOUS ONCE
Status: CANCELLED | OUTPATIENT
Start: 2018-11-02

## 2018-11-02 RX ORDER — SODIUM CHLORIDE 9 MG/ML
250 INJECTION, SOLUTION INTRAVENOUS ONCE
Status: DISCONTINUED | OUTPATIENT
Start: 2018-11-02 | End: 2018-11-02 | Stop reason: HOSPADM

## 2018-11-02 RX ORDER — ACETAMINOPHEN 325 MG/1
650 TABLET ORAL ONCE
Status: CANCELLED
Start: 2018-11-02 | End: 2018-11-02

## 2018-11-02 RX ORDER — DIPHENHYDRAMINE HYDROCHLORIDE 50 MG/ML
25 INJECTION INTRAMUSCULAR; INTRAVENOUS ONCE
Status: COMPLETED | OUTPATIENT
Start: 2018-11-02 | End: 2018-11-02

## 2018-11-02 RX ORDER — ONDANSETRON 2 MG/ML
8 INJECTION INTRAMUSCULAR; INTRAVENOUS EVERY 8 HOURS PRN
Status: CANCELLED | OUTPATIENT
Start: 2018-11-02

## 2018-11-02 RX ORDER — ACETAMINOPHEN 325 MG/1
650 TABLET ORAL ONCE
Status: COMPLETED | OUTPATIENT
Start: 2018-11-02 | End: 2018-11-02

## 2018-11-02 RX ORDER — ACETAMINOPHEN 325 MG/1
650 TABLET ORAL ONCE
Status: CANCELLED | OUTPATIENT
Start: 2018-11-02

## 2018-11-02 RX ADMIN — DIPHENHYDRAMINE HYDROCHLORIDE 25 MG: 50 INJECTION INTRAMUSCULAR; INTRAVENOUS at 08:35

## 2018-11-02 RX ADMIN — SODIUM CHLORIDE 1000 MG: 9 INJECTION, SOLUTION INTRAVENOUS at 08:38

## 2018-11-02 RX ADMIN — FAMOTIDINE 20 MG: 10 INJECTION, SOLUTION INTRAVENOUS at 14:41

## 2018-11-02 RX ADMIN — ACETAMINOPHEN 650 MG: 325 TABLET ORAL at 08:33

## 2018-11-02 RX ADMIN — FAMOTIDINE 20 MG: 10 INJECTION, SOLUTION INTRAVENOUS at 08:32

## 2018-11-02 RX ADMIN — RENAGEL 12 MG: 400 TABLET ORAL at 09:11

## 2018-11-21 ENCOUNTER — OFFICE VISIT (OUTPATIENT)
Dept: NEUROLOGY | Facility: CLINIC | Age: 63
End: 2018-11-21

## 2018-11-21 VITALS
OXYGEN SATURATION: 99 % | HEART RATE: 62 BPM | RESPIRATION RATE: 20 BRPM | BODY MASS INDEX: 46.51 KG/M2 | WEIGHT: 293 LBS

## 2018-11-21 DIAGNOSIS — G43.C0 PERIODIC HEADACHE SYNDROME, NOT INTRACTABLE: ICD-10-CM

## 2018-11-21 DIAGNOSIS — G35 MULTIPLE SCLEROSIS (HCC): Primary | ICD-10-CM

## 2018-11-21 DIAGNOSIS — F33.1 MODERATE EPISODE OF RECURRENT MAJOR DEPRESSIVE DISORDER (HCC): ICD-10-CM

## 2018-11-21 DIAGNOSIS — G25.81 RESTLESS LEGS SYNDROME: ICD-10-CM

## 2018-11-21 PROCEDURE — 99214 OFFICE O/P EST MOD 30 MIN: CPT | Performed by: PSYCHIATRY & NEUROLOGY

## 2018-11-21 NOTE — ASSESSMENT & PLAN NOTE
Psychological condition is improving with treatment.  Continue current treatment regimen.  Psychological condition  will be reassessed in 3 months.

## 2018-11-21 NOTE — PROGRESS NOTES
Subjective:   Chief Complaint   Patient presents with   • Multiple Sclerosis   • Post Lemtrada       Patient ID: Rashad Carrillo is a 62 y.o. female.    History of Present Illness     62 y.o.  woman with RRMS, migraines, MDD and RLS returns in follow up.  Last visit on 10/1/18 ordered Lemtrada. renewed GBP, Cymbalta and Ampyra.    10/1/18:  Lemtrada pre labs NCS     MRI Brain, my review of films, 10/8/18 as compared to 12/4/17, no change in T2 lesion load, moderate to severe T2 lesion burden and moderate atrophy.     JCV ab - 6/11/18 - 0.45; started 11/2014      RRMS    S/P Lemtrada 10/29/18 - 11/2/18    TB treatment completed by Dr Lora.  Stopped Rifampin on 9/30/18.     Gait endurance and stability improved since Lemtrada.     Fatigue is moderate to severe.  Heat intolerance is severe.    HA are resolving post Lemtrada.   GERD improving and continues on Prilosec.    Stopped Ampyra due to high out of pocket expenses.     Problem history:    25 ft timed walk increaesed 15.99 from 9.94.  Left hand 9 hole peg worsened.   Increasing swallowing difficulty.        Immediate and working memory are declining with SDMT 27/110..       Referred to ID and dx with latent TB.  Recommended isoniazid and pyridoxine for 9 months.  Subsequently developed aplastic anemia secondary to INH and followed by Dr Valentin.  Received multiple transfusions.  3/29/18 able to resume Tysabri and has had 3 infusions  Treated with rifampin.      MDD    Cymbalta and buproprion mood stable.     RLS    Sx controlled on GBP.      mg po BID, 300 mg 2 times overnight for discomfort.       MIgraines    HA frequency is 3 times a week.  Moderate to severe intensity.   Location moves around head.  Last for up to a day.  Tx with OTC meds.     The following portions of the patient's history were reviewed and updated as appropriate: allergies, current medications, past medical history, past surgical history and problem list.    Review of Systems    Constitutional: Positive for fatigue. Negative for activity change and unexpected weight change.   HENT: Negative for tinnitus and trouble swallowing.    Eyes: Negative for photophobia and visual disturbance.   Respiratory: Negative for apnea and choking.    Cardiovascular: Negative for leg swelling.   Endocrine: Positive for heat intolerance. Negative for cold intolerance.   Genitourinary: Negative for difficulty urinating, frequency, menstrual problem and urgency.   Musculoskeletal: Positive for gait problem. Negative for back pain.   Skin: Negative for color change.   Allergic/Immunologic: Negative for immunocompromised state.   Neurological: Positive for dizziness, tremors, numbness and headaches. Negative for seizures, syncope, facial asymmetry, speech difficulty and light-headedness.   Hematological: Negative for adenopathy. Does not bruise/bleed easily.   Psychiatric/Behavioral: Positive for decreased concentration, dysphoric mood and sleep disturbance. Negative for behavioral problems, confusion and hallucinations. The patient is nervous/anxious.         Objective:  Vitals:    11/21/18 1416   Pulse: 62   Resp: 20   SpO2: 99%   Weight: (!) 156 kg (343 lb)        Neurologic Exam     Mental Status   Registration: recalls 3 of 3 objects. Recall at 5 minutes: recalls 3 of 3 objects. Follows 3 step commands.   Attention: normal. Concentration: normal.   Level of consciousness: alert  Knowledge: good and consistent with education.   Able to name object. Able to read. Able to repeat. Able to write. Normal comprehension.         Cranial Nerves     CN II   Visual fields full to confrontation.   Visual acuity: normal  Right visual field deficit: none  Left visual field deficit: none     CN III, IV, VI   Nystagmus: none   Diplopia: none  Ophthalmoparesis: none  Upgaze: normal  Downgaze: normal  Conjugate gaze: present  Vestibulo-ocular reflex: present    CN V   Facial sensation intact.   Right corneal reflex:  normal  Left corneal reflex: normal    CN VII   Right facial weakness: none  Left facial weakness: none    CN VIII   Hearing: intact    CN IX, X   Palate: symmetric  Right gag reflex: normal  Left gag reflex: normal    CN XI   Right sternocleidomastoid strength: normal  Left sternocleidomastoid strength: normal    CN XII   Tongue: not atrophic  Fasciculations: absent  Tongue deviation: none    Motor Exam   Muscle bulk: normal  Overall muscle tone: normal  Right arm tone: normal  Left arm tone: normal  Right leg tone: increased  Left leg tone: increased    Strength   Strength 5/5 except as noted.   Left iliopsoas: 3/5  Left quadriceps: 3/5  Left hamstring: 3/5  Left glutei: 3/5  Left anterior tibial: 3/5  Left posterior tibial: 3/5  Left peroneal: 3/5  Left gastroc: 3/5    Sensory Exam   Right arm light touch: normal  Right leg light touch: normal  Right arm vibration: normal  Right leg vibration: normal  Right arm proprioception: normal  Right leg proprioception: normal  Right arm pinprick: normal  Right leg pinprick: normal  Sensory deficit distribution on right: non-dermatomal distribution (decreased left face, arm and leg)    Gait, Coordination, and Reflexes     Gait  Gait: circumduction, shuffling and wide-based (left leg)    Coordination   Romberg: positive  Heel to shin coordination: abnormal  Tandem walking coordination: abnormal    Tremor   Resting tremor: absent  Intention tremor: absent  Action tremor: left arm and right arm    Reflexes   Right brachioradialis: 3+  Left brachioradialis: 3+  Right biceps: 3+  Left biceps: 3+  Right triceps: 3+  Left triceps: 3+  Right patellar: 3+  Left patellar: 3+  Right achilles: 3+  Left achilles: 3+  Right : 3+  Left : 3+  Right plantar: normal  Left plantar: normal      Physical Exam   Constitutional: She appears well-developed and well-nourished.   Neurological: She has an abnormal Heel to Brown Test, an abnormal Romberg Test and an abnormal Tandem Gait Test.    Reflex Scores:       Tricep reflexes are 3+ on the right side and 3+ on the left side.       Bicep reflexes are 3+ on the right side and 3+ on the left side.       Brachioradialis reflexes are 3+ on the right side and 3+ on the left side.       Patellar reflexes are 3+ on the right side and 3+ on the left side.       Achilles reflexes are 3+ on the right side and 3+ on the left side.  Nursing note and vitals reviewed.    Infusion on 10/29/2018   Component Date Value Ref Range Status   • WBC 10/29/2018 5.40  3.50 - 10.80 10*3/mm3 Final    Verified by repeat analysis.    • RBC 10/29/2018 4.34  3.89 - 5.14 10*6/mm3 Final   • Hemoglobin 10/29/2018 11.9  11.5 - 15.5 g/dL Final   • Hematocrit 10/29/2018 36.9  34.5 - 44.0 % Final   • RDW 10/29/2018 17.4* 11.3 - 14.5 % Final   • MCV 10/29/2018 85.0  80.0 - 99.0 fL Final   • MCH 10/29/2018 27.4  27.0 - 31.0 pg Final   • MCHC 10/29/2018 32.2  32.0 - 36.0 g/dL Final   • MPV 10/29/2018 8.1  6.0 - 12.0 fL Final   • Platelets 10/29/2018 171  150 - 450 10*3/mm3 Final   • Neutrophil % 10/29/2018 88.1* 41.0 - 71.0 % Final   • Lymphocyte % 10/29/2018 10.2* 24.0 - 44.0 % Final   • Monocyte % 10/29/2018 1.7  0.0 - 12.0 % Final   • Neutrophils, Absolute 10/29/2018 4.80  1.50 - 8.30 10*3/mm3 Final   • Lymphocytes, Absolute 10/29/2018 0.60  0.60 - 4.80 10*3/mm3 Final   • Monocytes, Absolute 10/29/2018 0.10  0.00 - 1.00 10*3/mm3 Final       Assessment/Plan:       Problems Addressed this Visit        Cardiovascular and Mediastinum    Periodic headache syndrome, not intractable     Headaches are improving with treatment.  Continue current treatment regimen.                Nervous and Auditory    Multiple sclerosis (CMS/HCC) - Primary     Sx stable after Lemtrada 1st cycle    Start monthly labs.  Continue Acyclovir               Other    Depression     Psychological condition is improving with treatment.  Continue current treatment regimen.  Psychological condition  will be reassessed in 3  months.         Restless legs syndrome     Continues on GBP

## 2018-12-26 ENCOUNTER — TELEPHONE (OUTPATIENT)
Dept: NEUROLOGY | Facility: CLINIC | Age: 63
End: 2018-12-26

## 2018-12-26 NOTE — TELEPHONE ENCOUNTER
Patient called, reported that she has cellulitis on her leg. Patient stated that she just wanted to make sure the antibiotics that they placed her on would not interact with what she is already on. Advised the patient that the provider is not in the office. Encouraged her to ask her PCP to check her medication interaction. Patient verbalized understanding. Advised her that if she has any questions or any other issues to give us a call back.

## 2019-01-03 DIAGNOSIS — G25.81 RESTLESS LEGS SYNDROME: ICD-10-CM

## 2019-01-03 RX ORDER — GABAPENTIN 300 MG/1
600 CAPSULE ORAL NIGHTLY
Qty: 60 CAPSULE | Refills: 5 | Status: SHIPPED | OUTPATIENT
Start: 2019-01-03 | End: 2022-11-07

## 2019-01-10 ENCOUNTER — SPECIALTY PHARMACY (OUTPATIENT)
Dept: ONCOLOGY | Facility: HOSPITAL | Age: 64
End: 2019-01-10

## 2019-01-10 RX ORDER — DALFAMPRIDINE 10 MG/1
10 TABLET, FILM COATED, EXTENDED RELEASE ORAL 2 TIMES DAILY
Qty: 60 TABLET | Refills: 11 | Status: SHIPPED | OUTPATIENT
Start: 2019-01-10 | End: 2020-01-10 | Stop reason: SDUPTHER

## 2019-03-21 ENCOUNTER — OFFICE VISIT (OUTPATIENT)
Dept: NEUROLOGY | Facility: CLINIC | Age: 64
End: 2019-03-21

## 2019-03-21 VITALS
OXYGEN SATURATION: 99 % | BODY MASS INDEX: 39.68 KG/M2 | HEIGHT: 72 IN | DIASTOLIC BLOOD PRESSURE: 88 MMHG | HEART RATE: 70 BPM | WEIGHT: 293 LBS | RESPIRATION RATE: 18 BRPM | SYSTOLIC BLOOD PRESSURE: 146 MMHG

## 2019-03-21 DIAGNOSIS — F33.1 MODERATE EPISODE OF RECURRENT MAJOR DEPRESSIVE DISORDER (HCC): ICD-10-CM

## 2019-03-21 DIAGNOSIS — G43.C0 PERIODIC HEADACHE SYNDROME, NOT INTRACTABLE: ICD-10-CM

## 2019-03-21 DIAGNOSIS — G35 MULTIPLE SCLEROSIS (HCC): Primary | ICD-10-CM

## 2019-03-21 DIAGNOSIS — G25.81 RESTLESS LEGS SYNDROME: ICD-10-CM

## 2019-03-21 PROCEDURE — 99214 OFFICE O/P EST MOD 30 MIN: CPT | Performed by: PSYCHIATRY & NEUROLOGY

## 2019-03-21 RX ORDER — ACYCLOVIR 400 MG/1
TABLET ORAL
Refills: 11 | COMMUNITY
Start: 2019-02-28 | End: 2019-10-14 | Stop reason: SDUPTHER

## 2019-03-21 NOTE — ASSESSMENT & PLAN NOTE
Sx stable after Lemtrada 1/2    Ampyra improving walking speed. T25Fw 9.75 sec with a cane    Monthly labs normal

## 2019-03-21 NOTE — PROGRESS NOTES
Subjective:   Chief Complaint   Patient presents with   • Multiple Sclerosis       Patient ID: Rashad Carrillo is a 63 y.o. female.    History of Present Illness     63 y.o.  woman with RRMS S/P Lemtrada 10/29/18 - 11/2/18 , migraines, MDD and RLS returns in follow up.  Last visit on 11/21/18 ordered monthly labs, renewed GBP, Cymbalta, Acyclovir and restarted Ampyra.     MRI Brain 10/8/18 as compared to 12/4/17, no change in T2 lesion load, moderate to severe T2 lesion burden and moderate atrophy.     JCV ab - 6/11/18 - 0.45      RRMS    3/6/19:  CD4 141, Plt 230; TSH 2.810; Cr 0.71; U/A nl     Overall improved with gait stability.  Ampyra improving gait speed.      Word finding problems worsening.      Fatigue is severe.  Heat intolerance is severe.      LE feel heavy.  Increased aphthous ulcers      Problem history:    25 ft timed walk increaesed 15.99 from 9.94.  Left hand 9 hole peg worsened.   Increasing swallowing difficulty.        Immediate and working memory are declining with SDMT 27/110..       Referred to ID and dx with latent TB.  Recommended isoniazid and pyridoxine for 9 months.  Subsequently developed aplastic anemia secondary to INH and followed by Dr Valentin.  Received multiple transfusions.  3/29/18 able to resume Tysabri and has had 3 infusions  Treated with rifampin.      MDD    Mood is brighter after Lemtrada.    Cymbalta 30 mg  mood stable.     RLS    Moderate intensity on GBP.       mg po BID, 300 mg 2 times overnight for discomfort.       MIgraines    HA frequency is daily.  Moderate to severe intensity.   Location moves around head.  Quality Last for up to a day.  Tx with OTC meds.     Preventatives:  TPM, GBP, Cymbalta,     The following portions of the patient's history were reviewed and updated as appropriate: allergies, current medications, past medical history, past surgical history and problem list.    Review of Systems   Constitutional: Positive for fatigue. Negative for activity  "change and unexpected weight change.   HENT: Negative for tinnitus and trouble swallowing.    Eyes: Negative for photophobia and visual disturbance.   Respiratory: Negative for apnea and choking.    Cardiovascular: Negative for leg swelling.   Endocrine: Positive for heat intolerance. Negative for cold intolerance.   Genitourinary: Negative for difficulty urinating, frequency, menstrual problem and urgency.   Musculoskeletal: Positive for gait problem and myalgias. Negative for back pain.   Skin: Negative for color change.   Allergic/Immunologic: Negative for immunocompromised state.   Neurological: Positive for dizziness, tremors, weakness and headaches. Negative for seizures, syncope, facial asymmetry, speech difficulty and light-headedness.   Hematological: Negative for adenopathy. Does not bruise/bleed easily.   Psychiatric/Behavioral: Positive for decreased concentration, dysphoric mood and sleep disturbance. Negative for behavioral problems, confusion and hallucinations. The patient is nervous/anxious.         Objective:  Vitals:    03/21/19 0841   BP: 146/88   BP Location: Right arm   Patient Position: Sitting   Cuff Size: Adult   Pulse: 70   Resp: 18   SpO2: 99%   Weight: (!) 160 kg (353 lb)   Height: 182.9 cm (72\")        Neurologic Exam     Mental Status   Registration: recalls 3 of 3 objects. Recall at 5 minutes: recalls 3 of 3 objects. Follows 3 step commands.   Attention: normal. Concentration: normal.   Level of consciousness: alert  Knowledge: good and consistent with education.   Able to name object. Able to read. Able to repeat. Able to write. Normal comprehension.         Cranial Nerves     CN II   Visual fields full to confrontation.   Visual acuity: normal  Right visual field deficit: none  Left visual field deficit: none     CN III, IV, VI   Nystagmus: none   Diplopia: none  Ophthalmoparesis: none  Upgaze: normal  Downgaze: normal  Conjugate gaze: present  Vestibulo-ocular reflex: present    CN " V   Facial sensation intact.   Right corneal reflex: normal  Left corneal reflex: normal    CN VII   Right facial weakness: none  Left facial weakness: none    CN VIII   Hearing: intact    CN IX, X   Palate: symmetric  Right gag reflex: normal  Left gag reflex: normal    CN XI   Right sternocleidomastoid strength: normal  Left sternocleidomastoid strength: normal    CN XII   Tongue: not atrophic  Fasciculations: absent  Tongue deviation: none    Motor Exam   Muscle bulk: normal  Overall muscle tone: normal  Right arm tone: normal  Left arm tone: normal  Right leg tone: increased  Left leg tone: increased    Strength   Strength 5/5 except as noted.   Left iliopsoas: 3/5  Left quadriceps: 3/5  Left hamstring: 3/5  Left glutei: 3/5  Left anterior tibial: 3/5  Left posterior tibial: 3/5  Left peroneal: 3/5  Left gastroc: 3/5    Sensory Exam   Right arm light touch: normal  Right leg light touch: normal  Right arm vibration: normal  Right leg vibration: normal  Right arm proprioception: normal  Right leg proprioception: normal  Right arm pinprick: normal  Right leg pinprick: normal  Sensory deficit distribution on right: non-dermatomal distribution (decreased left face, arm and leg)    Gait, Coordination, and Reflexes     Gait  Gait: circumduction, shuffling and wide-based (left leg)    Coordination   Romberg: positive  Heel to shin coordination: abnormal  Tandem walking coordination: abnormal    Tremor   Resting tremor: absent  Intention tremor: absent  Action tremor: left arm and right arm    Reflexes   Right brachioradialis: 3+  Left brachioradialis: 3+  Right biceps: 3+  Left biceps: 3+  Right triceps: 3+  Left triceps: 3+  Right patellar: 3+  Left patellar: 3+  Right achilles: 3+  Left achilles: 3+  Right : 3+  Left : 3+  Right plantar: normal  Left plantar: normal      Physical Exam   Constitutional: She appears well-developed and well-nourished.   Neurological: She has an abnormal Heel to Shin Test, an  abnormal Romberg Test and an abnormal Tandem Gait Test.   Reflex Scores:       Tricep reflexes are 3+ on the right side and 3+ on the left side.       Bicep reflexes are 3+ on the right side and 3+ on the left side.       Brachioradialis reflexes are 3+ on the right side and 3+ on the left side.       Patellar reflexes are 3+ on the right side and 3+ on the left side.       Achilles reflexes are 3+ on the right side and 3+ on the left side.  Nursing note and vitals reviewed.    Infusion on 10/29/2018   Component Date Value Ref Range Status   • WBC 10/29/2018 5.40  3.50 - 10.80 10*3/mm3 Final    Verified by repeat analysis.    • RBC 10/29/2018 4.34  3.89 - 5.14 10*6/mm3 Final   • Hemoglobin 10/29/2018 11.9  11.5 - 15.5 g/dL Final   • Hematocrit 10/29/2018 36.9  34.5 - 44.0 % Final   • RDW 10/29/2018 17.4* 11.3 - 14.5 % Final   • MCV 10/29/2018 85.0  80.0 - 99.0 fL Final   • MCH 10/29/2018 27.4  27.0 - 31.0 pg Final   • MCHC 10/29/2018 32.2  32.0 - 36.0 g/dL Final   • MPV 10/29/2018 8.1  6.0 - 12.0 fL Final   • Platelets 10/29/2018 171  150 - 450 10*3/mm3 Final   • Neutrophil % 10/29/2018 88.1* 41.0 - 71.0 % Final   • Lymphocyte % 10/29/2018 10.2* 24.0 - 44.0 % Final   • Monocyte % 10/29/2018 1.7  0.0 - 12.0 % Final   • Neutrophils, Absolute 10/29/2018 4.80  1.50 - 8.30 10*3/mm3 Final   • Lymphocytes, Absolute 10/29/2018 0.60  0.60 - 4.80 10*3/mm3 Final   • Monocytes, Absolute 10/29/2018 0.10  0.00 - 1.00 10*3/mm3 Final       Assessment/Plan:       Problems Addressed this Visit        Cardiovascular and Mediastinum    Periodic headache syndrome, not intractable     Headaches are worsening.  Medication changes per orders.     Start Emgality                 Nervous and Auditory    Multiple sclerosis (CMS/HCC) - Primary     Sx stable after Lemtrada 1/2    Ampyra improving walking speed. T25Fw 9.75 sec with a cane    Monthly labs normal                   Other    Depression     Psychological condition is improving with  treatment.  Continue current treatment regimen.  Psychological condition  will be reassessed at the next regular appointment.         Restless legs syndrome     Sx of moderate intensity on GBP

## 2019-04-05 ENCOUNTER — SPECIALTY PHARMACY (OUTPATIENT)
Dept: ONCOLOGY | Facility: HOSPITAL | Age: 64
End: 2019-04-05

## 2019-04-05 NOTE — PROGRESS NOTES
Injectable Neurology Medication Teaching        Patient Name/:     Rashad Carrillo   1955  Injectable Neurology Medication Regimen:  Emgality 240mg SQ x 1, then 120mg SQ monthly  Date Started Medication: 3/21/2019               Initial Teaching Follow Up Comments      Safety      Storage instructions (away from children; away from heat/cold, sunlight, or moisture)       “How are you storing your medications?”, reminders on storage, proper handling (away from children, managing waste, etc.), disposal of medication with D/C or dosage change     Patient counseled on appropriate storage of medication. Store in refrigerator, away from pets and children. Advised to inspect each syringe prior to use and discard each syringe after use in an appropriate container. Pt verbalized understanding.       Adherence       patient and/or caregiver on how to take medication, take with/without food, assess their adherence potential, stress importance of adherence, ways to manage adherence (pill boxes, phone reminders, calendars), what to do if miss a dose    “How are you taking your medication?” “How are you remembering to take your medication?”, “How many doses have you missed?”, determine reasons for non-adherence (not remembering, side effects, etc), ways to improve, overadherence? Remind patient of ways to improve/maintain adherence Reviewed plan for Emgality 240mg SQ x 1, followed by 120mg SQ monthly. Discussed importance of compliance. LD given in office on 3/21/2019. First self-injection of Emgality due on 2019. Script will be submitted to [facility] and mailed to patient.     Side Effects/Adverse Reactions       patient on potential side effects, s/s, ways to manage, when to call MD/seek help       Determine if patient experiencing side effects, ways to manage  Discussed potential side effects including but not limited to: injection site reactions and hypersensitivity reactions.  Counseled pt on importance of  rotating injection sites. Pt verbalized understanding.      Miscellaneous      Food interactions, DDIs, financial issues Determine if patient started any new medications (analyze for DDI) No DDIs identified with planned medication list and Emgality.       Additional Notes: Discussed aforementioned material with patient by phone. All questions and concerns addressed. Patient provided with my contact information and instructed to call if any additional questions arise.

## 2019-04-11 ENCOUNTER — SPECIALTY PHARMACY (OUTPATIENT)
Dept: ONCOLOGY | Facility: HOSPITAL | Age: 64
End: 2019-04-11

## 2019-07-05 ENCOUNTER — TELEPHONE (OUTPATIENT)
Dept: NEUROLOGY | Facility: CLINIC | Age: 64
End: 2019-07-05

## 2019-07-05 RX ORDER — METHYLPREDNISOLONE 4 MG/1
TABLET ORAL
Qty: 1 EACH | Refills: 0 | Status: SHIPPED | OUTPATIENT
Start: 2019-07-05 | End: 2019-09-12

## 2019-07-05 NOTE — TELEPHONE ENCOUNTER
----- Message from Nuvia Lynn sent at 7/5/2019 10:13 AM EDT -----  Regarding: VERTIGO  Contact: [phone]  Patient states she is experiencing vertigo

## 2019-07-05 NOTE — TELEPHONE ENCOUNTER
Patient stated that she is experiencing vertigo and was wondering if you could give her something or if you had any suggestions. Please advise. Thanks!!

## 2019-07-05 NOTE — TELEPHONE ENCOUNTER
Called the patient, spoke with the patient, patient is aware of the medication being sent in for her and verbalized understanding.

## 2019-09-12 ENCOUNTER — OFFICE VISIT (OUTPATIENT)
Dept: NEUROLOGY | Facility: CLINIC | Age: 64
End: 2019-09-12

## 2019-09-12 VITALS
HEART RATE: 69 BPM | DIASTOLIC BLOOD PRESSURE: 68 MMHG | WEIGHT: 293 LBS | RESPIRATION RATE: 16 BRPM | HEIGHT: 72 IN | BODY MASS INDEX: 39.68 KG/M2 | OXYGEN SATURATION: 99 % | SYSTOLIC BLOOD PRESSURE: 128 MMHG

## 2019-09-12 DIAGNOSIS — F33.1 MODERATE EPISODE OF RECURRENT MAJOR DEPRESSIVE DISORDER (HCC): ICD-10-CM

## 2019-09-12 DIAGNOSIS — G25.81 RESTLESS LEGS SYNDROME: ICD-10-CM

## 2019-09-12 DIAGNOSIS — G35 MULTIPLE SCLEROSIS (HCC): ICD-10-CM

## 2019-09-12 DIAGNOSIS — G43.709 CHRONIC MIGRAINE WITHOUT AURA WITHOUT STATUS MIGRAINOSUS, NOT INTRACTABLE: Primary | ICD-10-CM

## 2019-09-12 PROCEDURE — 99214 OFFICE O/P EST MOD 30 MIN: CPT | Performed by: PSYCHIATRY & NEUROLOGY

## 2019-09-12 RX ORDER — BACLOFEN 10 MG/1
TABLET ORAL
COMMUNITY
End: 2020-06-08

## 2019-09-12 RX ORDER — METOPROLOL SUCCINATE 100 MG/1
TABLET, EXTENDED RELEASE ORAL
COMMUNITY
End: 2022-11-07

## 2019-09-12 NOTE — ASSESSMENT & PLAN NOTE
Schedule Lemtrada 2/2 10/2019    Monthly labs reviewed unremarkable    ampyra improving walking speed

## 2019-09-12 NOTE — PROGRESS NOTES
Subjective:   Chief Complaint   Patient presents with   • Multiple Sclerosis       Patient ID: Rashad Carrillo is a 63 y.o. female.    History of Present Illness     63 y.o.  woman with RRMS S/P Lemtrada 1/2 10/30/18 - 11/2/18 , migraines, MDD and RLS returns in follow up.  Last visit on 3/21/19 ordered monthly labs, renewed GBP, Cymbalta, Acyclovir and Ampyra, added Emgality.     MRI Brain 10/8/18 as compared to 12/4/17, no change in T2 lesion load, moderate to severe T2 lesion burden and moderate atrophy.     JCV ab - 6/11/18 - 0.45      RRMS    8/28/19:  CD4 125. Plt 231; TSH 3.38; Cr 0.78; U/A nl     Overall improved with gait stability.  Ampyra improving gait speed.      Word finding problems are improving      Fatigue is moderate to severe.  Heat intolerance is severe.  Early morning best time of day.    Walking twice a day.          Problem history:    25 ft timed walk increaesed 15.99 from 9.94.  Left hand 9 hole peg worsened.   Increasing swallowing difficulty.        Immediate and working memory are declining with SDMT 27/110..       Referred to ID and dx with latent TB.  Recommended isoniazid and pyridoxine for 9 months.  Subsequently developed aplastic anemia secondary to INH and followed by Dr Valentin.  Received multiple transfusions.  3/29/18 able to resume Tysabri and has had 3 infusions  Treated with rifampin.      MDD    Mood is stable on Cymbalta 30 mg       RLS    Controlled  on GBP.       mg po BID, 300 mg 2 times overnight for discomfort.       MIgraines    Added Emgality but could not afford.      HA frequency is daily.  Moderate to severe intensity.   Location moves around head.  Quality Last for up to a day.  Tx with OTC meds.     Greater than 15 HA a month, moderate to severe intensity, lasting over 6 hours.      Preventatives:  TPM, GBP, Cymbalta,     The following portions of the patient's history were reviewed and updated as appropriate: allergies, current medications, past medical  "history, past surgical history and problem list.    Review of Systems   Constitutional: Positive for fatigue. Negative for activity change and unexpected weight change.   HENT: Negative for tinnitus and trouble swallowing.    Eyes: Negative for photophobia and visual disturbance.   Respiratory: Negative for apnea and choking.    Cardiovascular: Negative for leg swelling.   Endocrine: Positive for heat intolerance. Negative for cold intolerance.   Genitourinary: Negative for difficulty urinating, frequency, menstrual problem and urgency.   Musculoskeletal: Positive for gait problem and myalgias. Negative for back pain.   Skin: Negative for color change.   Allergic/Immunologic: Negative for immunocompromised state.   Neurological: Positive for dizziness, tremors, weakness and headaches. Negative for seizures, syncope, facial asymmetry, speech difficulty and light-headedness.   Hematological: Negative for adenopathy. Does not bruise/bleed easily.   Psychiatric/Behavioral: Positive for decreased concentration, dysphoric mood and sleep disturbance. Negative for behavioral problems, confusion and hallucinations. The patient is nervous/anxious.         Objective:  Vitals:    09/12/19 0844   BP: 128/68   BP Location: Left arm   Patient Position: Sitting   Cuff Size: Adult   Pulse: 69   Resp: 16   SpO2: 99%   Weight: (!) 160 kg (352 lb)   Height: 182.9 cm (72\")        Neurologic Exam     Mental Status   Registration: recalls 3 of 3 objects. Recall at 5 minutes: recalls 3 of 3 objects. Follows 3 step commands.   Attention: normal. Concentration: normal.   Level of consciousness: alert  Knowledge: good and consistent with education.   Able to name object. Able to read. Able to repeat. Able to write. Normal comprehension.         Cranial Nerves     CN II   Visual fields full to confrontation.   Visual acuity: normal  Right visual field deficit: none  Left visual field deficit: none     CN III, IV, VI   Nystagmus: none "   Diplopia: none  Ophthalmoparesis: none  Upgaze: normal  Downgaze: normal  Conjugate gaze: present  Vestibulo-ocular reflex: present    CN V   Facial sensation intact.   Right corneal reflex: normal  Left corneal reflex: normal    CN VII   Right facial weakness: none  Left facial weakness: none    CN VIII   Hearing: intact    CN IX, X   Palate: symmetric  Right gag reflex: normal  Left gag reflex: normal    CN XI   Right sternocleidomastoid strength: normal  Left sternocleidomastoid strength: normal    CN XII   Tongue: not atrophic  Fasciculations: absent  Tongue deviation: none    Motor Exam   Muscle bulk: normal  Overall muscle tone: normal  Right arm tone: normal  Left arm tone: normal  Right leg tone: increased  Left leg tone: increased    Strength   Strength 5/5 except as noted.   Left iliopsoas: 3/5  Left quadriceps: 3/5  Left hamstring: 3/5  Left glutei: 3/5  Left anterior tibial: 3/5  Left posterior tibial: 3/5  Left peroneal: 3/5  Left gastroc: 3/5    Sensory Exam   Right arm light touch: normal  Right leg light touch: normal  Right arm vibration: normal  Right leg vibration: normal  Right arm proprioception: normal  Right leg proprioception: normal  Right arm pinprick: normal  Right leg pinprick: normal  Sensory deficit distribution on right: non-dermatomal distribution (decreased left face, arm and leg)    Gait, Coordination, and Reflexes     Gait  Gait: circumduction, shuffling and wide-based (left leg)    Coordination   Romberg: positive  Heel to shin coordination: abnormal  Tandem walking coordination: abnormal    Tremor   Resting tremor: absent  Intention tremor: absent  Action tremor: left arm and right arm    Reflexes   Right brachioradialis: 3+  Left brachioradialis: 3+  Right biceps: 3+  Left biceps: 3+  Right triceps: 3+  Left triceps: 3+  Right patellar: 3+  Left patellar: 3+  Right achilles: 3+  Left achilles: 3+  Right : 3+  Left : 3+  Right plantar: normal  Left plantar:  normal      Physical Exam   Constitutional: She appears well-developed and well-nourished.   Neurological: She has an abnormal Heel to Brown Test, an abnormal Romberg Test and an abnormal Tandem Gait Test.   Reflex Scores:       Tricep reflexes are 3+ on the right side and 3+ on the left side.       Bicep reflexes are 3+ on the right side and 3+ on the left side.       Brachioradialis reflexes are 3+ on the right side and 3+ on the left side.       Patellar reflexes are 3+ on the right side and 3+ on the left side.       Achilles reflexes are 3+ on the right side and 3+ on the left side.  Nursing note and vitals reviewed.    Infusion on 10/29/2018   Component Date Value Ref Range Status   • WBC 10/29/2018 5.40  3.50 - 10.80 10*3/mm3 Final    Verified by repeat analysis.    • RBC 10/29/2018 4.34  3.89 - 5.14 10*6/mm3 Final   • Hemoglobin 10/29/2018 11.9  11.5 - 15.5 g/dL Final   • Hematocrit 10/29/2018 36.9  34.5 - 44.0 % Final   • RDW 10/29/2018 17.4* 11.3 - 14.5 % Final   • MCV 10/29/2018 85.0  80.0 - 99.0 fL Final   • MCH 10/29/2018 27.4  27.0 - 31.0 pg Final   • MCHC 10/29/2018 32.2  32.0 - 36.0 g/dL Final   • MPV 10/29/2018 8.1  6.0 - 12.0 fL Final   • Platelets 10/29/2018 171  150 - 450 10*3/mm3 Final   • Neutrophil % 10/29/2018 88.1* 41.0 - 71.0 % Final   • Lymphocyte % 10/29/2018 10.2* 24.0 - 44.0 % Final   • Monocyte % 10/29/2018 1.7  0.0 - 12.0 % Final   • Neutrophils, Absolute 10/29/2018 4.80  1.50 - 8.30 10*3/mm3 Final   • Lymphocytes, Absolute 10/29/2018 0.60  0.60 - 4.80 10*3/mm3 Final   • Monocytes, Absolute 10/29/2018 0.10  0.00 - 1.00 10*3/mm3 Final       Assessment/Plan:       Problems Addressed this Visit        Cardiovascular and Mediastinum    Chronic migraine without aura, not intractable - Primary     Headaches are worsening.  Medication changes per orders.     Start  units             Relevant Medications    baclofen (LIORESAL) 10 MG tablet    metoprolol succinate XL (TOPROL-XL) 100 MG  24 hr tablet       Nervous and Auditory    Multiple sclerosis (CMS/HCC)     Schedule Lemtrada 2/2 10/2019    Monthly labs reviewed unremarkable    ampyra improving walking speed                 Other    Depression     Psychological condition is improving with treatment.  Continue current treatment regimen.  Psychological condition  will be reassessed at the next regular appointment.         Restless legs syndrome     Sx stable on GBP

## 2019-09-18 RX ORDER — DIPHENHYDRAMINE HYDROCHLORIDE 50 MG/ML
25 INJECTION INTRAMUSCULAR; INTRAVENOUS ONCE
Status: CANCELLED | OUTPATIENT
Start: 2019-09-18

## 2019-09-18 RX ORDER — ACETAMINOPHEN 325 MG/1
650 TABLET ORAL ONCE
Status: CANCELLED | OUTPATIENT
Start: 2019-09-18

## 2019-09-18 RX ORDER — ACETAMINOPHEN 325 MG/1
650 TABLET ORAL EVERY 6 HOURS PRN
Status: CANCELLED | OUTPATIENT
Start: 2019-09-18

## 2019-09-18 RX ORDER — IBUPROFEN 400 MG/1
400 TABLET ORAL EVERY 6 HOURS PRN
Status: CANCELLED | OUTPATIENT
Start: 2019-09-18

## 2019-09-18 RX ORDER — ONDANSETRON 2 MG/ML
8 INJECTION INTRAMUSCULAR; INTRAVENOUS EVERY 8 HOURS PRN
Status: CANCELLED | OUTPATIENT
Start: 2019-09-18

## 2019-09-18 RX ORDER — FAMOTIDINE 10 MG/ML
20 INJECTION, SOLUTION INTRAVENOUS EVERY 6 HOURS PRN
Status: CANCELLED | OUTPATIENT
Start: 2019-09-18

## 2019-10-14 ENCOUNTER — TELEPHONE (OUTPATIENT)
Dept: NEUROLOGY | Facility: CLINIC | Age: 64
End: 2019-10-14

## 2019-10-14 RX ORDER — ACYCLOVIR 400 MG/1
400 TABLET ORAL 2 TIMES DAILY
Qty: 60 TABLET | Refills: 11 | Status: SHIPPED | OUTPATIENT
Start: 2019-10-14 | End: 2020-11-10 | Stop reason: SDUPTHER

## 2019-10-14 NOTE — TELEPHONE ENCOUNTER
----- Message from Nuvia Lynn sent at 10/14/2019 10:20 AM EDT -----  Regarding: SECOND ROUND LEMTRADA  Contact: [phone]  Patient would like to know if she will need acyclovir for second round of Lemtrada?

## 2019-10-25 ENCOUNTER — TELEPHONE (OUTPATIENT)
Dept: NEUROLOGY | Facility: CLINIC | Age: 64
End: 2019-10-25

## 2019-10-25 NOTE — TELEPHONE ENCOUNTER
----- Message from Nuviajacques Lynn sent at 10/25/2019 12:26 PM EDT -----  Regarding: PHYSICAL  Per Renata from Dr. Newman's office . Patient ask if it would be okay for her to have her yearly physical scheduled for 11/02/19. Due to, patient  informing Dr. Newman she was scheduled for 3 day Lemtrada starting 11/04/19.  [phone] Dr. Newman's office number Option 0 ask for Renata

## 2019-10-29 ENCOUNTER — TELEPHONE (OUTPATIENT)
Dept: NEUROLOGY | Facility: CLINIC | Age: 64
End: 2019-10-29

## 2019-10-29 ENCOUNTER — PROCEDURE VISIT (OUTPATIENT)
Dept: NEUROLOGY | Facility: CLINIC | Age: 64
End: 2019-10-29

## 2019-10-29 DIAGNOSIS — G43.719 INTRACTABLE CHRONIC MIGRAINE WITHOUT AURA AND WITHOUT STATUS MIGRAINOSUS: Primary | ICD-10-CM

## 2019-10-29 PROCEDURE — 64615 CHEMODENERV MUSC MIGRAINE: CPT | Performed by: PSYCHIATRY & NEUROLOGY

## 2019-10-29 NOTE — TELEPHONE ENCOUNTER
Per Dr. Gilbert, ok to move physical back a month as patient would like to do. I LVM for Dr. Newman's clinical assistant notifying of the information and to reach out to patient to reschedule. Nothing further needed at this time. I provided office number should Dr. Newman's office have any further questions.

## 2019-10-29 NOTE — TELEPHONE ENCOUNTER
----- Message from Nuvia Lynn sent at 10/29/2019 10:00 AM EDT -----  Regarding: PHYSICAL  Patient scheduled for a physical on 11/12/19 (first, date of 11/02/19 was incorrect). Patient scheduled for Lemtrada on 11/04/19,11/05/19, and 11/06/19.  Please call Dr. Newman's office at [phone] to inform office if it's okay to have physical.

## 2019-10-29 NOTE — PROGRESS NOTES
Botulinum Toxin Injection Procedure      CC: HA      Pre-operative diagnosis: headache    Post-operative diagnosis: Same    Procedure: Chemical neurolysis    After risks and benefits were explained including bleeding, infection, worsening of pain, damage to the areas being injected, weakness of muscles, loss of muscle control, dysphagia if injecting the head or neck, facial droop if injecting the facial area, painful injection, allergic or other reaction to the medications being injected, and the failure of the procedure to help the problem, a signed consent was obtained.      The patient was placed in a comfortable area and the sites to be treated were identified. A time out was called and performed. The area to be treated was prepped three times with alcohol and the alcohol allowed to dry. Next, a 27 gauge needle was used to inject the medication in the area to be treated.    Area(s) injected: frontalis muscle, semispinalis capitis muscle and temporallis muscle    Medications used: botulinum toxin 200 mu, 2 mL of saline used to dilute the botulinum toxin.      The patient did tolerate the procedure well. There were no complications.       Physical Examination    Current Treatment (Units)   Splenius Capitis 25 units on the right and 25 units on the left.   Temporalis 25 units on the right and 25 units on the left.   Frontalis 27 units on the right and 28 units on the left.   EMG Guidance none .   Complications: none .   The total amount injected in units is 155 .   The total amount wasted in units is 45 .   The total amount submitted in units is 200 .   Botox was supplied by physician.

## 2019-10-29 NOTE — TELEPHONE ENCOUNTER
Spoke with Carolin at Dr. Newman's office. Advised of what provider said and that in the future it's ok for her to be at that appointment but it is their decision as to whether they want to reschedule or not. She verbalized good understanding, thanked our office and we ended the call.

## 2019-10-31 ENCOUNTER — TELEPHONE (OUTPATIENT)
Dept: NEUROLOGY | Facility: CLINIC | Age: 64
End: 2019-10-31

## 2019-11-01 ENCOUNTER — TELEPHONE (OUTPATIENT)
Dept: NEUROLOGY | Facility: CLINIC | Age: 64
End: 2019-11-01

## 2019-11-01 RX ORDER — VALACYCLOVIR HYDROCHLORIDE 500 MG/1
500 TABLET, FILM COATED ORAL 2 TIMES DAILY
Qty: 60 TABLET | Refills: 5 | Status: SHIPPED | OUTPATIENT
Start: 2019-11-01 | End: 2020-11-05 | Stop reason: SDUPTHER

## 2019-11-01 RX ORDER — METHYLPREDNISOLONE 4 MG/1
TABLET ORAL
Qty: 1 EACH | Refills: 0 | Status: SHIPPED | OUTPATIENT
Start: 2019-11-01 | End: 2020-04-27

## 2019-11-01 NOTE — TELEPHONE ENCOUNTER
"Patient has question regarding acyclovir.  She states prior when she took it for post Lemtrada it \"bothered her kidneys and she had pain, burning and an odor with urination\".  Would valtrex give her the same issues?  I told her I would ask your recommendation.      She will need acyclovir or valtrex called in to start Sunday and also a medrol giovany for post Lemtrada next week.  "

## 2019-11-04 ENCOUNTER — INFUSION (OUTPATIENT)
Dept: ONCOLOGY | Facility: HOSPITAL | Age: 64
End: 2019-11-04

## 2019-11-04 VITALS
BODY MASS INDEX: 48.55 KG/M2 | SYSTOLIC BLOOD PRESSURE: 174 MMHG | WEIGHT: 293 LBS | DIASTOLIC BLOOD PRESSURE: 82 MMHG | HEART RATE: 100 BPM | RESPIRATION RATE: 16 BRPM | TEMPERATURE: 97.5 F

## 2019-11-04 DIAGNOSIS — G35 MULTIPLE SCLEROSIS (HCC): Primary | ICD-10-CM

## 2019-11-04 PROCEDURE — 96367 TX/PROPH/DG ADDL SEQ IV INF: CPT

## 2019-11-04 PROCEDURE — 25010000002 DIPHENHYDRAMINE PER 50 MG: Performed by: PSYCHIATRY & NEUROLOGY

## 2019-11-04 PROCEDURE — 96415 CHEMO IV INFUSION ADDL HR: CPT

## 2019-11-04 PROCEDURE — 96365 THER/PROPH/DIAG IV INF INIT: CPT

## 2019-11-04 PROCEDURE — 25010000002 ALEMTUZUMAB 12 MG/1.2ML SOLUTION 1.2 ML VIAL: Performed by: PSYCHIATRY & NEUROLOGY

## 2019-11-04 PROCEDURE — 96413 CHEMO IV INFUSION 1 HR: CPT

## 2019-11-04 PROCEDURE — 96366 THER/PROPH/DIAG IV INF ADDON: CPT

## 2019-11-04 PROCEDURE — 25010000003 METHYLPREDNISOLONE PER 125 MG: Performed by: PSYCHIATRY & NEUROLOGY

## 2019-11-04 PROCEDURE — 96375 TX/PRO/DX INJ NEW DRUG ADDON: CPT

## 2019-11-04 RX ORDER — ACETAMINOPHEN 325 MG/1
650 TABLET ORAL EVERY 6 HOURS PRN
Status: CANCELLED | OUTPATIENT
Start: 2019-11-04

## 2019-11-04 RX ORDER — ACETAMINOPHEN 325 MG/1
650 TABLET ORAL ONCE
Status: CANCELLED | OUTPATIENT
Start: 2019-11-04

## 2019-11-04 RX ORDER — ACETAMINOPHEN 325 MG/1
650 TABLET ORAL ONCE
Status: COMPLETED | OUTPATIENT
Start: 2019-11-04 | End: 2019-11-04

## 2019-11-04 RX ORDER — DIPHENHYDRAMINE HYDROCHLORIDE 50 MG/ML
25 INJECTION INTRAMUSCULAR; INTRAVENOUS ONCE
Status: COMPLETED | OUTPATIENT
Start: 2019-11-04 | End: 2019-11-04

## 2019-11-04 RX ORDER — ONDANSETRON 2 MG/ML
8 INJECTION INTRAMUSCULAR; INTRAVENOUS EVERY 8 HOURS PRN
Status: CANCELLED | OUTPATIENT
Start: 2019-11-04

## 2019-11-04 RX ORDER — IBUPROFEN 400 MG/1
400 TABLET ORAL EVERY 6 HOURS PRN
Status: CANCELLED | OUTPATIENT
Start: 2019-11-04

## 2019-11-04 RX ORDER — FAMOTIDINE 10 MG/ML
20 INJECTION, SOLUTION INTRAVENOUS EVERY 6 HOURS PRN
Status: CANCELLED | OUTPATIENT
Start: 2019-11-04

## 2019-11-04 RX ORDER — DIPHENHYDRAMINE HYDROCHLORIDE 50 MG/ML
25 INJECTION INTRAMUSCULAR; INTRAVENOUS ONCE
Status: CANCELLED | OUTPATIENT
Start: 2019-11-04

## 2019-11-04 RX ADMIN — DIPHENHYDRAMINE HYDROCHLORIDE 25 MG: 50 INJECTION INTRAMUSCULAR; INTRAVENOUS at 08:13

## 2019-11-04 RX ADMIN — RENAGEL 12 MG: 400 TABLET ORAL at 08:52

## 2019-11-04 RX ADMIN — SODIUM CHLORIDE 1000 MG: 9 INJECTION, SOLUTION INTRAVENOUS at 08:16

## 2019-11-04 RX ADMIN — ACETAMINOPHEN 650 MG: 325 TABLET ORAL at 08:13

## 2019-11-05 ENCOUNTER — INFUSION (OUTPATIENT)
Dept: ONCOLOGY | Facility: HOSPITAL | Age: 64
End: 2019-11-05

## 2019-11-05 VITALS
RESPIRATION RATE: 18 BRPM | DIASTOLIC BLOOD PRESSURE: 72 MMHG | HEART RATE: 68 BPM | TEMPERATURE: 97.8 F | SYSTOLIC BLOOD PRESSURE: 148 MMHG

## 2019-11-05 DIAGNOSIS — G35 MULTIPLE SCLEROSIS (HCC): Primary | ICD-10-CM

## 2019-11-05 PROCEDURE — 96413 CHEMO IV INFUSION 1 HR: CPT

## 2019-11-05 PROCEDURE — 25010000002 DIPHENHYDRAMINE PER 50 MG: Performed by: PSYCHIATRY & NEUROLOGY

## 2019-11-05 PROCEDURE — 96366 THER/PROPH/DIAG IV INF ADDON: CPT

## 2019-11-05 PROCEDURE — 96367 TX/PROPH/DG ADDL SEQ IV INF: CPT

## 2019-11-05 PROCEDURE — 96375 TX/PRO/DX INJ NEW DRUG ADDON: CPT

## 2019-11-05 PROCEDURE — 25010000002 ALEMTUZUMAB 12 MG/1.2ML SOLUTION 1.2 ML VIAL: Performed by: PSYCHIATRY & NEUROLOGY

## 2019-11-05 PROCEDURE — 96415 CHEMO IV INFUSION ADDL HR: CPT

## 2019-11-05 PROCEDURE — 25010000003 METHYLPREDNISOLONE PER 125 MG: Performed by: PSYCHIATRY & NEUROLOGY

## 2019-11-05 RX ORDER — DIPHENHYDRAMINE HYDROCHLORIDE 50 MG/ML
25 INJECTION INTRAMUSCULAR; INTRAVENOUS ONCE
Status: COMPLETED | OUTPATIENT
Start: 2019-11-05 | End: 2019-11-05

## 2019-11-05 RX ORDER — ACETAMINOPHEN 325 MG/1
650 TABLET ORAL EVERY 6 HOURS PRN
Status: CANCELLED | OUTPATIENT
Start: 2019-11-05

## 2019-11-05 RX ORDER — DIPHENHYDRAMINE HYDROCHLORIDE 50 MG/ML
25 INJECTION INTRAMUSCULAR; INTRAVENOUS ONCE
Status: CANCELLED | OUTPATIENT
Start: 2019-11-05

## 2019-11-05 RX ORDER — FAMOTIDINE 10 MG/ML
20 INJECTION, SOLUTION INTRAVENOUS EVERY 6 HOURS PRN
Status: CANCELLED | OUTPATIENT
Start: 2019-11-05

## 2019-11-05 RX ORDER — ACETAMINOPHEN 325 MG/1
650 TABLET ORAL ONCE
Status: CANCELLED | OUTPATIENT
Start: 2019-11-05

## 2019-11-05 RX ORDER — ACETAMINOPHEN 325 MG/1
650 TABLET ORAL ONCE
Status: COMPLETED | OUTPATIENT
Start: 2019-11-05 | End: 2019-11-05

## 2019-11-05 RX ORDER — ONDANSETRON 2 MG/ML
8 INJECTION INTRAMUSCULAR; INTRAVENOUS EVERY 8 HOURS PRN
Status: CANCELLED | OUTPATIENT
Start: 2019-11-05

## 2019-11-05 RX ORDER — IBUPROFEN 400 MG/1
400 TABLET ORAL EVERY 6 HOURS PRN
Status: CANCELLED | OUTPATIENT
Start: 2019-11-05

## 2019-11-05 RX ADMIN — DIPHENHYDRAMINE HYDROCHLORIDE 25 MG: 50 INJECTION INTRAMUSCULAR; INTRAVENOUS at 08:18

## 2019-11-05 RX ADMIN — SODIUM CHLORIDE 1000 MG: 9 INJECTION, SOLUTION INTRAVENOUS at 08:21

## 2019-11-05 RX ADMIN — ACETAMINOPHEN 650 MG: 325 TABLET ORAL at 08:18

## 2019-11-05 RX ADMIN — RENAGEL 12 MG: 400 TABLET ORAL at 09:00

## 2019-11-06 ENCOUNTER — INFUSION (OUTPATIENT)
Dept: ONCOLOGY | Facility: HOSPITAL | Age: 64
End: 2019-11-06

## 2019-11-06 VITALS
HEART RATE: 67 BPM | DIASTOLIC BLOOD PRESSURE: 66 MMHG | SYSTOLIC BLOOD PRESSURE: 143 MMHG | TEMPERATURE: 97 F | RESPIRATION RATE: 20 BRPM

## 2019-11-06 DIAGNOSIS — G35 MULTIPLE SCLEROSIS (HCC): Primary | ICD-10-CM

## 2019-11-06 PROCEDURE — 25010000002 ALEMTUZUMAB 12 MG/1.2ML SOLUTION 1.2 ML VIAL: Performed by: PSYCHIATRY & NEUROLOGY

## 2019-11-06 PROCEDURE — 25010000002 DIPHENHYDRAMINE PER 50 MG: Performed by: PSYCHIATRY & NEUROLOGY

## 2019-11-06 PROCEDURE — 96413 CHEMO IV INFUSION 1 HR: CPT

## 2019-11-06 PROCEDURE — 96415 CHEMO IV INFUSION ADDL HR: CPT

## 2019-11-06 PROCEDURE — 96375 TX/PRO/DX INJ NEW DRUG ADDON: CPT

## 2019-11-06 RX ORDER — ONDANSETRON 2 MG/ML
8 INJECTION INTRAMUSCULAR; INTRAVENOUS EVERY 8 HOURS PRN
Status: CANCELLED | OUTPATIENT
Start: 2019-11-06

## 2019-11-06 RX ORDER — ACETAMINOPHEN 325 MG/1
650 TABLET ORAL ONCE
Status: COMPLETED | OUTPATIENT
Start: 2019-11-06 | End: 2019-11-06

## 2019-11-06 RX ORDER — AMLODIPINE BESYLATE 10 MG/1
10 TABLET ORAL DAILY
Qty: 30 TABLET | Refills: 11 | Status: SHIPPED | OUTPATIENT
Start: 2019-11-06 | End: 2020-11-05

## 2019-11-06 RX ORDER — DIPHENHYDRAMINE HYDROCHLORIDE 50 MG/ML
25 INJECTION INTRAMUSCULAR; INTRAVENOUS ONCE
Status: DISCONTINUED | OUTPATIENT
Start: 2019-11-06 | End: 2019-11-06 | Stop reason: HOSPADM

## 2019-11-06 RX ORDER — IBUPROFEN 400 MG/1
400 TABLET ORAL EVERY 6 HOURS PRN
Status: CANCELLED | OUTPATIENT
Start: 2019-11-06

## 2019-11-06 RX ORDER — DIPHENHYDRAMINE HYDROCHLORIDE 50 MG/ML
50 INJECTION INTRAMUSCULAR; INTRAVENOUS ONCE
Status: CANCELLED
Start: 2019-11-06 | End: 2019-11-06

## 2019-11-06 RX ORDER — ACETAMINOPHEN 325 MG/1
650 TABLET ORAL ONCE
Status: CANCELLED | OUTPATIENT
Start: 2019-11-06

## 2019-11-06 RX ORDER — DIPHENHYDRAMINE HYDROCHLORIDE 50 MG/ML
25 INJECTION INTRAMUSCULAR; INTRAVENOUS ONCE
Status: CANCELLED | OUTPATIENT
Start: 2019-11-06

## 2019-11-06 RX ORDER — FAMOTIDINE 10 MG/ML
20 INJECTION, SOLUTION INTRAVENOUS EVERY 6 HOURS PRN
Status: CANCELLED | OUTPATIENT
Start: 2019-11-06

## 2019-11-06 RX ORDER — ACETAMINOPHEN 325 MG/1
650 TABLET ORAL EVERY 6 HOURS PRN
Status: CANCELLED | OUTPATIENT
Start: 2019-11-06

## 2019-11-06 RX ORDER — AMLODIPINE BESYLATE 10 MG/1
10 TABLET ORAL
Status: CANCELLED
Start: 2019-11-06

## 2019-11-06 RX ORDER — AMLODIPINE BESYLATE 10 MG/1
10 TABLET ORAL
Status: DISCONTINUED | OUTPATIENT
Start: 2019-11-06 | End: 2019-11-06 | Stop reason: HOSPADM

## 2019-11-06 RX ADMIN — RENAGEL 12 MG: 400 TABLET ORAL at 09:45

## 2019-11-06 RX ADMIN — AMLODIPINE BESYLATE 10 MG: 10 TABLET ORAL at 09:10

## 2019-11-06 RX ADMIN — ACETAMINOPHEN 650 MG: 325 TABLET ORAL at 09:11

## 2019-11-06 RX ADMIN — DIPHENHYDRAMINE HYDROCHLORIDE: 50 INJECTION INTRAMUSCULAR; INTRAVENOUS at 09:10

## 2019-11-06 NOTE — PROGRESS NOTES
Upon arrival to Infusion, pt reports chest tightness and soa with exertion, /103, 144/92, 170/92.  Dr. Gilbert notified, orders received to hold Solu Medrol, give Norvasc 10mg PO and increase Benadryl premed to 50mg IV, recheck BP 30 min after Norvasc given, if BP improved, ok to proceed with Lemtrada, if not, have pt return tomorrow.  /77, Lemtrada started at 0945.

## 2019-11-07 ENCOUNTER — TELEPHONE (OUTPATIENT)
Dept: NEUROLOGY | Facility: CLINIC | Age: 64
End: 2019-11-07

## 2019-11-07 NOTE — TELEPHONE ENCOUNTER
Contacted patient per MD request to follow-up after ER visit last night.  Patient is feeling better today after visit to [facility] ER for chest tightness and SOB.  Her BP was also 200/100.  She was treated with benadryl, steroids and albuterol breathing treatment - unsure what she was given for BP    Today she does not have any chest tightness or SOB.  BP this morning prior to BP medications was 160/100.  BP at 11:30 a.m. Today is 170/105.  Per PCP she is to take another 1/2 of her BP medication this afternoon if BP is high and again at bedtime.  She was sent home with an albuterol inhaler, but has not needed it today.  She is taking Benadryl today.    Should she begin the post Lemtrada medrol pack today?    She asked what should she do tonight if she experiences chest tightness/SOB?

## 2019-11-08 ENCOUNTER — TELEPHONE (OUTPATIENT)
Dept: NEUROLOGY | Facility: CLINIC | Age: 64
End: 2019-11-08

## 2019-11-08 NOTE — TELEPHONE ENCOUNTER
RN will follow-up with patient next week to see if she has continued to feel better post-Lemtrada.

## 2019-11-08 NOTE — TELEPHONE ENCOUNTER
----- Message from Nuvia Lynn sent at 11/8/2019 12:08 PM EST -----  Regarding: FEELING BETTER  Contact: [phone]  Patient states she was to report how she felt today. Per patient she had a great night last night and feels better.

## 2019-11-17 NOTE — THERAPY TREATMENT NOTE
Outpatient Physical Therapy Neuro Treatment Note  [facility]     Patient Name: Rashad Carrillo  : 1955  MRN: [id number]  Today's Date: 2017      Visit Date: 2017    Visit Dx:    ICD-10-CM ICD-9-CM   1. Multiple sclerosis G35 340       Patient Active Problem List   Diagnosis   • Vitamin D deficiency   • Depression   • Multiple sclerosis   • Periodic limb movement disorder   • Restless legs syndrome   • Muscle spasticity   • Periodic headache syndrome, not intractable                 PT Neuro       17 0830          Subjective Comments    Subjective Comments Patient relates some of hip exercises have hurt her back.   -STANTON      Subjective Pain    Able to rate subjective pain? yes  -STANTON      Pre-Treatment Pain Level 4  -KL      Post-Treatment Pain Level 3  -KL      Bed Mobility, Assessment/Treatment    Bed Mobility, Comment quadruped activities: knee slides forward/backward and lateral; hip circles CW and CCW; threading stretch to each side; 1/2 somersalt   -        User Key  (r) = Recorded By, (t) = Taken By, (c) = Cosigned By    Initials Name Provider Type    STANTON Aguilar, PT Physical Therapist                        PT Assessment/Plan       17 0991       PT Assessment    Assessment Comments Patient with improved sit to  clinic, however complaints of upper gluteal soreness. She was administered several stretches to perform at home to decrease the soreness. Next visit will be her DC due to insurance change on .   -STANTON     PT Plan    PT Plan Comments Continue per POC.   -       User Key  (r) = Recorded By, (t) = Taken By, (c) = Cosigned By    Initials Name Provider Type    STANTON Aguilar, PT Physical Therapist                     Exercises       17 0830          Subjective Comments    Subjective Comments Patient relates some of hip exercises have hurt her back.   -STANTON      Subjective Pain    Able to rate subjective pain? yes  -STANTON      Pre-Treatment Pain  Level 4  -KL      Post-Treatment Pain Level 3  -KL      Exercise 1    Exercise Name 1 Nu-Step L6  -KL      Time (Minutes) 1 8  -KL      Exercise 2    Exercise Name 2 forward and lateral SB stretches   -KL      Reps 2 8  -KL      Time (Seconds) 2 10  -KL        User Key  (r) = Recorded By, (t) = Taken By, (c) = Cosigned By    Initials Name Provider Type    STANTON Aguilar PT Physical Therapist                                  Therapy Education       06/28/17 0935          Therapy Education    Given HEP  -KL      Program New  -KL      How Provided Verbal;Demonstration;Written  -KL      Provided to Patient  -KL      Level of Understanding Verbalized;Demonstrated  -KL        User Key  (r) = Recorded By, (t) = Taken By, (c) = Cosigned By    Initials Name Provider Type    STANTON Aguilar PT Physical Therapist                Time Calculation:   Start Time: 0830  Total Timed Code Minutes- PT: 45 minute(s)     Therapy Charges for Today     Code Description Service Date Service Provider Modifiers Qty    86440449568 HC PT THER PROC EA 15 MIN 6/28/2017 Kat Aguilar PT GP 2    23039887190 HC PT NEUROMUSC RE EDUCATION EA 15 MIN 6/28/2017 Kat Aguilar PT GP 1                    Kat Aguilar PT  6/28/2017      IV bolus running and IV ABT started. will repeat

## 2019-12-13 ENCOUNTER — TRANSCRIBE ORDERS (OUTPATIENT)
Dept: ADMINISTRATIVE | Facility: HOSPITAL | Age: 64
End: 2019-12-13

## 2019-12-13 DIAGNOSIS — R07.9 CHEST PAIN, UNSPECIFIED TYPE: Primary | ICD-10-CM

## 2019-12-23 ENCOUNTER — TELEPHONE (OUTPATIENT)
Dept: NEUROLOGY | Facility: CLINIC | Age: 64
End: 2019-12-23

## 2019-12-23 NOTE — TELEPHONE ENCOUNTER
----- Message from Nuvia Lynn sent at 12/23/2019  2:51 PM EST -----  Regarding: diet  Contact: [phone]  Patient would like to know if she can return to her HMR diet. Patient states she stopped during her Lemtrada.

## 2020-01-09 ENCOUNTER — APPOINTMENT (OUTPATIENT)
Dept: CARDIOLOGY | Facility: HOSPITAL | Age: 65
End: 2020-01-09

## 2020-01-10 ENCOUNTER — SPECIALTY PHARMACY (OUTPATIENT)
Dept: ONCOLOGY | Facility: HOSPITAL | Age: 65
End: 2020-01-10

## 2020-01-10 RX ORDER — DALFAMPRIDINE 10 MG/1
10 TABLET, FILM COATED, EXTENDED RELEASE ORAL 2 TIMES DAILY
Qty: 60 TABLET | Refills: 11 | Status: SHIPPED | OUTPATIENT
Start: 2020-01-10 | End: 2020-12-11 | Stop reason: SDUPTHER

## 2020-01-28 PROBLEM — R06.09 DOE (DYSPNEA ON EXERTION): Status: ACTIVE | Noted: 2020-01-28

## 2020-01-28 PROBLEM — E78.5 HYPERLIPIDEMIA LDL GOAL <70: Status: ACTIVE | Noted: 2020-01-28

## 2020-01-30 ENCOUNTER — PROCEDURE VISIT (OUTPATIENT)
Dept: NEUROLOGY | Facility: CLINIC | Age: 65
End: 2020-01-30

## 2020-01-30 VITALS — WEIGHT: 293 LBS | HEIGHT: 72 IN | BODY MASS INDEX: 39.68 KG/M2

## 2020-01-30 DIAGNOSIS — G43.719 INTRACTABLE CHRONIC MIGRAINE WITHOUT AURA AND WITHOUT STATUS MIGRAINOSUS: ICD-10-CM

## 2020-01-30 PROCEDURE — 64615 CHEMODENERV MUSC MIGRAINE: CPT | Performed by: NURSE PRACTITIONER

## 2020-01-30 NOTE — PROGRESS NOTES
Botulinum Toxin Injection Procedure    History:  Response to treatment has reduced HA's at least 7 fewer days a month and/or 100 hours fewer each month.     Pre-operative diagnosis: headache    Post-operative diagnosis: Same    Procedure: Chemical neurolysis    After risks and benefits were explained including bleeding, infection, worsening of pain, damage to the areas being injected, weakness of muscles, loss of muscle control, dysphagia if injecting the head or neck, facial droop if injecting the facial area, painful injection, allergic or other reaction to the medications being injected, and the failure of the procedure to help the problem, a signed consent was obtained.      The patient was placed in a comfortable area and the sites to be treated were identified. A time out was called and performed. The area to be treated was prepped three times with alcohol and the alcohol allowed to dry. Next, a 27 gauge needle was used to inject the medication in the area to be treated.    Area(s) injected: frontalis muscle, semispinalis capitis muscle and temporallis muscle    Medications used: botulinum toxin 200 mu, 2 mL of saline used to dilute the botulinum toxin.      The patient did tolerate the procedure well. There were no complications.       Physical Examination    Current Treatment (Units)   Splenius Capitis 25 units on the right and 25 units on the left.   Temporalis 25 units on the right and 25 units on the left.   Frontalis 27 units on the right and 28 units on the left.   EMG Guidance none .   Complications: none .   The total amount injected in units is 155 .   The total amount wasted in units is 45 .   The total amount submitted in units is 200 .   Botox was supplied by physician.

## 2020-02-11 ENCOUNTER — TRANSCRIBE ORDERS (OUTPATIENT)
Dept: ADMINISTRATIVE | Facility: HOSPITAL | Age: 65
End: 2020-02-11

## 2020-02-11 DIAGNOSIS — M25.561 RIGHT KNEE PAIN, UNSPECIFIED CHRONICITY: Primary | ICD-10-CM

## 2020-02-25 ENCOUNTER — HOSPITAL ENCOUNTER (OUTPATIENT)
Dept: MRI IMAGING | Facility: HOSPITAL | Age: 65
Discharge: HOME | End: 2020-02-25
Admitting: INTERNAL MEDICINE

## 2020-02-25 DIAGNOSIS — M25.561 RIGHT KNEE PAIN, UNSPECIFIED CHRONICITY: ICD-10-CM

## 2020-02-25 PROCEDURE — 73721 MRI JNT OF LWR EXTRE W/O DYE: CPT

## 2020-03-10 ENCOUNTER — OFFICE VISIT (OUTPATIENT)
Dept: ORTHOPEDIC SURGERY | Facility: CLINIC | Age: 65
End: 2020-03-10

## 2020-03-10 VITALS — WEIGHT: 293 LBS | BODY MASS INDEX: 39.68 KG/M2 | OXYGEN SATURATION: 96 % | HEART RATE: 80 BPM | HEIGHT: 72 IN

## 2020-03-10 DIAGNOSIS — M17.11 PRIMARY OSTEOARTHRITIS OF RIGHT KNEE: Primary | ICD-10-CM

## 2020-03-10 PROCEDURE — 20610 DRAIN/INJ JOINT/BURSA W/O US: CPT | Performed by: ORTHOPAEDIC SURGERY

## 2020-03-10 PROCEDURE — 99203 OFFICE O/P NEW LOW 30 MIN: CPT | Performed by: ORTHOPAEDIC SURGERY

## 2020-03-10 RX ORDER — ROPIVACAINE HYDROCHLORIDE 5 MG/ML
4 INJECTION, SOLUTION EPIDURAL; INFILTRATION; PERINEURAL
Status: COMPLETED | OUTPATIENT
Start: 2020-03-10 | End: 2020-03-10

## 2020-03-10 RX ORDER — TRIAMCINOLONE ACETONIDE 40 MG/ML
40 INJECTION, SUSPENSION INTRA-ARTICULAR; INTRAMUSCULAR
Status: COMPLETED | OUTPATIENT
Start: 2020-03-10 | End: 2020-03-10

## 2020-03-10 RX ADMIN — ROPIVACAINE HYDROCHLORIDE 4 ML: 5 INJECTION, SOLUTION EPIDURAL; INFILTRATION; PERINEURAL at 13:50

## 2020-03-10 RX ADMIN — TRIAMCINOLONE ACETONIDE 40 MG: 40 INJECTION, SUSPENSION INTRA-ARTICULAR; INTRAMUSCULAR at 13:50

## 2020-03-10 NOTE — PROGRESS NOTES
Procedure   Large Joint Arthrocentesis: R knee  Date/Time: 3/10/2020 1:50 PM  Consent given by: patient  Site marked: site marked  Timeout: Immediately prior to procedure a time out was called to verify the correct patient, procedure, equipment, support staff and site/side marked as required   Supporting Documentation  Indications: pain   Procedure Details  Location: knee - R knee  Preparation: Patient was prepped and draped in the usual sterile fashion  Needle size: 22 G  Approach: anterolateral  Medications administered: 4 mL ropivacaine 0.5 %; 40 mg triamcinolone acetonide 40 MG/ML  Patient tolerance: patient tolerated the procedure well with no immediate complications

## 2020-03-10 NOTE — PROGRESS NOTES
[facility] Note    Subjective     Chief Complaint   Patient presents with   • Right Knee - Pain        HPI    Rashad Carrillo is a 64 y.o. female who presents with right knee pain.  Onset: atraumatic and gradual in nature. The issue has been ongoing for 2 month(s). Pain is a 8/10 on the pain scale. Pain is described as aching, burning, throbbing, stabbing and shooting. Associated symptoms include pain, swelling and giving way/buckling. The pain is worse with walking, standing, sitting, climbing stairs, sleeping, working and leisure; resting, ice, heat and pain medication and/or NSAID improve the pain slightly. Previous treatments have included: bracing, cane/walker, NSAIDS and oral steroids.  No previous injections.  She has a history of multiple sclerosis, and it mainly affects her left side, so her right side is fairly dependent on her ability to ambulate.  She walks with a cane.    I have reviewed the following portions of the patient's history:History of Present Illness      Patient Active Problem List   Diagnosis   • Vitamin D deficiency   • Depression   • Multiple sclerosis (CMS/HCC)   • Restless legs syndrome   • Muscle spasticity   • Migraine, chronic, without aura, intractable, with status migrainosus   • Essential hypertension   • Frequent falls   • Status post balloon dilatation of esophageal stricture   • T2DM (type 2 diabetes mellitus) (CMS/HCC)   • TB lung, latent   • Aplastic anemia likely due to isoniazide   • Chest pain   • MCGRAW (dyspnea on exertion)   • Hyperlipidemia LDL goal <70     Past Medical History:   Diagnosis Date   • Diabetes mellitus (CMS/HCC)    • Hypertension    • Multiple sclerosis (CMS/HCC)       Past Surgical History:   Procedure Laterality Date   • CHOLECYSTECTOMY        Family History   Problem Relation Age of Onset   • Cancer Mother    • Hypertension Mother    • Heart disease Mother    • Alzheimer's disease Other    • Seizures Other         Epilepsy and  recurrent seizures   • Heart disease Other      Social History     Socioeconomic History   • Marital status:      Spouse name: Not on file   • Number of children: Not on file   • Years of education: Not on file   • Highest education level: Not on file   Tobacco Use   • Smoking status: Former Smoker     Types: Cigarettes     Last attempt to quit:      Years since quittin.2   • Smokeless tobacco: Never Used   • Tobacco comment: social   Substance and Sexual Activity   • Alcohol use: Yes     Comment: rarely   • Drug use: No   • Sexual activity: Defer      Current Outpatient Medications on File Prior to Visit   Medication Sig Dispense Refill   • acyclovir (ZOVIRAX) 400 MG tablet Take 1 tablet by mouth 2 (Two) Times a Day. 60 tablet 11   • amLODIPine (NORVASC) 10 MG tablet Take 1 tablet by mouth Daily. 30 tablet 11   • azelastine (ASTEPRO) 0.15 % solution nasal spray inhale 1-2 sprays by nasal route 2 times a day as needed  3   • B Complex-C-E-Zn (Z-BEC PO) Take 1 tablet by mouth daily.     • baclofen (LIORESAL) 10 MG tablet baclofen 10 mg tablet     • Dalfampridine ER 10 MG tablet sustained-release 12 hour Take 10 mg by mouth 2 (Two) Times a Day. 60 tablet 11   • DULoxetine (CYMBALTA) 30 MG capsule TAKE ONE CAPSULE BY MOUTH EVERY MORNING  5   • gabapentin (NEURONTIN) 100 MG capsule Take 3 capsules by mouth 3 (Three) Times a Day. 90 capsule 5   • hydrochlorothiazide (MICROZIDE) 12.5 MG capsule Take 12.5 mg by mouth Daily.     • HYDROcodone-acetaminophen (NORCO)  MG per tablet Take 1 tablet by mouth Every 6 (Six) Hours As Needed for Moderate Pain . 20 tablet 0   • irbesartan (AVAPRO) 150 MG tablet Take 150 mg by mouth Daily.     • methylPREDNISolone (MEDROL, RONEY,) 4 MG tablet Take as directed on package instructions. 1 each 0   • metoprolol succinate XL (TOPROL-XL) 100 MG 24 hr tablet metoprolol succinate  mg tablet,extended release 24 hr     • Multiple Vitamin (MULTI-VITAMIN DAILY) tablet Take  "1 tablet by mouth daily.     • omeprazole (priLOSEC) 20 MG capsule TAKE ONE CAPSULE IN THE MORNING.  11   • potassium chloride (MICRO-K) 10 MEQ CR capsule Take 10 mEq by mouth daily.     • simvastatin (ZOCOR) 20 MG tablet Take 20 mg by mouth Daily.     • valACYclovir (VALTREX) 500 MG tablet Take 1 tablet by mouth 2 (Two) Times a Day. 60 tablet 5   • vitamin D (ERGOCALCIFEROL) 94420 UNITS capsule capsule Take 50,000 Units by mouth Every 30 (Thirty) Days. 15TH     • gabapentin (NEURONTIN) 300 MG capsule Take 2 capsules by mouth Every Night. 60 capsule 5     No current facility-administered medications on file prior to visit.       Allergies   Allergen Reactions   • Isoniazid Other (See Comments)     Body stopped producing blood   • Sulfa Antibiotics Anaphylaxis   • Tetanus Toxoid Swelling        Review of Systems   Constitutional: Positive for fatigue.   HENT: Positive for mouth sores and postnasal drip.    Eyes: Negative.    Respiratory: Negative.    Cardiovascular: Negative.    Gastrointestinal: Negative.    Endocrine: Negative.    Genitourinary: Positive for urgency.   Musculoskeletal: Positive for arthralgias.   Skin: Negative.    Allergic/Immunologic: Negative.    Neurological: Positive for dizziness, speech difficulty, weakness, light-headedness, numbness and headaches.   Hematological: Negative.    Psychiatric/Behavioral: Negative.         Objective      Physical Exam  Pulse 80   Ht 182.9 cm (72.01\")   Wt (!) 166 kg (367 lb)   LMP  (LMP Unknown)   SpO2 96%   BMI 49.76 kg/m²     Body mass index is 49.76 kg/m².    General:   Mental Status:  Alert   Appearance: Cooperative, in no acute distress   Build and Nutrition: Obese female   Orientation: Alert and oriented to person, place and time   Posture: Normal   Gait: Normal    Integument:   Right knee: No skin lesions, no rash, no ecchymosis    Neurologic:   Sensation:    Right foot: Intact to light touch on the dorsal and plantar aspect   Motor:  Right lower " extremity: 5/5 quadriceps, hamstrings, ankle dorsiflexors, and ankle plantar flexors    Vascular:   Right lower extremity: 2+ dorsalis pedis pulse, prompt capillary refill    Lower Extremities:   Right Knee:    Tenderness:  None    Effusion:  None    Swelling:  None    Crepitus:  None    Atrophy:  None    Range of motion:  Extension: 5°       Flexion: 120°  Instability:  No varus laxity, no valgus laxity, negative anterior drawer  Deformities:  None      Imaging/Studies      Imaging Results (Last 24 Hours)     Procedure Component Value Units Date/Time    XR Knee 4+ View Right [[id number]] Resulted:  03/10/20 1341     Updated:  03/10/20 1342    Narrative:       Right Knee Radiographs  Indication: right knee pain  Views: Standing AP's and skiers of both knees, with lateral and sunrise   views of the right knee    Comparison: no prior studies available    Findings:   Medial joint space narrowing, peaking of the tibial spines,   tricompartmental osteophytes, with no acute bony abnormalities.        EXAMINATION: MRI KNEE, RIGHT, WO CONTRAST-02/25/2020:     INDICATION: m25.561; M25.561-Pain in right knee, right knee pain.     TECHNIQUE: Routine multiplanar imaging was obtained of the right knee  without the administration of Gadolinium contrast.     COMPARISON: NONE.     FINDINGS: The patella is normal in signal intensity with spurring  identified of the patella. There is lateral tilt of the patella in  respect to the trochlear groove. There is a moderate-to-large joint  effusion in the suprapatella bursa. Some edema identified in Hoffa's fat  pad. There is thinning identified of the patellar cartilage as well as  the trochlear cartilage. No evidence of osseous abnormality seen within  the patella. The medial and lateral retinaculum are intact and  unremarkable. There is some edema identified in the overlying soft  tissues and subcutaneous tissues anterior to the patellar tendon and  patella. The quadriceps tendon and  patellar tendon are normal in signal  intensity.     The anterior cruciate ligament is unremarkable as well as the posterior  cruciate ligament. There is no abnormality identified within the lateral  collateral ligament complex or medial collateral ligament. There is loss  of the cartilage overlying the medial femoral condyle and medial aspect  of the tibial plateau. There is irregularity identified of the cartilage  overlying the lateral femoral condyle with small defect identified. No  osseous abnormality is identified with osteophyte formation seen of the  femoral condyles bilaterally and spurring of the tibial plateau. There  are some degenerative changes identified within the medial tibial  plateau. Small osteochondral defect identified. The menisci reveal no  evidence of tear injury, however, there is thinning identified of the  menisci. Degenerative changes are identified. There is joint space  narrowing present bilaterally, medial greater than lateral. No evidence  of a Baker's cyst. The pes tendons are normal in signal intensity.     IMPRESSION:  Severe degenerative changes identified within the knee. The  majority of abnormality is seen within the patellofemoral joint space  with large joint effusion identified and lateral tilt of the patella  with thin-to-denuded cartilage overlying the patella and trochlea. The  medial compartment is also narrowed with thin degenerative cartilage  overlying the medial femoral condyle and medial aspect of the tibial  plateau. The major tendons and ligaments are intact with no evidence of  meniscal tear injury.      D:  02/25/2020  E:  02/25/2020     This report was finalized on 2/25/2020 5:32 PM by Dr. Lorna Martinez MD.      Assessment and Plan     Rashad was seen today for pain.    Diagnoses and all orders for this visit:    Primary osteoarthritis of right knee  -     XR Knee 4+ View Right  -     Large Joint Arthrocentesis: R knee  -     Ambulatory Referral to  Physical Therapy Evaluate and treat        1. Primary osteoarthritis of right knee        I reviewed my findings with the patient today.  She has arthritis in her right knee, and may benefit from an intra-articular injection, and this was administered today.  Body mass index is 49.8, and weight loss is been discussed, which will be beneficial for her knee, and certainly if knee replacement surgery is to be considered in the future will need to be significantly lower, below 40 would be the recommendation.  I will see her back in 3 months, but sooner for any problems.  Physical therapy may also be of benefit, and a referral was provided.    Of note, she had 25% improvement just a few minutes following the injection today.    Return in about 3 months (around 6/10/2020).        Medical Decision Making  Management Options : prescription/IM medicine and physical/occupational therapy  Data/Risk: radiology tests and independent visualization of imaging, lab tests, or EMG/NCV      Lalo Choe MD  03/10/20  14:09

## 2020-04-27 ENCOUNTER — OFFICE VISIT (OUTPATIENT)
Dept: NEUROLOGY | Facility: CLINIC | Age: 65
End: 2020-04-27

## 2020-04-27 VITALS
OXYGEN SATURATION: 97 % | SYSTOLIC BLOOD PRESSURE: 150 MMHG | HEIGHT: 72 IN | DIASTOLIC BLOOD PRESSURE: 98 MMHG | HEART RATE: 78 BPM | BODY MASS INDEX: 39.68 KG/M2 | WEIGHT: 293 LBS

## 2020-04-27 DIAGNOSIS — F33.1 MODERATE EPISODE OF RECURRENT MAJOR DEPRESSIVE DISORDER (HCC): ICD-10-CM

## 2020-04-27 DIAGNOSIS — G35 MULTIPLE SCLEROSIS (HCC): ICD-10-CM

## 2020-04-27 DIAGNOSIS — R29.6 FREQUENT FALLS: ICD-10-CM

## 2020-04-27 DIAGNOSIS — G43.711 CHRONIC MIGRAINE WITHOUT AURA, WITH INTRACTABLE MIGRAINE, SO STATED, WITH STATUS MIGRAINOSUS: ICD-10-CM

## 2020-04-27 PROCEDURE — 64615 CHEMODENERV MUSC MIGRAINE: CPT | Performed by: NURSE PRACTITIONER

## 2020-04-27 RX ORDER — METHYLPREDNISOLONE 4 MG/1
TABLET ORAL
Qty: 1 EACH | Refills: 0 | Status: SHIPPED | OUTPATIENT
Start: 2020-04-27 | End: 2020-06-10

## 2020-04-27 RX ORDER — FUROSEMIDE 40 MG/1
TABLET ORAL
COMMUNITY
Start: 2020-03-10 | End: 2022-11-11 | Stop reason: HOSPADM

## 2020-04-27 RX ORDER — DULOXETIN HYDROCHLORIDE 60 MG/1
60 CAPSULE, DELAYED RELEASE ORAL DAILY
Qty: 30 CAPSULE | Refills: 2 | Status: SHIPPED | OUTPATIENT
Start: 2020-04-27 | End: 2020-09-25 | Stop reason: ALTCHOICE

## 2020-04-27 NOTE — PROGRESS NOTES
Botulinum Toxin Injection Procedure    History:  Response to treatment has reduced HA's at least 7 fewer days a month and/or 100 hours fewer each month.     Pre-operative diagnosis: headache    Post-operative diagnosis: Same    Procedure: Chemical neurolysis    After risks and benefits were explained including bleeding, infection, worsening of pain, damage to the areas being injected, weakness of muscles, loss of muscle control, dysphagia if injecting the head or neck, facial droop if injecting the facial area, painful injection, allergic or other reaction to the medications being injected, and the failure of the procedure to help the problem, a signed consent was obtained.      The patient was placed in a comfortable area and the sites to be treated were identified. A time out was called and performed. The area to be treated was prepped three times with alcohol and the alcohol allowed to dry. Next, a 30 gauge needle was used to inject the medication in the area to be treated.    Area(s) injected: frontalis muscle, semispinalis capitis muscle and temporallis muscle    Medications used: botulinum toxin 200 mu, 2 mL of saline used to dilute the botulinum toxin.      The patient did tolerate the procedure well. There were no complications.       Physical Examination    Current Treatment (Units)    5 units on the right and 5 units on the left  Procerus 5 units  Frontalis 10 units on the right and 10 units on the left  Temporalis 20 units on the right and 20 units on the left  Occipitalis 15 units on the right and 15 units on the left   Cervical paraspinal 10 units on the right and 10 units on the left  Trapezius 15 units on the right and 15 units on the left  EMG Guidance none .   Complications: none .   The total amount injected in units is 155 .   The total amount wasted in units is 45 .   The total amount submitted in units is 200 .   Botox was supplied by physician.     During Botox visit patient c/o  dizziness and frequent falls. Patient reports that her firadu passed away in early March from the flu and she has been struggling with grief and frequent falls since then. Discussed depression and anxiety with patient, currently taking Cymbalta 30 mg PO daily. Infused Lemtrada in November 2019. Also has recently torn her Meniscus of the right knee, is not having surgery only doing PT but currently PT is shut down due to COVID. Completing home exercises. Only using a cane to ambulate.   Dizziness is likely pseudo relapse. Called in Medrol dose pack, increased Cymbalta and ordered a walker to help prevent falls. Patient denies SI or HI. Discussed COVID risk with patient, she is aware she needs to social distance especially following Lemtrada infusion and while taking prednisone.   Patient to follow up in 6 weeks.

## 2020-06-08 ENCOUNTER — TELEMEDICINE (OUTPATIENT)
Dept: NEUROLOGY | Facility: CLINIC | Age: 65
End: 2020-06-08

## 2020-06-08 DIAGNOSIS — G25.81 RESTLESS LEGS SYNDROME: ICD-10-CM

## 2020-06-08 DIAGNOSIS — G35 MULTIPLE SCLEROSIS (HCC): Primary | ICD-10-CM

## 2020-06-08 DIAGNOSIS — G43.711 MIGRAINE, CHRONIC, WITHOUT AURA, INTRACTABLE, WITH STATUS MIGRAINOSUS: ICD-10-CM

## 2020-06-08 DIAGNOSIS — R25.2 SPASTICITY: ICD-10-CM

## 2020-06-08 DIAGNOSIS — F33.1 MODERATE EPISODE OF RECURRENT MAJOR DEPRESSIVE DISORDER (HCC): ICD-10-CM

## 2020-06-08 DIAGNOSIS — R29.6 FREQUENT FALLS: ICD-10-CM

## 2020-06-08 DIAGNOSIS — M62.838 MUSCLE SPASTICITY: ICD-10-CM

## 2020-06-08 PROCEDURE — 99443 PR PHYS/QHP TELEPHONE EVALUATION 21-30 MIN: CPT | Performed by: NURSE PRACTITIONER

## 2020-06-08 RX ORDER — BACLOFEN 10 MG/1
10 TABLET ORAL NIGHTLY
Qty: 30 TABLET | Refills: 5 | Status: SHIPPED | OUTPATIENT
Start: 2020-06-08 | End: 2021-06-08

## 2020-06-08 NOTE — ASSESSMENT & PLAN NOTE
Stable following second round of Lemtrada    Steroids mildly improved pseudorelapse symptoms. Pseudorelapse also complicated by knee pain and instability related to arthritis in the joint.     MRI Brain w/wo ordered     Monthly labs stable     F/U via telephone visit for results.

## 2020-06-08 NOTE — PROGRESS NOTES
Subjective:   Chief Complaint   Patient presents with   • Migraine     6 week follow up        Patient ID: Rashad Carrillo is a 64 y.o. female.    Migraine    Associated symptoms include dizziness and weakness. Pertinent negatives include no back pain, photophobia, seizures or tinnitus.        64 y.o.  woman with RRMS S/P Lemtrada 2/2, 10/30/18 - 11/2/18, 11/4/19-11/6/19 migraines, MDD and RLS returns in follow up.  Last visit on 9/12/19 continued monthly labs, renewed GBP, Cymbalta, Acyclovir and Ampyra, added BTX and second round of Lemtrada.     This visit was completed via telephone visit. The patient consented to receiving care via telephone visit prior to appointment.      MRI Brain 10/8/18 as compared to 12/4/17, no change in T2 lesion load, moderate to severe T2 lesion burden and moderate atrophy.     JCV ab - 6/11/18 - 0.45      5/6/20: Plt 257; TSH 5.4; Cr 0.84; U/A nl     RRMS    Infused second round of Lemtrada in November. On 11/6/19 Patient developed HTN, SOB, chest tightness. Presented to [facility] ER, was treated with Benadryl, steroids, and neb treatments. On 11/8 pt feeling much better, HTN improved.   Her fiance passed away in March and has been working on his estate, causing stress and depression.     Multiple falls since November. Has been struggling with her right knee, she sees Dr. Choe this week regarding her knee. Prednisone given at her botox appointment has helped decrease the falls and knee pain. 3/10/20 had an arthrocentesis completed. She has not completed the PT which was ordered for her.     Has had approximately 10 falls since November. Does not go to the doctor following falls for evaluation.     Overall improved with gait stability on Ampyra, it has also improved gait speed.      Word finding problems are continuing.     Spasticity worsening and keeping her up at night.     Fatigue is moderate to severe.  Heat intolerance is severe.  Early morning best time of day.     Problem  history:    25 ft timed walk increaesed 15.99 from 9.94.  Left hand 9 hole peg worsened.   Increasing swallowing difficulty.        Immediate and working memory are declining with SDMT 27/110..       Referred to ID and dx with latent TB.  Recommended isoniazid and pyridoxine for 9 months.  Subsequently developed aplastic anemia secondary to INH and followed by Dr Valentin.  Received multiple transfusions.  3/29/18 able to resume Tysabri and has had 3 infusions  Treated with rifampin.      MDD    Mood is stable on Cymbalta 60 mg PO daily, situational depression, patient states this is slowly improving.     RLS    Controlled  on GBP.       mg po BID, 300 mg 2 times overnight for discomfort.       MIgraines  Headache frequency has improved to twice per week, no migraines since starting BTX. Intensity has decreased dramatically.    Problem History:  HA frequency is daily.  Moderate to severe intensity.   Location moves around head.  Quality Last for up to a day.  Tx with OTC meds.     Greater than 15 HA a month, moderate to severe intensity, lasting over 6 hours.      Preventatives:  TPM, GBP, Cymbalta,     The following portions of the patient's history were reviewed and updated as appropriate: allergies, current medications, past medical history, past surgical history and problem list.    Review of Systems   Constitutional: Positive for fatigue. Negative for activity change and unexpected weight change.   HENT: Negative for tinnitus and trouble swallowing.    Eyes: Negative for photophobia and visual disturbance.   Respiratory: Negative for apnea and choking.    Cardiovascular: Negative for leg swelling.   Endocrine: Positive for heat intolerance. Negative for cold intolerance.   Genitourinary: Negative for difficulty urinating, frequency, menstrual problem and urgency.   Musculoskeletal: Positive for arthralgias, gait problem and myalgias. Negative for back pain.   Skin: Negative for color change.    Allergic/Immunologic: Negative for immunocompromised state.   Neurological: Positive for dizziness, tremors, weakness and headaches. Negative for seizures, syncope, facial asymmetry, speech difficulty and light-headedness.   Hematological: Negative for adenopathy. Does not bruise/bleed easily.   Psychiatric/Behavioral: Positive for decreased concentration, dysphoric mood and sleep disturbance. Negative for behavioral problems, confusion and hallucinations. The patient is nervous/anxious.         Objective:  There were no vitals filed for this visit.     Neurologic Exam    Physical Exam  Infusion on 10/29/2018   Component Date Value Ref Range Status   • WBC 10/29/2018 5.40  3.50 - 10.80 10*3/mm3 Final    Verified by repeat analysis.    • RBC 10/29/2018 4.34  3.89 - 5.14 10*6/mm3 Final   • Hemoglobin 10/29/2018 11.9  11.5 - 15.5 g/dL Final   • Hematocrit 10/29/2018 36.9  34.5 - 44.0 % Final   • RDW 10/29/2018 17.4* 11.3 - 14.5 % Final   • MCV 10/29/2018 85.0  80.0 - 99.0 fL Final   • MCH 10/29/2018 27.4  27.0 - 31.0 pg Final   • MCHC 10/29/2018 32.2  32.0 - 36.0 g/dL Final   • MPV 10/29/2018 8.1  6.0 - 12.0 fL Final   • Platelets 10/29/2018 171  150 - 450 10*3/mm3 Final   • Neutrophil % 10/29/2018 88.1* 41.0 - 71.0 % Final   • Lymphocyte % 10/29/2018 10.2* 24.0 - 44.0 % Final   • Monocyte % 10/29/2018 1.7  0.0 - 12.0 % Final   • Neutrophils, Absolute 10/29/2018 4.80  1.50 - 8.30 10*3/mm3 Final   • Lymphocytes, Absolute 10/29/2018 0.60  0.60 - 4.80 10*3/mm3 Final   • Monocytes, Absolute 10/29/2018 0.10  0.00 - 1.00 10*3/mm3 Final       Assessment/Plan:       Problems Addressed this Visit        Cardiovascular and Mediastinum    Migraine, chronic, without aura, intractable, with status migrainosus     Headaches are improving with treatment.  Continue current treatment regimen.             Relevant Medications    baclofen (LIORESAL) 10 MG tablet       Nervous and Auditory    Multiple sclerosis (CMS/HCC) - Primary      Stable following second round of Lemtrada    Steroids mildly improved pseudorelapse symptoms. Pseudorelapse also complicated by knee pain and instability related to arthritis in the joint.     MRI Brain w/wo ordered     Monthly labs stable     F/U via telephone visit for results.          Relevant Orders    MRI Brain With & Without Contrast       Musculoskeletal and Integument    Muscle spasticity     Start Baclofen 10 mg PO QHS. Discussed side effect of drowsiness. Patient instructed only to take at night prior to bedtime. Patient V/U.                  Other    Depression     Psychological condition is worsening.  Continue current treatment regimen.  Psychological condition  will be reassessed at the next regular appointment.    Situational depression contributing to pseudorealpse, patient reports she is slowly improving.   Instructed patient to call if symptoms worsen or fail to improve.          Restless legs syndrome     Stable on GBP          Frequent falls     Likely related to pseudorelapse and arthritis.     Stable on Ampyra            Other Visit Diagnoses     Spasticity        Relevant Medications    baclofen (LIORESAL) 10 MG tablet           Return for Recheck after MRI.      I spent 25 minutes via telephone visit providing patient care.

## 2020-06-08 NOTE — ASSESSMENT & PLAN NOTE
Start Baclofen 10 mg PO QHS. Discussed side effect of drowsiness. Patient instructed only to take at night prior to bedtime. Patient V/U.

## 2020-06-08 NOTE — ASSESSMENT & PLAN NOTE
Psychological condition is worsening.  Continue current treatment regimen.  Psychological condition  will be reassessed at the next regular appointment.    Situational depression contributing to pseudorealpse, patient reports she is slowly improving.   Instructed patient to call if symptoms worsen or fail to improve.

## 2020-06-10 ENCOUNTER — OFFICE VISIT (OUTPATIENT)
Dept: ORTHOPEDIC SURGERY | Facility: CLINIC | Age: 65
End: 2020-06-10

## 2020-06-10 VITALS — HEART RATE: 86 BPM | OXYGEN SATURATION: 99 % | WEIGHT: 293 LBS | HEIGHT: 72 IN | BODY MASS INDEX: 39.68 KG/M2

## 2020-06-10 DIAGNOSIS — M17.11 PRIMARY OSTEOARTHRITIS OF RIGHT KNEE: Primary | ICD-10-CM

## 2020-06-10 PROCEDURE — 99212 OFFICE O/P EST SF 10 MIN: CPT | Performed by: ORTHOPAEDIC SURGERY

## 2020-06-10 PROCEDURE — 20610 DRAIN/INJ JOINT/BURSA W/O US: CPT | Performed by: ORTHOPAEDIC SURGERY

## 2020-06-10 NOTE — PROGRESS NOTES
Procedure   Large Joint Arthrocentesis: R knee  Date/Time: 6/10/2020 8:56 AM  Consent given by: patient  Site marked: site marked  Timeout: Immediately prior to procedure a time out was called to verify the correct patient, procedure, equipment, support staff and site/side marked as required   Supporting Documentation  Indications: pain   Procedure Details  Location: knee - R knee  Preparation: Patient was prepped and draped in the usual sterile fashion  Needle size: 22 G  Approach: anterolateral  Medications administered: 30 mg Hyaluronan 30 MG/2ML  Patient tolerance: patient tolerated the procedure well with no immediate complications

## 2020-06-10 NOTE — PROGRESS NOTES
[facility] [place] [facility] Note    Subjective     Chief Complaint   Patient presents with   • Follow-up     3 week f/u; Primary osteoarthritis of right knee (cortisone injection last visit 3/10/20)        HPI    It has been 3  month(s) since Ms. Carrillo's last visit. She returns to clinic today for follow-up of right knee arthritis. She rates her pain a 5/10 on the pain scale. Previous/current treatments: cane/walker, NSAIDS, physical therapy, weight loss and oral steroids. Current symptoms: pain, stiffness and giving way/buckling. The pain is worse with walking and sleeping; Oral Steroids improve the pain. Overall, she is doing better.  Injection only helped for a few days, but then an oral steroid pack helped.    I have reviewed the following portions of the patient's history:History of Present Illness     Patient Active Problem List   Diagnosis   • Vitamin D deficiency   • Depression   • Multiple sclerosis (CMS/HCC)   • Restless legs syndrome   • Muscle spasticity   • Migraine, chronic, without aura, intractable, with status migrainosus   • Essential hypertension   • Frequent falls   • Status post balloon dilatation of esophageal stricture   • T2DM (type 2 diabetes mellitus) (CMS/HCC)   • TB lung, latent   • Aplastic anemia likely due to isoniazide   • Chest pain   • MCGRAW (dyspnea on exertion)   • Hyperlipidemia LDL goal <70     Past Medical History:   Diagnosis Date   • Diabetes mellitus (CMS/HCC)    • Hypertension    • Multiple sclerosis (CMS/HCC)       Past Surgical History:   Procedure Laterality Date   • CHOLECYSTECTOMY        Family History   Problem Relation Age of Onset   • Cancer Mother    • Hypertension Mother    • Heart disease Mother    • Alzheimer's disease Other    • Seizures Other         Epilepsy and recurrent seizures   • Heart disease Other      Social History     Socioeconomic History   • Marital status:      Spouse name: Not on file   • Number of children: Not on file   • Years of  education: Not on file   • Highest education level: Not on file   Tobacco Use   • Smoking status: Former Smoker     Types: Cigarettes     Last attempt to quit:      Years since quittin.4   • Smokeless tobacco: Never Used   • Tobacco comment: social   Substance and Sexual Activity   • Alcohol use: Yes     Comment: rarely   • Drug use: No   • Sexual activity: Defer      Current Outpatient Medications on File Prior to Visit   Medication Sig Dispense Refill   • acyclovir (ZOVIRAX) 400 MG tablet Take 1 tablet by mouth 2 (Two) Times a Day. 60 tablet 11   • amLODIPine (NORVASC) 10 MG tablet Take 1 tablet by mouth Daily. 30 tablet 11   • azelastine (ASTEPRO) 0.15 % solution nasal spray inhale 1-2 sprays by nasal route 2 times a day as needed  3   • B Complex-C-E-Zn (Z-BEC PO) Take 1 tablet by mouth daily.     • baclofen (LIORESAL) 10 MG tablet Take 1 tablet by mouth Every Night. 30 tablet 5   • Dalfampridine ER 10 MG tablet sustained-release 12 hour Take 10 mg by mouth 2 (Two) Times a Day. 60 tablet 11   • DULoxetine (CYMBALTA) 60 MG capsule Take 1 capsule by mouth Daily. 30 capsule 2   • furosemide (LASIX) 40 MG tablet Take 1 tablet(s) every day     • gabapentin (NEURONTIN) 100 MG capsule Take 3 capsules by mouth 3 (Three) Times a Day. 90 capsule 5   • hydrochlorothiazide (MICROZIDE) 12.5 MG capsule Take 12.5 mg by mouth Daily.     • HYDROcodone-acetaminophen (NORCO)  MG per tablet Take 1 tablet by mouth Every 6 (Six) Hours As Needed for Moderate Pain . 20 tablet 0   • irbesartan (AVAPRO) 150 MG tablet Take 150 mg by mouth Daily.     • metoprolol succinate XL (TOPROL-XL) 100 MG 24 hr tablet metoprolol succinate  mg tablet,extended release 24 hr     • Multiple Vitamin (MULTI-VITAMIN DAILY) tablet Take 1 tablet by mouth daily.     • omeprazole (priLOSEC) 20 MG capsule TAKE ONE CAPSULE IN THE MORNING.  11   • potassium chloride (MICRO-K) 10 MEQ CR capsule Take 10 mEq by mouth daily.     • simvastatin  (ZOCOR) 20 MG tablet Take 20 mg by mouth Daily.     • valACYclovir (VALTREX) 500 MG tablet Take 1 tablet by mouth 2 (Two) Times a Day. 60 tablet 5   • vitamin D (ERGOCALCIFEROL) 89842 UNITS capsule capsule Take 50,000 Units by mouth Every 30 (Thirty) Days. 15TH     • gabapentin (NEURONTIN) 300 MG capsule Take 2 capsules by mouth Every Night. 60 capsule 5   • [DISCONTINUED] methylPREDNISolone (MEDROL, RONEY,) 4 MG tablet Take as directed on package instructions. 1 each 0     No current facility-administered medications on file prior to visit.       Allergies   Allergen Reactions   • Isoniazid Other (See Comments)     Body stopped producing blood   • Sulfa Antibiotics Anaphylaxis   • Tetanus Toxoid Swelling        Review of Systems   Constitutional: Positive for fatigue. Negative for activity change, appetite change, chills, diaphoresis, fever and unexpected weight change.   HENT: Negative for congestion, dental problem, drooling, ear discharge, ear pain, facial swelling, hearing loss, mouth sores, nosebleeds, postnasal drip, rhinorrhea, sinus pressure, sneezing, sore throat, tinnitus, trouble swallowing and voice change.    Eyes: Negative for photophobia, pain, discharge, redness, itching and visual disturbance.   Respiratory: Negative for apnea, cough, choking, chest tightness, shortness of breath, wheezing and stridor.    Cardiovascular: Negative for chest pain, palpitations and leg swelling.   Gastrointestinal: Negative for abdominal distention, abdominal pain, anal bleeding, blood in stool, constipation, diarrhea, nausea, rectal pain and vomiting.   Endocrine: Negative for cold intolerance, heat intolerance, polydipsia, polyphagia and polyuria.   Genitourinary: Negative for decreased urine volume, difficulty urinating, dysuria, enuresis, flank pain, frequency, genital sores, hematuria and urgency.   Musculoskeletal: Positive for arthralgias and gait problem. Negative for back pain, joint swelling, myalgias, neck  "pain and neck stiffness.   Skin: Negative for color change, pallor, rash and wound.   Allergic/Immunologic: Negative for environmental allergies, food allergies and immunocompromised state.   Neurological: Positive for dizziness, weakness, numbness and headaches. Negative for tremors, seizures, syncope, facial asymmetry, speech difficulty and light-headedness.        MS   Hematological: Negative for adenopathy. Does not bruise/bleed easily.   Psychiatric/Behavioral: Negative for agitation, behavioral problems, confusion, decreased concentration, dysphoric mood, hallucinations, self-injury, sleep disturbance and suicidal ideas. The patient is not nervous/anxious and is not hyperactive.         Objective      Physical Exam  Pulse 86   Ht 182.9 cm (72.01\")   Wt (!) 161 kg (355 lb)   LMP  (LMP Unknown)   SpO2 99%   BMI 48.14 kg/m²     Body mass index is 48.14 kg/m².    General:   Mental Status:  Alert   Appearance: Cooperative, in no acute distress   Build and Nutrition: Obese female   Orientation: Alert and oriented to person, place and time   Posture: Normal   Gait: Limp with a cane    Integument:              Right knee: No skin lesions, no rash, no ecchymosis     Lower Extremities:              Right Knee:                          Tenderness:    None                          Effusion:          None                          Swelling:          None                          Crepitus:          None                          Atrophy:           None                          Range of motion:        Extension:       5°                                                              Flexion:           110°  Instability:        No varus laxity, no valgus laxity, negative anterior drawer  Deformities:     None      Assessment and Plan     Rashad was seen today for follow-up.    Diagnoses and all orders for this visit:    Primary osteoarthritis of right knee  -     Large Joint Arthrocentesis: R knee        1. Primary " osteoarthritis of right knee        I reviewed my findings with the patient today.  The injection helped for just a few days, but then she had an oral steroid through her primary care physician, which provided relief.  She is interested in a trial of Visco supplementation injections, and we will submit for approval.  She is working on weight loss.    Addendum: Injections were approved for today.  We will go ahead and start the series.    Return in about 1 week (around 6/17/2020) for Injection.    Medical Decision Making  Management Options : prescription/IM medicine      Lalo Choe MD  06/10/20  09:14    Dragon disclaimer:  Much of this encounter note is an electronic transcription/translation of spoken language to printed text. The electronic translation of spoken language may permit erroneous, or at times, nonsensical words or phrases to be inadvertently transcribed; Although I have reviewed the note for such errors, some may still exist.

## 2020-06-17 ENCOUNTER — CLINICAL SUPPORT (OUTPATIENT)
Dept: ORTHOPEDIC SURGERY | Facility: CLINIC | Age: 65
End: 2020-06-17

## 2020-06-17 DIAGNOSIS — M17.11 PRIMARY OSTEOARTHRITIS OF RIGHT KNEE: Primary | ICD-10-CM

## 2020-06-17 PROCEDURE — 20610 DRAIN/INJ JOINT/BURSA W/O US: CPT | Performed by: ORTHOPAEDIC SURGERY

## 2020-06-17 NOTE — PROGRESS NOTES
Procedure   Large Joint Arthrocentesis: R knee  Date/Time: 6/17/2020 8:55 AM  Consent given by: patient  Site marked: site marked  Timeout: Immediately prior to procedure a time out was called to verify the correct patient, procedure, equipment, support staff and site/side marked as required   Supporting Documentation  Indications: pain   Procedure Details  Location: knee - R knee  Preparation: Patient was prepped and draped in the usual sterile fashion  Needle size: 22 G  Approach: anterolateral  Medications administered: 30 mg Hyaluronan 30 MG/2ML  Patient tolerance: patient tolerated the procedure well with no immediate complications            English

## 2020-06-17 NOTE — PROGRESS NOTES
[facility] [place] [facility] Note    Subjective     Chief Complaint   Patient presents with   • Right Knee - Follow-up     orthovisc #2        HPI    Rashad Carrillo is a 64 y.o. female who presents for the second Orthovisc injection into the right knee.  Of note, she has not noticed any improvement so far.    Patient Active Problem List   Diagnosis   • Vitamin D deficiency   • Depression   • Multiple sclerosis (CMS/HCC)   • Restless legs syndrome   • Muscle spasticity   • Migraine, chronic, without aura, intractable, with status migrainosus   • Essential hypertension   • Frequent falls   • Status post balloon dilatation of esophageal stricture   • T2DM (type 2 diabetes mellitus) (CMS/HCC)   • TB lung, latent   • Aplastic anemia likely due to isoniazide   • Chest pain   • MCGRAW (dyspnea on exertion)   • Hyperlipidemia LDL goal <70     Past Medical History:   Diagnosis Date   • Diabetes mellitus (CMS/HCC)    • Hypertension    • Multiple sclerosis (CMS/HCC)       Past Surgical History:   Procedure Laterality Date   • CHOLECYSTECTOMY        Family History   Problem Relation Age of Onset   • Cancer Mother    • Hypertension Mother    • Heart disease Mother    • Alzheimer's disease Other    • Seizures Other         Epilepsy and recurrent seizures   • Heart disease Other      Social History     Socioeconomic History   • Marital status:      Spouse name: Not on file   • Number of children: Not on file   • Years of education: Not on file   • Highest education level: Not on file   Tobacco Use   • Smoking status: Former Smoker     Types: Cigarettes     Last attempt to quit:      Years since quittin.4   • Smokeless tobacco: Never Used   • Tobacco comment: social   Substance and Sexual Activity   • Alcohol use: Yes     Comment: rarely   • Drug use: No   • Sexual activity: Defer      Current Outpatient Medications on File Prior to Visit   Medication Sig Dispense Refill   • acyclovir (ZOVIRAX) 400 MG tablet  Take 1 tablet by mouth 2 (Two) Times a Day. 60 tablet 11   • amLODIPine (NORVASC) 10 MG tablet Take 1 tablet by mouth Daily. 30 tablet 11   • azelastine (ASTEPRO) 0.15 % solution nasal spray inhale 1-2 sprays by nasal route 2 times a day as needed  3   • B Complex-C-E-Zn (Z-BEC PO) Take 1 tablet by mouth daily.     • baclofen (LIORESAL) 10 MG tablet Take 1 tablet by mouth Every Night. 30 tablet 5   • Dalfampridine ER 10 MG tablet sustained-release 12 hour Take 10 mg by mouth 2 (Two) Times a Day. 60 tablet 11   • DULoxetine (CYMBALTA) 60 MG capsule Take 1 capsule by mouth Daily. 30 capsule 2   • furosemide (LASIX) 40 MG tablet Take 1 tablet(s) every day     • gabapentin (NEURONTIN) 100 MG capsule Take 3 capsules by mouth 3 (Three) Times a Day. 90 capsule 5   • gabapentin (NEURONTIN) 300 MG capsule Take 2 capsules by mouth Every Night. 60 capsule 5   • hydrochlorothiazide (MICROZIDE) 12.5 MG capsule Take 12.5 mg by mouth Daily.     • HYDROcodone-acetaminophen (NORCO)  MG per tablet Take 1 tablet by mouth Every 6 (Six) Hours As Needed for Moderate Pain . 20 tablet 0   • irbesartan (AVAPRO) 150 MG tablet Take 150 mg by mouth Daily.     • metoprolol succinate XL (TOPROL-XL) 100 MG 24 hr tablet metoprolol succinate  mg tablet,extended release 24 hr     • Multiple Vitamin (MULTI-VITAMIN DAILY) tablet Take 1 tablet by mouth daily.     • omeprazole (priLOSEC) 20 MG capsule TAKE ONE CAPSULE IN THE MORNING.  11   • potassium chloride (MICRO-K) 10 MEQ CR capsule Take 10 mEq by mouth daily.     • simvastatin (ZOCOR) 20 MG tablet Take 20 mg by mouth Daily.     • valACYclovir (VALTREX) 500 MG tablet Take 1 tablet by mouth 2 (Two) Times a Day. 60 tablet 5   • vitamin D (ERGOCALCIFEROL) 09834 UNITS capsule capsule Take 50,000 Units by mouth Every 30 (Thirty) Days. 15TH       No current facility-administered medications on file prior to visit.       Allergies   Allergen Reactions   • Isoniazid Other (See Comments)      Body stopped producing blood   • Sulfa Antibiotics Anaphylaxis   • Tetanus Toxoid Swelling        Review of Systems     Objective      Physical Exam  LMP  (LMP Unknown)     There is no height or weight on file to calculate BMI.    General:   Mental Status:  Alert   Appearance: Cooperative, in no acute distress   Posture: Normal    Assessment and Plan     Rashad was seen today for follow-up.    Diagnoses and all orders for this visit:    Primary osteoarthritis of right knee  -     Large Joint Arthrocentesis: R knee        1. Primary osteoarthritis of right knee        Administration of viscosupplementation today.  Please see my procedure note for details.    Return in about 1 week (around 6/24/2020) for Injection.    Lalo Choe MD  06/17/20  09:05    Dragon disclaimer:  Much of this encounter note is an electronic transcription/translation of spoken language to printed text. The electronic translation of spoken language may permit erroneous, or at times, nonsensical words or phrases to be inadvertently transcribed; Although I have reviewed the note for such errors, some may still exist.

## 2020-06-24 ENCOUNTER — CLINICAL SUPPORT (OUTPATIENT)
Dept: ORTHOPEDIC SURGERY | Facility: CLINIC | Age: 65
End: 2020-06-24

## 2020-06-24 DIAGNOSIS — M17.11 PRIMARY OSTEOARTHRITIS OF RIGHT KNEE: Primary | ICD-10-CM

## 2020-06-24 PROCEDURE — 20610 DRAIN/INJ JOINT/BURSA W/O US: CPT | Performed by: ORTHOPAEDIC SURGERY

## 2020-06-24 NOTE — PROGRESS NOTES
Procedure   Large Joint Arthrocentesis: R knee  Date/Time: 6/24/2020 8:44 AM  Consent given by: patient  Site marked: site marked  Timeout: Immediately prior to procedure a time out was called to verify the correct patient, procedure, equipment, support staff and site/side marked as required   Supporting Documentation  Indications: pain   Procedure Details  Location: knee - R knee  Preparation: Patient was prepped and draped in the usual sterile fashion  Needle size: 22 G  Approach: anterolateral  Medications administered: 30 mg Hyaluronan 30 MG/2ML  Patient tolerance: patient tolerated the procedure well with no immediate complications

## 2020-06-24 NOTE — PROGRESS NOTES
[facility] [place] [facility] Note    Subjective     Chief Complaint   Patient presents with   • Right Knee - Follow-up     Right knee orthovisc injection #3        HPI    Rashad Carrillo is a 64 y.o. female who presents for the third and final Orthovisc injection into the right knee.  Of note she has seen some improvement so far.    Patient Active Problem List   Diagnosis   • Vitamin D deficiency   • Depression   • Multiple sclerosis (CMS/HCC)   • Restless legs syndrome   • Muscle spasticity   • Migraine, chronic, without aura, intractable, with status migrainosus   • Essential hypertension   • Frequent falls   • Status post balloon dilatation of esophageal stricture   • T2DM (type 2 diabetes mellitus) (CMS/HCC)   • TB lung, latent   • Aplastic anemia likely due to isoniazide   • Chest pain   • MCGRAW (dyspnea on exertion)   • Hyperlipidemia LDL goal <70     Past Medical History:   Diagnosis Date   • Diabetes mellitus (CMS/HCC)    • Hypertension    • Multiple sclerosis (CMS/HCC)       Past Surgical History:   Procedure Laterality Date   • CHOLECYSTECTOMY        Family History   Problem Relation Age of Onset   • Cancer Mother    • Hypertension Mother    • Heart disease Mother    • Alzheimer's disease Other    • Seizures Other         Epilepsy and recurrent seizures   • Heart disease Other      Social History     Socioeconomic History   • Marital status:      Spouse name: Not on file   • Number of children: Not on file   • Years of education: Not on file   • Highest education level: Not on file   Tobacco Use   • Smoking status: Former Smoker     Types: Cigarettes     Last attempt to quit:      Years since quittin.4   • Smokeless tobacco: Never Used   • Tobacco comment: social   Substance and Sexual Activity   • Alcohol use: Yes     Comment: rarely   • Drug use: No   • Sexual activity: Defer      Current Outpatient Medications on File Prior to Visit   Medication Sig Dispense Refill   • acyclovir  (ZOVIRAX) 400 MG tablet Take 1 tablet by mouth 2 (Two) Times a Day. 60 tablet 11   • amLODIPine (NORVASC) 10 MG tablet Take 1 tablet by mouth Daily. 30 tablet 11   • azelastine (ASTEPRO) 0.15 % solution nasal spray inhale 1-2 sprays by nasal route 2 times a day as needed  3   • B Complex-C-E-Zn (Z-BEC PO) Take 1 tablet by mouth daily.     • baclofen (LIORESAL) 10 MG tablet Take 1 tablet by mouth Every Night. 30 tablet 5   • Dalfampridine ER 10 MG tablet sustained-release 12 hour Take 10 mg by mouth 2 (Two) Times a Day. 60 tablet 11   • DULoxetine (CYMBALTA) 60 MG capsule Take 1 capsule by mouth Daily. 30 capsule 2   • furosemide (LASIX) 40 MG tablet Take 1 tablet(s) every day     • gabapentin (NEURONTIN) 100 MG capsule Take 3 capsules by mouth 3 (Three) Times a Day. 90 capsule 5   • gabapentin (NEURONTIN) 300 MG capsule Take 2 capsules by mouth Every Night. 60 capsule 5   • hydrochlorothiazide (MICROZIDE) 12.5 MG capsule Take 12.5 mg by mouth Daily.     • HYDROcodone-acetaminophen (NORCO)  MG per tablet Take 1 tablet by mouth Every 6 (Six) Hours As Needed for Moderate Pain . 20 tablet 0   • irbesartan (AVAPRO) 150 MG tablet Take 150 mg by mouth Daily.     • metoprolol succinate XL (TOPROL-XL) 100 MG 24 hr tablet metoprolol succinate  mg tablet,extended release 24 hr     • Multiple Vitamin (MULTI-VITAMIN DAILY) tablet Take 1 tablet by mouth daily.     • omeprazole (priLOSEC) 20 MG capsule TAKE ONE CAPSULE IN THE MORNING.  11   • potassium chloride (MICRO-K) 10 MEQ CR capsule Take 10 mEq by mouth daily.     • simvastatin (ZOCOR) 20 MG tablet Take 20 mg by mouth Daily.     • valACYclovir (VALTREX) 500 MG tablet Take 1 tablet by mouth 2 (Two) Times a Day. 60 tablet 5   • vitamin D (ERGOCALCIFEROL) 41742 UNITS capsule capsule Take 50,000 Units by mouth Every 30 (Thirty) Days. 15TH       No current facility-administered medications on file prior to visit.       Allergies   Allergen Reactions   • Isoniazid  Other (See Comments)     Body stopped producing blood   • Sulfa Antibiotics Anaphylaxis   • Tetanus Toxoid Swelling        Review of Systems     Objective      Physical Exam  LMP  (LMP Unknown)     There is no height or weight on file to calculate BMI.    General:   Mental Status:  Alert   Appearance: Cooperative, in no acute distress   Posture: Normal    Assessment and Plan     Rashad was seen today for follow-up.    Diagnoses and all orders for this visit:    Primary osteoarthritis of right knee  -     Large Joint Arthrocentesis: R knee        1. Primary osteoarthritis of right knee        Administration of viscosupplementation today.  Please see my procedure note for details.    Return in about 6 months (around 12/24/2020).    Lalo Choe MD  06/24/20  08:55    Dragon disclaimer:  Much of this encounter note is an electronic transcription/translation of spoken language to printed text. The electronic translation of spoken language may permit erroneous, or at times, nonsensical words or phrases to be inadvertently transcribed; Although I have reviewed the note for such errors, some may still exist.

## 2020-06-26 ENCOUNTER — TELEPHONE (OUTPATIENT)
Dept: NEUROLOGY | Facility: CLINIC | Age: 65
End: 2020-06-26

## 2020-06-26 NOTE — TELEPHONE ENCOUNTER
----- Message from Sofia Clarke [facility] sent at 6/26/2020 12:01 PM EDT -----  Regarding: [facility] Labwork Overdue  Patient is 51 days overdue on monthly [facility] labwork. Would you please call to remind patient? She goes to [facility] in [place] and they have active orders on file. Thanks!

## 2020-06-26 NOTE — TELEPHONE ENCOUNTER
I spoke with patient. She acknowledged knowing labs are overdue. Advised that she would get done as soon as possible.   -TMT 06/26/2020

## 2020-06-29 ENCOUNTER — HOSPITAL ENCOUNTER (OUTPATIENT)
Dept: MRI IMAGING | Facility: HOSPITAL | Age: 65
End: 2020-06-29

## 2020-07-01 ENCOUNTER — OFFICE VISIT (OUTPATIENT)
Dept: NEUROLOGY | Facility: CLINIC | Age: 65
End: 2020-07-01

## 2020-07-01 DIAGNOSIS — F33.1 MODERATE EPISODE OF RECURRENT MAJOR DEPRESSIVE DISORDER (HCC): ICD-10-CM

## 2020-07-01 DIAGNOSIS — G35 MULTIPLE SCLEROSIS (HCC): Primary | ICD-10-CM

## 2020-07-01 DIAGNOSIS — G43.711 MIGRAINE, CHRONIC, WITHOUT AURA, INTRACTABLE, WITH STATUS MIGRAINOSUS: ICD-10-CM

## 2020-07-01 DIAGNOSIS — G25.81 RESTLESS LEGS SYNDROME: ICD-10-CM

## 2020-07-01 PROCEDURE — 99442 PR PHYS/QHP TELEPHONE EVALUATION 11-20 MIN: CPT | Performed by: PSYCHIATRY & NEUROLOGY

## 2020-07-01 RX ORDER — PREDNISONE 20 MG/1
TABLET ORAL
Qty: 12 TABLET | Refills: 0 | Status: SHIPPED | OUTPATIENT
Start: 2020-07-01 | End: 2020-09-25

## 2020-07-01 NOTE — PROGRESS NOTES
Subjective:     Patient consents to a telephone visit in follow up.     Time:  15 minutes        Chief Complaint   Patient presents with   • Multiple Sclerosis     Follow up       Patient ID: Rashad Carrillo is a 64 y.o. female.    History of Present Illness     64 y.o.  woman with RRMS S/P [place] 2/2, 10/30/18 - 11/2/18, 11/4/19-11/6/19 migraines, MDD and RLS returns in follow up.  Last visit on 6/8/20 continued monthly labs, renewed GBP, Cymbalta, Acyclovir and Ampyra, ordered MRI Brain.  .       MRI Brain 10/8/18 as compared to 12/4/17, no change in T2 lesion load, moderate to severe T2 lesion burden and moderate atrophy.     JCV ab - 6/11/18 - 0.45      6/29/20: Plt 238; TSH 2.89; Cr 0.84; U/A nl     RRMS    Missed MRI Brain appt.      Difficulty with weakness in bilateral lower extremities.  L > R weakness started in March.  Torn meniscus in right knee and received multiple injections.  Last round of steroids in January.      Continues on Ampyra.        Fatigue is severe.  Heat intolerance is severe.  Early morning best time of day.     Problem history:    25 ft timed walk increaesed 15.99 from 9.94.  Left hand 9 hole peg worsened.   Increasing swallowing difficulty.        Immediate and working memory are declining with SDMT 27/110..       Referred to ID and dx with latent TB.  Recommended isoniazid and pyridoxine for 9 months.  Subsequently developed aplastic anemia secondary to INH and followed by Dr Valentin.  Received multiple transfusions.  3/29/18 able to resume Tysabri and has had 3 infusions  Treated with rifampin.      MDD    Mood is stable on Cymbalta 60 mg PO daily, worsening depression.     RLS    Controlled  on GBP.  Addition of baclofen helping with spasticity in LE.       mg po BID, 300 mg 2 times overnight for discomfort.       Migraines    Headache frequency is daily.  Intensity is mild to moderate.  Last BTX 1/30/20.      Problem History:  HA frequency is daily.  Moderate to severe  intensity.   Location moves around head.  Quality Last for up to a day.  Tx with OTC meds.     Greater than 15 HA a month, moderate to severe intensity, lasting over 6 hours.      Preventatives:  TPM, GBP, Cymbalta,     The following portions of the patient's history were reviewed and updated as appropriate: allergies, current medications, past medical history, past surgical history and problem list.    Review of Systems   Constitutional: Positive for fatigue. Negative for activity change and unexpected weight change.   HENT: Negative for trouble swallowing.    Eyes: Negative for visual disturbance.   Respiratory: Negative for apnea and choking.    Cardiovascular: Negative for leg swelling.   Endocrine: Positive for heat intolerance. Negative for cold intolerance.   Genitourinary: Negative for difficulty urinating, frequency, menstrual problem and urgency.   Musculoskeletal: Positive for arthralgias, gait problem and myalgias.   Skin: Negative for color change.   Allergic/Immunologic: Negative for immunocompromised state.   Neurological: Positive for tremors and headaches. Negative for syncope, facial asymmetry, speech difficulty and light-headedness.   Hematological: Negative for adenopathy. Does not bruise/bleed easily.   Psychiatric/Behavioral: Positive for decreased concentration, dysphoric mood and sleep disturbance. Negative for behavioral problems, confusion and hallucinations. The patient is nervous/anxious.         Objective:       Neurologic Exam     Mental Status   Attention: normal. Concentration: normal.   Speech: speech is normal   Level of consciousness: alert  Knowledge: good.   Normal comprehension.       Physical Exam   Psychiatric: Her speech is normal.     Infusion on 10/29/2018   Component Date Value Ref Range Status   • WBC 10/29/2018 5.40  3.50 - 10.80 10*3/mm3 Final    Verified by repeat analysis.    • RBC 10/29/2018 4.34  3.89 - 5.14 10*6/mm3 Final   • Hemoglobin 10/29/2018 11.9  11.5 - 15.5  g/dL Final   • Hematocrit 10/29/2018 36.9  34.5 - 44.0 % Final   • RDW 10/29/2018 17.4* 11.3 - 14.5 % Final   • MCV 10/29/2018 85.0  80.0 - 99.0 fL Final   • MCH 10/29/2018 27.4  27.0 - 31.0 pg Final   • MCHC 10/29/2018 32.2  32.0 - 36.0 g/dL Final   • MPV 10/29/2018 8.1  6.0 - 12.0 fL Final   • Platelets 10/29/2018 171  150 - 450 10*3/mm3 Final   • Neutrophil % 10/29/2018 88.1* 41.0 - 71.0 % Final   • Lymphocyte % 10/29/2018 10.2* 24.0 - 44.0 % Final   • Monocyte % 10/29/2018 1.7  0.0 - 12.0 % Final   • Neutrophils, Absolute 10/29/2018 4.80  1.50 - 8.30 10*3/mm3 Final   • Lymphocytes, Absolute 10/29/2018 0.60  0.60 - 4.80 10*3/mm3 Final   • Monocytes, Absolute 10/29/2018 0.10  0.00 - 1.00 10*3/mm3 Final       Assessment/Plan:       Problems Addressed this Visit        Cardiovascular and Mediastinum    Migraine, chronic, without aura, intractable, with status migrainosus     Headaches are unchanged.  Continue current treatment regimen.                Nervous and Auditory    Multiple sclerosis (CMS/HCC) - Primary     Worsening gait disability    Reschedule MRI Brain    Refer to PT   Prednisone taper    Reviewed monthly labs.          Relevant Medications    predniSONE (DELTASONE) 20 MG tablet    Other Relevant Orders    Ambulatory Referral to Physical Therapy Evaluate and treat       Other    Depression     Psychological condition is worsening.  Continue current treatment regimen.  Psychological condition  will be reassessed at the next regular appointment.         Restless legs syndrome     Sx controlled on GBP and baclofen

## 2020-07-01 NOTE — ASSESSMENT & PLAN NOTE
Worsening gait disability    Reschedule MRI Brain    Refer to PT   Prednisone taper    Reviewed monthly labs.

## 2020-07-02 ENCOUNTER — HOSPITAL ENCOUNTER (OUTPATIENT)
Dept: MRI IMAGING | Facility: HOSPITAL | Age: 65
Discharge: HOME | End: 2020-07-02

## 2020-07-02 DIAGNOSIS — G35 MULTIPLE SCLEROSIS (HCC): ICD-10-CM

## 2020-07-06 ENCOUNTER — TELEPHONE (OUTPATIENT)
Dept: NEUROLOGY | Facility: CLINIC | Age: 65
End: 2020-07-06

## 2020-07-06 NOTE — TELEPHONE ENCOUNTER
PT STATES SHE WENT TO GET THE MRI AS REQUESTED , SHE SAID THAT THERE WAS A LONG WAIT AND SHE OPTED TO RESCHEDULE INSTEAD OF WAITING THE HOUR WAIT. SHE WAS WONDERING IF SHE COULD GO TO ANOTHER LOCATION FOR MRI INSTEAD, SHE SATES IF NOT SHE WILL NOT HAVE MRI UNTIL 7-22     PLEASE ADVISE    [phone]  GAYATRI

## 2020-07-08 NOTE — PROGRESS NOTES
OFFICE VISIT  NOTE  [facility] CARDIOLOGY      Name: Rashad Carrillo    Date: 7/10/2020  MRN:  [id number]  :  1955      REFERRING/PRIMARY PROVIDER:  Ye Newman MD    Chief Complaint   Patient presents with   • Chest Pain       HPI: Rashad Carrillo is a 64 y.o. female who presents today for new consultation for chest pain.  She has associated history of diabetes mellitus type 2, uncontrolled hypertension and multiple sclerosis.  She recently had an MD VIP work-up including genetic testing and laboratory work-up for stratification for coronary atherosclerosis.  Abnormal high sensitivity CRP at 9.5, abnormal iso-Prostane  and myeloperoxidase and apo-lipoprotein B.  She was started on pravastatin 2019 and is tolerating it well.  She had a coronary calcium score of 2020.  She reports occasional chest pain, tightness, substernal, nonradiating, at rest and with exertion, she walks 1 mile per day with no exacerbation of symptoms.  Strong family history for premature coronary disease, mother had coronary disease starting in early 60s.    Past Medical History:   Diagnosis Date   • Diabetes mellitus (CMS/HCC)    • Hypertension    • Multiple sclerosis (CMS/HCC)        Past Surgical History:   Procedure Laterality Date   • CHOLECYSTECTOMY         Social History     Socioeconomic History   • Marital status:      Spouse name: Not on file   • Number of children: Not on file   • Years of education: Not on file   • Highest education level: Not on file   Tobacco Use   • Smoking status: Former Smoker     Types: Cigarettes     Last attempt to quit:      Years since quittin.5   • Smokeless tobacco: Never Used   • Tobacco comment: social   Substance and Sexual Activity   • Alcohol use: Yes     Comment: rarely   • Drug use: No   • Sexual activity: Defer       Family History   Problem Relation Age of Onset   • Cancer Mother    • Hypertension Mother    • Heart disease  Mother    • Alzheimer's disease Other    • Seizures Other         Epilepsy and recurrent seizures   • Heart disease Other         ROS:   Constitutional no fever,  no weight loss   Skin no rash, no subcutaneous nodules   Otolaryngeal no difficulty swallowing   Cardiovascular See HPI   Pulmonary no cough, no sputum production   Gastrointestinal no constipation, no diarrhea   Genitourinary no dysuria, no hematuria   Hematologic no easy bruisability, no abnormal bleeding   Musculoskeletal no muscle pain   Neurologic no dizziness, no falls         Allergies   Allergen Reactions   • Isoniazid Other (See Comments)     Body stopped producing blood   • Sulfa Antibiotics Anaphylaxis   • Tetanus Toxoid Swelling         Current Outpatient Medications:   •  acyclovir (ZOVIRAX) 400 MG tablet, Take 1 tablet by mouth 2 (Two) Times a Day., Disp: 60 tablet, Rfl: 11  •  amLODIPine (NORVASC) 10 MG tablet, Take 1 tablet by mouth Daily., Disp: 30 tablet, Rfl: 11  •  azelastine (ASTEPRO) 0.15 % solution nasal spray, inhale 1-2 sprays by nasal route 2 times a day as needed, Disp: , Rfl: 3  •  B Complex-C-E-Zn (Z-BEC PO), Take 1 tablet by mouth daily., Disp: , Rfl:   •  baclofen (LIORESAL) 10 MG tablet, Take 1 tablet by mouth Every Night., Disp: 30 tablet, Rfl: 5  •  Dalfampridine ER 10 MG tablet sustained-release 12 hour, Take 10 mg by mouth 2 (Two) Times a Day., Disp: 60 tablet, Rfl: 11  •  DULoxetine (CYMBALTA) 60 MG capsule, Take 1 capsule by mouth Daily., Disp: 30 capsule, Rfl: 2  •  furosemide (LASIX) 40 MG tablet, Take 1 tablet(s) every day, Disp: , Rfl:   •  gabapentin (NEURONTIN) 100 MG capsule, Take 3 capsules by mouth 3 (Three) Times a Day., Disp: 90 capsule, Rfl: 5  •  hydrochlorothiazide (MICROZIDE) 12.5 MG capsule, Take 12.5 mg by mouth Daily., Disp: , Rfl:   •  HYDROcodone-acetaminophen (NORCO)  MG per tablet, Take 1 tablet by mouth Every 6 (Six) Hours As Needed for Moderate Pain ., Disp: 20 tablet, Rfl: 0  •   "irbesartan (AVAPRO) 150 MG tablet, Take 150 mg by mouth Daily., Disp: , Rfl:   •  metoprolol succinate XL (TOPROL-XL) 100 MG 24 hr tablet, metoprolol succinate  mg tablet,extended release 24 hr, Disp: , Rfl:   •  Multiple Vitamin (MULTI-VITAMIN DAILY) tablet, Take 1 tablet by mouth daily., Disp: , Rfl:   •  omeprazole (priLOSEC) 20 MG capsule, TAKE ONE CAPSULE IN THE MORNING., Disp: , Rfl: 11  •  potassium chloride (MICRO-K) 10 MEQ CR capsule, Take 10 mEq by mouth daily., Disp: , Rfl:   •  predniSONE (DELTASONE) 20 MG tablet, Take 2 tabs (40 mg) daily x 3 days, then 1 tab (20 mg) daily x 3 days, then 1/2 tab (10 mg) daily x 3 days, then stop. Take with food., Disp: 12 tablet, Rfl: 0  •  valACYclovir (VALTREX) 500 MG tablet, Take 1 tablet by mouth 2 (Two) Times a Day., Disp: 60 tablet, Rfl: 5  •  vitamin D (ERGOCALCIFEROL) 06979 UNITS capsule capsule, Take 50,000 Units by mouth Every 30 (Thirty) Days. 15TH, Disp: , Rfl:   •  gabapentin (NEURONTIN) 300 MG capsule, Take 2 capsules by mouth Every Night., Disp: 60 capsule, Rfl: 5  •  pravastatin (PRAVACHOL) 40 MG tablet, Take 1 tablet by mouth Daily for 360 days., Disp: 90 tablet, Rfl: 3    Vitals:    07/10/20 1001   BP: 150/84   BP Location: Left arm   Patient Position: Sitting   Pulse: 70   SpO2: 98%   Weight: (!) 161 kg (355 lb)   Height: 182.9 cm (72\")     Body mass index is 48.15 kg/m².    PHYSICAL EXAM:    General Appearance:   · well developed  · well nourished  HENT:   · oropharynx moist  · lips not cyanotic  Neck:  · thyroid not enlarged  · supple  Respiratory:  · no respiratory distress  · normal breath sounds  · no rales  Cardiovascular:  · no jugular venous distention  · regular rhythm  · apical impulse normal  · S1 normal, S2 normal  · no S3, no S4   · no murmur  · no rub, no thrill  · carotid pulses normal; no bruit  · pedal pulses normal  · lower extremity edema: none    Gastrointestinal:   · bowel sounds normal  · non-tender  · no hepatomegaly, no " splenomegaly  Musculoskeletal:  · no clubbing of fingers.   · normocephalic, head atraumatic  Skin:   · warm, dry  Psychiatric:  · judgement and insight appropriate  · normal mood and affect    RESULTS:     ECG 12 Lead  Date/Time: 7/10/2020 10:36 AM  Performed by: Kendell Alonzo MD  Authorized by: Kendell Alonzo MD   Comparison: not compared with previous ECG   Previous ECG: no previous ECG available  Rhythm: sinus rhythm  Rate: normal  QRS axis: normal    Clinical impression: abnormal EKG  Comments: Lateral T wave inversions, abnormal.                   Labs:  CBC 06/29/20   WBC 4.6   RBC 4.65   Hemoglobin 13.5   Hematocrit 42.5   Platelet 238       Creatinine (06/29/20) 0.84   Glomerular Filtration Rate (06/29/20) 75   TSH (06/29/20) 2.89         Lab Results   Component Value Date    AST 21 10/01/2018    ALT 17 10/01/2018     Lab Results   Component Value Date    HGBA1C 6.5 (H) 01/24/2018     No components found for: CREATINININE  eGFR Non  Amer   Date Value Ref Range Status   10/01/2018 75 >60 mL/min/1.73 Final   09/12/2018 74 >60 mL/min/1.73 Final   07/23/2018 81 >60 mL/min/1.73 Final         ASSESSMENT:  Problem List Items Addressed This Visit        Cardiovascular and Mediastinum    Essential hypertension - Primary    Hyperlipidemia LDL goal <70    Relevant Medications    pravastatin (PRAVACHOL) 40 MG tablet       Endocrine    T2DM (type 2 diabetes mellitus) (CMS/HCC)       Nervous and Auditory    Multiple sclerosis (CMS/HCC)    Chest pain    Overview     01/2019 CT coronary calcium   · Coronary calcium score of 0, 10th percentile.   · 2.7 mm right lower lobe pulmonary nodule.            Relevant Orders    Stress Test With Pet Myocardial Perfusion (MULTI STUDY, REST AND STRESS)          PLAN:    1.  Chest pain with abnormal EKG:  Given her multiple risk factors including elevated high-sensitivity CRP, myeloperoxidase, and apolipoprotein B with obesity, strong family history of premature CAD and  chest pain will proceed with stress/PET imaging.  BMI 48  The patient was advised to call 911 if chest pain worsens or persist despite nitroglycerin or rest.    2.  Hyperlipidemia:  Long-term goal LDL less than 100 and less abnormal stress test and less than 70  Continue pravastatin 40 mg daily follow-up lipid panel at PCP    3.  Uncontrolled hypertension:  Blood pressure elevated today, PCP recently increased amlodipine to 10 mg daily approximately 10 days ago, some improvement thus far per patient.  Continue current antihypertensive regimen  If blood pressure uncontrolled with new changes, consider secondary hypertension work-up.    Return to clinic in 6 months, or sooner as needed.    Thank you for the opportunity to share in the care of your patient; please do not hesitate to call me with any questions.     Kendell Alonzo MD, [facility]  Office: [phone] [address] [address]

## 2020-07-10 ENCOUNTER — CONSULT (OUTPATIENT)
Dept: CARDIOLOGY | Facility: CLINIC | Age: 65
End: 2020-07-10

## 2020-07-10 VITALS
SYSTOLIC BLOOD PRESSURE: 150 MMHG | BODY MASS INDEX: 39.68 KG/M2 | DIASTOLIC BLOOD PRESSURE: 84 MMHG | OXYGEN SATURATION: 98 % | WEIGHT: 293 LBS | HEIGHT: 72 IN | HEART RATE: 70 BPM

## 2020-07-10 DIAGNOSIS — E11.69 TYPE 2 DIABETES MELLITUS WITH OTHER SPECIFIED COMPLICATION, UNSPECIFIED WHETHER LONG TERM INSULIN USE (HCC): ICD-10-CM

## 2020-07-10 DIAGNOSIS — G35 MULTIPLE SCLEROSIS (HCC): ICD-10-CM

## 2020-07-10 DIAGNOSIS — E78.5 HYPERLIPIDEMIA LDL GOAL <70: ICD-10-CM

## 2020-07-10 DIAGNOSIS — I10 ESSENTIAL HYPERTENSION: Primary | ICD-10-CM

## 2020-07-10 DIAGNOSIS — R07.9 CHEST PAIN, UNSPECIFIED TYPE: ICD-10-CM

## 2020-07-10 PROCEDURE — 99204 OFFICE O/P NEW MOD 45 MIN: CPT | Performed by: INTERNAL MEDICINE

## 2020-07-10 PROCEDURE — 93000 ELECTROCARDIOGRAM COMPLETE: CPT | Performed by: INTERNAL MEDICINE

## 2020-07-10 RX ORDER — PRAVASTATIN SODIUM 40 MG
40 TABLET ORAL DAILY
Qty: 90 TABLET | Refills: 3
Start: 2020-07-10 | End: 2021-01-26 | Stop reason: DRUGHIGH

## 2020-07-22 ENCOUNTER — APPOINTMENT (OUTPATIENT)
Dept: MRI IMAGING | Facility: HOSPITAL | Age: 65
End: 2020-07-22

## 2020-07-28 ENCOUNTER — PROCEDURE VISIT (OUTPATIENT)
Dept: NEUROLOGY | Facility: CLINIC | Age: 65
End: 2020-07-28

## 2020-07-28 VITALS
SYSTOLIC BLOOD PRESSURE: 156 MMHG | HEART RATE: 74 BPM | WEIGHT: 293 LBS | BODY MASS INDEX: 39.68 KG/M2 | HEIGHT: 72 IN | DIASTOLIC BLOOD PRESSURE: 82 MMHG | RESPIRATION RATE: 18 BRPM | OXYGEN SATURATION: 98 %

## 2020-07-28 DIAGNOSIS — G43.711 MIGRAINE, CHRONIC, WITHOUT AURA, INTRACTABLE, WITH STATUS MIGRAINOSUS: Primary | ICD-10-CM

## 2020-07-28 PROCEDURE — 64615 CHEMODENERV MUSC MIGRAINE: CPT | Performed by: NURSE PRACTITIONER

## 2020-07-28 NOTE — PROGRESS NOTES
Botulinum Toxin Injection Procedure    History:  Response to treatment has reduced HA's at least 7 fewer days a month and/or 100 hours fewer each month.     Pre-operative diagnosis: headache    Post-operative diagnosis: Same    Procedure: Chemical neurolysis    After risks and benefits were explained including bleeding, infection, worsening of pain, damage to the areas being injected, weakness of muscles, loss of muscle control, dysphagia if injecting the head or neck, facial droop if injecting the facial area, painful injection, allergic or other reaction to the medications being injected, and the failure of the procedure to help the problem, a signed consent was obtained.      The patient was placed in a comfortable area and the sites to be treated were identified. A time out was called and performed. The area to be treated was prepped three times with alcohol and the alcohol allowed to dry. Next, a 30 gauge needle was used to inject the medication in the area to be treated.    Area(s) injected: frontalis muscle, semispinalis capitis muscle and temporallis muscle    Medications used: botulinum toxin 200 mu, 2 mL of saline used to dilute the botulinum toxin.      The patient did tolerate the procedure well. There were no complications.       Physical Examination    Current Treatment (Units)    5 units on the right and 5 units on the left  Procerus 5 units  Frontalis 10 units on the right and 10 units on the left  Temporalis 20 units on the right and 20 units on the left  Occipitalis 15 units on the right and 15 units on the left   Cervical paraspinal 10 units on the right and 10 units on the left  Trapezius 15 units on the right and 15 units on the left  EMG Guidance none .   Complications: none .   The total amount injected in units is 155 .   The total amount wasted in units is 45 .   The total amount submitted in units is 200 .   Botox was supplied by physician.

## 2020-08-02 ENCOUNTER — APPOINTMENT (OUTPATIENT)
Dept: PREADMISSION TESTING | Facility: HOSPITAL | Age: 65
End: 2020-08-02

## 2020-08-04 ENCOUNTER — TELEPHONE (OUTPATIENT)
Dept: CARDIOLOGY | Facility: CLINIC | Age: 65
End: 2020-08-04

## 2020-08-04 ENCOUNTER — HOSPITAL ENCOUNTER (OUTPATIENT)
Dept: CARDIOLOGY | Facility: HOSPITAL | Age: 65
Discharge: HOME | End: 2020-08-04
Admitting: INTERNAL MEDICINE

## 2020-08-04 VITALS
RESPIRATION RATE: 18 BRPM | WEIGHT: 293 LBS | BODY MASS INDEX: 39.68 KG/M2 | SYSTOLIC BLOOD PRESSURE: 147 MMHG | HEIGHT: 72 IN | HEART RATE: 63 BPM | OXYGEN SATURATION: 99 % | DIASTOLIC BLOOD PRESSURE: 71 MMHG

## 2020-08-04 DIAGNOSIS — R07.9 CHEST PAIN, UNSPECIFIED TYPE: ICD-10-CM

## 2020-08-04 LAB
BH CV STRESS BP STAGE 2: NORMAL
BH CV STRESS BP STAGE 4: NORMAL
BH CV STRESS COMMENTS STAGE 1: NORMAL
BH CV STRESS COMMENTS STAGE 2: NORMAL
BH CV STRESS DOSE REGADENOSON STAGE 1: 0.4
BH CV STRESS DURATION MIN STAGE 1: 1
BH CV STRESS DURATION MIN STAGE 2: 1
BH CV STRESS DURATION MIN STAGE 3: 1
BH CV STRESS DURATION MIN STAGE 4: 1
BH CV STRESS HR STAGE 1: 87
BH CV STRESS HR STAGE 2: 84
BH CV STRESS HR STAGE 3: 83
BH CV STRESS HR STAGE 4: 79
BH CV STRESS O2 STAGE 1: 100
BH CV STRESS O2 STAGE 2: 100
BH CV STRESS O2 STAGE 3: 100
BH CV STRESS O2 STAGE 4: 100
BH CV STRESS PROTOCOL 1: NORMAL
BH CV STRESS RECOVERY BP: NORMAL MMHG
BH CV STRESS RECOVERY HR: 75 BPM
BH CV STRESS RECOVERY O2: 100 %
BH CV STRESS STAGE 1: 1
BH CV STRESS STAGE 2: 2
BH CV STRESS STAGE 3: 3
BH CV STRESS STAGE 4: 4
LV EF NUC BP: 69 %
MAXIMAL PREDICTED HEART RATE: 158 BPM
PERCENT MAX PREDICTED HR: 55.06 %
STRESS BASELINE BP: NORMAL MMHG
STRESS BASELINE HR: 65 BPM
STRESS O2 SAT REST: 99 %
STRESS PERCENT HR: 65 %
STRESS POST O2 SAT PEAK: 100 %
STRESS POST PEAK BP: NORMAL MMHG
STRESS POST PEAK HR: 87 BPM
STRESS TARGET HR: 134 BPM

## 2020-08-04 PROCEDURE — 25010000002 REGADENOSON 0.4 MG/5ML SOLUTION: Performed by: INTERNAL MEDICINE

## 2020-08-04 PROCEDURE — 93017 CV STRESS TEST TRACING ONLY: CPT

## 2020-08-04 PROCEDURE — 93018 CV STRESS TEST I&R ONLY: CPT | Performed by: INTERNAL MEDICINE

## 2020-08-04 PROCEDURE — 78492 MYOCRD IMG PET MLT RST&STRS: CPT | Performed by: INTERNAL MEDICINE

## 2020-08-04 PROCEDURE — 0 RUBIDIUM CHLORIDE: Performed by: INTERNAL MEDICINE

## 2020-08-04 PROCEDURE — 78492 MYOCRD IMG PET MLT RST&STRS: CPT

## 2020-08-04 PROCEDURE — A9555 RB82 RUBIDIUM: HCPCS | Performed by: INTERNAL MEDICINE

## 2020-08-04 RX ADMIN — RUBIDIUM CHLORIDE RB-82 1 DOSE: 150 INJECTION, SOLUTION INTRAVENOUS at 10:43

## 2020-08-04 RX ADMIN — REGADENOSON 0.4 MG: 0.08 INJECTION, SOLUTION INTRAVENOUS at 10:45

## 2020-08-04 RX ADMIN — RUBIDIUM CHLORIDE RB-82 1 DOSE: 150 INJECTION, SOLUTION INTRAVENOUS at 10:32

## 2020-08-04 NOTE — TELEPHONE ENCOUNTER
----- Message from Kendell Alonzo MD sent at 8/4/2020  4:15 PM EDT -----  Please inform the patient of their test results. Thank you.

## 2020-08-05 ENCOUNTER — HOSPITAL ENCOUNTER (OUTPATIENT)
Dept: MRI IMAGING | Facility: HOSPITAL | Age: 65
Discharge: HOME | End: 2020-08-05
Admitting: NURSE PRACTITIONER

## 2020-08-05 LAB — CREAT BLDA-MCNC: 0.7 MG/DL (ref 0.6–1.3)

## 2020-08-05 PROCEDURE — A9575 INJ GADOTERATE MEGLUMI 0.1ML: HCPCS | Performed by: NURSE PRACTITIONER

## 2020-08-05 PROCEDURE — 25010000002 GADOTERATE MEGLUMINE 10 MMOL/20ML SOLUTION: Performed by: NURSE PRACTITIONER

## 2020-08-05 PROCEDURE — 70553 MRI BRAIN STEM W/O & W/DYE: CPT

## 2020-08-05 PROCEDURE — 82565 ASSAY OF CREATININE: CPT

## 2020-08-05 RX ORDER — GADOTERATE MEGLUMINE 376.9 MG/ML
20 INJECTION INTRAVENOUS
Status: COMPLETED | OUTPATIENT
Start: 2020-08-05 | End: 2020-08-05

## 2020-08-05 RX ADMIN — GADOTERATE MEGLUMINE 20 ML: 376.9 INJECTION, SOLUTION INTRAVENOUS at 08:41

## 2020-08-26 LAB
APPEARANCE UR: ABNORMAL
BACTERIA #/AREA URNS HPF: ABNORMAL /[HPF]
BASOPHILS # BLD AUTO: 0 X10E3/UL (ref 0–0.2)
BASOPHILS NFR BLD AUTO: 1 %
BILIRUB UR QL STRIP: NEGATIVE
CASTS URNS MICRO: ABNORMAL
CASTS URNS QL MICRO: PRESENT /LPF
COLOR UR: YELLOW
CREAT SERPL-MCNC: 0.77 MG/DL (ref 0.57–1)
CRYSTALS URNS MICRO: ABNORMAL
EOSINOPHIL # BLD AUTO: 0.1 X10E3/UL (ref 0–0.4)
EOSINOPHIL NFR BLD AUTO: 3 %
EPI CELLS #/AREA URNS HPF: ABNORMAL /HPF (ref 0–10)
ERYTHROCYTE [DISTWIDTH] IN BLOOD BY AUTOMATED COUNT: 12.8 % (ref 11.7–15.4)
GLUCOSE UR QL: NEGATIVE
HCT VFR BLD AUTO: 44.8 % (ref 34–46.6)
HGB BLD-MCNC: 14.7 G/DL (ref 11.1–15.9)
HGB UR QL STRIP: NEGATIVE
IMM GRANULOCYTES # BLD AUTO: 0 X10E3/UL (ref 0–0.1)
IMM GRANULOCYTES NFR BLD AUTO: 0 %
KETONES UR QL STRIP: NEGATIVE
LEUKOCYTE ESTERASE UR QL STRIP: ABNORMAL
LYMPHOCYTES # BLD AUTO: 0.6 X10E3/UL (ref 0.7–3.1)
LYMPHOCYTES NFR BLD AUTO: 15 %
MCH RBC QN AUTO: 30.1 PG (ref 26.6–33)
MCHC RBC AUTO-ENTMCNC: 32.8 G/DL (ref 31.5–35.7)
MCV RBC AUTO: 92 FL (ref 79–97)
MICRO URNS: ABNORMAL
MONOCYTES # BLD AUTO: 0.4 X10E3/UL (ref 0.1–0.9)
MONOCYTES NFR BLD AUTO: 9 %
MUCOUS THREADS URNS QL MICRO: PRESENT
NEUTROPHILS # BLD AUTO: 3 X10E3/UL (ref 1.4–7)
NEUTROPHILS NFR BLD AUTO: 72 %
NITRITE UR QL STRIP: NEGATIVE
PH UR STRIP: 5 [PH] (ref 5–7.5)
PLATELET # BLD AUTO: 220 X10E3/UL (ref 150–450)
PROT UR QL STRIP: ABNORMAL
RBC # BLD AUTO: 4.88 X10E6/UL (ref 3.77–5.28)
RBC #/AREA URNS HPF: ABNORMAL /HPF (ref 0–2)
SP GR UR: >=1.03 (ref 1–1.03)
TSH SERPL DL<=0.005 MIU/L-ACNC: 2.55 UIU/ML (ref 0.45–4.5)
UNIDENT CRYS URNS QL MICRO: PRESENT
UROBILINOGEN UR STRIP-MCNC: 1 MG/DL (ref 0.2–1)
WBC # BLD AUTO: 4.1 X10E3/UL (ref 3.4–10.8)
WBC #/AREA URNS HPF: ABNORMAL /HPF (ref 0–5)

## 2020-09-23 LAB
APPEARANCE UR: ABNORMAL
BACTERIA #/AREA URNS HPF: ABNORMAL /[HPF]
BASOPHILS # BLD AUTO: 0 X10E3/UL (ref 0–0.2)
BASOPHILS NFR BLD AUTO: 1 %
BILIRUB UR QL STRIP: NEGATIVE
COLOR UR: YELLOW
CREAT SERPL-MCNC: 0.81 MG/DL (ref 0.57–1)
CRYSTALS URNS MICRO: ABNORMAL
EOSINOPHIL # BLD AUTO: 0.1 X10E3/UL (ref 0–0.4)
EOSINOPHIL NFR BLD AUTO: 3 %
EPI CELLS #/AREA URNS HPF: ABNORMAL /HPF (ref 0–10)
ERYTHROCYTE [DISTWIDTH] IN BLOOD BY AUTOMATED COUNT: 13.1 % (ref 11.7–15.4)
GLUCOSE UR QL: NEGATIVE
HCT VFR BLD AUTO: 45.4 % (ref 34–46.6)
HGB BLD-MCNC: 14.6 G/DL (ref 11.1–15.9)
HGB UR QL STRIP: NEGATIVE
IMM GRANULOCYTES # BLD AUTO: 0 X10E3/UL (ref 0–0.1)
IMM GRANULOCYTES NFR BLD AUTO: 0 %
KETONES UR QL STRIP: NEGATIVE
LEUKOCYTE ESTERASE UR QL STRIP: ABNORMAL
LYMPHOCYTES # BLD AUTO: 0.6 X10E3/UL (ref 0.7–3.1)
LYMPHOCYTES NFR BLD AUTO: 17 %
MCH RBC QN AUTO: 29.6 PG (ref 26.6–33)
MCHC RBC AUTO-ENTMCNC: 32.2 G/DL (ref 31.5–35.7)
MCV RBC AUTO: 92 FL (ref 79–97)
MICRO URNS: ABNORMAL
MONOCYTES # BLD AUTO: 0.3 X10E3/UL (ref 0.1–0.9)
MONOCYTES NFR BLD AUTO: 9 %
MUCOUS THREADS URNS QL MICRO: PRESENT
NEUTROPHILS # BLD AUTO: 2.4 X10E3/UL (ref 1.4–7)
NEUTROPHILS NFR BLD AUTO: 70 %
NITRITE UR QL STRIP: NEGATIVE
PH UR STRIP: >=9 [PH] (ref 5–7.5)
PLATELET # BLD AUTO: 204 X10E3/UL (ref 150–450)
PROT UR QL STRIP: ABNORMAL
RBC # BLD AUTO: 4.94 X10E6/UL (ref 3.77–5.28)
RBC #/AREA URNS HPF: ABNORMAL /HPF (ref 0–2)
SP GR UR: 1.03 (ref 1–1.03)
TSH SERPL DL<=0.005 MIU/L-ACNC: 2.36 UIU/ML (ref 0.45–4.5)
UNIDENT CRYS URNS QL MICRO: PRESENT
UROBILINOGEN UR STRIP-MCNC: 1 MG/DL (ref 0.2–1)
WBC # BLD AUTO: 3.5 X10E3/UL (ref 3.4–10.8)
WBC #/AREA URNS HPF: ABNORMAL /HPF (ref 0–5)

## 2020-09-25 ENCOUNTER — INFUSION (OUTPATIENT)
Dept: ONCOLOGY | Facility: HOSPITAL | Age: 65
End: 2020-09-25

## 2020-09-25 ENCOUNTER — OFFICE VISIT (OUTPATIENT)
Dept: NEUROLOGY | Facility: CLINIC | Age: 65
End: 2020-09-25

## 2020-09-25 VITALS
TEMPERATURE: 96.8 F | HEIGHT: 72 IN | HEART RATE: 78 BPM | BODY MASS INDEX: 39.68 KG/M2 | SYSTOLIC BLOOD PRESSURE: 140 MMHG | OXYGEN SATURATION: 97 % | DIASTOLIC BLOOD PRESSURE: 86 MMHG | WEIGHT: 293 LBS

## 2020-09-25 DIAGNOSIS — G25.81 RESTLESS LEGS SYNDROME: ICD-10-CM

## 2020-09-25 DIAGNOSIS — F33.1 MODERATE EPISODE OF RECURRENT MAJOR DEPRESSIVE DISORDER (HCC): ICD-10-CM

## 2020-09-25 DIAGNOSIS — G43.711 MIGRAINE, CHRONIC, WITHOUT AURA, INTRACTABLE, WITH STATUS MIGRAINOSUS: ICD-10-CM

## 2020-09-25 DIAGNOSIS — M62.838 MUSCLE SPASTICITY: ICD-10-CM

## 2020-09-25 DIAGNOSIS — G35 MULTIPLE SCLEROSIS (HCC): Primary | ICD-10-CM

## 2020-09-25 PROCEDURE — 96365 THER/PROPH/DIAG IV INF INIT: CPT

## 2020-09-25 PROCEDURE — 25010000003 METHYLPREDNISOLONE PER 125 MG: Performed by: PSYCHIATRY & NEUROLOGY

## 2020-09-25 PROCEDURE — 99214 OFFICE O/P EST MOD 30 MIN: CPT | Performed by: PSYCHIATRY & NEUROLOGY

## 2020-09-25 RX ORDER — SODIUM CHLORIDE 9 MG/ML
250 INJECTION, SOLUTION INTRAVENOUS ONCE
Status: CANCELLED | OUTPATIENT
Start: 2020-09-25

## 2020-09-25 RX ORDER — SODIUM CHLORIDE 9 MG/ML
250 INJECTION, SOLUTION INTRAVENOUS ONCE
Status: CANCELLED | OUTPATIENT
Start: 2020-09-26

## 2020-09-25 RX ORDER — SODIUM CHLORIDE 9 MG/ML
250 INJECTION, SOLUTION INTRAVENOUS ONCE
Status: DISCONTINUED | OUTPATIENT
Start: 2020-09-25 | End: 2020-09-25 | Stop reason: HOSPADM

## 2020-09-25 RX ORDER — ALBUTEROL SULFATE 90 UG/1
AEROSOL, METERED RESPIRATORY (INHALATION)
COMMUNITY

## 2020-09-25 RX ORDER — FLUOXETINE 10 MG/1
10 CAPSULE ORAL DAILY
COMMUNITY
End: 2022-05-23

## 2020-09-25 RX ADMIN — SODIUM CHLORIDE 1000 MG: 900 INJECTION INTRAVENOUS at 10:10

## 2020-09-25 NOTE — PROGRESS NOTES
Subjective:        Chief Complaint   Patient presents with   • [facility]       Patient ID: Rashad Carrillo is a 64 y.o. female.    History of Present Illness     64 y.o.  woman with RRMS S/P [place] 2/2, 10/30/18 - 11/2/18, 11/4/19- 11/6/19 migraines, MDD and RLS returns in follow up.  Last visit on 7/1/20 continued monthly labs, renewed GBP, Cymbalta, Acyclovir and Ampyra, ordered MRI Brain, referred to PT, prednisone taper.       MRI Brain, my review of films, 8/5/20 as compared to10/8/18, no change in T2 lesion load, moderate to severe T2 lesion burden and moderate atrophy.     JCV ab - 6/11/18 - 0.45      9/22/20: Plt 204; TSH 2.89; Cr 0.81; U/A nl     RRMS    Using two poles for gait instability.  Falling several times.  Requesting IV steroids.      Trouble with word finding.      Continues on Ampyra.        Fatigue is severe.  Heat intolerance is severe.       Increased pain and stiffness in LE.       Problem history:    25 ft timed walk increaesed 15.99 from 9.94.  Left hand 9 hole peg worsened.   Increasing swallowing difficulty.        Immediate and working memory are declining with SDMT 27/110..       Referred to ID and dx with latent TB.  Recommended isoniazid and pyridoxine for 9 months.  Subsequently developed aplastic anemia secondary to INH and followed by Dr Valentin.  Received multiple transfusions.  3/29/18 able to resume Tysabri and has had 3 infusions  Treated with rifampin.      MDD    Mood is stable on Cymbalta 60 mg PO daily     RLS    Controlled  on GBP.   Baclofen helping with spasticity in LE.       mg po BID, 300 mg 2 times overnight for discomfort.       Migraines    Headache frequency is two in last month.  Intensity is mild to moderate.  BTX improving sx      Problem History:  HA frequency is daily.  Moderate to severe intensity.   Location moves around head.  Quality Last for up to a day.  Tx with OTC meds.     Greater than 15 HA a month, moderate to severe  "intensity, lasting over 6 hours.      Preventatives:  TPM, GBP, Cymbalta,     The following portions of the patient's history were reviewed and updated as appropriate: allergies, current medications, past medical history, past surgical history and problem list.    Review of Systems   Constitutional: Positive for fatigue. Negative for activity change and unexpected weight change.   HENT: Negative for trouble swallowing.    Eyes: Negative for visual disturbance.   Respiratory: Negative for apnea and choking.    Cardiovascular: Negative for leg swelling.   Endocrine: Positive for heat intolerance. Negative for cold intolerance.   Genitourinary: Negative for difficulty urinating, frequency, menstrual problem and urgency.   Musculoskeletal: Positive for arthralgias, gait problem and myalgias.   Skin: Negative for color change.   Allergic/Immunologic: Negative for immunocompromised state.   Neurological: Positive for tremors and headaches. Negative for syncope, facial asymmetry, speech difficulty and light-headedness.   Hematological: Negative for adenopathy. Does not bruise/bleed easily.   Psychiatric/Behavioral: Positive for decreased concentration, dysphoric mood and sleep disturbance. Negative for behavioral problems, confusion and hallucinations. The patient is nervous/anxious.         Objective:     Vitals:    09/25/20 0820   BP: 140/86   Pulse: 78   Temp: 96.8 °F (36 °C)   SpO2: 97%   Weight: (!) 161 kg (354 lb)   Height: 182 cm (71.65\")         Neurologic Exam     Mental Status   Registration: recalls 3 of 3 objects. Recall at 5 minutes: recalls 3 of 3 objects. Follows 3 step commands.   Attention: normal. Concentration: normal.   Speech: speech is normal   Level of consciousness: alert  Knowledge: good.   Able to name object. Able to read. Able to repeat. Able to write. Normal comprehension.         Cranial Nerves     CN II   Visual fields full to confrontation.   Visual acuity: normal  Right visual field " deficit: none  Left visual field deficit: none     CN III, IV, VI   Nystagmus: none   Diplopia: none  Ophthalmoparesis: none  Upgaze: normal  Downgaze: normal  Conjugate gaze: present  Vestibulo-ocular reflex: present    CN V   Facial sensation intact.   Right corneal reflex: normal  Left corneal reflex: normal    CN VII   Right facial weakness: none  Left facial weakness: none    CN VIII   Hearing: intact    CN IX, X   Palate: symmetric  Right gag reflex: normal  Left gag reflex: normal    CN XI   Right sternocleidomastoid strength: normal  Left sternocleidomastoid strength: normal    CN XII   Tongue: not atrophic  Fasciculations: absent  Tongue deviation: none    Motor Exam   Muscle bulk: normal  Overall muscle tone: normal  Right arm tone: normal  Left arm tone: normal  Right leg tone: increased  Left leg tone: increased    Strength   Strength 5/5 except as noted.   Left iliopsoas: 3/5  Left quadriceps: 3/5  Left hamstring: 3/5  Left glutei: 3/5  Left anterior tibial: 3/5  Left posterior tibial: 3/5  Left peroneal: 3/5  Left gastroc: 3/5    Sensory Exam   Right arm light touch: normal  Right leg light touch: normal  Right arm vibration: normal  Right leg vibration: normal  Right arm proprioception: normal  Right leg proprioception: normal  Right arm pinprick: normal  Right leg pinprick: normal  Sensory deficit distribution on right: non-dermatomal distribution (decreased left face, arm and leg)    Gait, Coordination, and Reflexes     Gait  Gait: circumduction, shuffling and wide-based (left leg)    Coordination   Romberg: positive  Heel to shin coordination: abnormal  Tandem walking coordination: abnormal    Tremor   Resting tremor: absent  Intention tremor: absent  Action tremor: left arm and right arm    Reflexes   Right brachioradialis: 3+  Left brachioradialis: 3+  Right biceps: 3+  Left biceps: 3+  Right triceps: 3+  Left triceps: 3+  Right patellar: 3+  Left patellar: 3+  Right achilles: 3+  Left achilles:  3+  Right : 3+  Left : 3+  Right plantar: normal  Left plantar: normal      Physical Exam   Neurological: She has an abnormal Heel to Brown Test, an abnormal Romberg Test and an abnormal Tandem Gait Test.   Reflex Scores:       Tricep reflexes are 3+ on the right side and 3+ on the left side.       Bicep reflexes are 3+ on the right side and 3+ on the left side.       Brachioradialis reflexes are 3+ on the right side and 3+ on the left side.       Patellar reflexes are 3+ on the right side and 3+ on the left side.       Achilles reflexes are 3+ on the right side and 3+ on the left side.  Psychiatric: Her speech is normal.     Orders Only on 09/22/2020   Component Date Value Ref Range Status   • WBC 09/22/2020 3.5  3.4 - 10.8 x10E3/uL Final   • RBC 09/22/2020 4.94  3.77 - 5.28 x10E6/uL Final   • Hemoglobin 09/22/2020 14.6  11.1 - 15.9 g/dL Final   • Hematocrit 09/22/2020 45.4  34.0 - 46.6 % Final   • MCV 09/22/2020 92  79 - 97 fL Final   • MCH 09/22/2020 29.6  26.6 - 33.0 pg Final   • MCHC 09/22/2020 32.2  31.5 - 35.7 g/dL Final   • RDW 09/22/2020 13.1  11.7 - 15.4 % Final   • Platelets 09/22/2020 204  150 - 450 x10E3/uL Final   • Neutrophil Rel % 09/22/2020 70  Not Estab. % Final   • Lymphocyte Rel % 09/22/2020 17  Not Estab. % Final   • Monocyte Rel % 09/22/2020 9  Not Estab. % Final   • Eosinophil Rel % 09/22/2020 3  Not Estab. % Final   • Basophil Rel % 09/22/2020 1  Not Estab. % Final   • Neutrophils Absolute 09/22/2020 2.4  1.4 - 7.0 x10E3/uL Final   • Lymphocytes Absolute 09/22/2020 0.6* 0.7 - 3.1 x10E3/uL Final   • Monocytes Absolute 09/22/2020 0.3  0.1 - 0.9 x10E3/uL Final   • Eosinophils Absolute 09/22/2020 0.1  0.0 - 0.4 x10E3/uL Final   • Basophils Absolute 09/22/2020 0.0  0.0 - 0.2 x10E3/uL Final   • Immature Granulocyte Rel % 09/22/2020 0  Not Estab. % Final   • Immature Grans Absolute 09/22/2020 0.0  0.0 - 0.1 x10E3/uL Final   • Specific Gravity, UA 09/22/2020 1.028  1.005 - 1.030 Final   • pH,  UA 09/22/2020 >=9.0* 5.0 - 7.5 Final   • Color, UA 09/22/2020 Yellow  Yellow Final   • Appearance, UA 09/22/2020 Cloudy* Clear Final   • Leukocytes, UA 09/22/2020 2+* Negative Final   • Protein 09/22/2020 2+* Negative/Trace Final   • Glucose, UA 09/22/2020 Negative  Negative Final   • Ketones 09/22/2020 Negative  Negative Final   • Blood, UA 09/22/2020 Negative  Negative Final   • Bilirubin, UA 09/22/2020 Negative  Negative Final   • Urobilinogen, UA 09/22/2020 1.0  0.2 - 1.0 mg/dL Final   • Nitrite, UA 09/22/2020 Negative  Negative Final   • Microscopic Examination 09/22/2020 See below:   Final    Microscopic was indicated and was performed.   • WBC, UA 09/22/2020 0-5  0 - 5 /hpf Final   • RBC, UA 09/22/2020 0-2  0 - 2 /hpf Final   • Epithelial Cells (non renal) 09/22/2020 0-10  0 - 10 /hpf Final   • Crystals, UA 09/22/2020 Present* N/A Final   • Crystal Type 09/22/2020 Triple Phosphate  N/A Final   • Mucus, UA 09/22/2020 Present  Not Estab. Final   • Bacteria, UA 09/22/2020 Few  None seen/Few Final   • TSH 09/22/2020 2.360  0.450 - 4.500 uIU/mL Final   • Creatinine 09/22/2020 0.81  0.57 - 1.00 mg/dL Final   • eGFR Non African Am 09/22/2020 78  >59 mL/min/1.73 Final   • eGFR African Am 09/22/2020 90  >59 mL/min/1.73 Final       Assessment/Plan:       Problems Addressed this Visit        Cardiovascular and Mediastinum    Migraine, chronic, without aura, intractable, with status migrainosus     Headaches are improving with treatment.  Continue current treatment regimen.      units             Relevant Medications    FLUoxetine (PROzac) 10 MG capsule       Nervous and Auditory    Multiple sclerosis (CMS/HCC) - Primary     Gait is worsening and requesting steroids    IVMP 1000 mg daily for 3 days    Continue monthly labs.               Relevant Orders    Ambulatory Referral to Speech Therapy       Musculoskeletal and Integument    Muscle spasticity     Sx worsening on baclofen, GBP            Other    Depression      Psychological condition is unchanged.  Continue current treatment regimen.  Psychological condition  will be reassessed at the next regular appointment.         Relevant Medications    FLUoxetine (PROzac) 10 MG capsule    Restless legs syndrome     Continue GBP and baclofen           Diagnoses       Codes Comments    Multiple sclerosis (CMS/HCC)    -  Primary ICD-10-CM: G35  ICD-9-CM: 340     Migraine, chronic, without aura, intractable, with status migrainosus     ICD-10-CM: G43.711  ICD-9-CM: 346.73     Muscle spasticity     ICD-10-CM: M62.838  ICD-9-CM: 728.85     Restless legs syndrome     ICD-10-CM: G25.81  ICD-9-CM: 333.94     Moderate episode of recurrent major depressive disorder (CMS/HCC)     ICD-10-CM: F33.1  ICD-9-CM: 296.32

## 2020-09-28 ENCOUNTER — INFUSION (OUTPATIENT)
Dept: ONCOLOGY | Facility: HOSPITAL | Age: 65
End: 2020-09-28

## 2020-09-28 VITALS
TEMPERATURE: 97.1 F | SYSTOLIC BLOOD PRESSURE: 142 MMHG | RESPIRATION RATE: 18 BRPM | DIASTOLIC BLOOD PRESSURE: 80 MMHG | HEART RATE: 73 BPM

## 2020-09-28 DIAGNOSIS — G35 MULTIPLE SCLEROSIS (HCC): Primary | ICD-10-CM

## 2020-09-28 PROCEDURE — 25010000003 METHYLPREDNISOLONE PER 125 MG: Performed by: PSYCHIATRY & NEUROLOGY

## 2020-09-28 PROCEDURE — 96365 THER/PROPH/DIAG IV INF INIT: CPT

## 2020-09-28 RX ORDER — SODIUM CHLORIDE 9 MG/ML
250 INJECTION, SOLUTION INTRAVENOUS ONCE
Status: CANCELLED | OUTPATIENT
Start: 2020-09-29

## 2020-09-28 RX ADMIN — SODIUM CHLORIDE 1000 MG: 900 INJECTION INTRAVENOUS at 09:54

## 2020-09-29 ENCOUNTER — INFUSION (OUTPATIENT)
Dept: ONCOLOGY | Facility: HOSPITAL | Age: 65
End: 2020-09-29

## 2020-09-29 VITALS
TEMPERATURE: 97.5 F | HEART RATE: 81 BPM | RESPIRATION RATE: 18 BRPM | DIASTOLIC BLOOD PRESSURE: 82 MMHG | SYSTOLIC BLOOD PRESSURE: 159 MMHG

## 2020-09-29 DIAGNOSIS — G35 MULTIPLE SCLEROSIS (HCC): Primary | ICD-10-CM

## 2020-09-29 PROCEDURE — 25010000003 METHYLPREDNISOLONE PER 125 MG: Performed by: PSYCHIATRY & NEUROLOGY

## 2020-09-29 PROCEDURE — 96365 THER/PROPH/DIAG IV INF INIT: CPT

## 2020-09-29 RX ORDER — SODIUM CHLORIDE 9 MG/ML
250 INJECTION, SOLUTION INTRAVENOUS ONCE
Status: CANCELLED | OUTPATIENT
Start: 2020-09-30

## 2020-09-29 RX ADMIN — SODIUM CHLORIDE 1000 MG: 900 INJECTION INTRAVENOUS at 09:22

## 2020-10-20 ENCOUNTER — PROCEDURE VISIT (OUTPATIENT)
Dept: NEUROLOGY | Facility: CLINIC | Age: 65
End: 2020-10-20

## 2020-10-20 DIAGNOSIS — G43.711 CHRONIC MIGRAINE WITHOUT AURA, WITH INTRACTABLE MIGRAINE, SO STATED, WITH STATUS MIGRAINOSUS: Primary | ICD-10-CM

## 2020-10-20 PROCEDURE — 64615 CHEMODENERV MUSC MIGRAINE: CPT | Performed by: NURSE PRACTITIONER

## 2020-10-29 ENCOUNTER — OFFICE VISIT (OUTPATIENT)
Dept: PHYSICAL THERAPY | Facility: CLINIC | Age: 65
End: 2020-10-29

## 2020-10-29 DIAGNOSIS — G35 MULTIPLE SCLEROSIS (HCC): Primary | ICD-10-CM

## 2020-10-29 DIAGNOSIS — R41.841 COGNITIVE COMMUNICATION DEFICIT: ICD-10-CM

## 2020-10-29 PROCEDURE — 92523 SPEECH SOUND LANG COMPREHEN: CPT | Performed by: SPEECH-LANGUAGE PATHOLOGIST

## 2020-10-29 NOTE — PROGRESS NOTES
Outpatient Speech Language Pathology   Adult Speech Language Cognitive Initial Evaluation       Patient Name: Rashad Carrillo  : 1955  MRN: [id number]  Today's Date: 10/29/2020        Visit Date: 10/29/2020   Patient Active Problem List   Diagnosis   • Vitamin D deficiency   • Depression   • Multiple sclerosis (CMS/HCC)   • Restless legs syndrome   • Muscle spasticity   • Migraine, chronic, without aura, intractable, with status migrainosus   • Essential hypertension   • Frequent falls   • Status post balloon dilatation of esophageal stricture   • T2DM (type 2 diabetes mellitus) (CMS/HCC)   • TB lung, latent   • Aplastic anemia likely due to isoniazide   • Chest pain   • MCGRAW (dyspnea on exertion)   • Hyperlipidemia LDL goal <70        Past Medical History:   Diagnosis Date   • Diabetes mellitus (CMS/HCC)    • Hypertension    • Multiple sclerosis (CMS/HCC)         Past Surgical History:   Procedure Laterality Date   • CHOLECYSTECTOMY           Visit Dx:    ICD-10-CM ICD-9-CM   1. Multiple sclerosis (CMS/HCC)  G35 340   2. Cognitive communication deficit  R41.841 799.52     LTGs  Pt and family will implement compensatory strategies to maximize patient’s memory function so patient can continue to participate in daily activities with 80% accuracy and no cues.  Pt will be demonstrate verbal expression to express needs/wants with 80% accuracy and no cues.     STGs  Pt’s memory skills will be enhanced as reported by patient by utilizing internal memory strategies to recall up to 3 pieces of information after a 5 minute delay.  Pt will demonstrate word finding strategies with 80% accuracy and minimal cues.  Pt’s memory skills will be enhanced as reported by patient using external memory aides with 80% accuracy and no cues.  Pt will demonstrate self-monitoring strategies during functional tasks with 80% accuracy and minimal cues.    Recertification: 2021    SLP [facility] Evaluation - 10/29/20 1100         Communication Assessment/Intervention    Document Type  evaluation  (Pended)    -LL    Total Evaluation Minutes, SLP  45  (Pended)    -LL    Subjective Information  complains of;pain  (Pended)     knee pain  -LL    Patient Observations  alert;cooperative;agree to therapy  (Pended)    -LL    Care Plan Review  evaluation/treatment results reviewed;care plan/treatment goals reviewed  (Pended)    -LL    Patient Effort  good  (Pended)    -LL    Symptoms Noted During/After Treatment  none  (Pended)    -LL       General Information    Patient Profile Reviewed  yes  (Pended)    -LL    Pertinent History Of Current Problem  Pt is a 64 y.o. female with diagnosis of MS. Pt was referred by her neurologist for difficulty with word finding. Pt reports that her word finding difficulty creates frustration and fear that she will not be able to communicate in safety situations. Upon expression of this concern, SLP recommended that pt follow up with Dr. Gilbert to inquire about a medical ID card that notes pt is in his care and describes her pertinent medical condition/difficulties. SLP educated pt that a card like this may be helpful in the event that she is concerned for her safety but experiences dificulty with word finding and communicating important information. Pt notes that she has some difficulty with STM but is not very concerned about it. She notes that her cognitive difficulties worsen with fatigue. Pt reports no concern with LTM, auditory comprehension, or reading comprehension. She notes that she has a history of swallowing difficulty. She states that she has had her esophagus dilated two times and it has seemed to help. She reports her experience is feeling that food gets stuck in her esophagus. She notes no choking, and says alternating food and liquid is helpful to clearing the residue she feels. Pt reports WFL hearing and WFL vision with reading glasses. Pt worked at an [employer] for 25+ years and reports she  loved her job. She no longer works due to disability. Pt completed high school and some college courses. Pt lives by herself since her 's passing. She reports no family nearby and noted she has experienced many family deaths, but notes she has close friends nearby for support. Pt requested a document from her previous PT, Kat, for vertigo. She notes she had PT years ago and has lost her exercises that she found helpful in the past.   (Pended)    -LL    Precautions/Limitations, Vision  WFL;for purposes of eval;corrective lenses needed for reading  (Pended)    -LL    Precautions/Limitations, Hearing  WFL  (Pended)    -LL    Patient Level of Education  high school education + college courses  (Pended)    -LL    Prior Level of Function-Communication  WFL  (Pended)    -LL    Plans/Goals Discussed with  patient  (Pended)    -LL    Barriers to Rehab  none identified  (Pended)    -LL    Patient's Goals for Discharge  functional communication;functional cognition  (Pended)    -LL    Standardized Assessment Used  RBANS  (Pended)    -LL       Pain    Additional Documentation  Pain Scale: Numbers Pre/Post-Treatment (Group)  (Pended)    -LL       Pain Scale: Numbers Pre/Post-Treatment    Pretreatment Pain Rating  --  (Pended)     Pt reports pain without rating  -LL    Pre/Posttreatment Pain Comment  Pt notes she has a knee injury that is causing pain and difficulty with movement  (Pended)    -LL       Comprehension Assessment/Intervention    Comprehension Assessment/Intervention  Auditory Comprehension;Reading Comprehension  (Pended)    -LL       Auditory Comprehension Assessment/Intervention    Auditory Comprehension (Communication)  WFL  (Pended)    -LL    Auditory Comprehension Communication, Comment  WFL as reported by pt  (Pended)    -LL       Reading Comprehension Assessment/Intervention    Reading Comprehension (Communication)  WFL  (Pended)    -LL    Reading Comprehension, Comment  WFL as reported by pt    (Pended)    -LL       Expression Assessment/Intervention    Expression Assessment/Intervention  verbal expression;graphic expression  (Pended)    -LL       Verbal Expression Assessment/Intervention    Verbal Expression  moderate impairment  (Pended)    -LL    Automatic Speech (Communication)  WFL  (Pended)    -LL    Repetition  WFL  (Pended)    -LL    Responsive Naming  moderate impairment;circumlocution;delayed responses  (Pended)    -LL    Confrontational Naming  moderate impairment;circumlocution;delayed responses  (Pended)    -LL    Spontaneous/Functional Words  moderate impairment;circumlocution;delayed responses  (Pended)    -LL    Sentence Formulation  moderate impairment  (Pended)    -LL    Conversational Discourse/Fluency  circumlocution;delayed responses  (Pended)    -LL    Verbal Expression, Comment  Pt reports frequent word finding difficulty. This difficulty was observed during evaluation.  (Pended)    -LL       Graphic Expression Assessment/Intervention    Graphic Expression  WFL  (Pended)    -LL       Oral Motor Structure and Function    Oral Motor Structure and Function  WFL  (Pended)    -LL    Dentition Assessment  natural, present and adequate  (Pended)    -LL    Mucosal Quality  moist, healthy  (Pended)    -LL       Oral Musculature and Cranial Nerve Assessment    Oral Motor General Assessment  WFL  (Pended)    -LL       Motor Speech Assessment/Intervention    Motor Speech Function  WFL  (Pended)    -LL       Cursory Voice Assessment/Intervention    Quality and Resonance (Voice)  WFL  (Pended)    -LL       Cognitive Assessment Intervention- SLP    Cognitive Function (Cognition)  mild impairment;moderate impairment  (Pended)    -LL    Orientation Status (Cognition)  WFL  (Pended)    -LL    Memory (Cognitive)  mild impairment;functional;immediate;short-term;delayed  (Pended)    -LL    Attention (Cognitive)  WFL  (Pended)    -LL    Thought Organization (Cognitive)  moderate impairment  (Pended)     -LL    Reasoning (Cognitive)  WFL  (Pended)    -LL    Problem Solving (Cognitive)  WFL  (Pended)    -LL    Executive Function (Cognition)  WFL  (Pended)    -LL    Pragmatics (Communication)  WFL  (Pended)    -LL    Right Hemisphere Function  WFL  (Pended)    -LL    Cognition, Comment  Pt demonstrates difficulty with word finding, immediate memory, and delayed memory.   (Pended)    -LL       Standardized Tests    Cognitive/Memory Tests  RBANS: Repeatable Battery for the Assessment of Neuropsychological Status  (Pended)    -LL       RBANS- Repeatable Battery for the Assessment of Neuropsychological Status    Immediate Memory Index Score  83  (Pended)    -LL    Immediate Memory Qualitative Description  borderline  (Pended)    -LL    Visuospatial Index Score  92  (Pended)    -LL    Visuospatial Qualitative Description  low average  (Pended)    -LL    Language Index Score  60  (Pended)    -LL    Language Qualitative Description  extremely low  (Pended)    -LL    Attention Index Score  100  (Pended)    -LL    Attention Qualitative Description  average  (Pended)    -LL    Delayed Memory Index Score  81  (Pended)    -LL    Delayed Memory Qualitative Description  borderline  (Pended)    -LL    Total Index Score  416  (Pended)    -RB (r) LL (t)    RBANS Comments  Pt demonstrates difficulty in immediate memroy, delayed memory and language. The remainder of her scores fell within the normal range for the standardized norm for pt's age group.   (Pended)    -LL      User Key  (r) = Recorded By, (t) = Taken By, (c) = Cosigned By    Initials Name Provider Type    Marylou Moody, SLP Speech and Language Pathologist    Ewelina Valenzuela Speech Therapy Student Speech Therapy Student                         OP SLP Education     Row Name 10/29/20 1100       Education    Barriers to Learning  No barriers identified  (Pended)   -    Education Provided  Described results of evaluation;Patient expressed understanding of  evaluation;Patient requires further education on strategies, risks  (Pended)   -LL    Assessed  Learning needs;Learning motivation;Learning preferences;Learning readiness  (Pended)   -LL    Learning Motivation  Strong  (Pended)   -LL    Learning Method  Explanation;Demonstration;Written materials  (Pended)   -LL    Teaching Response  Verbalized understanding;Demonstrated understanding;Reinforcement needed  (Pended)   -LL    Education Comments  Memory and communication strategies discussed and written copy provided  (Pended)   -LL      User Key  (r) = Recorded By, (t) = Taken By, (c) = Cosigned By    Initials Name Effective Dates    LL Ewelina Jean, Speech Therapy Student 09/01/20 -               OP SLP Assessment/Plan - 10/29/20 1100        SLP Assessment    Functional Problems  Speech Language- Adult/Cognition  (Pended)    -LL    Impact on Function: Adult Speech Language/Cognition  Difficulty communicating wants, needs, and ideas;Difficulty communicating in a medical emergency;Restrictions in personal and social life;Difficulty sequencing thoughts to express complex messages;Decreased recall of personal information and medical history;Trouble learning or remembering new information  (Pended)    -LL    Clinical Impression: Speech Language-Adult/Congnition  Mild-Moderate:;Cognitive Communication Impairment  (Pended)    -LL    Functional Problems Comment  Pt's word finding difficulty impacts functional comunication which presents safety concern  (Pended)    -LL    Clinical Impression Comments  Pt would benefit from skilled ST  (Pended)    -LL    Please refer to paper survey for additional self-reported information  Yes  (Pended)    -LL    Please refer to items scanned into chart for additional diagnostic informaiton and handouts as provided by clinician  Yes  (Pended)    -LL    SLP Diagnosis  Mild-moderate cognitive communication impairment  (Pended)    -LL    Prognosis  Good (comment)  (Pended)    -LL     Patient/caregiver participated in establishment of treatment plan and goals  Yes  (Pended)    -LL    Patient would benefit from skilled therapy intervention  Yes  (Pended)    -LL       SLP Plan    Frequency  1x/weekly  (Pended)    -LL    Duration  9 weeks  (Pended)    -LL    Planned CPT's?  SLP INDIVIDUAL SPEECH THERAPY: 45872  (Pended)    -    Plan Comments  Initiate POC  (Pended)    -      User Key  (r) = Recorded By, (t) = Taken By, (c) = Cosigned By    Initials Name Provider Type    LL Ewelina Jean, Speech Therapy Student Speech Therapy Student        Ewelina Jean, SLP student, provided treatment under my direct supervision  Marylou Haddad MA CCC-SLP    RAFFAELE Turcios  10/29/2020

## 2020-11-05 ENCOUNTER — TREATMENT (OUTPATIENT)
Dept: PHYSICAL THERAPY | Facility: CLINIC | Age: 65
End: 2020-11-05

## 2020-11-05 ENCOUNTER — TELEPHONE (OUTPATIENT)
Dept: NEUROLOGY | Facility: CLINIC | Age: 65
End: 2020-11-05

## 2020-11-05 DIAGNOSIS — R41.841 COGNITIVE COMMUNICATION DEFICIT: ICD-10-CM

## 2020-11-05 DIAGNOSIS — G35 MULTIPLE SCLEROSIS (HCC): Primary | ICD-10-CM

## 2020-11-05 PROCEDURE — 92507 TX SP LANG VOICE COMM INDIV: CPT | Performed by: SPEECH-LANGUAGE PATHOLOGIST

## 2020-11-05 RX ORDER — VALACYCLOVIR HYDROCHLORIDE 500 MG/1
500 TABLET, FILM COATED ORAL 2 TIMES DAILY
Qty: 60 TABLET | Refills: 11 | Status: SHIPPED | OUTPATIENT
Start: 2020-11-05

## 2020-11-05 NOTE — PROGRESS NOTES
Outpatient Speech Language Pathology   Adult Speech Language Cognitive Treatment Note       Patient Name: Rashad Carrillo  : 1955  MRN: [id number]  Today's Date: 2020         Visit Date: 2020   Patient Active Problem List   Diagnosis   • Vitamin D deficiency   • Depression   • Multiple sclerosis (CMS/HCC)   • Restless legs syndrome   • Muscle spasticity   • Migraine, chronic, without aura, intractable, with status migrainosus   • Essential hypertension   • Frequent falls   • Status post balloon dilatation of esophageal stricture   • T2DM (type 2 diabetes mellitus) (CMS/HCC)   • TB lung, latent   • Aplastic anemia likely due to isoniazide   • Chest pain   • MCGRAW (dyspnea on exertion)   • Hyperlipidemia LDL goal <70          Visit Dx:    ICD-10-CM ICD-9-CM   1. Multiple sclerosis (CMS/HCC)  G35 340   2. Cognitive communication deficit  R41.841 799.52     Pain: 5 overall   Subjective: Pt reports no new concerns today.    LTGs  Pt and family will implement compensatory strategies to maximize patient’s memory function so patient can continue to participate in daily activities with 80% accuracy and no cues.  - Goal initiated. SLP student and pt discussed current strategies for ADLs. SLP student educated pt about additional compensatory strategies to use for ADLs.   Pt will be demonstrate verbal expression to express needs/wants with 80% accuracy and no cues.   - Goal initiated. Targeted with semantic feature analysis. 70% with min-mod verbal cues and a model.     STGs  Pt’s memory skills will be enhanced as reported by patient by utilizing internal memory strategies to recall up to 3 pieces of information after a 5 minute delay.  - Not targeted today due to time spent on assessment of ADLs and word finding strategy education/practice.  Pt will demonstrate word finding strategies with 80% accuracy and minimal cues.  - Goal initiated. Targeted with semantic feature analysis (SFA). Pt demonstrates 70%  accuracy with min-mod verbal cues and a model. HEP and visual aide provided  Pt’s memory skills will be enhanced as reported by patient using external memory aides with 80% accuracy and no cues.  - Goal initiated. Pt demonstrates 65% accuracy of use during SFA task with min-mod cues.   Pt will demonstrate self-monitoring strategies during functional tasks with 80% accuracy and minimal cues.  - Goal initiated. Pt demonstrates 70% accuracy with self-monitoring by asking questions about her memory function and rating herself on use of strategies provided.     *SLP student assessed pt's independence in activities of daily living including financial management, calendar management, and medication management. Pt demonstrates 90% accuracy with financial management task with min-moderate verbal cues. Pt demonstrates 100% accuracy with minimal verbal cues for calendar management. Pt demonstrates 90% accuracy with medication management with min-mod cues.      Recertification: 01/28/2021      OP SLP Education     Row Name 11/05/20 0800       Education    Barriers to Learning  No barriers identified  (Pended)   -    Education Provided  Patient demonstrated recommended strategies;Patient requires further education on strategies, risks  (Pended)   -LL    Assessed  Learning needs;Learning motivation;Learning preferences;Learning readiness  (Pended)   -LL    Learning Motivation  Strong  (Pended)   -LL    Learning Method  Explanation;Demonstration;Written materials  (Pended)   -    Teaching Response  Verbalized understanding;Demonstrated understanding;Reinforcement needed  (Pended)   -    Education Comments  Semantic feature analysis HEP  (Pended)   -      User Key  (r) = Recorded By, (t) = Taken By, (c) = Cosigned By    Initials Name Effective Dates     Ewelina Jean, Speech Therapy Student 09/01/20 -           OP SLP Assessment/Plan - 11/05/20 0800        SLP Assessment    Functional Problems  Speech Language-  Adult/Cognition  (Pended)    -LL    Impact on Function: Adult Speech Language/Cognition  Difficulty communicating wants, needs, and ideas;Difficulty communicating in a medical emergency;Restrictions in personal and social life;Difficulty sequencing thoughts to express complex messages;Decreased recall of personal information and medical history;Trouble learning or remembering new information  (Pended)    -LL    Clinical Impression: Speech Language-Adult/Congnition  Mild-Moderate:;Cognitive Communication Impairment  (Pended)    -LL    Functional Problems Comment  Pt's word finding impacts daily communication  (Pended)    -LL    Clinical Impression Comments  Pt benefits from skilled ST  (Pended)    -LL    Please refer to paper survey for additional self-reported information  Yes  (Pended)    -LL    Please refer to items scanned into chart for additional diagnostic informaiton and handouts as provided by clinician  Yes  (Pended)    -LL    SLP Diagnosis  Mild-moderate cognitive communication impairment  (Pended)    -LL    Prognosis  Good (comment)  (Pended)    -LL    Patient/caregiver participated in establishment of treatment plan and goals  Yes  (Pended)    -LL    Patient would benefit from skilled therapy intervention  Yes  (Pended)    -LL       SLP Plan    Frequency  1x/weekly  (Pended)    -LL    Duration  8 weeks  (Pended)    -LL    Planned CPT's?  SLP INDIVIDUAL SPEECH THERAPY: 17453  (Pended)    -LL    Expected Duration of Therapy Session (SLP Eval)  45  (Pended)    -LL    Plan Comments  Continue POC  (Pended)    -LL      User Key  (r) = Recorded By, (t) = Taken By, (c) = Cosigned By    Initials Name Provider Type    Ewelina Valenzuela, Speech Therapy Student Speech Therapy Student         Ewelina Jean, SLP student, provided treatment under my direct supervision   Marylou Haddad MA [facility]-SLP     Ewelina Jean, Speech Therapy Student  11/5/2020

## 2020-11-05 NOTE — TELEPHONE ENCOUNTER
PT CALLED IN TODAY REQUESTING A REFILL ON HER MEDICATION THAT'S FOR ULCERS IN HER MOUTH. PT WAS UNSURE OF WHICH MEDICATION IT WAS FOR BUT SAID IT WAS A PINK PILL THAT CAME IN SEVERAL DIFFERENT SIZES. PHARMACY HAS BEEN VERIFIED.      CALL BACK- [phone]

## 2020-11-10 RX ORDER — ACYCLOVIR 400 MG/1
400 TABLET ORAL 2 TIMES DAILY
Qty: 60 TABLET | Refills: 11 | Status: SHIPPED | OUTPATIENT
Start: 2020-11-10

## 2020-11-12 ENCOUNTER — TREATMENT (OUTPATIENT)
Dept: PHYSICAL THERAPY | Facility: CLINIC | Age: 65
End: 2020-11-12

## 2020-11-12 DIAGNOSIS — G35 MULTIPLE SCLEROSIS (HCC): Primary | ICD-10-CM

## 2020-11-12 DIAGNOSIS — R41.841 COGNITIVE COMMUNICATION DEFICIT: ICD-10-CM

## 2020-11-12 PROCEDURE — 92507 TX SP LANG VOICE COMM INDIV: CPT | Performed by: SPEECH-LANGUAGE PATHOLOGIST

## 2020-11-12 NOTE — PROGRESS NOTES
Outpatient Speech Language Pathology   Adult Speech Language Cognitive Treatment Note       Patient Name: Rashad Carrillo  : 1955  MRN: [id number]  Today's Date: 2020         Visit Date: 2020   Patient Active Problem List   Diagnosis   • Vitamin D deficiency   • Depression   • Multiple sclerosis (CMS/HCC)   • Restless legs syndrome   • Muscle spasticity   • Migraine, chronic, without aura, intractable, with status migrainosus   • Essential hypertension   • Frequent falls   • Status post balloon dilatation of esophageal stricture   • T2DM (type 2 diabetes mellitus) (CMS/HCC)   • TB lung, latent   • Aplastic anemia likely due to isoniazide   • Chest pain   • MCGRAW (dyspnea on exertion)   • Hyperlipidemia LDL goal <70          Visit Dx:    ICD-10-CM ICD-9-CM   1. Multiple sclerosis (CMS/HCC)  G35 340   2. Cognitive communication deficit  R41.841 799.52     Pain: 5 overall   Subjective: Pt reports using semantic feature analysis in conversations this past week with success.      LTGs  Pt and family will implement compensatory strategies to maximize patient’s memory function so patient can continue to participate in daily activities with 80% accuracy and no cues.  - Pt demonstrates 70% with short-term memory strategies with min-mod cues.   Pt will be demonstrate verbal expression to express needs/wants with 80% accuracy and no cues.   -  Targeted with semantic feature analysis. 75% with minimal verbal cues.     STGs  Pt’s memory skills will be enhanced as reported by patient by utilizing internal memory strategies to recall up to 3 pieces of information after a 5 minute delay.  - Pt recalls 4/4 pieces of information during functional name recall after 5 minute delay.   Pt will demonstrate word finding strategies with 80% accuracy and minimal cues.  - Targeted with semantic feature analysis (SFA). Pt demonstrates 75% accuracy with minimal verbal cues.  Pt’s memory skills will be enhanced as reported  by patient using external memory aides with 80% accuracy and no cues.  - Pt demonstrates 70% accuracy during functional financial/bill task with minimal verbal cues.  Pt will demonstrate self-monitoring strategies during functional tasks with 80% accuracy and minimal cues.  -  Pt demonstrates 70% accuracy with self-monitoring with minimal verbal cues by asking questions about her use of strategy and self-assessing use of strategy.     Recertification: 01/28/2021        OP SLP Education     Row Name 11/12/20 0700       Education    Barriers to Learning  No barriers identified  (Pended)   -    Education Provided  Patient demonstrated recommended strategies;Patient requires further education on strategies, risks  (Pended)   -LL    Assessed  Learning needs;Learning motivation;Learning preferences;Learning readiness  (Pended)   -LL    Learning Motivation  Strong  (Pended)   -    Learning Method  Explanation;Demonstration;Written materials  (Pended)   -    Teaching Response  Verbalized understanding;Demonstrated understanding;Reinforcement needed  (Pended)   -    Education Comments  External memory aide and word finding HEP  (Pended)   -      User Key  (r) = Recorded By, (t) = Taken By, (c) = Cosigned By    Initials Name Effective Dates    LL Ewelina Jean, Speech Therapy Student 09/01/20 -           OP SLP Assessment/Plan - 11/12/20 0700        SLP Assessment    Functional Problems  Speech Language- Adult/Cognition  (Pended)    -    Impact on Function: Adult Speech Language/Cognition  Difficulty communicating wants, needs, and ideas;Difficulty communicating in a medical emergency;Restrictions in personal and social life;Difficulty sequencing thoughts to express complex messages;Decreased recall of personal information and medical history;Trouble learning or remembering new information  (Pended)    -    Clinical Impression: Speech Language-Adult/Congnition  Mild-Moderate:;Cognitive Communication  Impairment  (Pended)    -LL    Functional Problems Comment  Pt's word finding impacts functional communication which presents safety concern  (Pended)    -LL    Clinical Impression Comments  Pt benefits from skilled ST to address cognition  (Pended)    -LL    Please refer to paper survey for additional self-reported information  Yes  (Pended)    -LL    Please refer to items scanned into chart for additional diagnostic informaiton and handouts as provided by clinician  Yes  (Pended)    -LL    SLP Diagnosis  Mild-moderate cognitive communication impairment  (Pended)    -LL    Prognosis  Good (comment)  (Pended)    -LL    Patient/caregiver participated in establishment of treatment plan and goals  Yes  (Pended)    -LL    Patient would benefit from skilled therapy intervention  Yes  (Pended)    -LL       SLP Plan    Frequency  1x/weekly  (Pended)    -LL    Duration  7 weeks  (Pended)    -LL    Planned CPT's?  SLP INDIVIDUAL SPEECH THERAPY: 53158  (Pended)    -LL    Expected Duration of Therapy Session (SLP Eval)  45  (Pended)    -LL    Plan Comments  Continue POC  (Pended)    -LL      User Key  (r) = Recorded By, (t) = Taken By, (c) = Cosigned By    Initials Name Provider Type    Ewelina Valenzuela, Speech Therapy Student Speech Therapy Student          Ewelina Jean, SLP student, provided treatment under my direct supervision  Marylou Haddad MA [facility]-SLP      Ewelina Jean, Speech Therapy Student  11/12/2020

## 2020-11-19 ENCOUNTER — TREATMENT (OUTPATIENT)
Dept: PHYSICAL THERAPY | Facility: CLINIC | Age: 65
End: 2020-11-19

## 2020-11-19 DIAGNOSIS — R41.841 COGNITIVE COMMUNICATION DEFICIT: ICD-10-CM

## 2020-11-19 DIAGNOSIS — G35 MULTIPLE SCLEROSIS (HCC): Primary | ICD-10-CM

## 2020-11-19 PROCEDURE — 92507 TX SP LANG VOICE COMM INDIV: CPT | Performed by: SPEECH-LANGUAGE PATHOLOGIST

## 2020-11-19 NOTE — PROGRESS NOTES
Outpatient Speech Language Pathology   Adult Speech Language Cognitive Treatment Note       Patient Name: Rashad Carrillo  : 1955  MRN: [id number]  Today's Date: 2020         Visit Date: 2020   Patient Active Problem List   Diagnosis   • Vitamin D deficiency   • Depression   • Multiple sclerosis (CMS/HCC)   • Restless legs syndrome   • Muscle spasticity   • Migraine, chronic, without aura, intractable, with status migrainosus   • Essential hypertension   • Frequent falls   • Status post balloon dilatation of esophageal stricture   • T2DM (type 2 diabetes mellitus) (CMS/HCC)   • TB lung, latent   • Aplastic anemia likely due to isoniazide   • Chest pain   • MCGRAW (dyspnea on exertion)   • Hyperlipidemia LDL goal <70          Visit Dx:    ICD-10-CM ICD-9-CM   1. Multiple sclerosis (CMS/HCC)  G35 340   2. Cognitive communication deficit  R41.841 799.52     Pain: 5 overall   Subjective: Pt reports difficulty with names of people she knows and asked for suggestions. SLP and student suggested strategies and pt expresses understanding.      LTGs  Pt and family will implement compensatory strategies to maximize patient’s memory function so patient can continue to participate in daily activities with 80% accuracy and no cues.  - Pt demonstrates 75% with short-term memory strategies with min-mod cues.   Pt will be demonstrate verbal expression to express needs/wants with 80% accuracy and no cues.   -  Targeted with semantic feature analysis. 80% with minimal verbal cues and a model throughout task.      STGs  Pt’s memory skills will be enhanced as reported by patient by utilizing internal memory strategies to recall up to 3 pieces of information after a 5 minute delay.  - Pt recalls 4/4 pieces of information during functional name recall after 5 minute delay. continue to reduce cues to facilitate independence in use of strategies for recall.   Pt will demonstrate word finding strategies with 80% accuracy  and minimal cues.  - Targeted with semantic feature analysis (SFA). Pt demonstrates 75% accuracy with minimal verbal cues. Pt benefits from model.   Pt’s memory skills will be enhanced as reported by patient using external memory aides with 80% accuracy and no cues.  - Pt demonstrates 70% accuracy with minimal cues during SFA task. Pt demonstrates 80% during work email proofreading task with no cues.   Pt will demonstrate self-monitoring strategies during functional tasks with 80% accuracy and minimal cues.  -  Pt demonstrates 75% accuracy with self-monitoring with minimal verbal cues by asking questions about her use of strategy and self-assessing use of strategy.     Recertification: 01/28/2021        OP SLP Education     Row Name 11/19/20 1400       Education    Barriers to Learning  No barriers identified  (Pended)   -LL    Education Provided  Patient demonstrated recommended strategies;Patient requires further education on strategies, risks  (Pended)   -LL    Assessed  Learning needs;Learning motivation;Learning preferences;Learning readiness  (Pended)   -LL    Learning Motivation  Strong  (Pended)   -LL    Learning Method  Explanation;Demonstration;Written materials  (Pended)   -LL    Teaching Response  Verbalized understanding;Demonstrated understanding;Reinforcement needed  (Pended)   -LL    Education Comments  Word find HEP   (Pended)   -LL      User Key  (r) = Recorded By, (t) = Taken By, (c) = Cosigned By    Initials Name Effective Dates    LL Ewelina Jean, Speech Therapy Student 09/01/20 -           OP SLP Assessment/Plan - 11/19/20 1400        SLP Assessment    Functional Problems  Speech Language- Adult/Cognition  (Pended)    -LL    Impact on Function: Adult Speech Language/Cognition  Difficulty communicating wants, needs, and ideas;Difficulty communicating in a medical emergency;Restrictions in personal and social life;Difficulty sequencing thoughts to express complex messages;Decreased recall  of personal information and medical history;Trouble learning or remembering new information  (Pended)    -LL    Clinical Impression: Speech Language-Adult/Congnition  Mild-Moderate:;Cognitive Communication Impairment  (Pended)    -LL    Functional Problems Comment  Pt's STM impacts functional communication and daily tasks  (Pended)    -LL    Clinical Impression Comments  Pt making progress toward goals; Continues to benefit from skilled ST services  (Pended)    -LL    Please refer to paper survey for additional self-reported information  Yes  (Pended)    -LL    Please refer to items scanned into chart for additional diagnostic informaiton and handouts as provided by clinician  Yes  (Pended)    -LL    SLP Diagnosis  Mild-moderate cognitive communication impairment  (Pended)    -LL    Prognosis  Good (comment)  (Pended)    -LL    Patient/caregiver participated in establishment of treatment plan and goals  Yes  (Pended)    -LL    Patient would benefit from skilled therapy intervention  Yes  (Pended)    -LL       SLP Plan    Frequency  1x/weekly  (Pended)    -LL    Duration  6 weeks  (Pended)    -LL    Planned CPT's?  SLP INDIVIDUAL SPEECH THERAPY: 26345  (Pended)    -LL    Expected Duration of Therapy Session (SLP Eval)  45  (Pended)    -LL    Plan Comments  Continue POC  (Pended)    -LL      User Key  (r) = Recorded By, (t) = Taken By, (c) = Cosigned By    Initials Name Provider Type    Ewelina Valenzuela, Speech Therapy Student Speech Therapy Student        Ewelina Jean, SLP student, provided treatment under my direct supervision  Marylou Haddad MA [facility]-SLP    Ewelina Jean, Speech Therapy Student  11/19/2020

## 2020-11-24 ENCOUNTER — TREATMENT (OUTPATIENT)
Dept: PHYSICAL THERAPY | Facility: CLINIC | Age: 65
End: 2020-11-24

## 2020-11-24 DIAGNOSIS — G35 MULTIPLE SCLEROSIS (HCC): Primary | ICD-10-CM

## 2020-11-24 DIAGNOSIS — R41.841 COGNITIVE COMMUNICATION DEFICIT: ICD-10-CM

## 2020-11-24 PROCEDURE — 92507 TX SP LANG VOICE COMM INDIV: CPT | Performed by: SPEECH-LANGUAGE PATHOLOGIST

## 2020-11-24 NOTE — PROGRESS NOTES
Outpatient Speech Language Pathology   Adult Speech Language Cognitive Treatment Note       Patient Name: Rashad Carrillo  : 1955  MRN: [id number]  Today's Date: 2020         Visit Date: 2020   Patient Active Problem List   Diagnosis   • Vitamin D deficiency   • Depression   • Multiple sclerosis (CMS/HCC)   • Restless legs syndrome   • Muscle spasticity   • Migraine, chronic, without aura, intractable, with status migrainosus   • Essential hypertension   • Frequent falls   • Status post balloon dilatation of esophageal stricture   • T2DM (type 2 diabetes mellitus) (CMS/HCC)   • TB lung, latent   • Aplastic anemia likely due to isoniazide   • Chest pain   • MCGRAW (dyspnea on exertion)   • Hyperlipidemia LDL goal <70          Visit Dx:    ICD-10-CM ICD-9-CM   1. Multiple sclerosis (CMS/HCC)  G35 340   2. Cognitive communication deficit  R41.841 799.52     Pain: 5 - knee, headache  Subjective: Pt reports use of memory strategies at home with names.      LTGs  Pt and family will implement compensatory strategies to maximize patient’s memory function so patient can continue to participate in daily activities with 80% accuracy and no cues.  - Pt demonstrates 80% with short-term memory strategies with min-mod cues. Continue to reduce cues to facilitate independence.   Pt will be demonstrate verbal expression to express needs/wants with 80% accuracy and no cues.   -  Targeted with semantic feature analysis. 80% with minimal verbal cues and a model throughout task. Pt remains consistent since last session.      STGs  Pt’s memory skills will be enhanced as reported by patient by utilizing internal memory strategies to recall up to 3 pieces of information after a 5 minute delay.  - Pt recalls 4/4 pieces of information during functional name recall after 5 minute delay. Pt required minimal-moderate cues in strategy creation; continue to reduce cues to facilitate independence in use of strategies for recall.    Pt will demonstrate word finding strategies with 80% accuracy and minimal cues.  - Targeted with semantic feature analysis (SFA). Pt demonstrates 80% accuracy with minimal verbal cues. Pt benefits from model. Continue to reduce cues and models to facilitate independence for generalization.   Pt’s memory skills will be enhanced as reported by patient using external memory aides with 80% accuracy and no cues.  - Pt demonstrates 75% accuracy with minimal cues during SFA task.   Pt will demonstrate self-monitoring strategies during functional tasks with 80% accuracy and minimal cues.  -  Pt demonstrates 75% accuracy with self-monitoring with minimal verbal cues through discussion of strategy use.     Recertification: 01/28/2021        OP SLP Education     Row Name 11/24/20 1100       Education    Barriers to Learning  No barriers identified  (Pended)   -LL    Education Provided  Patient demonstrated recommended strategies;Patient requires further education on strategies, risks  (Pended)   -LL    Assessed  Learning needs;Learning motivation;Learning preferences;Learning readiness  (Pended)   -LL    Learning Motivation  Strong  (Pended)   -LL    Learning Method  Explanation;Demonstration;Written materials  (Pended)   -    Teaching Response  Verbalized understanding;Demonstrated understanding;Reinforcement needed  (Pended)   -LL    Education Comments  Word finding HEP   (Pended)   -LL      User Key  (r) = Recorded By, (t) = Taken By, (c) = Cosigned By    Initials Name Effective Dates    LL Ewelina Jean, Speech Therapy Student 09/01/20 -           OP SLP Assessment/Plan - 11/24/20 1100        SLP Assessment    Functional Problems  Speech Language- Adult/Cognition  (Pended)    -LL    Impact on Function: Adult Speech Language/Cognition  Difficulty communicating wants, needs, and ideas;Difficulty communicating in a medical emergency;Restrictions in personal and social life;Trouble learning or remembering new  information  (Pended)    -LL    Clinical Impression: Speech Language-Adult/Congnition  Mild-Moderate:;Cognitive Communication Impairment  (Pended)    -LL    Functional Problems Comment  Pt's word finding impacts functional communication in all settings  (Pended)    -LL    Clinical Impression Comments  Pt continues to benefit from skilled ST  (Pended)    -LL    Please refer to paper survey for additional self-reported information  Yes  (Pended)    -LL    Please refer to items scanned into chart for additional diagnostic informaiton and handouts as provided by clinician  Yes  (Pended)    -LL    SLP Diagnosis  Mild-moderate cognitive communication impairment  (Pended)    -LL    Prognosis  Good (comment)  (Pended)    -LL    Patient/caregiver participated in establishment of treatment plan and goals  Yes  (Pended)    -LL    Patient would benefit from skilled therapy intervention  Yes  (Pended)    -LL       SLP Plan    Frequency  1x/weekly  (Pended)    -LL    Duration  5 weeks  (Pended)    -LL    Planned CPT's?  SLP INDIVIDUAL SPEECH THERAPY: 08742  (Pended)    -LL    Expected Duration of Therapy Session (SLP Eval)  45  (Pended)    -LL    Plan Comments  Continue POC  (Pended)    -LL      User Key  (r) = Recorded By, (t) = Taken By, (c) = Cosigned By    Initials Name Provider Type    LL Ewelina Jean, Speech Therapy Student Speech Therapy Student        Ewelina Jean, SLP student, provided treatment under my direct supervision  Marylou Haddad MA [facility]-SLP  Ewelina Jean, Speech Therapy Student  11/24/2020

## 2020-12-03 ENCOUNTER — TREATMENT (OUTPATIENT)
Dept: PHYSICAL THERAPY | Facility: CLINIC | Age: 65
End: 2020-12-03

## 2020-12-03 DIAGNOSIS — R41.841 COGNITIVE COMMUNICATION DEFICIT: ICD-10-CM

## 2020-12-03 DIAGNOSIS — G35 MULTIPLE SCLEROSIS (HCC): Primary | ICD-10-CM

## 2020-12-03 PROCEDURE — 92507 TX SP LANG VOICE COMM INDIV: CPT | Performed by: SPEECH-LANGUAGE PATHOLOGIST

## 2020-12-03 NOTE — PROGRESS NOTES
Outpatient Speech Language Pathology   Adult Speech Language Cognitive Treatment Note       Patient Name: Rashad Carrillo  : 1955  MRN: [id number]  Today's Date: 12/3/2020         Visit Date: 2020   Patient Active Problem List   Diagnosis   • Vitamin D deficiency   • Depression   • Multiple sclerosis (CMS/HCC)   • Restless legs syndrome   • Muscle spasticity   • Migraine, chronic, without aura, intractable, with status migrainosus   • Essential hypertension   • Frequent falls   • Status post balloon dilatation of esophageal stricture   • T2DM (type 2 diabetes mellitus) (CMS/HCC)   • TB lung, latent   • Aplastic anemia likely due to isoniazide   • Chest pain   • MCGRAW (dyspnea on exertion)   • Hyperlipidemia LDL goal <70          Visit Dx:    ICD-10-CM ICD-9-CM   1. Multiple sclerosis (CMS/HCC)  G35 340   2. Cognitive communication deficit  R41.841 799.52     Pain: knee; shortness of breath - pt has notified her doctor and thinks her medication is the cause  Subjective: Pt reports she would like to learn all she can about memory strategies due to ongoing difficulty with remembering names and misplacing items.     LTGs  Pt and family will implement compensatory strategies to maximize patient’s memory function so patient can continue to participate in daily activities with 80% accuracy and no cues.  - Pt demonstrates 80% with short-term memory strategies with minimal cues. Pt required less cueing today.  Pt will be demonstrate verbal expression to express needs/wants with 80% accuracy and no cues.   - Targeted through conversation about word finding and discussion of strategies. Pt remains consistent with 80% accuracy and minimal cues. She notes she feels more independent with strategies.     STGs  Pt’s memory skills will be enhanced as reported by patient by utilizing internal memory strategies to recall up to 3 pieces of information after a 5 minute delay.  - Pt recalls 4/4 pieces of information during  "functional name recall after 5 minute delay. - MET  Pt will demonstrate word finding strategies with 80% accuracy and minimal cues.  -Discussed pt's independence with word finding strategies; Pt feels the strategies are helpful and she feels confident with using them in daily settings. Pt encouraged to practice strategies with semantic feature analysis chart for HEP to continue increasing independence. Continue to reduced cues as appropriate.   Pt’s memory skills will be enhanced as reported by patient using external memory aides with 80% accuracy and no cues.  - Pt demonstrates 80% accuracy with minimal cues during functional calendar task  Pt will demonstrate self-monitoring strategies during functional tasks with 80% accuracy and minimal cues.  -  Pt demonstrates 75% accuracy with self-monitoring with minimal verbal cues through discussion of strategy use.    *Pt notes that her vertigo has gotten \"much worse\" lately and requests PT exercise sheet that she received in prior PT. SLP educated pt that she may benefit from returning to PT to re-assess for vertigo. Pt agrees, PT evaluation appointment was made today. SLP informed pt that the PT will assess and provide exercise sheet if she feels that it is still applicable/appropriate after evaluation. Pt also notes not feeling well (fatigue, shortness of breath) and feels that her medications are the cause. SLP student educated pt to consult with doctor to inform of these symptoms. Pt notes that she has called doctor.      Recertification: 01/28/2021        OP SLP Education     Row Name 12/03/20 0800       Education    Barriers to Learning  No barriers identified  (Pended)   -LL    Education Provided  Patient demonstrated recommended strategies;Patient requires further education on strategies, risks  (Pended)   -LL    Assessed  Learning needs;Learning motivation;Learning preferences;Learning readiness  (Pended)   -LL    Learning Motivation  Strong  (Pended)   -LL    " Learning Method  Explanation;Demonstration;Written materials  (Pended)   -LL    Teaching Response  Verbalized understanding;Demonstrated understanding;Reinforcement needed  (Pended)   -LL    Education Comments  Internal memory strategy HEP; article/information recall HEP  (Pended)   -LL      User Key  (r) = Recorded By, (t) = Taken By, (c) = Cosigned By    Initials Name Effective Dates    LL Ewelina Jean, Speech Therapy Student 09/01/20 -           OP SLP Assessment/Plan - 12/03/20 0800        SLP Assessment    Functional Problems  Speech Language- Adult/Cognition  (Pended)    -LL    Impact on Function: Adult Speech Language/Cognition  Difficulty communicating wants, needs, and ideas;Difficulty communicating in a medical emergency;Restrictions in personal and social life;Trouble learning or remembering new information  (Pended)    -LL    Clinical Impression: Speech Language-Adult/Congnition  Mild-Moderate:;Cognitive Communication Impairment  (Pended)    -LL    Functional Problems Comment  Pt's STM impacts social encounters for recall of names and functional information  (Pended)    -LL    Clinical Impression Comments  Pt benefits from skilled ST  (Pended)    -LL    Please refer to paper survey for additional self-reported information  Yes  (Pended)    -LL    Please refer to items scanned into chart for additional diagnostic informaiton and handouts as provided by clinician  Yes  (Pended)    -LL    SLP Diagnosis  Mild-moderate cognitive communication impairment  (Pended)    -LL    Prognosis  Good (comment)  (Pended)    -LL    Patient/caregiver participated in establishment of treatment plan and goals  Yes  (Pended)    -LL    Patient would benefit from skilled therapy intervention  Yes  (Pended)    -LL       SLP Plan    Frequency  1x/weekly  (Pended)    -LL    Duration  4 weeks  (Pended)    -LL    Planned CPT's?  SLP INDIVIDUAL SPEECH THERAPY: 74281  (Pended)    -LL    Expected Duration of Therapy Session (SLP  Eval)  45  (Pended)    -LL    Plan Comments  Continue POC  (Pended)    -LL      User Key  (r) = Recorded By, (t) = Taken By, (c) = Cosigned By    Initials Name Provider Type    Ewelina Vaelnzuela, Speech Therapy Student Speech Therapy Student        Ewelina Jean, SLP student, provided treatment under my direct supervision  Marylou Haddad MA [facility]-SLP    Ewelina Jean, Speech Therapy Student  12/3/2020

## 2020-12-10 ENCOUNTER — TELEPHONE (OUTPATIENT)
Dept: NEUROLOGY | Facility: CLINIC | Age: 65
End: 2020-12-10

## 2020-12-10 ENCOUNTER — TREATMENT (OUTPATIENT)
Dept: PHYSICAL THERAPY | Facility: CLINIC | Age: 65
End: 2020-12-10

## 2020-12-10 DIAGNOSIS — R41.841 COGNITIVE COMMUNICATION DEFICIT: Primary | ICD-10-CM

## 2020-12-10 DIAGNOSIS — G35 MULTIPLE SCLEROSIS (HCC): ICD-10-CM

## 2020-12-10 PROCEDURE — 92507 TX SP LANG VOICE COMM INDIV: CPT | Performed by: SPEECH-LANGUAGE PATHOLOGIST

## 2020-12-10 RX ORDER — DALFAMPRIDINE 10 MG/1
10 TABLET, FILM COATED, EXTENDED RELEASE ORAL 2 TIMES DAILY
Qty: 60 TABLET | Refills: 11 | Status: CANCELLED | OUTPATIENT
Start: 2020-12-10

## 2020-12-10 NOTE — PROGRESS NOTES
"Outpatient Speech Language Pathology   Adult Speech Language Cognitive Treatment Note/Discharge Summary       Patient Name: Rashad Carrillo  : 1955  MRN: [id number]  Today's Date: 12/10/2020         Visit Date: 12/10/2020   Patient Active Problem List   Diagnosis   • Vitamin D deficiency   • Depression   • Multiple sclerosis (CMS/HCC)   • Restless legs syndrome   • Muscle spasticity   • Migraine, chronic, without aura, intractable, with status migrainosus   • Essential hypertension   • Frequent falls   • Status post balloon dilatation of esophageal stricture   • T2DM (type 2 diabetes mellitus) (CMS/HCC)   • TB lung, latent   • Aplastic anemia likely due to isoniazide   • Chest pain   • MCGRAW (dyspnea on exertion)   • Hyperlipidemia LDL goal <70          Visit Dx:    ICD-10-CM ICD-9-CM   1. Cognitive communication deficit  R41.841 799.52   2. Multiple sclerosis (CMS/HCC)  G35 340        Pain: 8- general pain \"all over\"  Subjective: Pt reports not feeling very well today    LTGs  Pt and family will implement compensatory strategies to maximize patient’s memory function so patient can continue to participate in daily activities with 80% accuracy and no cues.  - Pt demonstrates 80% with short-term memory strategies with minimal cues. MET  Pt will be demonstrate verbal expression to express needs/wants with 80% accuracy and no cues.   - Targeted through conversation about word finding and discussion of strategies. Pt remains consistent with 80% accuracy and minimal cues. MET   STGs  Pt’s memory skills will be enhanced as reported by patient by utilizing internal memory strategies to recall up to 3 pieces of information after a 5 minute delay.  - Pt recalls 4/4 pieces of information during functional name recall and grocery list recall after 5 minute delay. - MET x2  Pt will demonstrate word finding strategies with 80% accuracy and minimal cues.  -MET at greater than 80%. MET  Pt’s memory skills will be enhanced " as reported by patient using external memory aides with 80% accuracy and no cues.  - Pt demonstrates 80% accuracy. MET  Pt will demonstrate self-monitoring strategies during functional tasks with 80% accuracy and minimal cues.  -  Pt demonstrates 80% accuracy with self-monitoring with minimal verbal cues through discussion of strategy use. MET        Recertification: 01/28/2021            OP SLP Education     Row Name 12/10/20 0800       Education    Barriers to Learning  No barriers identified  -RB    Education Provided  Patient demonstrated recommended strategies;Patient requires further education on strategies, risks  -RB    Assessed  Learning needs;Learning motivation;Learning preferences;Learning readiness  -RB    Learning Motivation  Strong  -RB    Learning Method  Explanation;Demonstration;Written materials  -RB    Teaching Response  Verbalized understanding;Demonstrated understanding  -RB    Education Comments  memory strategies and HEP  -RB      User Key  (r) = Recorded By, (t) = Taken By, (c) = Cosigned By    Initials Name Effective Dates    Marylou Moody SLP 02/28/20 -           OP SLP Assessment/Plan - 12/10/20 0800        SLP Assessment    Clinical Impression Comments  Pt is independent with strategies and met all her goals. Discharge at this time and follow HEP    -RB       SLP Plan    Plan Comments  discharge    -RB      User Key  (r) = Recorded By, (t) = Taken By, (c) = Cosigned By    Initials Name Provider Type    Marylou Moody SLP Speech and Language Pathologist                 OP SLP Discharge Summary  Date of Discharge: 12/10/20  Reason for Discharge: all goals and outcomes met, no further needs identified  Progress Toward Achieving Short/long Term Goals: all goals met within established timelines  Discharge Destination: home  Discharge Instructions: follow BEBA Haddad MA CCC-SLP  12/10/2020

## 2020-12-10 NOTE — TELEPHONE ENCOUNTER
PT IS CALLING REQUESTING NEW LAB ORDER .. STATES IT IS PAID BY  [employer] AND SHEHAS HAD THIS FOR 2 YEARS . SHE SAYS IT CAN BE MAILED OR SHE CAN   ALSO REQUESTING REFILL OF Dalfampridine ER 10 MG tablet sustained-release 12 hour IS LOW ONLY HAS A WEEKS WORTH LEFT     PLEASE ADVISE  CALL BACK @ [phone]

## 2020-12-10 NOTE — TELEPHONE ENCOUNTER
Spoke with patient informed I would fax over the order to [facility] in [place]. Advised patient to wait until next week to get them drawn just to make sure it gets entered into the their system. Patient verbalized understanding.

## 2020-12-11 ENCOUNTER — SPECIALTY PHARMACY (OUTPATIENT)
Dept: ONCOLOGY | Facility: HOSPITAL | Age: 65
End: 2020-12-11

## 2020-12-11 RX ORDER — DALFAMPRIDINE 10 MG/1
10 TABLET, FILM COATED, EXTENDED RELEASE ORAL 2 TIMES DAILY
Qty: 60 TABLET | Refills: 11 | Status: SHIPPED | OUTPATIENT
Start: 2020-12-11 | End: 2021-11-30 | Stop reason: SDUPTHER

## 2020-12-17 ENCOUNTER — TREATMENT (OUTPATIENT)
Dept: PHYSICAL THERAPY | Facility: CLINIC | Age: 65
End: 2020-12-17

## 2020-12-17 DIAGNOSIS — G35 MULTIPLE SCLEROSIS (HCC): Primary | ICD-10-CM

## 2020-12-17 DIAGNOSIS — Z74.09 IMPAIRED FUNCTIONAL MOBILITY, BALANCE, GAIT, AND ENDURANCE: ICD-10-CM

## 2020-12-17 PROCEDURE — 97162 PT EVAL MOD COMPLEX 30 MIN: CPT | Performed by: PHYSICAL THERAPIST

## 2020-12-28 ENCOUNTER — OFFICE VISIT (OUTPATIENT)
Dept: ORTHOPEDIC SURGERY | Facility: CLINIC | Age: 65
End: 2020-12-28

## 2020-12-28 VITALS — WEIGHT: 293 LBS | HEIGHT: 72 IN | OXYGEN SATURATION: 98 % | HEART RATE: 65 BPM | BODY MASS INDEX: 39.68 KG/M2

## 2020-12-28 DIAGNOSIS — M17.11 PRIMARY OSTEOARTHRITIS OF RIGHT KNEE: Primary | ICD-10-CM

## 2020-12-28 PROCEDURE — 20610 DRAIN/INJ JOINT/BURSA W/O US: CPT | Performed by: ORTHOPAEDIC SURGERY

## 2020-12-28 RX ORDER — TRIAMCINOLONE ACETONIDE 40 MG/ML
40 INJECTION, SUSPENSION INTRA-ARTICULAR; INTRAMUSCULAR
Status: COMPLETED | OUTPATIENT
Start: 2020-12-28 | End: 2020-12-28

## 2020-12-28 RX ORDER — ROPIVACAINE HYDROCHLORIDE 5 MG/ML
4 INJECTION, SOLUTION EPIDURAL; INFILTRATION; PERINEURAL
Status: COMPLETED | OUTPATIENT
Start: 2020-12-28 | End: 2020-12-28

## 2020-12-28 RX ADMIN — TRIAMCINOLONE ACETONIDE 40 MG: 40 INJECTION, SUSPENSION INTRA-ARTICULAR; INTRAMUSCULAR at 09:09

## 2020-12-28 RX ADMIN — ROPIVACAINE HYDROCHLORIDE 4 ML: 5 INJECTION, SOLUTION EPIDURAL; INFILTRATION; PERINEURAL at 09:09

## 2020-12-28 NOTE — PROGRESS NOTES
[facility]    Subjective     Chief Complaint   Patient presents with   • Follow-up     6 month follow up - Primary osteoarthritis of right knee - Visco injections finished 06/24/2020        HPI    It has been 6  month(s) since Ms. Carrillo's last visit. She returns to clinic today for follow-up of right knee arthritis. She rates her pain a 8/10 on the pain scale. Previous/current treatments: bracing, cane/walker, NSAIDS, physical therapy and weight loss. Current symptoms: pain, swelling, stiffness and giving way/buckling. The pain is worse with walking, standing, sitting, climbing stairs, sleeping, working and leisure. Overall, she is doing better.  Minimal improvement with the viscosupplementation injections 6 months ago.    I have reviewed the following portions of the patient's history and agree with: History of Present Illness and Review of Systems    Patient Active Problem List   Diagnosis   • Vitamin D deficiency   • Depression   • Multiple sclerosis (CMS/HCC)   • Restless legs syndrome   • Muscle spasticity   • Migraine, chronic, without aura, intractable, with status migrainosus   • Essential hypertension   • Frequent falls   • Status post balloon dilatation of esophageal stricture   • T2DM (type 2 diabetes mellitus) (CMS/HCC)   • TB lung, latent   • Aplastic anemia likely due to isoniazide   • Chest pain   • MCGRAW (dyspnea on exertion)   • Hyperlipidemia LDL goal <70     Past Medical History:   Diagnosis Date   • Diabetes mellitus (CMS/HCC)    • Hypertension    • Multiple sclerosis (CMS/HCC)       Past Surgical History:   Procedure Laterality Date   • CHOLECYSTECTOMY        Family History   Problem Relation Age of Onset   • Cancer Mother    • Hypertension Mother    • Heart disease Mother    • Alzheimer's disease Other    • Seizures Other         Epilepsy and recurrent seizures   • Heart disease Other      Social History     Socioeconomic History   • Marital status:      Spouse  name: Not on file   • Number of children: Not on file   • Years of education: Not on file   • Highest education level: Not on file   Tobacco Use   • Smoking status: Former Smoker     Types: Cigarettes     Quit date:      Years since quittin.0   • Smokeless tobacco: Never Used   • Tobacco comment: social   Substance and Sexual Activity   • Alcohol use: Yes     Comment: rarely   • Drug use: No   • Sexual activity: Defer      Current Outpatient Medications on File Prior to Visit   Medication Sig Dispense Refill   • acyclovir (ZOVIRAX) 400 MG tablet Take 1 tablet by mouth 2 (Two) Times a Day. 60 tablet 11   • albuterol sulfate HFA (Ventolin HFA) 108 (90 Base) MCG/ACT inhaler Ventolin HFA 90 mcg/actuation aerosol inhaler   1-2 puffs q 4-6^ prn     • azelastine (ASTEPRO) 0.15 % solution nasal spray inhale 1-2 sprays by nasal route 2 times a day as needed  3   • B Complex-C-E-Zn (Z-BEC PO) Take 1 tablet by mouth daily.     • baclofen (LIORESAL) 10 MG tablet Take 1 tablet by mouth Every Night. 30 tablet 5   • Dalfampridine ER 10 MG tablet sustained-release 12 hour Take 10 mg by mouth 2 (Two) Times a Day. 60 tablet 11   • FLUoxetine (PROzac) 10 MG capsule Take 10 mg by mouth Daily.     • furosemide (LASIX) 40 MG tablet Take 1 tablet(s) every day     • gabapentin (NEURONTIN) 100 MG capsule Take 3 capsules by mouth 3 (Three) Times a Day. 90 capsule 5   • gabapentin (NEURONTIN) 300 MG capsule Take 2 capsules by mouth Every Night. 60 capsule 5   • hydrochlorothiazide (MICROZIDE) 12.5 MG capsule Take 12.5 mg by mouth Daily.     • HYDROcodone-acetaminophen (NORCO)  MG per tablet Take 1 tablet by mouth Every 6 (Six) Hours As Needed for Moderate Pain . 20 tablet 0   • irbesartan (AVAPRO) 150 MG tablet Take 150 mg by mouth Daily.     • metoprolol succinate XL (TOPROL-XL) 100 MG 24 hr tablet metoprolol succinate  mg tablet,extended release 24 hr     • Multiple Vitamin (MULTI-VITAMIN DAILY) tablet Take 1 tablet by  mouth daily.     • omeprazole (priLOSEC) 20 MG capsule TAKE ONE CAPSULE IN THE MORNING.  11   • potassium chloride (MICRO-K) 10 MEQ CR capsule Take 10 mEq by mouth daily.     • pravastatin (PRAVACHOL) 40 MG tablet Take 1 tablet by mouth Daily for 360 days. 90 tablet 3   • valACYclovir (Valtrex) 500 MG tablet Take 1 tablet by mouth 2 (Two) Times a Day. 60 tablet 11   • vitamin D (ERGOCALCIFEROL) 99822 UNITS capsule capsule Take 50,000 Units by mouth Every 30 (Thirty) Days. 15TH       No current facility-administered medications on file prior to visit.       Allergies   Allergen Reactions   • Isoniazid Other (See Comments)     Body stopped producing blood   • Sulfa Antibiotics Anaphylaxis   • Tetanus Toxoid Swelling        Review of Systems   Constitutional: Negative for activity change, appetite change, chills, diaphoresis, fatigue, fever and unexpected weight change.   HENT: Negative for congestion, dental problem, drooling, ear discharge, ear pain, facial swelling, hearing loss, mouth sores, nosebleeds, postnasal drip, rhinorrhea, sinus pressure, sneezing, sore throat, tinnitus, trouble swallowing and voice change.    Eyes: Negative for photophobia, pain, discharge, redness, itching and visual disturbance.   Respiratory: Negative for apnea, cough, choking, chest tightness, shortness of breath, wheezing and stridor.    Cardiovascular: Negative for chest pain, palpitations and leg swelling.   Gastrointestinal: Negative for abdominal distention, abdominal pain, anal bleeding, blood in stool, constipation, diarrhea, nausea, rectal pain and vomiting.   Endocrine: Negative for cold intolerance, heat intolerance, polydipsia, polyphagia and polyuria.   Genitourinary: Negative for decreased urine volume, difficulty urinating, dysuria, enuresis, flank pain, frequency, genital sores, hematuria and urgency.   Musculoskeletal: Positive for arthralgias. Negative for back pain, gait problem, joint swelling, myalgias, neck pain  "and neck stiffness.   Skin: Negative for color change, pallor, rash and wound.   Allergic/Immunologic: Negative for environmental allergies, food allergies and immunocompromised state.   Neurological: Negative for dizziness, tremors, seizures, syncope, facial asymmetry, speech difficulty, weakness, light-headedness, numbness and headaches.   Hematological: Negative for adenopathy. Does not bruise/bleed easily.   Psychiatric/Behavioral: Negative for agitation, behavioral problems, confusion, decreased concentration, dysphoric mood, hallucinations, self-injury, sleep disturbance and suicidal ideas. The patient is not nervous/anxious and is not hyperactive.         Objective      Physical Exam  Pulse 65   Ht 182 cm (71.65\")   Wt (!) 155 kg (341 lb)   LMP  (LMP Unknown)   SpO2 98%   BMI 46.70 kg/m²     Body mass index is 46.7 kg/m².    General:   Mental Status:  Alert   Appearance: Cooperative, in no acute distress   Build and Nutrition: Obese female   Orientation: Alert and oriented to person, place and time   Posture: Normal   Gait: With a cane    Integument:              Right knee: No skin lesions, no rash, no ecchymosis     Lower Extremities:              Right Knee:                          Tenderness:    None                          Effusion:          None                          Swelling:          None                          Crepitus:          None                          Atrophy:           None                          Range of motion:        Extension:       5°                                                              Flexion:           110°  Instability:        No varus laxity, no valgus laxity, negative anterior drawer  Deformities:     None        Assessment and Plan     Diagnoses and all orders for this visit:    1. Primary osteoarthritis of right knee (Primary)  -     Large Joint Arthrocentesis: L knee        1. Primary osteoarthritis of right knee        I reviewed my findings with patient " today.  Her right knee continues to bother her.  She would like to have a steroid injection, and this was provided.  I will see her back in 4 months, but sooner for any problems.  Body mass index is 46.  She is continuing to work on weight loss.    Procedure Note:  The potential benefits of performing a therapeutic right knee joint injection, as well as potential risks (including, but not limited to infection, swelling, pain, bleeding, bruising, nerve/blood vessel damage, skin color changes, transient elevation in blood glucose levels, and fat atrophy) were discussed with the patient.  After informed consent, timeout procedure was performed, and the skin on the right knee was prepped with chlorhexidine soap and alcohol, after which ethyl chloride was applied to the skin at the injection site. Via the anterolateral approach, 1ml of Kenalog 40mg/ml mixed with 4ml 0.5% ropivacaine plain was injected into the knee joint.  The patient tolerated the procedure well, experiencing 20% improvement a few minutes following the injection. There were no complications.  Band-Aid was applied to the injection site. Post-procedural instructions were given to the patient and/or their caregiver.      Return in about 4 months (around 4/28/2021).    Medical Decision Making  Management Options : prescription/IM medicine    Lalo Choe MD  12/28/20  09:18 ARYAN Garcia disclaimer:  Much of this encounter note is an electronic transcription/translation of spoken language to printed text. The electronic translation of spoken language may permit erroneous, or at times, nonsensical words or phrases to be inadvertently transcribed; Although I have reviewed the note for such errors, some may still exist.

## 2020-12-28 NOTE — PROGRESS NOTES
Procedure   Large Joint Arthrocentesis: L knee  Date/Time: 12/28/2020 9:09 AM  Consent given by: patient  Site marked: site marked  Timeout: Immediately prior to procedure a time out was called to verify the correct patient, procedure, equipment, support staff and site/side marked as required   Supporting Documentation  Indications: pain   Procedure Details  Location: knee - L knee  Preparation: Patient was prepped and draped in the usual sterile fashion  Needle size: 22 G  Approach: anterolateral  Medications administered: 40 mg triamcinolone acetonide 40 MG/ML; 4 mL ropivacaine 0.5 %  Patient tolerance: patient tolerated the procedure well with no immediate complications

## 2020-12-30 LAB
APPEARANCE UR: ABNORMAL
BACTERIA #/AREA URNS HPF: ABNORMAL /[HPF]
BASOPHILS # BLD AUTO: 0 X10E3/UL (ref 0–0.2)
BASOPHILS NFR BLD AUTO: 0 %
BILIRUB UR QL STRIP: NEGATIVE
CASTS URNS MICRO: ABNORMAL
CASTS URNS QL MICRO: PRESENT /LPF
COLOR UR: YELLOW
CREAT SERPL-MCNC: 0.79 MG/DL (ref 0.57–1)
CRYSTALS URNS MICRO: ABNORMAL
EOSINOPHIL # BLD AUTO: 0 X10E3/UL (ref 0–0.4)
EOSINOPHIL NFR BLD AUTO: 0 %
EPI CELLS #/AREA URNS HPF: ABNORMAL /HPF (ref 0–10)
ERYTHROCYTE [DISTWIDTH] IN BLOOD BY AUTOMATED COUNT: 12.5 % (ref 11.7–15.4)
GLUCOSE UR QL: NEGATIVE
HCT VFR BLD AUTO: 44.3 % (ref 34–46.6)
HGB BLD-MCNC: 14.6 G/DL (ref 11.1–15.9)
HGB UR QL STRIP: NEGATIVE
IMM GRANULOCYTES # BLD AUTO: 0 X10E3/UL (ref 0–0.1)
IMM GRANULOCYTES NFR BLD AUTO: 0 %
KETONES UR QL STRIP: ABNORMAL
LEUKOCYTE ESTERASE UR QL STRIP: ABNORMAL
LYMPHOCYTES # BLD AUTO: 0.8 X10E3/UL (ref 0.7–3.1)
LYMPHOCYTES NFR BLD AUTO: 10 %
MCH RBC QN AUTO: 29.7 PG (ref 26.6–33)
MCHC RBC AUTO-ENTMCNC: 33 G/DL (ref 31.5–35.7)
MCV RBC AUTO: 90 FL (ref 79–97)
MICRO URNS: ABNORMAL
MONOCYTES # BLD AUTO: 0.3 X10E3/UL (ref 0.1–0.9)
MONOCYTES NFR BLD AUTO: 3 %
MUCOUS THREADS URNS QL MICRO: PRESENT
NEUTROPHILS # BLD AUTO: 7.1 X10E3/UL (ref 1.4–7)
NEUTROPHILS NFR BLD AUTO: 87 %
NITRITE UR QL STRIP: NEGATIVE
PH UR STRIP: 5.5 [PH] (ref 5–7.5)
PLATELET # BLD AUTO: 223 X10E3/UL (ref 150–450)
PROT UR QL STRIP: ABNORMAL
RBC # BLD AUTO: 4.92 X10E6/UL (ref 3.77–5.28)
RBC #/AREA URNS HPF: ABNORMAL /HPF (ref 0–2)
SP GR UR: 1.03 (ref 1–1.03)
TSH SERPL DL<=0.005 MIU/L-ACNC: 0.74 UIU/ML (ref 0.45–4.5)
UNIDENT CRYS URNS QL MICRO: PRESENT
UROBILINOGEN UR STRIP-MCNC: 1 MG/DL (ref 0.2–1)
WBC # BLD AUTO: 8.2 X10E3/UL (ref 3.4–10.8)
WBC #/AREA URNS HPF: ABNORMAL /HPF (ref 0–5)

## 2021-01-26 ENCOUNTER — OFFICE VISIT (OUTPATIENT)
Dept: NEUROLOGY | Facility: CLINIC | Age: 66
End: 2021-01-26

## 2021-01-26 ENCOUNTER — LAB (OUTPATIENT)
Dept: LAB | Facility: HOSPITAL | Age: 66
End: 2021-01-26

## 2021-01-26 VITALS — BODY MASS INDEX: 39.68 KG/M2 | HEIGHT: 72 IN | OXYGEN SATURATION: 98 % | WEIGHT: 293 LBS | HEART RATE: 73 BPM

## 2021-01-26 DIAGNOSIS — G35 MULTIPLE SCLEROSIS (HCC): Primary | Chronic | ICD-10-CM

## 2021-01-26 DIAGNOSIS — G43.711 MIGRAINE, CHRONIC, WITHOUT AURA, INTRACTABLE, WITH STATUS MIGRAINOSUS: Chronic | ICD-10-CM

## 2021-01-26 DIAGNOSIS — G25.81 RESTLESS LEGS SYNDROME: Chronic | ICD-10-CM

## 2021-01-26 DIAGNOSIS — G35 MULTIPLE SCLEROSIS (HCC): ICD-10-CM

## 2021-01-26 DIAGNOSIS — E67.1 HYPERCAROTENEMIA: ICD-10-CM

## 2021-01-26 DIAGNOSIS — F33.1 MODERATE EPISODE OF RECURRENT MAJOR DEPRESSIVE DISORDER (HCC): Chronic | ICD-10-CM

## 2021-01-26 LAB
ALBUMIN SERPL-MCNC: 3.9 G/DL (ref 3.5–5.2)
ALBUMIN/GLOB SERPL: 1.3 G/DL
ALP SERPL-CCNC: 106 U/L (ref 39–117)
ALT SERPL W P-5'-P-CCNC: 12 U/L (ref 1–33)
ANION GAP SERPL CALCULATED.3IONS-SCNC: 9 MMOL/L (ref 5–15)
AST SERPL-CCNC: 13 U/L (ref 1–32)
BACTERIA UR QL AUTO: ABNORMAL /HPF
BASOPHILS # BLD AUTO: 0.05 10*3/MM3 (ref 0–0.2)
BASOPHILS NFR BLD AUTO: 1 % (ref 0–1.5)
BILIRUB SERPL-MCNC: 0.4 MG/DL (ref 0–1.2)
BILIRUB UR QL STRIP: NEGATIVE
BUN SERPL-MCNC: 16 MG/DL (ref 8–23)
BUN/CREAT SERPL: 19 (ref 7–25)
CALCIUM SPEC-SCNC: 9.6 MG/DL (ref 8.6–10.5)
CHLORIDE SERPL-SCNC: 105 MMOL/L (ref 98–107)
CLARITY UR: ABNORMAL
CO2 SERPL-SCNC: 27 MMOL/L (ref 22–29)
COD CRY URNS QL: ABNORMAL /HPF
COLOR UR: ABNORMAL
CREAT SERPL-MCNC: 0.84 MG/DL (ref 0.57–1)
DEPRECATED RDW RBC AUTO: 42 FL (ref 37–54)
EOSINOPHIL # BLD AUTO: 0.18 10*3/MM3 (ref 0–0.4)
EOSINOPHIL NFR BLD AUTO: 3.8 % (ref 0.3–6.2)
ERYTHROCYTE [DISTWIDTH] IN BLOOD BY AUTOMATED COUNT: 12.8 % (ref 12.3–15.4)
GFR SERPL CREATININE-BSD FRML MDRD: 68 ML/MIN/1.73
GLOBULIN UR ELPH-MCNC: 3 GM/DL
GLUCOSE SERPL-MCNC: 76 MG/DL (ref 65–99)
GLUCOSE UR STRIP-MCNC: NEGATIVE MG/DL
HCT VFR BLD AUTO: 46.3 % (ref 34–46.6)
HGB BLD-MCNC: 14.6 G/DL (ref 12–15.9)
HGB UR QL STRIP.AUTO: NEGATIVE
HYALINE CASTS UR QL AUTO: ABNORMAL /LPF
IMM GRANULOCYTES # BLD AUTO: 0.03 10*3/MM3 (ref 0–0.05)
IMM GRANULOCYTES NFR BLD AUTO: 0.6 % (ref 0–0.5)
KETONES UR QL STRIP: ABNORMAL
LEUKOCYTE ESTERASE UR QL STRIP.AUTO: NEGATIVE
LYMPHOCYTES # BLD AUTO: 0.76 10*3/MM3 (ref 0.7–3.1)
LYMPHOCYTES NFR BLD AUTO: 15.9 % (ref 19.6–45.3)
MCH RBC QN AUTO: 28.7 PG (ref 26.6–33)
MCHC RBC AUTO-ENTMCNC: 31.5 G/DL (ref 31.5–35.7)
MCV RBC AUTO: 91 FL (ref 79–97)
MONOCYTES # BLD AUTO: 0.46 10*3/MM3 (ref 0.1–0.9)
MONOCYTES NFR BLD AUTO: 9.6 % (ref 5–12)
NEUTROPHILS NFR BLD AUTO: 3.31 10*3/MM3 (ref 1.7–7)
NEUTROPHILS NFR BLD AUTO: 69.1 % (ref 42.7–76)
NITRITE UR QL STRIP: NEGATIVE
NRBC BLD AUTO-RTO: 0 /100 WBC (ref 0–0.2)
PH UR STRIP.AUTO: 5.5 [PH] (ref 5–8)
PLATELET # BLD AUTO: 212 10*3/MM3 (ref 140–450)
PMV BLD AUTO: 11.5 FL (ref 6–12)
POTASSIUM SERPL-SCNC: 4.2 MMOL/L (ref 3.5–5.2)
PROT SERPL-MCNC: 6.9 G/DL (ref 6–8.5)
PROT UR QL STRIP: ABNORMAL
RBC # BLD AUTO: 5.09 10*6/MM3 (ref 3.77–5.28)
RBC # UR: ABNORMAL /HPF
REF LAB TEST METHOD: ABNORMAL
SODIUM SERPL-SCNC: 141 MMOL/L (ref 136–145)
SP GR UR STRIP: >=1.03 (ref 1–1.03)
SQUAMOUS #/AREA URNS HPF: ABNORMAL /HPF
TSH SERPL DL<=0.05 MIU/L-ACNC: 2.22 UIU/ML (ref 0.27–4.2)
UROBILINOGEN UR QL STRIP: ABNORMAL
WBC # BLD AUTO: 4.79 10*3/MM3 (ref 3.4–10.8)
WBC UR QL AUTO: ABNORMAL /HPF

## 2021-01-26 PROCEDURE — 64615 CHEMODENERV MUSC MIGRAINE: CPT | Performed by: PSYCHIATRY & NEUROLOGY

## 2021-01-26 PROCEDURE — 84443 ASSAY THYROID STIM HORMONE: CPT

## 2021-01-26 PROCEDURE — 80053 COMPREHEN METABOLIC PANEL: CPT

## 2021-01-26 PROCEDURE — 85025 COMPLETE CBC W/AUTO DIFF WBC: CPT

## 2021-01-26 PROCEDURE — 36415 COLL VENOUS BLD VENIPUNCTURE: CPT

## 2021-01-26 PROCEDURE — 81001 URINALYSIS AUTO W/SCOPE: CPT

## 2021-01-26 PROCEDURE — 99214 OFFICE O/P EST MOD 30 MIN: CPT | Performed by: PSYCHIATRY & NEUROLOGY

## 2021-01-26 RX ORDER — PRAVASTATIN SODIUM 80 MG/1
TABLET ORAL
COMMUNITY
Start: 2021-01-07 | End: 2022-11-07

## 2021-01-26 NOTE — ASSESSMENT & PLAN NOTE
Headaches are improving with treatment.  Continue current treatment regimen.     Continue  units

## 2021-01-26 NOTE — PROGRESS NOTES
Chief Complaint  Botulinum Toxin Injection (Office supplied, NDC: 0142-5338-20, LOT:[id number], EXP: 09/2023)    Subjective          Rashad Carrillo presents to [facility] for RRMS, MDD, migraines, RLS.    History of Present Illness    65 y.o. female returns in follow up.  Last visit on 10/20/2020 started on BTX for migraines, continued monthly labs, renewed GBP, Cymbalta, Acyclovir and Ampyra     MRI Brain 8/5/20 as compared to10/8/18, no change in T2 lesion load, moderate to severe T2 lesion burden and moderate atrophy.     JCV ab - 6/11/18 - 0.45       12/29/20: Plt 223; TSH 0.738; Cr 0.79; U/A nl      RRMS     Steroids improved gait and fatigue.     Continues on Ampyra.        Fatigue is moderate.  Heat intolerance improved in winter.        Continued pain and stiffness in LE.       Problem history:     25 ft timed walk increaesed 15.99 from 9.94.  Left hand 9 hole peg worsened.   Increasing swallowing difficulty.        Immediate and working memory are declining with SDMT 27/110..        Referred to ID and dx with latent TB.  Recommended isoniazid and pyridoxine for 9 months.  Subsequently developed aplastic anemia secondary to INH and followed by Dr Valentin.  Received multiple transfusions.  3/29/18 able to resume Tysabri and has had 3 infusions  Treated with rifampin.       MDD     Mood is stable on Cymbalta 60 mg PO daily      RLS     Controlled  on GBP.   Baclofen helping with spasticity in LE.        mg po BID, 300 mg 2 times overnight for discomfort.        Migraines     Headache frequency 4 - 5 per month.  Intensity is mild to moderate.  BTX improving sx       Problem History:  HA frequency is daily.  Moderate to severe intensity.   Location moves around head.  Quality Last for up to a day.  Tx with OTC meds.      Greater than 15 HA a month, moderate to severe intensity, lasting over 6 hours.       Preventatives:  TPM, GBP, Cymbalta,        Objective   Vital Signs:   Pulse  "73   Ht 182 cm (71.65\")   Wt (!) 154 kg (340 lb)   SpO2 98%   BMI 46.56 kg/m²     Physical Exam  Vitals signs and nursing note reviewed.   Constitutional:       Appearance: She is well-developed.   HENT:      Head: Normocephalic.   Eyes:      Extraocular Movements: EOM normal.      Pupils: Pupils are equal, round, and reactive to light.   Neurological:      Mental Status: She is oriented to person, place, and time.      Coordination: Finger-Nose-Finger Test normal.      Gait: Tandem walk abnormal.      Deep Tendon Reflexes: Strength normal.   Psychiatric:         Speech: Speech normal.          Neurologic Exam     Mental Status   Oriented to person, place, and time.   Attention: normal. Concentration: normal.   Speech: speech is normal   Level of consciousness: alert  Knowledge: good and consistent with education.   Normal comprehension.     Cranial Nerves     CN II   Visual fields full to confrontation.   Visual acuity: normal  Right visual field deficit: none  Left visual field deficit: none     CN III, IV, VI   Pupils are equal, round, and reactive to light.  Extraocular motions are normal.   Nystagmus: none   Diplopia: none  Ophthalmoparesis: none  Upgaze: normal  Downgaze: normal  Conjugate gaze: present    CN V   Facial sensation intact.   Right corneal reflex: normal  Left corneal reflex: normal    CN VII   Right facial weakness: none  Left facial weakness: none    CN VIII   Hearing: intact    CN IX, X   Palate: symmetric  Right gag reflex: normal  Left gag reflex: normal    CN XI   Right sternocleidomastoid strength: normal  Left sternocleidomastoid strength: normal    CN XII   Tongue: not atrophic  Fasciculations: absent  Tongue deviation: none    Motor Exam   Muscle bulk: normal  Overall muscle tone: normal  Right arm tone: normal  Left arm tone: normal  Right leg tone: normal  Left leg tone: normal    Strength   Strength 5/5 throughout.     Sensory Exam   Light touch normal.     Gait, Coordination, " and Reflexes     Gait  Gait: spastic and wide-based    Coordination   Finger to nose coordination: normal  Tandem walking coordination: abnormal    Tremor   Resting tremor: absent  Intention tremor: absent  Action tremor: absent    Reflexes   Reflexes 2+ except as noted.        Result Review :   The following data was reviewed by: Adonay Gilbert MD on 01/26/2021:  Common labs    Common Labsle 8/25/20 8/25/20 9/22/20 9/22/20 12/29/20 12/29/20    0908 0908 0910 0910 0921 0921   Creatinine  0.77  0.81  0.79   eGFR Non  Am  83  78  80   eGFR  Am  96  90  92   WBC 4.1  3.5  8.2    Hemoglobin 14.7  14.6  14.6    Hematocrit 44.8  45.4  44.3    Platelets 220  204  223                      Assessment and Plan    Problem List Items Addressed This Visit        Other    Depression (Chronic)    Current Assessment & Plan     Psychological condition is unchanged.  Continue current treatment regimen.  Psychological condition  will be reassessed at the next regular appointment.         Multiple sclerosis (CMS/HCC) - Primary (Chronic)    Current Assessment & Plan     No new or worsening s/p Lemtrada     Continue Ampyra         Relevant Orders    CBC & Differential    Comprehensive Metabolic Panel    TSH    Urinalysis With Microscopic - Urine, Clean Catch    Restless legs syndrome (Chronic)    Current Assessment & Plan     Sx stable          Migraine, chronic, without aura, intractable, with status migrainosus (Chronic)    Current Assessment & Plan     Headaches are improving with treatment.  Continue current treatment regimen.     Continue  units              Relevant Medications    OnabotulinumtoxinA 200 Units      Other Visit Diagnoses     Hypercarotenemia         Relevant Orders    TSH          Follow Up   No follow-ups on file.  Patient was given instructions and counseling regarding her condition or for health maintenance advice. Please see specific information pulled into the AVS if appropriate.

## 2021-02-10 ENCOUNTER — SPECIALTY PHARMACY (OUTPATIENT)
Dept: ONCOLOGY | Facility: HOSPITAL | Age: 66
End: 2021-02-10

## 2021-02-10 RX ORDER — DALFAMPRIDINE 10 MG/1
10 TABLET, FILM COATED, EXTENDED RELEASE ORAL 2 TIMES DAILY
Qty: 60 TABLET | Refills: 11 | Status: SHIPPED | OUTPATIENT
Start: 2021-02-10 | End: 2021-02-17

## 2021-03-11 ENCOUNTER — TELEPHONE (OUTPATIENT)
Dept: NEUROLOGY | Facility: CLINIC | Age: 66
End: 2021-03-11

## 2021-03-11 NOTE — TELEPHONE ENCOUNTER
Caller: Gerardo Rashad Singh    Relationship: Self    Best call back number: [phone]    What form or medical record are you requesting: DISABILITY FORM    Who is requesting this form or medical record from you: KD WITH [employer]    How would you like to receive the form or medical records (pick-up, mail, fax): FAX    If fax, what is the fax number:  PHONE NUMBERS SHE GAVE ME.   PH: [phone]     FAX: [phone]      Timeframe paperwork needed: ASAP    Additional notes: PATIENT STATES THEY BROUGHT FORM INTO OFFICE TO HAVE FILLED OUT AND PT STATES LETTER SHE RECEIVED SAYS THAT LAURIE HAS NOT RECEIVED YET.  PATIENT STATES INSURANCE WANTS A CLAIM NUMBER ON FORM. THE CLAIM NUMBER IS: [id number]    PATIENT ASKED IF SOMEONE COULD CALL HER WHEN THIS LETTER IS SENT. PLEASE ADVISE.

## 2021-03-11 NOTE — TELEPHONE ENCOUNTER
Gave patient a call to let her know that I faxed over paperwork to Hanna with [employer]. Also, let patient know I contacted Hanna to let her know I have faxed the paper work and gave her my direct line to contact me if she needs anymore. Patient asked for me to mail her a copy as well. Informed patient I will get it copied and mailed to her today. Apologized to patient for the delay. She verbalized understanding.

## 2021-03-15 ENCOUNTER — OFFICE VISIT (OUTPATIENT)
Dept: ORTHOPEDIC SURGERY | Facility: CLINIC | Age: 66
End: 2021-03-15

## 2021-03-15 VITALS
HEART RATE: 65 BPM | DIASTOLIC BLOOD PRESSURE: 99 MMHG | BODY MASS INDEX: 39.68 KG/M2 | HEIGHT: 72 IN | SYSTOLIC BLOOD PRESSURE: 173 MMHG | WEIGHT: 293 LBS

## 2021-03-15 DIAGNOSIS — M25.561 CHRONIC PAIN OF RIGHT KNEE: ICD-10-CM

## 2021-03-15 DIAGNOSIS — M17.11 PRIMARY OSTEOARTHRITIS OF RIGHT KNEE: Primary | ICD-10-CM

## 2021-03-15 DIAGNOSIS — G89.29 CHRONIC PAIN OF RIGHT KNEE: ICD-10-CM

## 2021-03-15 DIAGNOSIS — E66.01 CLASS 3 SEVERE OBESITY WITH SERIOUS COMORBIDITY AND BODY MASS INDEX (BMI) OF 45.0 TO 49.9 IN ADULT, UNSPECIFIED OBESITY TYPE (HCC): ICD-10-CM

## 2021-03-15 PROCEDURE — 20610 DRAIN/INJ JOINT/BURSA W/O US: CPT | Performed by: PHYSICIAN ASSISTANT

## 2021-03-15 PROCEDURE — 99213 OFFICE O/P EST LOW 20 MIN: CPT | Performed by: PHYSICIAN ASSISTANT

## 2021-03-15 RX ADMIN — TRIAMCINOLONE ACETONIDE 40 MG: 40 INJECTION, SUSPENSION INTRA-ARTICULAR; INTRAMUSCULAR at 09:50

## 2021-03-15 RX ADMIN — ROPIVACAINE HYDROCHLORIDE 4 ML: 5 INJECTION, SOLUTION EPIDURAL; INFILTRATION; PERINEURAL at 09:50

## 2021-03-15 NOTE — PROGRESS NOTES
"    [facility] Note        Subjective     CC: Follow-up of the Right Knee (11 week f/u,  Primary osteoarthritis of right knee, last knee cortisone injection 12/28/20//)      ANU Carrillo is a 65 y.o. female.  Patient is new to me and returns evaluation of her right knee.  She has been undergoing corticosteroid injections to the knee for over a year.  She states the injection that she was provided in December by Dr. Choe helped until approximately 10 days ago when she bit developed  swelling, increased pain and inability to weight-bear.  Treatment included Advil, Voltaren gel, soaks, topical pain medication.  She even took prednisone that she had leftover from an MS flare that has helped.    Currently patient endorses a pain scale of 8/10.  Symptoms include swelling, stiffness and giving way.  Symptoms are worse with walking, standing, sitting, stair climbing, sleep, working, leisure.  She did try ice, heat as well as the above-stated medications.  She was also in bed for about a week secondary to the pain.  None of these provided any significant improvement of symptoms.    Patient has also undergone viscosupplementation injections in the past which provided no benefit.    Overall, patient's symptoms are worse since last appointment.    ROS:    Constiutional:Pt denies fever, chills, nausea, or vomiting.  MSK:as above        Objective      Past Medical History  Past Medical History:   Diagnosis Date   • Diabetes mellitus (CMS/HCC)    • Hypertension    • Multiple sclerosis (CMS/HCC)          Physical Exam  /99   Pulse 65   Ht 182 cm (71.65\")   Wt (!) 152 kg (336 lb)   LMP  (LMP Unknown)   BMI 46.01 kg/m²     Body mass index is 46.01 kg/m².    Patient is well nourished and well developed.     Antalgic gait pattern; cane    Ortho Exam  Integument:              Right knee: No skin lesions, no rash, no ecchymosis     Lower Extremities:              Right " Knee:                          Tenderness:    Global diffuse tenderness throughout the knee but most pronounced along medial joint line                          Effusion:          Trace                          Swelling:          None                          Crepitus:          Positive                          Atrophy:           None                          Range of motion:        Extension:       5°                                                              Flexion:           110°  Instability:        No varus laxity, no valgus laxity, negative Lachman  Deformities:     Genu varum       Imaging/Labs/EMG Reviewed:  Ordered right knee plain films.  Imaging read by Dr. Choe.    Right Knee Radiographs  Indication: right knee pain  Views: Standing AP's and skiers of both knees, with lateral and sunrise views of the right knee     Comparison: 3/10/2020     Findings: Near bone-on-bone contact in the medial compartment, with tricompartmental osteophytes, varus alignment, no acute bony abnormalities.  Mild worsening compared to previous films.  Advanced patellofemoral degeneration.      Assessment:  1. Primary osteoarthritis of right knee    2. Chronic pain of right knee    3. Class 3 severe obesity with serious comorbidity and body mass index (BMI) of 45.0 to 49.9 in adult, unspecified obesity type (CMS/[facility])        Plan:  Primary osteoarthritis right knee causing chronic pain--imaging showed mild worsening compared to prior films.  Offered and accepted a corticosteroid injection into her right knee.  Injection given today.  May continue with oral and topical pain medication--has Advil, Voltaren gel as well as other topical pain medication.  Obesity--patient has a BMI of 46.01.  Patient has an elevated BMI greater than 40.  This places the patient at increased risk for perioperative complications as well as increased risk of accelerating the degenerative processes within the joint.  As a result, surgical intervention  will be deferred until the patient can achieve a BMI less than 40.  In the interval, the patient has been instructed on weight loss avenues including diet, portion control, calorie restriction, low/no impact exercise, referral to weight loss management and/or bariatric surgery.  It was explained that weight loss can improve joint pain alone by decreasing the joint reaction forces.  For every pound of weight change, the knee and hip joints see a 4 to 5 fold multiplier affect.  Given these options, the patient will proceed with diet and low impact exercise as able.  She declined referral to weight management stating she can lose the weight on her own.  Her goal weight is 295-300.  Follow-up in 4 months for repeat evaluation, sooner if issues arise or symptoms worsen/change.  At that appointment we can repeat the injection if necessary as well as perform weight check to see if she is ready to proceed with TKA.  Questions and concerns answered.    Case discussed with Dr. Choe who agrees with the above assessment and plan.    After discussing the risks, benefits, indications of injection, the patient gave consent to proceed.  Her right knee was confirmed as the correct joint to be injected with a timeout.  It was then prepped using Hibiclens and injected with a mixture of 4 cc of 0.5% ropivacaine and 1 cc of Kenalog (40 mg per mL), without any resistance through the anterior lateral approach, patient in seated position.  Area was cleaned, hemostasis was achieved and a Band-Aid was applied over the injection site.  The patient tolerated procedure well.  I instructed the patient on signs and symptoms of infection.  They should report to the emergency department or return to clinic if any of these develop, for further evaluation and treatment.  Recommended modifying activity for the next 48 hours to include rest, ice, elevation and oral pain medication as needed.        Kendal Coker PA-C  03/16/21  09:50  ALESSIA Garcia disclaimer:  Much of this encounter note is an electronic transcription/translation of spoken language to printed text. The electronic translation of spoken language may permit erroneous, or at times, nonsensical words or phrases to be inadvertently transcribed; Although I have reviewed the note for such errors, some may still exist.

## 2021-03-15 NOTE — PROGRESS NOTES
Procedure   Large Joint Arthrocentesis: R knee  Date/Time: 3/15/2021 9:50 AM  Consent given by: patient  Site marked: site marked  Timeout: Immediately prior to procedure a time out was called to verify the correct patient, procedure, equipment, support staff and site/side marked as required   Supporting Documentation  Indications: pain   Procedure Details  Location: knee - R knee  Preparation: Patient was prepped and draped in the usual sterile fashion  Needle size: 23 G  Approach: anterolateral  Medications administered: 4 mL ropivacaine 0.5 %; 40 mg triamcinolone acetonide 40 MG/ML  Patient tolerance: patient tolerated the procedure well with no immediate complications

## 2021-03-16 RX ORDER — ROPIVACAINE HYDROCHLORIDE 5 MG/ML
4 INJECTION, SOLUTION EPIDURAL; INFILTRATION; PERINEURAL
Status: COMPLETED | OUTPATIENT
Start: 2021-03-15 | End: 2021-03-15

## 2021-03-16 RX ORDER — TRIAMCINOLONE ACETONIDE 40 MG/ML
40 INJECTION, SUSPENSION INTRA-ARTICULAR; INTRAMUSCULAR
Status: COMPLETED | OUTPATIENT
Start: 2021-03-15 | End: 2021-03-15

## 2021-04-26 ENCOUNTER — PROCEDURE VISIT (OUTPATIENT)
Dept: NEUROLOGY | Facility: CLINIC | Age: 66
End: 2021-04-26

## 2021-04-26 ENCOUNTER — TELEPHONE (OUTPATIENT)
Dept: NEUROLOGY | Facility: CLINIC | Age: 66
End: 2021-04-26

## 2021-04-26 DIAGNOSIS — G43.711 MIGRAINE, CHRONIC, WITHOUT AURA, INTRACTABLE, WITH STATUS MIGRAINOSUS: Primary | Chronic | ICD-10-CM

## 2021-04-26 PROCEDURE — 64615 CHEMODENERV MUSC MIGRAINE: CPT | Performed by: NURSE PRACTITIONER

## 2021-04-26 NOTE — PROGRESS NOTES
Botulinum Toxin Injection Procedure    History:  Response to treatment has reduced HA's at least 7 fewer days a month and/or 100 hours fewer each month.     Pre-operative diagnosis: headache    Post-operative diagnosis: Same    Procedure: Chemical neurolysis    After risks and benefits were explained including bleeding, infection, worsening of pain, damage to the areas being injected, weakness of muscles, loss of muscle control, dysphagia if injecting the head or neck, facial droop if injecting the facial area, painful injection, allergic or other reaction to the medications being injected, and the failure of the procedure to help the problem, a signed consent was obtained.      The patient was placed in a comfortable area and the sites to be treated were identified. A time out was called and performed. The area to be treated was prepped three times with alcohol and the alcohol allowed to dry. Next, a 30 gauge needle was used to inject the medication in the area to be treated.    Area(s) injected: frontalis muscle, semispinalis capitis muscle and temporallis muscle    Medications used: botulinum toxin 200 mu, 2 mL of saline used to dilute the botulinum toxin.      The patient did tolerate the procedure well. There were no complications.       Physical Examination    Current Treatment (Units)   Splenius Capitis 25 units on the right and 25 units on the left.   Temporalis 25 units on the right and 25 units on the left.   Frontalis 27 units on the right and 28 units on the left.   EMG Guidance none .   Complications: none .   The total amount injected in units is 155 .   The total amount wasted in units is 45 .   The total amount submitted in units is 200 .   Botox was supplied by physician.

## 2021-04-26 NOTE — TELEPHONE ENCOUNTER
Caller: Rashad Carrillo    Relationship: Self    Best call back number: [phone]    What medications are you currently taking:   Current Outpatient Medications on File Prior to Visit   Medication Sig Dispense Refill   • acyclovir (ZOVIRAX) 400 MG tablet Take 1 tablet by mouth 2 (Two) Times a Day. 60 tablet 11   • albuterol sulfate HFA (Ventolin HFA) 108 (90 Base) MCG/ACT inhaler Ventolin HFA 90 mcg/actuation aerosol inhaler   1-2 puffs q 4-6^ prn     • azelastine (ASTEPRO) 0.15 % solution nasal spray inhale 1-2 sprays by nasal route 2 times a day as needed  3   • B Complex-C-E-Zn (Z-BEC PO) Take 1 tablet by mouth daily.     • baclofen (LIORESAL) 10 MG tablet Take 1 tablet by mouth Every Night. 30 tablet 5   • Dalfampridine ER 10 MG tablet sustained-release 12 hour Take 10 mg by mouth 2 (Two) Times a Day. 60 tablet 11   • FLUoxetine (PROzac) 10 MG capsule Take 10 mg by mouth Daily.     • furosemide (LASIX) 40 MG tablet Take 1 tablet(s) every day     • gabapentin (NEURONTIN) 100 MG capsule Take 3 capsules by mouth 3 (Three) Times a Day. 90 capsule 5   • gabapentin (NEURONTIN) 300 MG capsule Take 2 capsules by mouth Every Night. 60 capsule 5   • hydrochlorothiazide (MICROZIDE) 12.5 MG capsule Take 12.5 mg by mouth Daily.     • HYDROcodone-acetaminophen (NORCO)  MG per tablet Take 1 tablet by mouth Every 6 (Six) Hours As Needed for Moderate Pain . 20 tablet 0   • irbesartan (AVAPRO) 150 MG tablet Take 150 mg by mouth Daily.     • metoprolol succinate XL (TOPROL-XL) 100 MG 24 hr tablet metoprolol succinate  mg tablet,extended release 24 hr     • Multiple Vitamin (MULTI-VITAMIN DAILY) tablet Take 1 tablet by mouth daily.     • omeprazole (priLOSEC) 20 MG capsule TAKE ONE CAPSULE IN THE MORNING.  11   • potassium chloride (MICRO-K) 10 MEQ CR capsule Take 10 mEq by mouth daily.     • pravastatin (PRAVACHOL) 80 MG tablet Take 1 tablet(s) every day at bedtime.     • valACYclovir (Valtrex) 500 MG tablet  Take 1 tablet by mouth 2 (Two) Times a Day. 60 tablet 11   • vitamin D (ERGOCALCIFEROL) 09471 UNITS capsule capsule Take 50,000 Units by mouth Every 30 (Thirty) Days. 15TH       Current Facility-Administered Medications on File Prior to Visit   Medication Dose Route Frequency Provider Last Rate Last Admin   • OnabotulinumtoxinA 200 Units  200 Units Intramuscular Q3 Months Adonay Gilbert MD   200 Units at 04/26/21 0834        When did you start taking these medications: NA    Which medication are you concerned about: KISHAN    Who prescribed you this medication:     What are your concerns: PATIENT IS STATING THAT [facility] NEEDS A NOTE FROM  ONCE A MONTH FOR NEXT YEAR STATING THAT SHE NEEDS BLOOD DRAWN FOR LEMTRADA. SHE HAS ASKED FOR A CALL BACK ONCE THAT HAS BEEN SENT OVER TO THEM.     [facility] FAX: [phone]    How long have you been taking these medications: NA    How long have you had these concerns: NA

## 2021-04-28 ENCOUNTER — OFFICE VISIT (OUTPATIENT)
Dept: ORTHOPEDIC SURGERY | Facility: CLINIC | Age: 66
End: 2021-04-28

## 2021-04-28 VITALS
HEART RATE: 80 BPM | SYSTOLIC BLOOD PRESSURE: 170 MMHG | BODY MASS INDEX: 39.68 KG/M2 | HEIGHT: 72 IN | WEIGHT: 293 LBS | DIASTOLIC BLOOD PRESSURE: 80 MMHG

## 2021-04-28 DIAGNOSIS — E66.01 OBESITY, MORBID, BMI 40.0-49.9 (HCC): ICD-10-CM

## 2021-04-28 DIAGNOSIS — M17.11 PRIMARY OSTEOARTHRITIS OF RIGHT KNEE: Primary | ICD-10-CM

## 2021-04-28 PROCEDURE — 99214 OFFICE O/P EST MOD 30 MIN: CPT | Performed by: ORTHOPAEDIC SURGERY

## 2021-04-28 RX ORDER — MELOXICAM 7.5 MG/1
TABLET ORAL
Qty: 90 TABLET | Refills: 2 | Status: SHIPPED | OUTPATIENT
Start: 2021-04-28 | End: 2022-11-11 | Stop reason: HOSPADM

## 2021-04-28 NOTE — PROGRESS NOTES
"    [facility] [place] [facility] [place]    Subjective     Chief Complaint   Patient presents with   • Follow-up     6 weeks- Primary osteoarthritis of right knee         HPI    Rashad Carrillo is a 65 y.o. female who follows up for her right knee arthritis. She was last seen in 03/2021 by BRIDGETTE Claudio and we did a steroid injection at that visit. The patient had previously had a cortisone injection in 12/2020 as well. Her body mass index was 46 then and is 46 again today. She has had viscosupplementation injections in the past as well.     The patient reports that she had a fall because her knee gave way. Her knee is declining and worsening. She started taking glucosamine with mild relief. She has difficulty standing after sitting. The patient states that the previous treatments have all helped a \"tiny bit\" but none of them have provided lasting relief. The patient takes Advil, but has not tried meloxicam.     The patient has multiple sclerosis.    I have reviewed the following portions of the patient's history and agree with: History of Present Illness and Review of Systems    Patient Active Problem List   Diagnosis   • Vitamin D deficiency   • Depression   • Multiple sclerosis (CMS/HCC)   • Restless legs syndrome   • Muscle spasticity   • Migraine, chronic, without aura, intractable, with status migrainosus   • Essential hypertension   • Frequent falls   • Status post balloon dilatation of esophageal stricture   • T2DM (type 2 diabetes mellitus) (CMS/HCC)   • TB lung, latent   • Aplastic anemia likely due to isoniazide   • Chest pain   • MCGRAW (dyspnea on exertion)   • Hyperlipidemia LDL goal <70     Past Medical History:   Diagnosis Date   • Diabetes mellitus (CMS/HCC)    • Hypertension    • Multiple sclerosis (CMS/HCC)       Past Surgical History:   Procedure Laterality Date   • CHOLECYSTECTOMY        Family History   Problem Relation Age of Onset   • Cancer Mother    • Hypertension Mother    • Heart " disease Mother    • Alzheimer's disease Other    • Seizures Other         Epilepsy and recurrent seizures   • Heart disease Other      Social History     Socioeconomic History   • Marital status:      Spouse name: Not on file   • Number of children: Not on file   • Years of education: Not on file   • Highest education level: Not on file   Tobacco Use   • Smoking status: Former Smoker     Types: Cigarettes     Quit date:      Years since quittin.3   • Smokeless tobacco: Never Used   • Tobacco comment: social   Substance and Sexual Activity   • Alcohol use: Yes     Comment: rarely   • Drug use: No   • Sexual activity: Defer      Current Outpatient Medications on File Prior to Visit   Medication Sig Dispense Refill   • acyclovir (ZOVIRAX) 400 MG tablet Take 1 tablet by mouth 2 (Two) Times a Day. 60 tablet 11   • albuterol sulfate HFA (Ventolin HFA) 108 (90 Base) MCG/ACT inhaler Ventolin HFA 90 mcg/actuation aerosol inhaler   1-2 puffs q 4-6^ prn     • azelastine (ASTEPRO) 0.15 % solution nasal spray inhale 1-2 sprays by nasal route 2 times a day as needed  3   • B Complex-C-E-Zn (Z-BEC PO) Take 1 tablet by mouth daily.     • baclofen (LIORESAL) 10 MG tablet Take 1 tablet by mouth Every Night. 30 tablet 5   • Dalfampridine ER 10 MG tablet sustained-release 12 hour Take 10 mg by mouth 2 (Two) Times a Day. 60 tablet 11   • FLUoxetine (PROzac) 10 MG capsule Take 10 mg by mouth Daily.     • furosemide (LASIX) 40 MG tablet Take 1 tablet(s) every day     • gabapentin (NEURONTIN) 100 MG capsule Take 3 capsules by mouth 3 (Three) Times a Day. 90 capsule 5   • hydrochlorothiazide (MICROZIDE) 12.5 MG capsule Take 12.5 mg by mouth Daily.     • HYDROcodone-acetaminophen (NORCO)  MG per tablet Take 1 tablet by mouth Every 6 (Six) Hours As Needed for Moderate Pain . 20 tablet 0   • irbesartan (AVAPRO) 150 MG tablet Take 150 mg by mouth Daily.     • metoprolol succinate XL (TOPROL-XL) 100 MG 24 hr tablet  "metoprolol succinate  mg tablet,extended release 24 hr     • Multiple Vitamin (MULTI-VITAMIN DAILY) tablet Take 1 tablet by mouth daily.     • omeprazole (priLOSEC) 20 MG capsule TAKE ONE CAPSULE IN THE MORNING.  11   • potassium chloride (MICRO-K) 10 MEQ CR capsule Take 10 mEq by mouth daily.     • pravastatin (PRAVACHOL) 80 MG tablet Take 1 tablet(s) every day at bedtime.     • valACYclovir (Valtrex) 500 MG tablet Take 1 tablet by mouth 2 (Two) Times a Day. 60 tablet 11   • vitamin D (ERGOCALCIFEROL) 45543 UNITS capsule capsule Take 50,000 Units by mouth Every 30 (Thirty) Days. 15TH     • gabapentin (NEURONTIN) 300 MG capsule Take 2 capsules by mouth Every Night. 60 capsule 5     Current Facility-Administered Medications on File Prior to Visit   Medication Dose Route Frequency Provider Last Rate Last Admin   • OnabotulinumtoxinA 200 Units  200 Units Intramuscular Q3 Months Adonay Gilbert MD   200 Units at 04/26/21 0834      Allergies   Allergen Reactions   • Isoniazid Other (See Comments)     Body stopped producing blood   • Sulfa Antibiotics Anaphylaxis   • Tetanus Toxoid Swelling        Review of Systems   Constitutional: Negative.    HENT: Negative.    Eyes: Negative.    Respiratory: Negative.    Cardiovascular: Negative.    Gastrointestinal: Negative.    Endocrine: Negative.    Genitourinary: Negative.    Musculoskeletal: Positive for arthralgias.   Skin: Negative.    Allergic/Immunologic: Negative.    Neurological: Negative.    Hematological: Negative.    Psychiatric/Behavioral: Negative.         Objective      Physical Exam  /80   Pulse 80   Ht 182.9 cm (72\")   Wt (!) 154 kg (340 lb)   LMP  (LMP Unknown)   BMI 46.11 kg/m²     Body mass index is 46.11 kg/m².    General:   Mental Status:  Alert   Appearance: Cooperative, in no acute distress   Build and Nutrition: Obese by BMI female   Orientation: Alert and oriented to person, place and time   Posture: Normal   Gait: Antalgic on the " right    Integument       • Right knee: No skin lesions, rash, or ecchymosis.    Lower Extremities  • Right Knee:        • Tenderness: No medial or lateral joint line tenderness.       • Swelling: None        • Effusion: 1+       • Crepitus: None       • Atrophy: None       • Range of motion:             • Extension: 5°             • Flexion: 125°        • Instability: No varus or valgus laxity. Negative anterior drawer.       • Deformities: Valgus alignment    Imaging/Studies  No new radiographs.    Assessment and Plan     Diagnoses and all orders for this visit:    1. Primary osteoarthritis of right knee (Primary)    2. Obesity, morbid, BMI 40.0-49.9 (CMS/HCC)    Other orders  -     meloxicam (MOBIC) 7.5 MG tablet; 1 Oral Daily with food.  Dispense: 90 tablet; Refill: 2        1. Primary osteoarthritis of right knee    2. Obesity, morbid, BMI 40.0-49.9 (CMS/HCC)        She is still having right knee pain after conservative treatment with anti-inflammatories and injections. It is too early to do another injection. However, her body mass index is still too high to consider surgical intervention, so I advised her to work on weight loss. We also discussed how her MS will affect her recovery after surgery as well. I will prescribe her meloxicam and we will follow up in 3 months.     Return in about 3 months (around 7/28/2021).      Scribed for Lalo Choe MD by Lorna Lacy.  04/28/21   11:07 EDT    I have personally performed the services described in this document as scribed by the above individual, and it is both accurate and complete.  Lalo Choe MD  5/5/2021  13:08 EDT

## 2021-05-09 NOTE — TELEPHONE ENCOUNTER
Spoke with patient to let her know that I will send in another standing order to the [facility] in [facility]. Patient verbalized understanding.    No

## 2021-07-26 ENCOUNTER — PROCEDURE VISIT (OUTPATIENT)
Dept: NEUROLOGY | Facility: CLINIC | Age: 66
End: 2021-07-26

## 2021-07-26 DIAGNOSIS — G43.711 MIGRAINE, CHRONIC, WITHOUT AURA, INTRACTABLE, WITH STATUS MIGRAINOSUS: Primary | Chronic | ICD-10-CM

## 2021-07-26 PROCEDURE — 64615 CHEMODENERV MUSC MIGRAINE: CPT | Performed by: NURSE PRACTITIONER

## 2021-07-26 NOTE — PROGRESS NOTES
Botulinum Toxin Injection Procedure    History:  Response to treatment has reduced HA's at least 7 fewer days a month and/or 100 hours fewer each month.     Pre-operative diagnosis: headache    Post-operative diagnosis: Same    Procedure: Chemical neurolysis    After risks and benefits were explained including bleeding, infection, worsening of pain, damage to the areas being injected, weakness of muscles, loss of muscle control, dysphagia if injecting the head or neck, facial droop if injecting the facial area, painful injection, allergic or other reaction to the medications being injected, and the failure of the procedure to help the problem, a signed consent was obtained.      The patient was placed in a comfortable area and the sites to be treated were identified. A time out was called and performed. The area to be treated was prepped three times with alcohol and the alcohol allowed to dry. Next, a 30 gauge needle was used to inject the medication in the area to be treated.    Area(s) injected: frontalis muscle, semispinalis capitis muscle and temporallis muscle    Medications used: botulinum toxin 200 mu, 4 mL of saline used to dilute the botulinum toxin.      The patient did tolerate the procedure well. There were no complications.       Physical Examination    Current Treatment (Units)    5 units on the right and 5 units on the left  Procerus 5 units  Frontalis 10 units on the right and 10 units on the left  Temporalis 20 units on the right and 20 units on the left  Occipitalis 15 units on the right and 15 units on the left  Cervical Paraspinal 10 units on the right and 10 units on the left  Trapezius 15 units on the right and 15 units on the left  EMG Guidance none .   Complications: none .   The total amount injected in units is 155 .   The total amount wasted in units is 45 .   The total amount submitted in units is 200 .   Botox was supplied by the office.

## 2021-07-28 ENCOUNTER — OFFICE VISIT (OUTPATIENT)
Dept: ORTHOPEDIC SURGERY | Facility: CLINIC | Age: 66
End: 2021-07-28

## 2021-07-28 VITALS
SYSTOLIC BLOOD PRESSURE: 145 MMHG | DIASTOLIC BLOOD PRESSURE: 80 MMHG | HEIGHT: 72 IN | BODY MASS INDEX: 39.68 KG/M2 | WEIGHT: 293 LBS

## 2021-07-28 DIAGNOSIS — M17.11 PRIMARY OSTEOARTHRITIS OF RIGHT KNEE: Primary | ICD-10-CM

## 2021-07-28 DIAGNOSIS — E66.01 OBESITY, MORBID, BMI 40.0-49.9 (HCC): ICD-10-CM

## 2021-07-28 PROCEDURE — 99213 OFFICE O/P EST LOW 20 MIN: CPT | Performed by: ORTHOPAEDIC SURGERY

## 2021-07-28 PROCEDURE — 20610 DRAIN/INJ JOINT/BURSA W/O US: CPT | Performed by: ORTHOPAEDIC SURGERY

## 2021-07-28 RX ORDER — OLMESARTAN MEDOXOMIL 40 MG/1
TABLET ORAL
COMMUNITY
Start: 2021-06-11

## 2021-07-28 NOTE — PROGRESS NOTES
Procedure   Large Joint Arthrocentesis: R knee  Date/Time: 7/28/2021 8:20 AM  Consent given by: patient  Site marked: site marked  Timeout: Immediately prior to procedure a time out was called to verify the correct patient, procedure, equipment, support staff and site/side marked as required   Supporting Documentation  Indications: pain   Procedure Details  Location: knee - R knee  Preparation: Patient was prepped and draped in the usual sterile fashion  Needle size: 23 G  Approach: anterolateral  Medications administered: 30 mg Hyaluronan 30 MG/2ML  Patient tolerance: patient tolerated the procedure well with no immediate complications

## 2021-07-28 NOTE — PROGRESS NOTES
[facility] [place] [facility] [place]    Subjective     Chief Complaint   Patient presents with   • Follow-up     3 month recheck - Primary osteoarthritis of right knee         HPI    Rashad Carrillo is a 65 y.o. female who follows up for her right knee.     She has a known history of arthritis. The last time she was here was on 04/28/2021 and she was given a prescription for Mobic, which mildly helps. She has also been taking some glucosamine and she recently took a Medrol Dosepak for her multiple sclerosis and that has helped her out significantly. Her body mass index is still 46.    She has difficulty with performing sit to stand secondary to the weakness in her right knee. The previous cortisone provided some relief. She denies previously having viscosupplementation injections. She is trying to lose weight.    She was watering flowers and carrying two heavy 5 gallon buckets on uneven ground on 07/27/2021 and then she had severe pain on the proximal aspect of her right knee and it began giving way. It is now improved today after taking Mobic, glucosamine, ice, and IcyHot to it on 07/27/2021.     I have reviewed the following portions of the patient's history and agree with: History of Present Illness and Review of Systems    Patient Active Problem List   Diagnosis   • Vitamin D deficiency   • Depression   • Multiple sclerosis (CMS/HCC)   • Restless legs syndrome   • Muscle spasticity   • Migraine, chronic, without aura, intractable, with status migrainosus   • Essential hypertension   • Frequent falls   • Status post balloon dilatation of esophageal stricture   • T2DM (type 2 diabetes mellitus) (CMS/HCC)   • TB lung, latent   • Aplastic anemia likely due to isoniazide   • Chest pain   • MCGRAW (dyspnea on exertion)   • Hyperlipidemia LDL goal <70     Past Medical History:   Diagnosis Date   • Diabetes mellitus (CMS/HCC)    • Hypertension    • Multiple sclerosis (CMS/HCC)       Past Surgical History:   Procedure  Laterality Date   • CHOLECYSTECTOMY        Family History   Problem Relation Age of Onset   • Cancer Mother    • Hypertension Mother    • Heart disease Mother    • Alzheimer's disease Other    • Seizures Other         Epilepsy and recurrent seizures   • Heart disease Other      Social History     Socioeconomic History   • Marital status:      Spouse name: Not on file   • Number of children: Not on file   • Years of education: Not on file   • Highest education level: Not on file   Tobacco Use   • Smoking status: Former Smoker     Types: Cigarettes     Quit date:      Years since quittin.5   • Smokeless tobacco: Never Used   • Tobacco comment: social   Substance and Sexual Activity   • Alcohol use: Yes     Comment: rarely   • Drug use: No   • Sexual activity: Defer      Current Outpatient Medications on File Prior to Visit   Medication Sig Dispense Refill   • acyclovir (ZOVIRAX) 400 MG tablet Take 1 tablet by mouth 2 (Two) Times a Day. 60 tablet 11   • albuterol sulfate HFA (Ventolin HFA) 108 (90 Base) MCG/ACT inhaler Ventolin HFA 90 mcg/actuation aerosol inhaler   1-2 puffs q 4-6^ prn     • azelastine (ASTEPRO) 0.15 % solution nasal spray inhale 1-2 sprays by nasal route 2 times a day as needed  3   • B Complex-C-E-Zn (Z-BEC PO) Take 1 tablet by mouth daily.     • Dalfampridine ER 10 MG tablet sustained-release 12 hour Take 10 mg by mouth 2 (Two) Times a Day. 60 tablet 11   • FLUoxetine (PROzac) 10 MG capsule Take 10 mg by mouth Daily.     • furosemide (LASIX) 40 MG tablet Take 1 tablet(s) every day     • gabapentin (NEURONTIN) 100 MG capsule Take 3 capsules by mouth 3 (Three) Times a Day. 90 capsule 5   • hydrochlorothiazide (MICROZIDE) 12.5 MG capsule Take 12.5 mg by mouth Daily.     • HYDROcodone-acetaminophen (NORCO)  MG per tablet Take 1 tablet by mouth Every 6 (Six) Hours As Needed for Moderate Pain . 20 tablet 0   • irbesartan (AVAPRO) 150 MG tablet Take 150 mg by mouth Daily.     •  meloxicam (MOBIC) 7.5 MG tablet 1 Oral Daily with food. 90 tablet 2   • metoprolol succinate XL (TOPROL-XL) 100 MG 24 hr tablet metoprolol succinate  mg tablet,extended release 24 hr     • Multiple Vitamin (MULTI-VITAMIN DAILY) tablet Take 1 tablet by mouth daily.     • olmesartan (BENICAR) 40 MG tablet Take 1 tablet(s) every day for 90 days.     • omeprazole (priLOSEC) 20 MG capsule TAKE ONE CAPSULE IN THE MORNING.  11   • potassium chloride (MICRO-K) 10 MEQ CR capsule Take 10 mEq by mouth daily.     • pravastatin (PRAVACHOL) 80 MG tablet Take 1 tablet(s) every day at bedtime.     • valACYclovir (Valtrex) 500 MG tablet Take 1 tablet by mouth 2 (Two) Times a Day. 60 tablet 11   • vitamin D (ERGOCALCIFEROL) 85071 UNITS capsule capsule Take 50,000 Units by mouth Every 30 (Thirty) Days. 15TH     • gabapentin (NEURONTIN) 300 MG capsule Take 2 capsules by mouth Every Night. 60 capsule 5     Current Facility-Administered Medications on File Prior to Visit   Medication Dose Route Frequency Provider Last Rate Last Admin   • OnabotulinumtoxinA 200 Units  200 Units Intramuscular Q3 Months Adonay Gilbert MD   200 Units at 04/26/21 0834      Allergies   Allergen Reactions   • Isoniazid Other (See Comments)     Body stopped producing blood   • Sulfa Antibiotics Anaphylaxis   • Tetanus Toxoid Swelling        Review of Systems   Constitutional: Negative for activity change, appetite change, chills, diaphoresis, fatigue, fever and unexpected weight change.   HENT: Negative for congestion, dental problem, drooling, ear discharge, ear pain, facial swelling, hearing loss, mouth sores, nosebleeds, postnasal drip, rhinorrhea, sinus pressure, sneezing, sore throat, tinnitus, trouble swallowing and voice change.    Eyes: Negative for photophobia, pain, discharge, redness, itching and visual disturbance.   Respiratory: Negative for apnea, cough, choking, chest tightness, shortness of breath, wheezing and stridor.   "  Cardiovascular: Negative for chest pain, palpitations and leg swelling.   Gastrointestinal: Negative for abdominal distention, abdominal pain, anal bleeding, blood in stool, constipation, diarrhea, nausea, rectal pain and vomiting.   Endocrine: Negative for cold intolerance, heat intolerance, polydipsia, polyphagia and polyuria.   Genitourinary: Negative for decreased urine volume, difficulty urinating, dysuria, enuresis, flank pain, frequency, genital sores, hematuria and urgency.   Musculoskeletal: Positive for arthralgias and joint swelling. Negative for back pain, gait problem, myalgias, neck pain and neck stiffness.   Skin: Negative for color change, pallor, rash and wound.   Allergic/Immunologic: Negative for environmental allergies, food allergies and immunocompromised state.   Neurological: Negative for dizziness, tremors, seizures, syncope, facial asymmetry, speech difficulty, weakness, light-headedness, numbness and headaches.   Hematological: Negative for adenopathy. Does not bruise/bleed easily.   Psychiatric/Behavioral: Negative for agitation, behavioral problems, confusion, decreased concentration, dysphoric mood, hallucinations, self-injury, sleep disturbance and suicidal ideas. The patient is not nervous/anxious and is not hyperactive.         Objective      Physical Exam  /80   Ht 182.9 cm (72.01\")   Wt (!) 154 kg (339 lb 8.1 oz)   LMP  (LMP Unknown)   BMI 46.04 kg/m²     Body mass index is 46.04 kg/m².    General:   Mental Status:  Alert   Appearance: Cooperative, in no acute distress   Build and Nutrition: Obese by BMI female   Orientation: Alert and oriented to person, place and time   Posture: Normal   Gait: Unsteady and mildly antalgic on the right    Integument  • Right knee: No skin lesions, rash, or ecchymosis.    Lower Extremities  • Right Knee:  • Tenderness: No medial or lateral joint line tenderness.  • Swelling: None  • Effusion: Negative  • Crepitus: Positive  • Atrophy: " None  • Range of motion:  • Extension: 5°  • Flexion: 120°  • Instability: No varus or valgus laxity. Negative anterior drawer.  • Deformities: None      Imaging/Studies  No new radiographs were obtained.      Assessment and Plan     Diagnoses and all orders for this visit:    1. Primary osteoarthritis of right knee (Primary)  -     Large Joint Arthrocentesis: R knee    2. Obesity, morbid, BMI 40.0-49.9 (CMS/HCC)        1. Primary osteoarthritis of right knee    2. Obesity, morbid, BMI 40.0-49.9 (CMS/HCC)        I have informed the patient of my findings today. She elects to proceed with viscosupplementation injections. The viscosupplementation injections were approved today and she received her first injection today. She understands that her body mass index continues to be too high for surgery and she will continue to work on weight loss.    Procedure Note:   The potential benefits of performing a therapeutic knee joint visco supplementation injection, as well as potential risks (including, but not limited to infection, swelling, pain, bleeding, bruising, nerve/blood vessel damage, and pseudoseptic reaction) have been discussed with the patient.  After informed consent was obtained, a timeout procedure was performed, and the skin on the right knee was prepped with chlorhexidine soap and alcohol, after which ethyl chloride was applied to the skin at the injection site. Via the anterolateral approach, Orthovisc was injected into the knee joint.  The patient tolerated the procedure well. There were no complications. A Band-Aid was applied to the injection site. Post-procedural instructions discussed with the patient and/or their caregiver.    Return in about 1 week (around 8/4/2021) for Injection.      Scribed for Lalo Choe MD by Anjel Dubois.  07/28/21   08:58 EDT    I have personally performed the services described in this document as scribed by the above individual, and it is both accurate and complete.   Lalo Choe MD  7/29/2021  10:52 EDT

## 2021-08-04 ENCOUNTER — CLINICAL SUPPORT (OUTPATIENT)
Dept: ORTHOPEDIC SURGERY | Facility: CLINIC | Age: 66
End: 2021-08-04

## 2021-08-04 DIAGNOSIS — M17.11 PRIMARY OSTEOARTHRITIS OF RIGHT KNEE: Primary | ICD-10-CM

## 2021-08-04 PROCEDURE — 20610 DRAIN/INJ JOINT/BURSA W/O US: CPT | Performed by: ORTHOPAEDIC SURGERY

## 2021-08-04 NOTE — PROGRESS NOTES
[facility] [place] [facility] [facility] [place]    Subjective     Chief Complaint   Patient presents with   • Follow-up     1 week #2 Right knee orthovisc injection- Primary osteoarthritis of right knee        HPI    Rashad Carrillo is a 65 y.o. female who presents for the second Orthovisc injection into the right knee.  Of note, she has seen some improvement so far.    Patient Active Problem List   Diagnosis   • Vitamin D deficiency   • Depression   • Multiple sclerosis (CMS/HCC)   • Restless legs syndrome   • Muscle spasticity   • Migraine, chronic, without aura, intractable, with status migrainosus   • Essential hypertension   • Frequent falls   • Status post balloon dilatation of esophageal stricture   • T2DM (type 2 diabetes mellitus) (CMS/HCC)   • TB lung, latent   • Aplastic anemia likely due to isoniazide   • Chest pain   • MCGRAW (dyspnea on exertion)   • Hyperlipidemia LDL goal <70     Past Medical History:   Diagnosis Date   • Diabetes mellitus (CMS/HCC)    • Hypertension    • Multiple sclerosis (CMS/HCC)       Past Surgical History:   Procedure Laterality Date   • CHOLECYSTECTOMY        Family History   Problem Relation Age of Onset   • Cancer Mother    • Hypertension Mother    • Heart disease Mother    • Alzheimer's disease Other    • Seizures Other         Epilepsy and recurrent seizures   • Heart disease Other      Social History     Socioeconomic History   • Marital status:      Spouse name: Not on file   • Number of children: Not on file   • Years of education: Not on file   • Highest education level: Not on file   Tobacco Use   • Smoking status: Former Smoker     Types: Cigarettes     Quit date:      Years since quittin.6   • Smokeless tobacco: Never Used   • Tobacco comment: social   Substance and Sexual Activity   • Alcohol use: Yes     Comment: rarely   • Drug use: No   • Sexual activity: Defer      Current Outpatient Medications on File Prior to Visit   Medication Sig Dispense Refill    • acyclovir (ZOVIRAX) 400 MG tablet Take 1 tablet by mouth 2 (Two) Times a Day. 60 tablet 11   • albuterol sulfate HFA (Ventolin HFA) 108 (90 Base) MCG/ACT inhaler Ventolin HFA 90 mcg/actuation aerosol inhaler   1-2 puffs q 4-6^ prn     • azelastine (ASTEPRO) 0.15 % solution nasal spray inhale 1-2 sprays by nasal route 2 times a day as needed  3   • B Complex-C-E-Zn (Z-BEC PO) Take 1 tablet by mouth daily.     • Dalfampridine ER 10 MG tablet sustained-release 12 hour Take 10 mg by mouth 2 (Two) Times a Day. 60 tablet 11   • FLUoxetine (PROzac) 10 MG capsule Take 10 mg by mouth Daily.     • furosemide (LASIX) 40 MG tablet Take 1 tablet(s) every day     • gabapentin (NEURONTIN) 100 MG capsule Take 3 capsules by mouth 3 (Three) Times a Day. 90 capsule 5   • gabapentin (NEURONTIN) 300 MG capsule Take 2 capsules by mouth Every Night. 60 capsule 5   • hydrochlorothiazide (MICROZIDE) 12.5 MG capsule Take 12.5 mg by mouth Daily.     • HYDROcodone-acetaminophen (NORCO)  MG per tablet Take 1 tablet by mouth Every 6 (Six) Hours As Needed for Moderate Pain . 20 tablet 0   • irbesartan (AVAPRO) 150 MG tablet Take 150 mg by mouth Daily.     • meloxicam (MOBIC) 7.5 MG tablet 1 Oral Daily with food. 90 tablet 2   • metoprolol succinate XL (TOPROL-XL) 100 MG 24 hr tablet metoprolol succinate  mg tablet,extended release 24 hr     • Multiple Vitamin (MULTI-VITAMIN DAILY) tablet Take 1 tablet by mouth daily.     • olmesartan (BENICAR) 40 MG tablet Take 1 tablet(s) every day for 90 days.     • omeprazole (priLOSEC) 20 MG capsule TAKE ONE CAPSULE IN THE MORNING.  11   • potassium chloride (MICRO-K) 10 MEQ CR capsule Take 10 mEq by mouth daily.     • pravastatin (PRAVACHOL) 80 MG tablet Take 1 tablet(s) every day at bedtime.     • valACYclovir (Valtrex) 500 MG tablet Take 1 tablet by mouth 2 (Two) Times a Day. 60 tablet 11   • vitamin D (ERGOCALCIFEROL) 94901 UNITS capsule capsule Take 50,000 Units by mouth Every 30 (Thirty)  Days. 15TH       Current Facility-Administered Medications on File Prior to Visit   Medication Dose Route Frequency Provider Last Rate Last Admin   • OnabotulinumtoxinA 200 Units  200 Units Intramuscular Q3 Months Adonay Gilbert MD   200 Units at 04/26/21 0834      Allergies   Allergen Reactions   • Isoniazid Other (See Comments)     Body stopped producing blood   • Sulfa Antibiotics Anaphylaxis   • Tetanus Toxoid Swelling        Review of Systems   Constitutional: Negative for activity change, appetite change, chills, diaphoresis, fatigue, fever and unexpected weight change.   HENT: Negative for congestion, dental problem, drooling, ear discharge, ear pain, facial swelling, hearing loss, mouth sores, nosebleeds, postnasal drip, rhinorrhea, sinus pressure, sneezing, sore throat, tinnitus, trouble swallowing and voice change.    Eyes: Negative for photophobia, pain, discharge, redness, itching and visual disturbance.   Respiratory: Negative for apnea, cough, choking, chest tightness, shortness of breath, wheezing and stridor.    Cardiovascular: Negative for chest pain, palpitations and leg swelling.   Gastrointestinal: Negative for abdominal distention, abdominal pain, anal bleeding, blood in stool, constipation, diarrhea, nausea, rectal pain and vomiting.   Endocrine: Negative for cold intolerance, heat intolerance, polydipsia, polyphagia and polyuria.   Genitourinary: Negative for decreased urine volume, difficulty urinating, dysuria, enuresis, flank pain, frequency, genital sores, hematuria and urgency.   Musculoskeletal: Positive for arthralgias. Negative for back pain, gait problem, joint swelling, myalgias, neck pain and neck stiffness.   Skin: Negative for color change, pallor, rash and wound.   Allergic/Immunologic: Negative for environmental allergies, food allergies and immunocompromised state.   Neurological: Negative for dizziness, tremors, seizures, syncope, facial asymmetry, speech difficulty,  weakness, light-headedness, numbness and headaches.   Hematological: Negative for adenopathy. Does not bruise/bleed easily.   Psychiatric/Behavioral: Negative for agitation, behavioral problems, confusion, decreased concentration, dysphoric mood, hallucinations, self-injury, sleep disturbance and suicidal ideas. The patient is not nervous/anxious and is not hyperactive.         Objective      Physical Exam  LMP  (LMP Unknown)     There is no height or weight on file to calculate BMI.    General:   Mental Status:  Alert   Appearance: Cooperative, in no acute distress   Posture: Normal    Assessment and Plan     Diagnoses and all orders for this visit:    1. Primary osteoarthritis of right knee (Primary)  -     Large Joint Arthrocentesis: R knee        1. Primary osteoarthritis of right knee        Procedure Note:  The potential benefits of performing a therapeutic knee joint visco supplementation injection, as well as potential risks (including, but not limited to infection, swelling, pain, bleeding, bruising, nerve/blood vessel damage, and pseudoseptic reaction) have been discussed with the patient.  After informed consent, timeout procedure was performed, and the skin on the right knee was prepped with chlorhexidine soap and alcohol, after which ethyl chloride was applied to the skin at the injection site. Via the anterolateral approach, Orthovisc was injected into the knee joint.  The patient tolerated the procedure well. There were no complications.  Band-Aid was applied to the injection site. Post-procedural instructions discussed with the patient and/or their caregiver.    Return in about 1 week (around 8/11/2021) for Injection.    Lalo Choe MD  08/04/21  08:03 EDT

## 2021-08-04 NOTE — PROGRESS NOTES
Procedure   Large Joint Arthrocentesis: R knee  Date/Time: 8/4/2021 7:50 AM  Consent given by: patient  Site marked: site marked  Timeout: Immediately prior to procedure a time out was called to verify the correct patient, procedure, equipment, support staff and site/side marked as required   Supporting Documentation  Indications: pain   Procedure Details  Location: knee - R knee  Preparation: Patient was prepped and draped in the usual sterile fashion  Needle size: 23 G  Approach: anterolateral  Medications administered: 30 mg Hyaluronan 30 MG/2ML  Patient tolerance: patient tolerated the procedure well with no immediate complications

## 2021-08-11 ENCOUNTER — OFFICE VISIT (OUTPATIENT)
Dept: ORTHOPEDIC SURGERY | Facility: CLINIC | Age: 66
End: 2021-08-11

## 2021-08-11 DIAGNOSIS — M17.11 PRIMARY OSTEOARTHRITIS OF RIGHT KNEE: Primary | ICD-10-CM

## 2021-08-11 PROCEDURE — 20610 DRAIN/INJ JOINT/BURSA W/O US: CPT | Performed by: ORTHOPAEDIC SURGERY

## 2021-08-11 NOTE — PROGRESS NOTES
Procedure   Large Joint Arthrocentesis  Date/Time: 8/11/2021 7:58 AM  Consent given by: patient  Site marked: site marked  Timeout: Immediately prior to procedure a time out was called to verify the correct patient, procedure, equipment, support staff and site/side marked as required   Supporting Documentation  Indications: pain   Procedure Details  Location: knee -   Preparation: Patient was prepped and draped in the usual sterile fashion  Needle size: 23 G  Approach: anterolateral  Medications administered: 30 mg Hyaluronan 30 MG/2ML  Patient tolerance: patient tolerated the procedure well with no immediate complications

## 2021-08-11 NOTE — PROGRESS NOTES
[facility] [place] [facility] [place]    Subjective     Chief Complaint   Patient presents with   • Follow-up     right knee orthovisc injection #3        HPI    Rashad Carrillo is a 65 y.o. female who presents for the third and final Orthovisc injection into the right knee.  Of note, she has seen improvement so far.    Patient Active Problem List   Diagnosis   • Vitamin D deficiency   • Depression   • Multiple sclerosis (CMS/HCC)   • Restless legs syndrome   • Muscle spasticity   • Migraine, chronic, without aura, intractable, with status migrainosus   • Essential hypertension   • Frequent falls   • Status post balloon dilatation of esophageal stricture   • T2DM (type 2 diabetes mellitus) (CMS/HCC)   • TB lung, latent   • Aplastic anemia likely due to isoniazide   • Chest pain   • MCGRAW (dyspnea on exertion)   • Hyperlipidemia LDL goal <70     Past Medical History:   Diagnosis Date   • Diabetes mellitus (CMS/HCC)    • Hypertension    • Multiple sclerosis (CMS/HCC)       Past Surgical History:   Procedure Laterality Date   • CHOLECYSTECTOMY        Family History   Problem Relation Age of Onset   • Cancer Mother    • Hypertension Mother    • Heart disease Mother    • Alzheimer's disease Other    • Seizures Other         Epilepsy and recurrent seizures   • Heart disease Other      Social History     Socioeconomic History   • Marital status:      Spouse name: Not on file   • Number of children: Not on file   • Years of education: Not on file   • Highest education level: Not on file   Tobacco Use   • Smoking status: Former Smoker     Types: Cigarettes     Quit date:      Years since quittin.6   • Smokeless tobacco: Never Used   • Tobacco comment: social   Substance and Sexual Activity   • Alcohol use: Yes     Comment: rarely   • Drug use: No   • Sexual activity: Defer      Current Outpatient Medications on File Prior to Visit   Medication Sig Dispense Refill   • acyclovir (ZOVIRAX) 400 MG tablet Take  1 tablet by mouth 2 (Two) Times a Day. 60 tablet 11   • albuterol sulfate HFA (Ventolin HFA) 108 (90 Base) MCG/ACT inhaler Ventolin HFA 90 mcg/actuation aerosol inhaler   1-2 puffs q 4-6^ prn     • azelastine (ASTEPRO) 0.15 % solution nasal spray inhale 1-2 sprays by nasal route 2 times a day as needed  3   • B Complex-C-E-Zn (Z-BEC PO) Take 1 tablet by mouth daily.     • Dalfampridine ER 10 MG tablet sustained-release 12 hour Take 10 mg by mouth 2 (Two) Times a Day. 60 tablet 11   • FLUoxetine (PROzac) 10 MG capsule Take 10 mg by mouth Daily.     • furosemide (LASIX) 40 MG tablet Take 1 tablet(s) every day     • gabapentin (NEURONTIN) 100 MG capsule Take 3 capsules by mouth 3 (Three) Times a Day. 90 capsule 5   • gabapentin (NEURONTIN) 300 MG capsule Take 2 capsules by mouth Every Night. 60 capsule 5   • hydrochlorothiazide (MICROZIDE) 12.5 MG capsule Take 12.5 mg by mouth Daily.     • HYDROcodone-acetaminophen (NORCO)  MG per tablet Take 1 tablet by mouth Every 6 (Six) Hours As Needed for Moderate Pain . 20 tablet 0   • irbesartan (AVAPRO) 150 MG tablet Take 150 mg by mouth Daily.     • meloxicam (MOBIC) 7.5 MG tablet 1 Oral Daily with food. 90 tablet 2   • metoprolol succinate XL (TOPROL-XL) 100 MG 24 hr tablet metoprolol succinate  mg tablet,extended release 24 hr     • Multiple Vitamin (MULTI-VITAMIN DAILY) tablet Take 1 tablet by mouth daily.     • olmesartan (BENICAR) 40 MG tablet Take 1 tablet(s) every day for 90 days.     • omeprazole (priLOSEC) 20 MG capsule TAKE ONE CAPSULE IN THE MORNING.  11   • potassium chloride (MICRO-K) 10 MEQ CR capsule Take 10 mEq by mouth daily.     • pravastatin (PRAVACHOL) 80 MG tablet Take 1 tablet(s) every day at bedtime.     • valACYclovir (Valtrex) 500 MG tablet Take 1 tablet by mouth 2 (Two) Times a Day. 60 tablet 11   • vitamin D (ERGOCALCIFEROL) 20264 UNITS capsule capsule Take 50,000 Units by mouth Every 30 (Thirty) Days. 15TH       Current  Facility-Administered Medications on File Prior to Visit   Medication Dose Route Frequency Provider Last Rate Last Admin   • OnabotulinumtoxinA 200 Units  200 Units Intramuscular Q3 Months Adonay Gilbert MD   200 Units at 04/26/21 0834      Allergies   Allergen Reactions   • Isoniazid Other (See Comments)     Body stopped producing blood   • Sulfa Antibiotics Anaphylaxis   • Tetanus Toxoid Swelling        Review of Systems     Objective      Physical Exam  LMP  (LMP Unknown)     There is no height or weight on file to calculate BMI.    General:   Mental Status:  Alert   Appearance: Cooperative, in no acute distress   Posture: Normal    Assessment and Plan     Diagnoses and all orders for this visit:    1. Primary osteoarthritis of right knee (Primary)  -     Large Joint Arthrocentesis        1. Primary osteoarthritis of right knee        Procedure Note:  The potential benefits of performing a therapeutic knee joint visco supplementation injection, as well as potential risks (including, but not limited to infection, swelling, pain, bleeding, bruising, nerve/blood vessel damage, and pseudoseptic reaction) have been discussed with the patient.  After informed consent, timeout procedure was performed, and the skin on the right knee was prepped with chlorhexidine soap and alcohol, after which ethyl chloride was applied to the skin at the injection site. Via the anterolateral approach, Provisc was injected into the knee joint.  The patient tolerated the procedure well. There were no complications.  Band-Aid was applied to the injection site. Post-procedural instructions discussed with the patient and/or their caregiver.    Return in about 6 months (around 2/11/2022).    Lalo Choe MD  08/11/21  08:17 EDT

## 2021-08-13 ENCOUNTER — OFFICE VISIT (OUTPATIENT)
Dept: NEUROLOGY | Facility: CLINIC | Age: 66
End: 2021-08-13

## 2021-08-13 VITALS
OXYGEN SATURATION: 97 % | BODY MASS INDEX: 39.68 KG/M2 | SYSTOLIC BLOOD PRESSURE: 138 MMHG | HEIGHT: 72 IN | WEIGHT: 293 LBS | DIASTOLIC BLOOD PRESSURE: 82 MMHG | HEART RATE: 69 BPM

## 2021-08-13 DIAGNOSIS — G43.711 MIGRAINE, CHRONIC, WITHOUT AURA, INTRACTABLE, WITH STATUS MIGRAINOSUS: Chronic | ICD-10-CM

## 2021-08-13 DIAGNOSIS — G35 MULTIPLE SCLEROSIS (HCC): Primary | Chronic | ICD-10-CM

## 2021-08-13 DIAGNOSIS — G25.81 RESTLESS LEGS SYNDROME: Chronic | ICD-10-CM

## 2021-08-13 DIAGNOSIS — F33.1 MODERATE EPISODE OF RECURRENT MAJOR DEPRESSIVE DISORDER (HCC): Chronic | ICD-10-CM

## 2021-08-13 PROCEDURE — 99214 OFFICE O/P EST MOD 30 MIN: CPT | Performed by: PSYCHIATRY & NEUROLOGY

## 2021-08-13 NOTE — PROGRESS NOTES
"Chief Complaint  Multiple Sclerosis    Subjective          Rashad Carrillo presents to [facility] NEUROLOGY     History of Present Illness    65 y.o. female returns in follow up.  Last visit on 7/26/21 performed BTX, continued monthly labs, renewed GBP, Cymbalta, Acyclovir and Ampyra      MRI Brain 8/5/20 as compared to10/8/18, no change in T2 lesion load, moderate to severe T2 lesion burden and moderate atrophy.     JCV ab - 6/11/18 - 0.45       7/30/21: Plt 210; TSH 3.98; Cr 0.75; U/A nl     RRMS     S/P Lemtrada 2/2, 10/30/18 - 11/2/18, 11/4/19-11/6/19     Gait limited by R knee OA.    MS recommends PT/SLP.     Continues on Ampyra.        Fatigue is moderate.  Heat intolerance improved in winter.        Continued pain and stiffness in R LE.       Problem history:     25 ft timed walk increaesed 15.99 from 9.94.  Left hand 9 hole peg worsened.   Increasing swallowing difficulty.        Immediate and working memory are declining with SDMT 27/110..        Referred to ID and dx with latent TB.  Recommended isoniazid and pyridoxine for 9 months.  Subsequently developed aplastic anemia secondary to INH and followed by Dr Valentin.  Received multiple transfusions.  3/29/18 able to resume Tysabri and has had 3 infusions  Treated with rifampin.       MDD     Mood is stable on Cymbalta 60 mg PO daily      RLS     Sx controlled on current meds.     Controlled  on GBP.   Baclofen helping with spasticity in LE.           Migraines     Headache frequency 4 - 5 per month.  Intensity is mild.    BTX improving sx       Problem History:    HA frequency is daily.  Moderate to severe intensity.   Location moves around head.  Quality Last for up to a day.  Tx with OTC meds.      Greater than 15 HA a month, moderate to severe intensity, lasting over 6 hours.       Preventatives:  TPM, GBP, Cymbalta,          Objective   Vital Signs:   /82   Pulse 69   Ht 182.9 cm (72.01\")   Wt (!) 156 kg (344 lb)   SpO2 97%  "  BMI 46.64 kg/m²     Physical Exam  Neurological:      Coordination: Heel to Shin Test abnormal and Romberg Test abnormal.      Gait: Tandem walk abnormal.      Deep Tendon Reflexes:      Reflex Scores:       Tricep reflexes are 3+ on the right side and 3+ on the left side.       Bicep reflexes are 3+ on the right side and 3+ on the left side.       Brachioradialis reflexes are 3+ on the right side and 3+ on the left side.       Patellar reflexes are 3+ on the right side and 3+ on the left side.       Achilles reflexes are 3+ on the right side and 3+ on the left side.  Psychiatric:         Speech: Speech normal.          Neurologic Exam     Mental Status   Registration: recalls 3 of 3 objects. Recall at 5 minutes: recalls 3 of 3 objects. Follows 3 step commands.   Attention: normal. Concentration: normal.   Speech: speech is normal   Level of consciousness: alert  Knowledge: good.   Able to name object. Able to read. Able to repeat. Able to write. Normal comprehension.         Cranial Nerves     CN II   Visual fields full to confrontation.   Visual acuity: normal  Right visual field deficit: none  Left visual field deficit: none     CN III, IV, VI   Nystagmus: none   Diplopia: none  Ophthalmoparesis: none  Upgaze: normal  Downgaze: normal  Conjugate gaze: present  Vestibulo-ocular reflex: present    CN V   Facial sensation intact.   Right corneal reflex: normal  Left corneal reflex: normal    CN VII   Right facial weakness: none  Left facial weakness: none    CN VIII   Hearing: intact    CN IX, X   Palate: symmetric  Right gag reflex: normal  Left gag reflex: normal    CN XI   Right sternocleidomastoid strength: normal  Left sternocleidomastoid strength: normal    CN XII   Tongue: not atrophic  Fasciculations: absent  Tongue deviation: none    Motor Exam   Muscle bulk: normal  Overall muscle tone: normal  Right arm tone: normal  Left arm tone: normal  Right leg tone: increased  Left leg tone: increased    Strength    Strength 5/5 except as noted.   Left iliopsoas: 3/5  Left quadriceps: 3/5  Left hamstring: 3/5  Left glutei: 3/5  Left anterior tibial: 3/5  Left posterior tibial: 3/5  Left peroneal: 3/5  Left gastroc: 3/5    Sensory Exam   Right arm light touch: normal  Right leg light touch: normal  Right arm vibration: normal  Right leg vibration: normal  Right arm proprioception: normal  Right leg proprioception: normal  Right arm pinprick: normal  Right leg pinprick: normal  Sensory deficit distribution on right: non-dermatomal distribution (decreased left face, arm and leg)    Gait, Coordination, and Reflexes     Gait  Gait: circumduction, shuffling and wide-based (left leg)    Coordination   Romberg: positive  Heel to shin coordination: abnormal  Tandem walking coordination: abnormal    Tremor   Resting tremor: absent  Intention tremor: absent  Action tremor: left arm and right arm    Reflexes   Right brachioradialis: 3+  Left brachioradialis: 3+  Right biceps: 3+  Left biceps: 3+  Right triceps: 3+  Left triceps: 3+  Right patellar: 3+  Left patellar: 3+  Right achilles: 3+  Left achilles: 3+  Right : 3+  Left : 3+  Right plantar: normal  Left plantar: normal     Result Review :   The following data was reviewed by: Adonay Gilbert MD on 08/13/2021:  Common labs    Common Labsle 9/22/20 9/22/20 12/29/20 12/29/20 1/26/21 1/26/21    0910 0910 0921 0921 1016 1016   Glucose      76   BUN      16   Creatinine  0.81  0.79  0.84   eGFR Non  Am  78  80  68   eGFR African Am  90  92     Sodium      141   Potassium      4.2   Chloride      105   Calcium      9.6   Albumin      3.90   Total Bilirubin      0.4   Alkaline Phosphatase      106   AST (SGOT)      13   ALT (SGPT)      12   WBC 3.5  8.2  4.79    Hemoglobin 14.6  14.6  14.6    Hematocrit 45.4  44.3  46.3    Platelets 204  223  212                      Assessment and Plan    Diagnoses and all orders for this visit:    1. Multiple sclerosis (CMS/HCC)  (Primary)  Assessment & Plan:  Sx stable on Lemtrada     Monthly labs are stable         Orders:  -     FL Video Swallow With Speech Single Contrast; Future  -     Ambulatory Referral to Physical Therapy Evaluate and treat  -     Ambulatory Referral to Speech Therapy    2. Moderate episode of recurrent major depressive disorder (CMS/HCC)  Assessment & Plan:  Patient's depression is recurrent and is mild without psychosis. Their depression is currently in partial remission and the condition is improving with treatment. This will be reassessed at the next regular appointment. F/U as described:patient will continue current medication therapy.      3. Restless legs syndrome    4. Migraine, chronic, without aura, intractable, with status migrainosus  Assessment & Plan:  Headaches are improving with treatment.  Continue current treatment regimen.            Follow Up   No follow-ups on file.  Patient was given instructions and counseling regarding her condition or for health maintenance advice. Please see specific information pulled into the AVS if appropriate.

## 2021-09-05 ENCOUNTER — APPOINTMENT (OUTPATIENT)
Dept: PREADMISSION TESTING | Facility: HOSPITAL | Age: 66
End: 2021-09-05

## 2021-09-08 ENCOUNTER — APPOINTMENT (OUTPATIENT)
Dept: GENERAL RADIOLOGY | Facility: HOSPITAL | Age: 66
End: 2021-09-08

## 2021-10-19 ENCOUNTER — PROCEDURE VISIT (OUTPATIENT)
Dept: NEUROLOGY | Facility: CLINIC | Age: 66
End: 2021-10-19

## 2021-10-19 DIAGNOSIS — G43.711 MIGRAINE, CHRONIC, WITHOUT AURA, INTRACTABLE, WITH STATUS MIGRAINOSUS: Chronic | ICD-10-CM

## 2021-10-19 PROCEDURE — 64615 CHEMODENERV MUSC MIGRAINE: CPT | Performed by: NURSE PRACTITIONER

## 2021-11-11 ENCOUNTER — SPECIALTY PHARMACY (OUTPATIENT)
Dept: ONCOLOGY | Facility: HOSPITAL | Age: 66
End: 2021-11-11

## 2021-11-11 NOTE — PROGRESS NOTES
Specialty Pharmacy Refill Coordination Note     Rashad is a 65 y.o. female contacted today regarding her Dalfampridine that she is filling with [facility]. Also, discussed the monitoring and dispensing of her Botox.  Reviewed and verified with patient:      Specialty medication(s) and dose(s) confirmed: yes              Medication Adherence    Any gaps in refill history greater than 2 weeks in the last 3 months: no  Demonstrates understanding of importance of adherence: yes  Informant: patient  Reliability of informant: reliable  Provider-estimated medication adherence level: good          Follow-up: Beginning of 2022 to capture Botox.     Kyung Quintanilla, Pharmacy Technician  Specialty Pharmacy Technician

## 2021-11-30 ENCOUNTER — SPECIALTY PHARMACY (OUTPATIENT)
Dept: ONCOLOGY | Facility: HOSPITAL | Age: 66
End: 2021-11-30

## 2021-11-30 RX ORDER — DALFAMPRIDINE 10 MG/1
10 TABLET, FILM COATED, EXTENDED RELEASE ORAL 2 TIMES DAILY
Qty: 60 TABLET | Refills: 11 | Status: SHIPPED | OUTPATIENT
Start: 2021-11-30 | End: 2022-12-02 | Stop reason: SDUPTHER

## 2021-12-14 ENCOUNTER — TELEPHONE (OUTPATIENT)
Dept: ORTHOPEDIC SURGERY | Facility: CLINIC | Age: 66
End: 2021-12-14

## 2021-12-14 NOTE — TELEPHONE ENCOUNTER
CALLED PATIENT TO INFORM HER THAT LAST INJECTION ON 8/11/21 WAS FOR ORTHOVISC AND SHE CAN'T GET THAT UNTIL February. SCHEDULED PATIENT APPOINTMENT TO HAVE KNEE LOOKED AT.

## 2021-12-14 NOTE — TELEPHONE ENCOUNTER
Caller: Rashad Carrillo    Relationship to patient: Self    Best call back number: [phone]    Patient is needing: PATIENT HAD RIGHT KNEE INJECTION ON 8/11, SHE SAID IT HAS STARTED HURTING AGAIN AND WAS ASKING WHAT SHE CAN DO. OR IF SHE CAN HAVE ANOTHER INJECTION SCHEDULED.

## 2021-12-20 ENCOUNTER — OFFICE VISIT (OUTPATIENT)
Dept: ORTHOPEDIC SURGERY | Facility: CLINIC | Age: 66
End: 2021-12-20

## 2021-12-20 VITALS
WEIGHT: 293 LBS | SYSTOLIC BLOOD PRESSURE: 142 MMHG | DIASTOLIC BLOOD PRESSURE: 98 MMHG | HEIGHT: 72 IN | BODY MASS INDEX: 39.68 KG/M2

## 2021-12-20 DIAGNOSIS — M17.11 PRIMARY OSTEOARTHRITIS OF RIGHT KNEE: Primary | ICD-10-CM

## 2021-12-20 DIAGNOSIS — G89.29 CHRONIC PAIN OF RIGHT KNEE: ICD-10-CM

## 2021-12-20 DIAGNOSIS — E66.01 CLASS 3 SEVERE OBESITY WITH SERIOUS COMORBIDITY AND BODY MASS INDEX (BMI) OF 45.0 TO 49.9 IN ADULT, UNSPECIFIED OBESITY TYPE (HCC): ICD-10-CM

## 2021-12-20 DIAGNOSIS — M25.561 CHRONIC PAIN OF RIGHT KNEE: ICD-10-CM

## 2021-12-20 PROCEDURE — 20610 DRAIN/INJ JOINT/BURSA W/O US: CPT | Performed by: PHYSICIAN ASSISTANT

## 2021-12-20 RX ADMIN — TRIAMCINOLONE ACETONIDE 40 MG: 40 INJECTION, SUSPENSION INTRA-ARTICULAR; INTRAMUSCULAR at 08:49

## 2021-12-20 RX ADMIN — LIDOCAINE HYDROCHLORIDE 4 ML: 10 INJECTION, SOLUTION EPIDURAL; INFILTRATION; INTRACAUDAL; PERINEURAL at 08:49

## 2021-12-20 NOTE — PROGRESS NOTES
Procedure   Large Joint Arthrocentesis: R knee  Date/Time: 12/20/2021 8:49 AM  Consent given by: patient  Site marked: site marked  Timeout: Immediately prior to procedure a time out was called to verify the correct patient, procedure, equipment, support staff and site/side marked as required   Supporting Documentation  Indications: pain   Procedure Details  Location: knee - R knee  Preparation: Patient was prepped and draped in the usual sterile fashion  Needle size: 22 G  Approach: anterolateral  Medications administered: 4 mL lidocaine PF 1% 1 %; 40 mg triamcinolone acetonide 40 MG/ML  Patient tolerance: patient tolerated the procedure well with no immediate complications

## 2021-12-20 NOTE — PROGRESS NOTES
"    [facility] [place] [facility] Note        Subjective     CC: Follow-up (4 months follow up Primary osteoarthritis of right knee)      ANU Carrillo is a 66 y.o. female.  Patient returns today for follow-up of right knee pain.  Completed visco series 8/11/2021.  Pain and symptoms are worsening again and patient requesting another injection.      Pain scale: 8+/10.     Overall, patient's symptoms are worsening.    ROS:    Constiutional:Pt denies fever, chills, nausea, or vomiting.  MSK:as above        Objective      Past Medical History  Past Medical History:   Diagnosis Date   • Diabetes mellitus (HCC)    • Hypertension    • Multiple sclerosis (HCC)          Physical Exam  /98   Ht 182.9 cm (72.01\")   Wt (!) 152 kg (335 lb)   LMP  (LMP Unknown)   BMI 45.42 kg/m²     Body mass index is 45.42 kg/m².    Patient is well nourished and well developed.        Ortho Exam  Integument:   Right knee: No skin lesions, no rash, no ecchymosis    Lower Extremities:   Right Knee:    Tenderness:  Global tenderness    Effusion:  Trace    Swelling:  None    Crepitus:  Positive    Atrophy:  None    Range of motion:  Extension: 5°       Flexion: 120°    Instability:  No varus laxity, no valgus laxity, negative anterior drawer    Deformities:  Neutral      Imaging/Labs/EMG Reviewed:  No new imaging today.      Assessment:  1. Primary osteoarthritis of right knee    2. Chronic pain of right knee    3. Class 3 severe obesity with serious comorbidity and body mass index (BMI) of 45.0 to 49.9 in adult, unspecified obesity type (HCC)        Plan:  Primary osteoarthritis right knee causing chronic pain.  Patient was offered corticosteroid injection which was given today.  Obesity--patient has remained between 45 and 46 for BMI.  We discussed referral to bariatrics versus weight management.  She would like to think about it and will get back to me if she wishes to consider either 1 of these.  In the meantime she will " continue with diet and portion control.  Follow up after 2/11/2022 for repeat visco sereies.  Questions and concerns answered.      After discussing the risks, benefits, indications of injection, the patient gave consent to proceed.  Her right knee was confirmed as the correct joint to be injected with a timeout.  It was then prepped using Hibiclens and injected with a mixture of 4 cc of 1% plain lidocaine and 1 cc of Kenalog (40 mg per mL), without any resistance through the anterior lateral approach, patient in seated position.  Area was cleaned, hemostasis was achieved and a Band-Aid was applied over the injection site.  The patient tolerated procedure well.  I instructed the patient on signs and symptoms of infection.  They should report to the emergency department or return to clinic if any of these develop, for further evaluation and treatment.  Recommended modifying activity for the next 48 hours to include rest, ice, elevation and oral pain medication as needed.        Kendal Coker PA-C  12/20/21  09:00 EST      Dictated Utilizing Dragon Dictation.

## 2021-12-23 RX ORDER — TRIAMCINOLONE ACETONIDE 40 MG/ML
40 INJECTION, SUSPENSION INTRA-ARTICULAR; INTRAMUSCULAR
Status: COMPLETED | OUTPATIENT
Start: 2021-12-20 | End: 2021-12-20

## 2021-12-23 RX ORDER — LIDOCAINE HYDROCHLORIDE 10 MG/ML
4 INJECTION, SOLUTION EPIDURAL; INFILTRATION; INTRACAUDAL; PERINEURAL
Status: COMPLETED | OUTPATIENT
Start: 2021-12-20 | End: 2021-12-20

## 2021-12-30 ENCOUNTER — SPECIALTY PHARMACY (OUTPATIENT)
Dept: ONCOLOGY | Facility: HOSPITAL | Age: 66
End: 2021-12-30

## 2022-01-04 ENCOUNTER — SPECIALTY PHARMACY (OUTPATIENT)
Dept: ONCOLOGY | Facility: HOSPITAL | Age: 67
End: 2022-01-04

## 2022-01-12 ENCOUNTER — PROCEDURE VISIT (OUTPATIENT)
Dept: NEUROLOGY | Facility: CLINIC | Age: 67
End: 2022-01-12

## 2022-01-12 DIAGNOSIS — G43.711 MIGRAINE, CHRONIC, WITHOUT AURA, INTRACTABLE, WITH STATUS MIGRAINOSUS: Primary | Chronic | ICD-10-CM

## 2022-01-12 PROCEDURE — 64615 CHEMODENERV MUSC MIGRAINE: CPT | Performed by: NURSE PRACTITIONER

## 2022-01-12 NOTE — PROGRESS NOTES
Botulinum Toxin Injection Procedure    History:  Response to treatment has reduced HA's at least 7 fewer days a month and/or 100 hours fewer each month.     Pre-operative diagnosis: headache    Post-operative diagnosis: Same    Procedure: Chemical neurolysis    After risks and benefits were explained including bleeding, infection, worsening of pain, damage to the areas being injected, weakness of muscles, loss of muscle control, dysphagia if injecting the head or neck, facial droop if injecting the facial area, painful injection, allergic or other reaction to the medications being injected, and the failure of the procedure to help the problem, a signed consent was obtained.      The patient was placed in a comfortable area and the sites to be treated were identified. A time out was called and performed. The area to be treated was prepped three times with alcohol and the alcohol allowed to dry. Next, a 30 gauge needle was used to inject the medication in the area to be treated.    Area(s) injected: frontalis muscle, semispinalis capitis muscle and temporallis muscle    Medications used: botulinum toxin 200 mu, 4 mL of saline used to dilute the botulinum toxin.      The patient did tolerate the procedure well. There were no complications.       Physical Examination    Current Treatment (Units)    5 units on the right and 5 units on the left  Procerus 5 units  Frontalis 10 units on the right and 10 units on the left  Temporalis 20 units on the right and 20 units on the left  Occipitalis 15 units on the right and 15 units on the left  Cervical Paraspinal 10 units on the right and 10 units on the left  Trapezius 15 units on the right and 15 units on the left  EMG Guidance none .   Complications: none .   The total amount injected in units is 155 .   The total amount wasted in units is 45 .   The total amount submitted in units is 200 .   Botox was supplied by the physician.

## 2022-02-08 ENCOUNTER — TRANSCRIBE ORDERS (OUTPATIENT)
Dept: ADMINISTRATIVE | Facility: HOSPITAL | Age: 67
End: 2022-02-08

## 2022-02-08 DIAGNOSIS — Z12.31 ENCOUNTER FOR SCREENING MAMMOGRAM FOR MALIGNANT NEOPLASM OF BREAST: Primary | ICD-10-CM

## 2022-02-14 ENCOUNTER — OFFICE VISIT (OUTPATIENT)
Dept: ORTHOPEDIC SURGERY | Facility: CLINIC | Age: 67
End: 2022-02-14

## 2022-02-14 VITALS
HEIGHT: 72 IN | WEIGHT: 293 LBS | SYSTOLIC BLOOD PRESSURE: 122 MMHG | BODY MASS INDEX: 39.68 KG/M2 | DIASTOLIC BLOOD PRESSURE: 84 MMHG

## 2022-02-14 DIAGNOSIS — M17.11 PRIMARY OSTEOARTHRITIS OF RIGHT KNEE: Primary | ICD-10-CM

## 2022-02-14 PROCEDURE — 20610 DRAIN/INJ JOINT/BURSA W/O US: CPT | Performed by: ORTHOPAEDIC SURGERY

## 2022-02-14 NOTE — PROGRESS NOTES
Procedure   Large Joint Arthrocentesis: R knee  Date/Time: 2/14/2022 8:12 AM  Consent given by: patient  Site marked: site marked  Timeout: Immediately prior to procedure a time out was called to verify the correct patient, procedure, equipment, support staff and site/side marked as required   Supporting Documentation  Indications: pain   Procedure Details  Location: knee - R knee  Preparation: Patient was prepped and draped in the usual sterile fashion  Needle size: 22 G  Approach: anterolateral  Medications administered: 30 mg Hyaluronan 30 MG/2ML  Patient tolerance: patient tolerated the procedure well with no immediate complications

## 2022-02-14 NOTE — PROGRESS NOTES
[facility]    Subjective     Chief Complaint   Patient presents with   • Follow-up     8 weeks- Primary osteoarthritis of right knee         HPI    It has been 8  week(s) since Ms. Carrillo's last visit. She returns to clinic today for follow-up of right knee arthritis. The issue has been ongoing for many year(s). She rates her pain a 9/10 on the pain scale. Previous/current treatments: cane/walker, NSAIDS and oral steroids. Current symptoms: pain, swelling, stiffness and giving way/buckling. The pain is worse with walking, standing, sitting, climbing stairs, sleeping, working, leisure, lying on affected side, rising from seated position and any movement of the joint; resting improve the pain. Overall, she is doing better.  Good relief with Orthovisc injections in the past.  She would like to have another series.  She continues to work on weight loss.  She is looking into weight reduction surgery.    I have reviewed the following portions of the patient's history and agree with: History of Present Illness and Review of Systems    Patient Active Problem List   Diagnosis   • Vitamin D deficiency   • Depression   • Multiple sclerosis (HCC)   • Restless legs syndrome   • Muscle spasticity   • Migraine, chronic, without aura, intractable, with status migrainosus   • Essential hypertension   • Frequent falls   • Status post balloon dilatation of esophageal stricture   • T2DM (type 2 diabetes mellitus) (HCC)   • TB lung, latent   • Aplastic anemia likely due to isoniazide   • Chest pain   • MCGRAW (dyspnea on exertion)   • Hyperlipidemia LDL goal <70     Past Medical History:   Diagnosis Date   • Diabetes mellitus (HCC)    • Hypertension    • Multiple sclerosis (HCC)       Past Surgical History:   Procedure Laterality Date   • CHOLECYSTECTOMY        Family History   Problem Relation Age of Onset   • Cancer Mother    • Hypertension Mother    • Heart disease Mother    • Alzheimer's disease Other    •  Seizures Other         Epilepsy and recurrent seizures   • Heart disease Other      Social History     Socioeconomic History   • Marital status:    Tobacco Use   • Smoking status: Former Smoker     Types: Cigarettes     Quit date:      Years since quittin.1   • Smokeless tobacco: Never Used   • Tobacco comment: social   Vaping Use   • Vaping Use: Never used   Substance and Sexual Activity   • Alcohol use: Yes     Comment: rarely   • Drug use: No   • Sexual activity: Defer      Current Outpatient Medications on File Prior to Visit   Medication Sig Dispense Refill   • acyclovir (ZOVIRAX) 400 MG tablet Take 1 tablet by mouth 2 (Two) Times a Day. 60 tablet 11   • albuterol sulfate HFA (Ventolin HFA) 108 (90 Base) MCG/ACT inhaler Ventolin HFA 90 mcg/actuation aerosol inhaler   1-2 puffs q 4-6^ prn     • azelastine (ASTEPRO) 0.15 % solution nasal spray inhale 1-2 sprays by nasal route 2 times a day as needed  3   • B Complex-C-E-Zn (Z-BEC PO) Take 1 tablet by mouth daily.     • Dalfampridine ER 10 MG tablet sustained-release 12 hour Take 10 mg by mouth 2 (Two) Times a Day. 60 tablet 11   • FLUoxetine (PROzac) 10 MG capsule Take 10 mg by mouth Daily.     • furosemide (LASIX) 40 MG tablet Take 1 tablet(s) every day     • gabapentin (NEURONTIN) 100 MG capsule Take 3 capsules by mouth 3 (Three) Times a Day. 90 capsule 5   • hydrochlorothiazide (MICROZIDE) 12.5 MG capsule Take 12.5 mg by mouth Daily.     • HYDROcodone-acetaminophen (NORCO)  MG per tablet Take 1 tablet by mouth Every 6 (Six) Hours As Needed for Moderate Pain . 20 tablet 0   • irbesartan (AVAPRO) 150 MG tablet Take 150 mg by mouth Daily.     • meloxicam (MOBIC) 7.5 MG tablet 1 Oral Daily with food. 90 tablet 2   • metoprolol succinate XL (TOPROL-XL) 100 MG 24 hr tablet metoprolol succinate  mg tablet,extended release 24 hr     • Multiple Vitamin (MULTI-VITAMIN DAILY) tablet Take 1 tablet by mouth daily.     • olmesartan (BENICAR) 40  "MG tablet Take 1 tablet(s) every day for 90 days.     • omeprazole (priLOSEC) 20 MG capsule TAKE ONE CAPSULE IN THE MORNING.  11   • OnabotulinumtoxinA 200 units reconstituted solution FOR . PHYSICIAN TO INJECT 155 UNITS INTRAMUSCULARLY INTO HEAD, NECK AND SHOULDERS EVERY 12 WEEKS Per FDA PROTOCOL 1 each 3   • potassium chloride (MICRO-K) 10 MEQ CR capsule Take 10 mEq by mouth daily.     • pravastatin (PRAVACHOL) 80 MG tablet Take 1 tablet(s) every day at bedtime.     • valACYclovir (Valtrex) 500 MG tablet Take 1 tablet by mouth 2 (Two) Times a Day. 60 tablet 11   • vitamin D (ERGOCALCIFEROL) 56230 UNITS capsule capsule Take 50,000 Units by mouth Every 30 (Thirty) Days. 15TH     • gabapentin (NEURONTIN) 300 MG capsule Take 2 capsules by mouth Every Night. 60 capsule 5     Current Facility-Administered Medications on File Prior to Visit   Medication Dose Route Frequency Provider Last Rate Last Admin   • OnabotulinumtoxinA 200 Units  200 Units Intramuscular Q3 Months Adonay Gilbert MD   200 Units at 01/12/22 1027      Allergies   Allergen Reactions   • Isoniazid Other (See Comments)     Body stopped producing blood   • Sulfa Antibiotics Anaphylaxis   • Tetanus Toxoid Swelling        Review of Systems     Objective      Physical Exam  /84   Ht 182.9 cm (72.01\")   Wt (!) 156 kg (345 lb)   LMP  (LMP Unknown)   BMI 46.78 kg/m²     Body mass index is 46.78 kg/m².    General:   Mental Status:  Alert   Appearance: Cooperative, in no acute distress   Build and Nutrition: Obese by BMI female   Orientation: Alert and oriented to person, place and time   Posture: Normal   Gait: Limp on the right    Integument:   Right knee: No skin lesions, no rash, no ecchymosis    Lower Extremities:   Right Knee:    Tenderness:  Medial and lateral joint line tenderness    Effusion:  None    Swelling: None    Crepitus:  Positive    Range of motion:  Extension: 10°       Flexion: 115°  Instability:  No varus " laxity, no valgus laxity, negative anterior drawer  Deformities:  None      Imaging/Studies  Imaging Results (Last 24 Hours)     ** No results found for the last 24 hours. **        No new imaging today    Assessment and Plan     Diagnoses and all orders for this visit:    1. Primary osteoarthritis of right knee (Primary)  -     Large Joint Arthrocentesis: R knee        1. Primary osteoarthritis of right knee        I reviewed my findings with the patient.  Her right knee continues to bother her, and she had good relief with viscosupplementation injections in the past.  She would like a repeat series.  We will go ahead and start today.    Procedure Note:  The potential benefits of performing a therapeutic knee joint visco supplementation injection, as well as potential risks (including, but not limited to infection, swelling, pain, bleeding, bruising, nerve/blood vessel damage, and pseudoseptic reaction) have been discussed with the patient.  After informed consent, timeout procedure was performed, and the skin on the right knee was prepped with chlorhexidine soap and alcohol, after which ethyl chloride was applied to the skin at the injection site. Via the anterolateral approach, Orthovisc was injected into the knee joint.  The patient tolerated the procedure well. There were no complications.  Band-Aid was applied to the injection site. Post-procedural instructions discussed with the patient and/or their caregiver.      Return in about 1 week (around 2/21/2022) for Injection.      Lalo Choe MD  02/14/22  08:14 EST

## 2022-02-15 ENCOUNTER — LAB (OUTPATIENT)
Dept: LAB | Facility: HOSPITAL | Age: 67
End: 2022-02-15

## 2022-02-15 ENCOUNTER — OFFICE VISIT (OUTPATIENT)
Dept: NEUROLOGY | Facility: CLINIC | Age: 67
End: 2022-02-15

## 2022-02-15 VITALS
OXYGEN SATURATION: 97 % | SYSTOLIC BLOOD PRESSURE: 130 MMHG | WEIGHT: 293 LBS | DIASTOLIC BLOOD PRESSURE: 84 MMHG | BODY MASS INDEX: 39.68 KG/M2 | HEIGHT: 72 IN | HEART RATE: 73 BPM

## 2022-02-15 DIAGNOSIS — G35 MULTIPLE SCLEROSIS: ICD-10-CM

## 2022-02-15 DIAGNOSIS — G35 MULTIPLE SCLEROSIS: Chronic | ICD-10-CM

## 2022-02-15 DIAGNOSIS — G43.711 MIGRAINE, CHRONIC, WITHOUT AURA, INTRACTABLE, WITH STATUS MIGRAINOSUS: Primary | Chronic | ICD-10-CM

## 2022-02-15 DIAGNOSIS — R94.6 ABNORMAL RESULTS OF THYROID FUNCTION STUDIES: ICD-10-CM

## 2022-02-15 DIAGNOSIS — G25.81 RESTLESS LEGS SYNDROME: Chronic | ICD-10-CM

## 2022-02-15 DIAGNOSIS — F33.1 MODERATE EPISODE OF RECURRENT MAJOR DEPRESSIVE DISORDER: Chronic | ICD-10-CM

## 2022-02-15 DIAGNOSIS — E66.01 CLASS 3 SEVERE OBESITY DUE TO EXCESS CALORIES WITH SERIOUS COMORBIDITY AND BODY MASS INDEX (BMI) OF 50.0 TO 59.9 IN ADULT: ICD-10-CM

## 2022-02-15 PROBLEM — E66.813 CLASS 3 SEVERE OBESITY DUE TO EXCESS CALORIES WITH SERIOUS COMORBIDITY AND BODY MASS INDEX (BMI) OF 50.0 TO 59.9 IN ADULT: Chronic | Status: ACTIVE | Noted: 2022-02-15

## 2022-02-15 PROBLEM — E66.813 CLASS 3 SEVERE OBESITY DUE TO EXCESS CALORIES WITH SERIOUS COMORBIDITY AND BODY MASS INDEX (BMI) OF 50.0 TO 59.9 IN ADULT: Status: ACTIVE | Noted: 2022-02-15

## 2022-02-15 LAB
ALBUMIN SERPL-MCNC: 4 G/DL (ref 3.5–5.2)
ALBUMIN/GLOB SERPL: 1.2 G/DL
ALP SERPL-CCNC: 115 U/L (ref 39–117)
ALT SERPL W P-5'-P-CCNC: 18 U/L (ref 1–33)
ANION GAP SERPL CALCULATED.3IONS-SCNC: 7.9 MMOL/L (ref 5–15)
AST SERPL-CCNC: 15 U/L (ref 1–32)
BACTERIA UR QL AUTO: ABNORMAL /HPF
BASOPHILS # BLD AUTO: 0.05 10*3/MM3 (ref 0–0.2)
BASOPHILS NFR BLD AUTO: 0.6 % (ref 0–1.5)
BILIRUB SERPL-MCNC: 0.3 MG/DL (ref 0–1.2)
BILIRUB UR QL STRIP: NEGATIVE
BUN SERPL-MCNC: 23 MG/DL (ref 8–23)
BUN/CREAT SERPL: 26.7 (ref 7–25)
CALCIUM SPEC-SCNC: 9.9 MG/DL (ref 8.6–10.5)
CHLORIDE SERPL-SCNC: 100 MMOL/L (ref 98–107)
CLARITY UR: ABNORMAL
CO2 SERPL-SCNC: 31.1 MMOL/L (ref 22–29)
COLOR UR: YELLOW
CREAT SERPL-MCNC: 0.86 MG/DL (ref 0.57–1)
DEPRECATED RDW RBC AUTO: 39.8 FL (ref 37–54)
EOSINOPHIL # BLD AUTO: 0.2 10*3/MM3 (ref 0–0.4)
EOSINOPHIL NFR BLD AUTO: 2.5 % (ref 0.3–6.2)
ERYTHROCYTE [DISTWIDTH] IN BLOOD BY AUTOMATED COUNT: 12.8 % (ref 12.3–15.4)
GFR SERPL CREATININE-BSD FRML MDRD: 66 ML/MIN/1.73
GLOBULIN UR ELPH-MCNC: 3.3 GM/DL
GLUCOSE SERPL-MCNC: 90 MG/DL (ref 65–99)
GLUCOSE UR STRIP-MCNC: NEGATIVE MG/DL
HCT VFR BLD AUTO: 44.1 % (ref 34–46.6)
HGB BLD-MCNC: 14.8 G/DL (ref 12–15.9)
HGB UR QL STRIP.AUTO: NEGATIVE
HYALINE CASTS UR QL AUTO: ABNORMAL /LPF
IMM GRANULOCYTES # BLD AUTO: 0.11 10*3/MM3 (ref 0–0.05)
IMM GRANULOCYTES NFR BLD AUTO: 1.4 % (ref 0–0.5)
KETONES UR QL STRIP: ABNORMAL
LEUKOCYTE ESTERASE UR QL STRIP.AUTO: ABNORMAL
LYMPHOCYTES # BLD AUTO: 1.27 10*3/MM3 (ref 0.7–3.1)
LYMPHOCYTES NFR BLD AUTO: 16 % (ref 19.6–45.3)
MCH RBC QN AUTO: 29.2 PG (ref 26.6–33)
MCHC RBC AUTO-ENTMCNC: 33.6 G/DL (ref 31.5–35.7)
MCV RBC AUTO: 87.2 FL (ref 79–97)
MONOCYTES # BLD AUTO: 0.69 10*3/MM3 (ref 0.1–0.9)
MONOCYTES NFR BLD AUTO: 8.7 % (ref 5–12)
NEUTROPHILS NFR BLD AUTO: 5.62 10*3/MM3 (ref 1.7–7)
NEUTROPHILS NFR BLD AUTO: 70.8 % (ref 42.7–76)
NITRITE UR QL STRIP: NEGATIVE
NRBC BLD AUTO-RTO: 0.1 /100 WBC (ref 0–0.2)
PH UR STRIP.AUTO: 6 [PH] (ref 5–8)
PLATELET # BLD AUTO: 263 10*3/MM3 (ref 140–450)
PMV BLD AUTO: 10.7 FL (ref 6–12)
POTASSIUM SERPL-SCNC: 4.4 MMOL/L (ref 3.5–5.2)
PROT SERPL-MCNC: 7.3 G/DL (ref 6–8.5)
PROT UR QL STRIP: ABNORMAL
RBC # BLD AUTO: 5.06 10*6/MM3 (ref 3.77–5.28)
RBC # UR STRIP: ABNORMAL /HPF
REF LAB TEST METHOD: ABNORMAL
SODIUM SERPL-SCNC: 139 MMOL/L (ref 136–145)
SP GR UR STRIP: 1.03 (ref 1–1.03)
SQUAMOUS #/AREA URNS HPF: ABNORMAL /HPF
TSH SERPL DL<=0.05 MIU/L-ACNC: 3.36 UIU/ML (ref 0.27–4.2)
UROBILINOGEN UR QL STRIP: ABNORMAL
WBC # UR STRIP: ABNORMAL /HPF
WBC NRBC COR # BLD: 7.94 10*3/MM3 (ref 3.4–10.8)

## 2022-02-15 PROCEDURE — 85025 COMPLETE CBC W/AUTO DIFF WBC: CPT

## 2022-02-15 PROCEDURE — 99214 OFFICE O/P EST MOD 30 MIN: CPT | Performed by: PSYCHIATRY & NEUROLOGY

## 2022-02-15 PROCEDURE — 84443 ASSAY THYROID STIM HORMONE: CPT

## 2022-02-15 PROCEDURE — 80053 COMPREHEN METABOLIC PANEL: CPT

## 2022-02-15 PROCEDURE — 36415 COLL VENOUS BLD VENIPUNCTURE: CPT

## 2022-02-15 PROCEDURE — 81001 URINALYSIS AUTO W/SCOPE: CPT

## 2022-02-15 NOTE — PROGRESS NOTES
Chief Complaint  Multiple Sclerosis    Subjective          Rashad Carrillo presents to [facility]UR[facility]     History of Present Illness    66 y.o. female returns in follow up.  Last visit on 1/12/22 performed BTX, continued monthly labs, renewed GBP, Cymbalta, Acyclovir and Ampyra. Ordered swallow study, referred to PT, SLP      MRI Brain 8/5/20 as compared to10/8/18, no change in T2 lesion load, moderate to severe T2 lesion burden and moderate atrophy.     JCV ab - 6/11/18 - 0.45       10/6/21: Plt 206; TSH 3.72; Cr 0.76; U/A nl      RRMS     S/P Lemtrada 2/2, 10/30/18 - 11/2/18, 11/4/19-11/6/19      Speech is getting more difficult and trouble with memory.  Finished SLP.      Gait unsteady R knee OA, using a walking stick.  Dr Choe managing. .     Ampyra improves walking speed. .        Fatigue is moderate.       Continued pain and stiffness in R LE and limiting walking. .       Problem history:     25 ft timed walk increaesed 15.99 from 9.94.  Left hand 9 hole peg worsened.   Increasing swallowing difficulty.        Immediate and working memory are declining with SDMT 27/110..        Referred to ID and dx with latent TB.  Recommended isoniazid and pyridoxine for 9 months.  Subsequently developed aplastic anemia secondary to INH and followed by Dr Valentin.  Received multiple transfusions.  3/29/18 able to resume Tysabri and has had 3 infusions  Treated with rifampin.       MDD     Mood is good on Cymbalta 60 mg PO daily      RLS    No new or worsening sx.     Controlled on GBP.   Baclofen helping with spasticity in LE.           Migraines     Headache frequency one per week.  Intensity is mild.     BTX improving sx       Problem History:     HA frequency is daily.  Moderate to severe intensity.   Location moves around head.  Quality Last for up to a day.  Tx with OTC meds.      Greater than 15 HA a month, moderate to severe intensity, lasting over 6 hours.       Preventatives:  TPM, GBP,  "Cymbalta,          Objective   Vital Signs:   /84   Pulse 73   Ht 182.9 cm (72.01\")   Wt (!) 156 kg (345 lb)   SpO2 97%   BMI 46.78 kg/m²     Physical Exam  Neurological:      Coordination: Heel to Shin Test abnormal and Romberg Test abnormal.      Gait: Tandem walk abnormal.      Deep Tendon Reflexes:      Reflex Scores:       Tricep reflexes are 3+ on the right side and 3+ on the left side.       Bicep reflexes are 3+ on the right side and 3+ on the left side.       Brachioradialis reflexes are 3+ on the right side and 3+ on the left side.       Patellar reflexes are 3+ on the right side and 3+ on the left side.       Achilles reflexes are 3+ on the right side and 3+ on the left side.  Psychiatric:         Speech: Speech normal.          Neurologic Exam     Mental Status   Registration: recalls 3 of 3 objects. Recall at 5 minutes: recalls 3 of 3 objects. Follows 3 step commands.   Attention: normal. Concentration: normal.   Speech: speech is normal   Level of consciousness: alert  Knowledge: good.   Able to name object. Able to read. Able to repeat. Able to write. Normal comprehension.         Cranial Nerves     CN II   Visual fields full to confrontation.   Visual acuity: normal  Right visual field deficit: none  Left visual field deficit: none     CN III, IV, VI   Nystagmus: none   Diplopia: none  Ophthalmoparesis: none  Upgaze: normal  Downgaze: normal  Conjugate gaze: present  Vestibulo-ocular reflex: present    CN V   Facial sensation intact.   Right corneal reflex: normal  Left corneal reflex: normal    CN VII   Right facial weakness: none  Left facial weakness: none    CN VIII   Hearing: intact    CN IX, X   Palate: symmetric  Right gag reflex: normal  Left gag reflex: normal    CN XI   Right sternocleidomastoid strength: normal  Left sternocleidomastoid strength: normal    CN XII   Tongue: not atrophic  Fasciculations: absent  Tongue deviation: none    Motor Exam   Muscle bulk: normal  Overall " muscle tone: normal  Right arm tone: normal  Left arm tone: normal  Right leg tone: increased  Left leg tone: increased    Strength   Strength 5/5 except as noted.   Left iliopsoas: 3/5  Left quadriceps: 3/5  Left hamstring: 3/5  Left glutei: 3/5  Left anterior tibial: 3/5  Left posterior tibial: 3/5  Left peroneal: 3/5  Left gastroc: 3/5    Sensory Exam   Right arm light touch: normal  Right leg light touch: normal  Right arm vibration: normal  Right leg vibration: normal  Right arm proprioception: normal  Right leg proprioception: normal  Right arm pinprick: normal  Right leg pinprick: normal  Sensory deficit distribution on right: non-dermatomal distribution (decreased left face, arm and leg)    Gait, Coordination, and Reflexes     Gait  Gait: circumduction, shuffling and wide-based (left leg)    Coordination   Romberg: positive  Heel to shin coordination: abnormal  Tandem walking coordination: abnormal    Tremor   Resting tremor: absent  Intention tremor: absent  Action tremor: left arm and right arm    Reflexes   Right brachioradialis: 3+  Left brachioradialis: 3+  Right biceps: 3+  Left biceps: 3+  Right triceps: 3+  Left triceps: 3+  Right patellar: 3+  Left patellar: 3+  Right achilles: 3+  Left achilles: 3+  Right : 3+  Left : 3+  Right plantar: normal  Left plantar: normal     Result Review :   The following data was reviewed by: Adonay Gilbert MD on 02/15/2022:                Assessment and Plan    Diagnoses and all orders for this visit:    1. Migraine, chronic, without aura, intractable, with status migrainosus (Primary)  Assessment & Plan:  Headaches are improving with treatment.  Continue current treatment regimen.          2. Multiple sclerosis (HCC)  Assessment & Plan:  Sx stable on Lemtrada    Monthly labs    MRI B/C    Ampyra improving walking speed      Orders:  -     CBC & Differential; Future  -     Comprehensive Metabolic Panel; Future  -     TSH; Future  -     Urinalysis With  Microscopic - Urine, Clean Catch; Future  -     MRI Brain With & Without Contrast; Future  -     MRI Cervical Spine With & Without Contrast; Future    3. Moderate episode of recurrent major depressive disorder (HCC)    4. Restless legs syndrome  Assessment & Plan:  Continue GBP, baclofen       5. Abnormal results of thyroid function studies   -     TSH; Future    6. Class 3 severe obesity due to excess calories with serious comorbidity and body mass index (BMI) of 50.0 to 59.9 in adult (HCC)  Assessment & Plan:  Patient's (Body mass index is 46.78 kg/m².) indicates that they are morbidly obese (BMI > 40 or > 35 with obesity - related health condition) with health conditions that include hypertension . Weight is unchanged. BMI is is above average; BMI management plan is completed. We discussed low calorie, low carb based diet program, portion control and increasing exercise.     Refer to Bariatric surgery     Orders:  -     Ambulatory Referral to Bariatric Surgery      Follow Up   No follow-ups on file.  Patient was given instructions and counseling regarding her condition or for health maintenance advice. Please see specific information pulled into the AVS if appropriate.

## 2022-02-16 ENCOUNTER — TELEPHONE (OUTPATIENT)
Dept: NEUROLOGY | Facility: CLINIC | Age: 67
End: 2022-02-16

## 2022-02-16 RX ORDER — LEVOFLOXACIN 500 MG/1
500 TABLET, FILM COATED ORAL DAILY
Qty: 5 TABLET | Refills: 0 | Status: SHIPPED | OUTPATIENT
Start: 2022-02-16 | End: 2022-02-21

## 2022-02-16 NOTE — TELEPHONE ENCOUNTER
----- Message from Adonay Gilbert MD sent at 2/16/2022  7:09 AM EST -----  Pt has UTI   Called in antibx

## 2022-02-21 ENCOUNTER — CLINICAL SUPPORT (OUTPATIENT)
Dept: ORTHOPEDIC SURGERY | Facility: CLINIC | Age: 67
End: 2022-02-21

## 2022-02-21 DIAGNOSIS — M17.11 PRIMARY OSTEOARTHRITIS OF RIGHT KNEE: Primary | ICD-10-CM

## 2022-02-21 PROCEDURE — 20610 DRAIN/INJ JOINT/BURSA W/O US: CPT | Performed by: ORTHOPAEDIC SURGERY

## 2022-02-21 NOTE — PROGRESS NOTES
[facility] Note    Subjective     Chief Complaint   Patient presents with   • Follow-up     1 week recheck - #2 Right knee orthovisc injection- Primary osteoarthritis of right knee        HPI    Rashad Carrillo is a 66 y.o. female who presents for the second Orthovisc injection into the right knee.  Of note, she has seen some improvement so far.    Patient Active Problem List   Diagnosis   • Vitamin D deficiency   • Depression   • Multiple sclerosis ([facility])   • Restless legs syndrome   • Muscle spasticity   • Migraine, chronic, without aura, intractable, with status migrainosus   • Essential hypertension   • Frequent falls   • Status post balloon dilatation of esophageal stricture   • T2DM (type 2 diabetes mellitus) ([facility])   • TB lung, latent   • Aplastic anemia likely due to isoniazide   • Chest pain   • MCGRAW (dyspnea on exertion)   • Hyperlipidemia LDL goal <70   • Class 3 severe obesity due to excess calories with serious comorbidity and body mass index (BMI) of 50.0 to 59.9 in adult ([facility])     Past Medical History:   Diagnosis Date   • Diabetes mellitus ([facility])    • Hypertension    • Multiple sclerosis ([facility])       Past Surgical History:   Procedure Laterality Date   • CHOLECYSTECTOMY        Family History   Problem Relation Age of Onset   • Cancer Mother    • Hypertension Mother    • Heart disease Mother    • Alzheimer's disease Other    • Seizures Other         Epilepsy and recurrent seizures   • Heart disease Other      Social History     Socioeconomic History   • Marital status:    Tobacco Use   • Smoking status: Former Smoker     Types: Cigarettes     Quit date:      Years since quittin.1   • Smokeless tobacco: Never Used   • Tobacco comment: social   Vaping Use   • Vaping Use: Never used   Substance and Sexual Activity   • Alcohol use: Yes     Comment: rarely   • Drug use: No   • Sexual activity: Defer      Current Outpatient Medications on File Prior to Visit    Medication Sig Dispense Refill   • acyclovir (ZOVIRAX) 400 MG tablet Take 1 tablet by mouth 2 (Two) Times a Day. 60 tablet 11   • albuterol sulfate HFA (Ventolin HFA) 108 (90 Base) MCG/ACT inhaler Ventolin HFA 90 mcg/actuation aerosol inhaler   1-2 puffs q 4-6^ prn     • azelastine (ASTEPRO) 0.15 % solution nasal spray inhale 1-2 sprays by nasal route 2 times a day as needed  3   • B Complex-C-E-Zn (Z-BEC PO) Take 1 tablet by mouth daily.     • Dalfampridine ER 10 MG tablet sustained-release 12 hour Take 10 mg by mouth 2 (Two) Times a Day. 60 tablet 11   • FLUoxetine (PROzac) 10 MG capsule Take 10 mg by mouth Daily.     • furosemide (LASIX) 40 MG tablet Take 1 tablet(s) every day     • gabapentin (NEURONTIN) 100 MG capsule Take 3 capsules by mouth 3 (Three) Times a Day. 90 capsule 5   • gabapentin (NEURONTIN) 300 MG capsule Take 2 capsules by mouth Every Night. 60 capsule 5   • hydrochlorothiazide (MICROZIDE) 12.5 MG capsule Take 12.5 mg by mouth Daily.     • HYDROcodone-acetaminophen (NORCO)  MG per tablet Take 1 tablet by mouth Every 6 (Six) Hours As Needed for Moderate Pain . 20 tablet 0   • irbesartan (AVAPRO) 150 MG tablet Take 150 mg by mouth Daily.     • levoFLOXacin (Levaquin) 500 MG tablet Take 1 tablet by mouth Daily for 5 days. 5 tablet 0   • meloxicam (MOBIC) 7.5 MG tablet 1 Oral Daily with food. 90 tablet 2   • metoprolol succinate XL (TOPROL-XL) 100 MG 24 hr tablet metoprolol succinate  mg tablet,extended release 24 hr     • Multiple Vitamin (MULTI-VITAMIN DAILY) tablet Take 1 tablet by mouth daily.     • olmesartan (BENICAR) 40 MG tablet Take 1 tablet(s) every day for 90 days.     • omeprazole (priLOSEC) 20 MG capsule TAKE ONE CAPSULE IN THE MORNING.  11   • OnabotulinumtoxinA 200 units reconstituted solution FOR . PHYSICIAN TO INJECT 155 UNITS INTRAMUSCULARLY INTO HEAD, NECK AND SHOULDERS EVERY 12 WEEKS Per FDA PROTOCOL 1 each 3   • potassium chloride (MICRO-K) 10  MEQ CR capsule Take 10 mEq by mouth daily.     • pravastatin (PRAVACHOL) 80 MG tablet Take 1 tablet(s) every day at bedtime.     • valACYclovir (Valtrex) 500 MG tablet Take 1 tablet by mouth 2 (Two) Times a Day. 60 tablet 11   • vitamin D (ERGOCALCIFEROL) 88578 UNITS capsule capsule Take 50,000 Units by mouth Every 30 (Thirty) Days. 15TH       Current Facility-Administered Medications on File Prior to Visit   Medication Dose Route Frequency Provider Last Rate Last Admin   • OnabotulinumtoxinA 200 Units  200 Units Intramuscular Q3 Months Adonay Gilbert MD   200 Units at 01/12/22 1027      Allergies   Allergen Reactions   • Isoniazid Other (See Comments)     Body stopped producing blood   • Sulfa Antibiotics Anaphylaxis   • Tetanus Toxoid Swelling        Review of Systems   Constitutional: Negative for activity change, appetite change, chills, diaphoresis, fatigue, fever and unexpected weight change.   HENT: Negative for congestion, dental problem, drooling, ear discharge, ear pain, facial swelling, hearing loss, mouth sores, nosebleeds, postnasal drip, rhinorrhea, sinus pressure, sneezing, sore throat, tinnitus, trouble swallowing and voice change.    Eyes: Negative for photophobia, pain, discharge, redness, itching and visual disturbance.   Respiratory: Negative for apnea, cough, choking, chest tightness, shortness of breath, wheezing and stridor.    Cardiovascular: Negative for chest pain, palpitations and leg swelling.   Gastrointestinal: Negative for abdominal distention, abdominal pain, anal bleeding, blood in stool, constipation, diarrhea, nausea, rectal pain and vomiting.   Endocrine: Negative for cold intolerance, heat intolerance, polydipsia, polyphagia and polyuria.   Genitourinary: Negative for decreased urine volume, difficulty urinating, dysuria, enuresis, flank pain, frequency, genital sores, hematuria and urgency.   Musculoskeletal: Positive for arthralgias. Negative for back pain, gait problem,  joint swelling, myalgias, neck pain and neck stiffness.   Skin: Negative for color change, pallor, rash and wound.   Allergic/Immunologic: Negative for environmental allergies, food allergies and immunocompromised state.   Neurological: Negative for dizziness, tremors, seizures, syncope, facial asymmetry, speech difficulty, weakness, light-headedness, numbness and headaches.   Hematological: Negative for adenopathy. Does not bruise/bleed easily.   Psychiatric/Behavioral: Negative for agitation, behavioral problems, confusion, decreased concentration, dysphoric mood, hallucinations, self-injury, sleep disturbance and suicidal ideas. The patient is not nervous/anxious and is not hyperactive.         Objective      Physical Exam  LMP  (LMP Unknown)     There is no height or weight on file to calculate BMI.    General:   Mental Status:  Alert   Appearance: Cooperative, in no acute distress   Posture: Normal    Assessment and Plan     Diagnoses and all orders for this visit:    1. Primary osteoarthritis of right knee (Primary)  -     Large Joint Arthrocentesis: R knee        1. Primary osteoarthritis of right knee        Procedure Note:  The potential benefits of performing a therapeutic knee joint visco supplementation injection, as well as potential risks (including, but not limited to infection, swelling, pain, bleeding, bruising, nerve/blood vessel damage, and pseudoseptic reaction) have been discussed with the patient.  After informed consent, timeout procedure was performed, and the skin on the right knee was prepped with chlorhexidine soap and alcohol, after which ethyl chloride was applied to the skin at the injection site. Via the anterolateral approach, Orthovisc was injected into the knee joint.  The patient tolerated the procedure well. There were no complications.  Band-Aid was applied to the injection site. Post-procedural instructions discussed with the patient and/or their caregiver.    Return in about 1  week (around 2/28/2022) for Injection.    Lalo Choe MD  02/21/22  08:22 EST

## 2022-02-21 NOTE — PROGRESS NOTES
Procedure   Large Joint Arthrocentesis: R knee  Date/Time: 2/21/2022 8:18 AM  Consent given by: patient  Site marked: site marked  Timeout: Immediately prior to procedure a time out was called to verify the correct patient, procedure, equipment, support staff and site/side marked as required   Supporting Documentation  Indications: pain   Procedure Details  Location: knee - R knee  Preparation: Patient was prepped and draped in the usual sterile fashion  Needle size: 22 G  Approach: anterolateral  Medications administered: 30 mg Hyaluronan 30 MG/2ML  Patient tolerance: patient tolerated the procedure well with no immediate complications

## 2022-02-22 ENCOUNTER — TELEPHONE (OUTPATIENT)
Dept: NEUROLOGY | Facility: CLINIC | Age: 67
End: 2022-02-22

## 2022-02-22 NOTE — TELEPHONE ENCOUNTER
Spoke with Jenise. She is going to check her schedule and call me back to get patient scheduled for an appointment with Josefina.

## 2022-02-22 NOTE — TELEPHONE ENCOUNTER
Provider: DR. RAY  Caller: ZULEYKA NO& PT GAYATRI ROONEY  Relationship to Patient: PT NIECE (NOT ON VERBAL RELEASE)      Reason for Call: (we spoke before putting the pt on call)PT'S NIECE IS CALLING WANTING TO GIVE UPDATE ON PT. PT HAS GETTING LOST GOING TO SISTER'S HOUSE, CONVERSATIONS ARE GETTING CONFUSED, SHE CALLS HER SOMEONE ELSE, SEEMS LIKE PT LIVES IN A FANTASY WORLD. SHE HAS GOOD DAYS AND BAD DAYS. SHE STATED THAT SHE DON'T KNOW IF THE PT IS STARTING TO SHOW SIGNS OF DEMENTIA OR IF IT'S THE MS.    NIECE IS WITH HER EVERY DAY SO SHE CAN TELL WHEN PT IS HAVING A BAD DAY AND TODAY IS A BAD DAY.       SHE WILL BE WITH PT AT HER NEXT APPT WITH DR. RAY,SHE  HAVE MADE A F/U APPT ON 4-  D/T PT IS GETTING WORSE WITH HER MEMORY.      PT GAVE THE VERBAL OK FOR ZULEYKA NO TO SPEAK ON HER BEHALF     PLEASE CALL ZULEYKA BACK [phone]      PT IS WANTING SOMETHING TO CALM HER DOWN WHEN SHE HAS THE MRI BRAIN & CERVICAL SPINE ON 3-

## 2022-02-23 ENCOUNTER — OFFICE VISIT (OUTPATIENT)
Dept: NEUROLOGY | Facility: CLINIC | Age: 67
End: 2022-02-23

## 2022-02-23 ENCOUNTER — LAB (OUTPATIENT)
Dept: LAB | Facility: HOSPITAL | Age: 67
End: 2022-02-23

## 2022-02-23 VITALS — DIASTOLIC BLOOD PRESSURE: 82 MMHG | OXYGEN SATURATION: 99 % | SYSTOLIC BLOOD PRESSURE: 126 MMHG | HEART RATE: 67 BPM

## 2022-02-23 DIAGNOSIS — G35 MULTIPLE SCLEROSIS: Chronic | ICD-10-CM

## 2022-02-23 DIAGNOSIS — G43.711 MIGRAINE, CHRONIC, WITHOUT AURA, INTRACTABLE, WITH STATUS MIGRAINOSUS: Chronic | ICD-10-CM

## 2022-02-23 DIAGNOSIS — N30.00 ACUTE CYSTITIS WITHOUT HEMATURIA: Primary | ICD-10-CM

## 2022-02-23 DIAGNOSIS — M62.838 MUSCLE SPASTICITY: ICD-10-CM

## 2022-02-23 DIAGNOSIS — N30.00 ACUTE CYSTITIS WITHOUT HEMATURIA: ICD-10-CM

## 2022-02-23 DIAGNOSIS — G35 MULTIPLE SCLEROSIS: Primary | ICD-10-CM

## 2022-02-23 LAB
BACTERIA UR QL AUTO: ABNORMAL /HPF
BILIRUB UR QL STRIP: NEGATIVE
CLARITY UR: CLEAR
COLOR UR: YELLOW
GLUCOSE UR STRIP-MCNC: NEGATIVE MG/DL
HGB UR QL STRIP.AUTO: NEGATIVE
HYALINE CASTS UR QL AUTO: ABNORMAL /LPF
KETONES UR QL STRIP: NEGATIVE
LEUKOCYTE ESTERASE UR QL STRIP.AUTO: ABNORMAL
NITRITE UR QL STRIP: NEGATIVE
PH UR STRIP.AUTO: 5.5 [PH] (ref 5–8)
PROT UR QL STRIP: NEGATIVE
RBC # UR STRIP: ABNORMAL /HPF
REF LAB TEST METHOD: ABNORMAL
SP GR UR STRIP: 1.02 (ref 1–1.03)
SQUAMOUS #/AREA URNS HPF: ABNORMAL /HPF
UROBILINOGEN UR QL STRIP: ABNORMAL
WBC # UR STRIP: ABNORMAL /HPF

## 2022-02-23 PROCEDURE — 81001 URINALYSIS AUTO W/SCOPE: CPT

## 2022-02-23 PROCEDURE — 87086 URINE CULTURE/COLONY COUNT: CPT

## 2022-02-23 PROCEDURE — 99214 OFFICE O/P EST MOD 30 MIN: CPT | Performed by: NURSE PRACTITIONER

## 2022-02-23 RX ORDER — ALPRAZOLAM 0.5 MG/1
TABLET ORAL
Qty: 1 TABLET | Refills: 0 | Status: SHIPPED | OUTPATIENT
Start: 2022-02-23 | End: 2022-08-16

## 2022-02-23 NOTE — ASSESSMENT & PLAN NOTE
Headaches are improving with treatment.  Continue current treatment regimen.     Stable on BTX    Added Nurtec 75 mg SL PRN for abortive treatment

## 2022-02-23 NOTE — PROGRESS NOTES
Subjective:     Patient ID: Rashad Carrillo is a 66 y.o. female.    CC:   Chief Complaint   Patient presents with   • Migraine       HPI:   History of Present Illness   66 y.o. female returns in follow up.  Last visit on 2/15/22 performed BTX, continued monthly labs, ordered MRI B/C, renewed GBP, Cymbalta, Acyclovir, Baclofen, and Ampyra.     Labs 2/15/22 + for UTI, called in ABX.      MRI Brain 8/5/20 as compared to10/8/18, no change in T2 lesion load, moderate to severe T2 lesion burden and moderate atrophy.      2/15/22: Plt 263; TSH 3.360; Cr 0.86     RRMS     S/P [place]trada 2/2, 10/30/18 - 11/2/18, 11/4/19-11/6/19      Speech is getting more difficult and trouble with memory.  Finished SLP however only went to a few appointments.  Niece came to appointment today with concern for her memory. Has been declining since December. Has good days and bad days. Mixes her words up, sometimes cannot find the correct word she is trying to say. Recently could not remember the directions to her sisters house.     MMSE 29/30 today in office.      Gait unsteady R knee OA, using a walking stick. Continued pain and stiffness in R LE and limiting walking. Dr Choe managing.      Ampyra improves walking speed. .        Fatigue is moderate.       MDD     Mood is good on Cymbalta 60 mg PO daily      RLS     No new or worsening sx.      Controlled on GBP.   Baclofen helping with spasticity in LE.           Migraines     Headache frequency one per week.  Intensity is mild.     BTX improving sx      Does not use anything for abortive treatment.      Problem History:     HA frequency is daily.  Moderate to severe intensity.   Location moves around head.  Quality Last for up to a day.  Tx with OTC meds.      Greater than 15 HA a month, moderate to severe intensity, lasting over 6 hours.       Preventatives:  TPM, GBP, Cymbalta,     TB Hx.   Referred to ID and dx with latent TB.  Recommended isoniazid and pyridoxine for 9 months.   Subsequently developed aplastic anemia secondary to INH and followed by Dr Valentin. Received multiple transfusions.  3/29/18 able to resume Tysabri and has had 3 infusions Treated with rifampin.     The following portions of the patient's history were reviewed and updated as appropriate: allergies, current medications, past family history, past medical history, past social history, past surgical history and problem list.    Past Medical History:   Diagnosis Date   • Diabetes mellitus (HCC)    • Hypertension    • Multiple sclerosis ([facility])        Past Surgical History:   Procedure Laterality Date   • CHOLECYSTECTOMY         Social History     Socioeconomic History   • Marital status:    Tobacco Use   • Smoking status: Former Smoker     Types: Cigarettes     Quit date:      Years since quittin.1   • Smokeless tobacco: Never Used   • Tobacco comment: social   Vaping Use   • Vaping Use: Never used   Substance and Sexual Activity   • Alcohol use: Yes     Comment: rarely   • Drug use: No   • Sexual activity: Defer       Family History   Problem Relation Age of Onset   • Cancer Mother    • Hypertension Mother    • Heart disease Mother    • Alzheimer's disease Other    • Seizures Other         Epilepsy and recurrent seizures   • Heart disease Other         Objective:  /82   Pulse 67   LMP  (LMP Unknown)   SpO2 99%     Neurologic Exam     Mental Status   Oriented to person, place, and time.   Follows 3 step commands.   Attention: normal. Concentration: normal.   Speech: speech is normal   Level of consciousness: alert  Knowledge: good and consistent with education.   Able to name object. Able to read. Able to repeat. Able to write. Normal comprehension.   Mixes up words intermittently      Cranial Nerves     CN III, IV, VI   Pupils are equal, round, and reactive to light.  Extraocular motions are normal.   Right pupil: Shape: regular. Reactivity: brisk. Consensual response: intact.   Left pupil: Shape:  regular. Reactivity: brisk. Consensual response: intact.     CN VII   Right facial weakness: none  Left facial weakness: none    CN VIII   Hearing: intact    Motor Exam   Muscle bulk: normal  Overall muscle tone: normal    Gait, Coordination, and Reflexes     Gait  Gait: (antalgic )    Tremor   Resting tremor: absent  Intention tremor: absent  Action tremor: absent      Physical Exam  Vitals and nursing note reviewed.   Constitutional:       Appearance: Normal appearance.   HENT:      Head: Normocephalic and atraumatic.   Eyes:      Extraocular Movements: Extraocular movements intact and EOM normal.      Pupils: Pupils are equal, round, and reactive to light.   Skin:     General: Skin is warm and dry.   Neurological:      Mental Status: She is alert and oriented to person, place, and time.   Psychiatric:         Mood and Affect: Mood normal.         Speech: Speech normal.         Assessment/Plan:       Diagnoses and all orders for this visit:    1. Acute cystitis without hematuria (Primary)  -     Urinalysis With Microscopic - Urine, Clean Catch; Future  -     Urine Culture - Urine, Urine, Clean Catch; Future    2. Multiple sclerosis (HCC)  Assessment & Plan:  Continue with MRI's     Discussed memory difficulty as a symptom of MS, versus worsening with recent UTI. Patient continues to c/o low back pain following antibiotic completion. Ordered another UA with culture.     Recommended increasing water intake as she drinks very little fluid throughout the day.     Discussed starting Aricept for memory, patient and niece would like to wait at this time.     F/U at next scheduled appointment.       3. Migraine, chronic, without aura, intractable, with status migrainosus  Assessment & Plan:  Headaches are improving with treatment.  Continue current treatment regimen.     Stable on BTX    Added Nurtec 75 mg SL PRN for abortive treatment           4. Muscle spasticity           Reviewed medications, potential side effects  and signs and symptoms to report. Discussed risk versus benefits of treatment plan with patient and/or family-including medications, labs and radiology that may be ordered. Addressed questions and concerns during visit. Patient and/or family verbalized understanding and agree with plan. Patient instructed to call the office with questions or concerns and report to ED with life-threatening symptoms.     AS THE PROVIDER, I PERSONALLY WORE PPE DURING ENTIRE FACE TO FACE ENCOUNTER IN CLINIC WITH THE PATIENT. PATIENT ALSO WORE PPE DURING ENTIRE FACE TO FACE ENCOUNTER EXCEPT FOR A MAX OF 30 SECONDS DURING NEUROLOGICAL EVALUATION OF CRANIAL NERVES AND THEN MASK WAS PLACED BACK OVER PATIENT FACE FOR REMAINDER OF VISIT. I WASHED MY HANDS BEFORE AND AFTER VISIT.    During this visit the following were done:  Labs Reviewed []    Labs Ordered [x]    Radiology Reports Reviewed []    Radiology Ordered []    PCP Records Reviewed []    Referring Provider Records Reviewed []    ER Records Reviewed []    Hospital Records Reviewed []    History Obtained From Family []    Radiology Images Reviewed []    Other Reviewed []    Records Requested []      Return for Next scheduled follow up.      Josefina Moyer, CHACHO  2/23/2022

## 2022-02-23 NOTE — ASSESSMENT & PLAN NOTE
Continue with MRI's     Discussed memory difficulty as a symptom of MS, versus worsening with recent UTI. Patient continues to c/o low back pain following antibiotic completion. Ordered another UA with culture.     Recommended increasing water intake as she drinks very little fluid throughout the day.     Discussed starting Aricept for memory, patient and niece would like to wait at this time.     F/U at next scheduled appointment.

## 2022-02-24 LAB — BACTERIA SPEC AEROBE CULT: NO GROWTH

## 2022-02-25 ENCOUNTER — TELEPHONE (OUTPATIENT)
Dept: NEUROLOGY | Facility: CLINIC | Age: 67
End: 2022-02-25

## 2022-02-25 NOTE — TELEPHONE ENCOUNTER
Called to let patient know that UTI has resolved. She stated she is still hurting but she will just continue to drink plenty of water.

## 2022-02-25 NOTE — TELEPHONE ENCOUNTER
----- Message from CHACHO Giordano sent at 2/24/2022  4:01 PM EST -----  Please let Rashad know that her UTI has resolved. Thanks!

## 2022-03-02 ENCOUNTER — CLINICAL SUPPORT (OUTPATIENT)
Dept: ORTHOPEDIC SURGERY | Facility: CLINIC | Age: 67
End: 2022-03-02

## 2022-03-02 DIAGNOSIS — M17.11 PRIMARY OSTEOARTHRITIS OF RIGHT KNEE: Primary | ICD-10-CM

## 2022-03-02 PROCEDURE — 20610 DRAIN/INJ JOINT/BURSA W/O US: CPT | Performed by: ORTHOPAEDIC SURGERY

## 2022-03-02 NOTE — PROGRESS NOTES
Procedure   Large Joint Arthrocentesis: R knee  Date/Time: 3/2/2022 8:33 AM  Consent given by: patient  Site marked: site marked  Timeout: Immediately prior to procedure a time out was called to verify the correct patient, procedure, equipment, support staff and site/side marked as required   Supporting Documentation  Indications: pain   Procedure Details  Location: knee - R knee  Preparation: Patient was prepped and draped in the usual sterile fashion  Needle size: 22 G  Approach: anterolateral  Medications administered: 30 mg Hyaluronan 30 MG/2ML  Patient tolerance: patient tolerated the procedure well with no immediate complications

## 2022-03-02 NOTE — PROGRESS NOTES
[facility]    Subjective     Chief Complaint   Patient presents with   • Injections     Orthovisc # 3 Right Knee         HPI    Rashad Carrillo is a 66 y.o. female who presents for the third and final Orthovisc injection into the right knee.  Of note, she has had some improvement since starting the series.    Patient Active Problem List   Diagnosis   • Vitamin D deficiency   • Depression   • Multiple sclerosis (HCC)   • Restless legs syndrome   • Muscle spasticity   • Migraine, chronic, without aura, intractable, with status migrainosus   • Essential hypertension   • Frequent falls   • Status post balloon dilatation of esophageal stricture   • T2DM (type 2 diabetes mellitus) ([facility])   • TB lung, latent   • Aplastic anemia likely due to isoniazide   • Chest pain   • MCGRAW (dyspnea on exertion)   • Hyperlipidemia LDL goal <70   • Class 3 severe obesity due to excess calories with serious comorbidity and body mass index (BMI) of 50.0 to 59.9 in adult ([facility])     Past Medical History:   Diagnosis Date   • Diabetes mellitus (HCC)    • Hypertension    • Multiple sclerosis ([facility])       Past Surgical History:   Procedure Laterality Date   • CHOLECYSTECTOMY        Family History   Problem Relation Age of Onset   • Cancer Mother    • Hypertension Mother    • Heart disease Mother    • Alzheimer's disease Other    • Seizures Other         Epilepsy and recurrent seizures   • Heart disease Other      Social History     Socioeconomic History   • Marital status:    Tobacco Use   • Smoking status: Former Smoker     Types: Cigarettes     Quit date:      Years since quittin.1   • Smokeless tobacco: Never Used   • Tobacco comment: social   Vaping Use   • Vaping Use: Never used   Substance and Sexual Activity   • Alcohol use: Yes     Comment: rarely   • Drug use: No   • Sexual activity: Defer      Current Outpatient Medications on File Prior to Visit   Medication Sig Dispense Refill   • acyclovir  (ZOVIRAX) 400 MG tablet Take 1 tablet by mouth 2 (Two) Times a Day. 60 tablet 11   • albuterol sulfate HFA (Ventolin HFA) 108 (90 Base) MCG/ACT inhaler Ventolin HFA 90 mcg/actuation aerosol inhaler   1-2 puffs q 4-6^ prn     • ALPRAZolam (Xanax) 0.5 MG tablet Take one tablet 30 minutes prior to MRI 1 tablet 0   • azelastine (ASTEPRO) 0.15 % solution nasal spray inhale 1-2 sprays by nasal route 2 times a day as needed  3   • B Complex-C-E-Zn (Z-BEC PO) Take 1 tablet by mouth daily.     • Dalfampridine ER 10 MG tablet sustained-release 12 hour Take 10 mg by mouth 2 (Two) Times a Day. 60 tablet 11   • FLUoxetine (PROzac) 10 MG capsule Take 10 mg by mouth Daily.     • furosemide (LASIX) 40 MG tablet Take 1 tablet(s) every day     • gabapentin (NEURONTIN) 100 MG capsule Take 3 capsules by mouth 3 (Three) Times a Day. 90 capsule 5   • hydrochlorothiazide (MICROZIDE) 12.5 MG capsule Take 12.5 mg by mouth Daily.     • HYDROcodone-acetaminophen (NORCO)  MG per tablet Take 1 tablet by mouth Every 6 (Six) Hours As Needed for Moderate Pain . 20 tablet 0   • irbesartan (AVAPRO) 150 MG tablet Take 150 mg by mouth Daily.     • meloxicam (MOBIC) 7.5 MG tablet 1 Oral Daily with food. 90 tablet 2   • metoprolol succinate XL (TOPROL-XL) 100 MG 24 hr tablet metoprolol succinate  mg tablet,extended release 24 hr     • Multiple Vitamin (MULTI-VITAMIN DAILY) tablet Take 1 tablet by mouth daily.     • olmesartan (BENICAR) 40 MG tablet Take 1 tablet(s) every day for 90 days.     • omeprazole (priLOSEC) 20 MG capsule TAKE ONE CAPSULE IN THE MORNING.  11   • OnabotulinumtoxinA 200 units reconstituted solution FOR . PHYSICIAN TO INJECT 155 UNITS INTRAMUSCULARLY INTO HEAD, NECK AND SHOULDERS EVERY 12 WEEKS Per FDA PROTOCOL 1 each 3   • potassium chloride (MICRO-K) 10 MEQ CR capsule Take 10 mEq by mouth daily.     • pravastatin (PRAVACHOL) 80 MG tablet Take 1 tablet(s) every day at bedtime.     • valACYclovir  (Valtrex) 500 MG tablet Take 1 tablet by mouth 2 (Two) Times a Day. 60 tablet 11   • vitamin D (ERGOCALCIFEROL) 97315 UNITS capsule capsule Take 50,000 Units by mouth Every 30 (Thirty) Days. 15TH     • gabapentin (NEURONTIN) 300 MG capsule Take 2 capsules by mouth Every Night. 60 capsule 5     Current Facility-Administered Medications on File Prior to Visit   Medication Dose Route Frequency Provider Last Rate Last Admin   • OnabotulinumtoxinA 200 Units  200 Units Intramuscular Q3 Months Adonay Gilbert MD   200 Units at 01/12/22 1027      Allergies   Allergen Reactions   • Isoniazid Other (See Comments)     Body stopped producing blood   • Sulfa Antibiotics Anaphylaxis   • Tetanus Toxoid Swelling        Review of Systems   Constitutional: Negative.    HENT: Negative.    Eyes: Negative.    Respiratory: Negative.    Cardiovascular: Negative.    Gastrointestinal: Negative.    Endocrine: Negative.    Genitourinary: Negative.    Musculoskeletal: Positive for arthralgias.   Skin: Negative.    Allergic/Immunologic: Negative.    Neurological: Negative.    Hematological: Negative.    Psychiatric/Behavioral: Negative.         Objective      Physical Exam  LMP  (LMP Unknown)     There is no height or weight on file to calculate BMI.    General:   Mental Status:  Alert   Appearance: Cooperative, in no acute distress   Posture: Normal    Assessment and Plan     Diagnoses and all orders for this visit:    1. Primary osteoarthritis of right knee (Primary)  -     Large Joint Arthrocentesis: R knee        1. Primary osteoarthritis of right knee        Procedure Note:  The potential benefits of performing a therapeutic knee joint visco supplementation injection, as well as potential risks (including, but not limited to infection, swelling, pain, bleeding, bruising, nerve/blood vessel damage, and pseudoseptic reaction) have been discussed with the patient.  After informed consent, timeout procedure was performed, and the skin on the  right knee was prepped with chlorhexidine soap and alcohol, after which ethyl chloride was applied to the skin at the injection site. Via the anterolateral approach, Orthovisc was injected into the knee joint.  The patient tolerated the procedure well. There were no complications.  Band-Aid was applied to the injection site. Post-procedural instructions discussed with the patient and/or their caregiver.    Return in about 4 months (around 7/2/2022).    Lalo Choe MD  03/02/22  08:47 EST

## 2022-03-11 ENCOUNTER — HOSPITAL ENCOUNTER (OUTPATIENT)
Dept: MRI IMAGING | Facility: HOSPITAL | Age: 67
Discharge: HOME | End: 2022-03-11

## 2022-03-11 DIAGNOSIS — G35 MULTIPLE SCLEROSIS: Chronic | ICD-10-CM

## 2022-03-11 PROCEDURE — A9577 INJ MULTIHANCE: HCPCS | Performed by: PSYCHIATRY & NEUROLOGY

## 2022-03-11 PROCEDURE — 0 GADOBENATE DIMEGLUMINE 529 MG/ML SOLUTION: Performed by: PSYCHIATRY & NEUROLOGY

## 2022-03-11 PROCEDURE — 70553 MRI BRAIN STEM W/O & W/DYE: CPT

## 2022-03-11 PROCEDURE — 72156 MRI NECK SPINE W/O & W/DYE: CPT

## 2022-03-11 RX ADMIN — GADOBENATE DIMEGLUMINE 20 ML: 529 INJECTION, SOLUTION INTRAVENOUS at 09:05

## 2022-03-17 ENCOUNTER — APPOINTMENT (OUTPATIENT)
Dept: MAMMOGRAPHY | Facility: HOSPITAL | Age: 67
End: 2022-03-17

## 2022-04-07 ENCOUNTER — OFFICE VISIT (OUTPATIENT)
Dept: BEHAVIORAL HEALTH | Facility: CLINIC | Age: 67
End: 2022-04-07

## 2022-04-07 ENCOUNTER — DOCUMENTATION (OUTPATIENT)
Dept: BARIATRICS/WEIGHT MGMT | Facility: CLINIC | Age: 67
End: 2022-04-07

## 2022-04-07 ENCOUNTER — OFFICE VISIT (OUTPATIENT)
Dept: BARIATRICS/WEIGHT MGMT | Facility: CLINIC | Age: 67
End: 2022-04-07

## 2022-04-07 VITALS
DIASTOLIC BLOOD PRESSURE: 86 MMHG | HEIGHT: 71 IN | WEIGHT: 293 LBS | BODY MASS INDEX: 41.02 KG/M2 | SYSTOLIC BLOOD PRESSURE: 118 MMHG | OXYGEN SATURATION: 98 % | HEART RATE: 65 BPM | TEMPERATURE: 97 F | RESPIRATION RATE: 18 BRPM

## 2022-04-07 DIAGNOSIS — E66.01 MORBID OBESITY: Primary | ICD-10-CM

## 2022-04-07 DIAGNOSIS — G35 MULTIPLE SCLEROSIS: Chronic | ICD-10-CM

## 2022-04-07 DIAGNOSIS — I10 ESSENTIAL HYPERTENSION: ICD-10-CM

## 2022-04-07 DIAGNOSIS — F43.21 GRIEF: ICD-10-CM

## 2022-04-07 DIAGNOSIS — Z71.89 ENCOUNTER FOR PSYCHOLOGICAL ASSESSMENT PRIOR TO BARIATRIC SURGERY: ICD-10-CM

## 2022-04-07 DIAGNOSIS — R10.13 DYSPEPSIA: ICD-10-CM

## 2022-04-07 DIAGNOSIS — E66.01 OBESITY, CLASS III, BMI 40-49.9 (MORBID OBESITY): Primary | ICD-10-CM

## 2022-04-07 DIAGNOSIS — R53.83 FATIGUE, UNSPECIFIED TYPE: ICD-10-CM

## 2022-04-07 PROCEDURE — 90791 PSYCH DIAGNOSTIC EVALUATION: CPT | Performed by: PSYCHOLOGIST

## 2022-04-07 PROCEDURE — 99214 OFFICE O/P EST MOD 30 MIN: CPT | Performed by: PHYSICIAN ASSISTANT

## 2022-04-07 RX ORDER — OXYBUTYNIN CHLORIDE 10 MG/1
10 TABLET, EXTENDED RELEASE ORAL DAILY
COMMUNITY
Start: 2022-03-10

## 2022-04-07 NOTE — PROGRESS NOTES
PROGRESS NOTE    Data:    Rashad Carrillo is a 66 y.o. female who met with the undersigned for a scheduled psychological evaluation from 9:05 - 9:50am.      Clinical Maneuvering/Intervention:      Chief complaint and history of presenting illness/Problems: struggling with obesity for several years. Despite trying different weight loss plans and diets, the pt reported being unsuccessful in losing weight. A psychological evaluation was conducted in order to assess past and current level of functioning. Areas assessed included, but were not limited to: perception of social support, perception of ability to face and deal with challenges in life (positive functioning), anxiety symptoms, depressive symptoms, perspective on beliefs/belief system, coping skills for stress, intelligence level, addiction issues, etc. Therapeutic rapport was established. Interventions conducted today were geared towards assessing the pt's readiness for weight loss surgery and identifying and psychological contraindications for undergoing such a major life change. Social support was deemed strong (specific to weight loss surgery/weight loss in this manner and in a general sense): niece, sisters, friends, physicians. She talked about the extensive support that she receives from her physicians, like her MS physician. Current psychological struggles were described as low, but included grief over the loss of her  of 32 years and then losing a long-term from. She struggles with MS and being overweight as well, but tends to find gratitude in help from her physicians and expressed hope that weight loss surgery will greatly improve her life.Coping skills for distress and related to undergoing a major life change such as weight loss surgery/weight loss were deemed strong and included strong clayton in God, good sense of humor follows directions well, intelligent, responsible person, maintains quality relationships with others, and believes in  herself that she will be successful with weight loss surgery. The pt endorsed having characteristics of readiness to undergo major life changes inherent in the journey of weight loss surgery. She could speak to having 'suffered enough,' and the decision to have weight loss surgery has 'clicked' for her. The pt expressed gratitude for today's visit.     Past Family and Social History:      History of family mental health problems: none endorsed    Psychosocial history: treatment of psychiatric care in the past (N/A), alcohol/substance abuse treatment in the past (N/A) , alcohol/substance abuse problems (N/A), inpatient psychiatric care (N/A).    Mental Status Exam (MSE):  Hygiene:  good  Dress: normal  Attitude:  cooperative and proactive  Motor Activity: normal  Speech: normal  Mood:   determined and hopeful  Affect:  congruent  Thought Processes: normal  Thought Content:  normal  Suicidal Thoughts:  not endorsed  Homicidal Thoughts:  not endorsed  Crisis Safety Plan: not needed   Hallucinations:  none      Patient's Support Network Includes:  family, friends      Progress toward goal: there is evidence to suggest that she is taking measures to improve the quality of her life including seeking weight loss surgery.       Functional Status: moderate to high      Prognosis: good with weight loss surgery    Evaluation, Diagnoses, and Ability/Capacity to Respond to Treatment:      The pt presented to be struggling with grief, chronic pain/MS, and obesity (BMI = 49.8, morbid obesity). Results of MSE demonstrated a functional status of moderate to high. Strengths: belief in self that she will be successful with weight loss surgery, etc (see detailed list of coping skills above). Needed for growth (CPT code requirement for Weaknesses): weight loss.      From a psychological standpoint, the pt presents as a good candidate for bariatric surgery. She is motivated for the surgery, has showed readiness for the lifestyle change  in terms of starting to adjust her eating habits, and seems to have appropriate expectations of how to prepare and how to live after surgery in order to lose weight successfully.    Treatment Plan:      Short term goals: Start improving her health by following up with her bariatric surgeon in order to receive weight loss surgery as soon as feasible/appropriate and demonstrate success with compliance to adhering to the recommended diet. Long term goals: reach a healthy weight, potential alleviation of some pain, and overall improved sense of mood via gaining control over her health.    Leigh Chen, PhD, LP

## 2022-04-07 NOTE — PROGRESS NOTES
[facility]  [address]  [address]  [facility] [address]  [phone]      Patient  Name:  Rashad Carrillo  :  1955      Date of Visit: 2022      Chief Complaint:  weight gain; unable to maintain weight loss      History of Present Illness:  Rashad Carrillo is a 66 y.o. female who presents today for evaluation, education and consultation regarding metabolic and bariatric surgery with Dr. Anderson.     Rashad has been overweight for at least 44 years, has been 35 pounds or more overweight for at least 44 years, has been 100 pounds or more overweight for 25 or more years and started dieting at age 32.  Previous diet attempts include: [place], High Protein, Herbal Life, Low Carbohydrate, Low Fat, [facility], Calorie Counting, Lalito's Diet, [place], Cabbage Soup, Fasting and Slim Fast; Nutri-System, HMR and Tish Boateng; Prozac.  The most weight Rashad lost was 80 pounds w/ HMR but was unable to maintain that weight loss.  Her maximum lifetime weight is 365 pounds.      Hx MS, follows w/ Dr. Gilbert, on Lemtrad (alemtuzumab) infusions q 2yrs + Ampyra (dalfampridine) which assists w/ walking.  She tries very hard to walk 1mile/day but is becoming limited by her chronic knee pain.  Ortho say she needs a (R) TKR but must lose 50 lbs first.      Complete history has been obtained and discussed today, as pertinent to metabolic/ bariatric surgery.     Past Medical History:   Diagnosis Date   • Chronic knee pain     NSAIDS + steroid injections (last 3/2022)   • Dyspepsia    • Dyspnea on exertion    • Fatigue    • Hypertension    • Morbid obesity (HCC)    • Multiple sclerosis (HCC)     follows w/ Dr. Gilbert     Past Surgical History:   Procedure Laterality Date   • LAPAROSCOPIC CHOLECYSTECTOMY      gallstone pancreatitis       Allergies   Allergen Reactions   • Isoniazid Other (See Comments)     aplastic anemia   • Sulfa Antibiotics Anaphylaxis   •  Tetanus Toxoid Swelling       Current Outpatient Medications:   •  acyclovir (ZOVIRAX) 400 MG tablet, Take 1 tablet by mouth 2 (Two) Times a Day., Disp: 60 tablet, Rfl: 11  •  albuterol sulfate  (90 Base) MCG/ACT inhaler, Ventolin HFA 90 mcg/actuation aerosol inhaler  1-2 puffs q 4-6^ prn, Disp: , Rfl:   •  ALPRAZolam (Xanax) 0.5 MG tablet, Take one tablet 30 minutes prior to MRI, Disp: 1 tablet, Rfl: 0  •  azelastine (ASTEPRO) 0.15 % solution nasal spray, inhale 1-2 sprays by nasal route 2 times a day as needed, Disp: , Rfl: 3  •  B Complex-C-E-Zn (Z-BEC PO), Take 1 tablet by mouth daily., Disp: , Rfl:   •  Dalfampridine ER 10 MG tablet sustained-release 12 hour, Take 10 mg by mouth 2 (Two) Times a Day., Disp: 60 tablet, Rfl: 11  •  FLUoxetine (PROzac) 10 MG capsule, Take 10 mg by mouth Daily., Disp: , Rfl:   •  furosemide (LASIX) 40 MG tablet, Take 1 tablet(s) every day, Disp: , Rfl:   •  gabapentin (NEURONTIN) 100 MG capsule, Take 3 capsules by mouth 3 (Three) Times a Day., Disp: 90 capsule, Rfl: 5  •  gabapentin (NEURONTIN) 300 MG capsule, Take 2 capsules by mouth Every Night., Disp: 60 capsule, Rfl: 5  •  hydrochlorothiazide (MICROZIDE) 12.5 MG capsule, Take 12.5 mg by mouth Daily., Disp: , Rfl:   •  irbesartan (AVAPRO) 150 MG tablet, Take 150 mg by mouth Daily., Disp: , Rfl:   •  meloxicam (MOBIC) 7.5 MG tablet, 1 Oral Daily with food., Disp: 90 tablet, Rfl: 2  •  metoprolol succinate XL (TOPROL-XL) 100 MG 24 hr tablet, metoprolol succinate  mg tablet,extended release 24 hr, Disp: , Rfl:   •  Multiple Vitamin (MULTI-VITAMIN DAILY) tablet, Take 1 tablet by mouth daily., Disp: , Rfl:   •  olmesartan (BENICAR) 40 MG tablet, Take 1 tablet(s) every day for 90 days., Disp: , Rfl:   •  OnabotulinumtoxinA 200 units reconstituted solution, FOR . PHYSICIAN TO INJECT 155 UNITS INTRAMUSCULARLY INTO HEAD, NECK AND SHOULDERS EVERY 12 WEEKS Per FDA PROTOCOL, Disp: 1 each, Rfl: 3  •   oxybutynin XL (DITROPAN-XL) 10 MG 24 hr tablet, Take 10 mg by mouth Daily., Disp: , Rfl:   •  potassium chloride (MICRO-K) 10 MEQ CR capsule, Take 10 mEq by mouth daily., Disp: , Rfl:   •  pravastatin (PRAVACHOL) 80 MG tablet, Take 1 tablet(s) every day at bedtime., Disp: , Rfl:   •  valACYclovir (Valtrex) 500 MG tablet, Take 1 tablet by mouth 2 (Two) Times a Day., Disp: 60 tablet, Rfl: 11  •  vitamin D (ERGOCALCIFEROL) 24416 UNITS capsule capsule, Take 50,000 Units by mouth Every 30 (Thirty) Days. , Disp: , Rfl:   •  omeprazole (priLOSEC) 20 MG capsule, TAKE ONE CAPSULE IN THE MORNING., Disp: , Rfl: 11    Current Facility-Administered Medications:   •  OnabotulinumtoxinA 200 Units, 200 Units, Intramuscular, Q3 Months, Adonay Gilbert MD, 200 Units at 22 0931    Social History     Socioeconomic History   • Marital status:    Tobacco Use   • Smoking status: Former Smoker     Types: Cigarettes     Quit date:      Years since quittin.3   • Smokeless tobacco: Never Used   • Tobacco comment: social   Vaping Use   • Vaping Use: Never used   Substance and Sexual Activity   • Alcohol use: Yes     Comment: rarely   • Drug use: No   • Sexual activity: Defer     Social History     Social History Narrative    .  Lives alone in [place].  Forced halfway d/t MS - formerly worked in Finance.         Family History   Problem Relation Age of Onset   • Cancer Mother    • Hypertension Mother    • Heart disease Mother    • Alzheimer's disease Other    • Seizures Other         Epilepsy and recurrent seizures   • Heart disease Other        Review of Systems:  Constitutional:  reports fatigue, weight gain and denies fevers, chills.  HEENT:  denies headache, ear pain or loss of hearing, blurred or double vision, nasal discharge or sore throat.  Cardiovascular:  reports HTN and denies hx heart disease, hx MI, chest pain, palpitations, hx DVT.  Respiratory:  denies cough , wheezing, sleep apnea, asthma, hx  PE.  Gastrointestinal:  denies heartburn, nausea, vomiting, abdominal pain, liver disease.  Genitourinary:   denies history of  frequent UTI, incontinence, hematuria, dysuria, polyuria, polydipsia, renal insufficiency.    Musculoskeletal:  reports joint pain, back pain and denies fibromyalgia.  Neurological:  reports migraines and denies numbness /tingling, dizziness, confusion, seizure, stroke.  Psychiatric:  denies depressed mood, feeling anxious, bipolar disorder.  Endocrine:  denies glucose intolerance, diabetes, thyroid disease.  Hematologic:  reports bruising, hx blood transfusion and denies bleeding disorder.  Skin:   denies rashes, hx MRSA.      COVID-19 Questionnaire:    1.  Have you previously been tested for COVID-19?    []  No  [x]  Yes  2.  Were you ever positive for COVID-19?    [x]  No  []  Yes  3.  Are you employed in a healthcare setting?    [x]  No  []  Yes  4.  Are you symptomatic for COVID-19 as defined by the CDC (fever, cough)?  If so, when did symptoms begin?    [x]  No  []  Yes, symptoms began **  5.  Have you been hospitalized for COVID-19?  If so, were you in the ICU?  [x]  No  []  Yes, but not in the ICU  []  Yes, and I was in the ICU  6.  Are you a resident in a congregate (group care setting?)    [x]  No  []  Yes  7.  Are you pregnant?  [x]  No  []  Yes      Physical Exam:  Vital Signs:  Weight: (!) 160 kg (352 lb)   Body mass index is 49.79 kg/m².  Temp: 97 °F (36.1 °C)   Heart Rate: 65   BP: 118/86     Physical Exam  Vitals reviewed.   Constitutional:       Appearance: She is well-developed.      Comments: wearing a mask   HENT:      Head: Normocephalic and atraumatic.   Eyes:      General: No scleral icterus.     Conjunctiva/sclera: Conjunctivae normal.   Neck:      Thyroid: No thyromegaly.   Cardiovascular:      Rate and Rhythm: Normal rate and regular rhythm.      Heart sounds: No murmur heard.  Pulmonary:      Effort: Pulmonary effort is normal. No respiratory distress.      Breath  sounds: Normal breath sounds. No wheezing or rales.   Abdominal:      General: Bowel sounds are normal. There is no distension.      Palpations: Abdomen is soft. There is no mass.      Tenderness: There is no abdominal tenderness.      Hernia: No hernia is present.      Comments: scars: lap daniel   Musculoskeletal:         General: Normal range of motion.      Cervical back: Neck supple.   Skin:     General: Skin is warm and dry.      Findings: No rash.   Neurological:      Mental Status: She is alert and oriented to person, place, and time.      Gait: Gait normal.   Psychiatric:         Judgment: Judgment normal.         Patient Active Problem List   Diagnosis   • Vitamin D deficiency   • Depression   • Multiple sclerosis ([facility])   • Restless legs syndrome   • Muscle spasticity   • Chronic migraine without aura without status migrainosus, not intractable   • Essential hypertension   • Frequent falls   • Status post balloon dilatation of esophageal stricture   • T2DM (type 2 diabetes mellitus) ([facility])   • TB lung, latent   • Aplastic anemia likely due to isoniazide   • Chest pain   • MCGRAW (dyspnea on exertion)   • Hyperlipidemia LDL goal <70   • Class 3 severe obesity due to excess calories with serious comorbidity and body mass index (BMI) of 50.0 to 59.9 in adult ([facility])   • Fatigue   • Dyspepsia       Assessment:  66 y.o. female with medically complicated obesity pursuing sleeve gastrectomy.    Metabolic & Bariatric Surgery is deemed medically necessary given the following: Patient's Body mass index is 49.79 kg/m². indicating that she is obese (BMI >30). Obesity-related health conditions include the following: hypertension and MS. Obesity is worsening. BMI is is above average; BMI management plan is completed. We discussed consulting a Bariatric surgeon..        Plan:  Further evaluation will include: CBC, CMP, Lipids, TSH, HgA1C, H.Pylori UBT, EKG, CXR and EGD (requested for review).    Additional clearances needed  prior to surgery will include: Cardiology.     Patient understands that bariatric surgery is not cosmetic surgery but rather a tool to help make a lifelong commitment to lifestyle changes including diet, exercise and behavior modifications.  The patient has been educated today on those expected postoperative lifestyle changes.  Psychological and Nutritional consultations will be completed prior to surgery.  Instructions on how to access [employer] (an internet based site w/ educational surgical videos) were given to the patient.  Recommended perioperative vitamin supplementation was reviewed.  The importance of avoiding ASA/ NSAIDS/ steroids/ tobacco/nicotine/ hormones/ immunomodulators perioperatively was discussed in detail.  All questions/concerns have been addressed.      Further input to follow pending the above.           BRIDGETTE Peter

## 2022-04-07 NOTE — PROGRESS NOTES
"Weight Loss Surgery  Presurgical Nutrition Assessment     Rashad Carrillo  04/07/2022  [id number]  [id number]  1955  female    Surgery desired: Sleeve Gastrectomy    Height: 182.9 cm (72\")  Weight: 163 kg (360 #)  BMI: 48.82    Past Medical History:   Diagnosis Date   • Chronic knee pain     NSAIDS + steroid injections (last 3/2022)   • Hypertension    • Multiple sclerosis (HCC)     follows w/ Dr. Gilbert     Past Surgical History:   Procedure Laterality Date   • LAPAROSCOPIC CHOLECYSTECTOMY  2012    gallstone pancreatitis     Allergies   Allergen Reactions   • Isoniazid Other (See Comments)     aplastic anemia   • Sulfa Antibiotics Anaphylaxis   • Tetanus Toxoid Swelling       Current Outpatient Medications:   •  acyclovir (ZOVIRAX) 400 MG tablet, Take 1 tablet by mouth 2 (Two) Times a Day., Disp: 60 tablet, Rfl: 11  •  albuterol sulfate  (90 Base) MCG/ACT inhaler, Ventolin HFA 90 mcg/actuation aerosol inhaler  1-2 puffs q 4-6^ prn, Disp: , Rfl:   •  ALPRAZolam (Xanax) 0.5 MG tablet, Take one tablet 30 minutes prior to MRI, Disp: 1 tablet, Rfl: 0  •  azelastine (ASTEPRO) 0.15 % solution nasal spray, inhale 1-2 sprays by nasal route 2 times a day as needed, Disp: , Rfl: 3  •  B Complex-C-E-Zn (Z-BEC PO), Take 1 tablet by mouth daily., Disp: , Rfl:   •  Dalfampridine ER 10 MG tablet sustained-release 12 hour, Take 10 mg by mouth 2 (Two) Times a Day., Disp: 60 tablet, Rfl: 11  •  FLUoxetine (PROzac) 10 MG capsule, Take 10 mg by mouth Daily., Disp: , Rfl:   •  furosemide (LASIX) 40 MG tablet, Take 1 tablet(s) every day, Disp: , Rfl:   •  gabapentin (NEURONTIN) 100 MG capsule, Take 3 capsules by mouth 3 (Three) Times a Day., Disp: 90 capsule, Rfl: 5  •  gabapentin (NEURONTIN) 300 MG capsule, Take 2 capsules by mouth Every Night., Disp: 60 capsule, Rfl: 5  •  hydrochlorothiazide (MICROZIDE) 12.5 MG capsule, Take 12.5 mg by mouth Daily., Disp: , Rfl:   •  HYDROcodone-acetaminophen (NORCO)  MG per " tablet, Take 1 tablet by mouth Every 6 (Six) Hours As Needed for Moderate Pain ., Disp: 20 tablet, Rfl: 0  •  irbesartan (AVAPRO) 150 MG tablet, Take 150 mg by mouth Daily., Disp: , Rfl:   •  meloxicam (MOBIC) 7.5 MG tablet, 1 Oral Daily with food., Disp: 90 tablet, Rfl: 2  •  metoprolol succinate XL (TOPROL-XL) 100 MG 24 hr tablet, metoprolol succinate  mg tablet,extended release 24 hr, Disp: , Rfl:   •  Multiple Vitamin (MULTI-VITAMIN DAILY) tablet, Take 1 tablet by mouth daily., Disp: , Rfl:   •  olmesartan (BENICAR) 40 MG tablet, Take 1 tablet(s) every day for 90 days., Disp: , Rfl:   •  omeprazole (priLOSEC) 20 MG capsule, TAKE ONE CAPSULE IN THE MORNING., Disp: , Rfl: 11  •  OnabotulinumtoxinA 200 units reconstituted solution, FOR . PHYSICIAN TO INJECT 155 UNITS INTRAMUSCULARLY INTO HEAD, NECK AND SHOULDERS EVERY 12 WEEKS Per FDA PROTOCOL, Disp: 1 each, Rfl: 3  •  oxybutynin XL (DITROPAN-XL) 10 MG 24 hr tablet, Take 10 mg by mouth Daily., Disp: , Rfl:   •  potassium chloride (MICRO-K) 10 MEQ CR capsule, Take 10 mEq by mouth daily., Disp: , Rfl:   •  pravastatin (PRAVACHOL) 80 MG tablet, Take 1 tablet(s) every day at bedtime., Disp: , Rfl:   •  valACYclovir (Valtrex) 500 MG tablet, Take 1 tablet by mouth 2 (Two) Times a Day., Disp: 60 tablet, Rfl: 11  •  vitamin D (ERGOCALCIFEROL) 05027 UNITS capsule capsule, Take 50,000 Units by mouth Every 30 (Thirty) Days. 15TH, Disp: , Rfl:     Current Facility-Administered Medications:   •  OnabotulinumtoxinA 200 Units, 200 Units, Intramuscular, Q3 Months, Adonay Gilbert MD, 200 Units at 01/12/22 1027      Nutrition Assessment    Estimated energy needs:  2290 kcal    Estimated calories for weight loss:  1800 kcal    IBW (Pounds):  185 #        Excess body weight (Pounds):  175 #       Nutrition Recall  24 Hour recall: (B) (L) (D) -  Reviewed and discussed with patient, who has difficulty with mobility due to complications from MS and severe  osteoarthritis.  She has had problems from falling  - is now using a walker or a wheel chair. She complains of some swallowing issues, also. Meals are reported as follows:  On rising, drinks 1 cup coffee /c creamer.  First meal of the day from [employer] = a chicken wrap and water OR eats at home: chicken sandwich with lettuce. Second meal from [employer] Mediterranean grill = a salad without dressing.  Before bed she eats a chocolate chip cookie.  Diet as reported is low in protein and lacking a variety of fruits and vegetables. Patient will focus on ingesting adequate protein for successful weight management in 3 regular balanced meals + 2 to 3 high protein snacks per day. She states principles of a healthy diet as a result of completing a HMR program & losing 80 # there.         Exercise  intermittently - goal is to walk 2 miles each day; cannot do this at this time d/t knee pain      Education    Provided information packet re:  Sleeve Gastrectomy  1. Reviewed guidelines for higher protein, limited carbohydrate diet to promote weight loss.  Encouraged patient to incorporate these principles of healthy eating from now until approximately 2 weeks prior to bariatric surgery date, when an even lower carbohydrate “liver-shrinking” regimen will be followed. (Information sheet re pre-op diet given).  Explained that after recovery from surgery this diet will again be followed to ensure further loss and for weight maintenance.    2. Encouraged patient to choose an acceptable protein supplement powder or shake for post-surgery liquid diet.  Provided product guidelines and examples.    3. Explained importance of goal setting to help in changing eating behaviors that are not conducive to weight loss.  Targeted several on a worksheet which also included spaces for patient to work on issues specific to them.  4. Provided follow-up options for support, including contact information for dietitians here, if desired.  Web-based  support information and apps for smart phones and computers given.  Noted that monthly support group is offered at this clinic, and that support is associated with successful weight loss.    Recommend that team proceed with surgery and follow per protocol.      Nutrition Goals   Dietary Guidelines per information packet as described above  Protein goal:  grams per day   Carbohydrate goal:  100-140 grams per day  Eliminate soda, sweet tea, etc.     Exercise Goals  Continue current exercise routine   Add 15-30 minutes of activity per day as tolerated      Gale Canales RD  04/07/2022  10:34 EDT

## 2022-04-08 NOTE — PROGRESS NOTES
OFFICE VISIT  NOTE  [facility] CARDIOLOGY      Name: Rashad Carrillo    Date: 2022  MRN:  [id number]  :  1955      REFERRING/PRIMARY PROVIDER:  Ye Newman MD    Chief Complaint   Patient presents with   • Hypertension       HPI: Rashad Carrillo is a 66 y.o. female who presents today for follow-up for preop cardiovascular assessment.  She has associated history of diabetes mellitus type 2, uncontrolled hypertension and multiple sclerosis.  She had an MD VIP work-up including genetic testing and laboratory work-up for stratification for coronary atherosclerosis.  Abnormal high sensitivity CRP at 9.5, abnormal iso-Prostane  and myeloperoxidase and apo-lipoprotein B.  She was started on pravastatin 2019 and is tolerating it well.  She had a coronary calcium score of zero 2020.  Low risk stress test 2020.  No chest pain or shortness of breath.  Needs preop assessment for knee replacement surgery.    Past Medical History:   Diagnosis Date   • Chronic knee pain     NSAIDS + steroid injections (last 3/2022)   • Hypertension    • Multiple sclerosis (HCC)     follows w/ Dr. Gilbert       Past Surgical History:   Procedure Laterality Date   • LAPAROSCOPIC CHOLECYSTECTOMY  2012    gallstone pancreatitis       Social History     Socioeconomic History   • Marital status:    Tobacco Use   • Smoking status: Former Smoker     Types: Cigarettes     Quit date:      Years since quittin.2   • Smokeless tobacco: Never Used   • Tobacco comment: social   Vaping Use   • Vaping Use: Never used   Substance and Sexual Activity   • Alcohol use: Yes     Comment: rarely   • Drug use: No   • Sexual activity: Defer       Family History   Problem Relation Age of Onset   • Cancer Mother    • Hypertension Mother    • Heart disease Mother    • Alzheimer's disease Other    • Seizures Other         Epilepsy and recurrent seizures   • Heart disease Other         ROS:   Constitutional no  fever,  no weight loss   Skin no rash, no subcutaneous nodules   Otolaryngeal no difficulty swallowing   Cardiovascular See HPI   Pulmonary no cough, no sputum production   Gastrointestinal no constipation, no diarrhea   Genitourinary no dysuria, no hematuria   Hematologic no easy bruisability, no abnormal bleeding   Musculoskeletal no muscle pain   Neurologic no dizziness, no falls         Allergies   Allergen Reactions   • Isoniazid Other (See Comments)     aplastic anemia   • Sulfa Antibiotics Anaphylaxis   • Tetanus Toxoid Swelling         Current Outpatient Medications:   •  acyclovir (ZOVIRAX) 400 MG tablet, Take 1 tablet by mouth 2 (Two) Times a Day., Disp: 60 tablet, Rfl: 11  •  albuterol sulfate  (90 Base) MCG/ACT inhaler, Ventolin HFA 90 mcg/actuation aerosol inhaler  1-2 puffs q 4-6^ prn, Disp: , Rfl:   •  ALPRAZolam (Xanax) 0.5 MG tablet, Take one tablet 30 minutes prior to MRI, Disp: 1 tablet, Rfl: 0  •  azelastine (ASTEPRO) 0.15 % solution nasal spray, inhale 1-2 sprays by nasal route 2 times a day as needed, Disp: , Rfl: 3  •  B Complex-C-E-Zn (Z-BEC PO), Take 1 tablet by mouth daily., Disp: , Rfl:   •  Dalfampridine ER 10 MG tablet sustained-release 12 hour, Take 10 mg by mouth 2 (Two) Times a Day., Disp: 60 tablet, Rfl: 11  •  FLUoxetine (PROzac) 10 MG capsule, Take 10 mg by mouth Daily., Disp: , Rfl:   •  furosemide (LASIX) 40 MG tablet, Take 1 tablet(s) every day, Disp: , Rfl:   •  gabapentin (NEURONTIN) 100 MG capsule, Take 3 capsules by mouth 3 (Three) Times a Day., Disp: 90 capsule, Rfl: 5  •  hydrochlorothiazide (MICROZIDE) 12.5 MG capsule, Take 12.5 mg by mouth Daily., Disp: , Rfl:   •  irbesartan (AVAPRO) 150 MG tablet, Take 150 mg by mouth Daily., Disp: , Rfl:   •  meloxicam (MOBIC) 7.5 MG tablet, 1 Oral Daily with food., Disp: 90 tablet, Rfl: 2  •  metoprolol succinate XL (TOPROL-XL) 100 MG 24 hr tablet, metoprolol succinate  mg tablet,extended release 24 hr, Disp: , Rfl:   •   "Multiple Vitamin (MULTI-VITAMIN DAILY) tablet, Take 1 tablet by mouth daily., Disp: , Rfl:   •  olmesartan (BENICAR) 40 MG tablet, Take 1 tablet(s) every day for 90 days., Disp: , Rfl:   •  omeprazole (priLOSEC) 20 MG capsule, TAKE ONE CAPSULE IN THE MORNING., Disp: , Rfl: 11  •  OnabotulinumtoxinA 200 units reconstituted solution, FOR . PHYSICIAN TO INJECT 155 UNITS INTRAMUSCULARLY INTO HEAD, NECK AND SHOULDERS EVERY 12 WEEKS Per FDA PROTOCOL, Disp: 1 each, Rfl: 3  •  oxybutynin XL (DITROPAN-XL) 10 MG 24 hr tablet, Take 10 mg by mouth Daily., Disp: , Rfl:   •  potassium chloride (MICRO-K) 10 MEQ CR capsule, Take 10 mEq by mouth daily., Disp: , Rfl:   •  pravastatin (PRAVACHOL) 80 MG tablet, Take 1 tablet(s) every day at bedtime., Disp: , Rfl:   •  valACYclovir (Valtrex) 500 MG tablet, Take 1 tablet by mouth 2 (Two) Times a Day., Disp: 60 tablet, Rfl: 11  •  vitamin D (ERGOCALCIFEROL) 90457 UNITS capsule capsule, Take 50,000 Units by mouth Every 30 (Thirty) Days. 15TH, Disp: , Rfl:   •  gabapentin (NEURONTIN) 300 MG capsule, Take 2 capsules by mouth Every Night., Disp: 60 capsule, Rfl: 5    Current Facility-Administered Medications:   •  OnabotulinumtoxinA 200 Units, 200 Units, Intramuscular, Q3 Months, Adonay Gilbert MD, 200 Units at 04/11/22 0931    Vitals:    04/11/22 1534   BP: 138/78   BP Location: Right arm   Patient Position: Sitting   Pulse: 64   SpO2: 98%   Weight: (!) 163 kg (360 lb)   Height: 182.9 cm (72\")     Body mass index is 48.82 kg/m².    PHYSICAL EXAM:    General Appearance:   · well developed  · well nourished  HENT:   · oropharynx moist  · lips not cyanotic  Neck:  · thyroid not enlarged  · supple  Respiratory:  · no respiratory distress  · normal breath sounds  · no rales  Cardiovascular:  · no jugular venous distention  · regular rhythm  · apical impulse normal  · S1 normal, S2 normal  · no S3, no S4   · no murmur  · no rub, no thrill  · carotid pulses normal; no " bruit  · pedal pulses normal  · lower extremity edema: none    Gastrointestinal:   · bowel sounds normal  · non-tender  · no hepatomegaly, no splenomegaly  Musculoskeletal:  · no clubbing of fingers.   · normocephalic, head atraumatic  Skin:   · warm, dry  Psychiatric:  · judgement and insight appropriate  · normal mood and affect    RESULTS:   Procedures          Labs:  CBC 06/29/20   WBC 4.6   RBC 4.65   Hemoglobin 13.5   Hematocrit 42.5   Platelet 238       Creatinine (06/29/20) 0.84   Glomerular Filtration Rate (06/29/20) 75   TSH (06/29/20) 2.89         Lab Results   Component Value Date    TRIG 101 04/07/2022    HDL 60 04/07/2022     (H) 04/07/2022    AST 15 02/15/2022    ALT 18 02/15/2022     Lab Results   Component Value Date    HGBA1C 5.1 04/07/2022     No components found for: CREATINININE  eGFR Non  Am   Date Value Ref Range Status   12/29/2020 80 >59 mL/min/1.73 Final   09/22/2020 78 >59 mL/min/1.73 Final   08/25/2020 83 >59 mL/min/1.73 Final     eGFR Non  Amer   Date Value Ref Range Status   02/15/2022 66 >60 mL/min/1.73 Final   01/26/2021 68 >60 mL/min/1.73 Final   10/01/2018 75 >60 mL/min/1.73 Final         ASSESSMENT:  Problem List Items Addressed This Visit        Cardiac and Vasculature    Essential hypertension - Primary    Chest pain    Overview     8/4/20 Stress PET  · Myocardial perfusion imaging indicates a normal myocardial perfusion study with no evidence of ischemia.  · LVEF = 69%  · Impressions are consistent with a low risk study.    01/2019 CT coronary calcium   · Coronary calcium score of 0, 10th percentile.   · 2.7 mm right lower lobe pulmonary nodule.              MCGRAW (dyspnea on exertion)       Endocrine and Metabolic    T2DM (type 2 diabetes mellitus) (HCC)          PLAN:    1.  Preop cardiovascular assessment  Stress/PET imaging low risk  Overall she has low cardiovascular risk for the planned surgery, no further cardiac testing warranted prior to surgery.   Continue metoprolol day of surgery      2.  Hyperlipidemia:  Long-term goal LDL less than 100 and less abnormal stress test and less than 70  Continue pravastatin 40 mg daily follow-up lipid panel at PCP    3.  Essential hypertension:  Well-controlled today, continue current medical therapy.    4.  Obesity:  We discussed the importance of weight loss and strategies including caloric restriction.      Thank you for the opportunity to share in the care of your patient; please do not hesitate to call me with any questions.     Kendell Alonzo MD, [facility]  Office: [phone] [place] [address]

## 2022-04-09 LAB
CHOLEST SERPL-MCNC: 226 MG/DL (ref 100–199)
HBA1C MFR BLD: 5.1 % (ref 4.8–5.6)
HDLC SERPL-MCNC: 60 MG/DL
LDLC SERPL CALC-MCNC: 148 MG/DL (ref 0–99)
TRIGL SERPL-MCNC: 101 MG/DL (ref 0–149)
UREA BREATH TEST QL: NEGATIVE
VLDLC SERPL CALC-MCNC: 18 MG/DL (ref 5–40)

## 2022-04-11 ENCOUNTER — PROCEDURE VISIT (OUTPATIENT)
Dept: NEUROLOGY | Facility: CLINIC | Age: 67
End: 2022-04-11

## 2022-04-11 ENCOUNTER — OFFICE VISIT (OUTPATIENT)
Dept: CARDIOLOGY | Facility: CLINIC | Age: 67
End: 2022-04-11

## 2022-04-11 ENCOUNTER — LAB (OUTPATIENT)
Dept: LAB | Facility: HOSPITAL | Age: 67
End: 2022-04-11

## 2022-04-11 VITALS
DIASTOLIC BLOOD PRESSURE: 78 MMHG | HEART RATE: 64 BPM | WEIGHT: 293 LBS | HEIGHT: 72 IN | OXYGEN SATURATION: 98 % | SYSTOLIC BLOOD PRESSURE: 138 MMHG | BODY MASS INDEX: 39.68 KG/M2

## 2022-04-11 DIAGNOSIS — E11.69 TYPE 2 DIABETES MELLITUS WITH OTHER SPECIFIED COMPLICATION, UNSPECIFIED WHETHER LONG TERM INSULIN USE: ICD-10-CM

## 2022-04-11 DIAGNOSIS — G35 MULTIPLE SCLEROSIS: ICD-10-CM

## 2022-04-11 DIAGNOSIS — I10 ESSENTIAL HYPERTENSION: Primary | ICD-10-CM

## 2022-04-11 DIAGNOSIS — R07.9 CHEST PAIN, UNSPECIFIED TYPE: ICD-10-CM

## 2022-04-11 DIAGNOSIS — R06.09 DOE (DYSPNEA ON EXERTION): ICD-10-CM

## 2022-04-11 DIAGNOSIS — G43.709 CHRONIC MIGRAINE WITHOUT AURA WITHOUT STATUS MIGRAINOSUS, NOT INTRACTABLE: Primary | Chronic | ICD-10-CM

## 2022-04-11 LAB
ALBUMIN SERPL-MCNC: 4.3 G/DL (ref 3.5–5.2)
ALBUMIN/GLOB SERPL: 1.4 G/DL
ALP SERPL-CCNC: 116 U/L (ref 39–117)
ALT SERPL W P-5'-P-CCNC: 12 U/L (ref 1–33)
ANION GAP SERPL CALCULATED.3IONS-SCNC: 11.7 MMOL/L (ref 5–15)
AST SERPL-CCNC: 21 U/L (ref 1–32)
BASOPHILS # BLD AUTO: 0.03 10*3/MM3 (ref 0–0.2)
BASOPHILS NFR BLD AUTO: 0.5 % (ref 0–1.5)
BILIRUB SERPL-MCNC: 0.5 MG/DL (ref 0–1.2)
BUN SERPL-MCNC: 21 MG/DL (ref 8–23)
BUN/CREAT SERPL: 28 (ref 7–25)
CALCIUM SPEC-SCNC: 9.8 MG/DL (ref 8.6–10.5)
CHLORIDE SERPL-SCNC: 106 MMOL/L (ref 98–107)
CO2 SERPL-SCNC: 25.3 MMOL/L (ref 22–29)
CREAT SERPL-MCNC: 0.75 MG/DL (ref 0.57–1)
DEPRECATED RDW RBC AUTO: 39.9 FL (ref 37–54)
EGFRCR SERPLBLD CKD-EPI 2021: 89 ML/MIN/1.73
EOSINOPHIL # BLD AUTO: 0.18 10*3/MM3 (ref 0–0.4)
EOSINOPHIL NFR BLD AUTO: 2.9 % (ref 0.3–6.2)
ERYTHROCYTE [DISTWIDTH] IN BLOOD BY AUTOMATED COUNT: 12.9 % (ref 12.3–15.4)
GLOBULIN UR ELPH-MCNC: 3 GM/DL
GLUCOSE SERPL-MCNC: 79 MG/DL (ref 65–99)
HCT VFR BLD AUTO: 43.4 % (ref 34–46.6)
HGB BLD-MCNC: 14.4 G/DL (ref 12–15.9)
IMM GRANULOCYTES # BLD AUTO: 0.02 10*3/MM3 (ref 0–0.05)
IMM GRANULOCYTES NFR BLD AUTO: 0.3 % (ref 0–0.5)
LYMPHOCYTES # BLD AUTO: 1.22 10*3/MM3 (ref 0.7–3.1)
LYMPHOCYTES NFR BLD AUTO: 19.3 % (ref 19.6–45.3)
MCH RBC QN AUTO: 29 PG (ref 26.6–33)
MCHC RBC AUTO-ENTMCNC: 33.2 G/DL (ref 31.5–35.7)
MCV RBC AUTO: 87.5 FL (ref 79–97)
MONOCYTES # BLD AUTO: 0.68 10*3/MM3 (ref 0.1–0.9)
MONOCYTES NFR BLD AUTO: 10.8 % (ref 5–12)
NEUTROPHILS NFR BLD AUTO: 4.18 10*3/MM3 (ref 1.7–7)
NEUTROPHILS NFR BLD AUTO: 66.2 % (ref 42.7–76)
NRBC BLD AUTO-RTO: 0 /100 WBC (ref 0–0.2)
PLATELET # BLD AUTO: 234 10*3/MM3 (ref 140–450)
PMV BLD AUTO: 11.3 FL (ref 6–12)
POTASSIUM SERPL-SCNC: 5.2 MMOL/L (ref 3.5–5.2)
PROT SERPL-MCNC: 7.3 G/DL (ref 6–8.5)
RBC # BLD AUTO: 4.96 10*6/MM3 (ref 3.77–5.28)
SODIUM SERPL-SCNC: 143 MMOL/L (ref 136–145)
TSH SERPL DL<=0.05 MIU/L-ACNC: 3.44 UIU/ML (ref 0.27–4.2)
WBC NRBC COR # BLD: 6.31 10*3/MM3 (ref 3.4–10.8)

## 2022-04-11 PROCEDURE — 80050 GENERAL HEALTH PANEL: CPT

## 2022-04-11 PROCEDURE — 99214 OFFICE O/P EST MOD 30 MIN: CPT | Performed by: INTERNAL MEDICINE

## 2022-04-11 PROCEDURE — 64615 CHEMODENERV MUSC MIGRAINE: CPT | Performed by: PSYCHIATRY & NEUROLOGY

## 2022-04-11 PROCEDURE — 81001 URINALYSIS AUTO W/SCOPE: CPT

## 2022-04-11 PROCEDURE — 36415 COLL VENOUS BLD VENIPUNCTURE: CPT

## 2022-04-11 NOTE — PROGRESS NOTES
Botulinum Toxin Injection Procedure    Chief Complaint   Patient presents with   • Botulinum Toxin Injection     Office supplied, 200 units        Pre-operative diagnosis: headache    Post-operative diagnosis: Same    Procedure: Chemical neurolysis    After risks and benefits were explained including bleeding, infection, worsening of pain, damage to the areas being injected, weakness of muscles, loss of muscle control, dysphagia if injecting the head or neck, facial droop if injecting the facial area, painful injection, allergic or other reaction to the medications being injected, and the failure of the procedure to help the problem, a signed consent was obtained.      The patient was placed in a comfortable area and the sites to be treated were identified. A time out was called and performed. The area to be treated was prepped three times with alcohol and the alcohol allowed to dry. Next, a 27 gauge needle was used to inject the medication in the area to be treated.    Area(s) injected: frontalis muscle, semispinalis capitis muscle and temporallis muscle    Medications used: botulinum toxin 200 mu, 2 mL of saline used to dilute the botulinum toxin.      The patient did tolerate the procedure well. There were no complications.       Physical Examination    Current Treatment (Units)   Splenius Capitis 25 units on the right and 25 units on the left.   Temporalis 25 units on the right and 25 units on the left.   Frontalis 27 units on the right and 28 units on the left.   EMG Guidance none .   Complications: none .   The total amount injected in units is 155 .   The total amount wasted in units is 45 .   The total amount submitted in units is 200 .

## 2022-04-12 LAB
BACTERIA UR QL AUTO: NORMAL /HPF
BILIRUB UR QL STRIP: NEGATIVE
CLARITY UR: ABNORMAL
COLOR UR: YELLOW
GLUCOSE UR STRIP-MCNC: NEGATIVE MG/DL
HGB UR QL STRIP.AUTO: NEGATIVE
HYALINE CASTS UR QL AUTO: NORMAL /LPF
KETONES UR QL STRIP: NEGATIVE
LEUKOCYTE ESTERASE UR QL STRIP.AUTO: ABNORMAL
NITRITE UR QL STRIP: NEGATIVE
PH UR STRIP.AUTO: 5.5 [PH] (ref 5–8)
PROT UR QL STRIP: ABNORMAL
RBC # UR STRIP: NORMAL /HPF
REF LAB TEST METHOD: NORMAL
SP GR UR STRIP: 1.03 (ref 1–1.03)
SQUAMOUS #/AREA URNS HPF: NORMAL /HPF
UROBILINOGEN UR QL STRIP: ABNORMAL
WBC # UR STRIP: NORMAL /HPF

## 2022-04-13 ENCOUNTER — TRANSCRIBE ORDERS (OUTPATIENT)
Dept: ADMINISTRATIVE | Facility: HOSPITAL | Age: 67
End: 2022-04-13

## 2022-04-13 DIAGNOSIS — Z12.31 ENCOUNTER FOR SCREENING MAMMOGRAM FOR MALIGNANT NEOPLASM OF BREAST: Primary | ICD-10-CM

## 2022-04-15 PROBLEM — R53.83 FATIGUE: Status: ACTIVE | Noted: 2022-04-15

## 2022-04-15 PROBLEM — R10.13 DYSPEPSIA: Status: ACTIVE | Noted: 2022-04-15

## 2022-05-10 ENCOUNTER — APPOINTMENT (OUTPATIENT)
Dept: MAMMOGRAPHY | Facility: HOSPITAL | Age: 67
End: 2022-05-10

## 2022-05-23 ENCOUNTER — OFFICE VISIT (OUTPATIENT)
Dept: ORTHOPEDIC SURGERY | Facility: CLINIC | Age: 67
End: 2022-05-23

## 2022-05-23 ENCOUNTER — TELEPHONE (OUTPATIENT)
Dept: ORTHOPEDIC SURGERY | Facility: CLINIC | Age: 67
End: 2022-05-23

## 2022-05-23 VITALS
HEIGHT: 72 IN | SYSTOLIC BLOOD PRESSURE: 145 MMHG | DIASTOLIC BLOOD PRESSURE: 98 MMHG | WEIGHT: 293 LBS | BODY MASS INDEX: 39.68 KG/M2

## 2022-05-23 DIAGNOSIS — E66.01 OBESITY, MORBID, BMI 40.0-49.9: ICD-10-CM

## 2022-05-23 DIAGNOSIS — M17.11 PRIMARY OSTEOARTHRITIS OF RIGHT KNEE: Primary | ICD-10-CM

## 2022-05-23 PROCEDURE — 20610 DRAIN/INJ JOINT/BURSA W/O US: CPT | Performed by: ORTHOPAEDIC SURGERY

## 2022-05-23 PROCEDURE — 99213 OFFICE O/P EST LOW 20 MIN: CPT | Performed by: ORTHOPAEDIC SURGERY

## 2022-05-23 RX ORDER — ROPIVACAINE HYDROCHLORIDE 5 MG/ML
4 INJECTION, SOLUTION EPIDURAL; INFILTRATION; PERINEURAL
Status: COMPLETED | OUTPATIENT
Start: 2022-05-23 | End: 2022-05-23

## 2022-05-23 RX ORDER — FLUOXETINE HYDROCHLORIDE 20 MG/1
20 CAPSULE ORAL DAILY
COMMUNITY
Start: 2022-04-22

## 2022-05-23 RX ORDER — HYDROCODONE BITARTRATE AND ACETAMINOPHEN 5; 325 MG/1; MG/1
TABLET ORAL
COMMUNITY
Start: 2022-04-13

## 2022-05-23 RX ORDER — ORAL SEMAGLUTIDE 3 MG/1
TABLET ORAL
COMMUNITY
End: 2022-08-16

## 2022-05-23 RX ORDER — TRIAMCINOLONE ACETONIDE 40 MG/ML
40 INJECTION, SUSPENSION INTRA-ARTICULAR; INTRAMUSCULAR
Status: COMPLETED | OUTPATIENT
Start: 2022-05-23 | End: 2022-05-23

## 2022-05-23 RX ADMIN — ROPIVACAINE HYDROCHLORIDE 4 ML: 5 INJECTION, SOLUTION EPIDURAL; INFILTRATION; PERINEURAL at 13:59

## 2022-05-23 RX ADMIN — TRIAMCINOLONE ACETONIDE 40 MG: 40 INJECTION, SUSPENSION INTRA-ARTICULAR; INTRAMUSCULAR at 13:59

## 2022-05-23 NOTE — TELEPHONE ENCOUNTER
PT CALLED AND STATES THAT SHE HAS A LOT OF PAIN IN HER RIGHT KNEE AND SHE SAID IT IS SWOLLEN. SHE WOULD LIKE TO RECEIVE AN INJECTION. COULD SOMEONE PLEASE CONTACT HER? SHE CAN BE REACHED [phone]. ALSO, PT CALLED AND SAID SHE WOULD LIKE TO RECEIVE INJECTION TODAY BECAUSE SHE IS GOING OUT OF TOWN.

## 2022-05-23 NOTE — PROGRESS NOTES
[facility] [place] [facility] Note    Subjective     Chief Complaint   Patient presents with   • Follow-up     3 months- Primary osteoarthritis of right knee        HPI    It has been 3  month(s) since Ms. Carrillo's last visit. She returns to clinic today for follow-up of right knee arthritis. The issue has been ongoing for many year(s). She rates her pain a 9/10 on the pain scale. Previous/current treatments: bracing, cane/walker, NSAIDS and weight loss. Current symptoms: same as prior visit. The pain is worse with walking, standing, sitting, climbing stairs, sleeping, working, leisure, lying on affected side and any movement of the joint; nothing improves the pain. Overall, she is doing the same.  She would like a steroid injection today.  She finished viscosupplementation injections in early March.    I have reviewed the following portions of the patient's history and agree with: History of Present Illness and Review of Systems    Patient Active Problem List   Diagnosis   • Vitamin D deficiency   • Depression   • Multiple sclerosis (HCC)   • Restless legs syndrome   • Muscle spasticity   • Chronic migraine without aura without status migrainosus, not intractable   • Essential hypertension   • Frequent falls   • Status post balloon dilatation of esophageal stricture   • T2DM (type 2 diabetes mellitus) ([facility])   • TB lung, latent   • Aplastic anemia likely due to isoniazide   • Chest pain   • MCGRAW (dyspnea on exertion)   • Hyperlipidemia LDL goal <70   • Class 3 severe obesity due to excess calories with serious comorbidity and body mass index (BMI) of 50.0 to 59.9 in adult ([facility])   • Fatigue   • Dyspepsia     Past Medical History:   Diagnosis Date   • Chronic knee pain     NSAIDS + steroid injections (last 3/2022)   • Dyspepsia    • Dyspnea on exertion    • Fatigue    • Hypertension    • Morbid obesity ([facility])    • Multiple sclerosis ([facility])     follows w/ Dr. Gilbert      Past Surgical History:   Procedure Laterality  Date   • LAPAROSCOPIC CHOLECYSTECTOMY  2012    gallstone pancreatitis      Family History   Problem Relation Age of Onset   • Cancer Mother    • Hypertension Mother    • Heart disease Mother    • Alzheimer's disease Other    • Seizures Other         Epilepsy and recurrent seizures   • Heart disease Other      Social History     Socioeconomic History   • Marital status:    Tobacco Use   • Smoking status: Former Smoker     Types: Cigarettes     Quit date:      Years since quittin.4   • Smokeless tobacco: Never Used   • Tobacco comment: social   Vaping Use   • Vaping Use: Never used   Substance and Sexual Activity   • Alcohol use: Yes     Comment: rarely   • Drug use: No   • Sexual activity: Defer      Current Outpatient Medications on File Prior to Visit   Medication Sig Dispense Refill   • acyclovir (ZOVIRAX) 400 MG tablet Take 1 tablet by mouth 2 (Two) Times a Day. 60 tablet 11   • albuterol sulfate  (90 Base) MCG/ACT inhaler Ventolin HFA 90 mcg/actuation aerosol inhaler   1-2 puffs q 4-6^ prn     • ALPRAZolam (Xanax) 0.5 MG tablet Take one tablet 30 minutes prior to MRI 1 tablet 0   • azelastine (ASTEPRO) 0.15 % solution nasal spray inhale 1-2 sprays by nasal route 2 times a day as needed  3   • B Complex-C-E-Zn (Z-BEC PO) Take 1 tablet by mouth daily.     • Dalfampridine ER 10 MG tablet sustained-release 12 hour Take 10 mg by mouth 2 (Two) Times a Day. 60 tablet 11   • FLUoxetine (PROzac) 20 MG capsule Take 20 mg by mouth Daily.     • furosemide (LASIX) 40 MG tablet Take 1 tablet(s) every day     • gabapentin (NEURONTIN) 100 MG capsule Take 3 capsules by mouth 3 (Three) Times a Day. 90 capsule 5   • hydrochlorothiazide (MICROZIDE) 12.5 MG capsule Take 12.5 mg by mouth Daily.     • HYDROcodone-acetaminophen (NORCO) 5-325 MG per tablet TAKE ONE TABLET BY MOUTH EVERY FOUR HOURS AS NEEDED     • irbesartan (AVAPRO) 150 MG tablet Take 150 mg by mouth Daily.     • meloxicam (MOBIC) 7.5 MG tablet 1  Oral Daily with food. 90 tablet 2   • metoprolol succinate XL (TOPROL-XL) 100 MG 24 hr tablet metoprolol succinate  mg tablet,extended release 24 hr     • Multiple Vitamin (MULTI-VITAMIN DAILY) tablet Take 1 tablet by mouth daily.     • olmesartan (BENICAR) 40 MG tablet Take 1 tablet(s) every day for 90 days.     • omeprazole (priLOSEC) 20 MG capsule TAKE ONE CAPSULE IN THE MORNING.  11   • OnabotulinumtoxinA 200 units reconstituted solution FOR . PHYSICIAN TO INJECT 155 UNITS INTRAMUSCULARLY INTO HEAD, NECK AND SHOULDERS EVERY 12 WEEKS Per FDA PROTOCOL 1 each 3   • oxybutynin XL (DITROPAN-XL) 10 MG 24 hr tablet Take 10 mg by mouth Daily.     • potassium chloride (MICRO-K) 10 MEQ CR capsule Take 10 mEq by mouth daily.     • pravastatin (PRAVACHOL) 80 MG tablet Take 1 tablet(s) every day at bedtime.     • Semaglutide (Rybelsus) 3 MG tablet Rybelsus 3 mg tablet   Take 1 tablet every day by oral route.     • valACYclovir (Valtrex) 500 MG tablet Take 1 tablet by mouth 2 (Two) Times a Day. 60 tablet 11   • vitamin D (ERGOCALCIFEROL) 12909 UNITS capsule capsule Take 50,000 Units by mouth Every 30 (Thirty) Days. 15TH     • gabapentin (NEURONTIN) 300 MG capsule Take 2 capsules by mouth Every Night. 60 capsule 5   • [DISCONTINUED] FLUoxetine (PROzac) 10 MG capsule Take 10 mg by mouth Daily.       Current Facility-Administered Medications on File Prior to Visit   Medication Dose Route Frequency Provider Last Rate Last Admin   • OnabotulinumtoxinA 200 Units  200 Units Intramuscular Q3 Months Adonay Gilbert MD   200 Units at 04/11/22 0931      Allergies   Allergen Reactions   • Isoniazid Other (See Comments)     aplastic anemia   • Sulfa Antibiotics Anaphylaxis   • Tetanus Toxoid Swelling        Review of Systems   Constitutional: Negative for activity change, appetite change, chills, diaphoresis, fatigue, fever and unexpected weight change.   HENT: Negative for congestion, dental problem,  "drooling, ear discharge, ear pain, facial swelling, hearing loss, mouth sores, nosebleeds, postnasal drip, rhinorrhea, sinus pressure, sneezing, sore throat, tinnitus, trouble swallowing and voice change.    Eyes: Negative for photophobia, pain, discharge, redness, itching and visual disturbance.   Respiratory: Negative for apnea, cough, choking, chest tightness, shortness of breath, wheezing and stridor.    Cardiovascular: Negative for chest pain, palpitations and leg swelling.   Gastrointestinal: Negative for abdominal distention, abdominal pain, anal bleeding, blood in stool, constipation, diarrhea, nausea, rectal pain and vomiting.   Endocrine: Negative for cold intolerance, heat intolerance, polydipsia, polyphagia and polyuria.   Genitourinary: Negative for decreased urine volume, difficulty urinating, dysuria, enuresis, flank pain, frequency, genital sores, hematuria and urgency.   Musculoskeletal: Positive for arthralgias. Negative for back pain, gait problem, joint swelling, myalgias, neck pain and neck stiffness.   Skin: Negative for color change, pallor, rash and wound.   Allergic/Immunologic: Negative for environmental allergies, food allergies and immunocompromised state.   Neurological: Negative for dizziness, tremors, seizures, syncope, facial asymmetry, speech difficulty, weakness, light-headedness, numbness and headaches.   Hematological: Negative for adenopathy. Does not bruise/bleed easily.   Psychiatric/Behavioral: Negative for agitation, behavioral problems, confusion, decreased concentration, dysphoric mood, hallucinations, self-injury, sleep disturbance and suicidal ideas. The patient is not nervous/anxious and is not hyperactive.         Objective      Physical Exam  /98   Ht 182.9 cm (72.01\")   Wt (!) 161 kg (354 lb)   LMP  (LMP Unknown)   BMI 48.00 kg/m²     Body mass index is 48 kg/m².    General:   Mental Status:  Alert   Appearance: Cooperative, in no acute distress   Build and " Nutrition: Obese by BMI female   Orientation: Alert and oriented to person, place and time   Posture: Normal    Integument:   Right knee: No skin lesions, no rash, no ecchymosis    Lower Extremities:   Right Knee:    Tenderness:  None    Effusion:  Difficult to assess secondary to body habitus    Swelling: None    Crepitus:  Positive    Range of motion:  Extension: 0°       Flexion: 120°  Instability:  No varus laxity, no valgus laxity, negative anterior drawer  Deformities:  None      Imaging/Studies  Imaging Results (Last 24 Hours)     Procedure Component Value Units Date/Time    XR Knee 4+ View Right [[id number]] Resulted: 05/23/22 1342     Updated: 05/23/22 1343    Narrative:      Right Knee Radiographs  Indication: right knee pain  Views: Standing AP's and skiers of both knees, with lateral and sunrise   views of the right knee    Comparison: 3/15/2021    Findings:   Bone-on-bone contact medial compartment, tricompartmental osteophytes, no   acute bony abnormalities.  No unusual bony features.  Mild worsening   compared to previous imaging.            Assessment and Plan     Diagnoses and all orders for this visit:    1. Primary osteoarthritis of right knee (Primary)  -     XR Knee 4+ View Right  -     Large Joint Arthrocentesis: R knee    2. Obesity, morbid, BMI 40.0-49.9 (HCC)        1. Primary osteoarthritis of right knee    2. Obesity, morbid, BMI 40.0-49.9 (HCC)        I reviewed my findings with the patient.  She would like to have an injection for her right knee, this was provided.  I will see her back in 3 months, but sooner for any problems.  Discussed weight loss management again today.  Body mass index is 49 today.    Procedure Note:  The potential benefits of performing a therapeutic right knee joint injection, as well as potential risks (including, but not limited to infection, swelling, pain, bleeding, bruising, nerve/blood vessel damage, skin color changes, transient elevation in blood glucose  levels, and fat atrophy) were discussed with the patient.  After informed consent, timeout procedure was performed, and the skin on the right knee was prepped with chlorhexidine soap and alcohol, after which ethyl chloride was applied to the skin at the injection site. Via the anterolateral approach, 1ml of Kenalog 40mg/ml mixed with 4ml 0.5% ropivacaine plain was injected into the knee joint.  The patient tolerated the procedure well, experiencing 20% improvement a few minutes following the injection. There were no complications.  Band-Aid was applied to the injection site. Post-procedural instructions were given to the patient and/or their caregiver.      Return in about 3 months (around 8/23/2022).      Lalo Choe MD  05/23/22  14:25 EDT

## 2022-05-23 NOTE — PROGRESS NOTES
Procedure   Large Joint Arthrocentesis: R knee  Date/Time: 5/23/2022 1:59 PM  Consent given by: patient  Site marked: site marked  Timeout: Immediately prior to procedure a time out was called to verify the correct patient, procedure, equipment, support staff and site/side marked as required   Supporting Documentation  Indications: pain   Procedure Details  Location: knee - R knee  Preparation: Patient was prepped and draped in the usual sterile fashion  Needle size: 22 G  Approach: anterolateral  Medications administered: 4 mL ropivacaine 0.5 %; 40 mg triamcinolone acetonide 40 MG/ML  Patient tolerance: patient tolerated the procedure well with no immediate complications

## 2022-06-01 ENCOUNTER — DOCUMENTATION (OUTPATIENT)
Dept: ONCOLOGY | Facility: HOSPITAL | Age: 67
End: 2022-06-01

## 2022-07-07 ENCOUNTER — PROCEDURE VISIT (OUTPATIENT)
Dept: NEUROLOGY | Facility: CLINIC | Age: 67
End: 2022-07-07

## 2022-07-07 ENCOUNTER — LAB (OUTPATIENT)
Dept: LAB | Facility: HOSPITAL | Age: 67
End: 2022-07-07

## 2022-07-07 DIAGNOSIS — G35 MULTIPLE SCLEROSIS: ICD-10-CM

## 2022-07-07 DIAGNOSIS — H53.9 VISION CHANGES: ICD-10-CM

## 2022-07-07 DIAGNOSIS — G35 MULTIPLE SCLEROSIS: Primary | ICD-10-CM

## 2022-07-07 DIAGNOSIS — G43.709 CHRONIC MIGRAINE WITHOUT AURA WITHOUT STATUS MIGRAINOSUS, NOT INTRACTABLE: Primary | Chronic | ICD-10-CM

## 2022-07-07 LAB
ALBUMIN SERPL-MCNC: 4.2 G/DL (ref 3.5–5.2)
ALBUMIN/GLOB SERPL: 1.4 G/DL
ALP SERPL-CCNC: 104 U/L (ref 39–117)
ALT SERPL W P-5'-P-CCNC: 15 U/L (ref 1–33)
ANION GAP SERPL CALCULATED.3IONS-SCNC: 8.3 MMOL/L (ref 5–15)
AST SERPL-CCNC: 21 U/L (ref 1–32)
BACTERIA UR QL AUTO: ABNORMAL /HPF
BASOPHILS # BLD AUTO: 0.02 10*3/MM3 (ref 0–0.2)
BASOPHILS NFR BLD AUTO: 0.4 % (ref 0–1.5)
BILIRUB SERPL-MCNC: 0.4 MG/DL (ref 0–1.2)
BILIRUB UR QL STRIP: NEGATIVE
BUN SERPL-MCNC: 21 MG/DL (ref 8–23)
BUN/CREAT SERPL: 25.9 (ref 7–25)
CALCIUM SPEC-SCNC: 9.7 MG/DL (ref 8.6–10.5)
CHLORIDE SERPL-SCNC: 107 MMOL/L (ref 98–107)
CLARITY UR: ABNORMAL
CO2 SERPL-SCNC: 25.7 MMOL/L (ref 22–29)
COD CRY URNS QL: ABNORMAL /HPF
COLOR UR: YELLOW
CREAT SERPL-MCNC: 0.81 MG/DL (ref 0.57–1)
DEPRECATED RDW RBC AUTO: 41.3 FL (ref 37–54)
EGFRCR SERPLBLD CKD-EPI 2021: 81.2 ML/MIN/1.73
EOSINOPHIL # BLD AUTO: 0.19 10*3/MM3 (ref 0–0.4)
EOSINOPHIL NFR BLD AUTO: 3.9 % (ref 0.3–6.2)
ERYTHROCYTE [DISTWIDTH] IN BLOOD BY AUTOMATED COUNT: 13.1 % (ref 12.3–15.4)
GLOBULIN UR ELPH-MCNC: 3 GM/DL
GLUCOSE SERPL-MCNC: 81 MG/DL (ref 65–99)
GLUCOSE UR STRIP-MCNC: NEGATIVE MG/DL
HCT VFR BLD AUTO: 43.4 % (ref 34–46.6)
HGB BLD-MCNC: 14.7 G/DL (ref 12–15.9)
HGB UR QL STRIP.AUTO: NEGATIVE
HYALINE CASTS UR QL AUTO: ABNORMAL /LPF
IMM GRANULOCYTES # BLD AUTO: 0.02 10*3/MM3 (ref 0–0.05)
IMM GRANULOCYTES NFR BLD AUTO: 0.4 % (ref 0–0.5)
KETONES UR QL STRIP: NEGATIVE
LEUKOCYTE ESTERASE UR QL STRIP.AUTO: ABNORMAL
LYMPHOCYTES # BLD AUTO: 0.71 10*3/MM3 (ref 0.7–3.1)
LYMPHOCYTES NFR BLD AUTO: 14.5 % (ref 19.6–45.3)
MCH RBC QN AUTO: 29.6 PG (ref 26.6–33)
MCHC RBC AUTO-ENTMCNC: 33.9 G/DL (ref 31.5–35.7)
MCV RBC AUTO: 87.5 FL (ref 79–97)
MONOCYTES # BLD AUTO: 0.49 10*3/MM3 (ref 0.1–0.9)
MONOCYTES NFR BLD AUTO: 10 % (ref 5–12)
NEUTROPHILS NFR BLD AUTO: 3.47 10*3/MM3 (ref 1.7–7)
NEUTROPHILS NFR BLD AUTO: 70.8 % (ref 42.7–76)
NITRITE UR QL STRIP: NEGATIVE
NRBC BLD AUTO-RTO: 0 /100 WBC (ref 0–0.2)
PH UR STRIP.AUTO: 5.5 [PH] (ref 5–8)
PLATELET # BLD AUTO: 194 10*3/MM3 (ref 140–450)
PMV BLD AUTO: 11.3 FL (ref 6–12)
POTASSIUM SERPL-SCNC: 5.1 MMOL/L (ref 3.5–5.2)
PROT SERPL-MCNC: 7.2 G/DL (ref 6–8.5)
PROT UR QL STRIP: NEGATIVE
RBC # BLD AUTO: 4.96 10*6/MM3 (ref 3.77–5.28)
RBC # UR STRIP: ABNORMAL /HPF
REF LAB TEST METHOD: ABNORMAL
SODIUM SERPL-SCNC: 141 MMOL/L (ref 136–145)
SP GR UR STRIP: 1.02 (ref 1–1.03)
SQUAMOUS #/AREA URNS HPF: ABNORMAL /HPF
TSH SERPL DL<=0.05 MIU/L-ACNC: 3.62 UIU/ML (ref 0.27–4.2)
UROBILINOGEN UR QL STRIP: ABNORMAL
WBC # UR STRIP: ABNORMAL /HPF
WBC NRBC COR # BLD: 4.9 10*3/MM3 (ref 3.4–10.8)

## 2022-07-07 PROCEDURE — 36415 COLL VENOUS BLD VENIPUNCTURE: CPT

## 2022-07-07 PROCEDURE — 80053 COMPREHEN METABOLIC PANEL: CPT

## 2022-07-07 PROCEDURE — 81001 URINALYSIS AUTO W/SCOPE: CPT

## 2022-07-07 PROCEDURE — 85025 COMPLETE CBC W/AUTO DIFF WBC: CPT

## 2022-07-07 PROCEDURE — 64615 CHEMODENERV MUSC MIGRAINE: CPT | Performed by: PSYCHIATRY & NEUROLOGY

## 2022-07-07 PROCEDURE — 84443 ASSAY THYROID STIM HORMONE: CPT

## 2022-07-07 NOTE — PROGRESS NOTES
Botulinum Toxin Injection Procedure    Chief Complaint   Patient presents with   • Botulinum Toxin Injection     Office supplied, 200 units         History:  Response to treatment has reduced HA's at least 7 fewer days a month and/or 100 hours fewer each month.     Pre-operative diagnosis: headache    Post-operative diagnosis: Same    Procedure: Chemical neurolysis    After risks and benefits were explained including bleeding, infection, worsening of pain, damage to the areas being injected, weakness of muscles, loss of muscle control, dysphagia if injecting the head or neck, facial droop if injecting the facial area, painful injection, allergic or other reaction to the medications being injected, and the failure of the procedure to help the problem, a signed consent was obtained.      The patient was placed in a comfortable area and the sites to be treated were identified. A time out was called and performed. The area to be treated was prepped three times with alcohol and the alcohol allowed to dry. Next, a 27 gauge needle was used to inject the medication in the area to be treated.    Area(s) injected: frontalis muscle, semispinalis capitis muscle and temporallis muscle    Medications used: botulinum toxin 200 mu, 2 mL of saline used to dilute the botulinum toxin.      The patient did tolerate the procedure well. There were no complications.       Physical Examination    Current Treatment (Units)   Splenius Capitis 25 units on the right and 25 units on the left.   Temporalis 25 units on the right and 25 units on the left.   Frontalis 27 units on the right and 28 units on the left.   EMG Guidance none .   Complications: none .   The total amount injected in units is 155 .   The total amount wasted in units is 45 .   The total amount submitted in units is 200 .

## 2022-08-16 ENCOUNTER — LAB (OUTPATIENT)
Dept: LAB | Facility: HOSPITAL | Age: 67
End: 2022-08-16

## 2022-08-16 ENCOUNTER — OFFICE VISIT (OUTPATIENT)
Dept: NEUROLOGY | Facility: CLINIC | Age: 67
End: 2022-08-16

## 2022-08-16 VITALS
BODY MASS INDEX: 39.68 KG/M2 | DIASTOLIC BLOOD PRESSURE: 84 MMHG | HEART RATE: 76 BPM | SYSTOLIC BLOOD PRESSURE: 126 MMHG | OXYGEN SATURATION: 98 % | HEIGHT: 72 IN | WEIGHT: 293 LBS

## 2022-08-16 DIAGNOSIS — G35 MULTIPLE SCLEROSIS: ICD-10-CM

## 2022-08-16 DIAGNOSIS — F33.1 MODERATE EPISODE OF RECURRENT MAJOR DEPRESSIVE DISORDER: Chronic | ICD-10-CM

## 2022-08-16 DIAGNOSIS — R93.89 ABNORMAL FINDINGS ON DIAGNOSTIC IMAGING OF OTHER SPECIFIED BODY STRUCTURES: ICD-10-CM

## 2022-08-16 DIAGNOSIS — G25.81 RESTLESS LEGS SYNDROME: Chronic | ICD-10-CM

## 2022-08-16 DIAGNOSIS — G35 MULTIPLE SCLEROSIS: Primary | Chronic | ICD-10-CM

## 2022-08-16 DIAGNOSIS — G43.709 CHRONIC MIGRAINE WITHOUT AURA WITHOUT STATUS MIGRAINOSUS, NOT INTRACTABLE: Chronic | ICD-10-CM

## 2022-08-16 LAB
ALBUMIN SERPL-MCNC: 4.1 G/DL (ref 3.5–5.2)
ALBUMIN/GLOB SERPL: 1.3 G/DL
ALP SERPL-CCNC: 98 U/L (ref 39–117)
ALT SERPL W P-5'-P-CCNC: 16 U/L (ref 1–33)
ANION GAP SERPL CALCULATED.3IONS-SCNC: 8.2 MMOL/L (ref 5–15)
AST SERPL-CCNC: 18 U/L (ref 1–32)
BACTERIA UR QL AUTO: ABNORMAL /HPF
BASOPHILS # BLD AUTO: 0.05 10*3/MM3 (ref 0–0.2)
BASOPHILS NFR BLD AUTO: 0.8 % (ref 0–1.5)
BILIRUB SERPL-MCNC: 0.5 MG/DL (ref 0–1.2)
BILIRUB UR QL STRIP: NEGATIVE
BUN SERPL-MCNC: 19 MG/DL (ref 8–23)
BUN/CREAT SERPL: 22.4 (ref 7–25)
CALCIUM SPEC-SCNC: 10.4 MG/DL (ref 8.6–10.5)
CHLORIDE SERPL-SCNC: 105 MMOL/L (ref 98–107)
CLARITY UR: ABNORMAL
CO2 SERPL-SCNC: 26.8 MMOL/L (ref 22–29)
COD CRY URNS QL: ABNORMAL /HPF
COLOR UR: ABNORMAL
CREAT SERPL-MCNC: 0.85 MG/DL (ref 0.57–1)
DEPRECATED RDW RBC AUTO: 41.9 FL (ref 37–54)
EGFRCR SERPLBLD CKD-EPI 2021: 76.6 ML/MIN/1.73
EOSINOPHIL # BLD AUTO: 0.22 10*3/MM3 (ref 0–0.4)
EOSINOPHIL NFR BLD AUTO: 3.5 % (ref 0.3–6.2)
ERYTHROCYTE [DISTWIDTH] IN BLOOD BY AUTOMATED COUNT: 13.1 % (ref 12.3–15.4)
GLOBULIN UR ELPH-MCNC: 3.1 GM/DL
GLUCOSE SERPL-MCNC: 84 MG/DL (ref 65–99)
GLUCOSE UR STRIP-MCNC: NEGATIVE MG/DL
HCT VFR BLD AUTO: 43.9 % (ref 34–46.6)
HGB BLD-MCNC: 14.6 G/DL (ref 12–15.9)
HGB UR QL STRIP.AUTO: NEGATIVE
HYALINE CASTS UR QL AUTO: ABNORMAL /LPF
IMM GRANULOCYTES # BLD AUTO: 0.02 10*3/MM3 (ref 0–0.05)
IMM GRANULOCYTES NFR BLD AUTO: 0.3 % (ref 0–0.5)
KETONES UR QL STRIP: ABNORMAL
LEUKOCYTE ESTERASE UR QL STRIP.AUTO: ABNORMAL
LYMPHOCYTES # BLD AUTO: 1.07 10*3/MM3 (ref 0.7–3.1)
LYMPHOCYTES NFR BLD AUTO: 17.1 % (ref 19.6–45.3)
MCH RBC QN AUTO: 29.4 PG (ref 26.6–33)
MCHC RBC AUTO-ENTMCNC: 33.3 G/DL (ref 31.5–35.7)
MCV RBC AUTO: 88.3 FL (ref 79–97)
MONOCYTES # BLD AUTO: 0.62 10*3/MM3 (ref 0.1–0.9)
MONOCYTES NFR BLD AUTO: 9.9 % (ref 5–12)
NEUTROPHILS NFR BLD AUTO: 4.26 10*3/MM3 (ref 1.7–7)
NEUTROPHILS NFR BLD AUTO: 68.4 % (ref 42.7–76)
NITRITE UR QL STRIP: NEGATIVE
NRBC BLD AUTO-RTO: 0 /100 WBC (ref 0–0.2)
PH UR STRIP.AUTO: 5.5 [PH] (ref 5–8)
PLATELET # BLD AUTO: 238 10*3/MM3 (ref 140–450)
PMV BLD AUTO: 11.3 FL (ref 6–12)
POTASSIUM SERPL-SCNC: 4.6 MMOL/L (ref 3.5–5.2)
PROT SERPL-MCNC: 7.2 G/DL (ref 6–8.5)
PROT UR QL STRIP: ABNORMAL
RBC # BLD AUTO: 4.97 10*6/MM3 (ref 3.77–5.28)
RBC # UR STRIP: ABNORMAL /HPF
REF LAB TEST METHOD: ABNORMAL
SODIUM SERPL-SCNC: 140 MMOL/L (ref 136–145)
SP GR UR STRIP: 1.03 (ref 1–1.03)
SQUAMOUS #/AREA URNS HPF: ABNORMAL /HPF
TSH SERPL DL<=0.05 MIU/L-ACNC: 2.73 UIU/ML (ref 0.27–4.2)
UROBILINOGEN UR QL STRIP: ABNORMAL
WBC # UR STRIP: ABNORMAL /HPF
WBC NRBC COR # BLD: 6.24 10*3/MM3 (ref 3.4–10.8)

## 2022-08-16 PROCEDURE — 84443 ASSAY THYROID STIM HORMONE: CPT

## 2022-08-16 PROCEDURE — 85025 COMPLETE CBC W/AUTO DIFF WBC: CPT

## 2022-08-16 PROCEDURE — 81001 URINALYSIS AUTO W/SCOPE: CPT

## 2022-08-16 PROCEDURE — 99214 OFFICE O/P EST MOD 30 MIN: CPT | Performed by: PSYCHIATRY & NEUROLOGY

## 2022-08-16 PROCEDURE — 80053 COMPREHEN METABOLIC PANEL: CPT

## 2022-08-16 PROCEDURE — 36415 COLL VENOUS BLD VENIPUNCTURE: CPT

## 2022-08-16 RX ORDER — ORAL SEMAGLUTIDE 7 MG/1
TABLET ORAL
COMMUNITY
Start: 2022-08-13 | End: 2022-11-11 | Stop reason: HOSPADM

## 2022-08-16 RX ORDER — HYDRALAZINE HYDROCHLORIDE 10 MG/1
TABLET, FILM COATED ORAL
COMMUNITY
Start: 2022-08-11

## 2022-08-16 NOTE — ASSESSMENT & PLAN NOTE
Patient's depression is recurrent and is moderate without psychosis. Their depression is currently in partial remission and the condition is improving with treatment. This will be reassessed at the next regular appointment. F/U as described:patient will continue current medication therapy.

## 2022-08-16 NOTE — PROGRESS NOTES
Chief Complaint  Migraine    Subjective        Rashad Carrillo presents to [facility] NEUROLOGY     History of Present Illness    66 y.o. female returns in follow up.  Last visit on 7/7/22 performed BTX, continued monthly labs, renewed GBP, Cymbalta, Acyclovir and Ampyra.      MRI Brain 3/11/22 as compared to 8/5/20, no change in T2 lesion load, moderate to severe T2 lesion burden and moderate atrophy.     JCV ab - 6/11/18 - 0.45       7/7/22: Plt 194; TSH 3.62; Cr 0.81; U/A nl      RRMS     S/P [place] 2/2, 10/30/18 - 11/2/18, 11/4/19-11/6/19      MS[facility] indicates need for PT/OT/SLP    Multiple falls.         Gait unsteady R knee OA, using a walking stick.  Dr Choe managing. .     Ampyra improves walking speed. .        Fatigue is moderate.       Continued pain and stiffness in R LE and limiting walking. .       Problem history:     25 ft timed walk increaesed 15.99 from 9.94.  Left hand 9 hole peg worsened.   Increasing swallowing difficulty.        Immediate and working memory are declining with SDMT 27/110..        Referred to ID and dx with latent TB.  Recommended isoniazid and pyridoxine for 9 months.  Subsequently developed aplastic anemia secondary to INH and followed by Dr Valentin.  Received multiple transfusions.  3/29/18 able to resume Tysabri and has had 3 infusions  Treated with rifampin.       MDD     Mood is stable on Cymbalta 60 mg PO daily      RLS     No new or worsening sx.      Controlled on GBP.   Baclofen helping with spasticity in LE.           Migraines     Headache frequency one - two per week.  Intensity is mild.     BTX improving sx       Problem History:     HA frequency is daily.  Moderate to severe intensity.   Location moves around head.  Quality Last for up to a day.  Tx with OTC meds.      Greater than 15 HA a month, moderate to severe intensity, lasting over 6 hours.       Preventatives:  TPM, GBP, Cymbalta,        Objective   Vital Signs:  /84   Pulse 76   Ht  "182.9 cm (72.01\")   Wt (!) 155 kg (341 lb)   SpO2 98%   BMI 46.24 kg/m²   Estimated body mass index is 46.24 kg/m² as calculated from the following:    Height as of this encounter: 182.9 cm (72.01\").    Weight as of this encounter: 155 kg (341 lb).          Physical Exam  Eyes:      Extraocular Movements: EOM normal.      Pupils: Pupils are equal, round, and reactive to light.   Neurological:      Mental Status: She is oriented to person, place, and time.   Psychiatric:         Speech: Speech normal.          Neurologic Exam     Mental Status   Oriented to person, place, and time.   Follows 3 step commands.   Attention: normal. Concentration: normal.   Speech: speech is normal   Level of consciousness: alert  Knowledge: good and consistent with education.   Able to name object. Able to read. Able to repeat. Able to write. Normal comprehension.   Mixes up words intermittently      Cranial Nerves     CN III, IV, VI   Pupils are equal, round, and reactive to light.  Extraocular motions are normal.   Right pupil: Shape: regular. Reactivity: brisk. Consensual response: intact.   Left pupil: Shape: regular. Reactivity: brisk. Consensual response: intact.     CN VII   Right facial weakness: none  Left facial weakness: none    CN VIII   Hearing: intact    Motor Exam   Muscle bulk: normal  Overall muscle tone: normal    Gait, Coordination, and Reflexes     Gait  Gait: (antalgic )    Tremor   Resting tremor: absent  Intention tremor: absent  Action tremor: absent     Result Review :  The following data was reviewed by: Adonay Gilbert MD on 08/16/2022:  Common labs    Common Labsle 4/7/22 4/7/22 4/11/22 4/11/22 7/7/22 7/7/22    1051 1051 1326 1326 0850 0850   Glucose    79  81   BUN    21  21   Creatinine    0.75  0.81   Sodium    143  141   Potassium    5.2  5.1   Chloride    106  107   Calcium    9.8  9.7   Albumin    4.30  4.20   Total Bilirubin    0.5  0.4   Alkaline Phosphatase    116  104   AST (SGOT)    21  21 "   ALT (SGPT)    12  15   WBC   6.31  4.90    Hemoglobin   14.4  14.7    Hematocrit   43.4  43.4    Platelets   234  194    Total Cholesterol  226 (A)       Triglycerides  101       HDL Cholesterol  60       LDL Cholesterol   148 (A)       Hemoglobin A1C 5.1        (A) Abnormal value       Comments are available for some flowsheets but are not being displayed.                     Assessment and Plan   Diagnoses and all orders for this visit:    1. Multiple sclerosis (HCC) (Primary)  Assessment & Plan:  Continue monthly labs.     Ampyra,     Orders:  -     Ambulatory Referral to Physical Therapy Evaluate and treat  -     Ambulatory referral to Occupational Therapy  -     Ambulatory referral to Speech Therapy  -     CBC & Differential; Future  -     Comprehensive Metabolic Panel; Future  -     TSH; Future  -     Urinalysis With Microscopic - Urine, Clean Catch; Future    2. Restless legs syndrome    3. Chronic migraine without aura without status migrainosus, not intractable  Assessment & Plan:  Headaches are improving with treatment.  Continue current treatment regimen.          4. Moderate episode of recurrent major depressive disorder (HCC)  Assessment & Plan:  Patient's depression is recurrent and is moderate without psychosis. Their depression is currently in partial remission and the condition is improving with treatment. This will be reassessed at the next regular appointment. F/U as described:patient will continue current medication therapy.      5. Abnormal findings on diagnostic imaging of other specified body structures   -     TSH; Future           Follow Up   No follow-ups on file.  Patient was given instructions and counseling regarding her condition or for health maintenance advice. Please see specific information pulled into the AVS if appropriate.

## 2022-09-13 ENCOUNTER — TRANSCRIBE ORDERS (OUTPATIENT)
Dept: ADMINISTRATIVE | Facility: HOSPITAL | Age: 67
End: 2022-09-13

## 2022-09-13 DIAGNOSIS — Z12.31 VISIT FOR SCREENING MAMMOGRAM: Primary | ICD-10-CM

## 2022-09-26 DIAGNOSIS — R06.00 DYSPNEA, UNSPECIFIED TYPE: ICD-10-CM

## 2022-09-26 DIAGNOSIS — R53.83 FATIGUE, UNSPECIFIED TYPE: Primary | ICD-10-CM

## 2022-09-27 ENCOUNTER — LAB (OUTPATIENT)
Dept: LAB | Facility: HOSPITAL | Age: 67
End: 2022-09-27

## 2022-09-27 ENCOUNTER — HOSPITAL ENCOUNTER (OUTPATIENT)
Dept: GENERAL RADIOLOGY | Facility: HOSPITAL | Age: 67
Discharge: HOME | End: 2022-09-27

## 2022-09-27 DIAGNOSIS — R53.83 FATIGUE, UNSPECIFIED TYPE: ICD-10-CM

## 2022-09-27 DIAGNOSIS — R06.00 DYSPNEA, UNSPECIFIED TYPE: ICD-10-CM

## 2022-09-27 LAB
DEPRECATED RDW RBC AUTO: 40 FL (ref 37–54)
ERYTHROCYTE [DISTWIDTH] IN BLOOD BY AUTOMATED COUNT: 12.3 % (ref 12.3–15.4)
HCT VFR BLD AUTO: 45.2 % (ref 34–46.6)
HGB BLD-MCNC: 15 G/DL (ref 12–15.9)
MCH RBC QN AUTO: 29.6 PG (ref 26.6–33)
MCHC RBC AUTO-ENTMCNC: 33.2 G/DL (ref 31.5–35.7)
MCV RBC AUTO: 89.2 FL (ref 79–97)
PLATELET # BLD AUTO: 227 10*3/MM3 (ref 140–450)
PMV BLD AUTO: 10.9 FL (ref 6–12)
RBC # BLD AUTO: 5.07 10*6/MM3 (ref 3.77–5.28)
WBC NRBC COR # BLD: 6.24 10*3/MM3 (ref 3.4–10.8)

## 2022-09-27 PROCEDURE — 85027 COMPLETE CBC AUTOMATED: CPT

## 2022-09-27 PROCEDURE — 36415 COLL VENOUS BLD VENIPUNCTURE: CPT

## 2022-09-27 PROCEDURE — 80053 COMPREHEN METABOLIC PANEL: CPT

## 2022-09-27 PROCEDURE — 71046 X-RAY EXAM CHEST 2 VIEWS: CPT

## 2022-09-28 ENCOUNTER — OFFICE VISIT (OUTPATIENT)
Dept: ORTHOPEDIC SURGERY | Facility: CLINIC | Age: 67
End: 2022-09-28

## 2022-09-28 VITALS
BODY MASS INDEX: 39.68 KG/M2 | SYSTOLIC BLOOD PRESSURE: 122 MMHG | DIASTOLIC BLOOD PRESSURE: 81 MMHG | WEIGHT: 293 LBS | HEIGHT: 72 IN

## 2022-09-28 DIAGNOSIS — M17.11 PRIMARY OSTEOARTHRITIS OF RIGHT KNEE: Primary | ICD-10-CM

## 2022-09-28 LAB
ALBUMIN SERPL-MCNC: 4.3 G/DL (ref 3.5–5.2)
ALBUMIN/GLOB SERPL: 1.4 G/DL
ALP SERPL-CCNC: 113 U/L (ref 39–117)
ALT SERPL W P-5'-P-CCNC: 12 U/L (ref 1–33)
ANION GAP SERPL CALCULATED.3IONS-SCNC: 10.7 MMOL/L (ref 5–15)
AST SERPL-CCNC: 20 U/L (ref 1–32)
BILIRUB SERPL-MCNC: 0.6 MG/DL (ref 0–1.2)
BUN SERPL-MCNC: 20 MG/DL (ref 8–23)
BUN/CREAT SERPL: 25.6 (ref 7–25)
CALCIUM SPEC-SCNC: 9.9 MG/DL (ref 8.6–10.5)
CHLORIDE SERPL-SCNC: 100 MMOL/L (ref 98–107)
CO2 SERPL-SCNC: 29.3 MMOL/L (ref 22–29)
CREAT SERPL-MCNC: 0.78 MG/DL (ref 0.57–1)
EGFRCR SERPLBLD CKD-EPI 2021: 84.9 ML/MIN/1.73
GLOBULIN UR ELPH-MCNC: 3 GM/DL
GLUCOSE SERPL-MCNC: 77 MG/DL (ref 65–99)
POTASSIUM SERPL-SCNC: 4.1 MMOL/L (ref 3.5–5.2)
PROT SERPL-MCNC: 7.3 G/DL (ref 6–8.5)
SODIUM SERPL-SCNC: 140 MMOL/L (ref 136–145)

## 2022-09-28 PROCEDURE — 20610 DRAIN/INJ JOINT/BURSA W/O US: CPT | Performed by: ORTHOPAEDIC SURGERY

## 2022-09-28 RX ORDER — VIBEGRON 75 MG/1
TABLET, FILM COATED ORAL
COMMUNITY

## 2022-09-28 RX ORDER — TRIAMCINOLONE ACETONIDE 40 MG/ML
40 INJECTION, SUSPENSION INTRA-ARTICULAR; INTRAMUSCULAR
Status: COMPLETED | OUTPATIENT
Start: 2022-09-28 | End: 2022-09-28

## 2022-09-28 RX ORDER — ROPIVACAINE HYDROCHLORIDE 5 MG/ML
4 INJECTION, SOLUTION EPIDURAL; INFILTRATION; PERINEURAL
Status: COMPLETED | OUTPATIENT
Start: 2022-09-28 | End: 2022-09-28

## 2022-09-28 RX ADMIN — ROPIVACAINE HYDROCHLORIDE 4 ML: 5 INJECTION, SOLUTION EPIDURAL; INFILTRATION; PERINEURAL at 15:15

## 2022-09-28 RX ADMIN — TRIAMCINOLONE ACETONIDE 40 MG: 40 INJECTION, SUSPENSION INTRA-ARTICULAR; INTRAMUSCULAR at 15:15

## 2022-09-28 NOTE — PROGRESS NOTES
[facility] Note    Subjective     Chief Complaint   Patient presents with   • Follow-up     Primary Osteoarthritis Right Knee, last cortisone 5/23/22        HPI    It has been 4  month(s) since Ms. Carrillo's last visit. She returns to clinic today for follow-up of right knee arthritis. The issue has been ongoing for many year(s). She rates her pain a 8/10 on the pain scale. Previous/current treatments: bracing, cane/walker, NSAIDS, physical therapy, weight loss, oral steroids, cortisone injection 5/23/22, Orthovisc injection 3/2/22. Current symptoms: pain, swelling, stiffness and giving way/buckling. The pain is worse with walking, standing, sitting, climbing stairs, sleeping, working, leisure, lying on affected side and any movement of the joint; pain medication and/or NSAID improve the pain. Overall, she is doing the same.  He would like an injection today.  She is still working on weight loss.  She also was evaluated for bariatric surgery earlier this week.    I have reviewed the following portions of the patient's history and agree with: History of Present Illness and Review of Systems    Patient Active Problem List   Diagnosis   • Vitamin D deficiency   • Depression   • Multiple sclerosis ([facility])   • Restless legs syndrome   • Muscle spasticity   • Chronic migraine without aura without status migrainosus, not intractable   • Essential hypertension   • Frequent falls   • Status post balloon dilatation of esophageal stricture   • T2DM (type 2 diabetes mellitus) ([facility])   • TB lung, latent   • Aplastic anemia likely due to isoniazide   • Chest pain   • MCGRAW (dyspnea on exertion)   • Hyperlipidemia LDL goal <70   • Class 3 severe obesity due to excess calories with serious comorbidity and body mass index (BMI) of 50.0 to 59.9 in adult ([facility])   • Fatigue   • Dyspepsia     Past Medical History:   Diagnosis Date   • Chronic knee pain     NSAIDS + steroid injections (last 3/2022)   • Dyspepsia    •  Dyspnea on exertion    • Fatigue    • Hypertension    • Morbid obesity ([facility])    • Multiple sclerosis ([facility])     follows w/ Dr. Gilbert      Past Surgical History:   Procedure Laterality Date   • LAPAROSCOPIC CHOLECYSTECTOMY  2012    gallstone pancreatitis      Family History   Problem Relation Age of Onset   • Cancer Mother    • Hypertension Mother    • Heart disease Mother    • Alzheimer's disease Other    • Seizures Other         Epilepsy and recurrent seizures   • Heart disease Other      Social History     Socioeconomic History   • Marital status:    Tobacco Use   • Smoking status: Former Smoker     Types: Cigarettes     Quit date:      Years since quittin.7   • Smokeless tobacco: Never Used   • Tobacco comment: social   Vaping Use   • Vaping Use: Never used   Substance and Sexual Activity   • Alcohol use: Yes     Comment: rarely   • Drug use: No   • Sexual activity: Defer      Current Outpatient Medications on File Prior to Visit   Medication Sig Dispense Refill   • acyclovir (ZOVIRAX) 400 MG tablet Take 1 tablet by mouth 2 (Two) Times a Day. 60 tablet 11   • albuterol sulfate  (90 Base) MCG/ACT inhaler Ventolin HFA 90 mcg/actuation aerosol inhaler   1-2 puffs q 4-6^ prn     • azelastine (ASTEPRO) 0.15 % solution nasal spray inhale 1-2 sprays by nasal route 2 times a day as needed  3   • B Complex-C-E-Zn (Z-BEC PO) Take 1 tablet by mouth daily.     • Dalfampridine ER 10 MG tablet sustained-release 12 hour Take 10 mg by mouth 2 (Two) Times a Day. 60 tablet 11   • FLUoxetine (PROzac) 20 MG capsule Take 20 mg by mouth Daily.     • furosemide (LASIX) 40 MG tablet Take 1 tablet(s) every day     • gabapentin (NEURONTIN) 100 MG capsule Take 3 capsules by mouth 3 (Three) Times a Day. (Patient taking differently: Take 100 mg by mouth 2 (Two) Times a Day.) 90 capsule 5   • hydrALAZINE (APRESOLINE) 10 MG tablet Take 1 tablet 3 times a day as directed.     • hydrochlorothiazide (MICROZIDE) 12.5 MG  capsule Take 12.5 mg by mouth Daily.     • HYDROcodone-acetaminophen (NORCO) 5-325 MG per tablet TAKE ONE TABLET BY MOUTH EVERY FOUR HOURS AS NEEDED     • irbesartan (AVAPRO) 150 MG tablet Take 150 mg by mouth Daily.     • meloxicam (MOBIC) 7.5 MG tablet 1 Oral Daily with food. 90 tablet 2   • metoprolol succinate XL (TOPROL-XL) 100 MG 24 hr tablet metoprolol succinate  mg tablet,extended release 24 hr     • Multiple Vitamin (MULTI-VITAMIN DAILY) tablet Take 1 tablet by mouth daily.     • olmesartan (BENICAR) 40 MG tablet Take 1 tablet(s) every day for 90 days.     • omeprazole (priLOSEC) 20 MG capsule TAKE ONE CAPSULE IN THE MORNING.  11   • OnabotulinumtoxinA 200 units reconstituted solution FOR . PHYSICIAN TO INJECT 155 UNITS INTRAMUSCULARLY INTO HEAD, NECK AND SHOULDERS EVERY 12 WEEKS Per FDA PROTOCOL 1 each 3   • oxybutynin XL (DITROPAN-XL) 10 MG 24 hr tablet Take 10 mg by mouth Daily.     • potassium chloride (MICRO-K) 10 MEQ CR capsule Take 10 mEq by mouth daily.     • pravastatin (PRAVACHOL) 80 MG tablet Take 1 tablet(s) every day at bedtime.     • Rybelsus 7 MG tablet      • valACYclovir (Valtrex) 500 MG tablet Take 1 tablet by mouth 2 (Two) Times a Day. 60 tablet 11   • Vibegron (Gemtesa) 75 MG tablet Gemtesa 75 mg tablet   Take 1 tablet every day by oral route.     • vitamin D (ERGOCALCIFEROL) 16061 UNITS capsule capsule Take 50,000 Units by mouth Every 30 (Thirty) Days. 15TH     • gabapentin (NEURONTIN) 300 MG capsule Take 2 capsules by mouth Every Night. (Patient taking differently: Take 300 mg by mouth 2 (Two) Times a Day.) 60 capsule 5     Current Facility-Administered Medications on File Prior to Visit   Medication Dose Route Frequency Provider Last Rate Last Admin   • OnabotulinumtoxinA 200 Units  200 Units Intramuscular Q3 Months Adonay Gilbert MD   200 Units at 07/07/22 1540      Allergies   Allergen Reactions   • Isoniazid Other (See Comments)     aplastic anemia   •  Sulfa Antibiotics Anaphylaxis   • Tetanus Toxoid Swelling        Review of Systems   Constitutional: Negative for activity change, appetite change, chills, diaphoresis, fatigue, fever and unexpected weight change.   HENT: Negative for congestion, dental problem, drooling, ear discharge, ear pain, facial swelling, hearing loss, mouth sores, nosebleeds, postnasal drip, rhinorrhea, sinus pressure, sneezing, sore throat, tinnitus, trouble swallowing and voice change.    Eyes: Negative for photophobia, pain, discharge, redness, itching and visual disturbance.   Respiratory: Negative for apnea, cough, choking, chest tightness, shortness of breath, wheezing and stridor.    Cardiovascular: Negative for chest pain, palpitations and leg swelling.   Gastrointestinal: Negative for abdominal distention, abdominal pain, anal bleeding, blood in stool, constipation, diarrhea, nausea, rectal pain and vomiting.   Endocrine: Negative for cold intolerance, heat intolerance, polydipsia, polyphagia and polyuria.   Genitourinary: Negative for decreased urine volume, difficulty urinating, dysuria, enuresis, flank pain, frequency, genital sores, hematuria and urgency.   Musculoskeletal: Positive for arthralgias. Negative for back pain, gait problem, joint swelling, myalgias, neck pain and neck stiffness.   Skin: Negative for color change, pallor, rash and wound.   Allergic/Immunologic: Negative for environmental allergies, food allergies and immunocompromised state.   Neurological: Negative for dizziness, tremors, seizures, syncope, facial asymmetry, speech difficulty, weakness, light-headedness, numbness and headaches.   Hematological: Negative for adenopathy. Does not bruise/bleed easily.   Psychiatric/Behavioral: Negative for agitation, behavioral problems, confusion, decreased concentration, dysphoric mood, hallucinations, self-injury, sleep disturbance and suicidal ideas. The patient is not nervous/anxious and is not hyperactive.      "    Objective      Physical Exam  /81   Ht 182.9 cm (72.01\")   Wt (!) 150 kg (330 lb)   LMP  (LMP Unknown)   BMI 44.75 kg/m²     Body mass index is 44.75 kg/m².    General:   Mental Status:  Alert   Appearance: Cooperative, in no acute distress   Build and Nutrition: Obese by BMI female   Orientation: Alert and oriented to person, place and time   Posture: Normal   Gait: Limp on the right with a cane    Integument:              Right knee: No skin lesions, no rash, no ecchymosis     Lower Extremities:              Right Knee:                          Tenderness:    Lateral joint line tenderness                          Effusion:          None                          Swelling:          None                          Crepitus:          Positive                          Range of motion:        Extension:       10°                                                              Flexion:           120°  Instability:        No varus laxity, no valgus laxity, negative anterior drawer  Deformities:     None    Imaging/Studies  Imaging Results (Last 24 Hours)     ** No results found for the last 24 hours. **        No new imaging today.    Assessment and Plan     Diagnoses and all orders for this visit:    1. Primary osteoarthritis of right knee (Primary)  -     Large Joint Arthrocentesis: R knee        1. Primary osteoarthritis of right knee        I reviewed my findings with the patient.  She would like a repeat injection for her right knee today, this was provided.  She continues to work on weight loss, and has seen some weight loss from her last visit, and this is encouraging.  I will see her back in 4 months, but sooner for any problems.  She is also being evaluated for bariatric surgery, and was seen earlier this week.    Procedure Note:  The potential benefits of performing a therapeutic right knee joint injection, as well as potential risks (including, but not limited to infection, swelling, pain, bleeding, " bruising, nerve/blood vessel damage, skin color changes, transient elevation in blood glucose levels, and fat atrophy) were discussed with the patient.  After informed consent, timeout procedure was performed, and the skin on the right knee was prepped with chlorhexidine soap and alcohol, after which ethyl chloride was applied to the skin at the injection site. Via the anterolateral approach, 1ml of Kenalog 40mg/ml mixed with 4ml 0.5% ropivacaine plain was injected into the knee joint.  The patient tolerated the procedure well, experiencing 40% improvement a few minutes following the injection. There were no complications.  Band-Aid was applied to the injection site. Post-procedural instructions were given to the patient and/or their caregiver.    Return in about 4 months (around 1/28/2023).      Lalo Choe MD  09/28/22  15:40 EDT

## 2022-09-28 NOTE — PROGRESS NOTES
Procedure   Large Joint Arthrocentesis: R knee  Date/Time: 9/28/2022 3:15 PM  Consent given by: patient  Site marked: site marked  Timeout: Immediately prior to procedure a time out was called to verify the correct patient, procedure, equipment, support staff and site/side marked as required   Supporting Documentation  Indications: pain   Procedure Details  Location: knee - R knee  Preparation: Patient was prepped and draped in the usual sterile fashion  Needle size: 22 G  Approach: anterolateral  Medications administered: 4 mL ropivacaine 0.5 %; 40 mg triamcinolone acetonide 40 MG/ML  Patient tolerance: patient tolerated the procedure well with no immediate complications

## 2022-09-30 ENCOUNTER — LAB (OUTPATIENT)
Dept: LAB | Facility: HOSPITAL | Age: 67
End: 2022-09-30

## 2022-09-30 ENCOUNTER — PROCEDURE VISIT (OUTPATIENT)
Dept: NEUROLOGY | Facility: CLINIC | Age: 67
End: 2022-09-30

## 2022-09-30 DIAGNOSIS — G35 MULTIPLE SCLEROSIS: ICD-10-CM

## 2022-09-30 DIAGNOSIS — G35 MULTIPLE SCLEROSIS: Primary | ICD-10-CM

## 2022-09-30 DIAGNOSIS — G43.709 CHRONIC MIGRAINE WITHOUT AURA WITHOUT STATUS MIGRAINOSUS, NOT INTRACTABLE: Primary | Chronic | ICD-10-CM

## 2022-09-30 LAB — TSH SERPL DL<=0.05 MIU/L-ACNC: 0.83 UIU/ML (ref 0.27–4.2)

## 2022-09-30 PROCEDURE — 84443 ASSAY THYROID STIM HORMONE: CPT

## 2022-09-30 PROCEDURE — 64615 CHEMODENERV MUSC MIGRAINE: CPT | Performed by: PSYCHIATRY & NEUROLOGY

## 2022-09-30 PROCEDURE — 85025 COMPLETE CBC W/AUTO DIFF WBC: CPT

## 2022-09-30 PROCEDURE — 81001 URINALYSIS AUTO W/SCOPE: CPT

## 2022-09-30 PROCEDURE — 36415 COLL VENOUS BLD VENIPUNCTURE: CPT

## 2022-10-01 LAB
BACTERIA UR QL AUTO: ABNORMAL /HPF
BASOPHILS # BLD AUTO: 0.02 10*3/MM3 (ref 0–0.2)
BASOPHILS NFR BLD AUTO: 0.1 % (ref 0–1.5)
BILIRUB UR QL STRIP: NEGATIVE
CLARITY UR: CLEAR
COD CRY URNS QL: ABNORMAL /HPF
COLOR UR: YELLOW
DEPRECATED RDW RBC AUTO: 42.1 FL (ref 37–54)
EOSINOPHIL # BLD AUTO: 0 10*3/MM3 (ref 0–0.4)
EOSINOPHIL NFR BLD AUTO: 0 % (ref 0.3–6.2)
ERYTHROCYTE [DISTWIDTH] IN BLOOD BY AUTOMATED COUNT: 12.6 % (ref 12.3–15.4)
GLUCOSE UR STRIP-MCNC: NEGATIVE MG/DL
HCT VFR BLD AUTO: 45.9 % (ref 34–46.6)
HGB BLD-MCNC: 14.8 G/DL (ref 12–15.9)
HGB UR QL STRIP.AUTO: NEGATIVE
HYALINE CASTS UR QL AUTO: ABNORMAL /LPF
IMM GRANULOCYTES # BLD AUTO: 0.06 10*3/MM3 (ref 0–0.05)
IMM GRANULOCYTES NFR BLD AUTO: 0.4 % (ref 0–0.5)
KETONES UR QL STRIP: NEGATIVE
LEUKOCYTE ESTERASE UR QL STRIP.AUTO: ABNORMAL
LYMPHOCYTES # BLD AUTO: 1.28 10*3/MM3 (ref 0.7–3.1)
LYMPHOCYTES NFR BLD AUTO: 9.1 % (ref 19.6–45.3)
MCH RBC QN AUTO: 29.3 PG (ref 26.6–33)
MCHC RBC AUTO-ENTMCNC: 32.2 G/DL (ref 31.5–35.7)
MCV RBC AUTO: 90.9 FL (ref 79–97)
MONOCYTES # BLD AUTO: 0.71 10*3/MM3 (ref 0.1–0.9)
MONOCYTES NFR BLD AUTO: 5.1 % (ref 5–12)
NEUTROPHILS NFR BLD AUTO: 11.92 10*3/MM3 (ref 1.7–7)
NEUTROPHILS NFR BLD AUTO: 85.3 % (ref 42.7–76)
NITRITE UR QL STRIP: NEGATIVE
NRBC BLD AUTO-RTO: 0 /100 WBC (ref 0–0.2)
PH UR STRIP.AUTO: 6 [PH] (ref 5–8)
PLATELET # BLD AUTO: 253 10*3/MM3 (ref 140–450)
PMV BLD AUTO: 11.5 FL (ref 6–12)
PROT UR QL STRIP: ABNORMAL
RBC # BLD AUTO: 5.05 10*6/MM3 (ref 3.77–5.28)
RBC # UR STRIP: ABNORMAL /HPF
REF LAB TEST METHOD: ABNORMAL
SP GR UR STRIP: 1.03 (ref 1–1.03)
SQUAMOUS #/AREA URNS HPF: ABNORMAL /HPF
UROBILINOGEN UR QL STRIP: ABNORMAL
WBC # UR STRIP: ABNORMAL /HPF
WBC NRBC COR # BLD: 13.99 10*3/MM3 (ref 3.4–10.8)

## 2022-10-10 ENCOUNTER — CONSULT (OUTPATIENT)
Dept: BARIATRICS/WEIGHT MGMT | Facility: CLINIC | Age: 67
End: 2022-10-10

## 2022-10-10 VITALS
OXYGEN SATURATION: 97 % | DIASTOLIC BLOOD PRESSURE: 88 MMHG | TEMPERATURE: 97.8 F | BODY MASS INDEX: 41.02 KG/M2 | RESPIRATION RATE: 18 BRPM | HEART RATE: 81 BPM | WEIGHT: 293 LBS | HEIGHT: 71 IN | SYSTOLIC BLOOD PRESSURE: 130 MMHG

## 2022-10-10 DIAGNOSIS — E66.01 OBESITY, CLASS III, BMI 40-49.9 (MORBID OBESITY): ICD-10-CM

## 2022-10-10 DIAGNOSIS — G35 MULTIPLE SCLEROSIS: ICD-10-CM

## 2022-10-10 DIAGNOSIS — R53.83 FATIGUE, UNSPECIFIED TYPE: ICD-10-CM

## 2022-10-10 DIAGNOSIS — K44.9 HIATAL HERNIA: ICD-10-CM

## 2022-10-10 DIAGNOSIS — K21.9 GASTROESOPHAGEAL REFLUX DISEASE, UNSPECIFIED WHETHER ESOPHAGITIS PRESENT: ICD-10-CM

## 2022-10-10 DIAGNOSIS — E66.01 MORBID OBESITY: Primary | ICD-10-CM

## 2022-10-10 PROCEDURE — 99214 OFFICE O/P EST MOD 30 MIN: CPT | Performed by: SURGERY

## 2022-10-10 RX ORDER — CHLORHEXIDINE GLUCONATE 0.12 MG/ML
15 RINSE ORAL
Status: CANCELLED | OUTPATIENT
Start: 2022-10-10 | End: 2022-10-10

## 2022-10-10 RX ORDER — SCOLOPAMINE TRANSDERMAL SYSTEM 1 MG/1
1 PATCH, EXTENDED RELEASE TRANSDERMAL ONCE
Status: CANCELLED | OUTPATIENT
Start: 2022-10-10 | End: 2022-10-10

## 2022-10-10 RX ORDER — SODIUM CHLORIDE 0.9 % (FLUSH) 0.9 %
10 SYRINGE (ML) INJECTION AS NEEDED
Status: CANCELLED | OUTPATIENT
Start: 2022-10-10

## 2022-10-10 RX ORDER — ENOXAPARIN SODIUM 150 MG/ML
40 INJECTION SUBCUTANEOUS ONCE
Status: CANCELLED | OUTPATIENT
Start: 2022-10-10 | End: 2022-10-10

## 2022-10-10 RX ORDER — GABAPENTIN 100 MG/1
600 CAPSULE ORAL ONCE
Status: CANCELLED | OUTPATIENT
Start: 2022-10-10 | End: 2022-10-10

## 2022-10-10 RX ORDER — SODIUM CHLORIDE 9 MG/ML
150 INJECTION, SOLUTION INTRAVENOUS CONTINUOUS
Status: CANCELLED | OUTPATIENT
Start: 2022-10-10

## 2022-10-10 RX ORDER — PANTOPRAZOLE SODIUM 40 MG/10ML
40 INJECTION, POWDER, LYOPHILIZED, FOR SOLUTION INTRAVENOUS ONCE
Status: CANCELLED | OUTPATIENT
Start: 2022-10-10 | End: 2022-10-10

## 2022-10-10 RX ORDER — ACETAMINOPHEN 500 MG
1000 TABLET ORAL ONCE
Status: CANCELLED | OUTPATIENT
Start: 2022-10-10 | End: 2022-10-10

## 2022-10-10 RX ORDER — SODIUM CHLORIDE 0.9 % (FLUSH) 0.9 %
3 SYRINGE (ML) INJECTION EVERY 12 HOURS SCHEDULED
Status: CANCELLED | OUTPATIENT
Start: 2022-10-10

## 2022-10-10 NOTE — PROGRESS NOTES
"[facility] BARIATRIC SURGERY  [address]  [address]  [facility] [id number] [phone]      Patient  Name:  Rashad Carrillo  :  1955      Date of Visit: 10/10/2022      Chief Complaint:  weight gain; unable to maintain weight loss    History of Present Illness:  Rashad Carrillo is a 66 y.o. female who presents today for evaluation, education and consultation regarding weight loss surgery.     Patient has been overweight for many years, with numerous failed dietary/weight loss attempts.  She now has obesity related comorbidities of OA, fatigue, HTN and as such has decided to pursue weight loss surgery.    From intake:  \"Hx MS, follows w/ Dr. Gilbert, on Lemtrad (alemtuzumab) infusions q 2yrs + Ampyra (dalfampridine) which assists w/ walking.  She tries very hard to walk 1mile/day but is becoming limited by her chronic knee pain.  Ortho say she needs a (R) TKR but must lose 50 lbs first.   \"    10/10/2022 Update:    Stacys    Denies changes medical hx.    Takes Ampyra (not steroid or NSAID, potassium channel blocker) and a monoclonal Ab for MS.  Uses walker and cane for walking assistance.  Has some incontinence of urine and stool.    Has severe bone-on-bone osteoarthritis.  Saw Dr. Choe and got a steroid injection 2 weeks ago.    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    Hx of pancreatitis, admitted to hospital for 12 days.  Had cholecystectomy during same admission, but pt was told her pancreatitis was from a virus.    No personal hx of DVT or clotting d/o.  Her mom had varicose veins.  No liver, lung, heart, or renal disease        Review of data:    MAC: regular gabapentin and hydrocodone  CBC: 13.9 WBCs  CMP: CO2 29.  HP neg    EKG: none recent  CXR: nl    EGD:  GEJ 38 cm.  Dr. Santiago 2017.  Small sliding HH.  Path, reflux esophagitis    Has occasional reflux.  Has dyspepsia/gas.  Gas-ex relieves.    Cardiac " clearance: cleared.     Stress test: normal             Last tobacco: 25 years ago.    Last NSAIDs: Mobic last week.  Last ASA: n/a  Last steroids: 2 weeks ago.  Last hormones: n/a        COVID-19 Questionnaire:    1.  Have you previously been tested for COVID-19?    []  No  [x]  Yes    2.  Were you ever positive for COVID-19?    [x]  No  []  Yes    3.  Are you employed in a healthcare setting?    [x]  No  []  Yes    4.  Are you symptomatic for COVID-19 as defined by the CDC (fever, cough)?  If so, when did symptoms begin?    [x]  No  []  Yes    5.  Have you been hospitalized for COVID-19?  If so, were you in the ICU?  [x]  No    []  Yes, but not in the ICU    []  Yes, and I was in the ICU    6.  Are you a resident in a congregate (group care setting?)    [x]  No  []  Yes    7.  Are you pregnant?  [x]  No  []  Yes    8.  Are you vaccinated?    []  No  []  Yes, but only partially   [x]  Yes, fully          Past Medical History:   Diagnosis Date   • Chronic knee pain     NSAIDS + steroid injections (last 3/2022)   • Dyspepsia    • Dyspnea on exertion    • Fatigue    • Hypertension    • Morbid obesity (HCC)    • Multiple sclerosis (HCC)     follows w/ Dr. Gilbert     Past Surgical History:   Procedure Laterality Date   • LAPAROSCOPIC CHOLECYSTECTOMY  2012    gallstone pancreatitis       Allergies   Allergen Reactions   • Isoniazid Other (See Comments)     aplastic anemia   • Rifampin Anaphylaxis   • Sulfa Antibiotics Anaphylaxis   • Tetanus Toxoid Swelling       Current Outpatient Medications:   •  acyclovir (ZOVIRAX) 400 MG tablet, Take 1 tablet by mouth 2 (Two) Times a Day., Disp: 60 tablet, Rfl: 11  •  albuterol sulfate  (90 Base) MCG/ACT inhaler, Ventolin HFA 90 mcg/actuation aerosol inhaler  1-2 puffs q 4-6^ prn, Disp: , Rfl:   •  azelastine (ASTEPRO) 0.15 % solution nasal spray, inhale 1-2 sprays by nasal route 2 times a day as needed, Disp: , Rfl: 3  •  B Complex-C-E-Zn (Z-BEC PO), Take 1 tablet by mouth  daily., Disp: , Rfl:   •  Dalfampridine ER 10 MG tablet sustained-release 12 hour, Take 10 mg by mouth 2 (Two) Times a Day., Disp: 60 tablet, Rfl: 11  •  FLUoxetine (PROzac) 20 MG capsule, Take 20 mg by mouth Daily., Disp: , Rfl:   •  furosemide (LASIX) 40 MG tablet, Take 1 tablet(s) every day, Disp: , Rfl:   •  gabapentin (NEURONTIN) 100 MG capsule, Take 3 capsules by mouth 3 (Three) Times a Day. (Patient taking differently: Take 1 capsule by mouth 2 (Two) Times a Day.), Disp: 90 capsule, Rfl: 5  •  hydrALAZINE (APRESOLINE) 10 MG tablet, Take 1 tablet 3 times a day as directed., Disp: , Rfl:   •  hydrochlorothiazide (MICROZIDE) 12.5 MG capsule, Take 12.5 mg by mouth Daily., Disp: , Rfl:   •  HYDROcodone-acetaminophen (NORCO) 5-325 MG per tablet, TAKE ONE TABLET BY MOUTH EVERY FOUR HOURS AS NEEDED, Disp: , Rfl:   •  irbesartan (AVAPRO) 150 MG tablet, Take 150 mg by mouth Daily., Disp: , Rfl:   •  meloxicam (MOBIC) 7.5 MG tablet, 1 Oral Daily with food., Disp: 90 tablet, Rfl: 2  •  metoprolol succinate XL (TOPROL-XL) 100 MG 24 hr tablet, metoprolol succinate  mg tablet,extended release 24 hr, Disp: , Rfl:   •  Multiple Vitamin (MULTI-VITAMIN DAILY) tablet, Take 1 tablet by mouth daily., Disp: , Rfl:   •  olmesartan (BENICAR) 40 MG tablet, Take 1 tablet(s) every day for 90 days., Disp: , Rfl:   •  omeprazole (priLOSEC) 20 MG capsule, TAKE ONE CAPSULE IN THE MORNING., Disp: , Rfl: 11  •  OnabotulinumtoxinA 200 units reconstituted solution, FOR . PHYSICIAN TO INJECT 155 UNITS INTRAMUSCULARLY INTO HEAD, NECK AND SHOULDERS EVERY 12 WEEKS Per FDA PROTOCOL, Disp: 1 each, Rfl: 3  •  oxybutynin XL (DITROPAN-XL) 10 MG 24 hr tablet, Take 10 mg by mouth Daily., Disp: , Rfl:   •  potassium chloride (MICRO-K) 10 MEQ CR capsule, Take 10 mEq by mouth daily., Disp: , Rfl:   •  pravastatin (PRAVACHOL) 80 MG tablet, Take 1 tablet(s) every day at bedtime., Disp: , Rfl:   •  Rybelsus 7 MG tablet, , Disp: , Rfl:    •  valACYclovir (Valtrex) 500 MG tablet, Take 1 tablet by mouth 2 (Two) Times a Day., Disp: 60 tablet, Rfl: 11  •  Vibegron (Gemtesa) 75 MG tablet, Gemtesa 75 mg tablet  Take 1 tablet every day by oral route., Disp: , Rfl:   •  vitamin D (ERGOCALCIFEROL) 72596 UNITS capsule capsule, Take 50,000 Units by mouth Every 30 (Thirty) Days. , Disp: , Rfl:   •  gabapentin (NEURONTIN) 300 MG capsule, Take 2 capsules by mouth Every Night. (Patient taking differently: Take 300 mg by mouth 2 (Two) Times a Day.), Disp: 60 capsule, Rfl: 5  No current facility-administered medications for this visit.    Social History     Socioeconomic History   • Marital status:    Tobacco Use   • Smoking status: Former     Types: Cigarettes     Quit date:      Years since quittin.7   • Smokeless tobacco: Never   • Tobacco comments:     social   Vaping Use   • Vaping Use: Never used   Substance and Sexual Activity   • Alcohol use: Yes     Comment: rarely   • Drug use: No   • Sexual activity: Defer     Social History     Social History Narrative    .  Lives alone in [place].  Forced correction d/t MS - formerly worked in Finance.          Family History   Problem Relation Age of Onset   • Cancer Mother    • Hypertension Mother    • Heart disease Mother    • Alzheimer's disease Other    • Seizures Other         Epilepsy and recurrent seizures   • Heart disease Other        Review of Systems   Constitutional: Positive for fatigue and unexpected weight gain. Negative for chills, diaphoresis, fever and unexpected weight loss.   HENT: Negative for congestion and facial swelling.    Eyes: Negative for blurred vision, double vision and discharge.   Respiratory: Negative for chest tightness, shortness of breath and stridor.    Cardiovascular: Negative for chest pain, palpitations and leg swelling.   Gastrointestinal: Negative for blood in stool.   Endocrine: Negative for polydipsia.   Genitourinary: Negative for hematuria.    Musculoskeletal: Positive for arthralgias.   Skin: Negative for color change.   Allergic/Immunologic: Negative for immunocompromised state.   Neurological: Negative for confusion.   Psychiatric/Behavioral: Negative for self-injury.       Physical Exam:  Vital Signs:  Weight: (!) 151 kg (333 lb)   Body mass index is 47.11 kg/m².  Temp: 97.8 °F (36.6 °C)   Heart Rate: 81   BP: 130/88     Physical Exam  Vitals reviewed.   Constitutional:       Appearance: She is well-developed.   HENT:      Head: Normocephalic and atraumatic.      Nose: Nose normal.   Eyes:      Conjunctiva/sclera: Conjunctivae normal.      Pupils: Pupils are equal, round, and reactive to light.   Neck:      Thyroid: No thyromegaly.      Vascular: No carotid bruit.      Trachea: No tracheal deviation.   Cardiovascular:      Rate and Rhythm: Normal rate and regular rhythm.      Heart sounds: Normal heart sounds.   Pulmonary:      Effort: Pulmonary effort is normal. No respiratory distress.      Breath sounds: Normal breath sounds.   Abdominal:      General: There is no distension.      Palpations: Abdomen is soft.      Tenderness: There is no abdominal tenderness.      Comments: Lap scars   Musculoskeletal:         General: No deformity. Normal range of motion.      Cervical back: Normal range of motion and neck supple.   Skin:     General: Skin is warm and dry.      Findings: No rash.   Neurological:      Mental Status: She is alert and oriented to person, place, and time.      Cranial Nerves: No cranial nerve deficit.      Coordination: Coordination normal.      Comments: Ambulates with walker   Psychiatric:         Behavior: Behavior normal.         Thought Content: Thought content normal.         Judgment: Judgment normal.         Patient Active Problem List   Diagnosis   • Vitamin D deficiency   • Depression   • Multiple sclerosis (HCC)   • Restless legs syndrome   • Muscle spasticity   • Chronic migraine without aura without status migrainosus,  not intractable   • Essential hypertension   • Frequent falls   • Status post balloon dilatation of esophageal stricture   • T2DM (type 2 diabetes mellitus) ([facility])   • TB lung, latent   • Aplastic anemia likely due to isoniazide   • Chest pain   • MCGRAW (dyspnea on exertion)   • Hyperlipidemia LDL goal <70   • Class 3 severe obesity due to excess calories with serious comorbidity and body mass index (BMI) of 50.0 to 59.9 in adult ([facility])   • Fatigue   • Dyspepsia       Assessment:    Rahsad Carrillo is a 66 y.o. year old female with medically complicated obesity.    Weight loss surgery is deemed medically necessary given the following obesity related comorbidities including OA, fatigue, HTN with current Weight: (!) 151 kg (333 lb) and Body mass index is 47.11 kg/m²..    Patient is aware that surgery is a tool, and that weight loss is not guaranteed but only seen in the context of appropriate use, follow up and exercise.    The patient was present for an approximately a 2.5 hour discussion of the purpose of weight loss surgery, how WLS is a tool to assist in achieving weight loss goals, the most common complications and how best to avoid them, and the strategies for short and long term weight loss.  Ample opportunity to discuss questions was available both in group and during the time of individual examination.    I reviewed all available preop labs, Xrays, tests, clearances, etc and signed off on these in the record.  All of this in addition to the patient's unique history and exam has been taken into consideration in determining their appropriate candidacy for weight loss surgery.    Complications  of laparoscopic/possible robotic gastric sleeve were discussed. The patient is well aware of the potential complications of surgery that include but not limited to bleeding, infections, deep venous thrombosis, pulmonary embolism, pulmonary complications such as pneumonia, cardiac events, hernias, small bowel obstruction,  "damage to the spleen or other organs, bowel injury, disfiguring scars, failure to lose weight, need for additional surgery, conversion to an open procedure, and death. Patient is also aware of complications which apply in this particular procedure that can include but are not limited to a \"leak\" at the staple line which in some instances may require conversion to gastric bypass.    The patient is aware if a hiatal hernia is encountered, it likely will be repaired.  R/B/A Rx to hiatal hernia repair were discussed as outlined in our long consent form.  Briefly risks in addition to those for LSG include recurrent hernia, MARIA A, dysphagia, esophageal injury, pneumothorax, injury to the vagus nerves, injury to the thoracic duct, aorta or vena cava.    Greater than 3 minutes was spent with the patient discussing avoiding all tobacco products and second hand smoke at least 2 weeks pre-operatively and 6 weeks post-operatively to minimize the risk of sleeve leak.  This included discussing the importance of avoiding even secondhand smoke as the risk of leak is increased.  Examples discussed:  I made it very clear that the patient understands they should avoid even riding in a car where someone has previously smoked in the last 2 weeks, living in a house where someone smokes (even if it's in a separate room/patio/attached garage, etc.) we discussed that they should not have a conversation with a group of people who are smoking even if it's outside.  They can be around wood burning fires and barbecue.  I told them I do not know if marijuana has a same effects but my overall recommendation is to avoid it for 2 weeks prior in 6 weeks after surgery.  They also are aware that nicotine may also increase the risk of leak and I strongly encouraged him to avoid that as well for 2 weeks prior in 6 weeks after surgery.    Discussed the risks, benefits and alternative therapies at great length as outlined in our extensive consent forms, " consent videos, and educational teaching process under the direction of the center's .    A copy of the patient's signed informed consent is on file.    Plan:  Robotic assisted sleeve gastrectomy + HHR at [facility].      Hx of pancreatitis, may have posterior-gastric adhesions with increased risk of bleeding, injury to pancreas, splenic vessels, etc.  Unfortunately I do not have the EGD to review.      R/B/A Rx discussed to postop anticoagulation incl but not limited to bleeding, drug reaction, venothromboembolic events, etc. and she declined.  If after surgery she has a relapse of MS, may reconsider anticoagulation for 3 weeks.    MDM high:  Elective procedure with the following risk factors: morbid obesity, HTN, chronic narcotic use.  4+ chronic medical problems reviewed.      Thank you Ye Newman MD for allowing me to share in the care of our mutual patient.      Maribell Anderson MD

## 2022-10-11 PROBLEM — K44.9 HIATAL HERNIA: Status: ACTIVE | Noted: 2022-10-11

## 2022-10-18 ENCOUNTER — HOSPITAL ENCOUNTER (OUTPATIENT)
Dept: MAMMOGRAPHY | Facility: HOSPITAL | Age: 67
Discharge: HOME | End: 2022-10-18
Admitting: INTERNAL MEDICINE

## 2022-10-18 DIAGNOSIS — Z12.31 VISIT FOR SCREENING MAMMOGRAM: ICD-10-CM

## 2022-10-18 PROCEDURE — 77063 BREAST TOMOSYNTHESIS BI: CPT

## 2022-10-18 PROCEDURE — 77063 BREAST TOMOSYNTHESIS BI: CPT | Performed by: RADIOLOGY

## 2022-10-18 PROCEDURE — 77067 SCR MAMMO BI INCL CAD: CPT

## 2022-10-18 PROCEDURE — 77067 SCR MAMMO BI INCL CAD: CPT | Performed by: RADIOLOGY

## 2022-11-02 ENCOUNTER — APPOINTMENT (OUTPATIENT)
Dept: PREADMISSION TESTING | Facility: HOSPITAL | Age: 67
End: 2022-11-02

## 2022-11-07 ENCOUNTER — PRE-ADMISSION TESTING (OUTPATIENT)
Dept: PREADMISSION TESTING | Facility: HOSPITAL | Age: 67
End: 2022-11-07

## 2022-11-07 LAB
DEPRECATED RDW RBC AUTO: 43.5 FL (ref 37–54)
ERYTHROCYTE [DISTWIDTH] IN BLOOD BY AUTOMATED COUNT: 12.9 % (ref 12.3–15.4)
HBA1C MFR BLD: 4.8 % (ref 4.8–5.6)
HCT VFR BLD AUTO: 43.4 % (ref 34–46.6)
HGB BLD-MCNC: 13.9 G/DL (ref 12–15.9)
MCH RBC QN AUTO: 29.6 PG (ref 26.6–33)
MCHC RBC AUTO-ENTMCNC: 32 G/DL (ref 31.5–35.7)
MCV RBC AUTO: 92.5 FL (ref 79–97)
MRSA DNA SPEC QL NAA+PROBE: NEGATIVE
PLATELET # BLD AUTO: 218 10*3/MM3 (ref 140–450)
PMV BLD AUTO: 11.7 FL (ref 6–12)
POTASSIUM SERPL-SCNC: 4.4 MMOL/L (ref 3.5–5.2)
QT INTERVAL: 408 MS
QTC INTERVAL: 410 MS
RBC # BLD AUTO: 4.69 10*6/MM3 (ref 3.77–5.28)
WBC NRBC COR # BLD: 6.2 10*3/MM3 (ref 3.4–10.8)

## 2022-11-07 PROCEDURE — 85027 COMPLETE CBC AUTOMATED: CPT

## 2022-11-07 PROCEDURE — 93010 ELECTROCARDIOGRAM REPORT: CPT | Performed by: STUDENT IN AN ORGANIZED HEALTH CARE EDUCATION/TRAINING PROGRAM

## 2022-11-07 PROCEDURE — 93005 ELECTROCARDIOGRAM TRACING: CPT

## 2022-11-07 PROCEDURE — 83036 HEMOGLOBIN GLYCOSYLATED A1C: CPT

## 2022-11-07 PROCEDURE — 84132 ASSAY OF SERUM POTASSIUM: CPT

## 2022-11-07 PROCEDURE — 36415 COLL VENOUS BLD VENIPUNCTURE: CPT

## 2022-11-07 PROCEDURE — 87641 MR-STAPH DNA AMP PROBE: CPT

## 2022-11-07 NOTE — PAT
An arrival time for procedure was not provided during PAT visit. If patient had any questions or concerns about their arrival time, they were instructed to contact their surgeon/physician.  Additionally, if the patient referred to an arrival time that was acquired from their my chart account, patient was encouraged to verify that time with their surgeon/physician. Arrival times are NOT provided in Pre Admission Testing Department.    Patient to apply Chlorhexadine wipes  to surgical area (as instructed) the night before procedure and the AM of procedure. Wipes provided.    Patient instructed to drink 20 ounces of Gatorade and it needs to be completed 1 hour (for Main OR patients) or 2 hours (scheduled  section & [facility] patients) before given arrival time for procedure (NO RED Gatorade)    Patient verbalized understanding.    Per Anesthesia Request, patient instructed not to take their ACE/ARB medications on the AM of surgery.    Patient viewed general PAT education video as instructed in their preoperative information received from their surgeon.  Patient stated the general PAT education video was viewed in its entirety and survey completed.  Copies of PAT general education handouts (Incentive Spirometry, Meds to Beds Program, Patient Belongings, Pre-op skin preparation instructions, Blood Glucose testing, Visitor policy, Surgery FAQ, Code H) distributed to patient if not printed. Education related to the PAT pass and skin preparation for surgery (if applicable) completed in PAT as a reinforcement to PAT education video. Patient instructed to return PAT pass provided today as well as completed skin preparation sheet (if applicable) on the day of procedure.     Additionally if patient had not viewed video yet but intended to view it at home or in our waiting area, then referred them to the handout with QR code/link provided during PAT visit.  Instructed patient to complete survey after viewing the video in  its entirety.  Encouraged patient/family to read [facility] general education handouts thoroughly and notify PAT staff with any questions or concerns. Patient verbalized understanding of all information and priority content.

## 2022-11-08 ENCOUNTER — HOSPITAL ENCOUNTER (INPATIENT)
Facility: HOSPITAL | Age: 67
LOS: 3 days | Discharge: HOME HEALTH | End: 2022-11-11
Attending: SURGERY | Admitting: SURGERY

## 2022-11-08 ENCOUNTER — ANESTHESIA EVENT CONVERTED (OUTPATIENT)
Dept: ANESTHESIOLOGY | Facility: HOSPITAL | Age: 67
End: 2022-11-08

## 2022-11-08 ENCOUNTER — ANESTHESIA EVENT (OUTPATIENT)
Dept: PERIOP | Facility: HOSPITAL | Age: 67
End: 2022-11-08

## 2022-11-08 ENCOUNTER — ANESTHESIA (OUTPATIENT)
Dept: PERIOP | Facility: HOSPITAL | Age: 67
End: 2022-11-08

## 2022-11-08 DIAGNOSIS — G35 MULTIPLE SCLEROSIS: Chronic | ICD-10-CM

## 2022-11-08 DIAGNOSIS — E66.01 MORBID OBESITY: ICD-10-CM

## 2022-11-08 DIAGNOSIS — R29.6 FREQUENT FALLS: ICD-10-CM

## 2022-11-08 DIAGNOSIS — I10 ESSENTIAL HYPERTENSION: ICD-10-CM

## 2022-11-08 DIAGNOSIS — K44.9 HIATAL HERNIA: ICD-10-CM

## 2022-11-08 LAB
GLUCOSE BLDC GLUCOMTR-MCNC: 175 MG/DL (ref 70–130)
GLUCOSE BLDC GLUCOMTR-MCNC: 79 MG/DL (ref 70–130)

## 2022-11-08 PROCEDURE — 25010000002 FENTANYL CITRATE (PF) 100 MCG/2ML SOLUTION

## 2022-11-08 PROCEDURE — C9399 UNCLASSIFIED DRUGS OR BIOLOG: HCPCS

## 2022-11-08 PROCEDURE — 25010000002 CEFAZOLIN PER 500 MG: Performed by: SURGERY

## 2022-11-08 PROCEDURE — 0DB64Z3 EXCISION OF STOMACH, PERCUTANEOUS ENDOSCOPIC APPROACH, VERTICAL: ICD-10-PCS | Performed by: SURGERY

## 2022-11-08 PROCEDURE — 88307 TISSUE EXAM BY PATHOLOGIST: CPT | Performed by: SURGERY

## 2022-11-08 PROCEDURE — 25010000002 AMISULPRIDE (ANTIEMETIC) 10 MG/4ML SOLUTION

## 2022-11-08 PROCEDURE — 43775 LAP SLEEVE GASTRECTOMY: CPT | Performed by: SURGERY

## 2022-11-08 PROCEDURE — 25010000002 DEXAMETHASONE SODIUM PHOSPHATE 10 MG/ML SOLUTION

## 2022-11-08 PROCEDURE — 8E0W4CZ ROBOTIC ASSISTED PROCEDURE OF TRUNK REGION, PERCUTANEOUS ENDOSCOPIC APPROACH: ICD-10-PCS | Performed by: SURGERY

## 2022-11-08 PROCEDURE — 43775 LAP SLEEVE GASTRECTOMY: CPT | Performed by: PHYSICIAN ASSISTANT

## 2022-11-08 PROCEDURE — 25010000002 MIDAZOLAM PER 1 MG: Performed by: ANESTHESIOLOGY

## 2022-11-08 PROCEDURE — 0DJ08ZZ INSPECTION OF UPPER INTESTINAL TRACT, VIA NATURAL OR ARTIFICIAL OPENING ENDOSCOPIC: ICD-10-PCS | Performed by: SURGERY

## 2022-11-08 PROCEDURE — 25010000002 DROPERIDOL PER 5 MG

## 2022-11-08 PROCEDURE — 25010000002 PROPOFOL 10 MG/ML EMULSION

## 2022-11-08 PROCEDURE — 25010000002 HYDROMORPHONE 1 MG/ML SOLUTION

## 2022-11-08 PROCEDURE — 25010000002 DEXAMETHASONE PER 1 MG

## 2022-11-08 PROCEDURE — 25010000002 GLUCAGON (HUMAN RECOMBINANT) 1 MG RECONSTITUTED SOLUTION

## 2022-11-08 PROCEDURE — 25010000002 FENTANYL CITRATE (PF) 50 MCG/ML SOLUTION

## 2022-11-08 PROCEDURE — 25010000002 ENOXAPARIN PER 10 MG: Performed by: SURGERY

## 2022-11-08 PROCEDURE — 82962 GLUCOSE BLOOD TEST: CPT

## 2022-11-08 PROCEDURE — 0BQT4ZZ REPAIR DIAPHRAGM, PERCUTANEOUS ENDOSCOPIC APPROACH: ICD-10-PCS | Performed by: SURGERY

## 2022-11-08 DEVICE — STAPLER 60 RELOAD WHITE
Type: IMPLANTABLE DEVICE | Site: STOMACH | Status: FUNCTIONAL
Brand: SUREFORM

## 2022-11-08 DEVICE — FLOSEAL HEMOSTATIC MATRIX, 10ML
Type: IMPLANTABLE DEVICE | Site: STOMACH | Status: FUNCTIONAL
Brand: FLOSEAL HEMOSTATIC MATRIX

## 2022-11-08 DEVICE — SEALANT WND FIBRIN TISSEEL PREFIL/SYR/PRIMAFZ 4ML: Type: IMPLANTABLE DEVICE | Site: STOMACH | Status: FUNCTIONAL

## 2022-11-08 DEVICE — STAPLER 60 RELOAD BLUE
Type: IMPLANTABLE DEVICE | Site: STOMACH | Status: FUNCTIONAL
Brand: SUREFORM

## 2022-11-08 DEVICE — STAPLER 60 RELOAD GREEN
Type: IMPLANTABLE DEVICE | Site: STOMACH | Status: FUNCTIONAL
Brand: SUREFORM

## 2022-11-08 DEVICE — PBT NON ABSORBABLE WOUND CLOSURE DEVICE
Type: IMPLANTABLE DEVICE | Site: STOMACH | Status: FUNCTIONAL
Brand: V-LOC

## 2022-11-08 RX ORDER — DEXAMETHASONE SODIUM PHOSPHATE 10 MG/ML
INJECTION, SOLUTION INTRAMUSCULAR; INTRAVENOUS
Status: COMPLETED | OUTPATIENT
Start: 2022-11-08 | End: 2022-11-08

## 2022-11-08 RX ORDER — HYDROCODONE BITARTRATE AND ACETAMINOPHEN 5; 325 MG/1; MG/1
1 TABLET ORAL ONCE AS NEEDED
Status: DISCONTINUED | OUTPATIENT
Start: 2022-11-08 | End: 2022-11-08 | Stop reason: HOSPADM

## 2022-11-08 RX ORDER — BUPIVACAINE HYDROCHLORIDE 2.5 MG/ML
INJECTION, SOLUTION EPIDURAL; INFILTRATION; INTRACAUDAL
Status: COMPLETED | OUTPATIENT
Start: 2022-11-08 | End: 2022-11-08

## 2022-11-08 RX ORDER — FENTANYL CITRATE 50 UG/ML
50 INJECTION, SOLUTION INTRAMUSCULAR; INTRAVENOUS
Status: DISCONTINUED | OUTPATIENT
Start: 2022-11-08 | End: 2022-11-08 | Stop reason: HOSPADM

## 2022-11-08 RX ORDER — SODIUM CHLORIDE 0.9 % (FLUSH) 0.9 %
3 SYRINGE (ML) INJECTION EVERY 12 HOURS SCHEDULED
Status: DISCONTINUED | OUTPATIENT
Start: 2022-11-08 | End: 2022-11-08 | Stop reason: HOSPADM

## 2022-11-08 RX ORDER — SODIUM CHLORIDE 0.9 % (FLUSH) 0.9 %
10 SYRINGE (ML) INJECTION EVERY 12 HOURS SCHEDULED
Status: DISCONTINUED | OUTPATIENT
Start: 2022-11-08 | End: 2022-11-11 | Stop reason: HOSPADM

## 2022-11-08 RX ORDER — ACYCLOVIR 200 MG/1
400 CAPSULE ORAL 2 TIMES DAILY
Status: DISCONTINUED | OUTPATIENT
Start: 2022-11-08 | End: 2022-11-11 | Stop reason: HOSPADM

## 2022-11-08 RX ORDER — SODIUM CHLORIDE 0.9 % (FLUSH) 0.9 %
10 SYRINGE (ML) INJECTION AS NEEDED
Status: DISCONTINUED | OUTPATIENT
Start: 2022-11-08 | End: 2022-11-08 | Stop reason: HOSPADM

## 2022-11-08 RX ORDER — LIDOCAINE HYDROCHLORIDE 10 MG/ML
INJECTION, SOLUTION EPIDURAL; INFILTRATION; INTRACAUDAL; PERINEURAL AS NEEDED
Status: DISCONTINUED | OUTPATIENT
Start: 2022-11-08 | End: 2022-11-08 | Stop reason: SURG

## 2022-11-08 RX ORDER — SODIUM CHLORIDE, SODIUM LACTATE, POTASSIUM CHLORIDE, CALCIUM CHLORIDE 600; 310; 30; 20 MG/100ML; MG/100ML; MG/100ML; MG/100ML
9 INJECTION, SOLUTION INTRAVENOUS CONTINUOUS
Status: CANCELLED | OUTPATIENT
Start: 2022-11-08

## 2022-11-08 RX ORDER — PROCHLORPERAZINE MALEATE 10 MG
10 TABLET ORAL EVERY 6 HOURS PRN
Status: DISCONTINUED | OUTPATIENT
Start: 2022-11-08 | End: 2022-11-11 | Stop reason: HOSPADM

## 2022-11-08 RX ORDER — DEXAMETHASONE SODIUM PHOSPHATE 4 MG/ML
INJECTION, SOLUTION INTRA-ARTICULAR; INTRALESIONAL; INTRAMUSCULAR; INTRAVENOUS; SOFT TISSUE AS NEEDED
Status: DISCONTINUED | OUTPATIENT
Start: 2022-11-08 | End: 2022-11-08 | Stop reason: SURG

## 2022-11-08 RX ORDER — HYDROMORPHONE HYDROCHLORIDE 2 MG/1
2 TABLET ORAL
Status: DISCONTINUED | OUTPATIENT
Start: 2022-11-08 | End: 2022-11-09

## 2022-11-08 RX ORDER — FAMOTIDINE 20 MG/1
20 TABLET, FILM COATED ORAL ONCE
Status: DISCONTINUED | OUTPATIENT
Start: 2022-11-08 | End: 2022-11-08 | Stop reason: HOSPADM

## 2022-11-08 RX ORDER — INSULIN LISPRO 100 [IU]/ML
0-9 INJECTION, SOLUTION INTRAVENOUS; SUBCUTANEOUS
Status: DISCONTINUED | OUTPATIENT
Start: 2022-11-08 | End: 2022-11-08 | Stop reason: HOSPADM

## 2022-11-08 RX ORDER — PROMETHAZINE HYDROCHLORIDE 25 MG/1
25 SUPPOSITORY RECTAL ONCE AS NEEDED
Status: DISCONTINUED | OUTPATIENT
Start: 2022-11-08 | End: 2022-11-08 | Stop reason: HOSPADM

## 2022-11-08 RX ORDER — FLUOXETINE HYDROCHLORIDE 20 MG/1
20 CAPSULE ORAL DAILY
Status: DISCONTINUED | OUTPATIENT
Start: 2022-11-09 | End: 2022-11-11 | Stop reason: HOSPADM

## 2022-11-08 RX ORDER — ALPRAZOLAM 0.5 MG/1
0.25 TABLET ORAL 2 TIMES DAILY PRN
Status: DISCONTINUED | OUTPATIENT
Start: 2022-11-08 | End: 2022-11-11 | Stop reason: HOSPADM

## 2022-11-08 RX ORDER — ONDANSETRON 4 MG/1
4 TABLET, FILM COATED ORAL EVERY 6 HOURS PRN
Status: DISCONTINUED | OUTPATIENT
Start: 2022-11-12 | End: 2022-11-11 | Stop reason: HOSPADM

## 2022-11-08 RX ORDER — SODIUM CHLORIDE 0.9 % (FLUSH) 0.9 %
10 SYRINGE (ML) INJECTION EVERY 12 HOURS SCHEDULED
Status: DISCONTINUED | OUTPATIENT
Start: 2022-11-08 | End: 2022-11-08 | Stop reason: HOSPADM

## 2022-11-08 RX ORDER — OXYCODONE HYDROCHLORIDE 5 MG/1
5 TABLET ORAL EVERY 6 HOURS PRN
Status: DISCONTINUED | OUTPATIENT
Start: 2022-11-08 | End: 2022-11-11 | Stop reason: HOSPADM

## 2022-11-08 RX ORDER — ENOXAPARIN SODIUM 100 MG/ML
INJECTION SUBCUTANEOUS AS NEEDED
Status: DISCONTINUED | OUTPATIENT
Start: 2022-11-08 | End: 2022-11-08 | Stop reason: HOSPADM

## 2022-11-08 RX ORDER — DROPERIDOL 2.5 MG/ML
0.62 INJECTION, SOLUTION INTRAMUSCULAR; INTRAVENOUS
Status: DISCONTINUED | OUTPATIENT
Start: 2022-11-08 | End: 2022-11-08 | Stop reason: HOSPADM

## 2022-11-08 RX ORDER — GABAPENTIN 300 MG/1
600 CAPSULE ORAL ONCE
Status: COMPLETED | OUTPATIENT
Start: 2022-11-08 | End: 2022-11-08

## 2022-11-08 RX ORDER — SODIUM CHLORIDE, SODIUM LACTATE, POTASSIUM CHLORIDE, CALCIUM CHLORIDE 600; 310; 30; 20 MG/100ML; MG/100ML; MG/100ML; MG/100ML
150 INJECTION, SOLUTION INTRAVENOUS CONTINUOUS
Status: ACTIVE | OUTPATIENT
Start: 2022-11-08 | End: 2022-11-09

## 2022-11-08 RX ORDER — LABETALOL HYDROCHLORIDE 5 MG/ML
10 INJECTION, SOLUTION INTRAVENOUS
Status: DISCONTINUED | OUTPATIENT
Start: 2022-11-08 | End: 2022-11-11 | Stop reason: HOSPADM

## 2022-11-08 RX ORDER — PROMETHAZINE HYDROCHLORIDE 25 MG/1
12.5 TABLET ORAL EVERY 6 HOURS PRN
Status: DISCONTINUED | OUTPATIENT
Start: 2022-11-08 | End: 2022-11-11 | Stop reason: HOSPADM

## 2022-11-08 RX ORDER — FENTANYL CITRATE 50 UG/ML
INJECTION, SOLUTION INTRAMUSCULAR; INTRAVENOUS AS NEEDED
Status: DISCONTINUED | OUTPATIENT
Start: 2022-11-08 | End: 2022-11-08 | Stop reason: SURG

## 2022-11-08 RX ORDER — DIPHENHYDRAMINE HYDROCHLORIDE 50 MG/ML
25 INJECTION INTRAMUSCULAR; INTRAVENOUS EVERY 4 HOURS PRN
Status: DISCONTINUED | OUTPATIENT
Start: 2022-11-08 | End: 2022-11-11 | Stop reason: HOSPADM

## 2022-11-08 RX ORDER — PANTOPRAZOLE SODIUM 40 MG/10ML
40 INJECTION, POWDER, LYOPHILIZED, FOR SOLUTION INTRAVENOUS ONCE
Status: COMPLETED | OUTPATIENT
Start: 2022-11-08 | End: 2022-11-08

## 2022-11-08 RX ORDER — LABETALOL HYDROCHLORIDE 5 MG/ML
INJECTION, SOLUTION INTRAVENOUS AS NEEDED
Status: DISCONTINUED | OUTPATIENT
Start: 2022-11-08 | End: 2022-11-08 | Stop reason: SURG

## 2022-11-08 RX ORDER — NALOXONE HCL 0.4 MG/ML
0.1 VIAL (ML) INJECTION
Status: DISCONTINUED | OUTPATIENT
Start: 2022-11-08 | End: 2022-11-11 | Stop reason: HOSPADM

## 2022-11-08 RX ORDER — METOCLOPRAMIDE HYDROCHLORIDE 5 MG/ML
10 INJECTION INTRAMUSCULAR; INTRAVENOUS EVERY 6 HOURS PRN
Status: DISCONTINUED | OUTPATIENT
Start: 2022-11-08 | End: 2022-11-11 | Stop reason: HOSPADM

## 2022-11-08 RX ORDER — ROCURONIUM BROMIDE 10 MG/ML
INJECTION, SOLUTION INTRAVENOUS AS NEEDED
Status: DISCONTINUED | OUTPATIENT
Start: 2022-11-08 | End: 2022-11-08 | Stop reason: SURG

## 2022-11-08 RX ORDER — LABETALOL HYDROCHLORIDE 5 MG/ML
5 INJECTION, SOLUTION INTRAVENOUS
Status: DISCONTINUED | OUTPATIENT
Start: 2022-11-08 | End: 2022-11-08 | Stop reason: HOSPADM

## 2022-11-08 RX ORDER — LOSARTAN POTASSIUM 50 MG/1
100 TABLET ORAL
Status: DISCONTINUED | OUTPATIENT
Start: 2022-11-09 | End: 2022-11-11 | Stop reason: HOSPADM

## 2022-11-08 RX ORDER — SCOLOPAMINE TRANSDERMAL SYSTEM 1 MG/1
1 PATCH, EXTENDED RELEASE TRANSDERMAL ONCE
Status: DISCONTINUED | OUTPATIENT
Start: 2022-11-08 | End: 2022-11-08

## 2022-11-08 RX ORDER — CEFAZOLIN SODIUM IN 0.9 % NACL 3 G/100 ML
3 INTRAVENOUS SOLUTION, PIGGYBACK (ML) INTRAVENOUS EVERY 8 HOURS
Status: COMPLETED | OUTPATIENT
Start: 2022-11-08 | End: 2022-11-09

## 2022-11-08 RX ORDER — ONDANSETRON 2 MG/ML
4 INJECTION INTRAMUSCULAR; INTRAVENOUS EVERY 6 HOURS PRN
Status: DISCONTINUED | OUTPATIENT
Start: 2022-11-08 | End: 2022-11-11 | Stop reason: HOSPADM

## 2022-11-08 RX ORDER — SIMETHICONE 80 MG
80 TABLET,CHEWABLE ORAL 4 TIMES DAILY PRN
Status: DISCONTINUED | OUTPATIENT
Start: 2022-11-08 | End: 2022-11-11 | Stop reason: HOSPADM

## 2022-11-08 RX ORDER — PROPOFOL 10 MG/ML
VIAL (ML) INTRAVENOUS AS NEEDED
Status: DISCONTINUED | OUTPATIENT
Start: 2022-11-08 | End: 2022-11-08 | Stop reason: SURG

## 2022-11-08 RX ORDER — TRANEXAMIC ACID 10 MG/ML
1000 INJECTION, SOLUTION INTRAVENOUS ONCE
Status: COMPLETED | OUTPATIENT
Start: 2022-11-08 | End: 2022-11-08

## 2022-11-08 RX ORDER — IPRATROPIUM BROMIDE AND ALBUTEROL SULFATE 2.5; .5 MG/3ML; MG/3ML
3 SOLUTION RESPIRATORY (INHALATION) ONCE AS NEEDED
Status: DISCONTINUED | OUTPATIENT
Start: 2022-11-08 | End: 2022-11-08 | Stop reason: HOSPADM

## 2022-11-08 RX ORDER — EPHEDRINE SULFATE 50 MG/ML
INJECTION INTRAVENOUS AS NEEDED
Status: DISCONTINUED | OUTPATIENT
Start: 2022-11-08 | End: 2022-11-08 | Stop reason: SURG

## 2022-11-08 RX ORDER — HYDRALAZINE HYDROCHLORIDE 10 MG/1
10 TABLET, FILM COATED ORAL EVERY 8 HOURS SCHEDULED
Status: DISCONTINUED | OUTPATIENT
Start: 2022-11-08 | End: 2022-11-11 | Stop reason: HOSPADM

## 2022-11-08 RX ORDER — MAGNESIUM HYDROXIDE 1200 MG/15ML
LIQUID ORAL AS NEEDED
Status: DISCONTINUED | OUTPATIENT
Start: 2022-11-08 | End: 2022-11-08 | Stop reason: HOSPADM

## 2022-11-08 RX ORDER — GABAPENTIN 300 MG/1
300 CAPSULE ORAL 3 TIMES DAILY
Status: DISCONTINUED | OUTPATIENT
Start: 2022-11-08 | End: 2022-11-11 | Stop reason: HOSPADM

## 2022-11-08 RX ORDER — SODIUM CHLORIDE 0.9 % (FLUSH) 0.9 %
3-10 SYRINGE (ML) INJECTION AS NEEDED
Status: DISCONTINUED | OUTPATIENT
Start: 2022-11-08 | End: 2022-11-08 | Stop reason: HOSPADM

## 2022-11-08 RX ORDER — PANTOPRAZOLE SODIUM 40 MG/1
40 TABLET, DELAYED RELEASE ORAL
Status: DISCONTINUED | OUTPATIENT
Start: 2022-11-09 | End: 2022-11-11 | Stop reason: HOSPADM

## 2022-11-08 RX ORDER — CYANOCOBALAMIN 1000 UG/ML
1000 INJECTION, SOLUTION INTRAMUSCULAR; SUBCUTANEOUS ONCE
Status: COMPLETED | OUTPATIENT
Start: 2022-11-09 | End: 2022-11-09

## 2022-11-08 RX ORDER — HYDROMORPHONE HYDROCHLORIDE 1 MG/ML
0.5 INJECTION, SOLUTION INTRAMUSCULAR; INTRAVENOUS; SUBCUTANEOUS
Status: COMPLETED | OUTPATIENT
Start: 2022-11-08 | End: 2022-11-08

## 2022-11-08 RX ORDER — DROPERIDOL 2.5 MG/ML
INJECTION, SOLUTION INTRAMUSCULAR; INTRAVENOUS
Status: COMPLETED
Start: 2022-11-08 | End: 2022-11-08

## 2022-11-08 RX ORDER — FENTANYL CITRATE 50 UG/ML
INJECTION, SOLUTION INTRAMUSCULAR; INTRAVENOUS
Status: COMPLETED
Start: 2022-11-08 | End: 2022-11-08

## 2022-11-08 RX ORDER — ACETAMINOPHEN 500 MG
1000 TABLET ORAL ONCE
Status: COMPLETED | OUTPATIENT
Start: 2022-11-08 | End: 2022-11-08

## 2022-11-08 RX ORDER — DROPERIDOL 2.5 MG/ML
0.62 INJECTION, SOLUTION INTRAMUSCULAR; INTRAVENOUS ONCE AS NEEDED
Status: DISCONTINUED | OUTPATIENT
Start: 2022-11-08 | End: 2022-11-08 | Stop reason: HOSPADM

## 2022-11-08 RX ORDER — SODIUM CHLORIDE 9 MG/ML
150 INJECTION, SOLUTION INTRAVENOUS CONTINUOUS
Status: DISCONTINUED | OUTPATIENT
Start: 2022-11-08 | End: 2022-11-11 | Stop reason: HOSPADM

## 2022-11-08 RX ORDER — SODIUM CHLORIDE 0.9 % (FLUSH) 0.9 %
1-10 SYRINGE (ML) INJECTION AS NEEDED
Status: DISCONTINUED | OUTPATIENT
Start: 2022-11-08 | End: 2022-11-11 | Stop reason: HOSPADM

## 2022-11-08 RX ORDER — LABETALOL HYDROCHLORIDE 5 MG/ML
INJECTION, SOLUTION INTRAVENOUS
Status: COMPLETED
Start: 2022-11-08 | End: 2022-11-08

## 2022-11-08 RX ORDER — FAMOTIDINE 10 MG/ML
20 INJECTION, SOLUTION INTRAVENOUS ONCE
Status: CANCELLED | OUTPATIENT
Start: 2022-11-08 | End: 2022-11-08

## 2022-11-08 RX ORDER — NALOXONE HCL 0.4 MG/ML
0.4 VIAL (ML) INJECTION AS NEEDED
Status: DISCONTINUED | OUTPATIENT
Start: 2022-11-08 | End: 2022-11-08 | Stop reason: HOSPADM

## 2022-11-08 RX ORDER — ENOXAPARIN SODIUM 100 MG/ML
40 INJECTION SUBCUTANEOUS DAILY
Status: DISCONTINUED | OUTPATIENT
Start: 2022-11-09 | End: 2022-11-11 | Stop reason: HOSPADM

## 2022-11-08 RX ORDER — CEFAZOLIN SODIUM IN 0.9 % NACL 2 G/100 ML
2 PLASTIC BAG, INJECTION (ML) INTRAVENOUS ONCE
Status: COMPLETED | OUTPATIENT
Start: 2022-11-08 | End: 2022-11-08

## 2022-11-08 RX ORDER — LIDOCAINE HYDROCHLORIDE 10 MG/ML
0.5 INJECTION, SOLUTION EPIDURAL; INFILTRATION; INTRACAUDAL; PERINEURAL ONCE AS NEEDED
Status: COMPLETED | OUTPATIENT
Start: 2022-11-08 | End: 2022-11-08

## 2022-11-08 RX ORDER — CHLORHEXIDINE GLUCONATE 0.12 MG/ML
15 RINSE ORAL
Status: COMPLETED | OUTPATIENT
Start: 2022-11-08 | End: 2022-11-08

## 2022-11-08 RX ORDER — MIDAZOLAM HYDROCHLORIDE 1 MG/ML
1 INJECTION INTRAMUSCULAR; INTRAVENOUS
Status: DISCONTINUED | OUTPATIENT
Start: 2022-11-08 | End: 2022-11-08 | Stop reason: HOSPADM

## 2022-11-08 RX ORDER — HYDROCODONE BITARTRATE AND ACETAMINOPHEN 5; 325 MG/1; MG/1
1 TABLET ORAL EVERY 4 HOURS PRN
Status: DISCONTINUED | OUTPATIENT
Start: 2022-11-08 | End: 2022-11-09

## 2022-11-08 RX ORDER — ENOXAPARIN SODIUM 100 MG/ML
40 INJECTION SUBCUTANEOUS ONCE
Status: DISCONTINUED | OUTPATIENT
Start: 2022-11-08 | End: 2022-11-08 | Stop reason: HOSPADM

## 2022-11-08 RX ORDER — MEPERIDINE HYDROCHLORIDE 25 MG/ML
12.5 INJECTION INTRAMUSCULAR; INTRAVENOUS; SUBCUTANEOUS
Status: DISCONTINUED | OUTPATIENT
Start: 2022-11-08 | End: 2022-11-08 | Stop reason: HOSPADM

## 2022-11-08 RX ORDER — ALBUTEROL SULFATE 2.5 MG/3ML
2.5 SOLUTION RESPIRATORY (INHALATION) EVERY 6 HOURS PRN
Status: DISCONTINUED | OUTPATIENT
Start: 2022-11-08 | End: 2022-11-11 | Stop reason: HOSPADM

## 2022-11-08 RX ORDER — PROMETHAZINE HYDROCHLORIDE 25 MG/1
25 TABLET ORAL ONCE AS NEEDED
Status: DISCONTINUED | OUTPATIENT
Start: 2022-11-08 | End: 2022-11-08 | Stop reason: HOSPADM

## 2022-11-08 RX ORDER — SODIUM CHLORIDE AND POTASSIUM CHLORIDE 150; 450 MG/100ML; MG/100ML
100 INJECTION, SOLUTION INTRAVENOUS CONTINUOUS
Status: DISCONTINUED | OUTPATIENT
Start: 2022-11-09 | End: 2022-11-11 | Stop reason: HOSPADM

## 2022-11-08 RX ORDER — HYDROMORPHONE HYDROCHLORIDE 1 MG/ML
0.5 INJECTION, SOLUTION INTRAMUSCULAR; INTRAVENOUS; SUBCUTANEOUS
Status: DISCONTINUED | OUTPATIENT
Start: 2022-11-08 | End: 2022-11-11 | Stop reason: HOSPADM

## 2022-11-08 RX ORDER — ONDANSETRON 2 MG/ML
4 INJECTION INTRAMUSCULAR; INTRAVENOUS ONCE AS NEEDED
Status: DISCONTINUED | OUTPATIENT
Start: 2022-11-08 | End: 2022-11-08 | Stop reason: HOSPADM

## 2022-11-08 RX ADMIN — GLUCAGON HYDROCHLORIDE 1 MG: KIT at 11:22

## 2022-11-08 RX ADMIN — HYDROMORPHONE HYDROCHLORIDE 0.5 MG: 1 INJECTION, SOLUTION INTRAMUSCULAR; INTRAVENOUS; SUBCUTANEOUS at 12:50

## 2022-11-08 RX ADMIN — Medication 10 ML: at 21:33

## 2022-11-08 RX ADMIN — GABAPENTIN 300 MG: 300 CAPSULE ORAL at 15:38

## 2022-11-08 RX ADMIN — DEXAMETHASONE SODIUM PHOSPHATE 8 MG: 4 INJECTION, SOLUTION INTRA-ARTICULAR; INTRALESIONAL; INTRAMUSCULAR; INTRAVENOUS; SOFT TISSUE at 10:31

## 2022-11-08 RX ADMIN — LABETALOL HYDROCHLORIDE 5 MG: 5 INJECTION, SOLUTION INTRAVENOUS at 13:16

## 2022-11-08 RX ADMIN — PROPOFOL 200 MG: 10 INJECTION, EMULSION INTRAVENOUS at 10:16

## 2022-11-08 RX ADMIN — FENTANYL CITRATE 50 MCG: 50 INJECTION, SOLUTION INTRAMUSCULAR; INTRAVENOUS at 11:13

## 2022-11-08 RX ADMIN — HYDRALAZINE HYDROCHLORIDE 10 MG: 10 TABLET ORAL at 21:32

## 2022-11-08 RX ADMIN — ACETAMINOPHEN 1000 MG: 500 TABLET ORAL at 09:44

## 2022-11-08 RX ADMIN — FENTANYL CITRATE 50 MCG: 50 INJECTION, SOLUTION INTRAMUSCULAR; INTRAVENOUS at 10:26

## 2022-11-08 RX ADMIN — AMISULPRIDE 10 MG: 2.5 INJECTION, SOLUTION INTRAVENOUS at 11:46

## 2022-11-08 RX ADMIN — LABETALOL HYDROCHLORIDE 5 MG: 5 INJECTION, SOLUTION INTRAVENOUS at 13:37

## 2022-11-08 RX ADMIN — SIMETHICONE 80 MG: 80 TABLET, CHEWABLE ORAL at 15:36

## 2022-11-08 RX ADMIN — HYDROMORPHONE HYDROCHLORIDE 0.5 MG: 1 INJECTION, SOLUTION INTRAMUSCULAR; INTRAVENOUS; SUBCUTANEOUS at 13:12

## 2022-11-08 RX ADMIN — DROPERIDOL 0.62 MG: 2.5 INJECTION, SOLUTION INTRAMUSCULAR; INTRAVENOUS at 12:26

## 2022-11-08 RX ADMIN — LIDOCAINE HYDROCHLORIDE 50 MG: 10 INJECTION, SOLUTION EPIDURAL; INFILTRATION; INTRACAUDAL; PERINEURAL at 10:16

## 2022-11-08 RX ADMIN — SODIUM CHLORIDE 150 ML/HR: 9 INJECTION, SOLUTION INTRAVENOUS at 17:11

## 2022-11-08 RX ADMIN — CEFAZOLIN 3 G: 10 INJECTION, POWDER, FOR SOLUTION INTRAVENOUS at 10:20

## 2022-11-08 RX ADMIN — FENTANYL CITRATE 50 MCG: 50 INJECTION, SOLUTION INTRAMUSCULAR; INTRAVENOUS at 12:40

## 2022-11-08 RX ADMIN — GABAPENTIN 300 MG: 300 CAPSULE ORAL at 21:32

## 2022-11-08 RX ADMIN — BUPIVACAINE HYDROCHLORIDE 60 ML: 2.5 INJECTION, SOLUTION EPIDURAL; INFILTRATION; INTRACAUDAL; PERINEURAL at 10:19

## 2022-11-08 RX ADMIN — ACYCLOVIR 400 MG: 200 CAPSULE ORAL at 21:32

## 2022-11-08 RX ADMIN — SUGAMMADEX 100 MG: 100 INJECTION, SOLUTION INTRAVENOUS at 11:50

## 2022-11-08 RX ADMIN — GABAPENTIN 600 MG: 300 CAPSULE ORAL at 09:43

## 2022-11-08 RX ADMIN — HYDROMORPHONE HYDROCHLORIDE 0.5 MG: 1 INJECTION, SOLUTION INTRAMUSCULAR; INTRAVENOUS; SUBCUTANEOUS at 13:02

## 2022-11-08 RX ADMIN — EPHEDRINE SULFATE 10 MG: 50 INJECTION INTRAVENOUS at 10:31

## 2022-11-08 RX ADMIN — HYDROMORPHONE HYDROCHLORIDE 0.5 MG: 1 INJECTION, SOLUTION INTRAMUSCULAR; INTRAVENOUS; SUBCUTANEOUS at 12:33

## 2022-11-08 RX ADMIN — FENTANYL CITRATE 50 MCG: 50 INJECTION, SOLUTION INTRAMUSCULAR; INTRAVENOUS at 10:16

## 2022-11-08 RX ADMIN — HYDROCODONE BITARTRATE AND ACETAMINOPHEN 1 TABLET: 5; 325 TABLET ORAL at 21:37

## 2022-11-08 RX ADMIN — CHLORHEXIDINE GLUCONATE 0.12% ORAL RINSE 15 ML: 1.2 LIQUID ORAL at 09:44

## 2022-11-08 RX ADMIN — HYDROCODONE BITARTRATE AND ACETAMINOPHEN 1 TABLET: 5; 325 TABLET ORAL at 15:38

## 2022-11-08 RX ADMIN — LABETALOL HYDROCHLORIDE 5 MG: 5 INJECTION, SOLUTION INTRAVENOUS at 11:20

## 2022-11-08 RX ADMIN — FENTANYL CITRATE 50 MCG: 50 INJECTION, SOLUTION INTRAMUSCULAR; INTRAVENOUS at 12:25

## 2022-11-08 RX ADMIN — DEXAMETHASONE SODIUM PHOSPHATE 4 MG: 10 INJECTION, SOLUTION INTRAMUSCULAR; INTRAVENOUS at 10:19

## 2022-11-08 RX ADMIN — LABETALOL HYDROCHLORIDE 5 MG: 5 INJECTION, SOLUTION INTRAVENOUS at 12:48

## 2022-11-08 RX ADMIN — MIDAZOLAM HYDROCHLORIDE 2 MG: 1 INJECTION, SOLUTION INTRAMUSCULAR; INTRAVENOUS at 10:12

## 2022-11-08 RX ADMIN — HYDRALAZINE HYDROCHLORIDE 10 MG: 10 TABLET ORAL at 15:36

## 2022-11-08 RX ADMIN — SODIUM CHLORIDE, POTASSIUM CHLORIDE, SODIUM LACTATE AND CALCIUM CHLORIDE 150 ML/HR: 600; 310; 30; 20 INJECTION, SOLUTION INTRAVENOUS at 14:56

## 2022-11-08 RX ADMIN — Medication 10 ML: at 14:56

## 2022-11-08 RX ADMIN — SODIUM CHLORIDE: 9 INJECTION, SOLUTION INTRAVENOUS at 10:30

## 2022-11-08 RX ADMIN — ACYCLOVIR 400 MG: 200 CAPSULE ORAL at 15:36

## 2022-11-08 RX ADMIN — SODIUM CHLORIDE, POTASSIUM CHLORIDE, SODIUM LACTATE AND CALCIUM CHLORIDE 150 ML/HR: 600; 310; 30; 20 INJECTION, SOLUTION INTRAVENOUS at 21:33

## 2022-11-08 RX ADMIN — ROCURONIUM BROMIDE 50 MG: 10 INJECTION, SOLUTION INTRAVENOUS at 10:16

## 2022-11-08 RX ADMIN — CHLORHEXIDINE GLUCONATE 0.12% ORAL RINSE 15 ML: 1.2 LIQUID ORAL at 09:49

## 2022-11-08 RX ADMIN — LIDOCAINE HYDROCHLORIDE 0.5 ML: 10 INJECTION, SOLUTION EPIDURAL; INFILTRATION; INTRACAUDAL; PERINEURAL at 09:20

## 2022-11-08 RX ADMIN — PANTOPRAZOLE SODIUM 40 MG: 40 INJECTION, POWDER, FOR SOLUTION INTRAVENOUS at 09:44

## 2022-11-08 RX ADMIN — FENTANYL CITRATE 50 MCG: 50 INJECTION, SOLUTION INTRAMUSCULAR; INTRAVENOUS at 11:05

## 2022-11-08 RX ADMIN — CEFAZOLIN 3 G: 10 INJECTION, POWDER, FOR SOLUTION INTRAVENOUS at 17:12

## 2022-11-08 RX ADMIN — SODIUM CHLORIDE: 9 INJECTION, SOLUTION INTRAVENOUS at 09:01

## 2022-11-08 RX ADMIN — SCOPALAMINE 1 PATCH: 1 PATCH, EXTENDED RELEASE TRANSDERMAL at 09:44

## 2022-11-08 RX ADMIN — TRANEXAMIC ACID 1000 MG: 10 INJECTION, SOLUTION INTRAVENOUS at 11:09

## 2022-11-08 RX ADMIN — PANTOPRAZOLE SODIUM 40 MG: 40 INJECTION, POWDER, FOR SOLUTION INTRAVENOUS at 14:55

## 2022-11-08 RX ADMIN — SODIUM CHLORIDE 1000 ML: 9 INJECTION, SOLUTION INTRAVENOUS at 09:20

## 2022-11-08 NOTE — ANESTHESIA PREPROCEDURE EVALUATION
Anesthesia Evaluation     Patient summary reviewed and Nursing notes reviewed   no history of anesthetic complications:  NPO Solid Status: > 8 hours  NPO Liquid Status: > 8 hours           Airway   Mallampati: II  TM distance: >3 FB  Neck ROM: full  No difficulty expected  Dental      Pulmonary - normal exam   (+) shortness of breath,   Cardiovascular - normal exam    (+) hypertension, MCGRAW, hyperlipidemia,       Neuro/Psych  (+) headaches, psychiatric history,    GI/Hepatic/Renal/Endo    (+) obesity, morbid obesity, hiatal hernia, GERD,  diabetes mellitus,     Musculoskeletal     Abdominal    Substance History      OB/GYN          Other   blood dyscrasia,                     Anesthesia Plan    ASA 3     general with block     intravenous induction     Anesthetic plan, risks, benefits, and alternatives have been provided, discussed and informed consent has been obtained with: patient.    Plan discussed with CRNA.        CODE STATUS:

## 2022-11-08 NOTE — BRIEF OP NOTE
GASTRIC SLEEVE WITH HIATAL HERNIA LAPAROSCOPIC WITH DAVINCI ROBOT  Progress Note    Rashad Carrillo  11/8/2022    Pre-op Diagnosis:   Morbid obesity (HCC) [E66.01]  Hiatal hernia [K44.9]       Post-Op Diagnosis Codes:     * Morbid obesity (HCC) [E66.01]     * Hiatal hernia [K44.9]    Procedure/CPT® Codes:  VT LAP, HÉCTOR RESTRICT PROC, LONGITUDINAL GASTRECTOMY [34228]  VT ESOPHAGOGASTRODUODENOSCOPY TRANSORAL DIAGNOSTIC [01207]  VT LAP,ESOPHAGOGAST FUNDOPLASTY [53638]      Procedure(s):  GASTRIC SLEEVE WITH HIATAL HERNIA LAPAROSCOPIC WITH DAVINCI ROBOT, ESOPHAGOGASTRODUODENOSCOPY        Surgeon(s):  Maribell Anderson MD    Anesthesia: General with Block    Staff:   Circulator: Stanislaw Way RN; Kay Yu RN  Scrub Person: Haley, April; Rizwana Sims  Nursing Assistant: Kendal Bateman CNA  Assistant: Christoph Jones PA-C  Assistant: Christoph Jones PA-C      Estimated Blood Loss: 50 mL    Urine Voided: * No values recorded between 11/8/2022 10:10 AM and 11/8/2022 12:04 PM *    Specimens:                Specimens     ID Source Type Tests Collected By Collected At Frozen?    A Stomach Tissue · TISSUE PATHOLOGY EXAM   Maribell Anderson MD 11/8/22 1110 No    Description: SUB-TOTAL GASTRECTOMY    This specimen was not marked as sent.                Drains: * No LDAs found *    Findings: Diffusely oozing, improved with tranexamic acid.  Abundant upper abdominal fat.  Minimal lesser sac adhesions.        Complications: None immediate.    Assistant: Christoph Jones PA-C  was responsible for performing the following activities: Retraction, Suction, Irrigation, Suturing, Closing and Held/Positioned Camera and their skilled assistance was necessary for the success of this case.    Maribell Anderson MD     Date: 11/8/2022  Time: 12:33 EST

## 2022-11-08 NOTE — PLAN OF CARE
Goal Outcome Evaluation:  Plan of Care Reviewed With: patient        Progress: improving  Outcome Evaluation: Pt admit to floor from PACU. 2L NC O2 when sleeping. NSR on the monitor. Complaints of incisional pain. PRN's given for pain control. Lap sites clean and dry with no drainage. Pt encouraged to use ICS, chew gum, and ambulate frequently. Family at bedside. Pt resting comfortably. No further complaints at this time.

## 2022-11-08 NOTE — ANESTHESIA PROCEDURE NOTES
Airway  Urgency: elective    Date/Time: 11/8/2022 10:23 AM  Airway not difficult    General Information and Staff    Patient location during procedure: OR  CRNA/CAA: Nedra Doyle CRNA    Indications and Patient Condition  Indications for airway management: airway protection    Preoxygenated: yes  MILS not maintained throughout  Mask difficulty assessment: 2 - vent by mask + OA or adjuvant +/- NMBA    Final Airway Details  Final airway type: endotracheal airway      Successful airway: ETT  Cuffed: yes   Successful intubation technique: direct laryngoscopy  Facilitating devices/methods: intubating stylet  Endotracheal tube insertion site: oral  Blade: Jacquelin  Blade size: 3  ETT size (mm): 7.0  Cormack-Lehane Classification: grade I - full view of glottis  Placement verified by: chest auscultation and capnometry   Measured from: lips  ETT/EBT  to lips (cm): 20  Number of attempts at approach: 1  Assessment: lips, teeth, and gum same as pre-op and atraumatic intubation    Additional Comments  Negative epigastric sounds, Breath sound equal bilaterally with symmetric chest rise and fall

## 2022-11-08 NOTE — OP NOTE
OPERATIVE REPORT     DATE: 11/08/22    PATIENT: Rashad Carrillo    PREOPERATIVE DIAGNOSIS:    1. Morbid obesity with comorbidities  2.  Hiatal hernia    POSTOPERATIVE DIAGNOSIS:    1. Morbid obesity with multiple comorbidities.  2.  Hiatal hernia    PROCEDURES PERFORMED:  1. Robotic assisted laparoscopic sleeve gastrectomy (85% subtotal vertical gastrectomy) and hiatal hernia repair  2.  Esophagogastroduodenoscopy    SURGEON:  Maribell Anderson M.D.    ASSISTANT: BRIDGETTE Wade was instrumental in the success of the case and provided retraction, suction, camera holding, and skin closure.    ANESTHESIA:  General endotracheal with MARIBETH block    ESTIMATED BLOOD LOSS:   50 mL    FLUIDS:  Crystalloids.    SPECIMENS:  Subtotal gastrectomy.    DRAINS:  None.    COUNTS:  Correct.    COMPLICATIONS:  None.    FINDINGS: : Diffuse oozing, improved with tranexamic acid.  Abundant upper abdominal fat.  Minimal lesser sac adhesions.    INDICATIONS:   Rashad Carrillo is a 66 y.o. year old female with morbid obesity and associated comorbidities who presents for elective laparoscopic sleeve gastrectomy. The patient has undergone our extensive preoperative education, teaching, and consent process. Everything is in order and they wish to proceed.         DESCRIPTION OF PROCEDURE:   The patient was brought to the operating room and placed supine upon the operating room table. SCDs were placed. The patient underwent uneventful general endotracheal anesthesia and bilateral MARIBETH blocks per the anesthesiology staff.  She received subcutaneous Lovenox and was given Ancef. The abdomen was prepped with ChloraPrep and draped in the usual sterile fashion. An Ioban was used as well.  Timeout was performed.     A small transverse incision was made a few centimeters above and to the left of the umbilicus and the peritoneal cavity entered under direct camera visualization using an 8 mm 0° laparoscope and an Optiview trocar.  The abdomen was  then insufflated to a pressure of 16 mmHg with CO2 gas. Exploratory laparoscopy revealed no evidence of injury from the entrance technique. The gallbladder was absent.  The liver had a uniform capsule and was normal in size without evidence of gross hepatomegaly or fibrosis.  Two 8 mm ports were placed to the patient's left, lateral of the  first port, and a 12 mm port was placed to the patient's right.  The robot was docked, all safety checks were performed, and I moved to the robot console.     A 2-0 V-Loc suture was placed to make a liver sling with bites of the peritoneum and the right penelope of the diaphragm, and the left lobe of the liver was elevated. The stomach was decompressed with an 18 Sami orogastric tube, which was then positioned in the antrum. The greater curvature vessels were taken down with the vessel sealer energy device beginning at the midpoint of the stomach and continued up to the angle of His, including the short gastrics. The left penelope was completely exposed and there was a hiatal hernia seen here. This was photodocumented. The GE junction fat pad was elevated to make a clear landing zone for the stapler later. The greater curvature vessels were taken down with the energy device extending distally within 3 cm of the pylorus. Filmy adhesions to the stomach were taken down posteriorly.     I dissected the left penelope with the vessel sealer, then divided the pars flaccida.  There was no replaced right hepatic vessel in the gastrohepatic ligament.  I dissected the right penelope, then passed a penrose drain around the esophagus for retraction.  The phrenoesophageal ligament was divided.  Once the hiatus was completely dissected, I performed a posterior cruroplasty with running nonabsorbable 2-0 V-Loc suture.  The penrose was removed.  Of note, she had abundant friable fat near the hiatus, and she received 1 gram of tranexamic acid, which improved oozing.       A 36 Sami bougie was inserted and  positioned along the lesser curvature of the stomach.  1 mg of IV glucagon was given to relax gastric smooth muscle.  Using the bougie as a template, a vertical sleeve gastrectomy was fashioned with successive staple loads bolstered with a single absorbable Aracely-strip: the first load was green, the next load was blue, and the following 2 loads were white.   The staple line was examined and a few points of bleeding were cauterized with bipolar cautery. The gastrectomy specimen was removed through the medial 12 mm port site. The specimen was later examined, and the staples were well-formed. Palpation of the specimen revealed no masses. The staple line of the sleeve gastrectomy revealed staples that were well-formed. The upper abdomen was flooded with saline, and endoscopy was performed.     The flexible endoscope was passed transorally down to the pylorus easily. The sleeve extended to within 6 cm proximal to the pylorus and was hemostatic. The sleeve was not narrow or twisted. There was no hiatal hernia or distal esophagitis. The rest of the esophagus was normal. The scope was removed. The leak test was normal.        I rescrubbed and suctioned the irrigation fluid from the upper abdomen, making sure it was dry. The staple line on the stomach was hemostatic.  The staple line was treated with 4 ml of aerosolized Tisseel fibrin glue. The area around the hiatal hernia repair was treated with 10 mL of Floseal.  The liver stitch was removed easily. The medial 12 mm port was removed under direct visualization and there was no bleeding. This incision was closed with an 0-Vicryl transfascial suture using a suture passer under direct visualization in a horizontal mattress fashion. All other ports were removed and then replaced under direct visualization and all of these were hemostatic. The instruments were removed and the abdomen was desufflated. The ports were removed.  3-0 Monocryl plus was used to close skin incisions  with interrupted sutures. Skin glue was placed. 10 mg of IV Barhemsys was given at the end of the case.The patient was awakened and taken to recovery in good condition, having tolerated the procedure well. All sponge, needle, and instrument counts were correct.

## 2022-11-08 NOTE — ANESTHESIA PROCEDURE NOTES
Peripheral Block    Pre-sedation assessment completed: 10/19/2022 10:29 AM    Patient reassessed immediately prior to procedure    Patient location during procedure: OR  Start time: 11/8/2022 10:19 AM  Reason for block: at surgeon's request and post-op pain management  Performed by  Assisted by: Nedra Doyle CRNASRNA: Reynaldo Franklin CRNA  Preanesthetic Checklist  Completed: patient identified, IV checked, site marked, risks and benefits discussed, surgical consent, monitors and equipment checked, pre-op evaluation and timeout performed  Prep:  Pt Position: supine  Sterile barriers:cap, gloves, mask and washed/disinfected hands  Prep: ChloraPrep  Patient monitoring: blood pressure monitoring, continuous pulse oximetry and EKG  Procedure    Sedation: yes  Performed under: general  Guidance:ultrasound guided    ULTRASOUND INTERPRETATION.  Using ultrasound guidance a 20 G gauge needle was placed in close proximity to the nerve, at which point, under ultrasound guidance anesthetic was injected in the area of the nerve and spread of the anesthesia was seen on ultrasound in close proximity thereto.  There were no abnormalities seen on ultrasound; a digital image was taken; and the patient tolerated the procedure with no complications. Images:still images obtained, printed/placed on chart    Laterality:Bilateral  Block Type:TAP  Injection Technique:single-shot  Needle Type:short-bevel and echogenic  Needle Gauge:20 G  Resistance on Injection: none    Medications Used: bupivacaine PF (MARCAINE) 0.25 % injection - Injection   60 mL - 11/8/2022 10:19:00 AM  dexamethasone sodium phosphate injection - Injection   4 mg - 11/8/2022 10:19:00 AM      Medications  Comment:Block Injection:  LA dose divided between Right and Left block        Post Assessment  Injection Assessment: negative aspiration for heme, incremental injection and no paresthesia on injection  Patient Tolerance:comfortable throughout  "block  Complications:no  Additional Notes    Subcostal TAPs    A high-frequency linear transducer, with sterile cover, was placed sub-xiphoid to identify Linea Alba, right and left Rectus Abdominus Muscles (MARU). The transducer was moved either right or left subcostally to identify the MARU and the Transverse Abdominus Muscle (TORRES). The insertion site was prepped in sterile fashion and then localized with 2-5 ml of 1% Lidocaine. Using ultrasound-guidance, a 20-gauge B-Haile 4\" Ultraplex 360 non-stimulating echogenic needle was advanced in plane, from medial to lateral, until the tip of the needle was in the fascial plane between the MARU and TORRES. 1-3ml of preservative free normal saline was used to hydro-dissect the fascial planes. After the fascial plane was verified, the local anesthetic (LA) was injected. The procedure was repeated on the opposite side for bilateral coverage. Aspiration every 5 ml to prevent intravascular injection. Injection was completed with negative aspiration of blood and negative intravascular injection. Injection pressures were normal with minimal resistance. The subcostal approach to the TAP nerve block ideally anesthetizes the intercostal nerves T6-T9.            "

## 2022-11-08 NOTE — ANESTHESIA POSTPROCEDURE EVALUATION
Patient: Rashad Carrillo    Procedure Summary     Date: 11/08/22 Room / Location:  [facility]  /  [facility]    Anesthesia Start: 1010 Anesthesia Stop: 1210    Procedure: GASTRIC SLEEVE WITH HIATAL HERNIA LAPAROSCOPIC WITH DAVINCI ROBOT, ESOPHAGOGASTRODUODENOSCOPY (Abdomen) Diagnosis:       Morbid obesity (HCC)      Hiatal hernia      (Morbid obesity (HCC) [E66.01])      (Hiatal hernia [K44.9])    Surgeons: Maribell Anderson MD Provider: Ye Childress MD    Anesthesia Type: general with block ASA Status: 3          Anesthesia Type: general with block    Vitals  No vitals data found for the desired time range.          Post Anesthesia Care and Evaluation    Patient location during evaluation: PACU  Patient participation: complete - patient participated  Level of consciousness: sleepy but conscious  Pain management: adequate    Airway patency: patent  Anesthetic complications: No anesthetic complications  PONV Status: none  Cardiovascular status: hemodynamically stable and acceptable  Respiratory status: nonlabored ventilation, acceptable, nasal cannula and oral airway  Hydration status: acceptable    Comments: /79  Sats 94%  HR 72  Temp 97.3

## 2022-11-09 ENCOUNTER — APPOINTMENT (OUTPATIENT)
Dept: GENERAL RADIOLOGY | Facility: HOSPITAL | Age: 67
End: 2022-11-09

## 2022-11-09 ENCOUNTER — APPOINTMENT (OUTPATIENT)
Dept: MRI IMAGING | Facility: HOSPITAL | Age: 67
End: 2022-11-09

## 2022-11-09 LAB
ALBUMIN SERPL-MCNC: 3.8 G/DL (ref 3.5–5.2)
ALBUMIN/GLOB SERPL: 1.5 G/DL
ALP SERPL-CCNC: 105 U/L (ref 39–117)
ALT SERPL W P-5'-P-CCNC: 15 U/L (ref 1–33)
ANION GAP SERPL CALCULATED.3IONS-SCNC: 13 MMOL/L (ref 5–15)
AST SERPL-CCNC: 22 U/L (ref 1–32)
BASOPHILS # BLD AUTO: 0.01 10*3/MM3 (ref 0–0.2)
BASOPHILS NFR BLD AUTO: 0.1 % (ref 0–1.5)
BILIRUB SERPL-MCNC: 0.3 MG/DL (ref 0–1.2)
BUN SERPL-MCNC: 18 MG/DL (ref 8–23)
BUN/CREAT SERPL: 25.7 (ref 7–25)
CALCIUM SPEC-SCNC: 8.9 MG/DL (ref 8.6–10.5)
CHLORIDE SERPL-SCNC: 104 MMOL/L (ref 98–107)
CO2 SERPL-SCNC: 21 MMOL/L (ref 22–29)
CREAT SERPL-MCNC: 0.7 MG/DL (ref 0.57–1)
CYTO UR: NORMAL
DEPRECATED RDW RBC AUTO: 42.1 FL (ref 37–54)
EGFRCR SERPLBLD CKD-EPI 2021: 96.7 ML/MIN/1.73
EOSINOPHIL # BLD AUTO: 0 10*3/MM3 (ref 0–0.4)
EOSINOPHIL NFR BLD AUTO: 0 % (ref 0.3–6.2)
ERYTHROCYTE [DISTWIDTH] IN BLOOD BY AUTOMATED COUNT: 12.7 % (ref 12.3–15.4)
GLOBULIN UR ELPH-MCNC: 2.5 GM/DL
GLUCOSE SERPL-MCNC: 123 MG/DL (ref 65–99)
HCT VFR BLD AUTO: 40.8 % (ref 34–46.6)
HGB BLD-MCNC: 13.3 G/DL (ref 12–15.9)
IMM GRANULOCYTES # BLD AUTO: 0.03 10*3/MM3 (ref 0–0.05)
IMM GRANULOCYTES NFR BLD AUTO: 0.4 % (ref 0–0.5)
IRON 24H UR-MRATE: 34 MCG/DL (ref 37–145)
LAB AP CASE REPORT: NORMAL
LAB AP CLINICAL INFORMATION: NORMAL
LYMPHOCYTES # BLD AUTO: 0.75 10*3/MM3 (ref 0.7–3.1)
LYMPHOCYTES NFR BLD AUTO: 10 % (ref 19.6–45.3)
MCH RBC QN AUTO: 29.2 PG (ref 26.6–33)
MCHC RBC AUTO-ENTMCNC: 32.6 G/DL (ref 31.5–35.7)
MCV RBC AUTO: 89.7 FL (ref 79–97)
MONOCYTES # BLD AUTO: 0.46 10*3/MM3 (ref 0.1–0.9)
MONOCYTES NFR BLD AUTO: 6.1 % (ref 5–12)
NEUTROPHILS NFR BLD AUTO: 6.28 10*3/MM3 (ref 1.7–7)
NEUTROPHILS NFR BLD AUTO: 83.4 % (ref 42.7–76)
NRBC BLD AUTO-RTO: 0 /100 WBC (ref 0–0.2)
PATH REPORT.FINAL DX SPEC: NORMAL
PATH REPORT.GROSS SPEC: NORMAL
PLATELET # BLD AUTO: 180 10*3/MM3 (ref 140–450)
PMV BLD AUTO: 11.3 FL (ref 6–12)
POTASSIUM SERPL-SCNC: 4.3 MMOL/L (ref 3.5–5.2)
PROT SERPL-MCNC: 6.3 G/DL (ref 6–8.5)
RBC # BLD AUTO: 4.55 10*6/MM3 (ref 3.77–5.28)
SODIUM SERPL-SCNC: 138 MMOL/L (ref 136–145)
WBC NRBC COR # BLD: 7.53 10*3/MM3 (ref 3.4–10.8)

## 2022-11-09 PROCEDURE — 25010000002 CYANOCOBALAMIN PER 1000 MCG: Performed by: SURGERY

## 2022-11-09 PROCEDURE — 25010000002 CEFAZOLIN PER 500 MG: Performed by: SURGERY

## 2022-11-09 PROCEDURE — 25010000002 ENOXAPARIN PER 10 MG: Performed by: SURGERY

## 2022-11-09 PROCEDURE — 74240 X-RAY XM UPR GI TRC 1CNTRST: CPT

## 2022-11-09 PROCEDURE — 70551 MRI BRAIN STEM W/O DYE: CPT

## 2022-11-09 PROCEDURE — 83540 ASSAY OF IRON: CPT | Performed by: SURGERY

## 2022-11-09 PROCEDURE — 99024 POSTOP FOLLOW-UP VISIT: CPT | Performed by: SURGERY

## 2022-11-09 PROCEDURE — 0 POTASSIUM CHLORIDE PER 2 MEQ: Performed by: SURGERY

## 2022-11-09 PROCEDURE — 25010000002 THIAMINE PER 100 MG: Performed by: SURGERY

## 2022-11-09 PROCEDURE — 25010000002 NA FERRIC GLUC CPLX PER 12.5 MG: Performed by: SURGERY

## 2022-11-09 PROCEDURE — 80053 COMPREHEN METABOLIC PANEL: CPT | Performed by: SURGERY

## 2022-11-09 PROCEDURE — 0 DIATRIZOATE MEGLUMINE & SODIUM PER 1 ML: Performed by: SURGERY

## 2022-11-09 PROCEDURE — 85025 COMPLETE CBC W/AUTO DIFF WBC: CPT | Performed by: SURGERY

## 2022-11-09 RX ORDER — OXYCODONE HYDROCHLORIDE 5 MG/1
5 TABLET ORAL EVERY 6 HOURS PRN
Qty: 10 TABLET | Refills: 0 | Status: SHIPPED | OUTPATIENT
Start: 2022-11-09 | End: 2023-02-10

## 2022-11-09 RX ORDER — DALFAMPRIDINE 10 MG/1
10 TABLET, FILM COATED, EXTENDED RELEASE ORAL 2 TIMES DAILY
Status: DISCONTINUED | OUTPATIENT
Start: 2022-11-09 | End: 2022-11-11 | Stop reason: HOSPADM

## 2022-11-09 RX ORDER — HYDROCODONE BITARTRATE AND ACETAMINOPHEN 5; 325 MG/1; MG/1
1 TABLET ORAL EVERY 4 HOURS PRN
Status: DISCONTINUED | OUTPATIENT
Start: 2022-11-09 | End: 2022-11-11 | Stop reason: HOSPADM

## 2022-11-09 RX ORDER — OMEPRAZOLE 40 MG/1
40 CAPSULE, DELAYED RELEASE ORAL DAILY
Qty: 60 CAPSULE | Refills: 0 | Status: SHIPPED | OUTPATIENT
Start: 2022-11-09 | End: 2023-01-23

## 2022-11-09 RX ORDER — ONDANSETRON 4 MG/1
4 TABLET, ORALLY DISINTEGRATING ORAL EVERY 8 HOURS PRN
Qty: 10 TABLET | Refills: 0 | Status: SHIPPED | OUTPATIENT
Start: 2022-11-09 | End: 2022-11-15

## 2022-11-09 RX ORDER — HYDROMORPHONE HYDROCHLORIDE 2 MG/1
2 TABLET ORAL
Status: DISCONTINUED | OUTPATIENT
Start: 2022-11-09 | End: 2022-11-11 | Stop reason: HOSPADM

## 2022-11-09 RX ADMIN — HYDRALAZINE HYDROCHLORIDE 10 MG: 10 TABLET ORAL at 13:34

## 2022-11-09 RX ADMIN — ENOXAPARIN SODIUM 40 MG: 40 INJECTION SUBCUTANEOUS at 08:06

## 2022-11-09 RX ADMIN — HYDROCODONE BITARTRATE AND ACETAMINOPHEN 1 TABLET: 5; 325 TABLET ORAL at 19:42

## 2022-11-09 RX ADMIN — POTASSIUM CHLORIDE AND SODIUM CHLORIDE 100 ML/HR: 450; 150 INJECTION, SOLUTION INTRAVENOUS at 23:26

## 2022-11-09 RX ADMIN — Medication 10 ML: at 20:05

## 2022-11-09 RX ADMIN — LOSARTAN POTASSIUM 100 MG: 50 TABLET, FILM COATED ORAL at 08:06

## 2022-11-09 RX ADMIN — GABAPENTIN 300 MG: 300 CAPSULE ORAL at 08:11

## 2022-11-09 RX ADMIN — ALPRAZOLAM 0.25 MG: 0.5 TABLET ORAL at 23:26

## 2022-11-09 RX ADMIN — Medication 10 ML: at 08:07

## 2022-11-09 RX ADMIN — HYDRALAZINE HYDROCHLORIDE 10 MG: 10 TABLET ORAL at 06:48

## 2022-11-09 RX ADMIN — GABAPENTIN 300 MG: 300 CAPSULE ORAL at 18:39

## 2022-11-09 RX ADMIN — POTASSIUM CHLORIDE AND SODIUM CHLORIDE 100 ML/HR: 450; 150 INJECTION, SOLUTION INTRAVENOUS at 13:34

## 2022-11-09 RX ADMIN — ACYCLOVIR 400 MG: 200 CAPSULE ORAL at 20:04

## 2022-11-09 RX ADMIN — THIAMINE HYDROCHLORIDE 100 ML/HR: 100 INJECTION, SOLUTION INTRAMUSCULAR; INTRAVENOUS at 02:33

## 2022-11-09 RX ADMIN — CYANOCOBALAMIN 1000 MCG: 1000 INJECTION, SOLUTION INTRAMUSCULAR; SUBCUTANEOUS at 08:06

## 2022-11-09 RX ADMIN — LABETALOL HYDROCHLORIDE 10 MG: 5 INJECTION, SOLUTION INTRAVENOUS at 16:31

## 2022-11-09 RX ADMIN — CEFAZOLIN 3 G: 10 INJECTION, POWDER, FOR SOLUTION INTRAVENOUS at 02:32

## 2022-11-09 RX ADMIN — FLUOXETINE HYDROCHLORIDE 20 MG: 20 CAPSULE ORAL at 08:06

## 2022-11-09 RX ADMIN — SODIUM CHLORIDE 125 MG: 9 INJECTION, SOLUTION INTRAVENOUS at 11:26

## 2022-11-09 RX ADMIN — DALFAMPRIDINE 10 MG: 10 TABLET, FILM COATED, EXTENDED RELEASE ORAL at 22:13

## 2022-11-09 RX ADMIN — GABAPENTIN 300 MG: 300 CAPSULE ORAL at 20:04

## 2022-11-09 RX ADMIN — ACYCLOVIR 400 MG: 200 CAPSULE ORAL at 08:06

## 2022-11-09 RX ADMIN — HYDRALAZINE HYDROCHLORIDE 10 MG: 10 TABLET ORAL at 20:04

## 2022-11-09 RX ADMIN — PANTOPRAZOLE SODIUM 40 MG: 40 TABLET, DELAYED RELEASE ORAL at 08:06

## 2022-11-09 NOTE — PLAN OF CARE
Goal Outcome Evaluation:   VSS, room air. Patient having minimal pain but does not want any pain medicine at this time. Patient is ambulating and using IS. Patient is drinking protein with no nausea. Plan of care discussed with the patient.

## 2022-11-09 NOTE — PLAN OF CARE
Goal Outcome Evaluation:  Plan of Care Reviewed With: patient        Progress: improving  Outcome Evaluation: A&OX4. VSS. 2LNC. Pt c/o of pain. PRN intervention given. Pt ambulated to bathroom and around room. IV fluids infusing, IV abx infused as scheduled. Incentive spirometer in use, Lap sites are clean, dry and intact. Pt encourgaed to chew gum and tolerating sips and chips. Pt denies any additional complaints at this time. Nursing staff will continue to monitor.

## 2022-11-09 NOTE — PROGRESS NOTES
"Bariatric Surgery Progress Note:      Chief Complaint:  POD #1    Subjective     Interval History: She was doing very well earlier in the day, but around 4 PM, her general condition declined.  History this afternoon is obtained from her niece and her sister, who are her caregivers.  They report that she has not been able to walk, has word finding difficulty, and has been confused, all of which are typical symptoms of an multiple sclerosis relapse.  Earlier today, she was ambulating in the halls, sitting in the chair, and had 1.5 protein shakes.  She was also doing her IS to 1500 earlier in the day.  Her regular neurologist is Dr. Gilbert, and her niece was working on getting a hold of him.    Objective     Vital Signs  Blood pressure 151/66, pulse 79, temperature 97.6 °F (36.4 °C), temperature source Oral, resp. rate 18, height 179.1 cm (70.5\"), weight (!) 151 kg (333 lb), SpO2 99 %, not currently breastfeeding.      Intake/Output Summary (Last 24 hours) at 11/9/2022 1340  Last data filed at 11/9/2022 1306  Gross per 24 hour   Intake 180 ml   Output 800 ml   Net -620 ml       Physical Exam:  General: somnolent.  She awoke to voice, and answered a few questions appropriately before falling back to sleep.  Complained of chest pain with drinking.  Abdomen: soft, appropriate, incisions okay  Extremities: warm, (+) SCDs       Labs:  Lab Results (last 24 hours)     Procedure Component Value Units Date/Time    Tissue Pathology Exam [[id number]] Collected: 11/08/22 1110    Specimen: Tissue from Stomach Updated: 11/09/22 1050     Case Report --     Surgical Pathology Report                         Case: [id number]                                  Authorizing Provider:  Maribell Anderson MD  Collected:           11/08/2022 11:10 AM          Ordering Location:     [facility]   Received:            11/08/2022 01:39 PM                                 OR                                                            "                Pathologist:           Jimmy Olivia MD                                                        Specimen:    Stomach, SUB-TOTAL GASTRECTOMY                                                              Clinical Information --     Morbid obesity (HCC)  Hiatal hernia       Final Diagnosis --     STOMACH, SUBTOTAL GASTRECTOMY:                Gastric tissue with no signficant histopathologic change.  Negative for intestinal metaplasia and dysplasia.  No Helicobacter pylori-like organisms identified on routine stains.       Gross Description --     1. Stomach.  Received in formalin labeled subtotal gastrectomy is an 18 x 4 x 3 cm portion of stomach with a staple line along one side.  The serosa is smooth, tan-pink and glistening.  The specimen is received with a 3 x 3 cm transmural defect along the staple line.  The lumen contains a moderate amount of red viscous blood.  The mucosa is tan-red, glistening and has a normal rugal folding pattern.  No masses, polyps or ulcers are grossly identified.  Representative sections are submitted in blocks 1A-1C.  LDP         Microscopic Description --     The slides are reviewed and demonstrate histopathologic features supporting the above rendered diagnosis.        Iron [[id number]]  (Abnormal) Collected: 11/09/22 0431    Specimen: Blood Updated: 11/09/22 0700     Iron 34 mcg/dL     Comprehensive Metabolic Panel [[id number]]  (Abnormal) Collected: 11/09/22 0431    Specimen: Blood Updated: 11/09/22 0559     Glucose 123 mg/dL      BUN 18 mg/dL      Creatinine 0.70 mg/dL      Sodium 138 mmol/L      Potassium 4.3 mmol/L      Chloride 104 mmol/L      CO2 21.0 mmol/L      Calcium 8.9 mg/dL      Total Protein 6.3 g/dL      Albumin 3.80 g/dL      ALT (SGPT) 15 U/L      AST (SGOT) 22 U/L      Alkaline Phosphatase 105 U/L      Total Bilirubin 0.3 mg/dL      Globulin 2.5 gm/dL      Comment: Calculated Result        A/G Ratio 1.5 g/dL      BUN/Creatinine Ratio 25.7     Anion  Gap 13.0 mmol/L      eGFR 96.7 mL/min/1.73      Comment: National Kidney Foundation and American Society of Nephrology (ASN) Task Force recommended calculation based on the Chronic Kidney Disease Epidemiology Collaboration (CKD-EPI) equation refit without adjustment for race.       Narrative:      GFR Normal >60  Chronic Kidney Disease <60  Kidney Failure <15      CBC & Differential [[id number]]  (Abnormal) Collected: 11/09/22 0431    Specimen: Blood Updated: 11/09/22 0531    Narrative:      The following orders were created for panel order CBC & Differential.  Procedure                               Abnormality         Status                     ---------                               -----------         ------                     CBC Auto Differential[[id number]]        Abnormal            Final result                 Please view results for these tests on the individual orders.    CBC Auto Differential [[id number]]  (Abnormal) Collected: 11/09/22 0431    Specimen: Blood Updated: 11/09/22 0531     WBC 7.53 10*3/mm3      RBC 4.55 10*6/mm3      Hemoglobin 13.3 g/dL      Hematocrit 40.8 %      MCV 89.7 fL      MCH 29.2 pg      MCHC 32.6 g/dL      RDW 12.7 %      RDW-SD 42.1 fl      MPV 11.3 fL      Platelets 180 10*3/mm3      Neutrophil % 83.4 %      Lymphocyte % 10.0 %      Monocyte % 6.1 %      Eosinophil % 0.0 %      Basophil % 0.1 %      Immature Grans % 0.4 %      Neutrophils, Absolute 6.28 10*3/mm3      Lymphocytes, Absolute 0.75 10*3/mm3      Monocytes, Absolute 0.46 10*3/mm3      Eosinophils, Absolute 0.00 10*3/mm3      Basophils, Absolute 0.01 10*3/mm3      Immature Grans, Absolute 0.03 10*3/mm3      nRBC 0.0 /100 WBC             Assessment & Plan     POD # 1 s/p LSG/HHR.    Doing okay, but may be having multiple sclerosis relapse versus pseudo relapse.  I discussed the case with Dr. Xiong of neurology, and he will see her tomorrow.  I have ordered a noncontrast MRI of the brain at his guidance.  UGI normal  post-sleeve, images and report reviewed.  IV iron given for low Fe without evidence of bleeding on Lovenox.  Continue OOB/ PT/ DVT prophx.    Of course, she will not be a candidate for any steroids if she is having multiple sclerosis relapse for risk of life-threatening gastric leak.  She is okay to take her regular multiple sclerosis meds or any other monoclonal antibodies.  I have asked her niece to bring all of her medicines from home, so that we can get her back on her regular meds.

## 2022-11-10 LAB
ALBUMIN SERPL-MCNC: 3.5 G/DL (ref 3.5–5.2)
ALBUMIN/GLOB SERPL: 1.2 G/DL
ALP SERPL-CCNC: 96 U/L (ref 39–117)
ALT SERPL W P-5'-P-CCNC: 7 U/L (ref 1–33)
ANION GAP SERPL CALCULATED.3IONS-SCNC: 10 MMOL/L (ref 5–15)
AST SERPL-CCNC: 18 U/L (ref 1–32)
BASOPHILS # BLD AUTO: 0.03 10*3/MM3 (ref 0–0.2)
BASOPHILS NFR BLD AUTO: 0.4 % (ref 0–1.5)
BILIRUB SERPL-MCNC: 0.6 MG/DL (ref 0–1.2)
BUN SERPL-MCNC: 14 MG/DL (ref 8–23)
BUN/CREAT SERPL: 21.5 (ref 7–25)
CALCIUM SPEC-SCNC: 9.2 MG/DL (ref 8.6–10.5)
CHLORIDE SERPL-SCNC: 103 MMOL/L (ref 98–107)
CO2 SERPL-SCNC: 23 MMOL/L (ref 22–29)
CREAT SERPL-MCNC: 0.65 MG/DL (ref 0.57–1)
DEPRECATED RDW RBC AUTO: 44.2 FL (ref 37–54)
EGFRCR SERPLBLD CKD-EPI 2021: 98.5 ML/MIN/1.73
EOSINOPHIL # BLD AUTO: 0.02 10*3/MM3 (ref 0–0.4)
EOSINOPHIL NFR BLD AUTO: 0.3 % (ref 0.3–6.2)
ERYTHROCYTE [DISTWIDTH] IN BLOOD BY AUTOMATED COUNT: 13.4 % (ref 12.3–15.4)
GLOBULIN UR ELPH-MCNC: 3 GM/DL
GLUCOSE SERPL-MCNC: 84 MG/DL (ref 65–99)
HCT VFR BLD AUTO: 38.9 % (ref 34–46.6)
HGB BLD-MCNC: 12.8 G/DL (ref 12–15.9)
IMM GRANULOCYTES # BLD AUTO: 0.03 10*3/MM3 (ref 0–0.05)
IMM GRANULOCYTES NFR BLD AUTO: 0.4 % (ref 0–0.5)
LYMPHOCYTES # BLD AUTO: 0.91 10*3/MM3 (ref 0.7–3.1)
LYMPHOCYTES NFR BLD AUTO: 11.4 % (ref 19.6–45.3)
MCH RBC QN AUTO: 29.8 PG (ref 26.6–33)
MCHC RBC AUTO-ENTMCNC: 32.9 G/DL (ref 31.5–35.7)
MCV RBC AUTO: 90.5 FL (ref 79–97)
MONOCYTES # BLD AUTO: 0.88 10*3/MM3 (ref 0.1–0.9)
MONOCYTES NFR BLD AUTO: 11 % (ref 5–12)
NEUTROPHILS NFR BLD AUTO: 6.1 10*3/MM3 (ref 1.7–7)
NEUTROPHILS NFR BLD AUTO: 76.5 % (ref 42.7–76)
NRBC BLD AUTO-RTO: 0 /100 WBC (ref 0–0.2)
PLATELET # BLD AUTO: 173 10*3/MM3 (ref 140–450)
PMV BLD AUTO: 11.7 FL (ref 6–12)
POTASSIUM SERPL-SCNC: 3.8 MMOL/L (ref 3.5–5.2)
PROT SERPL-MCNC: 6.5 G/DL (ref 6–8.5)
RBC # BLD AUTO: 4.3 10*6/MM3 (ref 3.77–5.28)
SODIUM SERPL-SCNC: 136 MMOL/L (ref 136–145)
WBC NRBC COR # BLD: 7.97 10*3/MM3 (ref 3.4–10.8)

## 2022-11-10 PROCEDURE — 85025 COMPLETE CBC W/AUTO DIFF WBC: CPT | Performed by: SURGERY

## 2022-11-10 PROCEDURE — 99223 1ST HOSP IP/OBS HIGH 75: CPT | Performed by: PSYCHIATRY & NEUROLOGY

## 2022-11-10 PROCEDURE — 0 POTASSIUM CHLORIDE PER 2 MEQ: Performed by: SURGERY

## 2022-11-10 PROCEDURE — 25010000002 ENOXAPARIN PER 10 MG: Performed by: SURGERY

## 2022-11-10 PROCEDURE — 97162 PT EVAL MOD COMPLEX 30 MIN: CPT

## 2022-11-10 PROCEDURE — 99024 POSTOP FOLLOW-UP VISIT: CPT | Performed by: SURGERY

## 2022-11-10 PROCEDURE — 80053 COMPREHEN METABOLIC PANEL: CPT | Performed by: SURGERY

## 2022-11-10 RX ADMIN — PANTOPRAZOLE SODIUM 40 MG: 40 TABLET, DELAYED RELEASE ORAL at 08:40

## 2022-11-10 RX ADMIN — GABAPENTIN 300 MG: 300 CAPSULE ORAL at 08:39

## 2022-11-10 RX ADMIN — FLUOXETINE HYDROCHLORIDE 20 MG: 20 CAPSULE ORAL at 08:40

## 2022-11-10 RX ADMIN — DALFAMPRIDINE 10 MG: 10 TABLET, FILM COATED, EXTENDED RELEASE ORAL at 08:40

## 2022-11-10 RX ADMIN — ACYCLOVIR 400 MG: 200 CAPSULE ORAL at 22:17

## 2022-11-10 RX ADMIN — GABAPENTIN 300 MG: 300 CAPSULE ORAL at 22:17

## 2022-11-10 RX ADMIN — Medication 10 ML: at 08:40

## 2022-11-10 RX ADMIN — HYDRALAZINE HYDROCHLORIDE 10 MG: 10 TABLET ORAL at 13:07

## 2022-11-10 RX ADMIN — HYDRALAZINE HYDROCHLORIDE 10 MG: 10 TABLET ORAL at 06:11

## 2022-11-10 RX ADMIN — LOSARTAN POTASSIUM 100 MG: 50 TABLET, FILM COATED ORAL at 08:40

## 2022-11-10 RX ADMIN — POTASSIUM CHLORIDE AND SODIUM CHLORIDE 100 ML/HR: 450; 150 INJECTION, SOLUTION INTRAVENOUS at 13:07

## 2022-11-10 RX ADMIN — ACYCLOVIR 400 MG: 200 CAPSULE ORAL at 08:40

## 2022-11-10 RX ADMIN — GABAPENTIN 300 MG: 300 CAPSULE ORAL at 16:41

## 2022-11-10 RX ADMIN — Medication 10 ML: at 22:18

## 2022-11-10 RX ADMIN — POTASSIUM CHLORIDE AND SODIUM CHLORIDE 100 ML/HR: 450; 150 INJECTION, SOLUTION INTRAVENOUS at 22:30

## 2022-11-10 RX ADMIN — DALFAMPRIDINE 10 MG: 10 TABLET, FILM COATED, EXTENDED RELEASE ORAL at 22:17

## 2022-11-10 RX ADMIN — HYDRALAZINE HYDROCHLORIDE 10 MG: 10 TABLET ORAL at 22:17

## 2022-11-10 RX ADMIN — ENOXAPARIN SODIUM 40 MG: 40 INJECTION SUBCUTANEOUS at 08:39

## 2022-11-10 RX ADMIN — MAGNESIUM HYDROXIDE 10 ML: 2400 SUSPENSION ORAL at 22:17

## 2022-11-10 NOTE — THERAPY EVALUATION
Patient Name: Rashad Carrillo  : 1955    MRN: [id number]                              Today's Date: 11/10/2022       Admit Date: 2022    Visit Dx:     ICD-10-CM ICD-9-CM   1. Morbid obesity (HCC)  E66.01 278.01   2. Hiatal hernia  K44.9 553.3     Patient Active Problem List   Diagnosis   • Vitamin D deficiency   • Depression   • Multiple sclerosis (HCC)   • Restless legs syndrome   • Muscle spasticity   • Chronic migraine without aura without status migrainosus, not intractable   • Essential hypertension   • Frequent falls   • Status post balloon dilatation of esophageal stricture   • T2DM (type 2 diabetes mellitus) ([facility])   • TB lung, latent   • Aplastic anemia likely due to isoniazide   • Chest pain   • MCGRAW (dyspnea on exertion)   • Hyperlipidemia LDL goal <70   • Class 3 severe obesity due to excess calories with serious comorbidity and body mass index (BMI) of 50.0 to 59.9 in adult ([facility])   • Fatigue   • Dyspepsia   • Morbid obesity (HCC)   • Hiatal hernia     Past Medical History:   Diagnosis Date   • Chronic knee pain     NSAIDS + steroid injections (last 3/2022)   • Dyspepsia    • Dyspnea on exertion    • Fatigue    • GERD (gastroesophageal reflux disease)    • History of transfusion     PATIENT DENIES REACTION   • Hyperlipidemia    • Hypertension    • Morbid obesity ([facility])    • Multiple sclerosis ([facility])     follows ellie/ Dr. Gilbert     Past Surgical History:   Procedure Laterality Date   • COLONOSCOPY     • GASTRIC SLEEVE LAPAROSCOPIC N/A 2022    Procedure: GASTRIC SLEEVE WITH HIATAL HERNIA LAPAROSCOPIC WITH DAVINCI ROBOT, ESOPHAGOGASTRODUODENOSCOPY;  Surgeon: Maribell Anderson MD;  Location: [facility];  Service: Robotics - DaVinci;  Laterality: N/A;   • LAPAROSCOPIC CHOLECYSTECTOMY  2012    gallstone pancreatitis      General Information     Row Name 11/10/22 0900          Physical Therapy Time and Intention    Document Type evaluation  -KW     Mode of Treatment individual  therapy;physical therapy  -     Row Name 11/10/22 0900          General Information    Patient Profile Reviewed yes  -KW     Prior Level of Function independent:;gait;transfer;bed mobility;all household mobility  -KW     Existing Precautions/Restrictions other (see comments)  MS  -KW     Barriers to Rehab medically complex  -KW     Row Name 11/10/22 0900          Living Environment    People in Home alone  -KW     Row Name 11/10/22 0900          Home Main Entrance    Number of Stairs, Main Entrance two  -KW     Stair Railings, Main Entrance railings safe and in good condition;railings on both sides of stairs  -KW     Row Name 11/10/22 0900          Stairs Within Home, Primary    Number of Stairs, Within Home, Primary none  -KW     Row Name 11/10/22 0900          Cognition    Orientation Status (Cognition) oriented x 4  -KW     Row Name 11/10/22 0900          Safety Issues, Functional Mobility    Impairments Affecting Function (Mobility) endurance/activity tolerance;strength  -KW           User Key  (r) = Recorded By, (t) = Taken By, (c) = Cosigned By    Initials Name Provider Type    Snehal Miller PT Physical Therapist               Mobility     Row Name 11/10/22 0900          Sit-Stand Transfer    Sit-Stand [place] (Transfers) modified independence  -     Row Name 11/10/22 0900          Gait/Stairs (Locomotion)    [place] Level (Gait) supervision  -KW     Assistive Device (Gait) walker, front-wheeled  -KW     Distance in Feet (Gait) 60  -KW     Deviations/Abnormal Patterns (Gait) gait speed decreased;base of support, wide;stride length decreased  -KW           User Key  (r) = Recorded By, (t) = Taken By, (c) = Cosigned By    Initials Name Provider Type    Snehal Miller PT Physical Therapist               Obj/Interventions     Row Name 11/10/22 0900          Strength Comprehensive (MMT)    General Manual Muscle Testing (MMT) Assessment lower extremity strength deficits identified   -KW     Row Name 11/10/22 0900          Balance    Balance Assessment sitting static balance  -KW     Static Sitting Balance independent  -KW     Position, Sitting Balance unsupported;sitting in chair  -KW           User Key  (r) = Recorded By, (t) = Taken By, (c) = Cosigned By    Initials Name Provider Type    Snehal Miller PT Physical Therapist               Goals/Plan     Row Name 11/10/22 0900          Bed Mobility Goal 1 (PT)    Activity/Assistive Device (Bed Mobility Goal 1, PT) sit to supine/supine to sit  -KW     [place] Level/Cues Needed (Bed Mobility Goal 1, PT) independent  -KW     Time Frame (Bed Mobility Goal 1, PT) long term goal (LTG);10 days  -KW     Progress/Outcomes (Bed Mobility Goal 1, PT) new goal  -KW     Row Name 11/10/22 0900          Transfer Goal 1 (PT)    Activity/Assistive Device (Transfer Goal 1, PT) bed-to-chair/chair-to-bed  -KW     [place] Level/Cues Needed (Transfer Goal 1, PT) modified independence  -KW     Time Frame (Transfer Goal 1, PT) long term goal (LTG);10 days  -KW     Progress/Outcome (Transfer Goal 1, PT) new goal  -KW     Row Name 11/10/22 0900          Gait Training Goal 1 (PT)    Activity/Assistive Device (Gait Training Goal 1, PT) gait (walking locomotion);assistive device use;decrease fall risk;diminish gait deviation;improve balance and speed;increase endurance/gait distance;walker, rolling  -KW     [place] Level (Gait Training Goal 1, PT) modified independence  -KW     Distance (Gait Training Goal 1, PT) 150  -KW     Time Frame (Gait Training Goal 1, PT) long term goal (LTG);10 days  -KW     Progress/Outcome (Gait Training Goal 1, PT) new goal  -KW           User Key  (r) = Recorded By, (t) = Taken By, (c) = Cosigned By    Initials Name Provider Type    Snehal Miller PT Physical Therapist               Clinical Impression     Row Name 11/10/22 0900          Pain    Pretreatment Pain Rating 6/10  -KW     Pain Location -  epigastrium  -KW     Pain Intervention(s) Repositioned  -KW     Row Name 11/10/22 0900          Plan of Care Review    Plan of Care Reviewed With patient  -KW     Progress no change  -KW     Outcome Evaluation PT eval completed. Patient demonstrates slowed cinthia and widened CARLOS. She also demonstrates shortened strides due to R knee pain. She describes this as her normal cinthia however patient reported feeling much more SOA than typical (Spo2 maintained >90%). patient would benefit from skilled PT services to improve endurance and gait mechanics to return to PLOF.  -     Row Name 11/10/22 0900          Therapy Assessment/Plan (PT)    Rehab Potential (PT) good, to achieve stated therapy goals  -     Criteria for Skilled Interventions Met (PT) yes;skilled treatment is necessary  -KW     Therapy Frequency (PT) 5 times/wk  -KW     Row Name 11/10/22 0900          Positioning and Restraints    Pre-Treatment Position sitting in chair/recliner  -KW     Post Treatment Position chair  -KW     In Chair reclined;call light within reach;exit alarm on;with nsg  -KW           User Key  (r) = Recorded By, (t) = Taken By, (c) = Cosigned By    Initials Name Provider Type    KW Snehal Moctezuma, PT Physical Therapist               Outcome Measures     Row Name 11/10/22 0900 11/10/22 0800       How much help from another person do you currently need...    Turning from your back to your side while in flat bed without using bedrails? 3  -KW 3  -KT    Moving from lying on back to sitting on the side of a flat bed without bedrails? 3  -KW 3  -KT    Moving to and from a bed to a chair (including a wheelchair)? 4  -KW 3  -KT    Standing up from a chair using your arms (e.g., wheelchair, bedside chair)? 4  -KW 3  -KT    Climbing 3-5 steps with a railing? 3  -KW 2  -KT    To walk in hospital room? 3  -KW 3  -KT    AM-PAC 6 Clicks Score (PT) 20  -KW 17  -KT    Highest level of mobility 6 --> Walked 10 steps or more  -KW 5 --> Static  standing  -KT    Row Name 11/10/22 0900          Functional Assessment    Outcome Measure Options AM-PAC 6 Clicks Basic Mobility (PT)  -           User Key  (r) = Recorded By, (t) = Taken By, (c) = Cosigned By    Initials Name Provider Type    Cailin Aceves, RN Registered Nurse    Snehal Miller PT Physical Therapist                             Physical Therapy Education     Title: PT OT SLP Therapies (In Progress)     Topic: Physical Therapy (Done)     Point: Mobility training (Done)     Learning Progress Summary           Patient Acceptance, E,TB, VU by RADHA at 11/10/2022 0900    Comment: educated about importance of skilled PT services                   Point: Home exercise program (Done)     Learning Progress Summary           Patient Acceptance, E,TB, VU by RADHA at 11/10/2022 0900    Comment: educated about importance of skilled PT services                   Point: Body mechanics (Done)     Learning Progress Summary           Patient Acceptance, E,TB, VU by RADHA at 11/10/2022 0900    Comment: educated about importance of skilled PT services                   Point: Precautions (Done)     Learning Progress Summary           Patient Acceptance, E,TB, VU by RADHA at 11/10/2022 0900    Comment: educated about importance of skilled PT services                               User Key     Initials Effective Dates Name Provider Type Discipline     01/27/22 -  Snehal Moctezuma PT Physical Therapist PT              PT Recommendation and Plan     Plan of Care Reviewed With: patient  Progress: no change  Outcome Evaluation: PT eval completed. Patient demonstrates slowed cinthia and widened CARLOS. She also demonstrates shortened strides due to R knee pain. She describes this as her normal cinthia however patient reported feeling much more SOA than typical (Spo2 maintained >90%). patient would benefit from skilled PT services to improve endurance and gait mechanics to return to PLOF.     Time Calculation:    PT  Charges     Row Name 11/10/22 0900             Time Calculation    Start Time 0900  -KW      PT Received On 11/10/22  -KW      PT Goal Re-Cert Due Date 11/20/22  -KW         Untimed Charges    PT Eval/Re-eval Minutes 40  -KW         Total Minutes    Untimed Charges Total Minutes 40  -KW       Total Minutes 40  -KW            User Key  (r) = Recorded By, (t) = Taken By, (c) = Cosigned By    Initials Name Provider Type    Snehal Miller PT Physical Therapist              Therapy Charges for Today     Code Description Service Date Service Provider Modifiers Qty    [id number] HC PT EVAL MOD COMPLEXITY 3 11/10/2022 Snehal Moctezuma PT GP 1          PT G-Codes  Outcome Measure Options: AM-PAC 6 Clicks Basic Mobility (PT)  AM-PAC 6 Clicks Score (PT): 20    Snehal Moctezuma PT  11/10/2022

## 2022-11-10 NOTE — PROGRESS NOTES
"Cc: POD#2  Robot LSG/HHR  \"hurts when I eat\"    Her daughter is in the room.  Overall the patient looks and feels much better from an MS standpoint.  Her main complaint is postprandial epigastric pain.  She was able to tolerate a little more than a protein shake today.  She does admit the pain is a little better today than it was yesterday.  No pulmonary complaints, spirometer 1500.  No bowel movement or flatus and says she had not had a bowel movement for 5 days prior to surgery and usually takes a stool softener.  She feels her symptoms are controlled with oral medication and would like to go home in the morning as her MS is more manageable in the morning rather than the evenings.  The daughter is concerned about her elevated blood pressure readings.    No fever pulse 94 respirations 18 blood pressure 136/91 saturation 97% blood pressure as high as 185/96.  She is in no apparent distress and conversant.  Abdomen is soft and appropriate, bowel sounds hypoactive.  Wounds with slight halo erythema and some bruising no cellulitis or drainage or warmth.    CMP normal.  CBC unremarkable.  MRI brain no acute abnormality or significant change.    Impression: Postop day #2 robotic sleeve gastrectomy and hiatal hernia repair doing fairly well from that standpoint except for some postprandial epigastric pain. MS -Dr. Xiong's assistance/evaluation appreciated.  Chronic constipation.  Hypertension currently off her diuretics.  I offered hospitalist evaluation for her blood pressure and other medical issues and they declined.    Plan: Try milk of magnesia.  If this does not help instructed they can take MiraLAX at home.  Try and discharge tomorrow in the morning.  "

## 2022-11-10 NOTE — CONSULTS
Neurology    Referring provider:   Maribell Anderson MD  [address]  [address]  [place] [address]    Reason for Consultation: Multiple sclerosis    Chief complaint: MS attack    History of present illness: 66-year-old woman seen for Dr. Anderson for evaluation of multiple sclerosis.    Mrs. Carrillo is been a patient of mine for 20 years prior to leaving my office practice and is now followed by Dr. Gilbert for her MS.    She has been on a sequence of medications which includes among others Betaseron, Tysabri, and now Lemtrada.    She has had issues with complications from her therapy including aplastic anemia and tuberculosis.    She has prosperity in spite of all of the above.    She has had a longstanding problem with her weight and is in need of total knee replacement and is pursuing surgery so that she can continue to be active and keep her strength up.    She is followed with Dr. Gilbert and Dr. Choe feel that a 50 pound weight loss is necessary as she is proceed to gastric banding with Dr. Anderson for that purpose.    She is at some issues with fatigue, gait, and short-term memory.    She is on Ampyra for gait is engaged in conversations about the possibility of going on treatment for fatigue and memory in the future.    She tries to walk a mile a day and accomplishes most days.    When she is under stress these are physical or emotional she will often have a flareup lasting a day or less weakness difficulty with speaking.    As far as she knows she has never had a prolonged tach given recent years.        Review of Systems: The patient does get fatigue.    She admits to some short-term memory issues.    She lives independently and gets along fine.    All other systems reviewed are negative.    She has no incontinence double vision difficulty with swallowing or ongoing daily speech issues.        Home meds:   Medications Prior to Admission   Medication Sig Dispense Refill Last Dose   • acyclovir  (ZOVIRAX) 400 MG tablet Take 1 tablet by mouth 2 (Two) Times a Day. 60 tablet 11 11/7/2022   • B Complex-C-E-Zn (Z-BEC PO) Take 1 tablet by mouth daily.   11/7/2022   • Dalfampridine ER 10 MG tablet sustained-release 12 hour Take 10 mg by mouth 2 (Two) Times a Day. 60 tablet 11 11/8/2022 at 0630   • FLUoxetine (PROzac) 20 MG capsule Take 20 mg by mouth Daily.   11/8/2022 at 0630   • furosemide (LASIX) 40 MG tablet Take 1 tablet(s) every day   Past Week   • gabapentin (NEURONTIN) 100 MG capsule Take 3 capsules by mouth 3 (Three) Times a Day. (Patient taking differently: Take 1 capsule by mouth 2 (Two) Times a Day.) 90 capsule 5 11/8/2022 at 0630   • hydrALAZINE (APRESOLINE) 10 MG tablet Take 1 tablet 3 times a day as directed.   11/7/2022   • hydrochlorothiazide (MICROZIDE) 12.5 MG capsule Take 12.5 mg by mouth Daily.   Past Week   • HYDROcodone-acetaminophen (NORCO) 5-325 MG per tablet TAKE ONE TABLET BY MOUTH EVERY FOUR HOURS AS NEEDED   11/7/2022   • Multiple Vitamin (MULTI-VITAMIN DAILY) tablet Take 1 tablet by mouth daily.   11/7/2022   • olmesartan (BENICAR) 40 MG tablet Take 1 tablet(s) every day for 90 days.   11/7/2022   • omeprazole (priLOSEC) 20 MG capsule TAKE ONE CAPSULE IN THE MORNING.  11 11/7/2022   • oxybutynin XL (DITROPAN-XL) 10 MG 24 hr tablet Take 1 tablet by mouth Daily. PATIENT STATES THAT SHE HAS NOT TAKEN IN A MONTH AS OF 11/07/2022 11/7/2022   • potassium chloride (MICRO-K) 10 MEQ CR capsule Take 10 mEq by mouth daily.   Past Week   • Rybelsus 7 MG tablet    11/7/2022   • Vibegron (Gemtesa) 75 MG tablet Gemtesa 75 mg tablet   Take 1 tablet every day by oral route.   11/7/2022   • vitamin D (ERGOCALCIFEROL) 53887 UNITS capsule capsule Take 50,000 Units by mouth Every 30 (Thirty) Days. 15TH 11/7/2022   • albuterol sulfate  (90 Base) MCG/ACT inhaler Ventolin HFA 90 mcg/actuation aerosol inhaler   1-2 puffs q 4-6^ prn   Unknown   • azelastine (ASTEPRO) 0.15 % solution nasal spray  "inhale 1-2 sprays by nasal route 2 times a day as needed  3 Unknown   • gabapentin (NEURONTIN) 300 MG capsule Take 2 capsules by mouth Every Night. (Patient taking differently: Take 1 capsule by mouth 2 (Two) Times a Day.) 60 capsule 5    • meloxicam (MOBIC) 7.5 MG tablet 1 Oral Daily with food. (Patient taking differently: 1 Oral Daily with food.--HAS NOT TAKEN IN \"A MONTH\" PER PATIENT) 90 tablet 2 Unknown   • OnabotulinumtoxinA 200 units reconstituted solution FOR . PHYSICIAN TO INJECT 155 UNITS INTRAMUSCULARLY INTO HEAD, NECK AND SHOULDERS EVERY 12 WEEKS Per FDA PROTOCOL 1 each 3 Unknown   • valACYclovir (Valtrex) 500 MG tablet Take 1 tablet by mouth 2 (Two) Times a Day. 60 tablet 11        History  Past Medical History:   Diagnosis Date   • Chronic knee pain     NSAIDS + steroid injections (last 3/2022)   • Dyspepsia    • Dyspnea on exertion    • Fatigue    • GERD (gastroesophageal reflux disease)    • History of transfusion     PATIENT DENIES REACTION   • Hyperlipidemia    • Hypertension    • Morbid obesity (HCC)    • Multiple sclerosis (HCC)     follows w/ Dr. Gilbert   ,   Past Surgical History:   Procedure Laterality Date   • COLONOSCOPY     • GASTRIC SLEEVE LAPAROSCOPIC N/A 2022    Procedure: GASTRIC SLEEVE WITH HIATAL HERNIA LAPAROSCOPIC WITH DAVINCI ROBOT, ESOPHAGOGASTRODUODENOSCOPY;  Surgeon: Maribell Anderson MD;  Location: [facility];  Service: Robotics - DaVinci;  Laterality: N/A;   • LAPAROSCOPIC CHOLECYSTECTOMY  2012    gallstone pancreatitis   ,   Family History   Problem Relation Age of Onset   • Cancer Mother    • Hypertension Mother    • Heart disease Mother    • Alzheimer's disease Other    • Seizures Other         Epilepsy and recurrent seizures   • Heart disease Other    • Breast cancer Neg Hx    • Ovarian cancer Neg Hx    ,   Social History     Tobacco Use   • Smoking status: Former     Types: Cigarettes     Quit date:      Years since quittin.8   • " "Smokeless tobacco: Never   • Tobacco comments:     social   Vaping Use   • Vaping Use: Never used   Substance Use Topics   • Alcohol use: Yes     Comment: rarely   • Drug use: No    and Allergies:  Isoniazid, Rifampin, Sulfa antibiotics, Prednisone, and Tetanus toxoid,    Vital Signs   Blood pressure 159/86, pulse 88, temperature 98.2 °F (36.8 °C), temperature source Oral, resp. rate 18, height 179.1 cm (70.5\"), weight (!) 151 kg (333 lb), SpO2 94 %, not currently breastfeeding.  Body mass index is 47.11 kg/m².    Physical Exam:   General: 333 pound Pleasant white female in no distress              Head: No trauma              Neck: Supple              Resp: Normal breath              Cor: Regular              Extremities: 2+ edema              Skin: Warm and dry              Neuro: Mentally the patient is awake alert and oriented to person place and time.    She has normal memory, concentration, and attention.    Speech is articulate with no word finding problems or dysarthria.    Coordination is normal on finger-nose bilaterally.    Cranial nerves show benign fundi equal pupils full eye movements.  No double vision is noted.    Facial movement sensation are normal.    Palate elevates normally tongue protrudes normally.    Reflexes are plus minus throughout.    Motor testing shows antigravity moving in her upper and lower extremities with no pronator drift in her arms.    Her  are equal.    Sensory testing is intact and all areas.    Results Review: Brain MRI shows no evidence of acute changes and has characteristic changes of mild to moderate multiple sclerosis.  Minimal atrophy.          Labs:  Lab Results (last 72 hours)     Procedure Component Value Units Date/Time    CBC & Differential [[id number]]  (Abnormal) Collected: 11/10/22 0346    Specimen: Blood Updated: 11/10/22 0625    Narrative:      The following orders were created for panel order CBC & Differential.  Procedure                               " Abnormality         Status                     ---------                               -----------         ------                     CBC Auto Differential[[id number]]        Abnormal            Final result                 Please view results for these tests on the individual orders.    CBC Auto Differential [[id number]]  (Abnormal) Collected: 11/10/22 0346    Specimen: Blood Updated: 11/10/22 0625     WBC 7.97 10*3/mm3      RBC 4.30 10*6/mm3      Hemoglobin 12.8 g/dL      Hematocrit 38.9 %      MCV 90.5 fL      MCH 29.8 pg      MCHC 32.9 g/dL      RDW 13.4 %      RDW-SD 44.2 fl      MPV 11.7 fL      Platelets 173 10*3/mm3      Neutrophil % 76.5 %      Lymphocyte % 11.4 %      Monocyte % 11.0 %      Eosinophil % 0.3 %      Basophil % 0.4 %      Immature Grans % 0.4 %      Neutrophils, Absolute 6.10 10*3/mm3      Lymphocytes, Absolute 0.91 10*3/mm3      Monocytes, Absolute 0.88 10*3/mm3      Eosinophils, Absolute 0.02 10*3/mm3      Basophils, Absolute 0.03 10*3/mm3      Immature Grans, Absolute 0.03 10*3/mm3      nRBC 0.0 /100 WBC     Comprehensive Metabolic Panel [[id number]] Collected: 11/10/22 0346    Specimen: Blood Updated: 11/10/22 0547     Glucose 84 mg/dL      BUN 14 mg/dL      Creatinine 0.65 mg/dL      Sodium 136 mmol/L      Potassium 3.8 mmol/L      Chloride 103 mmol/L      CO2 23.0 mmol/L      Calcium 9.2 mg/dL      Total Protein 6.5 g/dL      Albumin 3.50 g/dL      ALT (SGPT) 7 U/L      AST (SGOT) 18 U/L      Alkaline Phosphatase 96 U/L      Total Bilirubin 0.6 mg/dL      Globulin 3.0 gm/dL      Comment: Calculated Result        A/G Ratio 1.2 g/dL      BUN/Creatinine Ratio 21.5     Anion Gap 10.0 mmol/L      eGFR 98.5 mL/min/1.73      Comment: National Kidney Foundation and American Society of Nephrology (ASN) Task Force recommended calculation based on the Chronic Kidney Disease Epidemiology Collaboration (CKD-EPI) equation refit without adjustment for race.       Narrative:      GFR Normal >60  Chronic  Kidney Disease <60  Kidney Failure <15      Tissue Pathology Exam [[id number]] Collected: 11/08/22 1110    Specimen: Tissue from Stomach Updated: 11/09/22 1050     Case Report --     Surgical Pathology Report                         Case: [id number]                                  Authorizing Provider:  Maribell Anderson MD  Collected:           11/08/2022 11:10 AM          Ordering Location:     [facility]   Received:            11/08/2022 01:39 PM                                 OR                                                                           Pathologist:           Jimmy Olivia MD                                                        Specimen:    Stomach, SUB-TOTAL GASTRECTOMY                                                              Clinical Information --     Morbid obesity (HCC)  Hiatal hernia       Final Diagnosis --     STOMACH, SUBTOTAL GASTRECTOMY:                Gastric tissue with no signficant histopathologic change.  Negative for intestinal metaplasia and dysplasia.  No Helicobacter pylori-like organisms identified on routine stains.       Gross Description --     1. Stomach.  Received in formalin labeled subtotal gastrectomy is an 18 x 4 x 3 cm portion of stomach with a staple line along one side.  The serosa is smooth, tan-pink and glistening.  The specimen is received with a 3 x 3 cm transmural defect along the staple line.  The lumen contains a moderate amount of red viscous blood.  The mucosa is tan-red, glistening and has a normal rugal folding pattern.  No masses, polyps or ulcers are grossly identified.  Representative sections are submitted in blocks 1A-1C.  LDP         Microscopic Description --     The slides are reviewed and demonstrate histopathologic features supporting the above rendered diagnosis.        Iron [[id number]]  (Abnormal) Collected: 11/09/22 0431    Specimen: Blood Updated: 11/09/22 0700     Iron 34 mcg/dL     Comprehensive Metabolic  Panel [[id number]]  (Abnormal) Collected: 11/09/22 0431    Specimen: Blood Updated: 11/09/22 0559     Glucose 123 mg/dL      BUN 18 mg/dL      Creatinine 0.70 mg/dL      Sodium 138 mmol/L      Potassium 4.3 mmol/L      Chloride 104 mmol/L      CO2 21.0 mmol/L      Calcium 8.9 mg/dL      Total Protein 6.3 g/dL      Albumin 3.80 g/dL      ALT (SGPT) 15 U/L      AST (SGOT) 22 U/L      Alkaline Phosphatase 105 U/L      Total Bilirubin 0.3 mg/dL      Globulin 2.5 gm/dL      Comment: Calculated Result        A/G Ratio 1.5 g/dL      BUN/Creatinine Ratio 25.7     Anion Gap 13.0 mmol/L      eGFR 96.7 mL/min/1.73      Comment: National Kidney Foundation and American Society of Nephrology (ASN) Task Force recommended calculation based on the Chronic Kidney Disease Epidemiology Collaboration (CKD-EPI) equation refit without adjustment for race.       Narrative:      GFR Normal >60  Chronic Kidney Disease <60  Kidney Failure <15      CBC & Differential [[id number]]  (Abnormal) Collected: 11/09/22 0431    Specimen: Blood Updated: 11/09/22 0531    Narrative:      The following orders were created for panel order CBC & Differential.  Procedure                               Abnormality         Status                     ---------                               -----------         ------                     CBC Auto Differential[[id number]]        Abnormal            Final result                 Please view results for these tests on the individual orders.    CBC Auto Differential [[id number]]  (Abnormal) Collected: 11/09/22 0431    Specimen: Blood Updated: 11/09/22 0531     WBC 7.53 10*3/mm3      RBC 4.55 10*6/mm3      Hemoglobin 13.3 g/dL      Hematocrit 40.8 %      MCV 89.7 fL      MCH 29.2 pg      MCHC 32.6 g/dL      RDW 12.7 %      RDW-SD 42.1 fl      MPV 11.3 fL      Platelets 180 10*3/mm3      Neutrophil % 83.4 %      Lymphocyte % 10.0 %      Monocyte % 6.1 %      Eosinophil % 0.0 %      Basophil % 0.1 %      Immature Grans % 0.4 %       Neutrophils, Absolute 6.28 10*3/mm3      Lymphocytes, Absolute 0.75 10*3/mm3      Monocytes, Absolute 0.46 10*3/mm3      Eosinophils, Absolute 0.00 10*3/mm3      Basophils, Absolute 0.01 10*3/mm3      Immature Grans, Absolute 0.03 10*3/mm3      nRBC 0.0 /100 WBC     POC Glucose Once [[id number]]  (Abnormal) Collected: 11/08/22 1236    Specimen: Blood Updated: 11/08/22 1238     Glucose 175 mg/dL      Comment: Meter: [id number] : [id number] Yohana Persaud       POC Glucose Once [[id number]]  (Normal) Collected: 11/08/22 0921    Specimen: Blood Updated: 11/08/22 0928     Glucose 79 mg/dL      Comment: Meter: [id number] : [id number] Olivia Demetrice             Rads:  Imaging Results (Last 72 Hours)     Procedure Component Value Units Date/Time    FL Upper GI Water Soluble [[id number]] Collected: 11/09/22 1543     Updated: 11/10/22 0822    Narrative:      EXAMINATION: FL UPPER GI WATER SOLUBLE-     INDICATION: post sleeve, rule out leak; E66.01-Morbid (severe) obesity  due to excess calories; K44.9-Diaphragmatic hernia without obstruction  or gangrene     TECHNIQUE: 30 seconds of fluoroscopic time was used for this exam. 8  associated fluoroscopic series were saved.  imaging reveals a  nonobstructive bowel gas pattern. There is a suture line visible in the  left upper quadrant. Surgical clips are visible in the gallbladder  fossa.     COMPARISON: NONE     FINDINGS: Under fluoroscopic observation, the patient ingested  water-soluble contrast. The oral phase of deglutition appeared normal.  The esophageal mucosa appeared grossly normal. There was no evidence of  a focal esophageal stricture. Examination of the stomach demonstrated  postoperative changes that are consistent with a vertical sleeve  gastrectomy. No extravasation of contrast was seen. The gastric folds  and gastric mucosa appeared grossly normal. There was no evidence of a  gastric or duodenal ulcer. There was no delay in gastric emptying. The  duodenal  bulb and duodenal C-loop appeared grossly normal.          Impression:      Status post vertical sleeve gastrectomy. There was no  evidence of extraluminal contrast. There was no delay in gastric  emptying.         This report was finalized on 11/10/2022 8:19 AM by Dr. Patrice Hernandez MD.       MRI Brain Without Contrast [[id number]] Collected: 11/10/22 0638     Updated: 11/10/22 0653    Narrative:      MRI BRAIN WO CONTRAST-     Date of Exam: 11/10/2022 12:47 AM     Indication: Mental status change, unknown cause; E66.01-Morbid (severe)  obesity due to excess calories; K44.9-Diaphragmatic hernia without  obstruction or gangrene.     Technique: Routine multiplanar multisequence MR imaging of the brain was  performed without the administration of   gadolinium contrast.     Comparison Exams: 03/11/2022 and prior     FINDINGS:        There are no areas of restricted diffusion.      Patchy confluent areas of FLAIR and T2 signal hyperintensities  compatible with patient's known history of multiple sclerosis  demonstrates no significant change in comparison. Superimposed chronic  white matter changes from small vessel ischemic disease may also be  present. Minimal changes are noted within the jocelyn similar to the prior  study. No significant progression or definite suspicious new foci are  noted.     There is no evidence of intracranial hemorrhage.      There is mild atrophy not unexpected for patient's stated age.     There is no mass, mass effect, or hydrocephalus. No abnormal extra-axial  fluid collections are seen.        Major intracranial vascular flow voids are preserved.      Sella and suprasellar cistern are within normal limits.        Cranio-vertebral junction is normal.     Globes and orbits are within normal limits.      Visualized paranasal sinuses and mastoid air cells are clear.       Impression:         1. No acute intracranial abnormality.  2. No significant change accounting for differences in technique  of  patchy and confluent white matter changes compatible with patient's  known history of demyelinating disease. No definite new foci  appreciated.     This report was finalized on 11/10/2022 6:50 AM by Ga Cordero.               Assessment: Relapsing remitting MS stable on Lemtrada therapy.    Pseudo exacerbation yesterday.           Plan:    Dr. Fabian follow her in hospital but seen nothing to suggest need for intervention.    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    Comment:   Has done remarkably well due to the combination of reserve and resilience.  She has been treated aggressively with excellent outcome.    I discussed the patients findings and my recommendations with patient, family and primary care team      Varun Xiong MD  11/10/22  11:44 EST

## 2022-11-10 NOTE — PLAN OF CARE
Goal Outcome Evaluation:  Plan of Care Reviewed With: patient        Progress: improving  Outcome Evaluation: VSS, room air. Patient has had much improvement with MS flare up symptoms. She states she is feeling better. Case management to set up home health for patient. Patient is ambulating, voiding, using IS and is drinking protein. Plan of care discussed with the patient and family at bedside.

## 2022-11-10 NOTE — PLAN OF CARE
Goal Outcome Evaluation:  Plan of Care Reviewed With: patient        Progress: improving  Outcome Evaluation: A&Ox4. VSS. RA. Pt c/o of pain at the beginning of the shift. PRN pain medication administered. MRI screening completed. Xanax given prior to MRI being obtained. Pt tolerated well and was transported via wheelchair. IV fluids infusing. MS home medication was brought in by family, an order was obtained to administer to pt and sent to pharmacy for verification and a label. Pt has been x1 assist to the [facility] this shift. Pt states she is tired but feeling better overall. Pt denies any addtional complaints at this time. Sister Paula was given an update at 0405 via phone call. Nursing staff will continue to monitor.

## 2022-11-10 NOTE — PLAN OF CARE
Goal Outcome Evaluation:  Plan of Care Reviewed With: patient        Progress: no change  Outcome Evaluation: PT eval completed. Patient demonstrates slowed cinthia and widened CARLOS. She also demonstrates shortened strides due to R knee pain. She describes this as her normal cinthia however patient reported feeling much more SOA than typical (Spo2 maintained >90%). patient would benefit from skilled PT services to improve endurance and gait mechanics to return to PLOF.

## 2022-11-10 NOTE — DISCHARGE PLACEMENT REQUEST
"  Please see order for home health   Anticipate Discharge 11/11  Will go to sister Paula [facility] home [phone]  Address: [address]     Thank you     Lucita Webster [phone]      Gayatri Rooney (66 y.o. Female)     Date of Birth   1955    Social Security Number       Address   [address]  [facility] [address]    Home Phone   [phone]    MRN   [id number]       Alevism   Adventism    Marital Status                               Admission Date   11/8/22    Admission Type   Elective    Admitting Provider   Maribell Anderson MD    Attending Provider   Maribell Anderson MD    Department, Room/Bed   [address], S217/1       Discharge Date       Discharge Disposition       Discharge Destination                               Attending Provider: Maribell Anderson MD    Allergies: Isoniazid, Rifampin, Sulfa Antibiotics, Prednisone, Tetanus Toxoid    Isolation: None   Infection: None   Code Status: CPR    Ht: 179.1 cm (70.5\")   Wt: 151 kg (333 lb)    Admission Cmt: None   Principal Problem: Morbid obesity (HCC) [E66.01]                 Active Insurance as of 11/8/2022     Primary Coverage     Payor Plan Insurance Group Employer/Plan Group    MEDICARE MEDICARE A & B      Payor Plan Address Payor Plan Phone Number Payor Plan Fax Number Effective Dates    [address] [phone]  2/1/2018 - None Entered    [facility] [id number]       Subscriber Name Subscriber Birth Date Member ID       GAYATRI ROONEY 1955 [id number]           Secondary Coverage     Payor Plan Insurance Group Employer/Plan Group    [employer] [facility] SUPP [id number]     Payor Plan Address Payor Plan Phone Number Payor Plan Fax Number Effective Dates    [address]   2/1/2018 - None Entered    [place] [address]       Subscriber Name Subscriber Birth Date Member ID       GAYATRI ROONEY 1955 [id number]                 Emergency Contacts  "     (Rel.) Home Phone Work Phone Mobile Phone    Madelaine Kim (Sister) -- -- [phone]    Paula Winters (Relative) -- -- [phone]    NOZULEYKA PHELPS (Relative) -- -- [phone]           [address]  [address] [facility] [address]  Phone:  [phone]  Fax:  [phone] Date: Nov 10, 2022      Ambulatory Referral to Home Health     Patient:  Rashad Carrillo MRN:  [id number]   [address]   [facility] [address] :  1955  SSN:    Phone: [phone] Sex:  F      INSURANCE PAYOR PLAN GROUP # SUBSCRIBER ID   Primary:  Secondary:    MEDICARE AN[employer] [id number]  [id number]    [id number]  [id number]      Referring Provider Information:  AUTUMN STINSON Phone: [phone] Fax: [phone]       Referral Information:   # Visits:  999 Referral Type: Home Health [42]   Urgency:  Routine Referral Reason: Specialty Services Required   Start Date: Nov 10, 2022 End Date:  To be determined by Insurer   Diagnosis: Morbid obesity (HCC) (E66.01 [ICD-10-CM] 278.01 [ICD-9-CM])  Multiple sclerosis (HCC) (G35 [ICD-10-CM] 340 [ICD-9-CM])  Frequent falls (R29.6 [ICD-10-CM] V15.88 [ICD-9-CM])  Essential hypertension (I10 [ICD-10-CM] 401.9 [ICD-9-CM])      Refer to Dept:   Refer to Provider:   Refer to Provider Phone:   Refer to Facility:       Face to Face Visit Date: 11/10/2022  Follow-up provider for Plan of Care? I treated the patient in an acute care facility and will not continue treatment after discharge.  Follow-up provider: VIPIN LUCIO [[id number]]  Reason/Clinical Findings: multiple sclerosis, Robotic assisted laparoscopic sleeve gastrectomy  Describe mobility limitations that make leaving home difficult: impaired mobility, impaired endurance, impaired ADLs  Nursing/Therapeutic Services Requested: Physical Therapy  PT orders: Total joint pathway  PT orders: Therapeutic exercise  PT orders: Gait Training  PT orders: Transfer training  PT  "orders: Strengthening  PT orders: Home safety assessment  Weight Bearing Status: As Tolerated     This document serves as a request of services and does not constitute Insurance authorization or approval of services.  To determine eligibility, please contact the members Insurance carrier to verify and review coverage.     If you have medical questions regarding this request for services. Please contact [address] at [phone] during normal business hours.        Authorizing Provider:Maribell Anderson MD  Authorizing Provider's NPI: [id number]  Order Entered By: Lucita Webster RN 11/10/2022  3:12 PM     Electronically signed by: Maribell Anderson MD 11/10/2022  3:12 PM            History & Physical      Maribell Anderson MD at 22          H&P reviewed. The patient was examined and there are no changes to the H&P.          Electronically signed by Maribell Anderson MD at 22   Source Note          [facility] BARIATRIC SURGERY  [address]  [address]  [facility] [id number] [phone]      Patient  Name:  Rashad Carrillo  :  1955      Date of Visit: 10/10/2022      Chief Complaint:  weight gain; unable to maintain weight loss    History of Present Illness:  Rashad Carrillo is a 66 y.o. female who presents today for evaluation, education and consultation regarding weight loss surgery.     Patient has been overweight for many years, with numerous failed dietary/weight loss attempts.  She now has obesity related comorbidities of OA, fatigue, HTN and as such has decided to pursue weight loss surgery.    From intake:  \"Hx MS, follows w/ Dr. Gilbert, on Lemtrad (alemtuzumab) infusions q 2yrs + Ampyra (dalfampridine) which assists w/ walking.  She tries very hard to walk 1mile/day but is becoming limited by her chronic knee pain.  Ortho say she needs a (R) TKR but must lose 50 lbs first.   \"    10/10/2022 " Update:    Joy    Denies changes medical hx.    Takes Ampyra (not steroid or NSAID, potassium channel blocker) and a monoclonal Ab for MS.  Uses walker and cane for walking assistance.  Has some incontinence of urine and stool.    Has severe bone-on-bone osteoarthritis.  Saw Dr. Choe and got a steroid injection 2 weeks ago.    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    Hx of pancreatitis, admitted to hospital for 12 days.  Had cholecystectomy during same admission, but pt was told her pancreatitis was from a virus.    No personal hx of DVT or clotting d/o.  Her mom had varicose veins.  No liver, lung, heart, or renal disease        Review of data:    MAC: regular gabapentin and hydrocodone  CBC: 13.9 WBCs  CMP: CO2 29.  HP neg    EKG: none recent  CXR: nl    EGD:  GEJ 38 cm.  Dr. Santiago 12/13/2017.  Small sliding HH.  Path, reflux esophagitis    Has occasional reflux.  Has dyspepsia/gas.  Gas-ex relieves.    Cardiac clearance: cleared.     Stress test: normal             Last tobacco: 25 years ago.    Last NSAIDs: Mobic last week.  Last ASA: n/a  Last steroids: 2 weeks ago.  Last hormones: n/a        COVID-19 Questionnaire:    1.  Have you previously been tested for COVID-19?    []  No  [x]  Yes    2.  Were you ever positive for COVID-19?    [x]  No  []  Yes    3.  Are you employed in a healthcare setting?    [x]  No  []  Yes    4.  Are you symptomatic for COVID-19 as defined by the CDC (fever, cough)?  If so, when did symptoms begin?    [x]  No  []  Yes    5.  Have you been hospitalized for COVID-19?  If so, were you in the ICU?  [x]  No    []  Yes, but not in the ICU    []  Yes, and I was in the ICU    6.  Are you a resident in a congregate (group care setting?)    [x]  No  []  Yes    7.  Are you pregnant?  [x]  No  []  Yes    8.  Are you vaccinated?    []  No  []  Yes, but only partially   [x]  Yes, fully          Past Medical History:   Diagnosis Date   •  Chronic knee pain     NSAIDS + steroid injections (last 3/2022)   • Dyspepsia    • Dyspnea on exertion    • Fatigue    • Hypertension    • Morbid obesity (HCC)    • Multiple sclerosis (HCC)     follows w/ Dr. Gilbert     Past Surgical History:   Procedure Laterality Date   • LAPAROSCOPIC CHOLECYSTECTOMY  2012    gallstone pancreatitis       Allergies   Allergen Reactions   • Isoniazid Other (See Comments)     aplastic anemia   • Rifampin Anaphylaxis   • Sulfa Antibiotics Anaphylaxis   • Tetanus Toxoid Swelling       Current Outpatient Medications:   •  acyclovir (ZOVIRAX) 400 MG tablet, Take 1 tablet by mouth 2 (Two) Times a Day., Disp: 60 tablet, Rfl: 11  •  albuterol sulfate  (90 Base) MCG/ACT inhaler, Ventolin HFA 90 mcg/actuation aerosol inhaler  1-2 puffs q 4-6^ prn, Disp: , Rfl:   •  azelastine (ASTEPRO) 0.15 % solution nasal spray, inhale 1-2 sprays by nasal route 2 times a day as needed, Disp: , Rfl: 3  •  B Complex-C-E-Zn (Z-BEC PO), Take 1 tablet by mouth daily., Disp: , Rfl:   •  Dalfampridine ER 10 MG tablet sustained-release 12 hour, Take 10 mg by mouth 2 (Two) Times a Day., Disp: 60 tablet, Rfl: 11  •  FLUoxetine (PROzac) 20 MG capsule, Take 20 mg by mouth Daily., Disp: , Rfl:   •  furosemide (LASIX) 40 MG tablet, Take 1 tablet(s) every day, Disp: , Rfl:   •  gabapentin (NEURONTIN) 100 MG capsule, Take 3 capsules by mouth 3 (Three) Times a Day. (Patient taking differently: Take 1 capsule by mouth 2 (Two) Times a Day.), Disp: 90 capsule, Rfl: 5  •  hydrALAZINE (APRESOLINE) 10 MG tablet, Take 1 tablet 3 times a day as directed., Disp: , Rfl:   •  hydrochlorothiazide (MICROZIDE) 12.5 MG capsule, Take 12.5 mg by mouth Daily., Disp: , Rfl:   •  HYDROcodone-acetaminophen (NORCO) 5-325 MG per tablet, TAKE ONE TABLET BY MOUTH EVERY FOUR HOURS AS NEEDED, Disp: , Rfl:   •  irbesartan (AVAPRO) 150 MG tablet, Take 150 mg by mouth Daily., Disp: , Rfl:   •  meloxicam (MOBIC) 7.5 MG tablet, 1 Oral Daily with  food., Disp: 90 tablet, Rfl: 2  •  metoprolol succinate XL (TOPROL-XL) 100 MG 24 hr tablet, metoprolol succinate  mg tablet,extended release 24 hr, Disp: , Rfl:   •  Multiple Vitamin (MULTI-VITAMIN DAILY) tablet, Take 1 tablet by mouth daily., Disp: , Rfl:   •  olmesartan (BENICAR) 40 MG tablet, Take 1 tablet(s) every day for 90 days., Disp: , Rfl:   •  omeprazole (priLOSEC) 20 MG capsule, TAKE ONE CAPSULE IN THE MORNING., Disp: , Rfl: 11  •  OnabotulinumtoxinA 200 units reconstituted solution, FOR . PHYSICIAN TO INJECT 155 UNITS INTRAMUSCULARLY INTO HEAD, NECK AND SHOULDERS EVERY 12 WEEKS Per FDA PROTOCOL, Disp: 1 each, Rfl: 3  •  oxybutynin XL (DITROPAN-XL) 10 MG 24 hr tablet, Take 10 mg by mouth Daily., Disp: , Rfl:   •  potassium chloride (MICRO-K) 10 MEQ CR capsule, Take 10 mEq by mouth daily., Disp: , Rfl:   •  pravastatin (PRAVACHOL) 80 MG tablet, Take 1 tablet(s) every day at bedtime., Disp: , Rfl:   •  Rybelsus 7 MG tablet, , Disp: , Rfl:   •  valACYclovir (Valtrex) 500 MG tablet, Take 1 tablet by mouth 2 (Two) Times a Day., Disp: 60 tablet, Rfl: 11  •  Vibegron (Gemtesa) 75 MG tablet, Gemtesa 75 mg tablet  Take 1 tablet every day by oral route., Disp: , Rfl:   •  vitamin D (ERGOCALCIFEROL) 38299 UNITS capsule capsule, Take 50,000 Units by mouth Every 30 (Thirty) Days. , Disp: , Rfl:   •  gabapentin (NEURONTIN) 300 MG capsule, Take 2 capsules by mouth Every Night. (Patient taking differently: Take 300 mg by mouth 2 (Two) Times a Day.), Disp: 60 capsule, Rfl: 5  No current facility-administered medications for this visit.    Social History     Socioeconomic History   • Marital status:    Tobacco Use   • Smoking status: Former     Types: Cigarettes     Quit date:      Years since quittin.7   • Smokeless tobacco: Never   • Tobacco comments:     social   Vaping Use   • Vaping Use: Never used   Substance and Sexual Activity   • Alcohol use: Yes     Comment: rarely    • Drug use: No   • Sexual activity: Defer     Social History     Social History Narrative    .  Lives alone in [place].  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          Family History   Problem Relation Age of Onset   • Cancer Mother    • Hypertension Mother    • Heart disease Mother    • Alzheimer's disease Other    • Seizures Other         Epilepsy and recurrent seizures   • Heart disease Other        Review of Systems   Constitutional: Positive for fatigue and unexpected weight gain. Negative for chills, diaphoresis, fever and unexpected weight loss.   HENT: Negative for congestion and facial swelling.    Eyes: Negative for blurred vision, double vision and discharge.   Respiratory: Negative for chest tightness, shortness of breath and stridor.    Cardiovascular: Negative for chest pain, palpitations and leg swelling.   Gastrointestinal: Negative for blood in stool.   Endocrine: Negative for polydipsia.   Genitourinary: Negative for hematuria.   Musculoskeletal: Positive for arthralgias.   Skin: Negative for color change.   Allergic/Immunologic: Negative for immunocompromised state.   Neurological: Negative for confusion.   Psychiatric/Behavioral: Negative for self-injury.       Physical Exam:  Vital Signs:  Weight: (!) 151 kg (333 lb)   Body mass index is 47.11 kg/m².  Temp: 97.8 °F (36.6 °C)   Heart Rate: 81   BP: 130/88     Physical Exam  Vitals reviewed.   Constitutional:       Appearance: She is well-developed.   HENT:      Head: Normocephalic and atraumatic.      Nose: Nose normal.   Eyes:      Conjunctiva/sclera: Conjunctivae normal.      Pupils: Pupils are equal, round, and reactive to light.   Neck:      Thyroid: No thyromegaly.      Vascular: No carotid bruit.      Trachea: No tracheal deviation.   Cardiovascular:      Rate and Rhythm: Normal rate and regular rhythm.      Heart sounds: Normal heart sounds.   Pulmonary:      Effort: Pulmonary effort is normal. No respiratory  distress.      Breath sounds: Normal breath sounds.   Abdominal:      General: There is no distension.      Palpations: Abdomen is soft.      Tenderness: There is no abdominal tenderness.      Comments: Lap scars   Musculoskeletal:         General: No deformity. Normal range of motion.      Cervical back: Normal range of motion and neck supple.   Skin:     General: Skin is warm and dry.      Findings: No rash.   Neurological:      Mental Status: She is alert and oriented to person, place, and time.      Cranial Nerves: No cranial nerve deficit.      Coordination: Coordination normal.      Comments: Ambulates with walker   Psychiatric:         Behavior: Behavior normal.         Thought Content: Thought content normal.         Judgment: Judgment normal.         Patient Active Problem List   Diagnosis   • Vitamin D deficiency   • Depression   • Multiple sclerosis (formerly [facility])   • Restless legs syndrome   • Muscle spasticity   • Chronic migraine without aura without status migrainosus, not intractable   • Essential hypertension   • Frequent falls   • Status post balloon dilatation of esophageal stricture   • T2DM (type 2 diabetes mellitus) (formerly [facility])   • TB lung, latent   • Aplastic anemia likely due to isoniazide   • Chest pain   • MCGRAW (dyspnea on exertion)   • Hyperlipidemia LDL goal <70   • Class 3 severe obesity due to excess calories with serious comorbidity and body mass index (BMI) of 50.0 to 59.9 in adult (formerly [facility])   • Fatigue   • Dyspepsia       Assessment:    Rashad Carrillo is a 66 y.o. year old female with medically complicated obesity.    Weight loss surgery is deemed medically necessary given the following obesity related comorbidities including OA, fatigue, HTN with current Weight: (!) 151 kg (333 lb) and Body mass index is 47.11 kg/m²..    Patient is aware that surgery is a tool, and that weight loss is not guaranteed but only seen in the context of appropriate use, follow up and exercise.    The patient was present  "for an approximately a 2.5 hour discussion of the purpose of weight loss surgery, how WLS is a tool to assist in achieving weight loss goals, the most common complications and how best to avoid them, and the strategies for short and long term weight loss.  Ample opportunity to discuss questions was available both in group and during the time of individual examination.    I reviewed all available preop labs, Xrays, tests, clearances, etc and signed off on these in the record.  All of this in addition to the patient's unique history and exam has been taken into consideration in determining their appropriate candidacy for weight loss surgery.    Complications  of laparoscopic/possible robotic gastric sleeve were discussed. The patient is well aware of the potential complications of surgery that include but not limited to bleeding, infections, deep venous thrombosis, pulmonary embolism, pulmonary complications such as pneumonia, cardiac events, hernias, small bowel obstruction, damage to the spleen or other organs, bowel injury, disfiguring scars, failure to lose weight, need for additional surgery, conversion to an open procedure, and death. Patient is also aware of complications which apply in this particular procedure that can include but are not limited to a \"leak\" at the staple line which in some instances may require conversion to gastric bypass.    The patient is aware if a hiatal hernia is encountered, it likely will be repaired.  R/B/A Rx to hiatal hernia repair were discussed as outlined in our long consent form.  Briefly risks in addition to those for LSG include recurrent hernia, MARIA A, dysphagia, esophageal injury, pneumothorax, injury to the vagus nerves, injury to the thoracic duct, aorta or vena cava.    Greater than 3 minutes was spent with the patient discussing avoiding all tobacco products and second hand smoke at least 2 weeks pre-operatively and 6 weeks post-operatively to minimize the risk of sleeve " leak.  This included discussing the importance of avoiding even secondhand smoke as the risk of leak is increased.  Examples discussed:  I made it very clear that the patient understands they should avoid even riding in a car where someone has previously smoked in the last 2 weeks, living in a house where someone smokes (even if it's in a separate room/patio/attached garage, etc.) we discussed that they should not have a conversation with a group of people who are smoking even if it's outside.  They can be around wood burning fires and barbecue.  I told them I do not know if marijuana has a same effects but my overall recommendation is to avoid it for 2 weeks prior in 6 weeks after surgery.  They also are aware that nicotine may also increase the risk of leak and I strongly encouraged him to avoid that as well for 2 weeks prior in 6 weeks after surgery.    Discussed the risks, benefits and alternative therapies at great length as outlined in our extensive consent forms, consent videos, and educational teaching process under the direction of the center's .    A copy of the patient's signed informed consent is on file.    Plan:  Robotic assisted sleeve gastrectomy + HHR at [facility].      Hx of pancreatitis, may have posterior-gastric adhesions with increased risk of bleeding, injury to pancreas, splenic vessels, etc.  Unfortunately I do not have the EGD to review.      R/B/A Rx discussed to postop anticoagulation incl but not limited to bleeding, drug reaction, venothromboembolic events, etc. and she declined.  If after surgery she has a relapse of MS, may reconsider anticoagulation for 3 weeks.    MDM high:  Elective procedure with the following risk factors: morbid obesity, HTN, chronic narcotic use.  4+ chronic medical problems reviewed.      Thank you Ye Newman MD for allowing me to share in the care of our mutual patient.      Maribell Anderson MD                          "                    Electronically signed by Maribell Anderson MD at 10/10/22 1240             aMribell Anderson MD at 22 0907          H&P reviewed. The patient was examined and there are no changes to the H&P.          Electronically signed by Maribell Anderson MD at 22 0907   Source Note          [facility] BARIATRIC SURGERY  [address]  [address]  [facility] [address] [phone]      Patient  Name:  Rashad Carrillo  :  1955      Date of Visit: 10/10/2022      Chief Complaint:  weight gain; unable to maintain weight loss    History of Present Illness:  Rashad Carrillo is a 66 y.o. female who presents today for evaluation, education and consultation regarding weight loss surgery.     Patient has been overweight for many years, with numerous failed dietary/weight loss attempts.  She now has obesity related comorbidities of OA, fatigue, HTN and as such has decided to pursue weight loss surgery.    From intake:  \"Hx MS, follows w/ Dr. Gilbert, on Lemtrad (alemtuzumab) infusions q 2yrs + Ampyra (dalfampridine) which assists w/ walking.  She tries very hard to walk 1mile/day but is becoming limited by her chronic knee pain.  Ortho say she needs a (R) TKR but must lose 50 lbs first.   \"    10/10/2022 Update:    Joy    Denies changes medical hx.    Takes Ampyra (not steroid or NSAID, potassium channel blocker) and a monoclonal Ab for MS.  Uses walker and cane for walking assistance.  Has some incontinence of urine and stool.    Has severe bone-on-bone osteoarthritis.  Saw Dr. Choe and got a steroid injection 2 weeks ago.    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    Hx of pancreatitis, admitted to hospital for 12 days.  Had cholecystectomy during same admission, but pt was told her pancreatitis was from a virus.    No personal hx of DVT or clotting d/o.  Her mom had varicose veins.  No liver, lung, " heart, or renal disease        Review of data:    MAC: regular gabapentin and hydrocodone  CBC: 13.9 WBCs  CMP: CO2 29.  HP neg    EKG: none recent  CXR: nl    EGD:  GEJ 38 cm.  Dr. Santiago 12/13/2017.  Small sliding HH.  Path, reflux esophagitis    Has occasional reflux.  Has dyspepsia/gas.  Gas-ex relieves.    Cardiac clearance: cleared.     Stress test: normal             Last tobacco: 25 years ago.    Last NSAIDs: Mobic last week.  Last ASA: n/a  Last steroids: 2 weeks ago.  Last hormones: n/a        COVID-19 Questionnaire:    2.  Have you previously been tested for COVID-19?    []  No  [x]  Yes    2.  Were you ever positive for COVID-19?    [x]  No  []  Yes    3.  Are you employed in a healthcare setting?    [x]  No  []  Yes    4.  Are you symptomatic for COVID-19 as defined by the CDC (fever, cough)?  If so, when did symptoms begin?    [x]  No  []  Yes    5.  Have you been hospitalized for COVID-19?  If so, were you in the ICU?  [x]  No    []  Yes, but not in the ICU    []  Yes, and I was in the ICU    6.  Are you a resident in a congregate (group care setting?)    [x]  No  []  Yes    7.  Are you pregnant?  [x]  No  []  Yes    8.  Are you vaccinated?    []  No  []  Yes, but only partially   [x]  Yes, fully          Past Medical History:   Diagnosis Date   • Chronic knee pain     NSAIDS + steroid injections (last 3/2022)   • Dyspepsia    • Dyspnea on exertion    • Fatigue    • Hypertension    • Morbid obesity (HCC)    • Multiple sclerosis (HCC)     follows w/ Dr. Gilbert     Past Surgical History:   Procedure Laterality Date   • LAPAROSCOPIC CHOLECYSTECTOMY  2012    gallstone pancreatitis       Allergies   Allergen Reactions   • Isoniazid Other (See Comments)     aplastic anemia   • Rifampin Anaphylaxis   • Sulfa Antibiotics Anaphylaxis   • Tetanus Toxoid Swelling       Current Outpatient Medications:   •  acyclovir (ZOVIRAX) 400 MG tablet, Take 1 tablet by mouth 2 (Two) Times a Day., Disp: 60 tablet, Rfl:  11  •  albuterol sulfate  (90 Base) MCG/ACT inhaler, Ventolin HFA 90 mcg/actuation aerosol inhaler  1-2 puffs q 4-6^ prn, Disp: , Rfl:   •  azelastine (ASTEPRO) 0.15 % solution nasal spray, inhale 1-2 sprays by nasal route 2 times a day as needed, Disp: , Rfl: 3  •  B Complex-C-E-Zn (Z-BEC PO), Take 1 tablet by mouth daily., Disp: , Rfl:   •  Dalfampridine ER 10 MG tablet sustained-release 12 hour, Take 10 mg by mouth 2 (Two) Times a Day., Disp: 60 tablet, Rfl: 11  •  FLUoxetine (PROzac) 20 MG capsule, Take 20 mg by mouth Daily., Disp: , Rfl:   •  furosemide (LASIX) 40 MG tablet, Take 1 tablet(s) every day, Disp: , Rfl:   •  gabapentin (NEURONTIN) 100 MG capsule, Take 3 capsules by mouth 3 (Three) Times a Day. (Patient taking differently: Take 1 capsule by mouth 2 (Two) Times a Day.), Disp: 90 capsule, Rfl: 5  •  hydrALAZINE (APRESOLINE) 10 MG tablet, Take 1 tablet 3 times a day as directed., Disp: , Rfl:   •  hydrochlorothiazide (MICROZIDE) 12.5 MG capsule, Take 12.5 mg by mouth Daily., Disp: , Rfl:   •  HYDROcodone-acetaminophen (NORCO) 5-325 MG per tablet, TAKE ONE TABLET BY MOUTH EVERY FOUR HOURS AS NEEDED, Disp: , Rfl:   •  irbesartan (AVAPRO) 150 MG tablet, Take 150 mg by mouth Daily., Disp: , Rfl:   •  meloxicam (MOBIC) 7.5 MG tablet, 1 Oral Daily with food., Disp: 90 tablet, Rfl: 2  •  metoprolol succinate XL (TOPROL-XL) 100 MG 24 hr tablet, metoprolol succinate  mg tablet,extended release 24 hr, Disp: , Rfl:   •  Multiple Vitamin (MULTI-VITAMIN DAILY) tablet, Take 1 tablet by mouth daily., Disp: , Rfl:   •  olmesartan (BENICAR) 40 MG tablet, Take 1 tablet(s) every day for 90 days., Disp: , Rfl:   •  omeprazole (priLOSEC) 20 MG capsule, TAKE ONE CAPSULE IN THE MORNING., Disp: , Rfl: 11  •  OnabotulinumtoxinA 200 units reconstituted solution, FOR . PHYSICIAN TO INJECT 155 UNITS INTRAMUSCULARLY INTO HEAD, NECK AND SHOULDERS EVERY 12 WEEKS Per FDA PROTOCOL, Disp: 1 each, Rfl:  3  •  oxybutynin XL (DITROPAN-XL) 10 MG 24 hr tablet, Take 10 mg by mouth Daily., Disp: , Rfl:   •  potassium chloride (MICRO-K) 10 MEQ CR capsule, Take 10 mEq by mouth daily., Disp: , Rfl:   •  pravastatin (PRAVACHOL) 80 MG tablet, Take 1 tablet(s) every day at bedtime., Disp: , Rfl:   •  Rybelsus 7 MG tablet, , Disp: , Rfl:   •  valACYclovir (Valtrex) 500 MG tablet, Take 1 tablet by mouth 2 (Two) Times a Day., Disp: 60 tablet, Rfl: 11  •  Vibegron (Gemtesa) 75 MG tablet, Gemtesa 75 mg tablet  Take 1 tablet every day by oral route., Disp: , Rfl:   •  vitamin D (ERGOCALCIFEROL) 42513 UNITS capsule capsule, Take 50,000 Units by mouth Every 30 (Thirty) Days. , Disp: , Rfl:   •  gabapentin (NEURONTIN) 300 MG capsule, Take 2 capsules by mouth Every Night. (Patient taking differently: Take 300 mg by mouth 2 (Two) Times a Day.), Disp: 60 capsule, Rfl: 5  No current facility-administered medications for this visit.    Social History     Socioeconomic History   • Marital status:    Tobacco Use   • Smoking status: Former     Types: Cigarettes     Quit date:      Years since quittin.7   • Smokeless tobacco: Never   • Tobacco comments:     social   Vaping Use   • Vaping Use: Never used   Substance and Sexual Activity   • Alcohol use: Yes     Comment: rarely   • Drug use: No   • Sexual activity: Defer     Social History     Social History Narrative    .  Lives alone in [place].  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          Family History   Problem Relation Age of Onset   • Cancer Mother    • Hypertension Mother    • Heart disease Mother    • Alzheimer's disease Other    • Seizures Other         Epilepsy and recurrent seizures   • Heart disease Other        Review of Systems   Constitutional: Positive for fatigue and unexpected weight gain. Negative for chills, diaphoresis, fever and unexpected weight loss.   HENT: Negative for congestion and facial swelling.    Eyes: Negative for  blurred vision, double vision and discharge.   Respiratory: Negative for chest tightness, shortness of breath and stridor.    Cardiovascular: Negative for chest pain, palpitations and leg swelling.   Gastrointestinal: Negative for blood in stool.   Endocrine: Negative for polydipsia.   Genitourinary: Negative for hematuria.   Musculoskeletal: Positive for arthralgias.   Skin: Negative for color change.   Allergic/Immunologic: Negative for immunocompromised state.   Neurological: Negative for confusion.   Psychiatric/Behavioral: Negative for self-injury.       Physical Exam:  Vital Signs:  Weight: (!) 151 kg (333 lb)   Body mass index is 47.11 kg/m².  Temp: 97.8 °F (36.6 °C)   Heart Rate: 81   BP: 130/88     Physical Exam  Vitals reviewed.   Constitutional:       Appearance: She is well-developed.   HENT:      Head: Normocephalic and atraumatic.      Nose: Nose normal.   Eyes:      Conjunctiva/sclera: Conjunctivae normal.      Pupils: Pupils are equal, round, and reactive to light.   Neck:      Thyroid: No thyromegaly.      Vascular: No carotid bruit.      Trachea: No tracheal deviation.   Cardiovascular:      Rate and Rhythm: Normal rate and regular rhythm.      Heart sounds: Normal heart sounds.   Pulmonary:      Effort: Pulmonary effort is normal. No respiratory distress.      Breath sounds: Normal breath sounds.   Abdominal:      General: There is no distension.      Palpations: Abdomen is soft.      Tenderness: There is no abdominal tenderness.      Comments: Lap scars   Musculoskeletal:         General: No deformity. Normal range of motion.      Cervical back: Normal range of motion and neck supple.   Skin:     General: Skin is warm and dry.      Findings: No rash.   Neurological:      Mental Status: She is alert and oriented to person, place, and time.      Cranial Nerves: No cranial nerve deficit.      Coordination: Coordination normal.      Comments: Ambulates with walker   Psychiatric:         Behavior:  Behavior normal.         Thought Content: Thought content normal.         Judgment: Judgment normal.         Patient Active Problem List   Diagnosis   • Vitamin D deficiency   • Depression   • Multiple sclerosis (formerly [facility])   • Restless legs syndrome   • Muscle spasticity   • Chronic migraine without aura without status migrainosus, not intractable   • Essential hypertension   • Frequent falls   • Status post balloon dilatation of esophageal stricture   • T2DM (type 2 diabetes mellitus) (formerly [facility])   • TB lung, latent   • Aplastic anemia likely due to isoniazide   • Chest pain   • MCGRAW (dyspnea on exertion)   • Hyperlipidemia LDL goal <70   • Class 3 severe obesity due to excess calories with serious comorbidity and body mass index (BMI) of 50.0 to 59.9 in adult (formerly [facility])   • Fatigue   • Dyspepsia       Assessment:    Rashad Carrillo is a 66 y.o. year old female with medically complicated obesity.    Weight loss surgery is deemed medically necessary given the following obesity related comorbidities including OA, fatigue, HTN with current Weight: (!) 151 kg (333 lb) and Body mass index is 47.11 kg/m²..    Patient is aware that surgery is a tool, and that weight loss is not guaranteed but only seen in the context of appropriate use, follow up and exercise.    The patient was present for an approximately a 2.5 hour discussion of the purpose of weight loss surgery, how WLS is a tool to assist in achieving weight loss goals, the most common complications and how best to avoid them, and the strategies for short and long term weight loss.  Ample opportunity to discuss questions was available both in group and during the time of individual examination.    I reviewed all available preop labs, Xrays, tests, clearances, etc and signed off on these in the record.  All of this in addition to the patient's unique history and exam has been taken into consideration in determining their appropriate candidacy for weight loss  "surgery.    Complications  of laparoscopic/possible robotic gastric sleeve were discussed. The patient is well aware of the potential complications of surgery that include but not limited to bleeding, infections, deep venous thrombosis, pulmonary embolism, pulmonary complications such as pneumonia, cardiac events, hernias, small bowel obstruction, damage to the spleen or other organs, bowel injury, disfiguring scars, failure to lose weight, need for additional surgery, conversion to an open procedure, and death. Patient is also aware of complications which apply in this particular procedure that can include but are not limited to a \"leak\" at the staple line which in some instances may require conversion to gastric bypass.    The patient is aware if a hiatal hernia is encountered, it likely will be repaired.  R/B/A Rx to hiatal hernia repair were discussed as outlined in our long consent form.  Briefly risks in addition to those for LSG include recurrent hernia, MARIA A, dysphagia, esophageal injury, pneumothorax, injury to the vagus nerves, injury to the thoracic duct, aorta or vena cava.    Greater than 3 minutes was spent with the patient discussing avoiding all tobacco products and second hand smoke at least 2 weeks pre-operatively and 6 weeks post-operatively to minimize the risk of sleeve leak.  This included discussing the importance of avoiding even secondhand smoke as the risk of leak is increased.  Examples discussed:  I made it very clear that the patient understands they should avoid even riding in a car where someone has previously smoked in the last 2 weeks, living in a house where someone smokes (even if it's in a separate room/patio/attached garage, etc.) we discussed that they should not have a conversation with a group of people who are smoking even if it's outside.  They can be around wood burning fires and barbecue.  I told them I do not know if marijuana has a same effects but my overall " recommendation is to avoid it for 2 weeks prior in 6 weeks after surgery.  They also are aware that nicotine may also increase the risk of leak and I strongly encouraged him to avoid that as well for 2 weeks prior in 6 weeks after surgery.    Discussed the risks, benefits and alternative therapies at great length as outlined in our extensive consent forms, consent videos, and educational teaching process under the direction of the center's .    A copy of the patient's signed informed consent is on file.    Plan:  Robotic assisted sleeve gastrectomy + HHR at [facility].      Hx of pancreatitis, may have posterior-gastric adhesions with increased risk of bleeding, injury to pancreas, splenic vessels, etc.  Unfortunately I do not have the EGD to review.      R/B/A Rx discussed to postop anticoagulation incl but not limited to bleeding, drug reaction, venothromboembolic events, etc. and she declined.  If after surgery she has a relapse of MS, may reconsider anticoagulation for 3 weeks.    MDM high:  Elective procedure with the following risk factors: morbid obesity, HTN, chronic narcotic use.  4+ chronic medical problems reviewed.      Thank you Ye Newman MD for allowing me to share in the care of our mutual patient.      Maribell Anderson MD                                             Electronically signed by Maribell Anderson MD at 10/10/22 1240             Maribell Anderson MD at 10/10/22 1145          [facility]  [address] [address]  [phone]      Patient  Name:  Rashad Carrillo  :  1955      Date of Visit: 10/10/2022      Chief Complaint:  weight gain; unable to maintain weight loss    History of Present Illness:  Rashad Carrillo is a 66 y.o. female who presents today for evaluation, education and consultation regarding weight loss surgery.     Patient has been overweight for many years, with  "numerous failed dietary/weight loss attempts.  She now has obesity related comorbidities of OA, fatigue, HTN and as such has decided to pursue weight loss surgery.    From intake:  \"Hx MS, follows w/ Dr. Gilbert, on Lemtrad (alemtuzumab) infusions q 2yrs + Ampyra (dalfampridine) which assists w/ walking.  She tries very hard to walk 1mile/day but is becoming limited by her chronic knee pain.  Ortho say she needs a (R) TKR but must lose 50 lbs first.   \"    10/10/2022 Update:    Joy    Denies changes medical hx.    Takes Ampyra (not steroid or NSAID, potassium channel blocker) and a monoclonal Ab for MS.  Uses walker and cane for walking assistance.  Has some incontinence of urine and stool.    Has severe bone-on-bone osteoarthritis.  Saw Dr. Choe and got a steroid injection 2 weeks ago.    The patient lives in [place], and is retired, worked in [employer] for [employer] for 24 hours.    Hx of pancreatitis, admitted to hospital for 12 days.  Had cholecystectomy during same admission, but pt was told her pancreatitis was from a virus.    No personal hx of DVT or clotting d/o.  Her mom had varicose veins.  No liver, lung, heart, or renal disease        Review of data:    MAC: regular gabapentin and hydrocodone  CBC: 13.9 WBCs  CMP: CO2 29.  HP neg    EKG: none recent  CXR: nl    EGD:  GEJ 38 cm.  Dr. Santiago 12/13/2017.  Small sliding HH.  Path, reflux esophagitis    Has occasional reflux.  Has dyspepsia/gas.  Gas-ex relieves.    Cardiac clearance: cleared.     Stress test: normal             Last tobacco: 25 years ago.    Last NSAIDs: Mobic last week.  Last ASA: n/a  Last steroids: 2 weeks ago.  Last hormones: n/a        COVID-19 Questionnaire:    3.  Have you previously been tested for COVID-19?    []  No  [x]  Yes    2.  Were you ever positive for COVID-19?    [x]  No  []  Yes    3.  Are you employed in a healthcare setting?    [x]  No  []  Yes    4.  Are you symptomatic for COVID-19 as defined by the CDC (fever, cough)? "  If so, when did symptoms begin?    [x]  No  []  Yes    5.  Have you been hospitalized for COVID-19?  If so, were you in the ICU?  [x]  No    []  Yes, but not in the ICU    []  Yes, and I was in the ICU    6.  Are you a resident in a congregate (group care setting?)    [x]  No  []  Yes    7.  Are you pregnant?  [x]  No  []  Yes    8.  Are you vaccinated?    []  No  []  Yes, but only partially   [x]  Yes, fully          Past Medical History:   Diagnosis Date   • Chronic knee pain     NSAIDS + steroid injections (last 3/2022)   • Dyspepsia    • Dyspnea on exertion    • Fatigue    • Hypertension    • Morbid obesity (HCC)    • Multiple sclerosis (HCC)     follows w/ Dr. Gilbert     Past Surgical History:   Procedure Laterality Date   • LAPAROSCOPIC CHOLECYSTECTOMY  2012    gallstone pancreatitis       Allergies   Allergen Reactions   • Isoniazid Other (See Comments)     aplastic anemia   • Rifampin Anaphylaxis   • Sulfa Antibiotics Anaphylaxis   • Tetanus Toxoid Swelling       Current Outpatient Medications:   •  acyclovir (ZOVIRAX) 400 MG tablet, Take 1 tablet by mouth 2 (Two) Times a Day., Disp: 60 tablet, Rfl: 11  •  albuterol sulfate  (90 Base) MCG/ACT inhaler, Ventolin HFA 90 mcg/actuation aerosol inhaler  1-2 puffs q 4-6^ prn, Disp: , Rfl:   •  azelastine (ASTEPRO) 0.15 % solution nasal spray, inhale 1-2 sprays by nasal route 2 times a day as needed, Disp: , Rfl: 3  •  B Complex-C-E-Zn (Z-BEC PO), Take 1 tablet by mouth daily., Disp: , Rfl:   •  Dalfampridine ER 10 MG tablet sustained-release 12 hour, Take 10 mg by mouth 2 (Two) Times a Day., Disp: 60 tablet, Rfl: 11  •  FLUoxetine (PROzac) 20 MG capsule, Take 20 mg by mouth Daily., Disp: , Rfl:   •  furosemide (LASIX) 40 MG tablet, Take 1 tablet(s) every day, Disp: , Rfl:   •  gabapentin (NEURONTIN) 100 MG capsule, Take 3 capsules by mouth 3 (Three) Times a Day. (Patient taking differently: Take 1 capsule by mouth 2 (Two) Times a Day.), Disp: 90 capsule,  Rfl: 5  •  hydrALAZINE (APRESOLINE) 10 MG tablet, Take 1 tablet 3 times a day as directed., Disp: , Rfl:   •  hydrochlorothiazide (MICROZIDE) 12.5 MG capsule, Take 12.5 mg by mouth Daily., Disp: , Rfl:   •  HYDROcodone-acetaminophen (NORCO) 5-325 MG per tablet, TAKE ONE TABLET BY MOUTH EVERY FOUR HOURS AS NEEDED, Disp: , Rfl:   •  irbesartan (AVAPRO) 150 MG tablet, Take 150 mg by mouth Daily., Disp: , Rfl:   •  meloxicam (MOBIC) 7.5 MG tablet, 1 Oral Daily with food., Disp: 90 tablet, Rfl: 2  •  metoprolol succinate XL (TOPROL-XL) 100 MG 24 hr tablet, metoprolol succinate  mg tablet,extended release 24 hr, Disp: , Rfl:   •  Multiple Vitamin (MULTI-VITAMIN DAILY) tablet, Take 1 tablet by mouth daily., Disp: , Rfl:   •  olmesartan (BENICAR) 40 MG tablet, Take 1 tablet(s) every day for 90 days., Disp: , Rfl:   •  omeprazole (priLOSEC) 20 MG capsule, TAKE ONE CAPSULE IN THE MORNING., Disp: , Rfl: 11  •  OnabotulinumtoxinA 200 units reconstituted solution, FOR . PHYSICIAN TO INJECT 155 UNITS INTRAMUSCULARLY INTO HEAD, NECK AND SHOULDERS EVERY 12 WEEKS Per FDA PROTOCOL, Disp: 1 each, Rfl: 3  •  oxybutynin XL (DITROPAN-XL) 10 MG 24 hr tablet, Take 10 mg by mouth Daily., Disp: , Rfl:   •  potassium chloride (MICRO-K) 10 MEQ CR capsule, Take 10 mEq by mouth daily., Disp: , Rfl:   •  pravastatin (PRAVACHOL) 80 MG tablet, Take 1 tablet(s) every day at bedtime., Disp: , Rfl:   •  Rybelsus 7 MG tablet, , Disp: , Rfl:   •  valACYclovir (Valtrex) 500 MG tablet, Take 1 tablet by mouth 2 (Two) Times a Day., Disp: 60 tablet, Rfl: 11  •  Vibegron (Gemtesa) 75 MG tablet, Gemtesa 75 mg tablet  Take 1 tablet every day by oral route., Disp: , Rfl:   •  vitamin D (ERGOCALCIFEROL) 35797 UNITS capsule capsule, Take 50,000 Units by mouth Every 30 (Thirty) Days. 15TH, Disp: , Rfl:   •  gabapentin (NEURONTIN) 300 MG capsule, Take 2 capsules by mouth Every Night. (Patient taking differently: Take 300 mg by mouth 2  (Two) Times a Day.), Disp: 60 capsule, Rfl: 5  No current facility-administered medications for this visit.    Social History     Socioeconomic History   • Marital status:    Tobacco Use   • Smoking status: Former     Types: Cigarettes     Quit date:      Years since quittin.7   • Smokeless tobacco: Never   • Tobacco comments:     social   Vaping Use   • Vaping Use: Never used   Substance and Sexual Activity   • Alcohol use: Yes     Comment: rarely   • Drug use: No   • Sexual activity: Defer     Social History     Social History Narrative    .  Lives alone in [place].  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          Family History   Problem Relation Age of Onset   • Cancer Mother    • Hypertension Mother    • Heart disease Mother    • Alzheimer's disease Other    • Seizures Other         Epilepsy and recurrent seizures   • Heart disease Other        Review of Systems   Constitutional: Positive for fatigue and unexpected weight gain. Negative for chills, diaphoresis, fever and unexpected weight loss.   HENT: Negative for congestion and facial swelling.    Eyes: Negative for blurred vision, double vision and discharge.   Respiratory: Negative for chest tightness, shortness of breath and stridor.    Cardiovascular: Negative for chest pain, palpitations and leg swelling.   Gastrointestinal: Negative for blood in stool.   Endocrine: Negative for polydipsia.   Genitourinary: Negative for hematuria.   Musculoskeletal: Positive for arthralgias.   Skin: Negative for color change.   Allergic/Immunologic: Negative for immunocompromised state.   Neurological: Negative for confusion.   Psychiatric/Behavioral: Negative for self-injury.       Physical Exam:  Vital Signs:  Weight: (!) 151 kg (333 lb)   Body mass index is 47.11 kg/m².  Temp: 97.8 °F (36.6 °C)   Heart Rate: 81   BP: 130/88     Physical Exam  Vitals reviewed.   Constitutional:       Appearance: She is well-developed.   HENT:       Head: Normocephalic and atraumatic.      Nose: Nose normal.   Eyes:      Conjunctiva/sclera: Conjunctivae normal.      Pupils: Pupils are equal, round, and reactive to light.   Neck:      Thyroid: No thyromegaly.      Vascular: No carotid bruit.      Trachea: No tracheal deviation.   Cardiovascular:      Rate and Rhythm: Normal rate and regular rhythm.      Heart sounds: Normal heart sounds.   Pulmonary:      Effort: Pulmonary effort is normal. No respiratory distress.      Breath sounds: Normal breath sounds.   Abdominal:      General: There is no distension.      Palpations: Abdomen is soft.      Tenderness: There is no abdominal tenderness.      Comments: Lap scars   Musculoskeletal:         General: No deformity. Normal range of motion.      Cervical back: Normal range of motion and neck supple.   Skin:     General: Skin is warm and dry.      Findings: No rash.   Neurological:      Mental Status: She is alert and oriented to person, place, and time.      Cranial Nerves: No cranial nerve deficit.      Coordination: Coordination normal.      Comments: Ambulates with walker   Psychiatric:         Behavior: Behavior normal.         Thought Content: Thought content normal.         Judgment: Judgment normal.         Patient Active Problem List   Diagnosis   • Vitamin D deficiency   • Depression   • Multiple sclerosis ([facility])   • Restless legs syndrome   • Muscle spasticity   • Chronic migraine without aura without status migrainosus, not intractable   • Essential hypertension   • Frequent falls   • Status post balloon dilatation of esophageal stricture   • T2DM (type 2 diabetes mellitus) ([facility])   • TB lung, latent   • Aplastic anemia likely due to isoniazide   • Chest pain   • MCGRAW (dyspnea on exertion)   • Hyperlipidemia LDL goal <70   • Class 3 severe obesity due to excess calories with serious comorbidity and body mass index (BMI) of 50.0 to 59.9 in adult ([facility])   • Fatigue   • Dyspepsia       Assessment:    Rashad  "Singh Carrillo is a 66 y.o. year old female with medically complicated obesity.    Weight loss surgery is deemed medically necessary given the following obesity related comorbidities including OA, fatigue, HTN with current Weight: (!) 151 kg (333 lb) and Body mass index is 47.11 kg/m²..    Patient is aware that surgery is a tool, and that weight loss is not guaranteed but only seen in the context of appropriate use, follow up and exercise.    The patient was present for an approximately a 2.5 hour discussion of the purpose of weight loss surgery, how WLS is a tool to assist in achieving weight loss goals, the most common complications and how best to avoid them, and the strategies for short and long term weight loss.  Ample opportunity to discuss questions was available both in group and during the time of individual examination.    I reviewed all available preop labs, Xrays, tests, clearances, etc and signed off on these in the record.  All of this in addition to the patient's unique history and exam has been taken into consideration in determining their appropriate candidacy for weight loss surgery.    Complications  of laparoscopic/possible robotic gastric sleeve were discussed. The patient is well aware of the potential complications of surgery that include but not limited to bleeding, infections, deep venous thrombosis, pulmonary embolism, pulmonary complications such as pneumonia, cardiac events, hernias, small bowel obstruction, damage to the spleen or other organs, bowel injury, disfiguring scars, failure to lose weight, need for additional surgery, conversion to an open procedure, and death. Patient is also aware of complications which apply in this particular procedure that can include but are not limited to a \"leak\" at the staple line which in some instances may require conversion to gastric bypass.    The patient is aware if a hiatal hernia is encountered, it likely will be repaired.  R/B/A Rx to hiatal " hernia repair were discussed as outlined in our long consent form.  Briefly risks in addition to those for LSG include recurrent hernia, MARIA A, dysphagia, esophageal injury, pneumothorax, injury to the vagus nerves, injury to the thoracic duct, aorta or vena cava.    Greater than 3 minutes was spent with the patient discussing avoiding all tobacco products and second hand smoke at least 2 weeks pre-operatively and 6 weeks post-operatively to minimize the risk of sleeve leak.  This included discussing the importance of avoiding even secondhand smoke as the risk of leak is increased.  Examples discussed:  I made it very clear that the patient understands they should avoid even riding in a car where someone has previously smoked in the last 2 weeks, living in a house where someone smokes (even if it's in a separate room/patio/attached garage, etc.) we discussed that they should not have a conversation with a group of people who are smoking even if it's outside.  They can be around wood burning fires and barbecue.  I told them I do not know if marijuana has a same effects but my overall recommendation is to avoid it for 2 weeks prior in 6 weeks after surgery.  They also are aware that nicotine may also increase the risk of leak and I strongly encouraged him to avoid that as well for 2 weeks prior in 6 weeks after surgery.    Discussed the risks, benefits and alternative therapies at great length as outlined in our extensive consent forms, consent videos, and educational teaching process under the direction of the center's .    A copy of the patient's signed informed consent is on file.    Plan:  Robotic assisted sleeve gastrectomy + HHR at [facility].      Hx of pancreatitis, may have posterior-gastric adhesions with increased risk of bleeding, injury to pancreas, splenic vessels, etc.  Unfortunately I do not have the EGD to review.      R/B/A Rx discussed to postop anticoagulation incl but not limited to  bleeding, drug reaction, venothromboembolic events, etc. and she declined.  If after surgery she has a relapse of MS, may reconsider anticoagulation for 3 weeks.    MDM high:  Elective procedure with the following risk factors: morbid obesity, HTN, chronic narcotic use.  4+ chronic medical problems reviewed.      Thank you Ye Newman MD for allowing me to share in the care of our mutual patient.      Maribell Anderson MD                                             Electronically signed by Maribell Anderson MD at 10/10/22 1240          Physical Therapy Notes (most recent note)      Snehal Moctezuma, PT at 11/10/22 0900  Version 1 of 1         Patient Name: Rashad Carrillo  : 1955    MRN: [id number]                              Today's Date: 11/10/2022       Admit Date: 2022    Visit Dx:     ICD-10-CM ICD-9-CM   1. Morbid obesity (HCC)  E66.01 278.01   2. Hiatal hernia  K44.9 553.3     Patient Active Problem List   Diagnosis   • Vitamin D deficiency   • Depression   • Multiple sclerosis (HCC)   • Restless legs syndrome   • Muscle spasticity   • Chronic migraine without aura without status migrainosus, not intractable   • Essential hypertension   • Frequent falls   • Status post balloon dilatation of esophageal stricture   • T2DM (type 2 diabetes mellitus) ([facility])   • TB lung, latent   • Aplastic anemia likely due to isoniazide   • Chest pain   • MCGRAW (dyspnea on exertion)   • Hyperlipidemia LDL goal <70   • Class 3 severe obesity due to excess calories with serious comorbidity and body mass index (BMI) of 50.0 to 59.9 in adult (HCC)   • Fatigue   • Dyspepsia   • Morbid obesity (HCC)   • Hiatal hernia     Past Medical History:   Diagnosis Date   • Chronic knee pain     NSAIDS + steroid injections (last 3/2022)   • Dyspepsia    • Dyspnea on exertion    • Fatigue    • GERD (gastroesophageal reflux disease)    • History of transfusion     PATIENT DENIES REACTION   • Hyperlipidemia    •  Hypertension    • Morbid obesity (HCC)    • Multiple sclerosis (HCC)     follows w/ Dr. Gilbert     Past Surgical History:   Procedure Laterality Date   • COLONOSCOPY     • GASTRIC SLEEVE LAPAROSCOPIC N/A 11/8/2022    Procedure: GASTRIC SLEEVE WITH HIATAL HERNIA LAPAROSCOPIC WITH DAVINCI ROBOT, ESOPHAGOGASTRODUODENOSCOPY;  Surgeon: Maribell Anderson MD;  Location: [facility];  Service: Robotics - DaVinci;  Laterality: N/A;   • LAPAROSCOPIC CHOLECYSTECTOMY  2012    gallstone pancreatitis      General Information     Row Name 11/10/22 0900          Physical Therapy Time and Intention    Document Type evaluation  -KW     Mode of Treatment individual therapy;physical therapy  -     Row Name 11/10/22 0900          General Information    Patient Profile Reviewed yes  -KW     Prior Level of Function independent:;gait;transfer;bed mobility;all household mobility  -     Existing Precautions/Restrictions other (see comments)  MS  -KW     Barriers to Rehab medically complex  -     Row Name 11/10/22 0900          Living Environment    People in Home alone  -     Row Name 11/10/22 0900          Home Main Entrance    Number of Stairs, Main Entrance two  -KW     Stair Railings, Main Entrance railings safe and in good condition;railings on both sides of stairs  -     Row Name 11/10/22 0900          Stairs Within Home, Primary    Number of Stairs, Within Home, Primary none  -     Row Name 11/10/22 0900          Cognition    Orientation Status (Cognition) oriented x 4  -KW     Row Name 11/10/22 0900          Safety Issues, Functional Mobility    Impairments Affecting Function (Mobility) endurance/activity tolerance;strength  -           User Key  (r) = Recorded By, (t) = Taken By, (c) = Cosigned By    Initials Name Provider Type    KW Snehal Moctezuma PT Physical Therapist               Mobility     Row Name 11/10/22 0900          Sit-Stand Transfer    Sit-Stand [place] (Transfers) modified independence   -KW     Row Name 11/10/22 0900          Gait/Stairs (Locomotion)    [place] Level (Gait) supervision  -KW     Assistive Device (Gait) walker, front-wheeled  -KW     Distance in Feet (Gait) 60  -KW     Deviations/Abnormal Patterns (Gait) gait speed decreased;base of support, wide;stride length decreased  -KW           User Key  (r) = Recorded By, (t) = Taken By, (c) = Cosigned By    Initials Name Provider Type    KW Snehal Moctezuma PT Physical Therapist               Obj/Interventions     Row Name 11/10/22 0900          Strength Comprehensive (MMT)    General Manual Muscle Testing (MMT) Assessment lower extremity strength deficits identified  -KW     Row Name 11/10/22 0900          Balance    Balance Assessment sitting static balance  -KW     Static Sitting Balance independent  -KW     Position, Sitting Balance unsupported;sitting in chair  -KW           User Key  (r) = Recorded By, (t) = Taken By, (c) = Cosigned By    Initials Name Provider Type    Snehal Miller PT Physical Therapist               Goals/Plan     Row Name 11/10/22 0900          Bed Mobility Goal 1 (PT)    Activity/Assistive Device (Bed Mobility Goal 1, PT) sit to supine/supine to sit  -KW     [place] Level/Cues Needed (Bed Mobility Goal 1, PT) independent  -KW     Time Frame (Bed Mobility Goal 1, PT) long term goal (LTG);10 days  -KW     Progress/Outcomes (Bed Mobility Goal 1, PT) new goal  -KW     Row Name 11/10/22 0900          Transfer Goal 1 (PT)    Activity/Assistive Device (Transfer Goal 1, PT) bed-to-chair/chair-to-bed  -KW     [place] Level/Cues Needed (Transfer Goal 1, PT) modified independence  -KW     Time Frame (Transfer Goal 1, PT) long term goal (LTG);10 days  -KW     Progress/Outcome (Transfer Goal 1, PT) new goal  -KW     Row Name 11/10/22 0900          Gait Training Goal 1 (PT)    Activity/Assistive Device (Gait Training Goal 1, PT) gait (walking locomotion);assistive device use;decrease fall  risk;diminish gait deviation;improve balance and speed;increase endurance/gait distance;walker, rolling  -KW     [place] Level (Gait Training Goal 1, PT) modified independence  -KW     Distance (Gait Training Goal 1, PT) 150  -KW     Time Frame (Gait Training Goal 1, PT) long term goal (LTG);10 days  -KW     Progress/Outcome (Gait Training Goal 1, PT) new goal  -KW           User Key  (r) = Recorded By, (t) = Taken By, (c) = Cosigned By    Initials Name Provider Type    Snehal Miller PT Physical Therapist               Clinical Impression     Row Name 11/10/22 0900          Pain    Pretreatment Pain Rating 6/10  -KW     Pain Location - epigastrium  -KW     Pain Intervention(s) Repositioned  -KW     Row Name 11/10/22 0900          Plan of Care Review    Plan of Care Reviewed With patient  -KW     Progress no change  -KW     Outcome Evaluation PT eval completed. Patient demonstrates slowed cinthia and widened CARLOS. She also demonstrates shortened strides due to R knee pain. She describes this as her normal cinthia however patient reported feeling much more SOA than typical (Spo2 maintained >90%). patient would benefit from skilled PT services to improve endurance and gait mechanics to return to PLOF.  -KW     Row Name 11/10/22 0900          Therapy Assessment/Plan (PT)    Rehab Potential (PT) good, to achieve stated therapy goals  -KW     Criteria for Skilled Interventions Met (PT) yes;skilled treatment is necessary  -KW     Therapy Frequency (PT) 5 times/wk  -KW     Row Name 11/10/22 0900          Positioning and Restraints    Pre-Treatment Position sitting in chair/recliner  -KW     Post Treatment Position chair  -KW     In Chair reclined;call light within reach;exit alarm on;with nsg  -KW           User Key  (r) = Recorded By, (t) = Taken By, (c) = Cosigned By    Initials Name Provider Type    Snehal Miller PT Physical Therapist               Outcome Measures     Row Name 11/10/22 0900  11/10/22 0800       How much help from another person do you currently need...    Turning from your back to your side while in flat bed without using bedrails? 3  -KW 3  -KT    Moving from lying on back to sitting on the side of a flat bed without bedrails? 3  -KW 3  -KT    Moving to and from a bed to a chair (including a wheelchair)? 4  -KW 3  -KT    Standing up from a chair using your arms (e.g., wheelchair, bedside chair)? 4  -KW 3  -KT    Climbing 3-5 steps with a railing? 3  -KW 2  -KT    To walk in hospital room? 3  -KW 3  -KT    AM-PAC 6 Clicks Score (PT) 20  -KW 17  -KT    Highest level of mobility 6 --> Walked 10 steps or more  -KW 5 --> Static standing  -KT    Row Name 11/10/22 0900          Functional Assessment    Outcome Measure Options AM-PAC 6 Clicks Basic Mobility (PT)  -KW           User Key  (r) = Recorded By, (t) = Taken By, (c) = Cosigned By    Initials Name Provider Type    Cailin Aceves, RN Registered Nurse    Snehal Miller, PT Physical Therapist                             Physical Therapy Education     Title: PT OT SLP Therapies (In Progress)     Topic: Physical Therapy (Done)     Point: Mobility training (Done)     Learning Progress Summary           Patient Acceptance, E,TB, VU by RADHA at 11/10/2022 0900    Comment: educated about importance of skilled PT services                   Point: Home exercise program (Done)     Learning Progress Summary           Patient Acceptance, E,TB, VU by RADHA at 11/10/2022 0900    Comment: educated about importance of skilled PT services                   Point: Body mechanics (Done)     Learning Progress Summary           Patient Acceptance, E,TB, VU by RADHA at 11/10/2022 0900    Comment: educated about importance of skilled PT services                   Point: Precautions (Done)     Learning Progress Summary           Patient Acceptance, E,TB, VU by RADHA at 11/10/2022 0900    Comment: educated about importance of skilled PT services                                User Key     Initials Effective Dates Name Provider Type Discipline    KW 01/27/22 -  Snehal Moctezuma PT Physical Therapist PT              PT Recommendation and Plan     Plan of Care Reviewed With: patient  Progress: no change  Outcome Evaluation: PT eval completed. Patient demonstrates slowed cinthia and widened CARLOS. She also demonstrates shortened strides due to R knee pain. She describes this as her normal cinthia however patient reported feeling much more SOA than typical (Spo2 maintained >90%). patient would benefit from skilled PT services to improve endurance and gait mechanics to return to PLOF.     Time Calculation:    PT Charges     Row Name 11/10/22 0900             Time Calculation    Start Time 0900  -KW      PT Received On 11/10/22  -KW      PT Goal Re-Cert Due Date 11/20/22  -KW         Untimed Charges    PT Eval/Re-eval Minutes 40  -KW         Total Minutes    Untimed Charges Total Minutes 40  -KW       Total Minutes 40  -KW            User Key  (r) = Recorded By, (t) = Taken By, (c) = Cosigned By    Initials Name Provider Type     Snehal Moctezuma PT Physical Therapist              Therapy Charges for Today     Code Description Service Date Service Provider Modifiers Qty    [id number] HC PT EVAL MOD COMPLEXITY 3 11/10/2022 Snehal Moctezuma PT GP 1          PT G-Codes  Outcome Measure Options: AM-PAC 6 Clicks Basic Mobility (PT)  AM-PAC 6 Clicks Score (PT): 20    Snehal Moctezuma PT  11/10/2022      Electronically signed by Snehal Moctezuma PT at 11/10/22 1031

## 2022-11-11 VITALS
RESPIRATION RATE: 18 BRPM | HEART RATE: 85 BPM | DIASTOLIC BLOOD PRESSURE: 76 MMHG | BODY MASS INDEX: 41.02 KG/M2 | WEIGHT: 293 LBS | SYSTOLIC BLOOD PRESSURE: 144 MMHG | HEIGHT: 71 IN | TEMPERATURE: 98.3 F | OXYGEN SATURATION: 94 %

## 2022-11-11 PROBLEM — K44.9 HIATAL HERNIA: Status: RESOLVED | Noted: 2022-10-11 | Resolved: 2022-11-11

## 2022-11-11 LAB
ALBUMIN SERPL-MCNC: 3.3 G/DL (ref 3.5–5.2)
ALBUMIN/GLOB SERPL: 1.1 G/DL
ALP SERPL-CCNC: 96 U/L (ref 39–117)
ALT SERPL W P-5'-P-CCNC: 6 U/L (ref 1–33)
ANION GAP SERPL CALCULATED.3IONS-SCNC: 12 MMOL/L (ref 5–15)
AST SERPL-CCNC: 15 U/L (ref 1–32)
BASOPHILS # BLD AUTO: 0.03 10*3/MM3 (ref 0–0.2)
BASOPHILS NFR BLD AUTO: 0.5 % (ref 0–1.5)
BILIRUB SERPL-MCNC: 0.8 MG/DL (ref 0–1.2)
BUN SERPL-MCNC: 9 MG/DL (ref 8–23)
BUN/CREAT SERPL: 13.2 (ref 7–25)
CALCIUM SPEC-SCNC: 9 MG/DL (ref 8.6–10.5)
CHLORIDE SERPL-SCNC: 104 MMOL/L (ref 98–107)
CO2 SERPL-SCNC: 20 MMOL/L (ref 22–29)
CREAT SERPL-MCNC: 0.68 MG/DL (ref 0.57–1)
DEPRECATED RDW RBC AUTO: 46 FL (ref 37–54)
EGFRCR SERPLBLD CKD-EPI 2021: 97.4 ML/MIN/1.73
EOSINOPHIL # BLD AUTO: 0.13 10*3/MM3 (ref 0–0.4)
EOSINOPHIL NFR BLD AUTO: 2.3 % (ref 0.3–6.2)
ERYTHROCYTE [DISTWIDTH] IN BLOOD BY AUTOMATED COUNT: 13.5 % (ref 12.3–15.4)
GLOBULIN UR ELPH-MCNC: 3.1 GM/DL
GLUCOSE SERPL-MCNC: 74 MG/DL (ref 65–99)
HCT VFR BLD AUTO: 40.5 % (ref 34–46.6)
HGB BLD-MCNC: 12.9 G/DL (ref 12–15.9)
IMM GRANULOCYTES # BLD AUTO: 0.02 10*3/MM3 (ref 0–0.05)
IMM GRANULOCYTES NFR BLD AUTO: 0.4 % (ref 0–0.5)
LYMPHOCYTES # BLD AUTO: 1.09 10*3/MM3 (ref 0.7–3.1)
LYMPHOCYTES NFR BLD AUTO: 19.1 % (ref 19.6–45.3)
MCH RBC QN AUTO: 29.7 PG (ref 26.6–33)
MCHC RBC AUTO-ENTMCNC: 31.9 G/DL (ref 31.5–35.7)
MCV RBC AUTO: 93.3 FL (ref 79–97)
MONOCYTES # BLD AUTO: 0.75 10*3/MM3 (ref 0.1–0.9)
MONOCYTES NFR BLD AUTO: 13.1 % (ref 5–12)
NEUTROPHILS NFR BLD AUTO: 3.69 10*3/MM3 (ref 1.7–7)
NEUTROPHILS NFR BLD AUTO: 64.6 % (ref 42.7–76)
NRBC BLD AUTO-RTO: 0 /100 WBC (ref 0–0.2)
PLATELET # BLD AUTO: 158 10*3/MM3 (ref 140–450)
PMV BLD AUTO: 11.9 FL (ref 6–12)
POTASSIUM SERPL-SCNC: 4 MMOL/L (ref 3.5–5.2)
PROT SERPL-MCNC: 6.4 G/DL (ref 6–8.5)
RBC # BLD AUTO: 4.34 10*6/MM3 (ref 3.77–5.28)
SODIUM SERPL-SCNC: 136 MMOL/L (ref 136–145)
WBC NRBC COR # BLD: 5.71 10*3/MM3 (ref 3.4–10.8)

## 2022-11-11 PROCEDURE — 99231 SBSQ HOSP IP/OBS SF/LOW 25: CPT | Performed by: PSYCHIATRY & NEUROLOGY

## 2022-11-11 PROCEDURE — 85025 COMPLETE CBC W/AUTO DIFF WBC: CPT | Performed by: SURGERY

## 2022-11-11 PROCEDURE — 80053 COMPREHEN METABOLIC PANEL: CPT | Performed by: SURGERY

## 2022-11-11 PROCEDURE — 99024 POSTOP FOLLOW-UP VISIT: CPT | Performed by: SURGERY

## 2022-11-11 PROCEDURE — 25010000002 ENOXAPARIN PER 10 MG: Performed by: SURGERY

## 2022-11-11 RX ADMIN — MAGNESIUM HYDROXIDE 10 ML: 2400 SUSPENSION ORAL at 08:14

## 2022-11-11 RX ADMIN — GABAPENTIN 300 MG: 300 CAPSULE ORAL at 08:15

## 2022-11-11 RX ADMIN — FLUOXETINE HYDROCHLORIDE 20 MG: 20 CAPSULE ORAL at 08:15

## 2022-11-11 RX ADMIN — PANTOPRAZOLE SODIUM 40 MG: 40 TABLET, DELAYED RELEASE ORAL at 08:15

## 2022-11-11 RX ADMIN — HYDRALAZINE HYDROCHLORIDE 10 MG: 10 TABLET ORAL at 05:32

## 2022-11-11 RX ADMIN — ENOXAPARIN SODIUM 40 MG: 40 INJECTION SUBCUTANEOUS at 08:15

## 2022-11-11 RX ADMIN — LOSARTAN POTASSIUM 100 MG: 50 TABLET, FILM COATED ORAL at 08:14

## 2022-11-11 RX ADMIN — DALFAMPRIDINE 10 MG: 10 TABLET, FILM COATED, EXTENDED RELEASE ORAL at 08:14

## 2022-11-11 RX ADMIN — ACYCLOVIR 400 MG: 200 CAPSULE ORAL at 08:15

## 2022-11-11 NOTE — CASE MANAGEMENT/SOCIAL WORK
Case Management Discharge Note      Final Note: Patient is being discharged today - Private transport has been arranged by the patient. Spoke with Stephanie who confirmed that [facility] would accept the patient for home health PT.  No other dc needs identified- Lucita [phone]         Selected Continued Care - Admitted Since 11/8/2022     Destination    No services have been selected for the patient.              Durable Medical Equipment    No services have been selected for the patient.              Dialysis/Infusion    No services have been selected for the patient.              Home Medical Care     Service Provider Selected Services Address Phone Fax Patient Preferred    [facility] [address] [address] [phone] --          Therapy    No services have been selected for the patient.              Community Resources    No services have been selected for the patient.              Community & DME    No services have been selected for the patient.                Selected Continued Care - Episodes Includes continued care and service providers with selected services from the active episodes listed below    Multiple Sclerosis Episode start date: 11/11/2021   There are no active outsourced providers for this episode.                    Final Discharge Disposition Code: 06 - home with home health care

## 2022-11-11 NOTE — PROGRESS NOTES
Neurology       Patient Care Team:  Ye Newman MD as PCP - General (Internal Medicine)  Ye Newman MD as Consulting Physician (Internal Medicine)  Adonay Gilbert MD as Consulting Physician (Neurology)    Chief complaint: No chief complaint on file.       History: History of multiple sclerosis, diagnosis made in 2004.  She had a Lemtrada infusion, last infusion 2019.  She has been stable.  She claims that she has periodic episodes where she goes into deep sleep and cannot wake up for about 18 to 20 hours.  Never had any history of seizures.  She has some forgetfulness.  Sometimes has word finding difficulty.  She also gets tired.  She underwent robotic sleeve gastrectomy and hiatal hernia repair.  Neurologically she is stable.  MRI of the brain does not show acute white matter changes.  However there is confluent white matter changes which are chronic consistent with diagnosis of multiple sclerosis.    She feels like she is back to her baseline.  Does not have any difficulty with the speech or swallowing.  She has no difficulty in walking.  She wants to go home.  Past Medical History:   Diagnosis Date   • Chronic knee pain     NSAIDS + steroid injections (last 3/2022)   • Dyspepsia    • Dyspnea on exertion    • Fatigue    • GERD (gastroesophageal reflux disease)    • History of transfusion     PATIENT DENIES REACTION   • Hyperlipidemia    • Hypertension    • Morbid obesity (HCC)    • Multiple sclerosis (HCC)     follows w/ Dr. Giblert       Vital Signs   Vitals:    11/10/22 2329 11/11/22 0325 11/11/22 0532 11/11/22 0704   BP: 153/75 144/77 148/77 144/76   BP Location: Right arm Right arm     Patient Position: Lying Lying     Pulse: 83 91 90 100   Resp: 18 18  18   Temp: 97.7 °F (36.5 °C) 97.7 °F (36.5 °C)  98.3 °F (36.8 °C)   TempSrc: Oral Oral  Oral   SpO2: 93%   94%   Weight:       Height:           Physical Exam:   General: Obese.  Mood is pleasant.  Affect appropriate.              Neuro: KIMBERLY  EOMI without nystagmus.  Convergence is normal.  Face symmetric and strong.  Normal tone and strength.  Coordination in both upper limbs normal.    Results Review:    Results from last 7 days   Lab Units 11/11/22  0339   WBC 10*3/mm3 5.71   HEMOGLOBIN g/dL 12.9   HEMATOCRIT % 40.5   PLATELETS 10*3/mm3 158     Results from last 7 days   Lab Units 11/11/22  0339 11/10/22  0346 11/09/22  0431   SODIUM mmol/L 136 136 138   POTASSIUM mmol/L 4.0 3.8 4.3   CHLORIDE mmol/L 104 103 104   CO2 mmol/L 20.0* 23.0 21.0*   BUN mg/dL 9 14 18   CREATININE mg/dL 0.68 0.65 0.70   CALCIUM mg/dL 9.0 9.2 8.9   BILIRUBIN mg/dL 0.8 0.6 0.3   ALK PHOS U/L 96 96 105   ALT (SGPT) U/L 6 7 15   AST (SGOT) U/L 15 18 22   GLUCOSE mg/dL 74 84 123*       Imaging Results (Last 24 Hours)     ** No results found for the last 24 hours. **          Assessment: Long standing diagnosis of multiple sclerosis, clinically she is stable.  As far as episodes of sleepiness are concerned advised her to see Dr. Gilbert as outpatient.  I am not going to keep her in the hospital just for EEG.  I do not think this is a new symptom.  Otherwise she is doing fine.      Plan:  She can be discharged home as far as neurology is concerned.    Comment:  Multiple sclerosis stable.  Likely she had pseudo exacerbation after the gastric sleeve surgery.         I discussed the patients findings and my recommendations with patient    Fredi Fabian MD  11/11/22  08:47 EST

## 2022-11-11 NOTE — PLAN OF CARE
Goal Outcome Evaluation:  Plan of Care Reviewed With: patient        Progress: improving  Outcome Evaluation: A&Ox4. VSS. RA. Pt denies pain or nausea this shift. She states she is feeling better. Adequate urine output. Incentive spirometer encouraged. Milk of mag given with no results at this time.  Pt denies additional complaints at this time. Nursing staff will continue to monitor. Pt is hopeful to discharge today.

## 2022-11-11 NOTE — PROGRESS NOTES
"Cc: POD#3  Robot LSG/HHR  \"feel fine\"    She is alone in the room sitting up in bed smiling.  She still has postprandial epigastric pain unchanged but is keeping things down and would like to go home.  She tells me it was actually her niece who was in the room with her yesterday.  No pulmonary complaints, spirometer 1750 witnessed.  Ambulating and voiding okay.  No bowel movement or flatus.    No fever pulse 90 respirations 18 blood pressure 148/77 room air sat 93% UO 1900 - NR she is in no apparent distress.  Abdomen is soft and benign.  Wounds look okay/unchanged.    CMP normal except for CO2 of 20 albumin 3.30.  White count normal with an unremarkable differential except for slight decrease lymphocytes and elevated monocytes.  H&H 12.9 and 40.5.    Impression: Postop day #3 robotic sleeve gastrectomy and hiatal hernia repair doing fairly well clinically.  Still with some postprandial epigastric pain not unexpected.  Keeping things down, would like to go home and does meet discharge criteria.  Exacerbation of multiple sclerosis stable.  Chronic constipation.  Hypertension.    Plan: Discharge home.  Discharge instructions discussed.  I am a little concerned that when I told her to avoid NSAIDs she said \"I am glad you told me that, I was not aware\" and I reminded her she is probably been told this several times prior to surgery.  I do not know her baseline and she may have memory issues related to her MS or possibly related to this recent exacerbation.  Unfortunately a family members not in the room with her today.  Dr. Anderson's admission H&P notes that if she has an exacerbation of her MS to give postoperative anticoagulation.  I did discuss the risks, benefits, alternative therapies with the patient and she is agreeable to Eliquis 2.5 mg p.o. every 12 hours for 3 weeks.  Hold diuretics for now.  Med rec appears to be completed already and prescriptions for omeprazole, Roxicodone and Zofran noted.  She says she " frequently requires steroids and I reminded her to avoid steroids for the next 2 months to minimize the risk of delayed leak.  See orders.  Call with any problems questions or concerns

## 2022-11-14 PROCEDURE — U0004 COV-19 TEST NON-CDC HGH THRU: HCPCS | Performed by: NURSE PRACTITIONER

## 2022-11-14 PROCEDURE — U0005 INFEC AGEN DETEC AMPLI PROBE: HCPCS | Performed by: NURSE PRACTITIONER

## 2022-11-15 ENCOUNTER — OFFICE VISIT (OUTPATIENT)
Dept: BARIATRICS/WEIGHT MGMT | Facility: CLINIC | Age: 67
End: 2022-11-15

## 2022-11-15 VITALS
DIASTOLIC BLOOD PRESSURE: 82 MMHG | HEART RATE: 80 BPM | OXYGEN SATURATION: 96 % | TEMPERATURE: 96.6 F | HEIGHT: 72 IN | BODY MASS INDEX: 39.68 KG/M2 | WEIGHT: 293 LBS | SYSTOLIC BLOOD PRESSURE: 124 MMHG

## 2022-11-15 DIAGNOSIS — R53.83 OTHER FATIGUE: Primary | ICD-10-CM

## 2022-11-15 DIAGNOSIS — E66.01 CLASS 3 SEVERE OBESITY DUE TO EXCESS CALORIES WITH SERIOUS COMORBIDITY AND BODY MASS INDEX (BMI) OF 50.0 TO 59.9 IN ADULT: Chronic | ICD-10-CM

## 2022-11-15 PROCEDURE — 99024 POSTOP FOLLOW-UP VISIT: CPT | Performed by: PHYSICIAN ASSISTANT

## 2022-11-15 RX ORDER — ONDANSETRON 4 MG/1
4 TABLET, ORALLY DISINTEGRATING ORAL EVERY 8 HOURS PRN
Qty: 20 TABLET | Refills: 0 | Status: SHIPPED | OUTPATIENT
Start: 2022-11-15

## 2022-11-15 RX ORDER — DOXYCYCLINE HYCLATE 100 MG/1
100 CAPSULE ORAL 2 TIMES DAILY
COMMUNITY
End: 2022-12-12

## 2022-11-15 RX ORDER — LEVOFLOXACIN 500 MG/1
500 TABLET, FILM COATED ORAL DAILY
COMMUNITY
End: 2022-12-12

## 2022-11-15 NOTE — PROGRESS NOTES
[facility] Bariatric Surgery  [address]  [address]  [facility] [address]  [phone]      Patient Name:  Rashad Carrillo  :  1955      Date of Visit: 11/15/2022      Reason for Visit:  POD #7    HPI:  Rashad Carrillo is a 66 y.o. female s/p robotic LSG/HHR 22 PNQ.     Discharged on POD#3. Had MS exacerbation following surgery and remained admitted. Cleared to restart monoclonal antibodies. Evaluated by Dr. Xiong-MRI brain with no acute findings. Educated on importance of avoiding NSAIDs and steroids. Was started on Eliquis 2.5mg BID given MS flare       Developed a sore throat, runny nose, and cough on Friday and it progressively worsened over the weekend.  She called her PCP on Monday who recommended she be evaluated at an urgent treatment center.  She was evaluated and chest x-ray did not show any acute process.  She was afebrile at that visit and she is afebrile today in the office.  She does report malaise and subjective fevers at home, but she has not taken her temperature.  She was prescribed an albuterol inhaler, levofloxacin, and Tessalon Perles.  She confirms she has not had any NSAIDs or steroids.  She does note some abdominal soreness around her incision sites that is improving.  She is also had some epigastric discomfort that radiates into her back that has been present since surgery.  This is also progressively improving.  Denies dysphagia, reflux, nausea and vomiting. Using IS 2500. Tolerating diet progression - on stage 1.  Getting 120g prot/day.  Drinking 64 fluid oz/day. Voiding ok-clear to pale yellow. Not yet taking vitamins.  On Omeprazole  and Eliquis .  Holding ASA , NSAIDs , Tramadol, Hormones, Diuretics , Steroids and Immunologics.  Ambulating.    Final Diagnosis   STOMACH, SUBTOTAL GASTRECTOMY:                Gastric tissue with no signficant histopathologic change.  Negative for intestinal metaplasia and dysplasia.  No Helicobacter  pylori-like organisms identified on routine stains.          Presurgery weight: 333 pounds.  Today's weight is (!) 149 kg (328 lb 8 oz) pounds, today's  Body mass index is 44.54 kg/m²., and weight loss since surgery is 5 pounds.       Past Medical History:   Diagnosis Date   • Chronic knee pain     NSAIDS + steroid injections (last 3/2022)   • Dyspepsia    • Dyspnea on exertion    • Fatigue    • GERD (gastroesophageal reflux disease)    • History of transfusion     PATIENT DENIES REACTION   • Hyperlipidemia    • Hypertension    • Morbid obesity (HCC)    • Multiple sclerosis (HCC)     follows w/ Dr. Gilbert     Past Surgical History:   Procedure Laterality Date   • COLONOSCOPY     • GASTRIC SLEEVE LAPAROSCOPIC N/A 11/8/2022    Procedure: GASTRIC SLEEVE WITH HIATAL HERNIA LAPAROSCOPIC WITH DAVINCI ROBOT, ESOPHAGOGASTRODUODENOSCOPY;  Surgeon: Maribell Anderson MD;  Location: [facility];  Service: Robotics - DaVinci;  Laterality: N/A;   • LAPAROSCOPIC CHOLECYSTECTOMY  2012    gallstone pancreatitis     Outpatient Medications Marked as Taking for the 11/15/22 encounter (Office Visit) with Linda Dhillon PA   Medication Sig Dispense Refill   • acyclovir (ZOVIRAX) 400 MG tablet Take 1 tablet by mouth 2 (Two) Times a Day. 60 tablet 11   • albuterol sulfate  (90 Base) MCG/ACT inhaler Ventolin HFA 90 mcg/actuation aerosol inhaler   1-2 puffs q 4-6^ prn     • albuterol sulfate  (90 Base) MCG/ACT inhaler Inhale 2 puffs Every 4 (Four) Hours As Needed for Wheezing for up to 30 days. 6.7 g 0   • azelastine (ASTEPRO) 0.15 % solution nasal spray inhale 1-2 sprays by nasal route 2 times a day as needed  3   • B Complex-C-E-Zn (Z-BEC PO) Take 1 tablet by mouth daily.     • benzonatate (TESSALON) 200 MG capsule Take 1 capsule by mouth 3 (Three) Times a Day As Needed for Cough. 30 capsule 0   • Dalfampridine ER 10 MG tablet sustained-release 12 hour Take 10 mg by mouth 2 (Two) Times a Day. 60 tablet 11   • doxycycline  (MONODOX) 100 MG capsule Take 1 capsule by mouth 2 (Two) Times a Day for 10 days. 20 capsule 0   • doxycycline (VIBRAMYCIN) 100 MG capsule Take 100 mg by mouth 2 (Two) Times a Day.     • FLUoxetine (PROzac) 20 MG capsule Take 20 mg by mouth Daily.     • gabapentin (NEURONTIN) 100 MG capsule Take 3 capsules by mouth 3 (Three) Times a Day. (Patient taking differently: Take 100 mg by mouth 2 (Two) Times a Day.) 90 capsule 5   • hydrALAZINE (APRESOLINE) 10 MG tablet Take 1 tablet 3 times a day as directed.     • HYDROcodone-acetaminophen (NORCO) 5-325 MG per tablet TAKE ONE TABLET BY MOUTH EVERY FOUR HOURS AS NEEDED     • levoFLOXacin (LEVAQUIN) 500 MG tablet Take 500 mg by mouth Daily.     • Multiple Vitamin (MULTI-VITAMIN DAILY) tablet Take 1 tablet by mouth daily.     • olmesartan (BENICAR) 40 MG tablet Take 1 tablet(s) every day for 90 days.     • omeprazole (priLOSEC) 40 MG capsule Take 1 capsule by mouth Daily for 60 days. 60 capsule 0   • oxybutynin XL (DITROPAN-XL) 10 MG 24 hr tablet Take 1 tablet by mouth Daily. PATIENT STATES THAT SHE HAS NOT TAKEN IN A MONTH AS OF 2022     • potassium chloride (MICRO-K) 10 MEQ CR capsule Take 10 mEq by mouth daily.     • valACYclovir (Valtrex) 500 MG tablet Take 1 tablet by mouth 2 (Two) Times a Day. 60 tablet 11   • vitamin D (ERGOCALCIFEROL) 31235 UNITS capsule capsule Take 50,000 Units by mouth Every 30 (Thirty) Days. 15       Allergies   Allergen Reactions   • Isoniazid Other (See Comments)     aplastic anemia   • Rifampin Anaphylaxis   • Sulfa Antibiotics Anaphylaxis   • Prednisone Other (See Comments)     No steroids until 23 for risk of life-threatening gastric leak.  Please contact Dr. Anderson with questions.   • Tetanus Toxoid Swelling       Social History     Socioeconomic History   • Marital status:    Tobacco Use   • Smoking status: Former     Types: Cigarettes     Quit date:      Years since quittin.8   • Smokeless tobacco: Never   •  "Tobacco comments:     social   Vaping Use   • Vaping Use: Never used   Substance and Sexual Activity   • Alcohol use: Yes     Comment: rarely   • Drug use: No   • Sexual activity: Defer     Social History     Social History Narrative    .  Lives alone in [place].  Forced half-way d/t MS - formerly worked in Finance.         /82   Pulse 80   Temp 96.6 °F (35.9 °C)   Ht 182.9 cm (72.01\")   Wt (!) 149 kg (328 lb 8 oz)   LMP  (LMP Unknown)   SpO2 96%   BMI 44.54 kg/m²   Physical Exam  Constitutional:       General: She is not in acute distress.     Appearance: She is obese.   Cardiovascular:      Rate and Rhythm: Normal rate and regular rhythm.      Heart sounds: Normal heart sounds.   Pulmonary:      Effort: Pulmonary effort is normal.      Breath sounds: No rhonchi or rales.      Comments: Diffuse expiratory wheezing  Abdominal:      General: Bowel sounds are normal. There is no distension.      Palpations: Abdomen is soft. There is no mass.      Tenderness: There is no abdominal tenderness.      Comments: Lap incisions healing well.  No erythema, induration, fluctuance, drainage   Skin:     General: Skin is warm and dry.   Neurological:      General: No focal deficit present.      Mental Status: She is alert and oriented to person, place, and time.   Psychiatric:         Mood and Affect: Mood normal.         Behavior: Behavior normal.           Assessment:   POD #7    Class 3 Severe Obesity (BMI >=40). Obesity-related health conditions include the following: as above. Obesity is improving with treatment. BMI is is above average; BMI management plan is completed. We discussed low calorie, low carb based diet program, portion control and increasing exercise.      Plan: She does seem to be doing fine from a bariatric surgery standpoint despite her MS flare and acute upper respiratory infection..  Continue to advance diet per manual.  Continue protein >100g/day.  Increase exercise/activity as " tolerated.  Reviewed lifting restrictions, nothing >25 lbs x 2 more weeks.  Start vitamins as discussed.  Continue PPI and Eliquis.  Continue to avoid ASA/NSAIDs/tobacco x 6 weeks postop, steroids x 8 weeks postop.  Call w/ problems/concerns.    Upper respiratory infection: Chest x-ray with no active disease.  Continue antibiotics, cough suppressants, as needed inhaler for wheezing per urgent treatment facility.  She is following up with Dr. Newman on this Friday.  I will order labs today and have those results faxed over to his office.     The patient was instructed to follow up in 3 weeks, sooner if needed.

## 2022-11-16 LAB
ALBUMIN SERPL-MCNC: 3.8 G/DL (ref 3.5–5.2)
ALBUMIN/GLOB SERPL: 1.4 G/DL
ALP SERPL-CCNC: 102 U/L (ref 39–117)
ALT SERPL-CCNC: 15 U/L (ref 1–33)
AST SERPL-CCNC: 19 U/L (ref 1–32)
BASOPHILS # BLD AUTO: 0.04 10*3/MM3 (ref 0–0.2)
BASOPHILS NFR BLD AUTO: 0.5 % (ref 0–1.5)
BILIRUB SERPL-MCNC: 0.4 MG/DL (ref 0–1.2)
BUN SERPL-MCNC: 24 MG/DL (ref 8–23)
BUN/CREAT SERPL: 24 (ref 7–25)
CALCIUM SERPL-MCNC: 9.2 MG/DL (ref 8.6–10.5)
CHLORIDE SERPL-SCNC: 104 MMOL/L (ref 98–107)
CO2 SERPL-SCNC: 23.4 MMOL/L (ref 22–29)
CREAT SERPL-MCNC: 1 MG/DL (ref 0.57–1)
EGFRCR SERPLBLD CKD-EPI 2021: 63 ML/MIN/1.73
EOSINOPHIL # BLD AUTO: 0.13 10*3/MM3 (ref 0–0.4)
EOSINOPHIL NFR BLD AUTO: 1.5 % (ref 0.3–6.2)
ERYTHROCYTE [DISTWIDTH] IN BLOOD BY AUTOMATED COUNT: 12.5 % (ref 12.3–15.4)
GLOBULIN SER CALC-MCNC: 2.7 GM/DL
GLUCOSE SERPL-MCNC: 117 MG/DL (ref 65–99)
HCT VFR BLD AUTO: 41.3 % (ref 34–46.6)
HGB BLD-MCNC: 13.6 G/DL (ref 12–15.9)
IMM GRANULOCYTES # BLD AUTO: 0.04 10*3/MM3 (ref 0–0.05)
IMM GRANULOCYTES NFR BLD AUTO: 0.5 % (ref 0–0.5)
LYMPHOCYTES # BLD AUTO: 0.51 10*3/MM3 (ref 0.7–3.1)
LYMPHOCYTES NFR BLD AUTO: 6 % (ref 19.6–45.3)
MCH RBC QN AUTO: 29.6 PG (ref 26.6–33)
MCHC RBC AUTO-ENTMCNC: 32.9 G/DL (ref 31.5–35.7)
MCV RBC AUTO: 89.8 FL (ref 79–97)
MONOCYTES # BLD AUTO: 0.77 10*3/MM3 (ref 0.1–0.9)
MONOCYTES NFR BLD AUTO: 9 % (ref 5–12)
NEUTROPHILS # BLD AUTO: 7.02 10*3/MM3 (ref 1.7–7)
NEUTROPHILS NFR BLD AUTO: 82.5 % (ref 42.7–76)
NRBC BLD AUTO-RTO: 0 /100 WBC (ref 0–0.2)
PLATELET # BLD AUTO: 207 10*3/MM3 (ref 140–450)
POTASSIUM SERPL-SCNC: 4 MMOL/L (ref 3.5–5.2)
PREALB SERPL-MCNC: 13 MG/DL (ref 10–36)
PROT SERPL-MCNC: 6.5 G/DL (ref 6–8.5)
RBC # BLD AUTO: 4.6 10*6/MM3 (ref 3.77–5.28)
SODIUM SERPL-SCNC: 141 MMOL/L (ref 136–145)
WBC # BLD AUTO: 8.51 10*3/MM3 (ref 3.4–10.8)

## 2022-11-18 ENCOUNTER — TRANSCRIBE ORDERS (OUTPATIENT)
Dept: ADMINISTRATIVE | Facility: HOSPITAL | Age: 67
End: 2022-11-18

## 2022-11-18 ENCOUNTER — HOSPITAL ENCOUNTER (OUTPATIENT)
Dept: CT IMAGING | Facility: HOSPITAL | Age: 67
Discharge: HOME | End: 2022-11-18
Admitting: INTERNAL MEDICINE

## 2022-11-18 DIAGNOSIS — R06.03 ACUTE RESPIRATORY DISTRESS: ICD-10-CM

## 2022-11-18 DIAGNOSIS — R05.1 ACUTE COUGH: Primary | ICD-10-CM

## 2022-11-18 DIAGNOSIS — R05.1 ACUTE COUGH: ICD-10-CM

## 2022-11-18 PROCEDURE — 71250 CT THORAX DX C-: CPT

## 2022-11-22 NOTE — DISCHARGE SUMMARY
Discharge Summary    Patient name: Rashad Carrillo    Medical record number: [id number]    Admission date: 11/8/2022  Discharge date:  11/11/2022    Attending physician: Maribell Anderson MD    Primary care physician: Ye Newman MD    Condition on discharge: Stable    Primary Diagnoses:  Morbid obesity with co-morbidities    Operative Procedure:  11/8/2022 robotic sleeve gastrectomy and hiatal hernia repair    Hospital Course: The patient is a very pleasant 66 y.o. female that was admitted to the hospital after elective robotic sleeve gastrectomy and hiatal hernia repair.  On POD#1, UGI was without obstruction or leak.  On postoperative day 1, she also had extreme somnolence, weakness, word finding difficulties which her family described as her usual signs of multiple sclerosis relapse.  I plan to consult from Dr. Varun Xiong of neurology, and an MRI did not show any new multiple sclerosis plaques.  Her clinical situation was deemed a pseudo relapse.  She improved spontaneously and was able to tolerate diet, ambulate, and do pulmonary toilet.  By POD#3, was ambulating well, tolerating stage 1 diet, and felt ready to be discharged.  Because she had had an MS pseudo relapse, and there was concern that her mobility would be poor, she was sent home on twice daily Eliquis at a prophylactic dose.  Please see daily progress notes for full details of hospital stay.  Rashad Carrillo was discharged home in good condition with instructions to follow up in the office in 1 week.      Discharge medications:      Discharge Medications      New Medications      Instructions Start Date   Eliquis 2.5 MG tablet tablet  Generic drug: apixaban   2.5 mg, Oral, Every 12 Hours Scheduled      oxyCODONE 5 MG immediate release tablet  Commonly known as: Roxicodone   5 mg, Oral, Every 6 Hours PRN         Changes to Medications      Instructions Start Date   gabapentin 300 MG capsule  Commonly known as: NEURONTIN  What  changed:   · how much to take  · when to take this   600 mg, Oral, Nightly      omeprazole 40 MG capsule  Commonly known as: priLOSEC  What changed:   · medication strength  · See the new instructions.   40 mg, Oral, Daily         Continue These Medications      Instructions Start Date   acyclovir 400 MG tablet  Commonly known as: ZOVIRAX   400 mg, Oral, 2 Times Daily      albuterol sulfate  (90 Base) MCG/ACT inhaler  Commonly known as: PROVENTIL HFA;VENTOLIN HFA;PROAIR HFA   Ventolin HFA 90 mcg/actuation aerosol inhaler   1-2 puffs q 4-6^ prn      azelastine 0.15 % solution nasal spray  Commonly known as: ASTEPRO   inhale 1-2 sprays by nasal route 2 times a day as needed      Dalfampridine ER 10 MG tablet sustained-release 12 hour   10 mg, Oral, 2 Times Daily      FLUoxetine 20 MG capsule  Commonly known as: PROzac   20 mg, Oral, Daily      Gemtesa 75 MG tablet  Generic drug: Vibegron   Gemtesa 75 mg tablet   Take 1 tablet every day by oral route.      hydrALAZINE 10 MG tablet  Commonly known as: APRESOLINE   Take 1 tablet 3 times a day as directed.      HYDROcodone-acetaminophen 5-325 MG per tablet  Commonly known as: NORCO   TAKE ONE TABLET BY MOUTH EVERY FOUR HOURS AS NEEDED      Multi-Vitamin Daily tablet tablet  Generic drug: multivitamin   1 tablet, Oral, Daily      olmesartan 40 MG tablet  Commonly known as: BENICAR   Take 1 tablet(s) every day for 90 days.      OnabotulinumtoxinA 200 units reconstituted solution   FOR . PHYSICIAN TO INJECT 155 UNITS INTRAMUSCULARLY INTO HEAD, NECK AND SHOULDERS EVERY 12 WEEKS Per FDA PROTOCOL      oxybutynin XL 10 MG 24 hr tablet  Commonly known as: DITROPAN-XL   10 mg, Oral, Daily, PATIENT STATES THAT SHE HAS NOT TAKEN IN A MONTH AS OF 11/07/2022      potassium chloride 10 MEQ CR capsule  Commonly known as: MICRO-K   10 mEq, Oral, Daily      valACYclovir 500 MG tablet  Commonly known as: Valtrex   500 mg, Oral, 2 Times Daily      vitamin D 1.25  MG (38950 UT) capsule capsule  Commonly known as: ERGOCALCIFEROL   50,000 Units, Oral, Every 30 Days, 15TH      Z-BEC PO   1 tablet, Oral, Daily         Stop These Medications    furosemide 40 MG tablet  Commonly known as: LASIX     hydroCHLOROthiazide 12.5 MG capsule  Commonly known as: MICROZIDE     meloxicam 7.5 MG tablet  Commonly known as: MOBIC     Rybelsus 7 MG tablet  Generic drug: Semaglutide        ASK your doctor about these medications      Instructions Start Date   gabapentin 100 MG capsule  Commonly known as: NEURONTIN   300 mg, Oral, 3 Times Daily             Discharge instructions:  Per Bariatric manual      Follow-up appointment: Follow up with Bariatric PA in the office in 1 week.     Statement Selected

## 2022-12-02 ENCOUNTER — SPECIALTY PHARMACY (OUTPATIENT)
Dept: ONCOLOGY | Facility: HOSPITAL | Age: 67
End: 2022-12-02

## 2022-12-02 RX ORDER — DALFAMPRIDINE 10 MG/1
10 TABLET, FILM COATED, EXTENDED RELEASE ORAL 2 TIMES DAILY
Qty: 60 TABLET | Refills: 11 | Status: SHIPPED | OUTPATIENT
Start: 2022-12-02

## 2022-12-12 ENCOUNTER — OFFICE VISIT (OUTPATIENT)
Dept: BARIATRICS/WEIGHT MGMT | Facility: CLINIC | Age: 67
End: 2022-12-12

## 2022-12-12 VITALS
HEART RATE: 95 BPM | WEIGHT: 293 LBS | HEIGHT: 71 IN | DIASTOLIC BLOOD PRESSURE: 88 MMHG | OXYGEN SATURATION: 97 % | SYSTOLIC BLOOD PRESSURE: 142 MMHG | TEMPERATURE: 97.1 F | RESPIRATION RATE: 18 BRPM | BODY MASS INDEX: 41.02 KG/M2

## 2022-12-12 DIAGNOSIS — Z98.84 STATUS POST BARIATRIC SURGERY: Primary | ICD-10-CM

## 2022-12-12 DIAGNOSIS — R53.83 FATIGUE, UNSPECIFIED TYPE: ICD-10-CM

## 2022-12-12 DIAGNOSIS — E55.9 HYPOVITAMINOSIS D: ICD-10-CM

## 2022-12-12 DIAGNOSIS — K91.2 POSTGASTRECTOMY MALABSORPTION: ICD-10-CM

## 2022-12-12 DIAGNOSIS — E66.01 OBESITY, CLASS III, BMI 40-49.9 (MORBID OBESITY): ICD-10-CM

## 2022-12-12 DIAGNOSIS — Z13.21 MALNUTRITION SCREEN: ICD-10-CM

## 2022-12-12 DIAGNOSIS — Z90.3 POSTGASTRECTOMY MALABSORPTION: ICD-10-CM

## 2022-12-12 DIAGNOSIS — Z13.0 SCREENING, IRON DEFICIENCY ANEMIA: ICD-10-CM

## 2022-12-12 PROCEDURE — 99024 POSTOP FOLLOW-UP VISIT: CPT | Performed by: PHYSICIAN ASSISTANT

## 2022-12-12 NOTE — PROGRESS NOTES
[facility] Bariatric Surgery  [address]  [address]  [facility] [address]  [phone]      Patient Name:  Rashad Carrillo.  :  1955      Reason for Visit:  1 month postop    HPI:  Rashad Carrillo is a 66 y.o. female s/p robotic LSG/HHR 22 PNQ    Doing well.  Is still recovering from MS flare with fatigue, balance. Holding on steroid treatment, seeing neuro coming up for follow up. Has some pressure at sternum occ with intake, working hard to get all her intake but feels full easily, decreased appetite which  No GI  issues/concerns. Denies dysphagia, reflux, nausea, abdominal pain, pulmonary issues and fevers.  Tolerating diet progression - on stage 5.  Getting 70g prot/day.  Drinking 64+ fluid oz/day.  Taking Patches: MVI + iron + B12 + Ca.  On Omeprazole , completed eliquis.  Still holding ASA , NSAIDs , Tramadol, Hormones, Diuretics , Steroids and Immunologics.  Activity is limited    Presurgery weight:  333 pounds. Today's weight is (!) 146 kg (322 lb) pounds, today's Body mass index is 45.55 kg/m²., and@ weight loss since surgery is 11 pounds.       Past Medical History:   Diagnosis Date   • Chronic knee pain     NSAIDS + steroid injections (last 3/2022)   • Dyspepsia    • Dyspnea on exertion    • Fatigue    • GERD (gastroesophageal reflux disease)    • History of transfusion     PATIENT DENIES REACTION   • Hyperlipidemia    • Hypertension    • Morbid obesity (HCC)    • Multiple sclerosis (HCC)     follows w/ Dr. Gilbert     Past Surgical History:   Procedure Laterality Date   • COLONOSCOPY     • GASTRIC SLEEVE LAPAROSCOPIC N/A 2022    Procedure: GASTRIC SLEEVE WITH HIATAL HERNIA LAPAROSCOPIC WITH DAVINCI ROBOT, ESOPHAGOGASTRODUODENOSCOPY;  Surgeon: Maribell Anderson MD;  Location: [facility];  Service: Robotics - DaVinci;  Laterality: N/A;   • LAPAROSCOPIC CHOLECYSTECTOMY  2012    gallstone pancreatitis     Outpatient Medications Marked as Taking for the  12/12/22 encounter (Office Visit) with Mari Bravo PA-C   Medication Sig Dispense Refill   • acyclovir (ZOVIRAX) 400 MG tablet Take 1 tablet by mouth 2 (Two) Times a Day. 60 tablet 11   • albuterol sulfate  (90 Base) MCG/ACT inhaler Ventolin HFA 90 mcg/actuation aerosol inhaler   1-2 puffs q 4-6^ prn     • albuterol sulfate  (90 Base) MCG/ACT inhaler Inhale 2 puffs Every 4 (Four) Hours As Needed for Wheezing for up to 30 days. 6.7 g 0   • azelastine (ASTEPRO) 0.15 % solution nasal spray inhale 1-2 sprays by nasal route 2 times a day as needed  3   • B Complex-C-E-Zn (Z-BEC PO) Take 1 tablet by mouth daily.     • Dalfampridine ER 10 MG tablet sustained-release 12 hour Take 10 mg by mouth 2 (Two) Times a Day. 60 tablet 11   • FLUoxetine (PROzac) 20 MG capsule Take 20 mg by mouth Daily.     • gabapentin (NEURONTIN) 100 MG capsule Take 3 capsules by mouth 3 (Three) Times a Day. (Patient taking differently: Take 100 mg by mouth 2 (Two) Times a Day.) 90 capsule 5   • hydrALAZINE (APRESOLINE) 10 MG tablet Take 1 tablet 3 times a day as directed.     • HYDROcodone-acetaminophen (NORCO) 5-325 MG per tablet TAKE ONE TABLET BY MOUTH EVERY FOUR HOURS AS NEEDED     • Multiple Vitamin (MULTI-VITAMIN DAILY) tablet Take 1 tablet by mouth daily.     • olmesartan (BENICAR) 40 MG tablet Take 1 tablet(s) every day for 90 days.     • omeprazole (priLOSEC) 40 MG capsule Take 1 capsule by mouth Daily for 60 days. 60 capsule 0   • OnabotulinumtoxinA 200 units reconstituted solution FOR . PHYSICIAN TO INJECT 155 UNITS INTRAMUSCULARLY INTO HEAD, NECK AND SHOULDERS EVERY 12 WEEKS Per FDA PROTOCOL 1 each 3   • potassium chloride (MICRO-K) 10 MEQ CR capsule Take 10 mEq by mouth daily.     • Vibegron (Gemtesa) 75 MG tablet Gemtesa 75 mg tablet   Take 1 tablet every day by oral route.     • vitamin D (ERGOCALCIFEROL) 51106 UNITS capsule capsule Take 50,000 Units by mouth Every 30 (Thirty) Days. 15TH     •  "[DISCONTINUED] benzonatate (TESSALON) 200 MG capsule Take 1 capsule by mouth 3 (Three) Times a Day As Needed for Cough. 30 capsule 0   • [DISCONTINUED] doxycycline (VIBRAMYCIN) 100 MG capsule Take 100 mg by mouth 2 (Two) Times a Day.     • [DISCONTINUED] levoFLOXacin (LEVAQUIN) 500 MG tablet Take 500 mg by mouth Daily.       Allergies   Allergen Reactions   • Isoniazid Other (See Comments)     aplastic anemia   • Rifampin Anaphylaxis   • Sulfa Antibiotics Anaphylaxis   • Prednisone Other (See Comments)     No steroids until 23 for risk of life-threatening gastric leak.  Please contact Dr. Anderson with questions.   • Tetanus Toxoid Swelling       Social History     Socioeconomic History   • Marital status:    Tobacco Use   • Smoking status: Former     Types: Cigarettes     Quit date:      Years since quittin.9   • Smokeless tobacco: Never   • Tobacco comments:     social   Vaping Use   • Vaping Use: Never used   Substance and Sexual Activity   • Alcohol use: Yes     Comment: rarely   • Drug use: No   • Sexual activity: Defer       /88 (BP Location: Left arm, Patient Position: Sitting)   Pulse 95   Temp 97.1 °F (36.2 °C)   Resp 18   Ht 179.1 cm (70.5\")   Wt (!) 146 kg (322 lb)   LMP  (LMP Unknown)   SpO2 97%   BMI 45.55 kg/m²     Physical Exam  Constitutional:       Appearance: She is well-developed. She is obese.   HENT:      Head: Normocephalic and atraumatic.   Cardiovascular:      Rate and Rhythm: Normal rate and regular rhythm.   Pulmonary:      Effort: Pulmonary effort is normal.      Breath sounds: Normal breath sounds.   Abdominal:      General: Bowel sounds are normal.      Palpations: Abdomen is soft.      Comments: Incisions healing well   Skin:     General: Skin is warm and dry.   Neurological:      Mental Status: She is alert.   Psychiatric:         Mood and Affect: Mood normal.         Behavior: Behavior normal.         Thought Content: Thought content normal.         " Judgment: Judgment normal.           Assessment:  1 month s/p robotic LSG/HHR 11/8/22 PNQ    ICD-10-CM ICD-9-CM   1. Status post bariatric surgery  Z98.84 V45.86   2. Obesity, Class III, BMI 40-49.9 (morbid obesity) ([facility])  E66.01 278.01   3. Fatigue, unspecified type  R53.83 780.79   4. Hypovitaminosis D  E55.9 268.9   5. Screening, iron deficiency anemia  Z13.0 V78.0   6. Malnutrition screen  Z13.21 V77.2   7. Postgastrectomy malabsorption  K91.2 579.3    Z90.3          Plan:  Doing well.  Continue to advance diet per manual.  Continue protein 70-100g/day.  Encouraged good food choices - high protein, low carb.  Continue fluids 64+oz per day. Continue routine exercise, lifting restrictions lifted.  Continue PPI. Continue to avoid NSAIDS, ASA, tramadol, tobacco x 6 weeks postop, steroids x 8 weeks postop.  Routine bariatric labs ordered.  Continue vitamins w/ adjustments pending lab results.  Call w/ problems/concerns.    Class 3 Severe Obesity (BMI >=40). Obesity-related health conditions include the following: as above. Obesity is improving with treatment. BMI is is above average; BMI management plan is completed. We discussed low calorie, low carb based diet program, portion control and increasing exercise.        The patient was instructed to follow up in 2 months, sooner if needed.

## 2022-12-13 ENCOUNTER — TELEPHONE (OUTPATIENT)
Dept: NEUROLOGY | Facility: CLINIC | Age: 67
End: 2022-12-13

## 2022-12-13 NOTE — TELEPHONE ENCOUNTER
Called patient to let her know that we had no other options at this time. Patient verbalized understanding.

## 2022-12-13 NOTE — TELEPHONE ENCOUNTER
Provider: DR RAY  Caller: PATIENT  Relationship to Patient: SELF  Pharmacy: [facility]  Phone Number: [phone]  Reason for Call: PATIENT IS HAVING AN MS FLARE UP AND CAN'T TAKE STEROIDS AT THIS TIME. PATIENT WOULD LIKE TO KNOW IF THERE ARE ANY OTHER OPTIONS. PLEASE CALL AND ADVISE, THANK YOU.

## 2022-12-27 ENCOUNTER — PROCEDURE VISIT (OUTPATIENT)
Dept: NEUROLOGY | Facility: CLINIC | Age: 67
End: 2022-12-27

## 2022-12-27 ENCOUNTER — LAB (OUTPATIENT)
Dept: LAB | Facility: HOSPITAL | Age: 67
End: 2022-12-27

## 2022-12-27 DIAGNOSIS — G35 MULTIPLE SCLEROSIS: Primary | ICD-10-CM

## 2022-12-27 DIAGNOSIS — R93.89 ABNORMAL FINDINGS ON DIAGNOSTIC IMAGING OF OTHER SPECIFIED BODY STRUCTURES: ICD-10-CM

## 2022-12-27 DIAGNOSIS — G35 MULTIPLE SCLEROSIS: ICD-10-CM

## 2022-12-27 LAB
ALBUMIN SERPL-MCNC: 3.9 G/DL (ref 3.5–5.2)
ALBUMIN/GLOB SERPL: 1.2 G/DL
ALP SERPL-CCNC: 105 U/L (ref 39–117)
ALT SERPL W P-5'-P-CCNC: 14 U/L (ref 1–33)
ANION GAP SERPL CALCULATED.3IONS-SCNC: 10.6 MMOL/L (ref 5–15)
AST SERPL-CCNC: 25 U/L (ref 1–32)
BACTERIA UR QL AUTO: ABNORMAL /HPF
BASOPHILS # BLD AUTO: 0.03 10*3/MM3 (ref 0–0.2)
BASOPHILS NFR BLD AUTO: 0.7 % (ref 0–1.5)
BILIRUB SERPL-MCNC: 0.6 MG/DL (ref 0–1.2)
BILIRUB UR QL STRIP: NEGATIVE
BUN SERPL-MCNC: 13 MG/DL (ref 8–23)
BUN/CREAT SERPL: 17.1 (ref 7–25)
CALCIUM SPEC-SCNC: 9.5 MG/DL (ref 8.6–10.5)
CHLORIDE SERPL-SCNC: 107 MMOL/L (ref 98–107)
CLARITY UR: ABNORMAL
CO2 SERPL-SCNC: 25.4 MMOL/L (ref 22–29)
COD CRY URNS QL: ABNORMAL /HPF
COLOR UR: ABNORMAL
CREAT SERPL-MCNC: 0.76 MG/DL (ref 0.57–1)
DEPRECATED RDW RBC AUTO: 45.3 FL (ref 37–54)
EGFRCR SERPLBLD CKD-EPI 2021: 87.1 ML/MIN/1.73
EOSINOPHIL # BLD AUTO: 0.18 10*3/MM3 (ref 0–0.4)
EOSINOPHIL NFR BLD AUTO: 4.2 % (ref 0.3–6.2)
ERYTHROCYTE [DISTWIDTH] IN BLOOD BY AUTOMATED COUNT: 13.4 % (ref 12.3–15.4)
GLOBULIN UR ELPH-MCNC: 3.2 GM/DL
GLUCOSE SERPL-MCNC: 101 MG/DL (ref 65–99)
GLUCOSE UR STRIP-MCNC: NEGATIVE MG/DL
HCT VFR BLD AUTO: 45.5 % (ref 34–46.6)
HGB BLD-MCNC: 14.4 G/DL (ref 12–15.9)
HGB UR QL STRIP.AUTO: NEGATIVE
HYALINE CASTS UR QL AUTO: ABNORMAL /LPF
IMM GRANULOCYTES # BLD AUTO: 0.01 10*3/MM3 (ref 0–0.05)
IMM GRANULOCYTES NFR BLD AUTO: 0.2 % (ref 0–0.5)
KETONES UR QL STRIP: ABNORMAL
LEUKOCYTE ESTERASE UR QL STRIP.AUTO: ABNORMAL
LYMPHOCYTES # BLD AUTO: 0.93 10*3/MM3 (ref 0.7–3.1)
LYMPHOCYTES NFR BLD AUTO: 21.7 % (ref 19.6–45.3)
MCH RBC QN AUTO: 28.9 PG (ref 26.6–33)
MCHC RBC AUTO-ENTMCNC: 31.6 G/DL (ref 31.5–35.7)
MCV RBC AUTO: 91.2 FL (ref 79–97)
MONOCYTES # BLD AUTO: 0.42 10*3/MM3 (ref 0.1–0.9)
MONOCYTES NFR BLD AUTO: 9.8 % (ref 5–12)
NEUTROPHILS NFR BLD AUTO: 2.72 10*3/MM3 (ref 1.7–7)
NEUTROPHILS NFR BLD AUTO: 63.4 % (ref 42.7–76)
NITRITE UR QL STRIP: NEGATIVE
NRBC BLD AUTO-RTO: 0 /100 WBC (ref 0–0.2)
PH UR STRIP.AUTO: 5.5 [PH] (ref 5–8)
PLATELET # BLD AUTO: 210 10*3/MM3 (ref 140–450)
PMV BLD AUTO: 11.8 FL (ref 6–12)
POTASSIUM SERPL-SCNC: 3.5 MMOL/L (ref 3.5–5.2)
PROT SERPL-MCNC: 7.1 G/DL (ref 6–8.5)
PROT UR QL STRIP: ABNORMAL
RBC # BLD AUTO: 4.99 10*6/MM3 (ref 3.77–5.28)
RBC # UR STRIP: ABNORMAL /HPF
REF LAB TEST METHOD: ABNORMAL
SODIUM SERPL-SCNC: 143 MMOL/L (ref 136–145)
SP GR UR STRIP: 1.03 (ref 1–1.03)
SQUAMOUS #/AREA URNS HPF: ABNORMAL /HPF
TSH SERPL DL<=0.05 MIU/L-ACNC: 2.03 UIU/ML (ref 0.27–4.2)
UROBILINOGEN UR QL STRIP: ABNORMAL
WBC # UR STRIP: ABNORMAL /HPF
WBC NRBC COR # BLD: 4.29 10*3/MM3 (ref 3.4–10.8)

## 2022-12-27 PROCEDURE — 36415 COLL VENOUS BLD VENIPUNCTURE: CPT

## 2022-12-27 PROCEDURE — 85025 COMPLETE CBC W/AUTO DIFF WBC: CPT

## 2022-12-27 PROCEDURE — 81001 URINALYSIS AUTO W/SCOPE: CPT

## 2022-12-27 PROCEDURE — 84443 ASSAY THYROID STIM HORMONE: CPT

## 2022-12-27 PROCEDURE — 64615 CHEMODENERV MUSC MIGRAINE: CPT | Performed by: PSYCHIATRY & NEUROLOGY

## 2022-12-27 PROCEDURE — 80053 COMPREHEN METABOLIC PANEL: CPT

## 2022-12-27 NOTE — PROGRESS NOTES
Botulinum Toxin Injection Procedure    No chief complaint on file.       History:  Response to treatment has reduced HA's at least 7 fewer days a month and/or 100 hours fewer each month.     Pre-operative diagnosis: headache    Post-operative diagnosis: Same    Procedure: Chemical neurolysis    After risks and benefits were explained including bleeding, infection, worsening of pain, damage to the areas being injected, weakness of muscles, loss of muscle control, dysphagia if injecting the head or neck, facial droop if injecting the facial area, painful injection, allergic or other reaction to the medications being injected, and the failure of the procedure to help the problem, a signed consent was obtained.      The patient was placed in a comfortable area and the sites to be treated were identified. A time out was called and performed. The area to be treated was prepped three times with alcohol and the alcohol allowed to dry. Next, a 27 gauge needle was used to inject the medication in the area to be treated.    Area(s) injected: frontalis muscle, semispinalis capitis muscle and temporallis muscle    Medications used: botulinum toxin 200 mu, 2 mL of saline used to dilute the botulinum toxin.      The patient did tolerate the procedure well. There were no complications.       Physical Examination    Current Treatment (Units)   Splenius Capitis 25 units on the right and 25 units on the left.   Temporalis 25 units on the right and 25 units on the left.   Frontalis 27 units on the right and 28 units on the left.   EMG Guidance none .   Complications: none .   The total amount injected in units is 155 .   The total amount wasted in units is 45 .   The total amount submitted in units is 200 .

## 2023-01-05 ENCOUNTER — TELEPHONE (OUTPATIENT)
Dept: BARIATRICS/WEIGHT MGMT | Facility: CLINIC | Age: 68
End: 2023-01-05
Payer: MEDICARE

## 2023-01-05 NOTE — TELEPHONE ENCOUNTER
Pt called, asked when she would be able to get a steroid injection in her knee. I advised pt that she needs to be at least 8 weeks post op prior to any steroid use. Pt understood with no further questions or concerns.

## 2023-01-23 RX ORDER — OMEPRAZOLE 40 MG/1
40 CAPSULE, DELAYED RELEASE ORAL DAILY
Qty: 60 CAPSULE | Refills: 0 | Status: SHIPPED | OUTPATIENT
Start: 2023-01-23 | End: 2023-03-24

## 2023-01-30 ENCOUNTER — DOCUMENTATION (OUTPATIENT)
Dept: ONCOLOGY | Facility: HOSPITAL | Age: 68
End: 2023-01-30
Payer: MEDICARE

## 2023-01-30 ENCOUNTER — OFFICE VISIT (OUTPATIENT)
Dept: ORTHOPEDIC SURGERY | Facility: CLINIC | Age: 68
End: 2023-01-30
Payer: MEDICARE

## 2023-01-30 VITALS
HEIGHT: 71 IN | SYSTOLIC BLOOD PRESSURE: 140 MMHG | WEIGHT: 293 LBS | DIASTOLIC BLOOD PRESSURE: 100 MMHG | BODY MASS INDEX: 41.02 KG/M2

## 2023-01-30 DIAGNOSIS — M17.11 PRIMARY OSTEOARTHRITIS OF RIGHT KNEE: Primary | ICD-10-CM

## 2023-01-30 PROCEDURE — 20610 DRAIN/INJ JOINT/BURSA W/O US: CPT | Performed by: ORTHOPAEDIC SURGERY

## 2023-01-30 RX ORDER — TRIAMCINOLONE ACETONIDE 40 MG/ML
40 INJECTION, SUSPENSION INTRA-ARTICULAR; INTRAMUSCULAR
Status: COMPLETED | OUTPATIENT
Start: 2023-01-30 | End: 2023-01-30

## 2023-01-30 RX ORDER — ROPIVACAINE HYDROCHLORIDE 5 MG/ML
4 INJECTION, SOLUTION EPIDURAL; INFILTRATION; PERINEURAL
Status: COMPLETED | OUTPATIENT
Start: 2023-01-30 | End: 2023-01-30

## 2023-01-30 RX ADMIN — TRIAMCINOLONE ACETONIDE 40 MG: 40 INJECTION, SUSPENSION INTRA-ARTICULAR; INTRAMUSCULAR at 09:36

## 2023-01-30 RX ADMIN — ROPIVACAINE HYDROCHLORIDE 4 ML: 5 INJECTION, SOLUTION EPIDURAL; INFILTRATION; PERINEURAL at 09:36

## 2023-01-30 NOTE — PROGRESS NOTES
Procedure   Large Joint Arthrocentesis: R knee  Date/Time: 1/30/2023 9:36 AM  Consent given by: patient  Site marked: site marked  Timeout: Immediately prior to procedure a time out was called to verify the correct patient, procedure, equipment, support staff and site/side marked as required   Supporting Documentation  Indications: pain   Procedure Details  Location: knee - R knee  Preparation: Patient was prepped and draped in the usual sterile fashion  Needle size: 23 G  Approach: anterolateral  Medications administered: 40 mg triamcinolone acetonide 40 MG/ML; 4 mL ropivacaine 0.5 %  Patient tolerance: patient tolerated the procedure well with no immediate complications

## 2023-01-30 NOTE — PROGRESS NOTES
[facility] Note    Subjective     Chief Complaint   Patient presents with   • Follow-up     4 month follow up- Primary osteoarthritis of right knee         HPI    It has been 4  month(s) since Ms. Carrillo's last visit. She returns to clinic today for follow-up of right knee arthritis. The issue has been ongoing for 2 year(s). She rates her pain a 9/10 on the pain scale. Previous/current treatments: cane/walker, NSAIDS, weight loss and oral steroids. Current symptoms: pain, swelling, stiffness and giving way/buckling. The pain is worse with walking, standing, sitting, climbing stairs, sleeping, working, leisure, lying on affected side and rising from seated position; resting, ice, heat, pain medication and/or NSAID and lying down improve the pain. Overall, she is doing worse.  She had bariatric surgery in November 2022 with Dr. Anderson.  She would like to have another injection today.  Ultimately she would like to proceed with surgery when her body mass index allows.    I have reviewed the following portions of the patient's history and agree with: History of Present Illness and Review of Systems    Patient Active Problem List   Diagnosis   • Vitamin D deficiency   • Depression   • Multiple sclerosis ([facility])   • Restless legs syndrome   • Muscle spasticity   • Chronic migraine without aura without status migrainosus, not intractable   • Essential hypertension   • Frequent falls   • Status post balloon dilatation of esophageal stricture   • T2DM (type 2 diabetes mellitus) ([facility])   • TB lung, latent   • Aplastic anemia likely due to isoniazide   • Chest pain   • MCGRAW (dyspnea on exertion)   • Hyperlipidemia LDL goal <70   • Class 3 severe obesity due to excess calories with serious comorbidity and body mass index (BMI) of 50.0 to 59.9 in adult ([facility])   • Fatigue   • Dyspepsia   • Morbid obesity ([facility])     Past Medical History:   Diagnosis Date   • Chronic knee pain     NSAIDS + steroid injections (last  3/2022)   • Dyspepsia    • Dyspnea on exertion    • Fatigue    • GERD (gastroesophageal reflux disease)    • History of transfusion     PATIENT DENIES REACTION   • Hyperlipidemia    • Hypertension    • Morbid obesity (HCC)    • Multiple sclerosis (HCC)     follows w/ Dr. Gilbert      Past Surgical History:   Procedure Laterality Date   • COLONOSCOPY     • GASTRIC SLEEVE LAPAROSCOPIC N/A 2022    Procedure: GASTRIC SLEEVE WITH HIATAL HERNIA LAPAROSCOPIC WITH DAVINCI ROBOT, ESOPHAGOGASTRODUODENOSCOPY;  Surgeon: Maribell Anderson MD;  Location: [facility];  Service: Robotics - DaVinci;  Laterality: N/A;   • LAPAROSCOPIC CHOLECYSTECTOMY  2012    gallstone pancreatitis      Family History   Problem Relation Age of Onset   • Cancer Mother    • Hypertension Mother    • Heart disease Mother    • Alzheimer's disease Other    • Seizures Other         Epilepsy and recurrent seizures   • Heart disease Other    • Breast cancer Neg Hx    • Ovarian cancer Neg Hx      Social History     Socioeconomic History   • Marital status:    Tobacco Use   • Smoking status: Former     Types: Cigarettes     Quit date:      Years since quittin.0   • Smokeless tobacco: Never   • Tobacco comments:     social   Vaping Use   • Vaping Use: Never used   Substance and Sexual Activity   • Alcohol use: Yes     Comment: rarely   • Drug use: No   • Sexual activity: Defer      Current Outpatient Medications on File Prior to Visit   Medication Sig Dispense Refill   • acyclovir (ZOVIRAX) 400 MG tablet Take 1 tablet by mouth 2 (Two) Times a Day. 60 tablet 11   • albuterol sulfate  (90 Base) MCG/ACT inhaler Ventolin HFA 90 mcg/actuation aerosol inhaler   1-2 puffs q 4-6^ prn     • apixaban (ELIQUIS) 2.5 MG tablet tablet Take 1 tablet by mouth Every 12 (Twelve) Hours. 42 tablet 0   • azelastine (ASTEPRO) 0.15 % solution nasal spray inhale 1-2 sprays by nasal route 2 times a day as needed  3   • B Complex-C-E-Zn (Z-BEC PO) Take 1  tablet by mouth daily.     • B COMPLEX-C-E-ZN PO B complex-C-E-Zn    1 patch daily.     • Dalfampridine ER 10 MG tablet sustained-release 12 hour Take 10 mg by mouth 2 (Two) Times a Day. 60 tablet 11   • FLUoxetine (PROzac) 20 MG capsule Take 20 mg by mouth Daily.     • gabapentin (NEURONTIN) 100 MG capsule Take 3 capsules by mouth 3 (Three) Times a Day. (Patient taking differently: Take 100 mg by mouth 2 (Two) Times a Day.) 90 capsule 5   • hydrALAZINE (APRESOLINE) 10 MG tablet Take 1 tablet 3 times a day as directed.     • HYDROcodone-acetaminophen (NORCO) 5-325 MG per tablet TAKE ONE TABLET BY MOUTH EVERY FOUR HOURS AS NEEDED     • Multiple Vitamin (MULTI-VITAMIN DAILY) tablet Take 1 tablet by mouth daily.     • olmesartan (BENICAR) 40 MG tablet Take 1 tablet(s) every day for 90 days.     • omeprazole (priLOSEC) 40 MG capsule Take 1 capsule by mouth Daily for 60 days. 60 capsule 0   • OnabotulinumtoxinA 200 units reconstituted solution FOR . PHYSICIAN TO INJECT 155 UNITS INTRAMUSCULARLY INTO HEAD, NECK AND SHOULDERS EVERY 12 WEEKS Per FDA PROTOCOL 1 each 3   • ondansetron ODT (ZOFRAN-ODT) 4 MG disintegrating tablet Place 1 tablet on the tongue Every 8 (Eight) Hours As Needed for Nausea or Vomiting. 20 tablet 0   • oxybutynin XL (DITROPAN-XL) 10 MG 24 hr tablet Take 1 tablet by mouth Daily. PATIENT STATES THAT SHE HAS NOT TAKEN IN A MONTH AS OF 11/07/2022     • oxyCODONE (Roxicodone) 5 MG immediate release tablet Take 1 tablet by mouth Every 6 (Six) Hours As Needed for Moderate Pain. 10 tablet 0   • potassium chloride (MICRO-K) 10 MEQ CR capsule Take 10 mEq by mouth daily.     • valACYclovir (Valtrex) 500 MG tablet Take 1 tablet by mouth 2 (Two) Times a Day. 60 tablet 11   • Vibegron (Gemtesa) 75 MG tablet Gemtesa 75 mg tablet   Take 1 tablet every day by oral route.     • vitamin D (ERGOCALCIFEROL) 88229 UNITS capsule capsule Take 50,000 Units by mouth Every 30 (Thirty) Days. 15TH     •  gabapentin (NEURONTIN) 300 MG capsule Take 2 capsules by mouth Every Night. (Patient taking differently: Take 1 capsule by mouth 2 (Two) Times a Day.) 60 capsule 5     No current facility-administered medications on file prior to visit.      Allergies   Allergen Reactions   • Isoniazid Other (See Comments)     aplastic anemia   • Rifampin Anaphylaxis   • Sulfa Antibiotics Anaphylaxis   • Prednisone Other (See Comments)     No steroids until 1/9/23 for risk of life-threatening gastric leak.  Please contact Dr. Anderson with questions.   • Tetanus Toxoid Swelling        Review of Systems   Constitutional: Negative for activity change, appetite change, chills, diaphoresis, fatigue, fever and unexpected weight change.   HENT: Negative for congestion, dental problem, drooling, ear discharge, ear pain, facial swelling, hearing loss, mouth sores, nosebleeds, postnasal drip, rhinorrhea, sinus pressure, sneezing, sore throat, tinnitus, trouble swallowing and voice change.    Eyes: Negative for photophobia, pain, discharge, redness, itching and visual disturbance.   Respiratory: Negative for apnea, cough, choking, chest tightness, shortness of breath, wheezing and stridor.    Cardiovascular: Negative for chest pain, palpitations and leg swelling.   Gastrointestinal: Negative for abdominal distention, abdominal pain, anal bleeding, blood in stool, constipation, diarrhea, nausea, rectal pain and vomiting.   Endocrine: Negative for cold intolerance, heat intolerance, polydipsia, polyphagia and polyuria.   Genitourinary: Negative for decreased urine volume, difficulty urinating, dysuria, enuresis, flank pain, frequency, genital sores, hematuria and urgency.   Musculoskeletal: Positive for arthralgias. Negative for back pain, gait problem, joint swelling, myalgias, neck pain and neck stiffness.   Skin: Negative for color change, pallor, rash and wound.   Allergic/Immunologic: Negative for environmental allergies, food allergies  "and immunocompromised state.   Neurological: Negative for dizziness, tremors, seizures, syncope, facial asymmetry, speech difficulty, weakness, light-headedness, numbness and headaches.   Hematological: Negative for adenopathy. Does not bruise/bleed easily.   Psychiatric/Behavioral: Negative for agitation, behavioral problems, confusion, decreased concentration, dysphoric mood, hallucinations, self-injury, sleep disturbance and suicidal ideas. The patient is not nervous/anxious and is not hyperactive.         Objective      Physical Exam  /100   Ht 179.1 cm (70.51\")   Wt (!) 141 kg (311 lb 3.2 oz)   LMP  (LMP Unknown)   BMI 44.01 kg/m²     Body mass index is 44.01 kg/m².    General:   Mental Status:  Alert   Appearance: Cooperative, in no acute distress   Build and Nutrition: Obese by BMI female   Orientation: Alert and oriented to person, place and time   Posture: Normal   Gait: Limp on the right    Integument:              Right knee: No skin lesions, no rash, no ecchymosis     Lower Extremities:              Right Knee:                          Tenderness:    None                          Effusion:          None                          Swelling:          None                          Crepitus:          Positive                          Range of motion:        Extension:       15°                                                              Flexion:           105°  Instability:        No varus laxity, no valgus laxity, negative anterior drawer  Deformities:     None    Imaging/Studies  Imaging Results (Last 24 Hours)     ** No results found for the last 24 hours. **        No new imaging today.    Assessment and Plan     Diagnoses and all orders for this visit:    1. Primary osteoarthritis of right knee (Primary)  -     Large Joint Arthrocentesis: R knee        1. Primary osteoarthritis of right knee        I reviewed my findings with the patient.  She would like to proceed with an injection today.  We " are awaiting approval from her bariatric surgeon before proceeding.  I will see her back in 3 months, but sooner for any problems.    Addendum: Dr. Anderson approved proceeding with the injection today, as she is far enough out from her procedure to allow.    Procedure Note:  The potential benefits of performing a therapeutic right knee joint injection, as well as potential risks (including, but not limited to infection, swelling, pain, bleeding, bruising, nerve/blood vessel damage, skin color changes, transient elevation in blood glucose levels, and fat atrophy) were discussed with the patient.  After informed consent, timeout procedure was performed, and the skin on the right knee was prepped with chlorhexidine soap and alcohol, after which ethyl chloride was applied to the skin at the injection site. Via the anterolateral approach, 1ml of Kenalog 40mg/ml mixed with 4ml 0.5% ropivacaine plain was injected into the knee joint.  The patient tolerated the procedure well, experiencing 25% improvement a few minutes following the injection. There were no complications.  Band-Aid was applied to the injection site. Post-procedural instructions were given to the patient and/or their caregiver.      Return in about 3 months (around 4/30/2023).      Lalo Choe MD  01/30/23  09:48 EST

## 2023-02-10 ENCOUNTER — OFFICE VISIT (OUTPATIENT)
Dept: BARIATRICS/WEIGHT MGMT | Facility: CLINIC | Age: 68
End: 2023-02-10
Payer: MEDICARE

## 2023-02-10 VITALS
SYSTOLIC BLOOD PRESSURE: 132 MMHG | WEIGHT: 293 LBS | HEIGHT: 71 IN | OXYGEN SATURATION: 98 % | TEMPERATURE: 97.3 F | HEART RATE: 103 BPM | BODY MASS INDEX: 41.02 KG/M2 | RESPIRATION RATE: 18 BRPM | DIASTOLIC BLOOD PRESSURE: 86 MMHG

## 2023-02-10 DIAGNOSIS — G35 MULTIPLE SCLEROSIS: ICD-10-CM

## 2023-02-10 DIAGNOSIS — K91.2 POSTGASTRECTOMY MALABSORPTION: ICD-10-CM

## 2023-02-10 DIAGNOSIS — E55.9 HYPOVITAMINOSIS D: ICD-10-CM

## 2023-02-10 DIAGNOSIS — Z13.21 MALNUTRITION SCREEN: ICD-10-CM

## 2023-02-10 DIAGNOSIS — E66.01 OBESITY, CLASS III, BMI 40-49.9 (MORBID OBESITY): ICD-10-CM

## 2023-02-10 DIAGNOSIS — R53.83 FATIGUE, UNSPECIFIED TYPE: Primary | ICD-10-CM

## 2023-02-10 DIAGNOSIS — Z13.0 SCREENING, IRON DEFICIENCY ANEMIA: ICD-10-CM

## 2023-02-10 DIAGNOSIS — Z90.3 POSTGASTRECTOMY MALABSORPTION: ICD-10-CM

## 2023-02-10 PROCEDURE — 99213 OFFICE O/P EST LOW 20 MIN: CPT | Performed by: SURGERY

## 2023-02-10 NOTE — PROGRESS NOTES
[facility]ariatric Surgery  [address]  [address]  [facility] [address] [phone]        Patient Name:  Rashad Carrillo.  :  1955      Date of Visit: 2/10/2023      Reason for Visit:   3 months postop     HPI: Rashad Carrillo is a 67 y.o. female s/p LSG/HHR 22 PNQ.  Has pseudorelapse of MS while in hospital.     C/o hair loss. Reports lost manual, got a new one today.  Admittedly not tracking protein.  Dr. Choe had texted me about a cortisone shot in her knee, and she got that a week ago.  It has helped.    Checked many complaints on intake sheet, but really is elated.    Has some nausea that is decreasing.  Takes Zofran daily.  Does not need a reflux.  Occasional mild reflux.  Dysphagia is almost gone.  Very pleased that she is not hungry.    Getting 50-60 g prot/day via shakes, but also some protein rich foods. Does not track intake.  Drinking 64 fluid oz/day.  1 month labs revealed  no vitamin deficiencies. Taking Patches: MVI, iron, B12, vitamin D.  Also on the monthly replacement Vitamin D..  Not on PPI.    Exercise: nothing, limited by knee pain.  Plans to go to [place] soon, and will walk/swim in pool.  Has [employer] in [place], but has not gone to swim there b/c her knee hurts so badly.  Hopeful it will be better after shot.  Tries to do her MS exercises that she learned from physical therapy while in the bed or standing at the sink.       Presurgery weight: 333 pounds.  Today's weight is (!) 139 kg (305 lb 8 oz) pounds, today's  Body mass index is 43.22 kg/m²., and weight loss since surgery is 28 pounds.      Past Medical History:   Diagnosis Date   • Chronic knee pain     NSAIDS + steroid injections (last 3/2022)   • Dyspepsia    • Dyspnea on exertion    • Fatigue    • GERD (gastroesophageal reflux disease)    • History of transfusion     PATIENT DENIES REACTION   • Hyperlipidemia    • Hypertension    • Morbid obesity (HCC)    • Multiple sclerosis (HCC)      lauren w/ Dr. Gilbert     Past Surgical History:   Procedure Laterality Date   • COLONOSCOPY     • GASTRIC SLEEVE LAPAROSCOPIC N/A 11/08/2022    Procedure: GASTRIC SLEEVE WITH HIATAL HERNIA LAPAROSCOPIC WITH DAVINCI ROBOT, ESOPHAGOGASTRODUODENOSCOPY;  Surgeon: Maribell Anderson MD;  Location: [facility];  Service: Robotics - DaVinci;  Laterality: N/A;   • LAPAROSCOPIC CHOLECYSTECTOMY  2012    gallstone pancreatitis     Outpatient Medications Marked as Taking for the 2/10/23 encounter (Office Visit) with Maribell Anderson MD   Medication Sig Dispense Refill   • acyclovir (ZOVIRAX) 400 MG tablet Take 1 tablet by mouth 2 (Two) Times a Day. 60 tablet 11   • albuterol sulfate  (90 Base) MCG/ACT inhaler Ventolin HFA 90 mcg/actuation aerosol inhaler   1-2 puffs q 4-6^ prn     • azelastine (ASTEPRO) 0.15 % solution nasal spray inhale 1-2 sprays by nasal route 2 times a day as needed  3   • B Complex-C-E-Zn (Z-BEC PO) Take 1 tablet by mouth daily.     • B COMPLEX-C-E-ZN PO B complex-C-E-Zn    1 patch daily.     • Dalfampridine ER 10 MG tablet sustained-release 12 hour Take 10 mg by mouth 2 (Two) Times a Day. 60 tablet 11   • FLUoxetine (PROzac) 20 MG capsule Take 20 mg by mouth Daily.     • gabapentin (NEURONTIN) 100 MG capsule Take 3 capsules by mouth 3 (Three) Times a Day. (Patient taking differently: Take 100 mg by mouth 2 (Two) Times a Day.) 90 capsule 5   • hydrALAZINE (APRESOLINE) 10 MG tablet Take 1 tablet 3 times a day as directed.     • HYDROcodone-acetaminophen (NORCO) 5-325 MG per tablet TAKE ONE TABLET BY MOUTH EVERY FOUR HOURS AS NEEDED     • Multiple Vitamin (MULTI-VITAMIN DAILY) tablet Take 1 tablet by mouth daily.     • olmesartan (BENICAR) 40 MG tablet Take 1 tablet(s) every day for 90 days.     • omeprazole (priLOSEC) 40 MG capsule Take 1 capsule by mouth Daily for 60 days. 60 capsule 0   • OnabotulinumtoxinA 200 units reconstituted solution FOR . PHYSICIAN TO INJECT  "155 UNITS INTRAMUSCULARLY INTO HEAD, NECK AND SHOULDERS EVERY 12 WEEKS Per FDA PROTOCOL 1 each 3   • ondansetron ODT (ZOFRAN-ODT) 4 MG disintegrating tablet Place 1 tablet on the tongue Every 8 (Eight) Hours As Needed for Nausea or Vomiting. 20 tablet 0   • oxybutynin XL (DITROPAN-XL) 10 MG 24 hr tablet Take 1 tablet by mouth Daily. PATIENT STATES THAT SHE HAS NOT TAKEN IN A MONTH AS OF 2022     • potassium chloride (MICRO-K) 10 MEQ CR capsule Take 10 mEq by mouth daily.     • valACYclovir (Valtrex) 500 MG tablet Take 1 tablet by mouth 2 (Two) Times a Day. 60 tablet 11   • Vibegron (Gemtesa) 75 MG tablet Gemtesa 75 mg tablet   Take 1 tablet every day by oral route.     • vitamin D (ERGOCALCIFEROL) 77348 UNITS capsule capsule Take 50,000 Units by mouth Every 30 (Thirty) Days. 15         Allergies   Allergen Reactions   • Isoniazid Other (See Comments)     aplastic anemia   • Rifampin Anaphylaxis   • Sulfa Antibiotics Anaphylaxis   • Tetanus Toxoid Swelling       Social History     Socioeconomic History   • Marital status:    Tobacco Use   • Smoking status: Former     Types: Cigarettes     Quit date:      Years since quittin.1   • Smokeless tobacco: Never   • Tobacco comments:     social   Vaping Use   • Vaping Use: Never used   Substance and Sexual Activity   • Alcohol use: Yes     Comment: rarely   • Drug use: No   • Sexual activity: Defer       /86 (BP Location: Left arm, Patient Position: Sitting)   Pulse 103   Temp 97.3 °F (36.3 °C)   Resp 18   Ht 179.1 cm (70.5\")   Wt (!) 139 kg (305 lb 8 oz)   LMP  (LMP Unknown)   SpO2 98%   BMI 43.22 kg/m²     Physical Exam  Constitutional:       General: She is not in acute distress.     Appearance: She is well-developed. She is not diaphoretic.   HENT:      Head: Normocephalic and atraumatic.      Mouth/Throat:      Pharynx: No oropharyngeal exudate.   Eyes:      Conjunctiva/sclera: Conjunctivae normal.      Pupils: Pupils are equal, " round, and reactive to light.   Pulmonary:      Effort: Pulmonary effort is normal. No respiratory distress.   Abdominal:      General: There is no distension.      Palpations: Abdomen is soft.   Skin:     General: Skin is warm and dry.      Coloration: Skin is not pale.   Neurological:      Mental Status: She is alert and oriented to person, place, and time.      Cranial Nerves: No cranial nerve deficit.      Comments: Ambulates with cane   Psychiatric:         Behavior: Behavior normal.         Thought Content: Thought content normal.           Assessment:  3 months s/p LSG/HHR 11/8/22 PNQ      ICD-10-CM ICD-9-CM   1. Fatigue, unspecified type  R53.83 780.79   2. Hypovitaminosis D  E55.9 268.9   3. Screening, iron deficiency anemia  Z13.0 V78.0   4. Postgastrectomy malabsorption  K91.2 579.3    Z90.3    5. Malnutrition screen  Z13.21 V77.2   6. Obesity, Class III, BMI 40-49.9 (morbid obesity) ([facility])  E66.01 278.01   7. Multiple sclerosis ([facility])  G35 340       Class 3 Severe Obesity (BMI >=40). Obesity-related health conditions include the following: hypertension, dyslipidemias and GERD. Obesity is improving with lifestyle modifications. BMI is is above average; BMI management plan is completed. We discussed portion control and increasing exercise.        Plan:  Doing well. Continue w/ good food choices and healthy habits.  Continue protein >70g/day.  Continue routine exercise.  Routine bariatric labs ordered.  Continue vitamins w/ adjustments pending lab results.  Call w/ problems/concerns.     The patient was instructed to follow up in 3 months, sooner if needed.    note: approx 15 of the 25 minute visit was spent counseling on nutrition and necessary dietary/lifestyle modifications face to face.    Maribell Anderson MD

## 2023-02-16 ENCOUNTER — OFFICE VISIT (OUTPATIENT)
Dept: NEUROLOGY | Facility: CLINIC | Age: 68
End: 2023-02-16
Payer: MEDICARE

## 2023-02-16 ENCOUNTER — TELEPHONE (OUTPATIENT)
Dept: BARIATRICS/WEIGHT MGMT | Facility: CLINIC | Age: 68
End: 2023-02-16
Payer: MEDICARE

## 2023-02-16 VITALS
HEART RATE: 87 BPM | HEIGHT: 71 IN | OXYGEN SATURATION: 97 % | DIASTOLIC BLOOD PRESSURE: 84 MMHG | WEIGHT: 293 LBS | BODY MASS INDEX: 41.02 KG/M2 | SYSTOLIC BLOOD PRESSURE: 130 MMHG

## 2023-02-16 DIAGNOSIS — F33.1 MODERATE EPISODE OF RECURRENT MAJOR DEPRESSIVE DISORDER: Chronic | ICD-10-CM

## 2023-02-16 DIAGNOSIS — G25.81 RESTLESS LEGS SYNDROME: Chronic | ICD-10-CM

## 2023-02-16 DIAGNOSIS — G35 MULTIPLE SCLEROSIS: Primary | Chronic | ICD-10-CM

## 2023-02-16 DIAGNOSIS — G43.709 CHRONIC MIGRAINE WITHOUT AURA WITHOUT STATUS MIGRAINOSUS, NOT INTRACTABLE: Chronic | ICD-10-CM

## 2023-02-16 PROCEDURE — 99214 OFFICE O/P EST MOD 30 MIN: CPT | Performed by: PSYCHIATRY & NEUROLOGY

## 2023-02-16 RX ORDER — ROSUVASTATIN CALCIUM 40 MG/1
40 TABLET, COATED ORAL DAILY
COMMUNITY
Start: 2023-02-10

## 2023-02-16 RX ORDER — MECLIZINE HYDROCHLORIDE 25 MG/1
TABLET ORAL
COMMUNITY
Start: 2023-02-10

## 2023-02-16 RX ORDER — OMEPRAZOLE 20 MG/1
CAPSULE, DELAYED RELEASE ORAL
COMMUNITY
Start: 2023-02-10

## 2023-02-16 NOTE — PROGRESS NOTES
Chief Complaint  Multiple Sclerosis    Subjective        Rashad Carrillo presents to [facility]     History of Present Illness    67 y.o. female returns in follow up.  [facility]aste visit on 12/27/22  performed BTX, continued monthly labs, renewed GBP, Cymbalta, Acyclovir and Ampyra.     11/8/2022 robotic sleeve gastrectomy and hiatal hernia repair     MRI Brain 11/10/22 as compared to 3/11/22, no change in T2 lesion load, moderate to severe T2 lesion burden and moderate atrophy.     JCV ab - 6/11/18 - 0.45       12/27/22: Plt 210; TSH 2.03; Cr 0.76; U/A not clean catch       RRMS     S/P Lemtrada 2/2, 10/30/18 - 11/2/18, 11/4/19-11/6/19      Multiple falls.       Gait unsteady R knee OA, using a walking stick.  Dr Choe managing. .     Ampyra improves walking speed. .        Fatigue is moderate.       Continued pain and stiffness in R LE and limiting walking. .       Problem history:     25 ft timed walk increaesed 15.99 from 9.94.  Left hand 9 hole peg worsened.   Increasing swallowing difficulty.        Immediate and working memory are declining with SDMT 27/110..        Referred to ID and dx with latent TB.  Recommended isoniazid and pyridoxine for 9 months.  Subsequently developed aplastic anemia secondary to INH and followed by Dr Valentin.  Received multiple transfusions.  3/29/18 able to resume Tysabri and has had 3 infusions  Treated with rifampin.       MDD     Mood is stable on Prozac 60 mg PO daily      RLS     No new or worsening sx.      Controlled on GBP.   Baclofen helping with spasticity in LE.           Migraines     Headache frequency one - two per week.  Intensity is mild.     BTX improving sx       Problem History:     HA frequency is daily.  Moderate to severe intensity.   Location moves around head.  Quality Last for up to a day.  Tx with OTC meds.      Greater than 15 HA a month, moderate to severe intensity, lasting over 6 hours.       Preventatives:  TPM, GBP,  "Cymbalta,   Objective   Vital Signs:  /84   Pulse 87   Ht 179.1 cm (70.51\")   Wt (!) 138 kg (305 lb)   SpO2 97%   BMI 43.13 kg/m²   Estimated body mass index is 43.13 kg/m² as calculated from the following:    Height as of this encounter: 179.1 cm (70.51\").    Weight as of this encounter: 138 kg (305 lb).             Physical Exam  Eyes:      Extraocular Movements: EOM normal.      Pupils: Pupils are equal, round, and reactive to light.   Neurological:      Mental Status: She is oriented to person, place, and time.   Psychiatric:         Speech: Speech normal.          Neurologic Exam     Mental Status   Oriented to person, place, and time.   Follows 3 step commands.   Attention: normal. Concentration: normal.   Speech: speech is normal   Level of consciousness: alert  Knowledge: good and consistent with education.   Able to name object. Able to read. Able to repeat. Able to write. Normal comprehension.   Mixes up words intermittently      Cranial Nerves     CN III, IV, VI   Pupils are equal, round, and reactive to light.  Extraocular motions are normal.   Right pupil: Shape: regular. Reactivity: brisk. Consensual response: intact.   Left pupil: Shape: regular. Reactivity: brisk. Consensual response: intact.     CN VII   Right facial weakness: none  Left facial weakness: none    CN VIII   Hearing: intact    Motor Exam   Muscle bulk: normal  Overall muscle tone: normal    Gait, Coordination, and Reflexes     Gait  Gait: (antalgic )    Tremor   Resting tremor: absent  Intention tremor: absent  Action tremor: absent     Result Review :  The following data was reviewed by: Adonay Gilbert MD on 02/16/2023:  Common labs    Common Labs 11/11/22 11/11/22 11/15/22 11/15/22 12/27/22 12/27/22    0339 [id number] 1132 1132 0913 0913   Glucose  74  117 (A)  101 (A)   BUN  9  24 (A)  13   Creatinine  0.68  1.00  0.76   Sodium  136  141  143   Potassium  4.0  4.0  3.5   Chloride  104  104  107   Calcium  9.0  9.2  9.5 "   Total Protein    6.5     Albumin  3.30 (A)  3.80  3.9   Total Bilirubin  0.8  0.4  0.6   Alkaline Phosphatase  96  102  105   AST (SGOT)  15  19  25   ALT (SGPT)  6  15  14   WBC 5.71  8.51  4.29    Hemoglobin 12.9  13.6  14.4    Hematocrit 40.5  41.3  45.5    Platelets 158  207  210    (A) Abnormal value       Comments are available for some flowsheets but are not being displayed.                        Assessment and Plan   Diagnoses and all orders for this visit:    1. Multiple sclerosis (HCC) (Primary)  Assessment & Plan:  Sx stable s/p Lemtrada      2. Restless legs syndrome  Assessment & Plan:  Stable on GBP      3. Chronic migraine without aura without status migrainosus, not intractable  Assessment & Plan:  Headaches are improving with treatment.  Continue current treatment regimen.     BTX           4. Moderate episode of recurrent major depressive disorder (HCC)  Assessment & Plan:  Mood is stable on Prozac              Follow Up   No follow-ups on file.  Patient was given instructions and counseling regarding her condition or for health maintenance advice. Please see specific information pulled into the AVS if appropriate.

## 2023-02-17 LAB
25(OH)D3+25(OH)D2 SERPL-MCNC: 31.4 NG/ML (ref 30–100)
ALBUMIN SERPL-MCNC: 4.1 G/DL (ref 3.8–4.8)
ALBUMIN/GLOB SERPL: 1.4 {RATIO} (ref 1.2–2.2)
ALP SERPL-CCNC: 129 IU/L (ref 44–121)
ALT SERPL-CCNC: 11 IU/L (ref 0–32)
AST SERPL-CCNC: 18 IU/L (ref 0–40)
BASOPHILS # BLD AUTO: 0 X10E3/UL (ref 0–0.2)
BASOPHILS NFR BLD AUTO: 1 %
BILIRUB SERPL-MCNC: 0.6 MG/DL (ref 0–1.2)
BUN SERPL-MCNC: 13 MG/DL (ref 8–27)
BUN/CREAT SERPL: 19 (ref 12–28)
CALCIUM SERPL-MCNC: 9.7 MG/DL (ref 8.7–10.3)
CHLORIDE SERPL-SCNC: 106 MMOL/L (ref 96–106)
CO2 SERPL-SCNC: 25 MMOL/L (ref 20–29)
CREAT SERPL-MCNC: 0.68 MG/DL (ref 0.57–1)
EGFRCR SERPLBLD CKD-EPI 2021: 97 ML/MIN/1.73
EOSINOPHIL # BLD AUTO: 0.1 X10E3/UL (ref 0–0.4)
EOSINOPHIL NFR BLD AUTO: 3 %
ERYTHROCYTE [DISTWIDTH] IN BLOOD BY AUTOMATED COUNT: 13.2 % (ref 11.7–15.4)
FERRITIN SERPL-MCNC: 184 NG/ML (ref 15–150)
FOLATE SERPL-MCNC: 4 NG/ML
GLOBULIN SER CALC-MCNC: 3 G/DL (ref 1.5–4.5)
GLUCOSE SERPL-MCNC: 96 MG/DL (ref 70–99)
HCT VFR BLD AUTO: 45.4 % (ref 34–46.6)
HGB BLD-MCNC: 14.5 G/DL (ref 11.1–15.9)
IMM GRANULOCYTES # BLD AUTO: 0 X10E3/UL (ref 0–0.1)
IMM GRANULOCYTES NFR BLD AUTO: 0 %
IRON SERPL-MCNC: 61 UG/DL (ref 27–139)
LYMPHOCYTES # BLD AUTO: 0.9 X10E3/UL (ref 0.7–3.1)
LYMPHOCYTES NFR BLD AUTO: 17 %
MCH RBC QN AUTO: 28.6 PG (ref 26.6–33)
MCHC RBC AUTO-ENTMCNC: 31.9 G/DL (ref 31.5–35.7)
MCV RBC AUTO: 90 FL (ref 79–97)
METHYLMALONATE SERPL-SCNC: 254 NMOL/L (ref 0–378)
MONOCYTES # BLD AUTO: 0.4 X10E3/UL (ref 0.1–0.9)
MONOCYTES NFR BLD AUTO: 8 %
NEUTROPHILS # BLD AUTO: 3.8 X10E3/UL (ref 1.4–7)
NEUTROPHILS NFR BLD AUTO: 71 %
PLATELET # BLD AUTO: 203 X10E3/UL (ref 150–450)
POTASSIUM SERPL-SCNC: 3.9 MMOL/L (ref 3.5–5.2)
PREALB SERPL-MCNC: 19 MG/DL (ref 10–36)
PROT SERPL-MCNC: 7.1 G/DL (ref 6–8.5)
RBC # BLD AUTO: 5.07 X10E6/UL (ref 3.77–5.28)
SODIUM SERPL-SCNC: 146 MMOL/L (ref 134–144)
VIT B1 BLD-SCNC: 129.7 NMOL/L (ref 66.5–200)
WBC # BLD AUTO: 5.3 X10E3/UL (ref 3.4–10.8)

## 2023-02-21 ENCOUNTER — TELEPHONE (OUTPATIENT)
Dept: BARIATRICS/WEIGHT MGMT | Facility: CLINIC | Age: 68
End: 2023-02-21
Payer: MEDICARE

## 2023-03-14 ENCOUNTER — OFFICE VISIT (OUTPATIENT)
Dept: OTHER | Facility: HOSPITAL | Age: 68
End: 2023-03-14

## 2023-03-14 NOTE — PROGRESS NOTES
Exercise Education Note - Initial Visit    Patient: Rashad Carrillo       Date: 03/14/2023  Patient was seen for in person for initial exercise education consult.  Approximately 60 min was spent discussing patient concerns, barriers, and next steps.      For/Concerns: Patient is post bariatric sleeve surgery, she is down 50 lbs and doing well with hunger control. She is seeking advice on additional exercise or activity that she can incorporate with her barriers.     Current Exercise Abilities/Experience: patient currently goes to  for upper body strength and nustep 2 days per week. She also does lower body exercise in her bed most mornings.     Equipment / Facilities Available: [email or web address] and [email or web address]    Barriers to Exercise:Patient right knee is bone on bone, she currently receives injections every few weeks and is pending knee replacement. She shares the pain is intense. She does leg raises in bed, calf raises and some balance exercises. She also has MS and fatigues more easily.    Summary of Education / Achievement Strategies: Patient was advised on a few additional exercises she can do in her bed for lower body strength, and shown some chair walking videos on YouTube that she can incorporate at home to replace her walking program.  She was encourage to continue 2 days at  but to try to build up her stamina on the NuStep after upper body strength exercise. We also discussed adequate hydration which she is continually working to improve. Patient feels she is doing quite a bit but what was discussed is manageable. She will reach out after she has knee replacement for follow up exercise.       Philip Elise  03/14/2023  10:49 EDT

## 2023-03-17 ENCOUNTER — DOCUMENTATION (OUTPATIENT)
Dept: BARIATRICS/WEIGHT MGMT | Facility: CLINIC | Age: 68
End: 2023-03-17
Payer: MEDICARE

## 2023-03-17 NOTE — PROGRESS NOTES
Bariatric Nutrition Consult     Name: Rashad Carrillo   YOB: 1955   Age: 67 y.o.   MRN: [id number]    Consult Date:    3/17/23    Surgery:      sleeve                             Date of surgery:11/8/22    Patient is 4 months post op.     Height: 179.1cm          Weight: 296.2lbs      Pre Op weight: 333lbs Current BMI: 43.13    Weight change:  Lost 26lbs    55.4% fat  164lbs fat mass (lost 19 lbs fat mass since December 2022)  132.2lbs FFM  97.8lbs TBW  33% TBW  40.2 BMI    Diet history reveals:  Not eating enough. Only eats/snacks on 1 meal a day-protein, green veg and fruit, average 3oz protein. Tries to snack occasionally on edameme  Drinks 2 protein drinks daily. Estimating 70g protein max daily.       Protein sources:  Meat, chicken, cheese, eggs, protein drinks, beans, edameme    Drinks:  Water, protein drinks, unsweet tea    Exercise/activity:  MS and walks with a cane . Recent appt with Philip     Main bariatric nutrition principles discussed and explained and queries answered. Encouraged patient to focus on     Increase nutritional intake  100g protein daily  Meal and snack ideas discussed  Eat 2 meals, 1 snack and 2 protein drinks daily (as portions are very small)  Batch cook  premade meals-choose high protein low carb choices  Queries answered        Rebecca Tucker RD, LD

## 2023-04-11 ENCOUNTER — PROCEDURE VISIT (OUTPATIENT)
Dept: NEUROLOGY | Facility: CLINIC | Age: 68
End: 2023-04-11
Payer: MEDICARE

## 2023-04-11 DIAGNOSIS — G43.709 CHRONIC MIGRAINE WITHOUT AURA WITHOUT STATUS MIGRAINOSUS, NOT INTRACTABLE: Primary | Chronic | ICD-10-CM

## 2023-04-11 DIAGNOSIS — G43.719 INTRACTABLE CHRONIC MIGRAINE WITHOUT AURA AND WITHOUT STATUS MIGRAINOSUS: ICD-10-CM

## 2023-04-11 PROCEDURE — 64615 CHEMODENERV MUSC MIGRAINE: CPT | Performed by: PSYCHIATRY & NEUROLOGY

## 2023-04-11 NOTE — PROGRESS NOTES
Botulinum Toxin Injection Procedure    Chief Complaint   Patient presents with   • Botulinum Toxin Injection     [employer] 200 units        History:  Response to treatment has reduced HA's at least 7 fewer days a month and/or 100 hours fewer each month.     Pre-operative diagnosis: headache    Post-operative diagnosis: Same    Procedure: Chemical neurolysis    After risks and benefits were explained including bleeding, infection, worsening of pain, damage to the areas being injected, weakness of muscles, loss of muscle control, dysphagia if injecting the head or neck, facial droop if injecting the facial area, painful injection, allergic or other reaction to the medications being injected, and the failure of the procedure to help the problem, a signed consent was obtained.      The patient was placed in a comfortable area and the sites to be treated were identified. A time out was called and performed. The area to be treated was prepped three times with alcohol and the alcohol allowed to dry. Next, a 27 gauge needle was used to inject the medication in the area to be treated.    Area(s) injected: frontalis muscle, semispinalis capitis muscle and temporallis muscle    Medications used: botulinum toxin 200 mu, 2 mL of saline used to dilute the botulinum toxin.      The patient did tolerate the procedure well. There were no complications.       Physical Examination    Current Treatment (Units)   Splenius Capitis 25 units on the right and 25 units on the left.   Temporalis 25 units on the right and 25 units on the left.   Frontalis 27 units on the right and 28 units on the left.   EMG Guidance none .   Complications: none .   The total amount injected in units is 155 .   The total amount wasted in units is 45 .   The total amount submitted in units is 200 .

## 2023-05-08 ENCOUNTER — OFFICE VISIT (OUTPATIENT)
Dept: ORTHOPEDIC SURGERY | Facility: CLINIC | Age: 68
End: 2023-05-08
Payer: MEDICARE

## 2023-05-08 VITALS
DIASTOLIC BLOOD PRESSURE: 80 MMHG | SYSTOLIC BLOOD PRESSURE: 135 MMHG | WEIGHT: 287 LBS | HEIGHT: 70 IN | BODY MASS INDEX: 41.09 KG/M2

## 2023-05-08 DIAGNOSIS — M17.11 PRIMARY OSTEOARTHRITIS OF RIGHT KNEE: Primary | ICD-10-CM

## 2023-05-08 RX ORDER — TRIAMCINOLONE ACETONIDE 40 MG/ML
40 INJECTION, SUSPENSION INTRA-ARTICULAR; INTRAMUSCULAR
Status: COMPLETED | OUTPATIENT
Start: 2023-05-08 | End: 2023-05-08

## 2023-05-08 RX ORDER — ROPIVACAINE HYDROCHLORIDE 5 MG/ML
4 INJECTION, SOLUTION EPIDURAL; INFILTRATION; PERINEURAL
Status: COMPLETED | OUTPATIENT
Start: 2023-05-08 | End: 2023-05-08

## 2023-05-08 RX ADMIN — TRIAMCINOLONE ACETONIDE 40 MG: 40 INJECTION, SUSPENSION INTRA-ARTICULAR; INTRAMUSCULAR at 15:23

## 2023-05-08 RX ADMIN — ROPIVACAINE HYDROCHLORIDE 4 ML: 5 INJECTION, SOLUTION EPIDURAL; INFILTRATION; PERINEURAL at 15:23

## 2023-05-08 NOTE — PROGRESS NOTES
[facility] Note    Subjective     Chief Complaint   Patient presents with   • Follow-up     3 month follow up - Primary osteoarthritis of right knee         HPI    It has been 3  month(s) since Ms. Carrillo's last visit. She returns to clinic today for follow-up of right knee pain. The issue has been ongoing for 3 year(s). She rates her pain a 9/10 on the pain scale. Previous/current treatments: cane/walker, NSAIDS, weight loss and oral steroids. Current symptoms: pain, swelling, grinding, stiffness, giving way/buckling and numbness and tingling. The pain is worse with walking, standing, sitting, climbing stairs, sleeping, working, leisure, lying on affected side and rising from seated position; pain medication and/or NSAID and nothing improves the pain. Overall, she is doing worse.  She would like to proceed with right total knee arthroplasty in October timeframe, and would like to work on additional weight loss between now and then.  She would like an injection in the meantime.  She does live alone, but has a sister who can help her out postoperatively.  She has borderline diabetes.  No heart disease.  No blood thinners.  Questionable history of blood clots in the past, and she is going to obtain some more information about that.    I have reviewed the following portions of the patient's history and agree with: History of Present Illness and Review of Systems    Patient Active Problem List   Diagnosis   • Vitamin D deficiency   • Depression   • Multiple sclerosis   • Restless legs syndrome   • Muscle spasticity   • Chronic migraine without aura without status migrainosus, not intractable   • Essential hypertension   • Frequent falls   • Status post balloon dilatation of esophageal stricture   • T2DM (type 2 diabetes mellitus)   • TB lung, latent   • Aplastic anemia likely due to isoniazide   • Chest pain   • MCGRAW (dyspnea on exertion)   • Hyperlipidemia LDL goal <70   • Class 3 severe obesity  due to excess calories with serious comorbidity and body mass index (BMI) of 50.0 to 59.9 in adult   • Fatigue   • Dyspepsia   • Morbid obesity     Past Medical History:   Diagnosis Date   • Chronic knee pain     NSAIDS + steroid injections (last 3/2022)   • Dyspepsia    • Dyspnea on exertion    • Fatigue    • GERD (gastroesophageal reflux disease)    • History of transfusion     PATIENT DENIES REACTION   • Hyperlipidemia    • Hypertension    • Morbid obesity    • Multiple sclerosis     follows w/ Dr. Gilbert      Past Surgical History:   Procedure Laterality Date   • COLONOSCOPY     • GASTRIC SLEEVE LAPAROSCOPIC N/A 2022    Procedure: GASTRIC SLEEVE WITH HIATAL HERNIA LAPAROSCOPIC WITH DAVINCI ROBOT, ESOPHAGOGASTRODUODENOSCOPY;  Surgeon: Maribell Anderson MD;  Location: [facility];  Service: Robotics - DaVinci;  Laterality: N/A;   • LAPAROSCOPIC CHOLECYSTECTOMY  2012    gallstone pancreatitis      Family History   Problem Relation Age of Onset   • Cancer Mother    • Hypertension Mother    • Heart disease Mother    • Alzheimer's disease Other    • Seizures Other         Epilepsy and recurrent seizures   • Heart disease Other    • Breast cancer Neg Hx    • Ovarian cancer Neg Hx      Social History     Socioeconomic History   • Marital status:    Tobacco Use   • Smoking status: Former     Types: Cigarettes     Quit date:      Years since quittin.3     Passive exposure: Never   • Smokeless tobacco: Never   • Tobacco comments:     social   Vaping Use   • Vaping Use: Never used   Substance and Sexual Activity   • Alcohol use: Yes     Comment: rarely   • Drug use: No   • Sexual activity: Defer      Current Outpatient Medications on File Prior to Visit   Medication Sig Dispense Refill   • acyclovir (ZOVIRAX) 400 MG tablet Take 1 tablet by mouth 2 (Two) Times a Day. 60 tablet 11   • albuterol sulfate  (90 Base) MCG/ACT inhaler Ventolin HFA 90 mcg/actuation aerosol inhaler   1-2 puffs q 4-6^  prn     • azelastine (ASTEPRO) 0.15 % solution nasal spray inhale 1-2 sprays by nasal route 2 times a day as needed  3   • B Complex-C-E-Zn (Z-BEC PO) Take 1 tablet by mouth daily.     • Dalfampridine ER 10 MG tablet sustained-release 12 hour Take 10 mg by mouth 2 (Two) Times a Day. 60 tablet 11   • FLUoxetine (PROzac) 20 MG capsule Take 1 capsule by mouth Daily.     • gabapentin (NEURONTIN) 100 MG capsule Take 3 capsules by mouth 3 (Three) Times a Day. (Patient taking differently: Take 1 capsule by mouth 2 (Two) Times a Day.) 90 capsule 5   • hydrALAZINE (APRESOLINE) 10 MG tablet Take 1 tablet 3 times a day as directed.     • HYDROcodone-acetaminophen (NORCO) 5-325 MG per tablet TAKE ONE TABLET BY MOUTH EVERY FOUR HOURS AS NEEDED     • meclizine (ANTIVERT) 25 MG tablet Take 1 tablet 3 times a day by oral route as needed.     • Multiple Vitamin (MULTI-VITAMIN DAILY) tablet Take 1 tablet by mouth Daily.     • olmesartan (BENICAR) 40 MG tablet Take 1 tablet(s) every day for 90 days.     • omeprazole (priLOSEC) 20 MG capsule take 1 capsule (20 mg) by oral route once daily before a meal as needed     • OnabotulinumtoxinA 200 units reconstituted solution FOR . PHYSICIAN TO INJECT 155 UNITS INTRAMUSCULARLY INTO HEAD, NECK AND SHOULDERS EVERY 12 WEEKS Per FDA PROTOCOL 1 each 3   • ondansetron ODT (ZOFRAN-ODT) 4 MG disintegrating tablet Place 1 tablet on the tongue Every 8 (Eight) Hours As Needed for Nausea or Vomiting. 20 tablet 0   • oxybutynin XL (DITROPAN-XL) 10 MG 24 hr tablet Take 1 tablet by mouth Daily. PATIENT STATES THAT SHE HAS NOT TAKEN IN A MONTH AS OF 11/07/2022     • potassium chloride (MICRO-K) 10 MEQ CR capsule Take 1 capsule by mouth Daily.     • rosuvastatin (CRESTOR) 40 MG tablet 1 tablet Daily.     • valACYclovir (Valtrex) 500 MG tablet Take 1 tablet by mouth 2 (Two) Times a Day. 60 tablet 11   • Vibegron (Gemtesa) 75 MG tablet Gemtesa 75 mg tablet   Take 1 tablet every day by oral  route.     • vitamin D (ERGOCALCIFEROL) 20040 UNITS capsule capsule Take 1 capsule by mouth Every 30 (Thirty) Days. 15TH     • gabapentin (NEURONTIN) 300 MG capsule Take 2 capsules by mouth Every Night. (Patient taking differently: Take 1 capsule by mouth 2 (Two) Times a Day.) 60 capsule 5   • [DISCONTINUED] B COMPLEX-C-E-ZN PO B complex-C-E-Zn    1 patch daily.       Current Facility-Administered Medications on File Prior to Visit   Medication Dose Route Frequency Provider Last Rate Last Admin   • OnabotulinumtoxinA 200 Units  200 Units Intramuscular Q3 Months Adonay Gilbert MD   200 Units at 04/11/23 1411      Allergies   Allergen Reactions   • Isoniazid Other (See Comments)     aplastic anemia   • Rifampin Anaphylaxis   • Sulfa Antibiotics Anaphylaxis   • Tetanus Toxoid Swelling        Review of Systems   Constitutional: Negative for activity change, appetite change, chills, diaphoresis, fatigue, fever and unexpected weight change.   HENT: Negative for congestion, dental problem, drooling, ear discharge, ear pain, facial swelling, hearing loss, mouth sores, nosebleeds, postnasal drip, rhinorrhea, sinus pressure, sneezing, sore throat, tinnitus, trouble swallowing and voice change.    Eyes: Negative for photophobia, pain, discharge, redness, itching and visual disturbance.   Respiratory: Negative for apnea, cough, choking, chest tightness, shortness of breath, wheezing and stridor.    Cardiovascular: Negative for chest pain, palpitations and leg swelling.   Gastrointestinal: Negative for abdominal distention, abdominal pain, anal bleeding, blood in stool, constipation, diarrhea, nausea, rectal pain and vomiting.   Endocrine: Negative for cold intolerance, heat intolerance, polydipsia, polyphagia and polyuria.   Genitourinary: Negative for decreased urine volume, difficulty urinating, dysuria, enuresis, flank pain, frequency, genital sores, hematuria and urgency.   Musculoskeletal: Positive for arthralgias.  "Negative for back pain, gait problem, joint swelling, myalgias, neck pain and neck stiffness.   Skin: Negative for color change, pallor, rash and wound.   Allergic/Immunologic: Negative for environmental allergies, food allergies and immunocompromised state.   Neurological: Negative for dizziness, tremors, seizures, syncope, facial asymmetry, speech difficulty, weakness, light-headedness, numbness and headaches.   Hematological: Negative for adenopathy. Does not bruise/bleed easily.   Psychiatric/Behavioral: Negative for agitation, behavioral problems, confusion, decreased concentration, dysphoric mood, hallucinations, self-injury, sleep disturbance and suicidal ideas. The patient is not nervous/anxious and is not hyperactive.         Objective      Physical Exam  /80   Ht 177.8 cm (70\")   Wt 130 kg (287 lb)   LMP  (LMP Unknown)   BMI 41.18 kg/m²     Body mass index is 41.18 kg/m².    General:   Mental Status:  Alert   Appearance: Cooperative, in no acute distress   Build and Nutrition: Obese by BMI female   Orientation: Alert and oriented to person, place and time   Posture: Normal   Gait: Limp on the right    Integument:              Right knee: No skin lesions, no rash, no ecchymosis     Lower Extremities:              Right Knee:                          Tenderness:    None                          Effusion:          None                          Swelling:          None                          Crepitus:          Positive                          Range of motion:        Extension:       15°                                                              Flexion:           105°  Instability:        No varus laxity, no valgus laxity, negative anterior drawer  Deformities:     None    Imaging/Studies  Imaging Results (Last 24 Hours)     Procedure Component Value Units Date/Time    XR Knee 4+ View Right [[id number]] Resulted: 05/08/23 1508     Updated: 05/08/23 1509    Narrative:      Right Knee " Radiographs  Indication: right knee pain  Views: Standing AP's and skiers of both knees, with lateral and sunrise   views of the right knee    Comparison: 5/23/2022    Findings:   Bone-on-bone contact the medial and lateral compartments, with advanced   patellofemoral arthritis, no acute bony abnormalities.  Overall worsening   compared to the previous imaging.  Advanced right knee arthritis.            Assessment and Plan     Diagnoses and all orders for this visit:    1. Primary osteoarthritis of right knee (Primary)  -     XR Knee 4+ View Right  -     - Large Joint Arthrocentesis: R knee        1. Primary osteoarthritis of right knee        I reviewed my findings with the patient.  She has reached a point where she would like to proceed with right total knee arthroplasty surgery.  She is working on getting her body mass index below 40, and is status post bariatric surgery.  She would like to schedule surgery for October timeframe, and we will do a weight check preoperatively.  Risks, benefits, alternatives surgery been discussed.  There is a questionable history of blood clots in the past, and she is going to obtain some additional information about that.  Please see my counseling note for details.  She did request another injection today to get by in the meantime.    Procedure Note:  The potential benefits of performing a therapeutic right knee joint injection, as well as potential risks (including, but not limited to infection, swelling, pain, bleeding, bruising, nerve/blood vessel damage, skin color changes, transient elevation in blood glucose levels, and fat atrophy) were discussed with the patient.  After informed consent, timeout procedure was performed, and the skin on the right knee was prepped with chlorhexidine soap and alcohol, after which ethyl chloride was applied to the skin at the injection site. Via the anterolateral approach, 1ml of Kenalog 40mg/ml mixed with 4ml 0.5% ropivacaine plain was  injected into the knee joint.  The patient tolerated the procedure well, experiencing 10% improvement a few minutes following the injection. There were no complications.  Band-Aid was applied to the injection site. Post-procedural instructions were given to the patient and/or their caregiver.      Surgical Counseling     I have informed the patient of the diagnosis and the prognosis.  Exhaustive conservative treatment modalities have not resulted in long term pain relief.  The symptoms have progressed to the point of daily pain and inability to perform activities of daily living without significant pain. The patient has reached the point of desiring to proceed with total knee arthroplasty after discussing the risks, benefits and alternatives to the procedure.  The surgical procedure itself was discussed in detail. Risks of the procedure were discussed, which included but are not limited to, bleeding, infection, damage to blood vessels and nerves, incomplete pain relief, loosening of the prosthesis (early or late), deep infection (early or late), need for further surgery, loss of limb, deep venous thrombosis, pulmonary embolus, death, heart attack, stroke, kidney failure, liver failure, and anesthetic complications.  In addition, the potential for deep infection developing in the future was discussed, which could require further surgery.  The knee would have to be re-opened, debrided, and potentially remove the prosthesis, which may or may not be replaced in the future.  Also, the possibility for loosening of the prosthesis has been mentioned.  If the prosthesis loosened, a revision arthroplasty could be performed, with results that are not as predictable compared to the original procedure.  The typical rehabilitative course has also been discussed, and full recovery may take up to a year to see the maximum benefit.  The importance of patient cooperation in the rehabilitative efforts has also been discussed.  No  guarantees were given.  The patient understands the potential risks versus the benefits and desires to proceed with total knee arthroplasty at a mutually convenient time.    Return for surgery.      Lalo Choe MD  05/08/23  16:22 EDT

## 2023-05-08 NOTE — PROGRESS NOTES
Procedure   - Large Joint Arthrocentesis: R knee on 5/8/2023 3:23 PM  Indications: pain  Details: 21 G needle, anterolateral approach  Medications: 4 mL ropivacaine 0.5 %; 40 mg triamcinolone acetonide 40 MG/ML  Outcome: tolerated well, no immediate complications  Procedure, treatment alternatives, risks and benefits explained, specific risks discussed. Consent was given by the patient. Immediately prior to procedure a time out was called to verify the correct patient, procedure, equipment, support staff and site/side marked as required. Patient was prepped and draped in the usual sterile fashion.

## 2023-05-11 ENCOUNTER — OFFICE VISIT (OUTPATIENT)
Dept: BARIATRICS/WEIGHT MGMT | Facility: CLINIC | Age: 68
End: 2023-05-11
Payer: MEDICARE

## 2023-05-11 VITALS
RESPIRATION RATE: 18 BRPM | HEIGHT: 71 IN | TEMPERATURE: 98 F | WEIGHT: 288.5 LBS | DIASTOLIC BLOOD PRESSURE: 72 MMHG | SYSTOLIC BLOOD PRESSURE: 136 MMHG | HEART RATE: 72 BPM | OXYGEN SATURATION: 98 % | BODY MASS INDEX: 40.39 KG/M2

## 2023-05-11 DIAGNOSIS — K91.2 POSTGASTRECTOMY MALABSORPTION: ICD-10-CM

## 2023-05-11 DIAGNOSIS — Z13.21 MALNUTRITION SCREEN: ICD-10-CM

## 2023-05-11 DIAGNOSIS — Z13.0 SCREENING, IRON DEFICIENCY ANEMIA: ICD-10-CM

## 2023-05-11 DIAGNOSIS — Z90.3 POSTGASTRECTOMY MALABSORPTION: ICD-10-CM

## 2023-05-11 DIAGNOSIS — R53.83 FATIGUE, UNSPECIFIED TYPE: ICD-10-CM

## 2023-05-11 DIAGNOSIS — E66.01 OBESITY, CLASS III, BMI 40-49.9 (MORBID OBESITY): Primary | ICD-10-CM

## 2023-05-11 DIAGNOSIS — E53.8 B12 DEFICIENCY: ICD-10-CM

## 2023-05-11 DIAGNOSIS — E55.9 HYPOVITAMINOSIS D: ICD-10-CM

## 2023-05-11 NOTE — PROGRESS NOTES
[facility] Bariatric Surgery  [address]  [address]  formerly [facility] [address] [phone]        Patient Name:  Rashad Carrillo.  :  1955      Date of Visit: 2023      Reason for Visit:   6 months postop     HPI: Rashad Carrillo is a 67 y.o. female s/p LSG/HHR 22 PNQ    Doing well.  No issues/concerns. Denies dysphagia, reflux, nausea, vomiting and abdominal pain.  Getting  g prot/day.  Drinking 64+ fluid oz/day.  Last labs revealed elevated MMA, prealbumin 19. Did not receive letter, did not increase B12.    Taking Patches: PATCHAID MVI, B12, iron, vit D.  On Omeprazole PRN.  Exercise: nothing.  Saw orthopedic surgeon Monday and had cortisone shot.  Planning on right knee replacement 10/2023.  To start swimming Monday.    Does not track intake.    She is thrilled that her hunger is under control and she can be more active than before.     Presurgery weight: 333 pounds.  Today's weight is 131 kg (288 lb 8 oz) pounds, today's  Body mass index is 40.81 kg/m²., and weight loss since surgery is 45 pounds.      B: 2 eggs, black beans, red peppers/onions, salsa  L: protein   D: meat, veg, fruit  Drinks protein water    Past Medical History:   Diagnosis Date   • Chronic knee pain     NSAIDS + steroid injections (last 3/2022)   • Dyspepsia    • Dyspnea on exertion    • Fatigue    • GERD (gastroesophageal reflux disease)    • History of transfusion     PATIENT DENIES REACTION   • Hyperlipidemia    • Hypertension    • Morbid obesity    • Multiple sclerosis     follows w/ Dr. Gilbert     Past Surgical History:   Procedure Laterality Date   • COLONOSCOPY     • GASTRIC SLEEVE LAPAROSCOPIC N/A 2022    Procedure: GASTRIC SLEEVE WITH HIATAL HERNIA LAPAROSCOPIC WITH DAVINCI ROBOT, ESOPHAGOGASTRODUODENOSCOPY;  Surgeon: Maribell Anderson MD;  [facility];  Service: Robotics - DaVinci;  Laterality: N/A;   • LAPAROSCOPIC CHOLECYSTECTOMY      gallstone  pancreatitis     Outpatient Medications Marked as Taking for the 5/11/23 encounter (Office Visit) with Maribell Anderson MD   Medication Sig Dispense Refill   • acyclovir (ZOVIRAX) 400 MG tablet Take 1 tablet by mouth 2 (Two) Times a Day. 60 tablet 11   • albuterol sulfate  (90 Base) MCG/ACT inhaler Ventolin HFA 90 mcg/actuation aerosol inhaler   1-2 puffs q 4-6^ prn     • azelastine (ASTEPRO) 0.15 % solution nasal spray inhale 1-2 sprays by nasal route 2 times a day as needed  3   • B Complex-C-E-Zn (Z-BEC PO) Take 1 tablet by mouth daily.     • Dalfampridine ER 10 MG tablet sustained-release 12 hour Take 10 mg by mouth 2 (Two) Times a Day. 60 tablet 11   • FLUoxetine (PROzac) 20 MG capsule Take 1 capsule by mouth Daily.     • gabapentin (NEURONTIN) 100 MG capsule Take 3 capsules by mouth 3 (Three) Times a Day. (Patient taking differently: Take 1 capsule by mouth 2 (Two) Times a Day.) 90 capsule 5   • gabapentin (NEURONTIN) 300 MG capsule Take 2 capsules by mouth Every Night. (Patient taking differently: Take 1 capsule by mouth 2 (Two) Times a Day.) 60 capsule 5   • hydrALAZINE (APRESOLINE) 10 MG tablet Take 1 tablet 3 times a day as directed.     • HYDROcodone-acetaminophen (NORCO) 5-325 MG per tablet TAKE ONE TABLET BY MOUTH EVERY FOUR HOURS AS NEEDED     • meclizine (ANTIVERT) 25 MG tablet Take 1 tablet 3 times a day by oral route as needed.     • Multiple Vitamin (MULTI-VITAMIN DAILY) tablet Take 1 tablet by mouth Daily.     • olmesartan (BENICAR) 40 MG tablet Take 1 tablet(s) every day for 90 days.     • omeprazole (priLOSEC) 20 MG capsule take 1 capsule (20 mg) by oral route once daily before a meal as needed     • OnabotulinumtoxinA 200 units reconstituted solution FOR . PHYSICIAN TO INJECT 155 UNITS INTRAMUSCULARLY INTO HEAD, NECK AND SHOULDERS EVERY 12 WEEKS Per FDA PROTOCOL 1 each 3   • ondansetron ODT (ZOFRAN-ODT) 4 MG disintegrating tablet Place 1 tablet on the tongue  "Every 8 (Eight) Hours As Needed for Nausea or Vomiting. 20 tablet 0   • oxybutynin XL (DITROPAN-XL) 10 MG 24 hr tablet Take 1 tablet by mouth Daily. PATIENT STATES THAT SHE HAS NOT TAKEN IN A MONTH AS OF 2022     • potassium chloride (MICRO-K) 10 MEQ CR capsule Take 1 capsule by mouth Daily.     • rosuvastatin (CRESTOR) 40 MG tablet 1 tablet Daily.     • valACYclovir (Valtrex) 500 MG tablet Take 1 tablet by mouth 2 (Two) Times a Day. 60 tablet 11   • Vibegron (Gemtesa) 75 MG tablet Gemtesa 75 mg tablet   Take 1 tablet every day by oral route.     • vitamin D (ERGOCALCIFEROL) 68621 UNITS capsule capsule Take 1 capsule by mouth Every 30 (Thirty) Days. 15       Current Facility-Administered Medications for the 23 encounter (Office Visit) with Maribell Anderson MD   Medication Dose Route Frequency Provider Last Rate Last Admin   • OnabotulinumtoxinA 200 Units  200 Units Intramuscular Q3 Months Adonay Gilbert MD   200 Units at 23 1411       Allergies   Allergen Reactions   • Isoniazid Other (See Comments)     aplastic anemia   • Rifampin Anaphylaxis   • Sulfa Antibiotics Anaphylaxis   • Tetanus Toxoid Swelling       Social History     Socioeconomic History   • Marital status:    Tobacco Use   • Smoking status: Former     Types: Cigarettes     Quit date:      Years since quittin.3     Passive exposure: Never   • Smokeless tobacco: Never   • Tobacco comments:     social   Vaping Use   • Vaping Use: Never used   Substance and Sexual Activity   • Alcohol use: Yes     Comment: rarely   • Drug use: No   • Sexual activity: Defer       /72 (BP Location: Left arm, Patient Position: Sitting)   Pulse 72   Temp 98 °F (36.7 °C)   Resp 18   Ht 179.1 cm (70.5\")   Wt 131 kg (288 lb 8 oz)   LMP  (LMP Unknown)   SpO2 98%   BMI 40.81 kg/m²     Physical Exam  Constitutional:       General: She is not in acute distress.     Appearance: She is well-developed. She is not diaphoretic. "   HENT:      Head: Normocephalic and atraumatic.      Mouth/Throat:      Pharynx: No oropharyngeal exudate.   Eyes:      Conjunctiva/sclera: Conjunctivae normal.      Pupils: Pupils are equal, round, and reactive to light.   Pulmonary:      Effort: Pulmonary effort is normal. No respiratory distress.   Abdominal:      General: There is no distension.      Palpations: Abdomen is soft.   Skin:     General: Skin is warm and dry.      Coloration: Skin is not pale.   Neurological:      Mental Status: She is alert and oriented to person, place, and time.      Cranial Nerves: No cranial nerve deficit.   Psychiatric:         Behavior: Behavior normal.         Thought Content: Thought content normal.           Assessment:  6 months s/p LSG/HHR 11/8/22 PNQ    ICD-10-CM ICD-9-CM   1. Obesity, Class III, BMI 40-49.9 (morbid obesity)  E66.01 278.01   2. Fatigue, unspecified type  R53.83 780.79   3. Hypovitaminosis D  E55.9 268.9   4. Malnutrition screen  Z13.21 V77.2   5. Postgastrectomy malabsorption  K91.2 579.3    Z90.3    6. Screening, iron deficiency anemia  Z13.0 V78.0   7. B12 deficiency  E53.8 266.2       Class 3 Severe Obesity (BMI >=40). Obesity-related health conditions include the following: OA, HLD, HTN GERD. Obesity is improving with lifestyle modifications. BMI is is above average; BMI management plan is completed. We discussed portion control and increasing exercise.      Plan:  Doing well. Continue w/ good food choices and healthy habits.  Continue protein >70g/day.  Continue routine exercise.  Routine bariatric labs ordered.  Continue vitamins w/ adjustments pending lab results.  Call w/ problems/concerns.     The patient was instructed to follow up in 3 months, sooner if needed.    note: approx 15 of the 25 minute visit was spent counseling on nutrition and necessary dietary/lifestyle modifications face to face.    Maribell Anderson MD

## 2023-05-19 LAB
25(OH)D3+25(OH)D2 SERPL-MCNC: 32.1 NG/ML (ref 30–100)
ALBUMIN SERPL-MCNC: 4.1 G/DL (ref 3.8–4.8)
ALBUMIN/GLOB SERPL: 1.4 {RATIO} (ref 1.2–2.2)
ALP SERPL-CCNC: 144 IU/L (ref 44–121)
ALT SERPL-CCNC: 12 IU/L (ref 0–32)
AST SERPL-CCNC: 18 IU/L (ref 0–40)
BASOPHILS # BLD AUTO: 0 X10E3/UL (ref 0–0.2)
BASOPHILS NFR BLD AUTO: 0 %
BILIRUB SERPL-MCNC: 0.5 MG/DL (ref 0–1.2)
BUN SERPL-MCNC: 26 MG/DL (ref 8–27)
BUN/CREAT SERPL: 36 (ref 12–28)
CALCIUM SERPL-MCNC: 9.6 MG/DL (ref 8.7–10.3)
CHLORIDE SERPL-SCNC: 105 MMOL/L (ref 96–106)
CO2 SERPL-SCNC: 22 MMOL/L (ref 20–29)
CREAT SERPL-MCNC: 0.73 MG/DL (ref 0.57–1)
EGFRCR SERPLBLD CKD-EPI 2021: 91 ML/MIN/1.73
EOSINOPHIL # BLD AUTO: 0 X10E3/UL (ref 0–0.4)
EOSINOPHIL NFR BLD AUTO: 0 %
ERYTHROCYTE [DISTWIDTH] IN BLOOD BY AUTOMATED COUNT: 13.5 % (ref 11.7–15.4)
FERRITIN SERPL-MCNC: 182 NG/ML (ref 15–150)
FOLATE SERPL-MCNC: 4.4 NG/ML
GLOBULIN SER CALC-MCNC: 3 G/DL (ref 1.5–4.5)
GLUCOSE SERPL-MCNC: 82 MG/DL (ref 70–99)
HCT VFR BLD AUTO: 44.9 % (ref 34–46.6)
HGB BLD-MCNC: 14.9 G/DL (ref 11.1–15.9)
IMM GRANULOCYTES # BLD AUTO: 0 X10E3/UL (ref 0–0.1)
IMM GRANULOCYTES NFR BLD AUTO: 0 %
IRON SERPL-MCNC: 123 UG/DL (ref 27–139)
LYMPHOCYTES # BLD AUTO: 1.2 X10E3/UL (ref 0.7–3.1)
LYMPHOCYTES NFR BLD AUTO: 19 %
MCH RBC QN AUTO: 29.9 PG (ref 26.6–33)
MCHC RBC AUTO-ENTMCNC: 33.2 G/DL (ref 31.5–35.7)
MCV RBC AUTO: 90 FL (ref 79–97)
METHYLMALONATE SERPL-SCNC: 305 NMOL/L (ref 0–378)
MONOCYTES # BLD AUTO: 0.6 X10E3/UL (ref 0.1–0.9)
MONOCYTES NFR BLD AUTO: 9 %
NEUTROPHILS # BLD AUTO: 4.6 X10E3/UL (ref 1.4–7)
NEUTROPHILS NFR BLD AUTO: 72 %
PLATELET # BLD AUTO: 219 X10E3/UL (ref 150–450)
POTASSIUM SERPL-SCNC: 3.9 MMOL/L (ref 3.5–5.2)
PREALB SERPL-MCNC: 21 MG/DL (ref 10–36)
PROT SERPL-MCNC: 7.1 G/DL (ref 6–8.5)
RBC # BLD AUTO: 4.98 X10E6/UL (ref 3.77–5.28)
SODIUM SERPL-SCNC: 143 MMOL/L (ref 134–144)
VIT B1 BLD-SCNC: 128.1 NMOL/L (ref 66.5–200)
WBC # BLD AUTO: 6.5 X10E3/UL (ref 3.4–10.8)

## 2023-05-26 ENCOUNTER — PREP FOR SURGERY (OUTPATIENT)
Dept: OTHER | Facility: HOSPITAL | Age: 68
End: 2023-05-26
Payer: MEDICARE

## 2023-05-26 DIAGNOSIS — M17.11 PRIMARY OSTEOARTHRITIS OF RIGHT KNEE: Primary | ICD-10-CM

## 2023-05-26 RX ORDER — CEFAZOLIN SODIUM IN 0.9 % NACL 3 G/100 ML
3 INTRAVENOUS SOLUTION, PIGGYBACK (ML) INTRAVENOUS ONCE
OUTPATIENT
Start: 2023-05-26 | End: 2023-05-26

## 2023-05-26 RX ORDER — PREGABALIN 150 MG/1
150 CAPSULE ORAL ONCE
OUTPATIENT
Start: 2023-05-26 | End: 2023-05-26

## 2023-05-26 RX ORDER — TRANEXAMIC ACID 10 MG/ML
1000 INJECTION, SOLUTION INTRAVENOUS ONCE
OUTPATIENT
Start: 2023-05-26 | End: 2023-05-26

## 2023-05-26 RX ORDER — MELOXICAM 15 MG/1
15 TABLET ORAL ONCE
OUTPATIENT
Start: 2023-05-26 | End: 2023-05-26

## 2023-05-26 RX ORDER — ACETAMINOPHEN 500 MG
1000 TABLET ORAL ONCE
OUTPATIENT
Start: 2023-05-26 | End: 2023-05-26

## 2023-06-07 NOTE — ASSESSMENT & PLAN NOTE
Psychological condition is worsening.  Continue current treatment regimen.  Psychological condition  will be reassessed at the next regular appointment.   Pinch Graft Text: The defect edges were debeveled with a #15 scalpel blade. Given the location of the defect, shape of the defect and the proximity to free margins a pinch graft was deemed most appropriate. Using a sterile surgical marker, the primary defect shape was transferred to the donor site. The area thus outlined was incised deep to adipose tissue with a #15 scalpel blade.  The harvested graft was then trimmed of adipose tissue until only dermis and epidermis was left. The skin margins of the secondary defect were undermined to an appropriate distance in all directions utilizing iris scissors.  The secondary defect was closed with interrupted buried subcutaneous sutures.  The skin edges were then re-apposed with running  sutures.  The skin graft was then placed in the primary defect and oriented appropriately.

## 2023-08-07 ENCOUNTER — TELEPHONE (OUTPATIENT)
Dept: NEUROLOGY | Facility: CLINIC | Age: 68
End: 2023-08-07

## 2023-08-07 NOTE — TELEPHONE ENCOUNTER
Provider: DR RAY  Caller: PATIENT  Relationship to Patient: SELF  Phone Number: [phone]  Reason for Call: PATIENT STATES HER AMPYRA WENT FROM $40 TO OVER $200 AND SHE CAN'T DO THAT. SHE WOULD LIKE TO KNOW WHAT TO DO. SHE WILL NEED A REFILL WITHIN THE NEXT WEEK OR SO. PLEASE ADVISE, THANK YOU.

## 2023-08-11 ENCOUNTER — OFFICE VISIT (OUTPATIENT)
Dept: BARIATRICS/WEIGHT MGMT | Facility: CLINIC | Age: 68
End: 2023-08-11
Payer: MEDICARE

## 2023-08-11 VITALS
DIASTOLIC BLOOD PRESSURE: 80 MMHG | TEMPERATURE: 97.1 F | WEIGHT: 270 LBS | HEIGHT: 71 IN | OXYGEN SATURATION: 98 % | HEART RATE: 65 BPM | SYSTOLIC BLOOD PRESSURE: 120 MMHG | BODY MASS INDEX: 37.8 KG/M2

## 2023-08-11 DIAGNOSIS — Z13.0 SCREENING, IRON DEFICIENCY ANEMIA: ICD-10-CM

## 2023-08-11 DIAGNOSIS — K91.2 POSTGASTRECTOMY MALABSORPTION: ICD-10-CM

## 2023-08-11 DIAGNOSIS — Z90.3 POSTGASTRECTOMY MALABSORPTION: ICD-10-CM

## 2023-08-11 DIAGNOSIS — E66.9 OBESITY, CLASS II, BMI 35-39.9: ICD-10-CM

## 2023-08-11 DIAGNOSIS — R53.83 FATIGUE, UNSPECIFIED TYPE: Primary | ICD-10-CM

## 2023-08-11 DIAGNOSIS — E55.9 HYPOVITAMINOSIS D: ICD-10-CM

## 2023-08-11 PROCEDURE — 1159F MED LIST DOCD IN RCRD: CPT | Performed by: PHYSICIAN ASSISTANT

## 2023-08-11 PROCEDURE — 1160F RVW MEDS BY RX/DR IN RCRD: CPT | Performed by: PHYSICIAN ASSISTANT

## 2023-08-11 PROCEDURE — 3074F SYST BP LT 130 MM HG: CPT | Performed by: PHYSICIAN ASSISTANT

## 2023-08-11 PROCEDURE — 99213 OFFICE O/P EST LOW 20 MIN: CPT | Performed by: PHYSICIAN ASSISTANT

## 2023-08-11 PROCEDURE — 3079F DIAST BP 80-89 MM HG: CPT | Performed by: PHYSICIAN ASSISTANT

## 2023-08-11 NOTE — PROGRESS NOTES
[facility] Bariatric Surgery  [address]  [address]  [facility] [address]  [phone]        Patient Name:  Rashad Carrillo  :  1955      Date of Visit: 2023      Reason for Visit:   9 months postop      HPI: Rashad Carrillo is a 67 y.o. female s/p LSG/HHR 22 PNQ     Doing well. Very pleased with her progress. Says this is the first time in her life she does not feel hungry all the time. Very happy.  No new issues/concerns. Has chronic issues with paresthesias, vision changes, forgetfulness due to MS. Denies dysphagia, reflux, nausea, vomiting, and abdominal pain.  Getting 70+g prot/day.  Drinking 64 fluid oz/day.  6 month labs revealed low vitamin [id number]-[id number] to take 2500mcg sublingual. Taking Patches: MVI, B12+, Iron, B complex PO .  On Omeprazole .  Physical activity: has started swimming. Planning to go more frequently.    Diet recall:     Waking: protein shake   11 am: egg  Afternoon/dinner: fish tacos, veggies   Snacks: nuts     Premier protein water      Presurgery weight: 333 pounds.  Today's weight is 122 kg (270 lb) pounds, today's  Body mass index is 38.19 kg/mý., and weight loss since surgery is 63 pounds.      Past Medical History:   Diagnosis Date    Chronic knee pain     NSAIDS + steroid injections (last 3/2022)    Dyspepsia     Dyspnea on exertion     Fatigue     GERD (gastroesophageal reflux disease)     History of transfusion     PATIENT DENIES REACTION    Hyperlipidemia     Hypertension     Morbid obesity     Multiple sclerosis     follows w/ Dr. Gilbert     Past Surgical History:   Procedure Laterality Date    COLONOSCOPY      GASTRIC SLEEVE LAPAROSCOPIC N/A 2022    Procedure: GASTRIC SLEEVE WITH HIATAL HERNIA LAPAROSCOPIC WITH DAVINCI ROBOT, ESOPHAGOGASTRODUODENOSCOPY;  Surgeon: Maribell Anderson MD;  Location: [facility];  Service: Robotics - DaVinci;  Laterality: N/A;    LAPAROSCOPIC CHOLECYSTECTOMY      gallstone  pancreatitis     Outpatient Medications Marked as Taking for the 8/11/23 encounter (Office Visit) with Linda Dhillon PA   Medication Sig Dispense Refill    acyclovir (ZOVIRAX) 400 MG tablet Take 1 tablet by mouth 2 (Two) Times a Day. 60 tablet 11    albuterol sulfate  (90 Base) MCG/ACT inhaler Ventolin HFA 90 mcg/actuation aerosol inhaler   1-2 puffs q 4-6^ prn      azelastine (ASTEPRO) 0.15 % solution nasal spray inhale 1-2 sprays by nasal route 2 times a day as needed  3    B Complex-C-E-Zn (Z-BEC PO) Take 1 tablet by mouth daily.      Chlorhexidine Gluconate 4 % solution Shower daily with solution as directed for 5 days prior to surgery 237 mL 0    Dalfampridine ER 10 MG tablet sustained-release 12 hour Take 10 mg by mouth 2 (Two) Times a Day. 60 tablet 11    FLUoxetine (PROzac) 20 MG capsule Take 1 capsule by mouth Daily.      gabapentin (NEURONTIN) 100 MG capsule Take 3 capsules by mouth 3 (Three) Times a Day. (Patient taking differently: Take 1 capsule by mouth 2 (Two) Times a Day.) 90 capsule 5    hydrALAZINE (APRESOLINE) 10 MG tablet Take 1 tablet 3 times a day as directed.      HYDROcodone-acetaminophen (NORCO) 5-325 MG per tablet TAKE ONE TABLET BY MOUTH EVERY FOUR HOURS AS NEEDED      meclizine (ANTIVERT) 25 MG tablet Take 1 tablet 3 times a day by oral route as needed.      Multiple Vitamin (MULTI-VITAMIN DAILY) tablet Take 1 tablet by mouth Daily.      olmesartan (BENICAR) 40 MG tablet Take 1 tablet(s) every day for 90 days.      omeprazole (priLOSEC) 20 MG capsule take 1 capsule (20 mg) by oral route once daily before a meal as needed      OnabotulinumtoxinA 200 units reconstituted solution FOR . PHYSICIAN TO INJECT 155 UNITS INTRAMUSCULARLY INTO HEAD, NECK AND SHOULDERS EVERY 12 WEEKS Per FDA PROTOCOL 1 each 3    ondansetron ODT (ZOFRAN-ODT) 4 MG disintegrating tablet Place 1 tablet on the tongue Every 8 (Eight) Hours As Needed for Nausea or Vomiting. 20 tablet 0     "oxybutynin XL (DITROPAN-XL) 10 MG 24 hr tablet Take 1 tablet by mouth Daily. PATIENT STATES THAT SHE HAS NOT TAKEN IN A MONTH AS OF 2022      potassium chloride (MICRO-K) 10 MEQ CR capsule Take 1 capsule by mouth Daily.      rosuvastatin (CRESTOR) 40 MG tablet 1 tablet Daily.      valACYclovir (Valtrex) 500 MG tablet Take 1 tablet by mouth 2 (Two) Times a Day. 60 tablet 11    Vibegron (Gemtesa) 75 MG tablet Gemtesa 75 mg tablet   Take 1 tablet every day by oral route.      vitamin D (ERGOCALCIFEROL) 10279 UNITS capsule capsule Take 1 capsule by mouth Every 30 (Thirty) Days. 15TH       Current Facility-Administered Medications for the 23 encounter (Office Visit) with Linda Dhillon PA   Medication Dose Route Frequency Provider Last Rate Last Admin    OnabotulinumtoxinA 200 Units  200 Units Intramuscular Q3 Months Adonay Gilbert MD   200 Units at 23 0948       Allergies   Allergen Reactions    Isoniazid Other (See Comments)     aplastic anemia    Rifampin Anaphylaxis    Sulfa Antibiotics Anaphylaxis    Tetanus Toxoid Swelling       Social History     Socioeconomic History    Marital status:    Tobacco Use    Smoking status: Former     Types: Cigarettes     Quit date:      Years since quittin.6     Passive exposure: Never    Smokeless tobacco: Never    Tobacco comments:     social   Vaping Use    Vaping Use: Never used   Substance and Sexual Activity    Alcohol use: Yes     Comment: rarely    Drug use: No    Sexual activity: Defer     Social History     Social History Narrative    .  Lives alone in [place].  Forced correction d/t MS - formerly worked in Finance.         /80 (BP Location: Left arm, Patient Position: Sitting)   Pulse 65   Temp 97.1 øF (36.2 øC)   Ht 179.1 cm (70.5\")   Wt 122 kg (270 lb)   LMP  (LMP Unknown)   SpO2 98%   BMI 38.19 kg/mý     Physical Exam  Constitutional:       Appearance: She is obese.   HENT:      Head: Normocephalic and atraumatic. "   Cardiovascular:      Rate and Rhythm: Normal rate and regular rhythm.   Pulmonary:      Effort: Pulmonary effort is normal.      Breath sounds: Normal breath sounds.   Abdominal:      General: Bowel sounds are normal. There is no distension.      Palpations: Abdomen is soft. There is no mass.      Tenderness: There is no abdominal tenderness.   Skin:     General: Skin is warm and dry.   Neurological:      General: No focal deficit present.      Mental Status: She is alert and oriented to person, place, and time.   Psychiatric:         Mood and Affect: Mood normal.         Behavior: Behavior normal.         Assessment:  9 months s/p  LSG/HHR 11/8/22 PNQ     ICD-10-CM ICD-9-CM   1. Fatigue, unspecified type  R53.83 780.79   2. Hypovitaminosis D  E55.9 268.9   3. Postgastrectomy malabsorption  K91.2 579.3    Z90.3    4. Screening, iron deficiency anemia  Z13.0 V78.0   5. Obesity, Class II, BMI 35-39.9  E66.9 278.00        Class 2 Severe Obesity (BMI >=35 and <=39.9). Obesity-related health conditions include the following:  as above . Obesity is improving with treatment. BMI is is above average; BMI management plan is completed. We discussed low calorie, low carb based diet program, portion control, and increasing exercise.        Plan:  Doing well. Continue w/ good food choices and healthy habits.  Continue protein >70g/day.  Continue routine physical activity.  Routine bariatric labs ordered.  Continue vitamins w/ adjustments pending lab results.  Call w/ problems/concerns.     The patient was instructed to follow up in 3 months, sooner if needed.

## 2023-08-14 ENCOUNTER — SPECIALTY PHARMACY (OUTPATIENT)
Dept: ONCOLOGY | Facility: HOSPITAL | Age: 68
End: 2023-08-14
Payer: MEDICARE

## 2023-08-14 RX ORDER — DALFAMPRIDINE 10 MG/1
10 TABLET, FILM COATED, EXTENDED RELEASE ORAL 2 TIMES DAILY
Qty: 180 TABLET | Refills: 3 | Status: SHIPPED | OUTPATIENT
Start: 2023-08-14

## 2023-08-16 LAB
25(OH)D3+25(OH)D2 SERPL-MCNC: 38.9 NG/ML (ref 30–100)
ALBUMIN SERPL-MCNC: 4 G/DL (ref 3.9–4.9)
ALBUMIN/GLOB SERPL: 1.5 {RATIO} (ref 1.2–2.2)
ALP SERPL-CCNC: 138 IU/L (ref 44–121)
ALT SERPL-CCNC: 11 IU/L (ref 0–32)
AST SERPL-CCNC: 18 IU/L (ref 0–40)
BASOPHILS # BLD AUTO: 0 X10E3/UL (ref 0–0.2)
BASOPHILS NFR BLD AUTO: 1 %
BILIRUB SERPL-MCNC: 0.6 MG/DL (ref 0–1.2)
BUN SERPL-MCNC: 21 MG/DL (ref 8–27)
BUN/CREAT SERPL: 30 (ref 12–28)
CALCIUM SERPL-MCNC: 9.6 MG/DL (ref 8.7–10.3)
CHLORIDE SERPL-SCNC: 106 MMOL/L (ref 96–106)
CO2 SERPL-SCNC: 23 MMOL/L (ref 20–29)
CREAT SERPL-MCNC: 0.7 MG/DL (ref 0.57–1)
EGFRCR SERPLBLD CKD-EPI 2021: 96 ML/MIN/1.73
EOSINOPHIL # BLD AUTO: 0.2 X10E3/UL (ref 0–0.4)
EOSINOPHIL NFR BLD AUTO: 5 %
ERYTHROCYTE [DISTWIDTH] IN BLOOD BY AUTOMATED COUNT: 12.9 % (ref 11.7–15.4)
FERRITIN SERPL-MCNC: 205 NG/ML (ref 15–150)
FOLATE SERPL-MCNC: 6.3 NG/ML
GLOBULIN SER CALC-MCNC: 2.7 G/DL (ref 1.5–4.5)
GLUCOSE SERPL-MCNC: 84 MG/DL (ref 70–99)
HCT VFR BLD AUTO: 43.4 % (ref 34–46.6)
HGB BLD-MCNC: 14.2 G/DL (ref 11.1–15.9)
IMM GRANULOCYTES # BLD AUTO: 0 X10E3/UL (ref 0–0.1)
IMM GRANULOCYTES NFR BLD AUTO: 0 %
IRON SERPL-MCNC: 92 UG/DL (ref 27–139)
LYMPHOCYTES # BLD AUTO: 0.9 X10E3/UL (ref 0.7–3.1)
LYMPHOCYTES NFR BLD AUTO: 20 %
MCH RBC QN AUTO: 29.7 PG (ref 26.6–33)
MCHC RBC AUTO-ENTMCNC: 32.7 G/DL (ref 31.5–35.7)
MCV RBC AUTO: 91 FL (ref 79–97)
METHYLMALONATE SERPL-SCNC: 313 NMOL/L (ref 0–378)
MONOCYTES # BLD AUTO: 0.4 X10E3/UL (ref 0.1–0.9)
MONOCYTES NFR BLD AUTO: 10 %
NEUTROPHILS # BLD AUTO: 2.8 X10E3/UL (ref 1.4–7)
NEUTROPHILS NFR BLD AUTO: 64 %
PLATELET # BLD AUTO: 184 X10E3/UL (ref 150–450)
POTASSIUM SERPL-SCNC: 4.3 MMOL/L (ref 3.5–5.2)
PREALB SERPL-MCNC: 16 MG/DL (ref 10–36)
PROT SERPL-MCNC: 6.7 G/DL (ref 6–8.5)
RBC # BLD AUTO: 4.78 X10E6/UL (ref 3.77–5.28)
SODIUM SERPL-SCNC: 142 MMOL/L (ref 134–144)
VIT B1 BLD-SCNC: 107 NMOL/L (ref 66.5–200)
VIT B12 SERPL-MCNC: 387 PG/ML (ref 232–1245)
WBC # BLD AUTO: 4.3 X10E3/UL (ref 3.4–10.8)

## 2023-09-27 ENCOUNTER — PRE-ADMISSION TESTING (OUTPATIENT)
Dept: PREADMISSION TESTING | Facility: HOSPITAL | Age: 68
End: 2023-09-27
Payer: MEDICARE

## 2023-09-27 VITALS — WEIGHT: 268.52 LBS | BODY MASS INDEX: 37.59 KG/M2 | HEIGHT: 71 IN

## 2023-09-27 DIAGNOSIS — M17.11 PRIMARY OSTEOARTHRITIS OF RIGHT KNEE: ICD-10-CM

## 2023-09-27 LAB
ANION GAP SERPL CALCULATED.3IONS-SCNC: 8 MMOL/L (ref 5–15)
APTT PPP: 23.9 SECONDS (ref 22–39)
BASOPHILS # BLD AUTO: 0.03 10*3/MM3 (ref 0–0.2)
BASOPHILS NFR BLD AUTO: 0.6 % (ref 0–1.5)
BUN SERPL-MCNC: 13 MG/DL (ref 8–23)
BUN/CREAT SERPL: 19.7 (ref 7–25)
CALCIUM SPEC-SCNC: 9.8 MG/DL (ref 8.6–10.5)
CHLORIDE SERPL-SCNC: 106 MMOL/L (ref 98–107)
CO2 SERPL-SCNC: 30 MMOL/L (ref 22–29)
CREAT SERPL-MCNC: 0.66 MG/DL (ref 0.57–1)
CRP SERPL-MCNC: 0.32 MG/DL (ref 0–0.5)
DEPRECATED RDW RBC AUTO: 46.9 FL (ref 37–54)
EGFRCR SERPLBLD CKD-EPI 2021: 97.5 ML/MIN/1.73
EOSINOPHIL # BLD AUTO: 0.2 10*3/MM3 (ref 0–0.4)
EOSINOPHIL NFR BLD AUTO: 4.2 % (ref 0.3–6.2)
ERYTHROCYTE [DISTWIDTH] IN BLOOD BY AUTOMATED COUNT: 13.5 % (ref 12.3–15.4)
ERYTHROCYTE [SEDIMENTATION RATE] IN BLOOD: 30 MM/HR (ref 0–30)
GLUCOSE SERPL-MCNC: 91 MG/DL (ref 65–99)
HBA1C MFR BLD: 4.9 % (ref 4.8–5.6)
HCT VFR BLD AUTO: 44.7 % (ref 34–46.6)
HGB BLD-MCNC: 14.2 G/DL (ref 12–15.9)
IMM GRANULOCYTES # BLD AUTO: 0.02 10*3/MM3 (ref 0–0.05)
IMM GRANULOCYTES NFR BLD AUTO: 0.4 % (ref 0–0.5)
INR PPP: 0.97 (ref 0.89–1.12)
LYMPHOCYTES # BLD AUTO: 0.93 10*3/MM3 (ref 0.7–3.1)
LYMPHOCYTES NFR BLD AUTO: 19.5 % (ref 19.6–45.3)
MCH RBC QN AUTO: 29.9 PG (ref 26.6–33)
MCHC RBC AUTO-ENTMCNC: 31.8 G/DL (ref 31.5–35.7)
MCV RBC AUTO: 94.1 FL (ref 79–97)
MONOCYTES # BLD AUTO: 0.39 10*3/MM3 (ref 0.1–0.9)
MONOCYTES NFR BLD AUTO: 8.2 % (ref 5–12)
NEUTROPHILS NFR BLD AUTO: 3.19 10*3/MM3 (ref 1.7–7)
NEUTROPHILS NFR BLD AUTO: 67.1 % (ref 42.7–76)
NRBC BLD AUTO-RTO: 0 /100 WBC (ref 0–0.2)
PLATELET # BLD AUTO: 189 10*3/MM3 (ref 140–450)
PMV BLD AUTO: 11.2 FL (ref 6–12)
POTASSIUM SERPL-SCNC: 4.3 MMOL/L (ref 3.5–5.2)
PROTHROMBIN TIME: 13 SECONDS (ref 12.2–14.5)
QT INTERVAL: 408 MS
QTC INTERVAL: 417 MS
RBC # BLD AUTO: 4.75 10*6/MM3 (ref 3.77–5.28)
SODIUM SERPL-SCNC: 144 MMOL/L (ref 136–145)
WBC NRBC COR # BLD: 4.76 10*3/MM3 (ref 3.4–10.8)

## 2023-09-27 PROCEDURE — 85652 RBC SED RATE AUTOMATED: CPT

## 2023-09-27 PROCEDURE — 85025 COMPLETE CBC W/AUTO DIFF WBC: CPT

## 2023-09-27 PROCEDURE — 80048 BASIC METABOLIC PNL TOTAL CA: CPT

## 2023-09-27 PROCEDURE — 85610 PROTHROMBIN TIME: CPT

## 2023-09-27 PROCEDURE — 83036 HEMOGLOBIN GLYCOSYLATED A1C: CPT

## 2023-09-27 PROCEDURE — 86140 C-REACTIVE PROTEIN: CPT

## 2023-09-27 PROCEDURE — 85730 THROMBOPLASTIN TIME PARTIAL: CPT

## 2023-09-27 PROCEDURE — 93005 ELECTROCARDIOGRAM TRACING: CPT

## 2023-09-27 PROCEDURE — 36415 COLL VENOUS BLD VENIPUNCTURE: CPT

## 2023-09-27 NOTE — DISCHARGE INSTRUCTIONS
The following information and instructions were given:    Do not eat, drink, smoke or chew gum after midnight the night before surgery. This includes no mints.  Take all routine, prescribed medications including heart and blood pressure medicines with a sip of water unless otherwise instructed by your physician.   Do NOT take diabetic medication unless instructed by your physician.      DO NOT shave for two days before your procedure.  Do not wear makeup.      DO NOT wear fingernail polish (gel/regular) and/or acrylic/artificial nails on the day of surgery. If you had a recent manicure and would rather not remove polish or artificial nails, the minimum requirement is that the polish/artificial nails must be removed from the middle finger on each hand.      If you are having surgery/procedure on an upper extremity, fingernail polish/artificial fingernails must be removed for surgery.  NO EXCEPTIONS.      If you are having surgery/procedure on a lower extremity, toenail polish on both extremities must be removed for surgery.  NO EXCEPTIONS.    Remove all jewelry (advise to go to jeweler if unable to remove).  Jewelry, especially rings, can no longer be taped for surgery.    Leave anything you consider valuable at home.    Leave your suitcase in the car until after your surgery if you are staying overnight.    Bring the following with you the day of your procedure (when applicable):       -Picture ID and insurance cards       -Co-pay/deductible required by insurance       -Medications in the original bottles or a detailed list (name, dosage, frequency of medications) including all over-the-counter medications if not brought to PAT       -Copy of advance directive, living will or power of  documents if not brought to PAT       -CPAP or BIPAP mask and tubing (do not bring machine)       -Skin prep instruction(s) sheet       -PAT Pass    Educational handout or binder (joint replacements) related to procedure given  to patient.  Educational handout also includes general information related to the recovery that mentions signs and symptoms of infection and when to call the doctor.    When applicable, an ERAS handout was given to patient.    Respirex use and pneumonia prevention education provided in Pre Admission Testing general education video.    Information related to infection and hand hygiene mentioned in Pre Admission Testing general education video. Patient instructed to call their doctor if any of the following symptoms are noted during recovery:  Fever of 100.4 F or higher, incision that is warm or has increasing bleeding, redness or drainage.    DVT Prevention instructions given in general education video presentation during Pre Admission Testing appointment that stress the importance of ambulation to improve blood circulation.  Also encouraged patient to perform foot exercises when in bed and application of a sequential device may be applied to lower extremities to improve circulation.      Please apply Chlorhexidine wipes to surgical area (if instructed) the night before procedure and the AM of procedure and document date/time of applications on skin prep instruction sheet.

## 2023-09-27 NOTE — PAT
.Discussed with patient options for receiving total joint replacement education and assessed patient's ability and preference. Joint Replacement Guide given to patient during PAT visit since not received a copy within the last year. Encouraged patient/family to read guide thoroughly and notify PAT staff with any questions or concerns. Handout provided directing patient to links to watch online videos related to joint replacement surgery on the [facility] website. The handout gives detailed instructions for joining an online joint replacement class through Zoom or phone conference offered on . Patient agreed to participate by joining an online class through phone conference. Patient verbalized understanding of instructions and to complete the online learning tool survey. Encouraged to share information with family and/or . An overview of the joint replacement education was provided during the visit including general perioperative instructions that are routine for all surgical patients (PAT PASS, wipes, directions to pre-op, etc.).    Patient instructed to drink 20 ounces of Gatorade or Gatorlyte (if diabetic) and it needs to be completed 1 hour (for Main OR patients) or 2 hours (scheduled  section & [facility] patients) before given arrival time for procedure (NO RED Gatorade and NO Gatorade Zero).    Patient verbalized understanding.    Patient to apply Chlorhexadine wipes  to surgical area (as instructed) the night before procedure and the AM of procedure. Wipes provided.    Prescription for Chlorhexidine shower called into patient's pharmacy or [facility] pharmacy by patient's surgeon.  Reinforced with patient to  the prescription from applicable pharmacy if they haven't already.  Verbal and written instructions given regarding proper use of Chlorhexidine body wash to patient and/or famlily during PAT visit. Patient/family also instructed to complete checklist and return it to Pre-op on  the day of surgery.  Patient and/or family verbalized understanding.    [employer] (by [employer]) pain pump patient informational handout given to patient.  Instructed patient to watch [employer] Patient Education Video regarding Peripheral Nerve Catheter that will be in place for upcoming surgery unless contraindicated. The video can be accessed using QR code noted on handout.  Patient agreed to watch video.  Stressed to patient to call [facility] Nursing Hotline 24/7 if patient has any questions or concerns after discharge.     Per Anesthesia Request, patient instructed not to take their ACE/ARB medications on the AM of surgery.

## 2023-09-29 ENCOUNTER — TRANSCRIBE ORDERS (OUTPATIENT)
Dept: GENERAL RADIOLOGY | Facility: HOSPITAL | Age: 68
End: 2023-09-29
Payer: MEDICARE

## 2023-09-29 ENCOUNTER — HOSPITAL ENCOUNTER (OUTPATIENT)
Dept: GENERAL RADIOLOGY | Facility: HOSPITAL | Age: 68
Discharge: HOME | End: 2023-09-29
Admitting: INTERNAL MEDICINE
Payer: MEDICARE

## 2023-09-29 DIAGNOSIS — R05.9 COUGH, UNSPECIFIED TYPE: Primary | ICD-10-CM

## 2023-09-29 PROCEDURE — 71046 X-RAY EXAM CHEST 2 VIEWS: CPT

## 2023-10-09 ENCOUNTER — ANESTHESIA EVENT (OUTPATIENT)
Dept: PERIOP | Facility: HOSPITAL | Age: 68
End: 2023-10-09
Payer: MEDICARE

## 2023-10-09 PROBLEM — M17.11 PRIMARY OSTEOARTHRITIS OF RIGHT KNEE: Status: ACTIVE | Noted: 2023-05-26

## 2023-10-09 RX ORDER — FAMOTIDINE 10 MG/ML
20 INJECTION, SOLUTION INTRAVENOUS ONCE
Status: CANCELLED | OUTPATIENT
Start: 2023-10-09 | End: 2023-10-09

## 2023-10-10 ENCOUNTER — ANESTHESIA EVENT CONVERTED (OUTPATIENT)
Dept: ANESTHESIOLOGY | Facility: HOSPITAL | Age: 68
End: 2023-10-10
Payer: MEDICARE

## 2023-10-10 ENCOUNTER — APPOINTMENT (OUTPATIENT)
Dept: GENERAL RADIOLOGY | Facility: HOSPITAL | Age: 68
End: 2023-10-10
Payer: MEDICARE

## 2023-10-10 ENCOUNTER — ANESTHESIA (OUTPATIENT)
Dept: PERIOP | Facility: HOSPITAL | Age: 68
End: 2023-10-10
Payer: MEDICARE

## 2023-10-10 ENCOUNTER — HOSPITAL ENCOUNTER (OUTPATIENT)
Facility: HOSPITAL | Age: 68
Discharge: NURSING, REHAB OR LONG-TERM CARE | End: 2023-10-12
Attending: ORTHOPAEDIC SURGERY | Admitting: ORTHOPAEDIC SURGERY
Payer: MEDICARE

## 2023-10-10 DIAGNOSIS — G89.18 ACUTE POSTOPERATIVE PAIN: Primary | ICD-10-CM

## 2023-10-10 DIAGNOSIS — M17.11 PRIMARY OSTEOARTHRITIS OF RIGHT KNEE: ICD-10-CM

## 2023-10-10 PROBLEM — Z96.651 S/P TOTAL KNEE ARTHROPLASTY, RIGHT: Status: ACTIVE | Noted: 2023-10-10

## 2023-10-10 LAB
GEN 5 2HR TROPONIN T REFLEX: 9 NG/L
GLUCOSE BLDC GLUCOMTR-MCNC: 79 MG/DL (ref 70–130)
QT INTERVAL: 408 MS
QTC INTERVAL: 417 MS
TROPONIN T DELTA: 1 NG/L
TROPONIN T SERPL HS-MCNC: 8 NG/L

## 2023-10-10 PROCEDURE — 25010000002 CEFAZOLIN PER 500 MG: Performed by: ORTHOPAEDIC SURGERY

## 2023-10-10 PROCEDURE — 25010000002 SUGAMMADEX 200 MG/2ML SOLUTION: Performed by: ANESTHESIOLOGY

## 2023-10-10 PROCEDURE — 25010000002 ROPIVACAINE PER 1 MG: Performed by: ORTHOPAEDIC SURGERY

## 2023-10-10 PROCEDURE — 25010000002 ONDANSETRON PER 1 MG: Performed by: ANESTHESIOLOGY

## 2023-10-10 PROCEDURE — 25010000002 PROPOFOL 10 MG/ML EMULSION: Performed by: ANESTHESIOLOGY

## 2023-10-10 PROCEDURE — 25010000002 DEXAMETHASONE PER 1 MG: Performed by: ANESTHESIOLOGY

## 2023-10-10 PROCEDURE — C1755 CATHETER, INTRASPINAL: HCPCS | Performed by: ORTHOPAEDIC SURGERY

## 2023-10-10 PROCEDURE — 25010000002 BUPIVACAINE (PF) 0.25 % SOLUTION: Performed by: ANESTHESIOLOGY

## 2023-10-10 PROCEDURE — 73560 X-RAY EXAM OF KNEE 1 OR 2: CPT

## 2023-10-10 PROCEDURE — 82948 REAGENT STRIP/BLOOD GLUCOSE: CPT

## 2023-10-10 PROCEDURE — 84484 ASSAY OF TROPONIN QUANT: CPT | Performed by: ANESTHESIOLOGY

## 2023-10-10 PROCEDURE — 25810000003 SODIUM CHLORIDE 0.9 % SOLUTION: Performed by: ORTHOPAEDIC SURGERY

## 2023-10-10 PROCEDURE — 25810000003 LACTATED RINGERS PER 1000 ML: Performed by: ANESTHESIOLOGY

## 2023-10-10 PROCEDURE — C1776 JOINT DEVICE (IMPLANTABLE): HCPCS | Performed by: ORTHOPAEDIC SURGERY

## 2023-10-10 PROCEDURE — C1713 ANCHOR/SCREW BN/BN,TIS/BN: HCPCS | Performed by: ORTHOPAEDIC SURGERY

## 2023-10-10 PROCEDURE — 25010000002 FENTANYL CITRATE (PF) 50 MCG/ML SOLUTION

## 2023-10-10 PROCEDURE — 25010000002 HYDROMORPHONE 1 MG/ML SOLUTION

## 2023-10-10 PROCEDURE — 25010000002 FENTANYL CITRATE (PF) 100 MCG/2ML SOLUTION: Performed by: ANESTHESIOLOGY

## 2023-10-10 PROCEDURE — 25010000002 ROPIVACAINE PER 1 MG: Performed by: NURSE ANESTHETIST, CERTIFIED REGISTERED

## 2023-10-10 DEVICE — IMPLANTABLE DEVICE: Type: IMPLANTABLE DEVICE | Site: KNEE | Status: FUNCTIONAL

## 2023-10-10 DEVICE — LEGION CR HIGH FLEX XLPE SZ 3-4 12MM
Type: IMPLANTABLE DEVICE | Site: KNEE | Status: FUNCTIONAL
Brand: LEGION

## 2023-10-10 DEVICE — VIOLET ANTIBACTERIAL POLYDIOXANONE, KNOTLESS TISSUE CONTROL DEVICE
Type: IMPLANTABLE DEVICE | Site: KNEE | Status: FUNCTIONAL
Brand: STRATAFIX

## 2023-10-10 DEVICE — PALACOS® R IS A FAST-CURING, RADIOPAQUE, POLY(METHYL METHACRYLATE)-BASED BONE CEMENT.PALACOS ® R CONTAINS THE X-RAY CONTRAST MEDIUM ZIRCONIUM DIOXIDE. TO IMPROVE VISIBILITY IN THE SURGICAL FIELD PALACOS ® R HAS BEEN COLOURED WITH CHLOROPHYLL (E141). THE BONE CEMENT IS PREPARED DIRECTLY BEFORE USE BY MIXING A POLYMER POWDER COMPONENT WITH A LIQUID MONOMER COMPONENT. A DUCTILE DOUGH FORMS WHICH CURES WITHIN A FEW MINUTES.
Type: IMPLANTABLE DEVICE | Site: KNEE | Status: FUNCTIONAL
Brand: PALACOS®

## 2023-10-10 DEVICE — GENESIS II NON-POROUS TIBIAL                                    BASEPLATE SIZE 4 RIGHT
Type: IMPLANTABLE DEVICE | Site: KNEE | Status: FUNCTIONAL
Brand: GENESIS II

## 2023-10-10 DEVICE — GEN II 7.5MM RESUR PAT 32MM
Type: IMPLANTABLE DEVICE | Site: KNEE | Status: FUNCTIONAL
Brand: GENESIS II

## 2023-10-10 DEVICE — LEGION NARROW CRUCIATE RETAINING                                    OXINIUM SIZE 6N RIGHT
Type: IMPLANTABLE DEVICE | Site: KNEE | Status: FUNCTIONAL
Brand: LEGION

## 2023-10-10 DEVICE — KNOTLESS TISSUE CONTROL DEVICE, UNDYED UNIDIRECTIONAL (ANTIBACTERIAL) SYNTHETIC ABSORBABLE DEVICE
Type: IMPLANTABLE DEVICE | Site: KNEE | Status: FUNCTIONAL
Brand: STRATAFIX

## 2023-10-10 RX ORDER — TRANEXAMIC ACID 10 MG/ML
1000 INJECTION, SOLUTION INTRAVENOUS ONCE
Status: DISCONTINUED | OUTPATIENT
Start: 2023-10-10 | End: 2023-10-10

## 2023-10-10 RX ORDER — MAGNESIUM HYDROXIDE 1200 MG/15ML
LIQUID ORAL AS NEEDED
Status: DISCONTINUED | OUTPATIENT
Start: 2023-10-10 | End: 2023-10-10 | Stop reason: HOSPADM

## 2023-10-10 RX ORDER — HYDROMORPHONE HYDROCHLORIDE 2 MG/ML
0.5 INJECTION, SOLUTION INTRAMUSCULAR; INTRAVENOUS; SUBCUTANEOUS
Status: DISCONTINUED | OUTPATIENT
Start: 2023-10-10 | End: 2023-10-12 | Stop reason: HOSPADM

## 2023-10-10 RX ORDER — SODIUM CHLORIDE 9 MG/ML
120 INJECTION, SOLUTION INTRAVENOUS CONTINUOUS
Status: DISCONTINUED | OUTPATIENT
Start: 2023-10-10 | End: 2023-10-12 | Stop reason: HOSPADM

## 2023-10-10 RX ORDER — PROPOFOL 10 MG/ML
VIAL (ML) INTRAVENOUS AS NEEDED
Status: DISCONTINUED | OUTPATIENT
Start: 2023-10-10 | End: 2023-10-10 | Stop reason: SURG

## 2023-10-10 RX ORDER — NALOXONE HCL 0.4 MG/ML
0.1 VIAL (ML) INJECTION
Status: DISCONTINUED | OUTPATIENT
Start: 2023-10-10 | End: 2023-10-12 | Stop reason: HOSPADM

## 2023-10-10 RX ORDER — CEFAZOLIN SODIUM IN 0.9 % NACL 3 G/100 ML
3 INTRAVENOUS SOLUTION, PIGGYBACK (ML) INTRAVENOUS EVERY 8 HOURS
Qty: 200 ML | Refills: 0 | Status: COMPLETED | OUTPATIENT
Start: 2023-10-10 | End: 2023-10-11

## 2023-10-10 RX ORDER — EPHEDRINE SULFATE 50 MG/ML
INJECTION INTRAVENOUS AS NEEDED
Status: DISCONTINUED | OUTPATIENT
Start: 2023-10-10 | End: 2023-10-10 | Stop reason: SURG

## 2023-10-10 RX ORDER — FENTANYL CITRATE 50 UG/ML
100 INJECTION, SOLUTION INTRAMUSCULAR; INTRAVENOUS ONCE
Status: DISCONTINUED | OUTPATIENT
Start: 2023-10-10 | End: 2023-10-10 | Stop reason: HOSPADM

## 2023-10-10 RX ORDER — ONDANSETRON 2 MG/ML
INJECTION INTRAMUSCULAR; INTRAVENOUS AS NEEDED
Status: DISCONTINUED | OUTPATIENT
Start: 2023-10-10 | End: 2023-10-10 | Stop reason: SURG

## 2023-10-10 RX ORDER — PANTOPRAZOLE SODIUM 40 MG/1
40 TABLET, DELAYED RELEASE ORAL
Status: DISCONTINUED | OUTPATIENT
Start: 2023-10-11 | End: 2023-10-12 | Stop reason: HOSPADM

## 2023-10-10 RX ORDER — GABAPENTIN 300 MG/1
300 CAPSULE ORAL 2 TIMES DAILY
Status: DISCONTINUED | OUTPATIENT
Start: 2023-10-10 | End: 2023-10-12 | Stop reason: HOSPADM

## 2023-10-10 RX ORDER — NALOXONE HCL 0.4 MG/ML
0.4 VIAL (ML) INJECTION
Status: DISCONTINUED | OUTPATIENT
Start: 2023-10-10 | End: 2023-10-12 | Stop reason: HOSPADM

## 2023-10-10 RX ORDER — FENTANYL CITRATE 50 UG/ML
INJECTION, SOLUTION INTRAMUSCULAR; INTRAVENOUS
Status: COMPLETED
Start: 2023-10-10 | End: 2023-10-10

## 2023-10-10 RX ORDER — ONDANSETRON 2 MG/ML
4 INJECTION INTRAMUSCULAR; INTRAVENOUS EVERY 6 HOURS PRN
Status: DISCONTINUED | OUTPATIENT
Start: 2023-10-10 | End: 2023-10-12 | Stop reason: HOSPADM

## 2023-10-10 RX ORDER — MELOXICAM 15 MG/1
15 TABLET ORAL DAILY
Status: DISCONTINUED | OUTPATIENT
Start: 2023-10-11 | End: 2023-10-12 | Stop reason: HOSPADM

## 2023-10-10 RX ORDER — HYDROMORPHONE HYDROCHLORIDE 1 MG/ML
0.5 INJECTION, SOLUTION INTRAMUSCULAR; INTRAVENOUS; SUBCUTANEOUS
Status: DISCONTINUED | OUTPATIENT
Start: 2023-10-10 | End: 2023-10-10 | Stop reason: HOSPADM

## 2023-10-10 RX ORDER — DIPHENOXYLATE HYDROCHLORIDE AND ATROPINE SULFATE 2.5; .025 MG/1; MG/1
1 TABLET ORAL DAILY
Status: DISCONTINUED | OUTPATIENT
Start: 2023-10-10 | End: 2023-10-12 | Stop reason: HOSPADM

## 2023-10-10 RX ORDER — ONDANSETRON 4 MG/1
4 TABLET, FILM COATED ORAL EVERY 6 HOURS PRN
Status: DISCONTINUED | OUTPATIENT
Start: 2023-10-10 | End: 2023-10-12 | Stop reason: HOSPADM

## 2023-10-10 RX ORDER — SODIUM CHLORIDE 0.9 % (FLUSH) 0.9 %
10 SYRINGE (ML) INJECTION EVERY 12 HOURS SCHEDULED
Status: DISCONTINUED | OUTPATIENT
Start: 2023-10-10 | End: 2023-10-10 | Stop reason: HOSPADM

## 2023-10-10 RX ORDER — FENTANYL CITRATE 50 UG/ML
50 INJECTION, SOLUTION INTRAMUSCULAR; INTRAVENOUS
Status: DISCONTINUED | OUTPATIENT
Start: 2023-10-10 | End: 2023-10-10 | Stop reason: HOSPADM

## 2023-10-10 RX ORDER — MELOXICAM 15 MG/1
15 TABLET ORAL ONCE
Status: COMPLETED | OUTPATIENT
Start: 2023-10-10 | End: 2023-10-10

## 2023-10-10 RX ORDER — ROPIVACAINE HYDROCHLORIDE 2 MG/ML
INJECTION, SOLUTION EPIDURAL; INFILTRATION; PERINEURAL CONTINUOUS
Status: DISCONTINUED | OUTPATIENT
Start: 2023-10-10 | End: 2023-10-12 | Stop reason: HOSPADM

## 2023-10-10 RX ORDER — FENTANYL CITRATE 50 UG/ML
INJECTION, SOLUTION INTRAMUSCULAR; INTRAVENOUS
Status: DISPENSED
Start: 2023-10-10 | End: 2023-10-11

## 2023-10-10 RX ORDER — MECLIZINE HYDROCHLORIDE 25 MG/1
25 TABLET ORAL 3 TIMES DAILY PRN
Status: DISCONTINUED | OUTPATIENT
Start: 2023-10-10 | End: 2023-10-12 | Stop reason: HOSPADM

## 2023-10-10 RX ORDER — MIDAZOLAM HYDROCHLORIDE 1 MG/ML
0.5 INJECTION INTRAMUSCULAR; INTRAVENOUS
Status: DISCONTINUED | OUTPATIENT
Start: 2023-10-10 | End: 2023-10-10 | Stop reason: HOSPADM

## 2023-10-10 RX ORDER — BUPIVACAINE HYDROCHLORIDE 2.5 MG/ML
INJECTION, SOLUTION EPIDURAL; INFILTRATION; INTRACAUDAL
Status: COMPLETED | OUTPATIENT
Start: 2023-10-10 | End: 2023-10-10

## 2023-10-10 RX ORDER — ASPIRIN 325 MG
325 TABLET, DELAYED RELEASE (ENTERIC COATED) ORAL DAILY
Status: DISCONTINUED | OUTPATIENT
Start: 2023-10-11 | End: 2023-10-11

## 2023-10-10 RX ORDER — SODIUM CHLORIDE 0.9 % (FLUSH) 0.9 %
10 SYRINGE (ML) INJECTION EVERY 12 HOURS SCHEDULED
Status: DISCONTINUED | OUTPATIENT
Start: 2023-10-10 | End: 2023-10-12 | Stop reason: HOSPADM

## 2023-10-10 RX ORDER — FAMOTIDINE 20 MG/1
20 TABLET, FILM COATED ORAL ONCE
Status: COMPLETED | OUTPATIENT
Start: 2023-10-10 | End: 2023-10-10

## 2023-10-10 RX ORDER — AZELASTINE 1 MG/ML
1 SPRAY, METERED NASAL DAILY PRN
Status: DISCONTINUED | OUTPATIENT
Start: 2023-10-10 | End: 2023-10-12 | Stop reason: HOSPADM

## 2023-10-10 RX ORDER — ROSUVASTATIN CALCIUM 20 MG/1
40 TABLET, COATED ORAL NIGHTLY
Status: DISCONTINUED | OUTPATIENT
Start: 2023-10-10 | End: 2023-10-12 | Stop reason: HOSPADM

## 2023-10-10 RX ORDER — DEXAMETHASONE SODIUM PHOSPHATE 4 MG/ML
INJECTION, SOLUTION INTRA-ARTICULAR; INTRALESIONAL; INTRAMUSCULAR; INTRAVENOUS; SOFT TISSUE AS NEEDED
Status: DISCONTINUED | OUTPATIENT
Start: 2023-10-10 | End: 2023-10-10 | Stop reason: SURG

## 2023-10-10 RX ORDER — FLUOXETINE HYDROCHLORIDE 20 MG/1
20 CAPSULE ORAL DAILY
Status: DISCONTINUED | OUTPATIENT
Start: 2023-10-11 | End: 2023-10-12 | Stop reason: HOSPADM

## 2023-10-10 RX ORDER — SODIUM CHLORIDE 0.9 % (FLUSH) 0.9 %
10 SYRINGE (ML) INJECTION AS NEEDED
Status: DISCONTINUED | OUTPATIENT
Start: 2023-10-10 | End: 2023-10-10 | Stop reason: HOSPADM

## 2023-10-10 RX ORDER — CEFAZOLIN SODIUM IN 0.9 % NACL 3 G/100 ML
3 INTRAVENOUS SOLUTION, PIGGYBACK (ML) INTRAVENOUS ONCE
Status: DISCONTINUED | OUTPATIENT
Start: 2023-10-10 | End: 2023-10-10

## 2023-10-10 RX ORDER — SODIUM CHLORIDE, SODIUM LACTATE, POTASSIUM CHLORIDE, CALCIUM CHLORIDE 600; 310; 30; 20 MG/100ML; MG/100ML; MG/100ML; MG/100ML
9 INJECTION, SOLUTION INTRAVENOUS CONTINUOUS
Status: DISCONTINUED | OUTPATIENT
Start: 2023-10-10 | End: 2023-10-10

## 2023-10-10 RX ORDER — SODIUM CHLORIDE 9 MG/ML
40 INJECTION, SOLUTION INTRAVENOUS AS NEEDED
Status: DISCONTINUED | OUTPATIENT
Start: 2023-10-10 | End: 2023-10-10 | Stop reason: HOSPADM

## 2023-10-10 RX ORDER — MORPHINE SULFATE 4 MG/ML
4 INJECTION, SOLUTION INTRAMUSCULAR; INTRAVENOUS
Status: DISCONTINUED | OUTPATIENT
Start: 2023-10-10 | End: 2023-10-12 | Stop reason: HOSPADM

## 2023-10-10 RX ORDER — OXYCODONE HYDROCHLORIDE 5 MG/1
5 TABLET ORAL EVERY 4 HOURS PRN
Status: DISCONTINUED | OUTPATIENT
Start: 2023-10-10 | End: 2023-10-11

## 2023-10-10 RX ORDER — LIDOCAINE HYDROCHLORIDE 10 MG/ML
INJECTION, SOLUTION EPIDURAL; INFILTRATION; INTRACAUDAL; PERINEURAL AS NEEDED
Status: DISCONTINUED | OUTPATIENT
Start: 2023-10-10 | End: 2023-10-10 | Stop reason: SURG

## 2023-10-10 RX ORDER — PREGABALIN 150 MG/1
150 CAPSULE ORAL ONCE
Status: COMPLETED | OUTPATIENT
Start: 2023-10-10 | End: 2023-10-10

## 2023-10-10 RX ORDER — ROPIVACAINE HYDROCHLORIDE 5 MG/ML
INJECTION, SOLUTION EPIDURAL; INFILTRATION; PERINEURAL AS NEEDED
Status: DISCONTINUED | OUTPATIENT
Start: 2023-10-10 | End: 2023-10-10 | Stop reason: HOSPADM

## 2023-10-10 RX ORDER — ACETAMINOPHEN 500 MG
1000 TABLET ORAL ONCE
Status: COMPLETED | OUTPATIENT
Start: 2023-10-10 | End: 2023-10-10

## 2023-10-10 RX ORDER — DALFAMPRIDINE 10 MG/1
10 TABLET, FILM COATED, EXTENDED RELEASE ORAL EVERY 12 HOURS SCHEDULED
Status: DISCONTINUED | OUTPATIENT
Start: 2023-10-10 | End: 2023-10-12 | Stop reason: HOSPADM

## 2023-10-10 RX ORDER — OXYBUTYNIN CHLORIDE 10 MG/1
10 TABLET, EXTENDED RELEASE ORAL DAILY
Status: DISCONTINUED | OUTPATIENT
Start: 2023-10-10 | End: 2023-10-12 | Stop reason: HOSPADM

## 2023-10-10 RX ORDER — ALBUTEROL SULFATE 90 UG/1
2 AEROSOL, METERED RESPIRATORY (INHALATION) EVERY 6 HOURS PRN
Status: DISCONTINUED | OUTPATIENT
Start: 2023-10-10 | End: 2023-10-12 | Stop reason: HOSPADM

## 2023-10-10 RX ORDER — SODIUM CHLORIDE 0.9 % (FLUSH) 0.9 %
1-10 SYRINGE (ML) INJECTION AS NEEDED
Status: DISCONTINUED | OUTPATIENT
Start: 2023-10-10 | End: 2023-10-12 | Stop reason: HOSPADM

## 2023-10-10 RX ORDER — ROCURONIUM BROMIDE 10 MG/ML
INJECTION, SOLUTION INTRAVENOUS AS NEEDED
Status: DISCONTINUED | OUTPATIENT
Start: 2023-10-10 | End: 2023-10-10 | Stop reason: SURG

## 2023-10-10 RX ORDER — MIDAZOLAM HYDROCHLORIDE 1 MG/ML
1 INJECTION INTRAMUSCULAR; INTRAVENOUS
Status: DISCONTINUED | OUTPATIENT
Start: 2023-10-10 | End: 2023-10-10 | Stop reason: HOSPADM

## 2023-10-10 RX ORDER — SODIUM CHLORIDE 9 MG/ML
40 INJECTION, SOLUTION INTRAVENOUS AS NEEDED
Status: DISCONTINUED | OUTPATIENT
Start: 2023-10-10 | End: 2023-10-12 | Stop reason: HOSPADM

## 2023-10-10 RX ORDER — HYDRALAZINE HYDROCHLORIDE 10 MG/1
10 TABLET, FILM COATED ORAL EVERY 12 HOURS SCHEDULED
Status: DISCONTINUED | OUTPATIENT
Start: 2023-10-10 | End: 2023-10-12 | Stop reason: HOSPADM

## 2023-10-10 RX ORDER — VALACYCLOVIR HYDROCHLORIDE 500 MG/1
500 TABLET, FILM COATED ORAL 2 TIMES DAILY
Status: DISCONTINUED | OUTPATIENT
Start: 2023-10-10 | End: 2023-10-12 | Stop reason: HOSPADM

## 2023-10-10 RX ORDER — LIDOCAINE HYDROCHLORIDE 10 MG/ML
0.5 INJECTION, SOLUTION EPIDURAL; INFILTRATION; INTRACAUDAL; PERINEURAL ONCE AS NEEDED
Status: COMPLETED | OUTPATIENT
Start: 2023-10-10 | End: 2023-10-10

## 2023-10-10 RX ORDER — FENTANYL CITRATE 50 UG/ML
INJECTION, SOLUTION INTRAMUSCULAR; INTRAVENOUS AS NEEDED
Status: DISCONTINUED | OUTPATIENT
Start: 2023-10-10 | End: 2023-10-10 | Stop reason: SURG

## 2023-10-10 RX ADMIN — FENTANYL CITRATE 100 MCG: 50 INJECTION, SOLUTION INTRAMUSCULAR; INTRAVENOUS at 14:42

## 2023-10-10 RX ADMIN — HYDROMORPHONE HYDROCHLORIDE 0.5 MG: 1 INJECTION, SOLUTION INTRAMUSCULAR; INTRAVENOUS; SUBCUTANEOUS at 15:00

## 2023-10-10 RX ADMIN — FENTANYL CITRATE 50 MCG: 50 INJECTION, SOLUTION INTRAMUSCULAR; INTRAVENOUS at 15:42

## 2023-10-10 RX ADMIN — DALFAMPRIDINE 10 MG: 10 TABLET, FILM COATED, EXTENDED RELEASE ORAL at 17:43

## 2023-10-10 RX ADMIN — OXYBUTYNIN CHLORIDE 10 MG: 10 TABLET, EXTENDED RELEASE ORAL at 20:34

## 2023-10-10 RX ADMIN — PROPOFOL 200 MG: 10 INJECTION, EMULSION INTRAVENOUS at 12:54

## 2023-10-10 RX ADMIN — HYDROMORPHONE HYDROCHLORIDE 0.5 MG: 1 INJECTION, SOLUTION INTRAMUSCULAR; INTRAVENOUS; SUBCUTANEOUS at 14:51

## 2023-10-10 RX ADMIN — BUPIVACAINE HYDROCHLORIDE 30 ML: 2.5 INJECTION, SOLUTION EPIDURAL; INFILTRATION; INTRACAUDAL; PERINEURAL at 12:58

## 2023-10-10 RX ADMIN — SODIUM CHLORIDE 120 ML/HR: 9 INJECTION, SOLUTION INTRAVENOUS at 20:27

## 2023-10-10 RX ADMIN — SODIUM CHLORIDE, POTASSIUM CHLORIDE, SODIUM LACTATE AND CALCIUM CHLORIDE 9 ML/HR: 600; 310; 30; 20 INJECTION, SOLUTION INTRAVENOUS at 11:29

## 2023-10-10 RX ADMIN — ACETAMINOPHEN 1000 MG: 500 TABLET ORAL at 11:28

## 2023-10-10 RX ADMIN — PREGABALIN 150 MG: 150 CAPSULE ORAL at 11:28

## 2023-10-10 RX ADMIN — EPHEDRINE SULFATE 5 MG: 50 INJECTION INTRAVENOUS at 14:12

## 2023-10-10 RX ADMIN — CEFAZOLIN 3 G: 10 INJECTION, POWDER, FOR SOLUTION INTRAVENOUS at 22:18

## 2023-10-10 RX ADMIN — LIDOCAINE HYDROCHLORIDE 0.5 ML: 10 INJECTION, SOLUTION EPIDURAL; INFILTRATION; INTRACAUDAL; PERINEURAL at 11:28

## 2023-10-10 RX ADMIN — GABAPENTIN 300 MG: 300 CAPSULE ORAL at 20:33

## 2023-10-10 RX ADMIN — Medication 1000 MG: at 15:40

## 2023-10-10 RX ADMIN — OXYCODONE HYDROCHLORIDE 5 MG: 5 TABLET ORAL at 20:45

## 2023-10-10 RX ADMIN — VALACYCLOVIR HYDROCHLORIDE 500 MG: 500 TABLET, FILM COATED ORAL at 20:33

## 2023-10-10 RX ADMIN — TRANEXAMIC ACID 1000 MG: 10 INJECTION, SOLUTION INTRAVENOUS at 14:07

## 2023-10-10 RX ADMIN — MULTIVITAMIN TABLET 1 TABLET: TABLET at 20:34

## 2023-10-10 RX ADMIN — SUGAMMADEX 200 MG: 100 INJECTION, SOLUTION INTRAVENOUS at 14:28

## 2023-10-10 RX ADMIN — MELOXICAM 15 MG: 15 TABLET ORAL at 11:28

## 2023-10-10 RX ADMIN — TRANEXAMIC ACID 1000 MG: 10 INJECTION, SOLUTION INTRAVENOUS at 12:59

## 2023-10-10 RX ADMIN — FAMOTIDINE 20 MG: 20 TABLET, FILM COATED ORAL at 11:28

## 2023-10-10 RX ADMIN — CEFAZOLIN 3 G: 10 INJECTION, POWDER, FOR SOLUTION INTRAVENOUS at 12:59

## 2023-10-10 RX ADMIN — LIDOCAINE HYDROCHLORIDE 50 MG: 10 INJECTION, SOLUTION EPIDURAL; INFILTRATION; INTRACAUDAL; PERINEURAL at 12:54

## 2023-10-10 RX ADMIN — ROSUVASTATIN 40 MG: 20 TABLET, FILM COATED ORAL at 20:34

## 2023-10-10 RX ADMIN — DEXAMETHASONE SODIUM PHOSPHATE 8 MG: 4 INJECTION, SOLUTION INTRAMUSCULAR; INTRAVENOUS at 12:59

## 2023-10-10 RX ADMIN — ROCURONIUM BROMIDE 50 MG: 10 SOLUTION INTRAVENOUS at 12:54

## 2023-10-10 RX ADMIN — SUGAMMADEX 300 MG: 100 INJECTION, SOLUTION INTRAVENOUS at 14:18

## 2023-10-10 RX ADMIN — HYDRALAZINE HYDROCHLORIDE 10 MG: 10 TABLET, FILM COATED ORAL at 20:33

## 2023-10-10 RX ADMIN — ONDANSETRON 4 MG: 2 INJECTION INTRAMUSCULAR; INTRAVENOUS at 14:18

## 2023-10-10 RX ADMIN — FENTANYL CITRATE 100 MCG: 50 INJECTION, SOLUTION INTRAMUSCULAR; INTRAVENOUS at 12:54

## 2023-10-10 NOTE — ANESTHESIA PROCEDURE NOTES
Ss adductor canal      Patient reassessed immediately prior to procedure    Reason for block: at surgeon's request and post-op pain management  Performed by  CRNA/CAA: Shin Richard CRNA  Preanesthetic Checklist  Completed: patient identified, IV checked, site marked, risks and benefits discussed, surgical consent, monitors and equipment checked, pre-op evaluation and timeout performed  Prep:  Pt Position: supine  Sterile barriers:cap, gloves, mask, sterile barriers and washed/disinfected hands  Prep: ChloraPrep  Patient monitoring: blood pressure monitoring, continuous pulse oximetry and EKG  Procedure  Performed under: spinal  Guidance:ultrasound guided    ULTRASOUND INTERPRETATION.  Using ultrasound guidance a 20 G gauge needle was placed in close proximity to the nerve, at which point, under ultrasound guidance anesthetic was injected in the area of the nerve and spread of the anesthesia was seen on ultrasound in close proximity thereto.  There were no abnormalities seen on ultrasound; a digital image was taken; and the patient tolerated the procedure with no complications. Images:still images obtained, printed/placed on chart    Laterality:right  Block Type:adductor canal block  Injection Technique:single-shot  Needle Type:Tuohy and echogenic  Needle Gauge:18 G  Resistance on Injection: none  Catheter Size:20 G (20g)    Medications Used: bupivacaine PF (MARCAINE) 0.25 % injection - Injection   30 mL - 10/10/2023 12:58:00 PM      Post Assessment  Injection Assessment: negative aspiration for heme, incremental injection and no paresthesia on injection  Patient Tolerance:comfortable throughout block  Complications:no

## 2023-10-10 NOTE — H&P
Patient Name: Rashad Carrillo  MRN: [id number]  : 1955  DOS: 10/10/2023    Attending: Lalo Choe MD    Primary Care Provider: Ye Newman MD      Chief complaint: Right knee Pain.    Subjective   Patient is a 67 y.o.female presents for scheduled surgery by Dr. Choe. . Her knee has been painful for many years. She uses a cane or walker for ambulation. She reports frequent falls.  Of note, she reports she has active MS and history of being in a comatose state 6-8 hours after surgery. She is unable to speak, respond or move and her temperature drops significantly. She states this lasted 3 days after her last surgery.   Patient was seen in the preop area,  She underwent a TOTAL KNEE ARTHROPLASTY WITH CORI ROBOT - RIGHT  today  Currently denies no fever no chills no chest pain or shortness of breath       Allergies:  Allergies   Allergen Reactions    Isoniazid Other (See Comments)     aplastic anemia    Rifampin Anaphylaxis    Sulfa Antibiotics Anaphylaxis    Tetanus Antitoxin Swelling    Tetanus Toxoid Swelling       Meds:  Facility-Administered Medications Prior to Admission   Medication Dose Route Frequency Provider Last Rate Last Admin    OnabotulinumtoxinA 200 Units  200 Units Intramuscular Q3 Months Adonay Gilbert MD   200 Units at 23 0948     Medications Prior to Admission   Medication Sig Dispense Refill Last Dose    acyclovir (ZOVIRAX) 400 MG tablet Take 1 tablet by mouth 2 (Two) Times a Day. (Patient taking differently: Take 1 tablet by mouth Daily As Needed.) 60 tablet 11 Past Month    albuterol sulfate  (90 Base) MCG/ACT inhaler Daily As Needed.   Past Week    azelastine (ASTEPRO) 0.15 % solution nasal spray Daily As Needed.  3 Past Month    B Complex-C-E-Zn (Z-BEC PO) Take 1 tablet by mouth daily.   10/9/2023    Chlorhexidine Gluconate 4 % solution Shower daily with solution as directed for 5 days prior to surgery 237 mL 0 10/9/2023    Dalfampridine ER 10 MG tablet  sustained-release 12 hour Take 10 mg by mouth 2 (Two) Times a Day. 180 tablet 3 10/10/2023 at 0600    FLUoxetine (PROzac) 20 MG capsule Take 1 capsule by mouth Daily.   10/10/2023 at 0600    gabapentin (NEURONTIN) 100 MG capsule Take 3 capsules by mouth 3 (Three) Times a Day. (Patient taking differently: Take 1 capsule by mouth 2 (Two) Times a Day.) 90 capsule 5 10/10/2023 at 0600    gabapentin (NEURONTIN) 300 MG capsule Take 2 capsules by mouth Every Night. (Patient taking differently: Take 1 capsule by mouth 2 (Two) Times a Day.) 60 capsule 5 10/9/2023    hydrALAZINE (APRESOLINE) 10 MG tablet 1 tablet Daily.   10/9/2023    HYDROcodone-acetaminophen (NORCO) 5-325 MG per tablet Daily As Needed.   Past Week    meclizine (ANTIVERT) 25 MG tablet 1 tablet 3 (Three) Times a Day As Needed.   10/9/2023    Multiple Vitamin (MULTI-VITAMIN DAILY) tablet Take 1 tablet by mouth Daily.   10/9/2023    olmesartan (BENICAR) 40 MG tablet 0.5 tablets Daily.   10/9/2023    omeprazole (priLOSEC) 20 MG capsule 1 capsule Daily As Needed.   Past Week    ondansetron ODT (ZOFRAN-ODT) 4 MG disintegrating tablet Place 1 tablet on the tongue Every 8 (Eight) Hours As Needed for Nausea or Vomiting. 20 tablet 0 Past Week    oxybutynin XL (DITROPAN-XL) 10 MG 24 hr tablet Take 1 tablet by mouth Daily.   Past Month    potassium chloride (MICRO-K) 10 MEQ CR capsule Take 1 capsule by mouth Daily.   Past Month    rosuvastatin (CRESTOR) 40 MG tablet 1 tablet Daily.   Past Month    valACYclovir (Valtrex) 500 MG tablet Take 1 tablet by mouth 2 (Two) Times a Day. 60 tablet 11 Past Week    Vibegron (Gemtesa) 75 MG tablet Gemtesa 75 mg tablet   Take 1 tablet every day by oral route.   Past Week    apixaban (ELIQUIS) 2.5 MG tablet tablet Take 1 tablet twice a day by oral route.   10/3/2023    OnabotulinumtoxinA 200 units reconstituted solution FOR . PHYSICIAN TO INJECT 155 UNITS INTRAMUSCULARLY INTO HEAD, NECK AND SHOULDERS EVERY 12 WEEKS  "Per FDA PROTOCOL 1 each 3     vitamin D (ERGOCALCIFEROL) 57815 UNITS capsule capsule Take 1 capsule by mouth Every 7 (Seven) Days.            History:   Past Medical History:   Diagnosis Date    Chronic knee pain     NSAIDS + steroid injections (last 3/2022)    Dyspepsia     Dyspnea on exertion     Fatigue     GERD (gastroesophageal reflux disease)     History of transfusion     PATIENT DENIES REACTION    Hyperlipidemia     Hypertension     Morbid obesity     Multiple sclerosis     follows w/ Dr. Gilbert     Past Surgical History:   Procedure Laterality Date    COLONOSCOPY      GASTRIC SLEEVE LAPAROSCOPIC N/A 2022    Procedure: GASTRIC SLEEVE WITH HIATAL HERNIA LAPAROSCOPIC WITH DAVINCI ROBOT, ESOPHAGOGASTRODUODENOSCOPY;  Surgeon: Maribell Anderson MD;  [facility];  Service: Robotics - DaVinci;  Laterality: N/A;    LAPAROSCOPIC CHOLECYSTECTOMY      gallstone pancreatitis     Family History   Problem Relation Age of Onset    Cancer Mother     Hypertension Mother     Heart disease Mother     Alzheimer's disease Other     Seizures Other         Epilepsy and recurrent seizures    Heart disease Other     Breast cancer Neg Hx     Ovarian cancer Neg Hx      Social History     Tobacco Use    Smoking status: Former     Packs/day: 1.00     Years: 5.00     Additional pack years: 0.00     Total pack years: 5.00     Types: Cigarettes     Quit date:      Years since quittin.7     Passive exposure: Never    Smokeless tobacco: Never    Tobacco comments:     social   Vaping Use    Vaping Use: Never used   Substance Use Topics    Alcohol use: Yes     Comment: 3 drinks/month    Drug use: No       Review of Systems  Pertinent items are noted in HPI, all other systems reviewed and negative    Vital Signs  /100 (BP Location: Right arm, Patient Position: Lying)   Temp 97.7 øF (36.5 øC) (Temporal)   Resp 16   Ht 179.1 cm (70.5\")   Wt 122 kg (268 lb 8.3 oz)   LMP  (LMP Unknown)   SpO2 (!) 63%   " "BMI 37.98 kg/mý     Physical Exam:    General Appearance:    Alert, cooperative, in no acute distress   Head:    Normocephalic, without obvious abnormality, atraumatic   Eyes:            Lids and lashes normal, conjunctivae and sclerae normal, no   icterus, no pallor, corneas clear    Ears:    Ears appear intact with no abnormalities noted   Throat:   No oral lesions, no thrush, oral mucosa moist   Neck:   No adenopathy, supple, trachea midline, no thyromegaly         Lungs:     Clear to auscultation,respirations regular, even and                   unlabored    Heart:    Regular rhythm and normal rate, normal S1 and S2, no       murmur, no gallop   Abdomen:     Normal bowel sounds, no masses, no organomegaly, soft        non-tender, non-distended, no guarding, no rebound                 tenderness   Genitalia:    Deferred   Extremities: Right LE, CDI dressing on knee, PNB cath present.    Pulses:   Pulses palpable and equal bilaterally   Skin:   No bleeding, bruising or rash   Neurologic:   Cranial nerves 2 - 12 grossly intact, normal reflexes      I reviewed the patient's new clinical results.             Invalid input(s): \"NEUTOPHILPCT\"        Invalid input(s): \"LABALBU\", \"PROT\"  Lab Results   Component Value Date    HGBA1C 4.90 09/27/2023           Assessment and Plan:       Degenerative arthritis of right knee    Depression    Multiple sclerosis    Restless legs syndrome    Essential hypertension    Primary osteoarthritis of right knee    S/P total knee arthroplasty, right      Plan  1. PT/OT,  Weight bearing as tolerated right LE  2. Pain control-prns, ACB cath with ropivacaine infusion.  3. IS-encourage  4. DVT proph- Mechanicals and aspirin   5. Bowel regimen  6. Resume home medications as appropriate  7. Monitor post-op labs  8. DC planning for discharge home after PT clearance      History of MS  Monitor mental status closely after surgery  Continue oxygen to keep saturation above " 92%    Hypertension  Continue hydralazine  Hold Benicar  Monitor blood pressure closely    Hypercholesterolemia  Continue statin    Anxiety and depression  Continue Prozac    Unclear reason why patient on Eliquis but we will hold it until it is cleared by orthopedic        Dragon disclaimer:  Part of this encounter note is an electronic transcription/translation of spoken language to printed text. The electronic translation of spoken language may permit erroneous, or at times, nonsensical words or phrases to be inadvertently transcribed; Although I have reviewed the note for such errors, some may still exist.    Shiraz Durham MD  10/10/23  14:25 EDT

## 2023-10-10 NOTE — ANESTHESIA PROCEDURE NOTES
Airway  Urgency: elective    Date/Time: 10/10/2023 12:55 PM  Airway not difficult    General Information and Staff    Patient location during procedure: OR    Indications and Patient Condition  Indications for airway management: airway protection    Preoxygenated: yes  MILS not maintained throughout  Mask difficulty assessment: 1 - vent by mask    Final Airway Details  Final airway type: endotracheal airway      Successful airway: ETT  Cuffed: yes   Successful intubation technique: direct laryngoscopy  Endotracheal tube insertion site: oral  Blade: Jacquelin  Blade size: 3  ETT size (mm): 7.0  Cormack-Lehane Classification: grade I - full view of glottis  Placement verified by: chest auscultation and capnometry   Measured from: lips  ETT/EBT  to lips (cm): 20  Number of attempts at approach: 1  Assessment: lips, teeth, and gum same as pre-op and atraumatic intubation    Additional Comments  Negative epigastric sounds, Breath sound equal bilaterally with symmetric chest rise and fall

## 2023-10-10 NOTE — H&P
Pre-Op H&P  Rashad Carrillo  [id number]  1955      Chief complaint: Right knee pain      Subjective:  Patient is a 67 y.o.female presents for scheduled surgery by Dr. Choe. She anticipates a TOTAL KNEE ARTHROPLASTY WITH CORI ROBOT - RIGHT  today. Her knee has been painful for many years. She uses a cane or walker for ambulation. She reports frequent falls.  Of note, she reports she has active MS and history of being in a comatose state 6-8 hours after surgery. She is unable to speak, respond or move and her temperature drops significantly. She states this lasted 3 days after her last surgery.       Review of Systems:  Constitutional-- No fever, chills or sweats. No fatigue.  CV-- No chest pain, palpitation or syncope. +HTN, HLD  Resp-- No SOB, cough, hemoptysis  Skin--No rashes or lesions      Allergies:   Allergies   Allergen Reactions    Isoniazid Other (See Comments)     aplastic anemia    Rifampin Anaphylaxis    Sulfa Antibiotics Anaphylaxis    Tetanus Antitoxin Swelling    Tetanus Toxoid Swelling         Home Meds:  Facility-Administered Medications Prior to Admission   Medication Dose Route Frequency Provider Last Rate Last Admin    OnabotulinumtoxinA 200 Units  200 Units Intramuscular Q3 Months Adonay Gilbert MD   200 Units at 07/17/23 0948     Medications Prior to Admission   Medication Sig Dispense Refill Last Dose    acyclovir (ZOVIRAX) 400 MG tablet Take 1 tablet by mouth 2 (Two) Times a Day. (Patient taking differently: Take 1 tablet by mouth Daily As Needed.) 60 tablet 11     albuterol sulfate  (90 Base) MCG/ACT inhaler Daily As Needed.       apixaban (ELIQUIS) 2.5 MG tablet tablet Take 1 tablet twice a day by oral route.       azelastine (ASTEPRO) 0.15 % solution nasal spray Daily As Needed.  3     B Complex-C-E-Zn (Z-BEC PO) Take 1 tablet by mouth daily.       Chlorhexidine Gluconate 4 % solution Shower daily with solution as directed for 5 days prior to surgery 237 mL 0      Dalfampridine ER 10 MG tablet sustained-release 12 hour Take 10 mg by mouth 2 (Two) Times a Day. 180 tablet 3     FLUoxetine (PROzac) 20 MG capsule Take 1 capsule by mouth Daily.       gabapentin (NEURONTIN) 100 MG capsule Take 3 capsules by mouth 3 (Three) Times a Day. (Patient taking differently: Take 1 capsule by mouth 2 (Two) Times a Day.) 90 capsule 5     gabapentin (NEURONTIN) 300 MG capsule Take 2 capsules by mouth Every Night. (Patient taking differently: Take 1 capsule by mouth 2 (Two) Times a Day.) 60 capsule 5     hydrALAZINE (APRESOLINE) 10 MG tablet 1 tablet Daily.       HYDROcodone-acetaminophen (NORCO) 5-325 MG per tablet Daily As Needed.       meclizine (ANTIVERT) 25 MG tablet 1 tablet 3 (Three) Times a Day As Needed.       Multiple Vitamin (MULTI-VITAMIN DAILY) tablet Take 1 tablet by mouth Daily.       olmesartan (BENICAR) 40 MG tablet 0.5 tablets Daily.       omeprazole (priLOSEC) 20 MG capsule 1 capsule Daily As Needed.       OnabotulinumtoxinA 200 units reconstituted solution FOR . PHYSICIAN TO INJECT 155 UNITS INTRAMUSCULARLY INTO HEAD, NECK AND SHOULDERS EVERY 12 WEEKS Per FDA PROTOCOL 1 each 3     ondansetron ODT (ZOFRAN-ODT) 4 MG disintegrating tablet Place 1 tablet on the tongue Every 8 (Eight) Hours As Needed for Nausea or Vomiting. 20 tablet 0     oxybutynin XL (DITROPAN-XL) 10 MG 24 hr tablet Take 1 tablet by mouth Daily. PATIENT STATES THAT SHE HAS NOT TAKEN IN A MONTH AS OF 11/07/2022       potassium chloride (MICRO-K) 10 MEQ CR capsule Take 1 capsule by mouth Daily.       rosuvastatin (CRESTOR) 40 MG tablet 1 tablet Daily.       valACYclovir (Valtrex) 500 MG tablet Take 1 tablet by mouth 2 (Two) Times a Day. 60 tablet 11     Vibegron (Gemtesa) 75 MG tablet Gemtesa 75 mg tablet   Take 1 tablet every day by oral route.       vitamin D (ERGOCALCIFEROL) 57244 UNITS capsule capsule Take 1 capsule by mouth Every 30 (Thirty) Days. 15TH            PMH:   Past Medical  History:   Diagnosis Date    Chronic knee pain     NSAIDS + steroid injections (last 3/2022)    Dyspepsia     Dyspnea on exertion     Fatigue     GERD (gastroesophageal reflux disease)     History of transfusion     PATIENT DENIES REACTION    Hyperlipidemia     Hypertension     Morbid obesity     Multiple sclerosis     follows w/ Dr. Gilbert     PSH:    Past Surgical History:   Procedure Laterality Date    COLONOSCOPY      GASTRIC SLEEVE LAPAROSCOPIC N/A 2022    Procedure: GASTRIC SLEEVE WITH HIATAL HERNIA LAPAROSCOPIC WITH DAVINCI ROBOT, ESOPHAGOGASTRODUODENOSCOPY;  Surgeon: Maribell Anderson MD;  Location: [facility];  Service: Robotics - DaVinci;  Laterality: N/A;    LAPAROSCOPIC CHOLECYSTECTOMY      gallstone pancreatitis     Social History:   Tobacco:   Social History     Tobacco Use   Smoking Status Former    Packs/day: 1.00    Years: 5.00    Additional pack years: 0.00    Total pack years: 5.00    Types: Cigarettes    Quit date:     Years since quittin.7    Passive exposure: Never   Smokeless Tobacco Never   Tobacco Comments    social      Alcohol:     Social History     Substance and Sexual Activity   Alcohol Use Yes    Comment: 3 drinks/month         Physical Exam: VS: /100  HR 70  RR 16  T 98.1  Sat 99%RA      General Appearance:    Alert, cooperative, no distress, appears stated age   Head:    Normocephalic, without obvious abnormality, atraumatic   Lungs:     Clear to auscultation bilaterally, respirations unlabored    Heart:   Regular rate and rhythm, S1 and S2 normal    Abdomen:    Soft without tenderness   Extremities:   Extremities normal, atraumatic, no cyanosis or edema   Skin:   Skin color, texture, turgor normal, no rashes or lesions   Neurologic:   Grossly intact     Results Review:     LABS:  Lab Results   Component Value Date    WBC 4.76 2023    HGB 14.2 2023    HCT 44.7 2023    MCV 94.1 2023     2023    NEUTROABS 3.19  09/27/2023    GLUCOSE 91 09/27/2023    BUN 13 09/27/2023    CREATININE 0.66 09/27/2023    EGFRIFNONA 66 02/15/2022    EGFRIFAFRI 92 12/29/2020     09/27/2023    K 4.3 09/27/2023     09/27/2023    CO2 30.0 (H) 09/27/2023    CALCIUM 9.8 09/27/2023    ALBUMIN 4.0 08/11/2023    AST 18 08/11/2023    ALT 11 08/11/2023    BILITOT 0.6 08/11/2023       RADIOLOGY:  Imaging Results (Last 72 Hours)       ** No results found for the last 72 hours. **            I reviewed the patient's new clinical results.    Cancer Staging (if applicable)  Cancer Patient: __ yes __no __unknown; If yes, clinical stage T:__ N:__M:__, stage group or __N/A      Impression: Right knee pain      Plan: TOTAL KNEE ARTHROPLASTY WITH CORI ROBOT - RIGHT       CHACHO Bernal   10/10/2023   10:45 EDT    Plan for right TKA    Lalo Choe MD  10/10/23  12:08 EDT

## 2023-10-10 NOTE — ANESTHESIA PROCEDURE NOTES
Adductor canal cath      Patient reassessed immediately prior to procedure    Reason for block: at surgeon's request and post-op pain management  Performed by  CRNA/CAA: Shin Richard CRNA  Preanesthetic Checklist  Completed: patient identified, IV checked, site marked, risks and benefits discussed, surgical consent, monitors and equipment checked, pre-op evaluation and timeout performed  Prep:  Pt Position: supine  Sterile barriers:cap, gloves, mask, sterile barriers and washed/disinfected hands  Prep: ChloraPrep  Patient monitoring: blood pressure monitoring, continuous pulse oximetry and EKG  Procedure  Performed under: spinal  Guidance:ultrasound guided    ULTRASOUND INTERPRETATION.  Using ultrasound guidance a 20 G gauge needle was placed in close proximity to the nerve, at which point, under ultrasound guidance anesthetic was injected in the area of the nerve and spread of the anesthesia was seen on ultrasound in close proximity thereto.  There were no abnormalities seen on ultrasound; a digital image was taken; and the patient tolerated the procedure with no complications. Images:still images obtained, printed/placed on chart    Laterality:right  Block Type:adductor canal block  Injection Technique:catheter  Needle Type:Tuohy and echogenic  Needle Gauge:18 G  Resistance on Injection: none  Catheter Size:20 G (20g)  Cath Depth at skin: 10 cm          Post Assessment  Injection Assessment: negative aspiration for heme, incremental injection and no paresthesia on injection  Patient Tolerance:comfortable throughout block  Complications:no  Additional Notes

## 2023-10-10 NOTE — ANESTHESIA POSTPROCEDURE EVALUATION
"Patient: Rashad Carrillo    Procedure Summary       Date: 10/10/23 Room / Location:  [facility] /  [facility]    Anesthesia Start: 1247 Anesthesia Stop: 1444    Procedure: TOTAL KNEE ARTHROPLASTY WITH CORI ROBOT - RIGHT (Right: Knee) Diagnosis:       Primary osteoarthritis of right knee      (Primary osteoarthritis of right knee [M17.11])    Surgeons: Lalo Choe MD Provider: Ye Childress MD    Anesthesia Type: general with block ASA Status: 3            Anesthesia Type: general with block    Vitals  No vitals data found for the desired time range.          Post Anesthesia Care and Evaluation    Patient location during evaluation: PACU  Patient participation: complete - patient participated  Level of consciousness: awake and responsive to verbal stimuli  Pain score: 2  Pain management: adequate    Airway patency: patent  Anesthetic complications: No anesthetic complications    Cardiovascular status: acceptable  Respiratory status: acceptable  Hydration status: acceptable    Comments: Pt awake and responsive. SV. VSS. Report to RN. Patient Vitals in the past 24 hrs:  10/10/23 1130, BP:180/100, Temp:97.7 øF (36.5 øC), Temp src:Temporal, Resp:16, SpO2:(!) 63 %, Height:179.1 cm (70.5\"), Weight:122 kg (268 lb 8.3 oz)  133/78. p 72. r 16. t 98.1                "

## 2023-10-10 NOTE — ANESTHESIA PREPROCEDURE EVALUATION
Anesthesia Evaluation     Patient summary reviewed and Nursing notes reviewed   history of anesthetic complications (MS exacerbation following previous anesthetic including immobility, confusion, hypothermia, etc):   NPO Solid Status: > 8 hours  NPO Liquid Status: > 2 hours           Airway   Mallampati: II  TM distance: >3 FB  Neck ROM: full  No difficulty expected  Dental - normal exam     Pulmonary - normal exam    breath sounds clear to auscultation  Cardiovascular - normal exam    ECG reviewed  PT is on anticoagulation therapy  Rhythm: regular  Rate: normal    (+) hypertension    ROS comment: Last dose of eliquis > 1 wk    Neuro/Psych    ROS Comment: MS - last exacerbation ~ 1 yr ago  GI/Hepatic/Renal/Endo    (+) obesity (s/p LSG), GERD well controlled    Musculoskeletal     Abdominal    Substance History      OB/GYN          Other                    Anesthesia Plan    ASA 3     general with block     Reason for not using neuraxial anesthesia or peripheral nerve block: Neurological Conditions  (Plan for GA d/t risk of MS exacerbation with spinal  Right adductor canal block/catheter with Infu-system pump for post-operative analgesia per request of Dr. Choe.  Time out performed to verify patient, surgeon, and surgical site.)  intravenous induction     Anesthetic plan, risks, benefits, and alternatives have been provided, discussed and informed consent has been obtained with: patient.    Plan discussed with CRNA.    CODE STATUS:

## 2023-10-10 NOTE — OP NOTE
DATE OF PROCEDURE: 10/10/23    PREOPERATIVE DIAGNOSIS: right knee arthritis      POSTOPERATIVE DIAGNOSIS:  right knee arthritis    PROCEDURES PERFORMED:   right total knee arthroplasty with [employer] components (#6 narrow cruciate retaining femur, #4 tibia, 12 mm polyethylene, with 32 three peg patella) with CORI robotic assistance    Surgical Approach: Knee Medial Parapatellar     SURGEON: Lalo Choe MD    ASSISTANT: Mimi Shelley PA-C  (Mimi Shelley PA-C was present and necessary for positioning, draping, retraction, instrumentation and closure.)    SPECIMENS: None    IMPLANTS:   Implant Name Type Inv. Item Serial No.  Lot No. LRB No. Used Action   CMT BONE PALACOS R HI/VISC 1X40 - [id number] Implant CMT BONE PALACOS R HI/VISC 1X40  [facility] [id number] Right 1 Implanted   DEV CONTRL TISS STRATAFIX SPIRAL MNCRYL UD 3/0 PLS 60CM - [id number] Implant DEV CONTRL TISS STRATAFIX SPIRAL MNCRYL UD 3/0 PLS 60CM  [employer]  DIV [employer] [id number] Right 1 Implanted   DEV CONTRL TISS STRATAFIX SYMM PDS PLUS FRANKY CT-1 45CM - [id number] Implant DEV CONTRL TISS STRATAFIX SYMM PDS PLUS FRANKY CT-1 45CM  [employer]  DIV [employer] [id number] Right 1 Implanted   BASE TIB/KN GEN2 NONPOR TI SZ4 RT - [id number] Implant BASE TIB/KN GEN2 NONPOR TI SZ4 RT  [employer] [id number] Right 1 Implanted   COMP FEM LEGION OXINIUM CR NRW  SZ6N RT - [id number] Implant COMP FEM LEGION OXINIUM CR NRW  SZ6N RT  [employer] [id number] Right 1 Implanted   PATELLA RESRF GEN2 7.5X32MM - [id number] Implant PATELLA RESRF GEN2 7.5X32MM  [employer] [id number] Right 1 Implanted   INSRT ART/KN LEGION CR HF XLPE SZ3TO4 12MM - [id number] Implant INSRT ART/KN LEGION CR HF XLPE SZ3TO4 12MM  [employer] [id number] Right 1 Implanted         ANESTHESIA:  Spinal    STAFF:  Circulator: Singh Deleon RN  Scrub Person: Carri Escobar  Vendor Representative: Brenden Bee ([employer]); Melba  Yayo  Nursing Assistant: Musa De PCT; Job Phillips PCT  Assistant: Mimi Shelley PA-C    TOURNIQUET TIME: 20 minutes    ESTIMATED BLOOD LOSS: 100ml     COMPLICATIONS: None    PREOPERATIVE ANTIBIOTICS: Ancef 2 g    INDICATIONS: The patient is a 67 y.o. female with debilitating right knee pain secondary to osteoarthritis that failed to improve in spite of conservative treatment.  Options have been discussed at length with the patient and the patient has had an extended course of conservative treatment without long-term benefit. The patient has reached the point where the patient desires total knee arthroplasty surgery and understands the risks, benefits, and alternatives. Consent was obtained. Please see my office notes for details with regard to preoperative counseling and operative rationale.     DESCRIPTION OF PROCEDURE: The patient was positively identified in the preoperative holding area and brought to the operating suite and placed in a supine position. After adequate general with peripheral nerve block anesthetic had been achieved (patient was not a candidate for spinal anesthetic), the right lower extremity was prepped and draped in the usual sterile fashion.  After application of a tourniquet to the right upper thigh, which was used during the procedure for a total 20 minutes during the cementation process only. Landmarks of the knee were identified and timeout procedure was performed to confirm the operative site, as well as other parameters. Following the sterile prep and drape, a skin incision was made just off the medial aspect of midline for a medial parapatellar approach. Following a sharp skin incision, dissection was carried down to the level of the extensor mechanism and a medial parapatellar arthrotomy was made and the patella was tucked into the lateral gutter.  Anterior horns of the medial and lateral menisci were removed, and ACL transected.  Osteophytes were also removed.   Description of arthritis: Bone-on-bone contact medial and lateral compartments, as well as patellofemoral joint, with relatively neutral alignment.      [employer] robotic system was then employed to assist with preparation of the femoral and tibial bone surfaces.  Femoral and tibial arrays were placed, as well as markers in both the femur and tibia.  System was registered in a systematic fashion, and 3D models created of both the femoral and tibial aspects.  Range of motion and gap assessments were also performed, and implants were selected and appropriately sized and oriented both the femur and tibial aspects starting with the flexion gap, and then progressing to the extension gap.  Planning was made for a 6 cruciate retaining femoral component and a 4 tibial component with a 9 mm insert.  Once balancing had been achieved, distal femoral surface was then prepared with the CORI semaj, followed by preparation for the 4-in-1 cutting block.  Proximal tibial cut was then performed in a guided fashion, posterior condyles were freed of osteophytes, and trial implants placed, namely a 6 cruciate retaining femur with a 4 tibia and a 12 mm trial insert.  Range of motion and gap assessments were again performed, and excellent balancing was noted.  The patella was then prepared for a 32 three peg patella which had excellent tracking.      Therefore the trial components were removed, and preparations made for final placement, and final components were cemented in place with namely a #4 tibia, #6 narrow cruciate retaining femur, and a 32 three peg patella with a trial 12 mm insert for cement compression. All the excess cement was removed from the bone implant interface and allowed to harden. Tourniquet was deflated. Hemostasis was obtained with electrocautery. There was no brisk bleeding noted in the popliteal fossa in particular. Therefore, the knee was copiously irrigated as it was between major steps, and the final 12 mm insert was  placed as this was deemed appropriate for the patient's anatomy with full flexion and extension and no instability and attention was then directed towards closure. The medial parapatellar arthrotomy was closed with #1 Vicryl in an interrupted figure-of-eight fashion in 4 strategic locations followed by oversewing this from proximal to distal with a #1 StrataFix symmetric, which nicely sealed the joint, followed by closure of the deep fascial layer with #1 Vicryl in a buried interrupted fashion, followed by closure of the subcutaneous layer with 2-0 Vicryl and the skin with 3-0 Stratafix in a running subcuticular fashion.  Adhesive wound closure dressing was applied followed by a sterile dressing with 4 x 4's, abdominal pad, soft roll and Ace wrap. The patient tolerated the procedure well and was brought to the recovery room in good condition.     PLAN:  1.  The patient will begin early range of motion and weight-bearing per the post total knee arthroplasty protocol.   2.  I anticipate brief hospitalization for initial rehabilitation and pain control followed by continued rehabilitation most likely in a rehab facility due to her underlying medical conditions.  Follow-up in 3 weeks as planned  3.  Postoperative medical management with Dr. Blake.  4.  Aspirin will be utilized for DVT prophylaxis.       Lalo Choe MD  10/10/23  14:34 EDT

## 2023-10-11 PROBLEM — G89.18 ACUTE POSTOPERATIVE PAIN: Status: ACTIVE | Noted: 2023-10-11

## 2023-10-11 LAB
DEPRECATED RDW RBC AUTO: 45.2 FL (ref 37–54)
ERYTHROCYTE [DISTWIDTH] IN BLOOD BY AUTOMATED COUNT: 13.2 % (ref 12.3–15.4)
HCT VFR BLD AUTO: 33.3 % (ref 34–46.6)
HGB BLD-MCNC: 10.7 G/DL (ref 12–15.9)
MCH RBC QN AUTO: 30 PG (ref 26.6–33)
MCHC RBC AUTO-ENTMCNC: 32.1 G/DL (ref 31.5–35.7)
MCV RBC AUTO: 93.3 FL (ref 79–97)
PLATELET # BLD AUTO: 167 10*3/MM3 (ref 140–450)
PMV BLD AUTO: 11.5 FL (ref 6–12)
RBC # BLD AUTO: 3.57 10*6/MM3 (ref 3.77–5.28)
WBC NRBC COR # BLD: 8.14 10*3/MM3 (ref 3.4–10.8)

## 2023-10-11 PROCEDURE — 97535 SELF CARE MNGMENT TRAINING: CPT

## 2023-10-11 PROCEDURE — 97166 OT EVAL MOD COMPLEX 45 MIN: CPT

## 2023-10-11 PROCEDURE — 97162 PT EVAL MOD COMPLEX 30 MIN: CPT

## 2023-10-11 PROCEDURE — 97110 THERAPEUTIC EXERCISES: CPT

## 2023-10-11 PROCEDURE — 85027 COMPLETE CBC AUTOMATED: CPT | Performed by: ORTHOPAEDIC SURGERY

## 2023-10-11 PROCEDURE — 25010000002 CEFAZOLIN PER 500 MG: Performed by: ORTHOPAEDIC SURGERY

## 2023-10-11 PROCEDURE — 25010000002 MORPHINE PER 10 MG: Performed by: ORTHOPAEDIC SURGERY

## 2023-10-11 PROCEDURE — 97116 GAIT TRAINING THERAPY: CPT

## 2023-10-11 RX ORDER — CYCLOBENZAPRINE HCL 10 MG
5 TABLET ORAL 3 TIMES DAILY PRN
Status: DISCONTINUED | OUTPATIENT
Start: 2023-10-11 | End: 2023-10-12 | Stop reason: HOSPADM

## 2023-10-11 RX ORDER — OXYCODONE HYDROCHLORIDE 5 MG/1
5 TABLET ORAL EVERY 4 HOURS PRN
Status: DISCONTINUED | OUTPATIENT
Start: 2023-10-11 | End: 2023-10-12 | Stop reason: HOSPADM

## 2023-10-11 RX ORDER — OXYCODONE HYDROCHLORIDE 10 MG/1
10 TABLET ORAL EVERY 4 HOURS PRN
Status: DISCONTINUED | OUTPATIENT
Start: 2023-10-11 | End: 2023-10-12 | Stop reason: HOSPADM

## 2023-10-11 RX ADMIN — VALACYCLOVIR HYDROCHLORIDE 500 MG: 500 TABLET, FILM COATED ORAL at 21:30

## 2023-10-11 RX ADMIN — MORPHINE SULFATE 4 MG: 4 INJECTION, SOLUTION INTRAMUSCULAR; INTRAVENOUS at 05:17

## 2023-10-11 RX ADMIN — PANTOPRAZOLE SODIUM 40 MG: 40 TABLET, DELAYED RELEASE ORAL at 05:59

## 2023-10-11 RX ADMIN — OXYCODONE HYDROCHLORIDE 10 MG: 10 TABLET ORAL at 11:25

## 2023-10-11 RX ADMIN — DALFAMPRIDINE 10 MG: 10 TABLET, FILM COATED, EXTENDED RELEASE ORAL at 05:59

## 2023-10-11 RX ADMIN — OXYCODONE HYDROCHLORIDE 10 MG: 10 TABLET ORAL at 15:19

## 2023-10-11 RX ADMIN — MELOXICAM 15 MG: 15 TABLET ORAL at 07:50

## 2023-10-11 RX ADMIN — OXYCODONE HYDROCHLORIDE 5 MG: 5 TABLET ORAL at 07:50

## 2023-10-11 RX ADMIN — FLUOXETINE 20 MG: 20 CAPSULE ORAL at 10:13

## 2023-10-11 RX ADMIN — MULTIVITAMIN TABLET 1 TABLET: TABLET at 10:13

## 2023-10-11 RX ADMIN — APIXABAN 2.5 MG: 2.5 TABLET, FILM COATED ORAL at 21:29

## 2023-10-11 RX ADMIN — ASPIRIN 325 MG: 325 TABLET, COATED ORAL at 10:13

## 2023-10-11 RX ADMIN — OXYCODONE HYDROCHLORIDE 5 MG: 5 TABLET ORAL at 03:55

## 2023-10-11 RX ADMIN — Medication 10 ML: at 21:35

## 2023-10-11 RX ADMIN — CYCLOBENZAPRINE 5 MG: 10 TABLET, FILM COATED ORAL at 18:51

## 2023-10-11 RX ADMIN — VALACYCLOVIR HYDROCHLORIDE 500 MG: 500 TABLET, FILM COATED ORAL at 10:13

## 2023-10-11 RX ADMIN — GABAPENTIN 300 MG: 300 CAPSULE ORAL at 10:13

## 2023-10-11 RX ADMIN — DALFAMPRIDINE 10 MG: 10 TABLET, FILM COATED, EXTENDED RELEASE ORAL at 18:51

## 2023-10-11 RX ADMIN — CEFAZOLIN 3 G: 10 INJECTION, POWDER, FOR SOLUTION INTRAVENOUS at 05:17

## 2023-10-11 RX ADMIN — OXYCODONE HYDROCHLORIDE 10 MG: 10 TABLET ORAL at 21:30

## 2023-10-11 RX ADMIN — GABAPENTIN 300 MG: 300 CAPSULE ORAL at 21:30

## 2023-10-11 RX ADMIN — OXYBUTYNIN CHLORIDE 10 MG: 10 TABLET, EXTENDED RELEASE ORAL at 10:13

## 2023-10-11 RX ADMIN — HYDRALAZINE HYDROCHLORIDE 10 MG: 10 TABLET, FILM COATED ORAL at 21:30

## 2023-10-11 RX ADMIN — ROSUVASTATIN 40 MG: 20 TABLET, FILM COATED ORAL at 21:29

## 2023-10-11 NOTE — PLAN OF CARE
Goal Outcome Evaluation:  Plan of Care Reviewed With: patient        Progress: no change  Outcome Evaluation: OT evaluation completed. Pt presents below her functional baseline due to increased R knee pain with mobility, generalized weakness, balance deficits, and decreased activity tolerance. Pt provided and educated on ADL AE including a long handled shoe horn, long handled sponge, reacher, sock aid, and leg .  Pt performed bed mobility with SBA and use of leg . ADLs completed modA with verbal cues for proper AE use. Pt will benefit from continued OT services to assist in restoring PLOF. Recommend a d/c to IRF for best functional outcome.      Anticipated Discharge Disposition (OT): inpatient rehabilitation facility

## 2023-10-11 NOTE — PROGRESS NOTES
" progress note      Rashad Carrillo  [id number]  1955     LOS: 0 days     Attending: Lalo Choe MD    Primary Care Provider: Ye Newman MD      Chief Complaint/Reason for visit:  Right knee pain    Subjective   Doing ok. Having increased knee pain this morning. Denies f/c/n/v/sob/cp.    Objective     Vital Signs  Visit Vitals  /63 (BP Location: Left arm, Patient Position: Sitting)   Pulse 75   Temp 98.2 øF (36.8 øC) (Oral)   Resp 18   Ht 179.1 cm (70.5\")   Wt 122 kg (268 lb 8.3 oz)   LMP  (LMP Unknown)   SpO2 96%   BMI 37.98 kg/mý     Temp (24hrs), Av.9 øF (36.6 øC), Min:97 øF (36.1 øC), Max:98.7 øF (37.1 øC)      Nutrition: PO    Respiratory: RA    Physical Therapy: pending    Physical Exam:     General Appearance:    Alert, cooperative, in no acute distress   Head:    Normocephalic, without obvious abnormality, atraumatic    Lungs:     Normal effort, symmetric chest rise, no crepitus, clear to      auscultation bilaterally             Heart:    Regular rhythm and normal rate, normal S1 and S2   Abdomen:     Normal bowel sounds, no masses, no organomegaly, soft        non-tender, non-distended, no guarding, no rebound                tenderness   Extremities:   Right knee dressing CDI. Nerve block present    Pulses:   Pulses palpable and equal bilaterally   Skin:   No bleeding, bruising or rash   Neurologic:   Flexion and dorsiflexion intact bilateral feet.       Results Review:     I reviewed the patient's new clinical results.   Results from last 7 days   Lab Units 10/11/23  0601   WBC 10*3/mm3 8.14   HEMOGLOBIN g/dL 10.7*   HEMATOCRIT % 33.3*   PLATELETS 10*3/mm3 167           Invalid input(s): \"LABALBU\", \"PROT\"  I reviewed the patient's new imaging including images and reports.    All medications reviewed.   apixaban, 2.5 mg, Oral, Q12H  Dalfampridine ER, 10 mg, Oral, Q12H  FLUoxetine, 20 mg, Oral, Daily  gabapentin, 300 mg, Oral, BID  hydrALAZINE, 10 mg, Oral, Q12H  meloxicam, " 15 mg, Oral, Daily  multivitamin, 1 tablet, Oral, Daily  oxybutynin XL, 10 mg, Oral, Daily  pantoprazole, 40 mg, Oral, Q AM  rosuvastatin, 40 mg, Oral, Nightly  sodium chloride, 10 mL, Intravenous, Q12H  valACYclovir, 500 mg, Oral, BID        Assessment & Plan     S/P total knee arthroplasty, right    Degenerative arthritis of right knee    Primary osteoarthritis of right knee    Depression    Multiple sclerosis    Restless legs syndrome    Essential hypertension    Acute postoperative pain      Plan  1. PT/OT- WBAT RLE  2. Pain control-prns, AC nerve block. Adjustments to pain medication 10/11/23  3. IS-encouraged  4. DVT proph- [facility]. Switched to Eliquis d/t hx DVT  5. Bowel regimen  6. Monitor post-op labs  7. DC planning for [facility], possibly tomorrow    History of MS  Monitor mental status closely after surgery  Continue oxygen to keep saturation above 92%     Hypertension  Continue hydralazine  Hold Benicar  Monitor blood pressure closely     Hypercholesterolemia  Continue statin     Anxiety and depression  Continue CHACHO Rollins  10/11/23  12:05 EDT

## 2023-10-11 NOTE — PROGRESS NOTES
[facility]    Acute pain service Inpatient Progress Note    Patient Name: Rashad Carrillo  :  1955  MRN:  [id number]        Acute Pain  Service Inpatient Progress Note:    Analgesia:Good  Pain Score:5/10  LOC: alert and awake  Resp Status: room air  Cardiac: VS stable  Side Effects:None  Catheter Site:clean, dressing intact and dry  Cath type: peripheral nerve cath with ON Q  Infusion rate: Fem/ Add: Basal: 1ml/hr, PIB: 8ml q 8h, PCA: 8ml q 30 min (1mL,8ml, 8ml InfuSystem Pump)  Catheter Plan:Catheter to remain Insitu and Continue catheter infusion rate unchanged  Comments:

## 2023-10-11 NOTE — THERAPY TREATMENT NOTE
Patient Name: Rashad Carrillo  : 1955    MRN: [id number]                              Today's Date: 10/11/2023       Admit Date: 10/10/2023    Visit Dx:     ICD-10-CM ICD-9-CM   1. Primary osteoarthritis of right knee  M17.11 715.16     Patient Active Problem List   Diagnosis    Vitamin D deficiency    Depression    Multiple sclerosis    Restless legs syndrome    Muscle spasticity    Chronic migraine without aura without status migrainosus, not intractable    Essential hypertension    Frequent falls    Status post balloon dilatation of esophageal stricture    T2DM (type 2 diabetes mellitus)    TB lung, latent    Aplastic anemia likely due to isoniazide    Chest pain    MCGRAW (dyspnea on exertion)    Hyperlipidemia LDL goal <70    Class 3 severe obesity due to excess calories with serious comorbidity and body mass index (BMI) of 50.0 to 59.9 in adult    Fatigue    Dyspepsia    Morbid obesity    Degenerative arthritis of right knee    Primary osteoarthritis of right knee    S/P total knee arthroplasty, right    Acute postoperative pain     Past Medical History:   Diagnosis Date    Chronic knee pain     NSAIDS + steroid injections (last 3/2022)    Dyspepsia     Dyspnea on exertion     Fatigue     GERD (gastroesophageal reflux disease)     History of transfusion     PATIENT DENIES REACTION    Hyperlipidemia     Hypertension     Morbid obesity     Multiple sclerosis     follows w/ Dr. Gilbert     Past Surgical History:   Procedure Laterality Date    COLONOSCOPY      GASTRIC SLEEVE LAPAROSCOPIC N/A 2022    Procedure: GASTRIC SLEEVE WITH HIATAL HERNIA LAPAROSCOPIC WITH DAVINCI ROBOT, ESOPHAGOGASTRODUODENOSCOPY;  Surgeon: Maribell Anderson MD;  Location: [facility];  Service: Robotics - DaVinci;  Laterality: N/A;    LAPAROSCOPIC CHOLECYSTECTOMY      gallstone pancreatitis    TOTAL KNEE ARTHROPLASTY Right 10/10/2023    Procedure: TOTAL KNEE ARTHROPLASTY WITH CORI ROBOT - RIGHT;  Surgeon: Lalo Choe  MD JARAD;  Location: [facility];  Service: Robotics - Ortho;  Laterality: Right;      General Information       Row Name 10/11/23 1901 10/11/23 1150       Physical Therapy Time and Intention    Document Type therapy note (daily note)  - evaluation  -    Mode of Treatment physical therapy  - physical therapy  -      Row Name 10/11/23 1901 10/11/23 1150       General Information    Patient Profile Reviewed yes  -HP yes  -    Prior Level of Function -- min assist:;all household mobility;community mobility;gait;transfer;bed mobility;ADL's  used walking canes for home amb and unable to tolerate longer community distance.\  -HP    Existing Precautions/Restrictions fall;other (see comments)  adductor canal nerve catheter; MS  -HP fall;other (see comments)  adductor canal nerve cath  -    Barriers to Rehab -- none identified  -      Row Name 10/11/23 1150          Living Environment    People in Home alone  -       Row Name 10/11/23 1150          Home Main Entrance    Number of Stairs, Main Entrance two  -     Stair Railings, Main Entrance railings on both sides of stairs  -       Row Name 10/11/23 1150          Stairs Within Home, Primary    Number of Stairs, Within Home, Primary none  -       Row Name 10/11/23 1901 10/11/23 1150       Cognition    Orientation Status (Cognition) oriented x 4  - oriented x 3  -      Row Name 10/11/23 1901 10/11/23 1150       Safety Issues, Functional Mobility    Safety Issues Affecting Function (Mobility) -- insight into deficits/self-awareness;awareness of need for assistance;safety precaution awareness;judgment;sequencing abilities;ability to follow commands  -    Impairments Affecting Function (Mobility) balance;endurance/activity tolerance;pain;strength  - balance;endurance/activity tolerance;pain;strength;range of motion (ROM)  -              User Key  (r) = Recorded By, (t) = Taken By, (c) = Cosigned By      Initials Name Provider Type     Gloria Nugent  PT Physical Therapist                   Mobility       Row Name 10/11/23 1901 10/11/23 1153       Bed Mobility    Bed Mobility supine-sit  -HP supine-sit  -HP    Supine-Sit [place] (Bed Mobility) standby assist  - standby assist  -    Assistive Device (Bed Mobility) head of bed elevated;bed rails  - --    Comment, (Bed Mobility) increased time and effort  - VC for sequencing and line management  -      Row Name 10/11/23 1901 10/11/23 1153       Transfers    Comment, (Transfers) VC for hand palcement  - VC for hand placement to push from bed and for BLE extension. Decreased weight acceptance onto RLE noted with transition  -      Row Name 10/11/23 1901 10/11/23 1153       Sit-Stand Transfer    Sit-Stand [place] (Transfers) moderate assist (50% patient effort);verbal cues;nonverbal cues (demo/gesture)  - 2 person assist;verbal cues;nonverbal cues (demo/gesture);maximum assist (25% patient effort)  -    Assistive Device (Sit-Stand Transfers) walker, front-wheeled  -HP walker, front-wheeled  -HP      Row Name 10/11/23 1901 10/11/23 1153       Gait/Stairs (Locomotion)    [place] Level (Gait) minimum assist (75% patient effort)  - 2 person assist;verbal cues;nonverbal cues (demo/gesture);moderate assist (50% patient effort)  -    Assistive Device (Gait) walker, front-wheeled  -HP walker, front-wheeled  -HP    Patient was able to Ambulate -- yes  -    Distance in Feet (Gait) 60  -HP 50  -HP    Deviations/Abnormal Patterns (Gait) bilateral deviations;base of support, wide;weight shifting decreased;stride length decreased;gait speed decreased;left sided deviations;festinating/shuffling  - bilateral deviations;base of support, wide;weight shifting decreased;stride length decreased;gait speed decreased;left sided deviations;festinating/shuffling  -    Bilateral Gait Deviations forward flexed posture;heel strike decreased  - forward flexed posture;heel strike decreased  -    Right  Sided Gait Deviations decreased knee extension  - decreased knee extension  -    Comment, (Gait/Stairs) Pt amb in to mccarthy with step-to gait pattern. Max VC and encouragement to progress to minimal L foot forward propulsion. Encouraged increased weight acceptance and weight shifting onto RLE with moderate improvement. Slow gait speed and shuffling steps noted though improved from previous session. Pt continues to be fearful of falling and hesitant to put weight through RLE. Activity limited by fatigue and pain.  - Pt amb into mccarthy with step-to gait pattern. Decreased stride length noted with negative L foot progression and shuffling steps. Decreased weight shifting or weight acceptance onto RLE. VC for increased heel strike, upright posture, decreased BUE reliance, and increased weight acceptance onto RLE with mild improvement despite max VC. Pt with fear of falling. No knee buckling or LOB noted. Activity limited by fatigue.  -      Row Name 10/11/23 1901 10/11/23 1153       Mobility    Extremity Weight-bearing Status right lower extremity  - right lower extremity  -    Right Lower Extremity (Weight-bearing Status) weight-bearing as tolerated (WBAT)  - weight-bearing as tolerated (WBAT)  -              User Key  (r) = Recorded By, (t) = Taken By, (c) = Cosigned By      Initials Name Provider Type     Gloria Nugent PT Physical Therapist                   Obj/Interventions       Row Name 10/11/23 1429          Range of Motion Comprehensive    General Range of Motion lower extremity range of motion deficits identified  -     Comment, General Range of Motion R knee ROM 13-74  -       Row Name 10/11/23 1425          Strength Comprehensive (MMT)    General Manual Muscle Testing (MMT) Assessment lower extremity strength deficits identified  -     Comment, General Manual Muscle Testing (MMT) Assessment Pt able to perform B active ankle DF and SLR  -       Row Name 10/11/23 1426          Motor  Skills    Therapeutic Exercise knee;ankle  -       Row Name 10/11/23 1905 10/11/23 1428       Knee (Therapeutic Exercise)    Knee (Therapeutic Exercise) isometric exercises;strengthening exercise  - isometric exercises;strengthening exercise  -    Knee Isometrics (Therapeutic Exercise) bilateral;quad sets;10 repetitions  - bilateral;quad sets;10 repetitions  -    Knee Strengthening (Therapeutic Exercise) right;SLR (straight leg raise);SAQ (short arc quad);LAQ (long arc quad);heel slides;10 repetitions  - right;SLR (straight leg raise);SAQ (short arc quad);LAQ (long arc quad);heel slides;10 repetitions  -      Row Name 10/11/23 1905 10/11/23 1428       Ankle (Therapeutic Exercise)    Ankle (Therapeutic Exercise) AROM (active range of motion)  - AROM (active range of motion)  -    Ankle AROM (Therapeutic Exercise) bilateral;dorsiflexion;plantarflexion;10 repetitions  - bilateral;dorsiflexion;plantarflexion;10 repetitions  -      Row Name 10/11/23 1905 10/11/23 1428       Balance    Balance Assessment sitting static balance;sitting dynamic balance;sit to stand dynamic balance;standing static balance;standing dynamic balance  - sitting static balance;sitting dynamic balance;sit to stand dynamic balance;standing static balance;standing dynamic balance  -    Static Sitting Balance standby assist  - standby assist  -    Dynamic Sitting Balance standby assist  - standby assist  -    Position, Sitting Balance sitting edge of bed  - sitting edge of bed  -    Static Standing Balance contact guard  - contact guard  -    Dynamic Standing Balance minimal assist  - contact guard  -HP    Position/Device Used, Standing Balance supported;walker, rolling  -HP supported;walker, rolling  -    Balance Interventions sitting;standing;sit to stand;occupation based/functional task  - sitting;standing;sit to stand;occupation based/functional task  -HP              User Key  (r) = Recorded By,  (t) = Taken By, (c) = Cosigned By      Initials Name Provider Type     Gloria Nugent, PT Physical Therapist                   Goals/Plan       Row Name 10/11/23 1431          Bed Mobility Goal 1 (PT)    Activity/Assistive Device (Bed Mobility Goal 1, PT) sit to supine/supine to sit  -HP     [place] Level/Cues Needed (Bed Mobility Goal 1, PT) modified independence  -HP     Time Frame (Bed Mobility Goal 1, PT) long term goal (LTG);3 days  -HP     Progress/Outcomes (Bed Mobility Goal 1, PT) goal ongoing  -       Row Name 10/11/23 1431          Transfer Goal 1 (PT)    Activity/Assistive Device (Transfer Goal 1, PT) sit-to-stand/stand-to-sit  -HP     [place] Level/Cues Needed (Transfer Goal 1, PT) contact guard required  -HP     Time Frame (Transfer Goal 1, PT) long term goal (LTG);3 days  -HP     Progress/Outcome (Transfer Goal 1, PT) goal ongoing  -       Row Name 10/11/23 1431          Gait Training Goal 1 (PT)    Activity/Assistive Device (Gait Training Goal 1, PT) gait (walking locomotion)  -HP     [place] Level (Gait Training Goal 1, PT) contact guard required  -HP     Distance (Gait Training Goal 1, PT) 150  -HP     Time Frame (Gait Training Goal 1, PT) long term goal (LTG);3 days  -HP     Progress/Outcome (Gait Training Goal 1, PT) goal ongoing  -[facility] Name 10/11/23 1431          ROM Goal 1 (PT)    ROM Goal 1 (PT) R knee ROM 0-90  -HP     Time Frame (ROM Goal 1, PT) long-term goal (LTG);3 days  -HP     Progress/Outcome (ROM Goal 1, PT) goal ongoing  -       Row Name 10/11/23 1431          Therapy Assessment/Plan (PT)    Planned Therapy Interventions (PT) balance training;bed mobility training;gait training;home exercise program;patient/family education;transfer training;stretching;strengthening;stair training;ROM (range of motion)  -               User Key  (r) = Recorded By, (t) = Taken By, (c) = Cosigned By      Initials Name Provider Type     Gloria Nugent, PT Physical  Therapist                   Clinical Impression       Row Name 10/11/23 1905 10/11/23 1429       Pain    Pretreatment Pain Rating 9/10  -HP 9/10  -HP    Posttreatment Pain Rating 9/10  - 9/10  -HP    Pain Location - Side/Orientation -- Right  -HP    Pain Location -- generalized  -HP    Pain Location - -- knee  -HP    Pain Intervention(s) -- Repositioned;Cold applied;Elevated;Ambulation/increased activity  -      Row Name 10/11/23 1905 10/11/23 1429       Plan of Care Review    Plan of Care Reviewed With patient;sibling  -HP patient  -HP    Progress improving  -HP no change  -    Outcome Evaluation Pt amb with FWW and Min A. Pt continues to require frequent VC to accept weight onto RLE. Slow gait speed noted. Good effort observed this session. Activity limited by elevated pain. Continue to recommend d/c to IRF to promote increased independence with functional mobility and decrease risk of falls.  -HP Pt amb in room with FWW and Min A x2. Step-to gait pattern noted with decreased weight acceptance onto RLE. No knee buckling or LOB noted. Max VC to encourage weight shifting onto RLE with mild improvement. Activity limited by fatigue. HEP and precautions reviewed with pt. Recommend d/c to IRF to address mobility deficits and decrease risk of falls.  -      Row Name 10/11/23 1429          Therapy Assessment/Plan (PT)    Rehab Potential (PT) good, to achieve stated therapy goals  -     Criteria for Skilled Interventions Met (PT) yes;meets criteria;skilled treatment is necessary  -     Therapy Frequency (PT) 2 times/day  -       Row Name 10/11/23 1905 10/11/23 1429       Vital Signs    Pre Patient Position Supine  -HP Supine  -HP    Intra Patient Position Standing  -HP Standing  -HP    Post Patient Position Sitting  -HP Sitting  -HP      Row Name 10/11/23 1905 10/11/23 1429       Positioning and Restraints    Pre-Treatment Position in bed  -HP in bed  -HP    Post Treatment Position chair  -HP chair  -HP    In  Chair notified nsg;reclined;sitting;call light within reach;exit alarm on;encouraged to call for assist;with family/caregiver;legs elevated;waffle cushion;compression device  -HP notified nsg;reclined;sitting;call light within reach;encouraged to call for assist;exit alarm on;legs elevated;waffle cushion;compression device;on mechanical lift sling  -HP              User Key  (r) = Recorded By, (t) = Taken By, (c) = Cosigned By      Initials Name Provider Type    HP Gloria Nugent, PT Physical Therapist                   Outcome Measures       Row Name 10/11/23 1907 10/11/23 1431       How much help from another person do you currently need...    Turning from your back to your side while in flat bed without using bedrails? 4  -HP 4  -HP    Moving from lying on back to sitting on the side of a flat bed without bedrails? 3  -HP 3  -HP    Moving to and from a bed to a chair (including a wheelchair)? 3  -HP 3  -HP    Standing up from a chair using your arms (e.g., wheelchair, bedside chair)? 3  -HP 3  -HP    Climbing 3-5 steps with a railing? 2  -HP 2  -HP    To walk in hospital room? 3  -HP 3  -HP    AM-PAC 6 Clicks Score (PT) 18  -HP 18  -HP    Highest level of mobility 6 --> Walked 10 steps or more  -HP 6 --> Walked 10 steps or more  -HP      Row Name 10/11/23 0750          How much help from another person do you currently need...    Turning from your back to your side while in flat bed without using bedrails? 3  -MW     Moving from lying on back to sitting on the side of a flat bed without bedrails? 3  -MW     Moving to and from a bed to a chair (including a wheelchair)? 3  -MW     Standing up from a chair using your arms (e.g., wheelchair, bedside chair)? 3  -MW     Climbing 3-5 steps with a railing? 3  -MW     To walk in hospital room? 3  -MW     AM-PAC 6 Clicks Score (PT) 18  -MW     Highest level of mobility 6 --> Walked 10 steps or more  -MW       Row Name 10/11/23 1431          PADD    Diagnosis 1  -HP      Gender 1  -     Age Group 4  -     Gait Distance 0  -HP     Assist Level 0  -     Home Support 0  -HP     PADD Score 4  -HP     Patient Preference extended rehabilitation  -     Prediction by PADD Score extended rehabilitation  -       Row Name 10/11/23 1431 10/11/23 1340       Functional Assessment    Outcome Measure Options AM-PAC 6 Clicks Basic Mobility (PT);PADD  -HP AM-PAC 6 Clicks Daily Activity (OT)  -KF              User Key  (r) = Recorded By, (t) = Taken By, (c) = Cosigned By      Initials Name Provider Type    Sloane Gray, RN Registered Nurse     Gloria Nugent, PT Physical Therapist    KF Bibi Mariscal, OT Occupational Therapist                                 Physical Therapy Education       Title: PT OT SLP Therapies (In Progress)       Topic: Physical Therapy (Done)       Point: Mobility training (Done)       Learning Progress Summary             Patient Acceptance, E,D, VU,DU by  at 10/11/2023 1907    Acceptance, E,D, VU,DU by  at 10/11/2023 1432                         Point: Home exercise program (Done)       Learning Progress Summary             Patient Acceptance, E,D, VU,DU by  at 10/11/2023 1907    Acceptance, E,D, VU,DU by  at 10/11/2023 1432                         Point: Body mechanics (Done)       Learning Progress Summary             Patient Acceptance, E,D, VU,DU by  at 10/11/2023 1907    Acceptance, E,D, VU,DU by  at 10/11/2023 1432                         Point: Precautions (Done)       Learning Progress Summary             Patient Acceptance, E,D, VU,DU by  at 10/11/2023 1907    Acceptance, E,D, VU,DU by  at 10/11/2023 1432                                         User Key       Initials Effective Dates Name Provider Type [facility] 06/01/21 -  Gloria Nugent, PT Physical Therapist PT                  PT Recommendation and Plan  Planned Therapy Interventions (PT): balance training, bed mobility training, gait training, home exercise program,  patient/family education, transfer training, stretching, strengthening, stair training, ROM (range of motion)  Plan of Care Reviewed With: patient, sibling  Progress: improving  Outcome Evaluation: Pt amb with FWW and Min A. Pt continues to require frequent VC to accept weight onto RLE. Slow gait speed noted. Good effort observed this session. Activity limited by elevated pain. Continue to recommend d/c to IRF to promote increased independence with functional mobility and decrease risk of falls.     Time Calculation:   PT Evaluation Complexity  History, PT Evaluation Complexity: 1-2 personal factors and/or comorbidities  Examination of Body Systems (PT Eval Complexity): total of 3 or more elements  Clinical Presentation (PT Evaluation Complexity): evolving  Clinical Decision Making (PT Evaluation Complexity): moderate complexity  Overall Complexity (PT Evaluation Complexity): moderate complexity     PT Charges       Row Name 10/11/23 1440 10/11/23 1110          Time Calculation    Start Time 1440  -HP 1110  -HP     PT Received On 10/11/23  -HP 10/11/23  -HP     PT Goal Re-Cert Due Date -- 10/21/23  -HP        Timed Charges    21706 - PT Therapeutic Exercise Minutes 9  -HP --     12131 - Gait Training Minutes  15  -HP 10  -HP        Untimed Charges    PT Eval/Re-eval Minutes -- 50  -HP        Total Minutes    Timed Charges Total Minutes 24  -HP 10  -HP     Untimed Charges Total Minutes -- 50  -HP      Total Minutes 24  -HP 60  -HP               User Key  (r) = Recorded By, (t) = Taken By, (c) = Cosigned By      Initials Name Provider Type    HP Gloria Nugent PT Physical Therapist                  Therapy Charges for Today       Code Description Service Date Service Provider Modifiers Qty    95161707936 HC GAIT TRAINING EA 15 MIN 10/11/2023 Gloria Nugent, PT GP 1    56197408975 HC PT EVAL MOD COMPLEXITY 4 10/11/2023 Gloria Nugent, PT GP 1    78457626748 HC PT THER SUPP EA 15 MIN 10/11/2023 Gloria Nugent, PT GP 3     [id number]  PT THER PROC EA 15 MIN 10/11/2023 Gloria Nugent, PT GP 1    89216249633 HC GAIT TRAINING EA 15 MIN 10/11/2023 Gloria Nugent, PT GP 1            PT G-Codes  Outcome Measure Options: AM-PAC 6 Clicks Basic Mobility (PT), PADD  AM-PAC 6 Clicks Score (PT): 18  AM-PAC 6 Clicks Score (OT): 20  PT Discharge Summary  Anticipated Discharge Disposition (PT): inpatient rehabilitation facility    Gloria Nugent, ADONIS  10/11/2023

## 2023-10-11 NOTE — PROGRESS NOTES
"      [facility] Orthopaedic Surgery Progress Note    Subjective      LOS: 0 days   Patient Care Team:  Ye Newman MD as PCP - General (Internal Medicine)  Ye Newman MD as Consulting Physician (Internal Medicine)  Adonay Gilbert MD as Consulting Physician (Neurology)    CC: Right knee pain    Interval History:   Pain controlled currently.  No new complaints.  Resting in bed.  Niece at the bedside.    Objective      Vital Signs  Temp (24hrs), Av.8 øF (36.6 øC), Min:97 øF (36.1 øC), Max:98.7 øF (37.1 øC)      BP 98/55 (BP Location: Right arm, Patient Position: Lying)   Pulse 84   Temp 98.3 øF (36.8 øC) (Oral)   Resp 18   Ht 179.1 cm (70.5\")   Wt 122 kg (268 lb 8.3 oz)   LMP  (LMP Unknown)   SpO2 98%   BMI 37.98 kg/mý     Examination:   Examination of the right knee: The wound is clean, dry, and intact.  Ankle dorsiflexion, ankle plantar flexion, and EHL are intact.  Sensation intact in the foot to light touch.  2+ dorsalis pedis pulse.  Straight leg raise is intact.      Labs:  Results from last 7 days   Lab Units 10/11/23  0601   WBC 10*3/mm3 8.14   HEMOGLOBIN g/dL 10.7*   HEMATOCRIT % 33.3*   MCV fL 93.3   PLATELETS 10*3/mm3 167       Radiology:  Imaging Results (Last 24 Hours)       Procedure Component Value Units Date/Time    XR Knee 1 or 2 View Right [[id number]] Collected: 10/10/23 1648     Updated: 10/10/23 1652    Narrative:      XR KNEE 1 OR 2 VW RIGHT    Date of Exam: 10/10/2023 2:55 PM EDT    Indication: post-op  Post-op knee arthroplasty    Comparison: None available.    Findings:  2 views. There is a total knee arthroplasty in normal anatomic alignment. There are no fractures or hardware complications.      Impression:      Impression:  Normal-appearing knee arthroplasty.      Electronically Signed: Lu Dixon MD    10/10/2023 4:49 PM EDT    Workstation ID: [id number]            PT:  Physical Therapy - Plan of Care Review - Outcome Summary:   ]       Results Review:     I " reviewed the patient's new clinical results.    Assessment and Plan     Assessment:   Status post right total knee arthroplasty      Degenerative arthritis of right knee    Depression    Multiple sclerosis    Restless legs syndrome    Essential hypertension    Primary osteoarthritis of right knee    S/P total knee arthroplasty, right      Plan for disposition: Plan for rehab facility most likely at the time of discharge.  Follow-up in 3 weeks as planned.      Future Appointments   Date Time Provider Department Center   10/25/2023  8:45 AM Adonay Gilbert MD [facility]   10/30/2023 10:50 AM Kendal Coker PA-C [facility]   11/13/2023  1:30 PM Maribell Anderson MD [facility] None           Lalo Choe MD  10/11/23  07:53 EDT

## 2023-10-11 NOTE — THERAPY EVALUATION
Patient Name: Rashad Carrillo  : 1955    MRN: [id number]                              Today's Date: 10/11/2023       Admit Date: 10/10/2023    Visit Dx:     ICD-10-CM ICD-9-CM   1. Primary osteoarthritis of right knee  M17.11 715.16     Patient Active Problem List   Diagnosis    Vitamin D deficiency    Depression    Multiple sclerosis    Restless legs syndrome    Muscle spasticity    Chronic migraine without aura without status migrainosus, not intractable    Essential hypertension    Frequent falls    Status post balloon dilatation of esophageal stricture    T2DM (type 2 diabetes mellitus)    TB lung, latent    Aplastic anemia likely due to isoniazide    Chest pain    MCGRAW (dyspnea on exertion)    Hyperlipidemia LDL goal <70    Class 3 severe obesity due to excess calories with serious comorbidity and body mass index (BMI) of 50.0 to 59.9 in adult    Fatigue    Dyspepsia    Morbid obesity    Degenerative arthritis of right knee    Primary osteoarthritis of right knee    S/P total knee arthroplasty, right    Acute postoperative pain     Past Medical History:   Diagnosis Date    Chronic knee pain     NSAIDS + steroid injections (last 3/2022)    Dyspepsia     Dyspnea on exertion     Fatigue     GERD (gastroesophageal reflux disease)     History of transfusion     PATIENT DENIES REACTION    Hyperlipidemia     Hypertension     Morbid obesity     Multiple sclerosis     follows w/ Dr. Gilbert     Past Surgical History:   Procedure Laterality Date    COLONOSCOPY      GASTRIC SLEEVE LAPAROSCOPIC N/A 2022    Procedure: GASTRIC SLEEVE WITH HIATAL HERNIA LAPAROSCOPIC WITH DAVINCI ROBOT, ESOPHAGOGASTRODUODENOSCOPY;  Surgeon: Maribell Anderson MD;  Location: [facility];  Service: Robotics - DaVinci;  Laterality: N/A;    LAPAROSCOPIC CHOLECYSTECTOMY      gallstone pancreatitis    TOTAL KNEE ARTHROPLASTY Right 10/10/2023    Procedure: TOTAL KNEE ARTHROPLASTY WITH CORI ROBOT - RIGHT;  Surgeon: Lalo Choe  MD JARAD;  Location: [facility];  Service: Robotics - Ortho;  Laterality: Right;      General Information       Row Name 10/11/23 1150          Physical Therapy Time and Intention    Document Type evaluation  -     Mode of Treatment physical therapy  -       Row Name 10/11/23 1150          General Information    Patient Profile Reviewed yes  -HP     Prior Level of Function min assist:;all household mobility;community mobility;gait;transfer;bed mobility;ADL's  used walking canes for home amb and unable to tolerate longer community distance.\  -HP     Existing Precautions/Restrictions fall;other (see comments)  adductor canal nerve cath  -     Barriers to Rehab none identified  -       Row Name 10/11/23 1150          Living Environment    People in Home alone  -       Row Name 10/11/23 1150          Home Main Entrance    Number of Stairs, Main Entrance two  -     Stair Railings, Main Entrance railings on both sides of stairs  -       Row Name 10/11/23 1150          Stairs Within Home, Primary    Number of Stairs, Within Home, Primary none  -       Row Name 10/11/23 1150          Cognition    Orientation Status (Cognition) oriented x 3  -       Row Name 10/11/23 1150          Safety Issues, Functional Mobility    Safety Issues Affecting Function (Mobility) insight into deficits/self-awareness;awareness of need for assistance;safety precaution awareness;judgment;sequencing abilities;ability to follow commands  -     Impairments Affecting Function (Mobility) balance;endurance/activity tolerance;pain;strength;range of motion (ROM)  -               User Key  (r) = Recorded By, (t) = Taken By, (c) = Cosigned By      Initials Name Provider Type     Gloria Nugent PT Physical Therapist                   Mobility       Row Name 10/11/23 1153          Bed Mobility    Bed Mobility supine-sit  -     Supine-Sit [place] (Bed Mobility) standby assist  -     Comment, (Bed Mobility) VC for sequencing and  line management  -       Row Name 10/11/23 1153          Transfers    Comment, (Transfers) VC for hand placement to push from bed and for BLE extension. Decreased weight acceptance onto RLE noted with transition  -       Row Name 10/11/23 1153          Sit-Stand Transfer    Sit-Stand [place] (Transfers) 2 person assist;verbal cues;nonverbal cues (demo/gesture);maximum assist (25% patient effort)  -     Assistive Device (Sit-Stand Transfers) walker, front-wheeled  -       Row Name 10/11/23 1153          Gait/Stairs (Locomotion)    [place] Level (Gait) 2 person assist;verbal cues;nonverbal cues (demo/gesture);moderate assist (50% patient effort)  -     Assistive Device (Gait) walker, front-wheeled  -     Patient was able to Ambulate yes  -     Distance in Feet (Gait) 50  -     Deviations/Abnormal Patterns (Gait) bilateral deviations;base of support, wide;weight shifting decreased;stride length decreased;gait speed decreased;left sided deviations;festinating/shuffling  -     Bilateral Gait Deviations forward flexed posture;heel strike decreased  -     Right Sided Gait Deviations decreased knee extension  -     Comment, (Gait/Stairs) Pt amb into mccarthy with step-to gait pattern. Decreased stride length noted with negative L foot progression and shuffling steps. Decreased weight shifting or weight acceptance onto RLE. VC for increased heel strike, upright posture, decreased BUE reliance, and increased weight acceptance onto RLE with mild improvement despite max VC. Pt with fear of falling. No knee buckling or LOB noted. Activity limited by fatigue.  -       Row Name 10/11/23 1153          Mobility    Extremity Weight-bearing Status right lower extremity  -     Right Lower Extremity (Weight-bearing Status) weight-bearing as tolerated (WBAT)  -               User Key  (r) = Recorded By, (t) = Taken By, (c) = Cosigned By      Initials Name Provider Type     Gloria Nugent, PT Physical  Therapist                   Obj/Interventions       [facility] Name 10/11/23 1428          Range of Motion Comprehensive    General Range of Motion lower extremity range of motion deficits identified  -     Comment, General Range of Motion R knee ROM 13-74  -[facility] Name 10/11/23 1428          Strength Comprehensive (MMT)    General Manual Muscle Testing (MMT) Assessment lower extremity strength deficits identified  -     Comment, General Manual Muscle Testing (MMT) Assessment Pt able to perform B active ankle DF and SLR  -[facility] Name 10/11/23 1428          Motor Skills    Therapeutic Exercise knee;ankle  -[facility] Name 10/11/23 1428          Knee (Therapeutic Exercise)    Knee (Therapeutic Exercise) isometric exercises;strengthening exercise  -     Knee Isometrics (Therapeutic Exercise) bilateral;quad sets;10 repetitions  -     Knee Strengthening (Therapeutic Exercise) right;SLR (straight leg raise);SAQ (short arc quad);LAQ (long arc quad);heel slides;10 repetitions  -[facility] Name 10/11/23 1428          Ankle (Therapeutic Exercise)    Ankle (Therapeutic Exercise) AROM (active range of motion)  -     Ankle AROM (Therapeutic Exercise) bilateral;dorsiflexion;plantarflexion;10 repetitions  -[facility] Name 10/11/23 1428          Balance    Balance Assessment sitting static balance;sitting dynamic balance;sit to stand dynamic balance;standing static balance;standing dynamic balance  -     Static Sitting Balance standby assist  -     Dynamic Sitting Balance standby assist  -     Position, Sitting Balance sitting edge of bed  -     Static Standing Balance contact guard  -     Dynamic Standing Balance contact guard  -     Position/Device Used, Standing Balance supported;walker, rolling  -     Balance Interventions sitting;standing;sit to stand;occupation based/functional task  -               User Key  (r) = Recorded By, (t) = Taken By, (c) = Cosigned By      Initials Name Provider  Type    HP Gloria Nugent, PT Physical Therapist                   Goals/Plan       Row Name 10/11/23 1431          Bed Mobility Goal 1 (PT)    Activity/Assistive Device (Bed Mobility Goal 1, PT) sit to supine/supine to sit  -HP     [place] Level/Cues Needed (Bed Mobility Goal 1, PT) modified independence  -HP     Time Frame (Bed Mobility Goal 1, PT) long term goal (LTG);3 days  -HP     Progress/Outcomes (Bed Mobility Goal 1, PT) goal ongoing  -       Row Name 10/11/23 1431          Transfer Goal 1 (PT)    Activity/Assistive Device (Transfer Goal 1, PT) sit-to-stand/stand-to-sit  -HP     [place] Level/Cues Needed (Transfer Goal 1, PT) contact guard required  -HP     Time Frame (Transfer Goal 1, PT) long term goal (LTG);3 days  -HP     Progress/Outcome (Transfer Goal 1, PT) goal ongoing  -       Row Name 10/11/23 1431          Gait Training Goal 1 (PT)    Activity/Assistive Device (Gait Training Goal 1, PT) gait (walking locomotion)  -HP     [place] Level (Gait Training Goal 1, PT) contact guard required  -HP     Distance (Gait Training Goal 1, PT) 150  -HP     Time Frame (Gait Training Goal 1, PT) long term goal (LTG);3 days  -HP     Progress/Outcome (Gait Training Goal 1, PT) goal ongoing  -       Row Name 10/11/23 1431          ROM Goal 1 (PT)    ROM Goal 1 (PT) R knee ROM 0-90  -HP     Time Frame (ROM Goal 1, PT) long-term goal (LTG);3 days  -HP     Progress/Outcome (ROM Goal 1, PT) goal ongoing  -       Row Name 10/11/23 1431          Therapy Assessment/Plan (PT)    Planned Therapy Interventions (PT) balance training;bed mobility training;gait training;home exercise program;patient/family education;transfer training;stretching;strengthening;stair training;ROM (range of motion)  -               User Key  (r) = Recorded By, (t) = Taken By, (c) = Cosigned By      Initials Name Provider Type     Gloria Nugent, PT Physical Therapist                   Clinical Impression       Row Name  10/11/23 1429          Pain    Pretreatment Pain Rating 9/10  -     Posttreatment Pain Rating 9/10  -     Pain Location - Side/Orientation Right  -     Pain Location generalized  -     Pain Location - knee  -     Pain Intervention(s) Repositioned;Cold applied;Elevated;Ambulation/increased activity  -       Row Name 10/11/23 1429          Plan of Care Review    Plan of Care Reviewed With patient  -HP     Progress no change  -     Outcome Evaluation Pt amb in room with FWW and Min A x2. Step-to gait pattern noted with decreased weight acceptance onto RLE. No knee buckling or LOB noted. Max VC to encourage weight shifting onto RLE with mild improvement. Activity limited by fatigue. HEP and precautions reviewed with pt. Recommend d/c to IRF to address mobility deficits and decrease risk of falls.  -       Row Name 10/11/23 1429          Therapy Assessment/Plan (PT)    Rehab Potential (PT) good, to achieve stated therapy goals  -     Criteria for Skilled Interventions Met (PT) yes;meets criteria;skilled treatment is necessary  -     Therapy Frequency (PT) 2 times/day  -       Row Name 10/11/23 1429          Vital Signs    Pre Patient Position Supine  -     Intra Patient Position Standing  -     Post Patient Position Sitting  -       Row Name 10/11/23 1429          Positioning and Restraints    Pre-Treatment Position in bed  -     Post Treatment Position chair  -HP     In Chair notified nsg;reclined;sitting;call light within reach;encouraged to call for assist;exit alarm on;legs elevated;waffle cushion;compression device;on mechanical lift sling  -               User Key  (r) = Recorded By, (t) = Taken By, (c) = Cosigned By      Initials Name Provider Type     Gloria Nugent, PT Physical Therapist                   Outcome Measures       Row Name 10/11/23 1431 10/11/23 0750       How much help from another person do you currently need...    Turning from your back to your side while in  flat bed without using bedrails? 4  -HP 3  -MW    Moving from lying on back to sitting on the side of a flat bed without bedrails? 3  -HP 3  -MW    Moving to and from a bed to a chair (including a wheelchair)? 3  -HP 3  -MW    Standing up from a chair using your arms (e.g., wheelchair, bedside chair)? 3  -HP 3  -MW    Climbing 3-5 steps with a railing? 2  -HP 3  -MW    To walk in hospital room? 3  -HP 3  -MW    AM-PAC 6 Clicks Score (PT) 18  -HP 18  -MW    Highest level of mobility 6 --> Walked 10 steps or more  -HP 6 --> Walked 10 steps or more  -MW      Row Name 10/11/23 1431          PADD    Diagnosis 1  -HP     Gender 1  -     Age Group 4  -HP     Gait Distance 0  -HP     Assist Level 0  -HP     Home Support 0  -HP     PADD Score 4  -HP     Patient Preference extended rehabilitation  -HP     Prediction by PADD Score extended rehabilitation  -       Row Name 10/11/23 1431 10/11/23 1340       Functional Assessment    Outcome Measure Options AM-PAC 6 Clicks Basic Mobility (PT);PADD  -HP AM-PAC 6 Clicks Daily Activity (OT)  -KF              User Key  (r) = Recorded By, (t) = Taken By, (c) = Cosigned By      Initials Name Provider Type    Sloane Gray, RN Registered Nurse    Gloria Hernandez, PT Physical Therapist    KF Bibi Mariscal, OT Occupational Therapist                                 Physical Therapy Education       Title: PT OT SLP Therapies (In Progress)       Topic: Physical Therapy (Done)       Point: Mobility training (Done)       Learning Progress Summary             Patient Acceptance, E,D, VU,DU by  at 10/11/2023 1432                         Point: Home exercise program (Done)       Learning Progress Summary             Patient Acceptance, E,D, VU,DU by  at 10/11/2023 1432                         Point: Body mechanics (Done)       Learning Progress Summary             Patient Acceptance, E,D, VU,DU by  at 10/11/2023 1432                         Point: Precautions (Done)       Learning  Progress Summary             Patient Acceptance, E,D, VU,DU by  at 10/11/2023 1432                                         User Key       Initials Effective Dates Name Provider Type Discipline     06/01/21 -  Gloria Nugent, ADONIS Physical Therapist PT                  PT Recommendation and Plan  Planned Therapy Interventions (PT): balance training, bed mobility training, gait training, home exercise program, patient/family education, transfer training, stretching, strengthening, stair training, ROM (range of motion)  Plan of Care Reviewed With: patient  Progress: no change  Outcome Evaluation: Pt amb in room with FWW and Min A x2. Step-to gait pattern noted with decreased weight acceptance onto RLE. No knee buckling or LOB noted. Max VC to encourage weight shifting onto RLE with mild improvement. Activity limited by fatigue. HEP and precautions reviewed with pt. Recommend d/c to IRF to address mobility deficits and decrease risk of falls.     Time Calculation:   PT Evaluation Complexity  History, PT Evaluation Complexity: 1-2 personal factors and/or comorbidities  Examination of Body Systems (PT Eval Complexity): total of 3 or more elements  Clinical Presentation (PT Evaluation Complexity): evolving  Clinical Decision Making (PT Evaluation Complexity): moderate complexity  Overall Complexity (PT Evaluation Complexity): moderate complexity     PT Charges       Row Name 10/11/23 1110             Time Calculation    Start Time 1110  -HP      PT Received On 10/11/23  -      PT Goal Re-Cert Due Date 10/21/23  -         Timed Charges    85052 - Gait Training Minutes  10  -HP         Untimed Charges    PT Eval/Re-eval Minutes 50  -HP         Total Minutes    Timed Charges Total Minutes 10  -HP      Untimed Charges Total Minutes 50  -HP       Total Minutes 60  -HP                User Key  (r) = Recorded By, (t) = Taken By, (c) = Cosigned By      Initials Name Provider Type     Gloria Nugent, PT Physical Therapist                   Therapy Charges for Today       Code Description Service Date Service Provider Modifiers Qty    15270413059 HC GAIT TRAINING EA 15 MIN 10/11/2023 Gloria Nugent, PT GP 1    64978670958 HC PT EVAL MOD COMPLEXITY 4 10/11/2023 Gloria Nugent, PT GP 1    [id number] HC PT THER SUPP EA 15 MIN 10/11/2023 Gloria Nugent, PT GP 3            PT G-Codes  Outcome Measure Options: AM-PAC 6 Clicks Basic Mobility (PT), PADD  AM-PAC 6 Clicks Score (PT): 18  AM-PAC 6 Clicks Score (OT): 20  PT Discharge Summary  Anticipated Discharge Disposition (PT): inpatient rehabilitation facility    Gloria Nugent, ADONIS  10/11/2023

## 2023-10-11 NOTE — THERAPY EVALUATION
Patient Name: Rashad Carrillo  : 1955    MRN: [id number]                              Today's Date: 10/11/2023       Admit Date: 10/10/2023    Visit Dx:     ICD-10-CM ICD-9-CM   1. Primary osteoarthritis of right knee  M17.11 715.16     Patient Active Problem List   Diagnosis    Vitamin D deficiency    Depression    Multiple sclerosis    Restless legs syndrome    Muscle spasticity    Chronic migraine without aura without status migrainosus, not intractable    Essential hypertension    Frequent falls    Status post balloon dilatation of esophageal stricture    T2DM (type 2 diabetes mellitus)    TB lung, latent    Aplastic anemia likely due to isoniazide    Chest pain    MCGRAW (dyspnea on exertion)    Hyperlipidemia LDL goal <70    Class 3 severe obesity due to excess calories with serious comorbidity and body mass index (BMI) of 50.0 to 59.9 in adult    Fatigue    Dyspepsia    Morbid obesity    Degenerative arthritis of right knee    Primary osteoarthritis of right knee    S/P total knee arthroplasty, right    Acute postoperative pain     Past Medical History:   Diagnosis Date    Chronic knee pain     NSAIDS + steroid injections (last 3/2022)    Dyspepsia     Dyspnea on exertion     Fatigue     GERD (gastroesophageal reflux disease)     History of transfusion     PATIENT DENIES REACTION    Hyperlipidemia     Hypertension     Morbid obesity     Multiple sclerosis     follows w/ Dr. Gilbert     Past Surgical History:   Procedure Laterality Date    COLONOSCOPY      GASTRIC SLEEVE LAPAROSCOPIC N/A 2022    Procedure: GASTRIC SLEEVE WITH HIATAL HERNIA LAPAROSCOPIC WITH DAVINCI ROBOT, ESOPHAGOGASTRODUODENOSCOPY;  Surgeon: Maribell Anderson MD;  Location: [facility];  Service: Robotics - DaVinci;  Laterality: N/A;    LAPAROSCOPIC CHOLECYSTECTOMY      gallstone pancreatitis      General Information       Row Name 10/11/23 1327          OT Time and Intention    Document Type evaluation  -KF     Mode of  Treatment occupational therapy;individual therapy  -KF       Row Name 10/11/23 1327          General Information    Patient Profile Reviewed yes  -KF     Prior Level of Function min assist:;all household mobility;community mobility;bed mobility;ADL's  Uses walking cane at home; Hx of multiple falls  -KF     Existing Precautions/Restrictions fall;other (see comments)  adductor canal nerve catheter  -KF     Barriers to Rehab none identified  -KF       Row Name 10/11/23 1327          Living Environment    People in Home alone  -KF       Row Name 10/11/23 1327          Home Main Entrance    Number of Stairs, Main Entrance two  -KF     Stair Railings, Main Entrance railings on both sides of stairs  -       Row Name 10/11/23 1327          Stairs Within Home, Primary    Number of Stairs, Within Home, Primary none  -       Row Name 10/11/23 1327          Cognition    Orientation Status (Cognition) oriented x 4  -       Row Name 10/11/23 1327          Safety Issues, Functional Mobility    Safety Issues Affecting Function (Mobility) awareness of need for assistance;insight into deficits/self-awareness;safety precaution awareness;safety precautions follow-through/compliance;sequencing abilities  -KF     Impairments Affecting Function (Mobility) balance;endurance/activity tolerance;pain;strength  -KF               User Key  (r) = Recorded By, (t) = Taken By, (c) = Cosigned By      Initials Name Provider Type    KF Bibi Mariscal OT Occupational Therapist                     Mobility/ADL's       Row Name 10/11/23 1329          Bed Mobility    Bed Mobility supine-sit;sit-supine  -KF     Supine-Sit [place] (Bed Mobility) standby assist  -KF     Sit-Supine [place] (Bed Mobility) standby assist  -KF     Assistive Device (Bed Mobility) leg   -KF     Comment, (Bed Mobility) Verbal cues for proper AE use and for nerve catheter management  -KF       Row Name 10/11/23 1329          Transfers    Transfers sit-stand  transfer;stand-sit transfer  -KF     Comment, (Transfers) Verbal cues for safe hand positioning and upright posture  -       Row Name 10/11/23 1329          Sit-Stand Transfer    Sit-Stand [place] (Transfers) contact guard;1 person assist;verbal cues  -KF     Assistive Device (Sit-Stand Transfers) walker, front-wheeled  -     Comment, (Sit-Stand Transfer) STS x1 with side steps toward the HOB using a RWx with CGA. Decreased weight acceptance on the RLE.  -       Row Name 10/11/23 1329          Stand-Sit Transfer    Stand-Sit [place] (Transfers) contact guard;1 person assist;verbal cues  -     Assistive Device (Stand-Sit Transfers) walker, front-wheeled  -       Row Name 10/11/23 1329          Functional Mobility    Functional Mobility- Comment Pt declined additional ambulation at this time due to increased pain and fatigue.  -       Row Name 10/11/23 1329          Activities of Daily Living    BADL Assessment/Intervention bathing;lower body dressing;feeding  -       Row Name 10/11/23 [facility]          Mobility    Extremity Weight-bearing Status right lower extremity  -     Right Lower Extremity (Weight-bearing Status) weight-bearing as tolerated (WBAT)  -       Row Name 10/11/23 132          Bathing Assessment/Intervention    Assistive Devices (Bathing) long-handled sponge  -     Comment, (Bathing) OT educated on nerve catheter precautions including no showering while in place. OT provided and educated on use of a long handled sponge to assist in bathing.  -       Row Name 10/11/23 1329          Lower Body Dressing Assessment/Training    [place] Level (Lower Body Dressing) doff;don;socks;moderate assist (50% patient effort);verbal cues;nonverbal cues (demo/gesture)  -     Assistive Devices (Lower Body Dressing) leg ;long-handled shoe horn;reacher;sock-aid  -     Position (Lower Body Dressing) edge of bed sitting  -     Comment, (Lower Body Dressing) OT educated on nerve  catheter management during LBD. OT provided and educated on use of ADL AE including a leg , long handled shoe horn, reacher, and sock aid. Good demonstration of sock aid education.  -       Row Name 10/11/23 1329          Self-Feeding Assessment/Training    [place] Level (Feeding) feeding skills;liquids to mouth;prepare tray/open items;scoop food and bring to mouth;independent  -KF     Position (Self-Feeding) supine  -               User Key  (r) = Recorded By, (t) = Taken By, (c) = Cosigned By      Initials Name Provider Type    KF Bibi Mariscal OT Occupational Therapist                   Obj/Interventions       Row Name 10/11/23 1333          Sensory Assessment (Somatosensory)    Sensory Assessment (Somatosensory) UE sensation intact  -       Row Name 10/11/23 1333          Vision Assessment/Intervention    Visual Impairment/Limitations WFL  -       Row Name 10/11/23 1333          Range of Motion Comprehensive    General Range of Motion bilateral upper extremity ROM L  -       Row Name 10/11/23 1333          Strength Comprehensive (MMT)    General Manual Muscle Testing (MMT) Assessment upper extremity strength deficits identified  -KF     Comment, General Manual Muscle Testing (MMT) Assessment BUE grossly 4/5  -       Row Name 10/11/23 1333          Balance    Balance Assessment sitting static balance;sitting dynamic balance;standing static balance;standing dynamic balance  -KF     Static Sitting Balance standby assist  -KF     Dynamic Sitting Balance standby assist  -KF     Position, Sitting Balance unsupported;sitting edge of bed  -KF     Static Standing Balance contact guard;1-person assist  -KF     Dynamic Standing Balance contact guard;1-person assist  -KF     Position/Device Used, Standing Balance supported;walker, front-wheeled  -KF     Balance Interventions sitting;standing;sit to stand;supported;static;dynamic;occupation based/functional task  -     Comment, Balance No overt  LOB or knee buckling  -KF               User Key  (r) = Recorded By, (t) = Taken By, (c) = Cosigned By      Initials Name Provider Type    Bibi Kessler OT Occupational Therapist                   Goals/Plan       Row Name 10/11/23 1339          Transfer Goal 1 (OT)    Activity/Assistive Device (Transfer Goal 1, OT) commode;bed-to-chair/chair-to-bed  -KF     Sac Level/Cues Needed (Transfer Goal 1, OT) supervision required  -KF     Time Frame (Transfer Goal 1, OT) long term goal (LTG);10 days  -KF     Progress/Outcome (Transfer Goal 1, OT) goal ongoing  -KF       Row Name 10/11/23 1339          Dressing Goal 1 (OT)    Activity/Device (Dressing Goal 1, OT) upper body dressing;lower body dressing;long-handled shoe horn;reacher;sock-aid  -KF     Sac/Cues Needed (Dressing Goal 1, OT) supervision required  -KF     Time Frame (Dressing Goal 1, OT) long term goal (LTG);10 days  -KF     Progress/Outcome (Dressing Goal 1, OT) goal ongoing  -KF       Row Name 10/11/23 1339          Grooming Goal 1 (OT)    Activity/Device (Grooming Goal 1, OT) grooming skills, all  -KF     Sac (Grooming Goal 1, OT) supervision required  -KF     Time Frame (Grooming Goal 1, OT) long term goal (LTG);10 days  -KF     Progress/Outcome (Grooming Goal 1, OT) goal ongoing  -KF       Row Name 10/11/23 1330          Therapy Assessment/Plan (OT)    Planned Therapy Interventions (OT) activity tolerance training;adaptive equipment training;BADL retraining;functional balance retraining;IADL retraining;occupation/activity based interventions;patient/caregiver education/training;strengthening exercise;transfer/mobility retraining  -KF               User Key  (r) = Recorded By, (t) = Taken By, (c) = Cosigned By      Initials Name Provider Type    Bibi Kessler OT Occupational Therapist                   Clinical Impression       Row Name 10/11/23 3837          Pain Assessment    Pretreatment Pain Rating 9/10  -      Posttreatment Pain Rating 9/10  -KF     Pain Location - Side/Orientation Right  -KF     Pain Location generalized  -KF     Pain Location - knee  -KF     Pain Intervention(s) Cold applied;Repositioned;Ambulation/increased activity  -KF       Row Name 10/11/23 5615          Plan of Care Review    Plan of Care Reviewed With patient  -KF     Progress no change  -KF     Outcome Evaluation OT evaluation completed. Pt presents below her functional baseline due to increased R knee pain with mobility, generalized weakness, balance deficits, and decreased activity tolerance. Pt provided and educated on ADL AE including a long handled shoe horn, long handled sponge, reacher, sock aid, and leg .  Pt performed bed mobility with SBA and use of leg . ADLs completed modA with verbal cues for proper AE use. Pt will benefit from continued OT services to assist in restoring PLOF. Recommend a d/c to IRF for best functional outcome.  -       Row Name 10/11/23 2345          Therapy Assessment/Plan (OT)    Patient/Family Therapy Goal Statement (OT) Restore PLOF  -KF     Rehab Potential (OT) good, to achieve stated therapy goals  -     Criteria for Skilled Therapeutic Interventions Met (OT) yes;skilled treatment is necessary  -KF     Therapy Frequency (OT) daily  -KF       Row Name 10/11/23 4306          Therapy Plan Review/Discharge Plan (OT)    Anticipated Discharge Disposition (OT) inpatient rehabilitation facility  -       Row Name 10/11/23 8953          Vital Signs    Pre Systolic BP Rehab 135  -KF     Pre Treatment Diastolic BP 63  -KF     Pre Patient Position Supine  -KF     Intra Patient Position Standing  -KF     Post Patient Position Supine  -KF       Row Name 10/11/23 1392          Positioning and Restraints    Pre-Treatment Position in bed  -KF     Post Treatment Position bed  -KF     In Bed supine;call light within reach;encouraged to call for assist;exit alarm on;with family/caregiver  Pt declined SCD  placement.  -KF               User Key  (r) = Recorded By, (t) = Taken By, (c) = Cosigned By      Initials Name Provider Type    Bibi Kessler OT Occupational Therapist                   Outcome Measures       Row Name 10/11/23 1340          How much help from another is currently needed...    Putting on and taking off regular lower body clothing? 3  -KF     Bathing (including washing, rinsing, and drying) 3  -KF     Toileting (which includes using toilet bed pan or urinal) 3  -KF     Putting on and taking off regular upper body clothing 4  -KF     Taking care of personal grooming (such as brushing teeth) 3  -KF     Eating meals 4  -KF     AM-PAC 6 Clicks Score (OT) 20  -KF       Row Name 10/11/23 0750          How much help from another person do you currently need...    Turning from your back to your side while in flat bed without using bedrails? 3  -MW     Moving from lying on back to sitting on the side of a flat bed without bedrails? 3  -MW     Moving to and from a bed to a chair (including a wheelchair)? 3  -MW     Standing up from a chair using your arms (e.g., wheelchair, bedside chair)? 3  -MW     Climbing 3-5 steps with a railing? 3  -MW     To walk in hospital room? 3  -MW     AM-PAC 6 Clicks Score (PT) 18  -MW     Highest level of mobility 6 --> Walked 10 steps or more  -       Row Name 10/11/23 1340          Functional Assessment    Outcome Measure Options AM-PAC 6 Clicks Daily Activity (OT)  -KF               User Key  (r) = Recorded By, (t) = Taken By, (c) = Cosigned By      Initials Name Provider Type    Sloane Gray, RN Registered Nurse    Bibi Kessler OT Occupational Therapist                    Occupational Therapy Education       Title: PT OT SLP Therapies (In Progress)       Topic: Occupational Therapy (In Progress)       Point: ADL training (Done)       Description:   Instruct learner(s) on proper safety adaptation and remediation techniques during self care or transfers.    Instruct in proper use of assistive devices.                  Learning Progress Summary             Patient Acceptance, E,TB, VU,DU by  at 10/11/2023 1300                         Point: Home exercise program (Not Started)       Description:   Instruct learner(s) on appropriate technique for monitoring, assisting and/or progressing therapeutic exercises/activities.                  Learner Progress:  Not documented in this visit.              Point: Precautions (Done)       Description:   Instruct learner(s) on prescribed precautions during self-care and functional transfers.                  Learning Progress Summary             Patient Acceptance, E,TB, VU,DU by  at 10/11/2023 1300                         Point: Body mechanics (Done)       Description:   Instruct learner(s) on proper positioning and spine alignment during self-care, functional mobility activities and/or exercises.                  Learning Progress Summary             Patient Acceptance, E,TB, VU,DU by  at 10/11/2023 1300                                         User Key       Initials Effective Dates Name Provider Type Discipline     08/09/23 -  Bibi Mariscal OT Occupational Therapist OT                  OT Recommendation and Plan  Planned Therapy Interventions (OT): activity tolerance training, adaptive equipment training, BADL retraining, functional balance retraining, IADL retraining, occupation/activity based interventions, patient/caregiver education/training, strengthening exercise, transfer/mobility retraining  Therapy Frequency (OT): daily  Plan of Care Review  Plan of Care Reviewed With: patient  Progress: no change  Outcome Evaluation: OT evaluation completed. Pt presents below her functional baseline due to increased R knee pain with mobility, generalized weakness, balance deficits, and decreased activity tolerance. Pt provided and educated on ADL AE including a long handled shoe horn, long handled sponge, reacher, sock aid,  and leg .  Pt performed bed mobility with SBA and use of leg . ADLs completed modA with verbal cues for proper AE use. Pt will benefit from continued OT services to assist in restoring PLOF. Recommend a d/c to IRF for best functional outcome.     Time Calculation:   Evaluation Complexity (OT)  Review Occupational Profile/Medical/Therapy History Complexity: expanded/moderate complexity  Assessment, Occupational Performance/Identification of Deficit Complexity: 3-5 performance deficits  Clinical Decision Making Complexity (OT): detailed assessment/moderate complexity  Overall Complexity of Evaluation (OT): moderate complexity     Time Calculation- OT       Row Name 10/11/23 1341             Time Calculation- OT    OT Start Time 1300  -KF      OT Received On 10/11/23  -KF      OT Goal Re-Cert Due Date 10/21/23  -KF         Timed Charges    62056 - OT Self Care/Mgmt Minutes 10  -KF         Untimed Charges    OT Eval/Re-eval Minutes 40  -KF         Total Minutes    Timed Charges Total Minutes 10  -KF      Untimed Charges Total Minutes 40  -KF       Total Minutes 50  -KF                User Key  (r) = Recorded By, (t) = Taken By, (c) = Cosigned By      Initials Name Provider Type    KF Bibi Mariscal OT Occupational Therapist                  Therapy Charges for Today       Code Description Service Date Service Provider Modifiers Qty    [id number]  OT EVAL MOD COMPLEXITY 3 10/11/2023 Bibi Mariscal OT GO 1    [id number]  OT SELF CARE/MGMT/TRAIN EA 15 MIN 10/11/2023 Bibi Mariscal OT GO 1                 Bibi Mariscal OT  10/11/2023

## 2023-10-11 NOTE — PLAN OF CARE
Goal Outcome Evaluation:  Plan of Care Reviewed With: patient, sibling        Progress: improving  Outcome Evaluation: Pt amb with FWW and Min A. Pt continues to require frequent VC to accept weight onto RLE. Slow gait speed noted. Good effort observed this session. Activity limited by elevated pain. Continue to recommend d/c to IRF to promote increased independence with functional mobility and decrease risk of falls.      Anticipated Discharge Disposition (PT): inpatient rehabilitation facility

## 2023-10-11 NOTE — CASE MANAGEMENT/SOCIAL WORK
Continued Stay Note  [facility]     Patient Name: Rashad Carrillo  MRN: [id number]  Today's Date: 10/11/2023    Admit Date: 10/10/2023    Plan: [facility]   Discharge Plan       Row Name 10/11/23 0927       Plan    Plan [facility]    Patient/Family in Agreement with Plan yes    Plan Comments Pt lives alone in [facility]. She reports she was independent with ADLs prior to admit and owns a walker and cane. She is followed by her PCP and has drug coverage. At this time pt has requested a referral to [facility] for short term rehab. April has been given the referral.    Final Discharge Disposition Code 62 - inpatient rehab facility                   Discharge Codes    No documentation.                       Emily Winters RN

## 2023-10-11 NOTE — PLAN OF CARE
Goal Outcome Evaluation:                        Problem: Adult Inpatient Plan of Care  Goal: Absence of Hospital-Acquired Illness or Injury  Intervention: Identify and Manage Fall Risk  Recent Flowsheet Documentation  Taken 10/11/2023 0400 by Davis yMers RN  Safety Promotion/Fall Prevention:   activity supervised   safety round/check completed   toileting scheduled  Taken 10/11/2023 0200 by Davis Myers RN  Safety Promotion/Fall Prevention:   activity supervised   safety round/check completed   toileting scheduled  Taken 10/11/2023 0000 by Davis Myers RN  Safety Promotion/Fall Prevention:   activity supervised   safety round/check completed   toileting scheduled  Taken 10/10/2023 2200 by Davis Myers RN  Safety Promotion/Fall Prevention:   activity supervised   safety round/check completed   toileting scheduled  Taken 10/10/2023 2000 by Davis Myers RN  Safety Promotion/Fall Prevention:   activity supervised   safety round/check completed   toileting scheduled  Intervention: Prevent Skin Injury  Recent Flowsheet Documentation  Taken 10/11/2023 0400 by Davis Myers RN  Body Position: supine  Taken 10/11/2023 0200 by Davis Myers RN  Body Position: supine  Taken 10/11/2023 0000 by Davis Myers RN  Body Position: supine  Taken 10/10/2023 2200 by Davis Myers RN  Body Position: supine  Taken 10/10/2023 2000 by Davis Myers RN  Body Position: supine  Intervention: Prevent and Manage VTE (Venous Thromboembolism) Risk  Recent Flowsheet Documentation  Taken 10/11/2023 0400 by Davis Myers RN  VTE Prevention/Management:   left   foot pump device on  Taken 10/11/2023 0200 by Davis Myers RN  VTE Prevention/Management:   left   foot pump device on  Taken 10/11/2023 0000 by Davis Myers RN  VTE Prevention/Management:   left   foot pump device on  Taken 10/10/2023 2200 by Davis Myers RN  VTE Prevention/Management:   left   foot pump device on  Taken 10/10/2023 2000 by  Davis Myers RN  VTE Prevention/Management:   left   foot pump device on  Intervention: Prevent Infection  Recent Flowsheet Documentation  Taken 10/11/2023 0400 by Davis Myers RN  Infection Prevention: environmental surveillance performed  Taken 10/11/2023 0200 by Davis Myers RN  Infection Prevention: environmental surveillance performed  Taken 10/11/2023 0000 by Davis Myers RN  Infection Prevention: environmental surveillance performed  Taken 10/10/2023 2200 by Davis Myers RN  Infection Prevention: environmental surveillance performed  Taken 10/10/2023 2000 by Davis Myers RN  Infection Prevention: environmental surveillance performed  Goal: Optimal Comfort and Wellbeing  Intervention: Monitor Pain and Promote Comfort  Recent Flowsheet Documentation  Taken 10/11/2023 0400 by Davis Myers RN  Pain Management Interventions: quiet environment facilitated  Taken 10/11/2023 0200 by Davis Myers RN  Pain Management Interventions: quiet environment facilitated  Taken 10/11/2023 0000 by Davis Myers RN  Pain Management Interventions: quiet environment facilitated  Taken 10/10/2023 2200 by Davis Myers RN  Pain Management Interventions: quiet environment facilitated  Taken 10/10/2023 2000 by Davis Myers RN  Pain Management Interventions: quiet environment facilitated  Intervention: Provide Person-Centered Care  Recent Flowsheet Documentation  Taken 10/11/2023 0400 by Davis Myers RN  Trust Relationship/Rapport: care explained  Taken 10/11/2023 0200 by Davis Myers RN  Trust Relationship/Rapport: care explained  Taken 10/11/2023 0000 by Davis Myers RN  Trust Relationship/Rapport: care explained  Taken 10/10/2023 2200 by Davis Myers RN  Trust Relationship/Rapport: care explained  Taken 10/10/2023 2000 by Davis Myers RN  Trust Relationship/Rapport: care explained    VSS, voids well, rested throughout the night, ambulates in [place] and to bathroom, will  continue to monitor for changes.

## 2023-10-11 NOTE — PLAN OF CARE
Goal Outcome Evaluation:  Plan of Care Reviewed With: patient        Progress: no change  Outcome Evaluation: Pt amb in room with FWW and Min A x2. Step-to gait pattern noted with decreased weight acceptance onto RLE. No knee buckling or LOB noted. Max VC to encourage weight shifting onto RLE with mild improvement. Activity limited by fatigue. HEP and precautions reviewed with pt. Recommend d/c to IRF to address mobility deficits and decrease risk of falls.      Anticipated Discharge Disposition (PT): inpatient rehabilitation facility

## 2023-10-12 VITALS
BODY MASS INDEX: 37.59 KG/M2 | RESPIRATION RATE: 16 BRPM | DIASTOLIC BLOOD PRESSURE: 54 MMHG | HEIGHT: 71 IN | WEIGHT: 268.52 LBS | SYSTOLIC BLOOD PRESSURE: 124 MMHG | HEART RATE: 74 BPM | OXYGEN SATURATION: 94 % | TEMPERATURE: 98.5 F

## 2023-10-12 LAB
ANION GAP SERPL CALCULATED.3IONS-SCNC: 5 MMOL/L (ref 5–15)
BUN SERPL-MCNC: 15 MG/DL (ref 8–23)
BUN/CREAT SERPL: 25 (ref 7–25)
CALCIUM SPEC-SCNC: 8.1 MG/DL (ref 8.6–10.5)
CHLORIDE SERPL-SCNC: 106 MMOL/L (ref 98–107)
CO2 SERPL-SCNC: 27 MMOL/L (ref 22–29)
CREAT SERPL-MCNC: 0.6 MG/DL (ref 0.57–1)
DEPRECATED RDW RBC AUTO: 46.4 FL (ref 37–54)
EGFRCR SERPLBLD CKD-EPI 2021: 99.8 ML/MIN/1.73
ERYTHROCYTE [DISTWIDTH] IN BLOOD BY AUTOMATED COUNT: 13.4 % (ref 12.3–15.4)
GLUCOSE SERPL-MCNC: 92 MG/DL (ref 65–99)
HCT VFR BLD AUTO: 31.7 % (ref 34–46.6)
HGB BLD-MCNC: 10.2 G/DL (ref 12–15.9)
MCH RBC QN AUTO: 30.4 PG (ref 26.6–33)
MCHC RBC AUTO-ENTMCNC: 32.2 G/DL (ref 31.5–35.7)
MCV RBC AUTO: 94.3 FL (ref 79–97)
PLATELET # BLD AUTO: 150 10*3/MM3 (ref 140–450)
PMV BLD AUTO: 11.5 FL (ref 6–12)
POTASSIUM SERPL-SCNC: 4.3 MMOL/L (ref 3.5–5.2)
RBC # BLD AUTO: 3.36 10*6/MM3 (ref 3.77–5.28)
SODIUM SERPL-SCNC: 138 MMOL/L (ref 136–145)
WBC NRBC COR # BLD: 7.34 10*3/MM3 (ref 3.4–10.8)

## 2023-10-12 PROCEDURE — 97116 GAIT TRAINING THERAPY: CPT

## 2023-10-12 PROCEDURE — 85027 COMPLETE CBC AUTOMATED: CPT | Performed by: ORTHOPAEDIC SURGERY

## 2023-10-12 PROCEDURE — 97110 THERAPEUTIC EXERCISES: CPT

## 2023-10-12 PROCEDURE — 80048 BASIC METABOLIC PNL TOTAL CA: CPT | Performed by: NURSE PRACTITIONER

## 2023-10-12 RX ORDER — MELOXICAM 15 MG/1
15 TABLET ORAL DAILY
Start: 2023-10-12 | End: 2023-10-27

## 2023-10-12 RX ORDER — DALFAMPRIDINE 10 MG/1
10 TABLET, FILM COATED, EXTENDED RELEASE ORAL EVERY 12 HOURS SCHEDULED
Start: 2023-10-12

## 2023-10-12 RX ORDER — DOCUSATE SODIUM 100 MG/1
100 CAPSULE, LIQUID FILLED ORAL 2 TIMES DAILY
Start: 2023-10-12

## 2023-10-12 RX ORDER — OXYCODONE HYDROCHLORIDE 5 MG/1
5 TABLET ORAL EVERY 4 HOURS PRN
Start: 2023-10-12

## 2023-10-12 RX ORDER — BISACODYL 10 MG
10 SUPPOSITORY, RECTAL RECTAL DAILY PRN
Status: DISCONTINUED | OUTPATIENT
Start: 2023-10-12 | End: 2023-10-12 | Stop reason: HOSPADM

## 2023-10-12 RX ORDER — ROPIVACAINE HYDROCHLORIDE 2 MG/ML
1 INJECTION, SOLUTION EPIDURAL; INFILTRATION; PERINEURAL CONTINUOUS
Start: 2023-10-12

## 2023-10-12 RX ORDER — BISACODYL 5 MG/1
5 TABLET, DELAYED RELEASE ORAL DAILY PRN
Status: DISCONTINUED | OUTPATIENT
Start: 2023-10-12 | End: 2023-10-12 | Stop reason: HOSPADM

## 2023-10-12 RX ORDER — POLYETHYLENE GLYCOL 3350 17 G/17G
17 POWDER, FOR SOLUTION ORAL DAILY
Start: 2023-10-12

## 2023-10-12 RX ORDER — AMOXICILLIN 250 MG
2 CAPSULE ORAL 2 TIMES DAILY
Status: DISCONTINUED | OUTPATIENT
Start: 2023-10-12 | End: 2023-10-12 | Stop reason: HOSPADM

## 2023-10-12 RX ORDER — GABAPENTIN 100 MG/1
100 CAPSULE ORAL 2 TIMES DAILY
Start: 2023-10-12

## 2023-10-12 RX ORDER — CYCLOBENZAPRINE HCL 5 MG
5 TABLET ORAL 3 TIMES DAILY PRN
Start: 2023-10-12

## 2023-10-12 RX ORDER — POLYETHYLENE GLYCOL 3350 17 G/17G
17 POWDER, FOR SOLUTION ORAL DAILY PRN
Status: DISCONTINUED | OUTPATIENT
Start: 2023-10-12 | End: 2023-10-12 | Stop reason: HOSPADM

## 2023-10-12 RX ADMIN — OXYBUTYNIN CHLORIDE 10 MG: 10 TABLET, EXTENDED RELEASE ORAL at 09:30

## 2023-10-12 RX ADMIN — GABAPENTIN 300 MG: 300 CAPSULE ORAL at 09:30

## 2023-10-12 RX ADMIN — FLUOXETINE 20 MG: 20 CAPSULE ORAL at 09:30

## 2023-10-12 RX ADMIN — MULTIVITAMIN TABLET 1 TABLET: TABLET at 09:30

## 2023-10-12 RX ADMIN — PANTOPRAZOLE SODIUM 40 MG: 40 TABLET, DELAYED RELEASE ORAL at 05:21

## 2023-10-12 RX ADMIN — OXYCODONE HYDROCHLORIDE 10 MG: 10 TABLET ORAL at 02:33

## 2023-10-12 RX ADMIN — OXYCODONE HYDROCHLORIDE 10 MG: 10 TABLET ORAL at 09:29

## 2023-10-12 RX ADMIN — DALFAMPRIDINE 10 MG: 10 TABLET, FILM COATED, EXTENDED RELEASE ORAL at 05:23

## 2023-10-12 RX ADMIN — VALACYCLOVIR HYDROCHLORIDE 500 MG: 500 TABLET, FILM COATED ORAL at 09:30

## 2023-10-12 RX ADMIN — APIXABAN 2.5 MG: 2.5 TABLET, FILM COATED ORAL at 09:30

## 2023-10-12 RX ADMIN — OXYCODONE HYDROCHLORIDE 10 MG: 10 TABLET ORAL at 13:45

## 2023-10-12 RX ADMIN — MELOXICAM 15 MG: 15 TABLET ORAL at 09:30

## 2023-10-12 RX ADMIN — SENNOSIDES AND DOCUSATE SODIUM 2 TABLET: 8.6; 5 TABLET ORAL at 13:45

## 2023-10-12 NOTE — PLAN OF CARE
Goal Outcome Evaluation:  Plan of Care Reviewed With: patient        Progress: improving  Outcome Evaluation: Pain has improved throughout day, Patient making progress with PT, complaining of spasms in Right leg, flexeril ordered, Infubl[place] in place, ice packs, VSS, voiding well, cont to monitor

## 2023-10-12 NOTE — THERAPY TREATMENT NOTE
Patient Name: Rashad Carrillo  : 1955    MRN: [id number]                              Today's Date: 10/12/2023       Admit Date: 10/10/2023    Visit Dx:     ICD-10-CM ICD-9-CM   1. Primary osteoarthritis of right knee  M17.11 715.16     Patient Active Problem List   Diagnosis    Vitamin D deficiency    Depression    Multiple sclerosis    Restless legs syndrome    Muscle spasticity    Chronic migraine without aura without status migrainosus, not intractable    Essential hypertension    Frequent falls    Status post balloon dilatation of esophageal stricture    T2DM (type 2 diabetes mellitus)    TB lung, latent    Aplastic anemia likely due to isoniazide    Chest pain    MCGRAW (dyspnea on exertion)    Hyperlipidemia LDL goal <70    Class 3 severe obesity due to excess calories with serious comorbidity and body mass index (BMI) of 50.0 to 59.9 in adult    Fatigue    Dyspepsia    Morbid obesity    Degenerative arthritis of right knee    Primary osteoarthritis of right knee    S/P total knee arthroplasty, right    Acute postoperative pain     Past Medical History:   Diagnosis Date    Chronic knee pain     NSAIDS + steroid injections (last 3/2022)    Dyspepsia     Dyspnea on exertion     Fatigue     GERD (gastroesophageal reflux disease)     History of transfusion     PATIENT DENIES REACTION    Hyperlipidemia     Hypertension     Morbid obesity     Multiple sclerosis     follows w/ Dr. Gilbert     Past Surgical History:   Procedure Laterality Date    COLONOSCOPY      GASTRIC SLEEVE LAPAROSCOPIC N/A 2022    Procedure: GASTRIC SLEEVE WITH HIATAL HERNIA LAPAROSCOPIC WITH DAVINCI ROBOT, ESOPHAGOGASTRODUODENOSCOPY;  Surgeon: Maribell Anderson MD;  Location: [facility];  Service: Robotics - DaVinci;  Laterality: N/A;    LAPAROSCOPIC CHOLECYSTECTOMY      gallstone pancreatitis    TOTAL KNEE ARTHROPLASTY Right 10/10/2023    Procedure: TOTAL KNEE ARTHROPLASTY WITH CORI ROBOT - RIGHT;  Surgeon: Lalo Choe  MD JARAD;  Location: [facility];  Service: Robotics - Ortho;  Laterality: Right;      General Information       Row Name 10/12/23 1127          Physical Therapy Time and Intention    Document Type therapy note (daily note)  -LR     Mode of Treatment physical therapy;individual therapy  -LR       Row Name 10/12/23 1127          General Information    Patient Profile Reviewed yes  -LR     Existing Precautions/Restrictions fall;other (see comments)  R adductor canal nerve catheter, MS  -LR     Barriers to Rehab previous functional deficit;medically complex  -LR       Row Name 10/12/23 1127          Cognition    Orientation Status (Cognition) oriented x 4  -LR       Row Name 10/12/23 [facility]7          Safety Issues, Functional Mobility    Safety Issues Affecting Function (Mobility) awareness of need for assistance;insight into deficits/self-awareness;safety precautions follow-through/compliance;positioning of assistive device;sequencing abilities;safety precaution awareness  -LR     Impairments Affecting Function (Mobility) balance;endurance/activity tolerance;pain;range of motion (ROM);strength  -LR               User Key  (r) = Recorded By, (t) = Taken By, (c) = Cosigned By      Initials Name Provider Type    LR Yesi King, PT Physical Therapist                   Mobility       Row Name 10/12/23 1127          Bed Mobility    Bed Mobility supine-sit;sit-supine  -LR     Supine-Sit [place] (Bed Mobility) verbal cues;standby assist  -LR     Sit-Supine [place] (Bed Mobility) verbal cues;standby assist  -LR     Assistive Device (Bed Mobility) head of bed elevated;leg ;bed rails  -LR     Comment, (Bed Mobility) Verbal cues for correct sequencing for t/f in and out of bed. Required increased time to perform. No assist required. Cues to let go of leg  once R foot off EOB to allow patient to use both UEs to push to scoot hips out to get feet on floor. Denied dizziness upon sitting up.  -LR       Row  Name 10/12/23 1127          Transfers    Comment, (Transfers) Verbal cues for correct hand placement with t/f and to step R LE out before t/f for comfort. Patient requested bed to be elevated to assist with t/f. Assisted patient to and from [facility].  -LR       Row Name 10/12/23 1127          Bed-Chair Transfer    Bed-Chair [place] (Transfers) not tested  -LR       Row Name 10/12/23 [facility]          Sit-Stand Transfer    Sit-Stand [place] (Transfers) verbal cues;minimum assist (75% patient effort)  -LR     Assistive Device (Sit-Stand Transfers) walker, front-wheeled  -LR       Row Name 10/12/23 1127          Gait/Stairs (Locomotion)    Stanley Level (Gait) verbal cues;contact guard  -LR     Assistive Device (Gait) walker, front-wheeled  -LR     Distance in Feet (Gait) 70  -LR     Deviations/Abnormal Patterns (Gait) bilateral deviations;cinthia decreased;gait speed decreased;stride length decreased;right sided deviations;antalgic  -LR     Bilateral Gait Deviations forward flexed posture;heel strike decreased  -LR     Right Sided Gait Deviations weight shift ability decreased  -LR     Stanley Level (Stairs) not tested  -LR     Comment, (Gait/Stairs) Patient ambulated with step to gait pattern at slow pace with forward flexed posture. Verbal cues for increased R LE weight bearing/stance phase, decreased UE weight bearing, and increased R knee flexion during swing phase. Improved slightly with cues for correction. Unable to correct forward flexed posture despite cues. Gait limited by pain.  -LR       Row Name 10/12/23 1127          Mobility    Extremity Weight-bearing Status right lower extremity  -LR     Right Lower Extremity (Weight-bearing Status) weight-bearing as tolerated (WBAT)  -LR               User Key  (r) = Recorded By, (t) = Taken By, (c) = Cosigned By      Initials Name Provider Type    LR Yesi King, PT Physical Therapist                   Obj/Interventions       Row Name 10/12/23  1127          Range of Motion Comprehensive    General Range of Motion lower extremity range of motion deficits identified  -     Comment, General Range of Motion Surgical knee ROM: 13-90 degrees, lacking 13 degrees extension AROM, demonstrating 90 degrees flexion AAROM in sitting.  -       Row Name 10/12/23 1127          Strength Comprehensive (MMT)    General Manual Muscle Testing (MMT) Assessment lower extremity strength deficits identified  -       Row Name 10/12/23 1127          Motor Skills    Therapeutic Exercise knee;ankle;other (see comments)  cues for technique; mod assist R SLR, R LAQ, max assist R SAQ, min assist R heel slides  -       Row Name 10/12/23 1127          Hip (Therapeutic Exercise)    Hip Isometrics (Therapeutic Exercise) flexion;extension;gluteal sets  -       Row Name 10/12/23 1127          Knee (Therapeutic Exercise)    Knee (Therapeutic Exercise) strengthening exercise;isometric exercises;AROM (active range of motion)  -     Knee Isometrics (Therapeutic Exercise) quad sets;bilateral;supine;10 repetitions;5 repetitions  -     Knee Strengthening (Therapeutic Exercise) SLR (straight leg raise);SAQ (short arc quad);right;bilateral;LAQ (long arc quad);sitting;supine;15 repititions  -       Row Name 10/12/23 1127          Ankle (Therapeutic Exercise)    Ankle (Therapeutic Exercise) AROM (active range of motion)  -     Ankle AROM (Therapeutic Exercise) bilateral;dorsiflexion;plantarflexion;supine;15 repititions  -       Row Name 10/12/23 1127          Balance    Balance Assessment sitting static balance;sitting dynamic balance;standing static balance;standing dynamic balance  -     Static Sitting Balance standby assist  -     Dynamic Sitting Balance standby assist  -LR     Position, Sitting Balance unsupported;sitting edge of bed  -     Static Standing Balance contact guard  -LR     Dynamic Standing Balance contact guard  -LR     Position/Device Used, Standing Balance  supported;walker, rolling  -LR       Row Name 10/12/23 1127          Sensory Assessment (Somatosensory)    Sensory Assessment (Somatosensory) LE sensation intact  -LR               User Key  (r) = Recorded By, (t) = Taken By, (c) = Cosigned By      Initials Name Provider Type    LR Yesi King, PT Physical Therapist                   Goals/Plan       Row Name 10/12/23 1127          Bed Mobility Goal 1 (PT)    Activity/Assistive Device (Bed Mobility Goal 1, PT) sit to supine/supine to sit  -LR     [place] Level/Cues Needed (Bed Mobility Goal 1, PT) modified independence  -LR     Time Frame (Bed Mobility Goal 1, PT) long term goal (LTG);3 days  -LR     Progress/Outcomes (Bed Mobility Goal 1, PT) goal ongoing;good progress toward goal  -       Row Name 10/12/23 1127          Transfer Goal 1 (PT)    Activity/Assistive Device (Transfer Goal 1, PT) sit-to-stand/stand-to-sit;walker, rolling  -LR     [place] Level/Cues Needed (Transfer Goal 1, PT) modified independence  -LR     Time Frame (Transfer Goal 1, PT) long term goal (LTG);3 days  -LR     Progress/Outcome (Transfer Goal 1, PT) continuing progress toward goal;goal ongoing  -       Row Name 10/12/23 1127          Gait Training Goal 1 (PT)    Activity/Assistive Device (Gait Training Goal 1, PT) gait (walking locomotion);walker, rolling  -LR     [place] Level (Gait Training Goal 1, PT) modified independence  -LR     Distance (Gait Training Goal 1, PT) 150 feet  -LR     Time Frame (Gait Training Goal 1, PT) long term goal (LTG);3 days  -LR     Progress/Outcome (Gait Training Goal 1, PT) continuing progress toward goal;goal ongoing  -       Row Name 10/12/23 1127          ROM Goal 1 (PT)    ROM Goal 1 (PT) R knee ROM 0-90 degrees  -LR     Time Frame (ROM Goal 1, PT) long-term goal (LTG);3 days  -LR     Progress/Outcome (ROM Goal 1, PT) good progress toward goal;goal partially met;goal ongoing  -       Row Name 10/12/23 1127           Therapy Assessment/Plan (PT)    Planned Therapy Interventions (PT) balance training;bed mobility training;gait training;home exercise program;ROM (range of motion);patient/family education;stair training;strengthening;stretching;transfer training  -LR               User Key  (r) = Recorded By, (t) = Taken By, (c) = Cosigned By      Initials Name Provider Type    LR Yesi King, PT Physical Therapist                   Clinical Impression       Row Name 10/12/23 1127          Pain    Pretreatment Pain Rating 10/10  -LR     Posttreatment Pain Rating 10/10  -LR     Pain Location - Side/Orientation Right  -LR     Pain Location generalized  -LR     Pain Location - knee  -LR     Pain Intervention(s) Ambulation/increased activity;Repositioned;Elevated;Cold applied  -LR       Row Name 10/12/23 1127          Plan of Care Review    Plan of Care Reviewed With patient;sibling  -LR     Progress improving  -LR     Outcome Evaluation Patient demonstrated improved gait mechanics and tolerance to mobility today with increased independence with OOB mobility. R knee AROM progressing well, 90 degrees today. Patient continues to be below baseline functioning, demonstrating decreased functional mobility status, impaired balance, and decreased R knee strength/ROM. Will address these deficits to promote return to PLOF. Recommend IRF at d/c.  -LR       Row Name 10/12/23 1127          Therapy Assessment/Plan (PT)    Rehab Potential (PT) good, to achieve stated therapy goals  -LR     Criteria for Skilled Interventions Met (PT) yes;meets criteria;skilled treatment is necessary  -LR     Therapy Frequency (PT) 2 times/day  -LR       Row Name 10/12/23 1127          Positioning and Restraints    Pre-Treatment Position in bed  -LR     Post Treatment Position bed  -LR     In Bed notified nsg;supine;call light within reach;encouraged to call for assist;exit alarm on;with family/caregiver;side rails up x2;SCD pump applied  -LR                User Key  (r) = Recorded By, (t) = Taken By, (c) = Cosigned By      Initials Name Provider Type    Yesi Umana, PT Physical Therapist                   Outcome Measures       Row Name 10/12/23 1127          How much help from another person do you currently need...    Turning from your back to your side while in flat bed without using bedrails? 4  -LR     Moving from lying on back to sitting on the side of a flat bed without bedrails? 3  -LR     Moving to and from a bed to a chair (including a wheelchair)? 3  -LR     Standing up from a chair using your arms (e.g., wheelchair, bedside chair)? 3  -LR     Climbing 3-5 steps with a railing? 2  -LR     To walk in hospital room? 3  -LR     AM-PAC 6 Clicks Score (PT) 18  -LR     Highest level of mobility 6 --> Walked 10 steps or more  -LR       Row Name 10/12/23 1127          Functional Assessment    Outcome Measure Options AM-PAC 6 Clicks Basic Mobility (PT)  -LR               User Key  (r) = Recorded By, (t) = Taken By, (c) = Cosigned By      Initials Name Provider Type    Yesi Umana, PT Physical Therapist                                 Physical Therapy Education       Title: PT OT SLP Therapies (In Progress)       Topic: Physical Therapy (Done)       Point: Mobility training (Done)       Learning Progress Summary             Patient Acceptance, E,D, VU,NR by LR at 10/12/2023 1127    Comment: Educated on precautions, weight bearing status, LE HEP, safety with mobility, correct supine<->sit t/f technique, correct sit<->stand t/f technique, correct gait mechanics, and progression of POC.    Acceptance, E,D, VU,DU by  at 10/11/2023 1907    Acceptance, E,D, VU,DU by HP at 10/11/2023 1432   Family Acceptance, E,D, VU,NR by LR at 10/12/2023 1127    Comment: Educated on precautions, weight bearing status, LE HEP, safety with mobility, correct supine<->sit t/f technique, correct sit<->stand t/f technique, correct gait mechanics, and progression of  POC.                         Point: Home exercise program (Done)       Learning Progress Summary             Patient Acceptance, E,D, VU,NR by LR at 10/12/2023 1127    Comment: Educated on precautions, weight bearing status, LE HEP, safety with mobility, correct supine<->sit t/f technique, correct sit<->stand t/f technique, correct gait mechanics, and progression of POC.    Acceptance, E,D, VU,DU by  at 10/11/2023 1907    Acceptance, E,D, VU,DU by  at 10/11/2023 1432   Family Acceptance, E,D, VU,NR by LR at 10/12/2023 1127    Comment: Educated on precautions, weight bearing status, LE HEP, safety with mobility, correct supine<->sit t/f technique, correct sit<->stand t/f technique, correct gait mechanics, and progression of POC.                         Point: Body mechanics (Done)       Learning Progress Summary             Patient Acceptance, E,D, VU,NR by LR at 10/12/2023 1127    Comment: Educated on precautions, weight bearing status, LE HEP, safety with mobility, correct supine<->sit t/f technique, correct sit<->stand t/f technique, correct gait mechanics, and progression of POC.    Acceptance, E,D, VU,DU by  at 10/11/2023 1907    Acceptance, E,D, VU,DU by  at 10/11/2023 1432   Family Acceptance, E,D, VU,NR by LR at 10/12/2023 1127    Comment: Educated on precautions, weight bearing status, LE HEP, safety with mobility, correct supine<->sit t/f technique, correct sit<->stand t/f technique, correct gait mechanics, and progression of POC.                         Point: Precautions (Done)       Learning Progress Summary             Patient Acceptance, E,D, VU,NR by LR at 10/12/2023 1127    Comment: Educated on precautions, weight bearing status, LE HEP, safety with mobility, correct supine<->sit t/f technique, correct sit<->stand t/f technique, correct gait mechanics, and progression of POC.    Acceptance, E,D, VU,DU by  at 10/11/2023 1907    Acceptance, E,D, VU,DU by  at 10/11/2023 1432   Family  Acceptance, E,D, VU,NR by LR at 10/12/2023 1127    Comment: Educated on precautions, weight bearing status, LE HEP, safety with mobility, correct supine<->sit t/f technique, correct sit<->stand t/f technique, correct gait mechanics, and progression of POC.                                         User Key       Initials Effective Dates Name Provider Type Discipline    LR 02/03/23 -  Yesi King, PT Physical Therapist PT     06/01/21 -  Gloria Nugent, ADONIS Physical Therapist PT                  PT Recommendation and Plan  Planned Therapy Interventions (PT): balance training, bed mobility training, gait training, home exercise program, ROM (range of motion), patient/family education, stair training, strengthening, stretching, transfer training  Plan of Care Reviewed With: patient, sibling  Progress: improving  Outcome Evaluation: Patient demonstrated improved gait mechanics and tolerance to mobility today with increased independence with OOB mobility. R knee AROM progressing well, 90 degrees today. Patient continues to be below baseline functioning, demonstrating decreased functional mobility status, impaired balance, and decreased R knee strength/ROM. Will address these deficits to promote return to PLOF. Recommend IRF at d/c.     Time Calculation:         PT Charges       Row Name 10/12/23 1127             Time Calculation    Start Time 1127  -LR      PT Received On 10/12/23  -LR      PT Goal Re-Cert Due Date 10/21/23  -LR         Timed Charges    15661 - PT Therapeutic Exercise Minutes 15  -LR      89028 - Gait Training Minutes  15  -LR         Total Minutes    Timed Charges Total Minutes 30  -LR       Total Minutes 30  -LR                User Key  (r) = Recorded By, (t) = Taken By, (c) = Cosigned By      Initials Name Provider Type    LR Yesi King, PT Physical Therapist                  Therapy Charges for Today       Code Description Service Date Service Provider Modifiers Qty     [id number]  PT THER PROC EA 15 MIN 10/12/2023 Yesi King, PT GP 1    [id number]  GAIT TRAINING EA 15 MIN 10/12/2023 Yesi King, PT GP 1            PT G-Codes  Outcome Measure Options: AM-PAC 6 Clicks Basic Mobility (PT)  AM-PAC 6 Clicks Score (PT): 18  AM-PAC 6 Clicks Score (OT): 20  PT Discharge Summary  Anticipated Discharge Disposition (PT): inpatient rehabilitation facility    Yesi King, PT  10/12/2023

## 2023-10-12 NOTE — DISCHARGE INSTRUCTIONS
EXOFIN CARING FOR YOUR WOUND    AFTER exofin Fusion SKIN CLOSURE SYSTEM HAS BEEN APPLIED    YOUR HEALTHCARE PROFESSIONAL HAS CHOSEN TO USE exofin Fusion SKIN CLOSURE SYSTEM TO CLOSE YOUR WOUND.    exocin Fusion is the combination of a mesh and liquid adhesive that allows the incision or wound to be held together during the healing process.    exocin Fusion should remain in place until your healthcare professional; has determined that adequate healing has occurred, which is usually anywhere between 7 to 14 days. In most cases, exofin Fusion is easily removed with little or no discomfort.    In the event that you notice that exofin Fusion is beginning to loosen or may be coming off, contact your healthcare professional.    The following information is provided to help you understand how to care for your incision and is based on the FDA-cleared product labeling.    You should always follow the instructions of your healthcare provider.    THINGS TO KNOW    BATHING OR SHOWERING    If directed by your healthcare professional, you may occasionally and briefly wet your incision or wound that was treated with exofin Fusion in the shower or bath. Do not soak or scrub your incision or wound. Do not swim or soak your incision or wound in water. After showering or bathing, gently blot your incision or wound dry with a soft towel. If a dry protective dressing is being used over exofin Fusion, it should be replaced with a fresh, dry protective dressing after showering or bathing as directed by your healthcare practitioner. Care should also be taken so that any tape that may be part of the dry protective dressing does not come into contact with exofin Fusion because when the tape is removed, it may also remove exofin Fusion.    WOUND HEALING  If you experience any redness, swelling, discomfort, warmth or pus, contact your healthcare professional and he or she will determine how your incision or wound is healing and take the  necessary steps to address any issues.    EXERCISE  Do not engage in strenuous exercise that may cause additional stress on your incision or wound. Follow your healthcare professional's guidance about when you can return to your normal activities.    OINTMENTS OR LIQUIDS  Topical ointments, liquids or any other products (other than dry bandages) should not be applied to the incision while exofin Fusion is in place. These may loosen exofin Fusion from the skin before it has completely healed.    REMOVING exofin Fusion  Your healthcare professional will determine when the healing process of your incision or wound has been completed and exofin Fusion is ready to be removed, which is usually between 7 to 14 days. When healing is complete, your healthcare professional will carefully peel off the exofin Fusion.    Prior to removal, do not scratch, rub or pick at the mesh. This may loosen the adhesive and mesh before the skin is healed.  In the event that you notice the exofin Fusion is beginning to loosen and may be coming off or comes off with the skin/wound, contract your healthcare professional.    For complete directions on the use of exofin Fusion, please refer to instructions for Use (IFU) included in the product packaging.  IF YOU HAVE ANY QUESTIONS OR CONCERNS ABOUT exofin Fusion PLEASE CONTACT YOUR HEALTHCARE PROFESSIONAL.InfuBLOCK - Patient Information    What is a pain pump?  The InfuBLOCK pump delivers post-operative, non-narcotic, numbing medication to the nerve near the surgical site for pain relief.     Where can I find information about my pain pump?           For more information about your pain pump, scan the QR code.  For additional patient resources, visit [email or web address]-pain-management.                                                                                               While your physician is your primary source for information about your treatment there may be times during your  treatment that you need assistance with your infusion pump.     If you need assistance take the following steps:    The InfuSystem Nursing Hotline is Here for You 24/7.  Please call [phone] for the following concerns or complications:    Answers to questions about your infusion pump                 Tubing disconnect  Assistance with pump alarms                                                      Dislodged catheter  Excessive leakage noted from pump                                         Inadequate pain control    2.   [facility] Acute Pain Service: [phone] is available 24/7 for any further needs or concerns about medication or pain control. Nerve Catheter Removal Instructions  When your device is empty:    Remove your catheter by pulling the dressing off slowly (like you would remove a regular bandage). The catheter should pull right out of the skin.  Check that the BLUE tip is intact.                                                                                     If the catheter is stuck, reposition your   extremity and pull slowly until removed.  *If catheter is HURTING and WON'T come out, stop and call [phone] for further assistance.    Remove medication bag from the black carrying case.  Cut the tubing on right and left side of pump, and discard the medication bag and tubing into garbage.  Place the pump and black carrying case into the plastic bag and then place this into the return box.  Seal box with blue stickers and return to [employer] postal service. THIS IS PRE-PAID POSTAGE.[facility] COLD THERAPY - PATIENT INSTRUCTION SHEET    Cold Compression Therapy for your comfort and rehabilitation  Your caregivers want you to be productive in your rehab and comfortable during your stay. In keeping with those goals, you will be receiving an [facility] Cold Therapy Wrap to help ease post-operative pain and swelling that might keep you from getting back on track! Your [facility] Cold Therapy Wrap is  effective and simple-to-use, and you will be encouraged to apply it throughout your hospital stay and at home through the duration of your recovery.    When you are ready to go home  Be sure to take your SMI Cold Therapy Wrap and both sets of Gel Bags with you for continued comfort and use throughout your rehabilitation. If you don't already have them, ask your nurse or aide to retrieve your SMI Gel Bags from the patient freezer.    Home use precautions  Always follow your medical professional's application instructions upon discharge. Your SMI Cold Therapy Wrap and Gel Bags are designed to last for months following your surgery. Never heat the Gel Bags unless specified by your healthcare provider. Supervision is advised when using this product on children or geriatric patients. To avoid danger of suffocation, please keep the outer plastic packaging away from children & pets.    Cold Therapy Instructions  Place Gel Bags in a freezer set _ of the way to max temperature for at least (4) hours. For best results, lay the Gel Bags flat and [id number] in the freezer. Once frozen, slide Gel Bags into the gel pouch and secure your wrap to the affected area with the straps.  Gel wraps that have been stored in a freezer for an extended period of time may require a (10) minute period of softening up in a room temperature environment before application.  The gel pouch acts as a protective barrier. NEVER place frozen bags directly onto skin, as this may cause frostbite injury.  The SMI Cold Therapy Wrap is designed to be able to be worm while ambulating. The compression straps can be secured well enough so that the Wrap won't fall off while moving.  Wrap Application Videos can be viewed at [employer].  An additional protective barrier such as clothing, a washcloth, hand-towel or pillowcase may be used during prolonged treatment applications.  The Gel-Pouch and Wrap are both Latex-Free and the Gel Bag ingredients  are non toxic.    [facility] Wrap care instructions  The [facility] Cold Therapy Wrap may be hand washed and hung to dry when needed.    [facility] re-order information  Additional [facility] body specific wraps and/or Gel Bags can be re-ordered from [employer]ra[employer]com or call 620-[phone] ([phone])

## 2023-10-12 NOTE — CASE MANAGEMENT/SOCIAL WORK
Case Management Discharge Note      Final Note: Patient has been accepted and insurance has been approved for patient to go to IPR @ [facility] today, Thursday, 10/12 per April, liaison, if patient is medically ready. RN to call report to Spinal cord unit @ [phone]. Family will provide transportation to facility. Both patient and family at bedside, sisterMadelaine agreeable to discharge plan to [facility] today.         Selected Continued Care - Admitted Since 10/10/2023       Destination Coordination complete.      Service Provider Selected Services Address Phone Fax Patient Preferred    [facility] [address] [address] [phone] --              Durable Medical Equipment    No services have been selected for the patient.                Dialysis/Infusion    No services have been selected for the patient.                Home Medical Care    No services have been selected for the patient.                Therapy    No services have been selected for the patient.                Community Resources    No services have been selected for the patient.                Community & DME    No services have been selected for the patient.                    Selected Continued Care - Episodes Includes continued care and service providers with selected services from the active episodes listed below      Multiple Sclerosis Episode start date: 11/11/2021   There are no active outsourced providers for this episode.                      Final Discharge Disposition Code: 62 - inpatient rehab facility

## 2023-10-12 NOTE — PROGRESS NOTES
"      [facility] Orthopaedic Surgery Progress Note    Subjective      LOS: 0 days   Patient Care Team:  Ye Newman MD as PCP - General (Internal Medicine)  Ye Newman MD as Consulting Physician (Internal Medicine)  Adonay Gilbert MD as Consulting Physician (Neurology)    CC: Right knee pain    Interval History:   No new complaints this morning.  Awaiting [facility].    Objective      Vital Signs  Temp (24hrs), Av.2 øF (36.8 øC), Min:97.8 øF (36.6 øC), Max:98.5 øF (36.9 øC)      /59 (BP Location: Left arm, Patient Position: Lying)   Pulse 87   Temp 98.5 øF (36.9 øC) (Oral)   Resp 18   Ht 179.1 cm (70.5\")   Wt 122 kg (268 lb 8.3 oz)   LMP  (LMP Unknown)   SpO2 95%   BMI 37.98 kg/mý     Examination:   Examination of the right knee: The wound is clean, dry, and intact.  Ankle dorsiflexion, ankle plantar flexion, and EHL are intact.  Sensation intact in the foot to light touch.  2+ dorsalis pedis pulse.  Straight leg raise is weak but intact.      Labs:  Results from last 7 days   Lab Units 10/12/23  0515 10/11/23  0601   WBC 10*3/mm3 7.34 8.14   HEMOGLOBIN g/dL 10.2* 10.7*   HEMATOCRIT % 31.7* 33.3*   MCV fL 94.3 93.3   PLATELETS 10*3/mm3 150 167       Radiology:  Imaging Results (Last 24 Hours)       ** No results found for the last 24 hours. **            PT:  Physical Therapy - Plan of Care Review - Outcome Summary:  Outcome Evaluation: Pt amb with FWW and Min A. Pt continues to require frequent VC to accept weight onto RLE. Slow gait speed noted. Good effort observed this session. Activity limited by elevated pain. Continue to recommend d/c to IRF to promote increased independence with functional mobility and decrease risk of falls. (10/11/23 1905)]       Results Review:     I reviewed the patient's new clinical results.    Assessment and Plan     Assessment:   Status post right total knee arthroplasty      S/P total knee arthroplasty, right    Depression    Multiple sclerosis    " Restless legs syndrome    Essential hypertension    Degenerative arthritis of right knee    Primary osteoarthritis of right knee    Acute postoperative pain      Plan for disposition: Plan for [facility] today if bed available and cleared medically.  Follow-up in 3 weeks as planned.      Future Appointments   Date Time Provider Department Center   10/25/2023  8:45 AM Adonay Gilbert MD [facility]   10/30/2023 10:50 AM Kendal Coker PA-C [facility]   11/13/2023  1:30 PM Maribell Anderson MD [facility] None           Lalo Choe MD  10/12/23  12:08 EDT

## 2023-10-12 NOTE — DISCHARGE SUMMARY
Patient Name: Rashad Carrillo  MRN: [id number]  : 1955  DOS: 10/12/2023    Attending: Lalo Choe MD    Primary Care Provider: Ye Newman MD    Date of Admission:.10/10/2023 10:10 AM    Date of Discharge:  10/12/2023    Discharge Diagnosis:   S/P total knee arthroplasty, right    Depression    Multiple sclerosis    Restless legs syndrome    Essential hypertension    Degenerative arthritis of right knee    Primary osteoarthritis of right knee    Acute postoperative pain      Hospital Course    At admit:  Patient is a 67 y.o.female presents for scheduled surgery by Dr. Choe. . Her knee has been painful for many years. She uses a cane or walker for ambulation. She reports frequent falls.  Of note, she reports she has active MS and history of being in a comatose state 6-8 hours after surgery. She is unable to speak, respond or move and her temperature drops significantly. She states this lasted 3 days after her last surgery.   Patient was seen in the preop area,  She underwent a TOTAL KNEE ARTHROPLASTY WITH CORI ROBOT - RIGHT  today  Currently denies no fever no chills no chest pain or shortness of breath     After admit:    Patient was provided pain medications as needed for pain control, along with adductor canal nerve block infusion of Ropivacaine.      Adjustments were made to pain medications to optimize postop pain management. Risks and benefits of opiate medications discussed with patient. JOANN report was reviewed.    She was seen by PT and OT and has progressed well over her stay.    Pt used an IS for atelectasis prophylaxis and Eliquis along with mechanicals for DVT prophylaxis.    Home medications were resumed as appropriate, and labs were monitored and remained fairly stable.     With the progress she has made, she is ready for DC to [facility] today.     She will have an Infupump ( instructed on it during this admit).    Discussed with patient regarding plan and she shows understanding  "and agreement.             Procedures Performed  Procedure(s):  TOTAL KNEE ARTHROPLASTY WITH CORI ROBOT - RIGHT       Pertinent Test Results:    I reviewed the patient's new clinical results.   Results from last 7 days   Lab Units 10/12/23  0515 10/11/23  0601   WBC 10*3/mm3 7.34 8.14   HEMOGLOBIN g/dL 10.2* 10.7*   HEMATOCRIT % 31.7* 33.3*   PLATELETS 10*3/mm3 150 167     Results from last 7 days   Lab Units 10/12/23  0515   SODIUM mmol/L 138   POTASSIUM mmol/L 4.3   CHLORIDE mmol/L 106   CO2 mmol/L 27.0   BUN mg/dL 15   CREATININE mg/dL 0.60   CALCIUM mg/dL 8.1*   GLUCOSE mg/dL 92     I reviewed the patient's new imaging including images and reports.      Physical therapy  Date of Service: 10/12/23 1127  Creation Time: 10/12/23 1229     Signed         Goal Outcome Evaluation:  Plan of Care Reviewed With: patient, sibling  Progress: improving  Outcome Evaluation: Patient demonstrated improved gait mechanics and tolerance to mobility today with increased independence with OOB mobility. R knee AROM progressing well, 90 degrees today. Patient continues to be below baseline functioning, demonstrating decreased functional mobility status, impaired balance, and decreased R knee strength/ROM. Will address these deficits to promote return to PLOF. Recommend IRF at d/c.        Anticipated Discharge Disposition (PT): inpatient rehabilitation facility                 Discharge Assessment:       Visit Vitals  /54 (BP Location: Right arm, Patient Position: Lying)   Pulse 74   Temp 98.5 øF (36.9 øC) (Oral)   Resp 16   Ht 179.1 cm (70.5\")   Wt 122 kg (268 lb 8.3 oz)   LMP  (LMP Unknown)   SpO2 94%   BMI 37.98 kg/mý     Temp (24hrs), Av.2 øF (36.8 øC), Min:97.8 øF (36.6 øC), Max:98.5 øF (36.9 øC)      General Appearance:    Alert, cooperative, in no acute distress   Lungs:     Clear to auscultation,respirations regular, even and                   unlabored    Heart:    Regular rhythm and normal rate, normal S1 and S2 "   Abdomen:     Normal bowel sounds, no masses, no organomegaly, soft        non-tender, non-distended, no guarding, no rebound                 tenderness   Extremities:   CDI dressing surgical knee . ACB cath present, infupump.   Pulses:   Pulses palpable and equal bilaterally   Skin:   No bleeding, bruising or rash   Neurologic:   Cranial nerves 2 - 12 grossly intact, sensation intact, Flexion and dorsiflexion intact bilateral feet.         Discharge Disposition: [facility]          Discharge Medications        New Medications        Instructions Start Date   cyclobenzaprine 5 MG tablet  Commonly known as: FLEXERIL   5 mg, Oral, 3 Times Daily PRN      docusate sodium 100 MG capsule  Commonly known as: Colace   100 mg, Oral, 2 Times Daily      meloxicam 15 MG tablet  Commonly known as: MOBIC   15 mg, Oral, Daily      oxyCODONE 5 MG immediate release tablet  Commonly known as: Roxicodone   5 mg, Oral, Every 4 Hours PRN      polyethylene glycol 17 g packet  Commonly known as: MIRALAX   17 g, Oral, Daily      ropivacaine 0.2 % infusion (INFUSYSTEM)  Commonly known as: NAROPIN   1 mL/hr (2 mg/hr), Peripheral Nerve, Continuous             Changes to Medications        Instructions Start Date   Dalfampridine ER 10 MG tablet sustained-release 12 hour  What changed: Another medication with the same name was added. Make sure you understand how and when to take each.   10 mg, Oral, 2 Times Daily      Dalfampridine ER 10 MG tablet sustained-release 12 hour  What changed: You were already taking a medication with the same name, and this prescription was added. Make sure you understand how and when to take each.   10 mg, Oral, Every 12 Hours Scheduled      gabapentin 300 MG capsule  Commonly known as: NEURONTIN  What changed:   how much to take  when to take this   600 mg, Oral, Nightly      gabapentin 100 MG capsule  Commonly known as: NEURONTIN  What changed:   how much to take  when to take this   100 mg, Oral, 2 Times Daily              Continue These Medications        Instructions Start Date   acyclovir 400 MG tablet  Commonly known as: ZOVIRAX   400 mg, Oral, 2 Times Daily      albuterol sulfate  (90 Base) MCG/ACT inhaler  Commonly known as: PROVENTIL HFA;VENTOLIN HFA;PROAIR HFA   Daily As Needed.      apixaban 2.5 MG tablet tablet  Commonly known as: ELIQUIS   Take 1 tablet twice a day by oral route.      azelastine 0.15 % solution nasal spray  Commonly known as: ASTEPRO   Daily As Needed.      FLUoxetine 20 MG capsule  Commonly known as: PROzac   20 mg, Oral, Daily      Gemtesa 75 MG tablet  Generic drug: Vibegron   Gemtesa 75 mg tablet   Take 1 tablet every day by oral route.      hydrALAZINE 10 MG tablet  Commonly known as: APRESOLINE   1 tablet Daily.      meclizine 25 MG tablet  Commonly known as: ANTIVERT   1 tablet 3 (Three) Times a Day As Needed.      Multi-Vitamin Daily tablet tablet  Generic drug: multivitamin   1 tablet, Oral, Daily      omeprazole 20 MG capsule  Commonly known as: priLOSEC   1 capsule Daily As Needed.      ondansetron ODT 4 MG disintegrating tablet  Commonly known as: ZOFRAN-ODT   4 mg, Translingual, Every 8 Hours PRN      oxybutynin XL 10 MG 24 hr tablet  Commonly known as: DITROPAN-XL   10 mg, Oral, Daily      potassium chloride 10 MEQ CR capsule  Commonly known as: MICRO-K   10 mEq, Oral, Daily      rosuvastatin 40 MG tablet  Commonly known as: CRESTOR   40 mg, Daily      valACYclovir 500 MG tablet  Commonly known as: Valtrex   500 mg, Oral, 2 Times Daily      vitamin D 1.25 MG (80511 UT) capsule capsule  Commonly known as: ERGOCALCIFEROL   50,000 Units, Oral, Every 7 Days      Z-BEC PO   1 tablet, Oral, Daily             Stop These Medications      Chlorhexidine Gluconate 4 % solution     HYDROcodone-acetaminophen 5-325 MG per tablet  Commonly known as: NORCO     olmesartan 40 MG tablet  Commonly known as: BENICAR     OnabotulinumtoxinA 200 units reconstituted solution              Discharge Diet:  resume prior.     Activity at Discharge:   Weight bearing as tolerated         Future Appointments   Date Time Provider Department Center   10/25/2023  8:45 AM Adonay Gilbert MD [facility]   10/30/2023 10:50 AM Kendal Coker PA-C [facility]   11/13/2023  1:30 PM Maribell Anderson MD [facility] None         Dragon disclaimer:  Part of this encounter note is an electronic transcription/translation of spoken language to printed text. The electronic translation of spoken language may permit erroneous, or at times, nonsensical words or phrases to be inadvertently transcribed; Although I have reviewed the note for such errors, some may still exist.       Caty Blake MD  10/12/23  13:49 EDT

## 2023-10-12 NOTE — PLAN OF CARE
Goal Outcome Evaluation:  Plan of Care Reviewed With: patient, sibling        Progress: improving  Outcome Evaluation: Patient demonstrated improved gait mechanics and tolerance to mobility today with increased independence with OOB mobility. R knee AROM progressing well, 90 degrees today. Patient continues to be below baseline functioning, demonstrating decreased functional mobility status, impaired balance, and decreased R knee strength/ROM. Will address these deficits to promote return to PLOF. Recommend IRF at d/c.      Anticipated Discharge Disposition (PT): inpatient rehabilitation facility

## 2023-10-13 ENCOUNTER — TELEPHONE (OUTPATIENT)
Dept: ORTHOPEDIC SURGERY | Facility: CLINIC | Age: 68
End: 2023-10-13

## 2023-10-13 NOTE — TELEPHONE ENCOUNTER
PT CALLED AND STATED THAT SHE IS HAVING A FEVER .8 AND WAS SENT TO [facility] TO LOWER FEVER. SHE WAS WANTING TO KNOW IF ANTIBIOTICS COULD BE GIVEN FOR RECOVERY.    DR. WILKINSON OR CLINICAL TEAM PLEASE RESPOND

## 2023-10-25 ENCOUNTER — TELEPHONE (OUTPATIENT)
Dept: NEUROLOGY | Facility: CLINIC | Age: 68
End: 2023-10-25

## 2023-10-30 ENCOUNTER — OFFICE VISIT (OUTPATIENT)
Dept: ORTHOPEDIC SURGERY | Facility: CLINIC | Age: 68
End: 2023-10-30
Payer: MEDICARE

## 2023-10-30 VITALS — WEIGHT: 268.96 LBS | HEIGHT: 71 IN | BODY MASS INDEX: 37.65 KG/M2

## 2023-10-30 DIAGNOSIS — Z96.651 S/P TOTAL KNEE ARTHROPLASTY, RIGHT: Primary | ICD-10-CM

## 2023-10-30 PROCEDURE — 1160F RVW MEDS BY RX/DR IN RCRD: CPT | Performed by: PHYSICIAN ASSISTANT

## 2023-10-30 PROCEDURE — 1159F MED LIST DOCD IN RCRD: CPT | Performed by: PHYSICIAN ASSISTANT

## 2023-10-30 PROCEDURE — 99024 POSTOP FOLLOW-UP VISIT: CPT | Performed by: PHYSICIAN ASSISTANT

## 2023-10-30 RX ORDER — HYDROCODONE BITARTRATE AND ACETAMINOPHEN 5; 325 MG/1; MG/1
1 TABLET ORAL EVERY 4 HOURS PRN
COMMUNITY

## 2023-10-30 RX ORDER — ATORVASTATIN CALCIUM 40 MG/1
40 TABLET, FILM COATED ORAL DAILY
COMMUNITY
Start: 2023-10-26

## 2023-10-30 RX ORDER — CHLORTHALIDONE 25 MG/1
25 TABLET ORAL DAILY
COMMUNITY
Start: 2023-10-27

## 2023-10-30 NOTE — PROGRESS NOTES
"    [facility] Note        Subjective     Post-op (3 wk s/p - Total Knee Arthroplasty With Cori Robot - Right DOS 10/10/23)       ANU Carrillo is a 67 y.o. female.  Patient presents for their initial postop visit following right TKA with Cori robot performed on the above date by Dr. Choe.    Pain scale: 8/10. Associated symptoms swelling, stiffness, giving away. Pain with walking, standing, stairs, sleeping, lying on affected side, rising from seated position. Ice, medication helps to ease the pain. Using walker to assist with ambulation. Starting OPPT today.    Patient denies any fever, chills, night sweats or other constitutional symptoms.    Overall, doing better since surgery.      Objective      Physical Exam  Ht 179.1 cm (70.51\")   Wt 122 kg (268 lb 15.4 oz)   LMP  (LMP Unknown)   BMI 38.03 kg/m²     Body mass index is 38.03 kg/m².        Ortho Exam  Integument:   Right knee: Incision is healing well without redness, warmth, drainage or signs of infection.    Lower Extremities:   Right Knee:    Tenderness:  Generalized pain typical following TKA    Effusion:  1+     Swelling: Positive with soft calf    Crepitus:  None    Range of motion:  Extension: 5/10°       Flexion: 90°  Instability:  No varus laxity, no valgus laxity, negative anterior drawer  Deformities:  None      Imaging Reviewed:  Ordered right knee plain films.  Imaging read/interpreted by Dr. Choe.    Imaging Results (Last 24 Hours)       Procedure Component Value Units Date/Time    XR Knee 3+ View With [place] Right [[id number]] Resulted: 10/30/23 1241     Updated: 10/30/23 1241    Narrative:      Right Knee Radiographs  Indication: status-post right total knee arthroplasty  Views: AP, lateral, and sunrise views of the right knee    Comparison: no change compared to prior study, 10/10/2023    Findings:   The components are well aligned, with no signs of loosening or failure.               Assessment:  1. S/P " total knee arthroplasty, right        Plan:  Status post right TKA with Cori robot, stable.  Reviewed imaging with patient.  Continue with outpatient PT.  Be aggressive with ROM, stretching to the knee.  Encourage ice and elevation to assist with inflammation and swelling control.  Patient states she'll call when she needs refill of pain medication.  Recommend OTC pain medication as needed.  Continue with Eliquis as directed for DVT prophylaxis.  Follow up with Dr. Choe in 6 weeks for repeat evaluation. Needs knee imaging.    Questions and concerns answered.      Kendal Coker PA-C  10/30/23  20:45 EDT      Dictated Utilizing Dragon Dictation.

## 2023-11-01 ENCOUNTER — SPECIALTY PHARMACY (OUTPATIENT)
Dept: ONCOLOGY | Facility: HOSPITAL | Age: 68
End: 2023-11-01
Payer: MEDICARE

## 2023-11-14 ENCOUNTER — TELEPHONE (OUTPATIENT)
Dept: NEUROLOGY | Facility: CLINIC | Age: 68
End: 2023-11-14
Payer: MEDICARE

## 2023-11-14 NOTE — TELEPHONE ENCOUNTER
Pt states they just had knee surgery and can not drive. They were not able to find someone to bring them in for their Botox appt on 11/17/223.     Pt asked to be scheduled a few weeks past that date so they have time to heal enough to be able to drive on their own.     Pt was rescheduled for 12/5/23.

## 2023-11-17 ENCOUNTER — TELEPHONE (OUTPATIENT)
Dept: ORTHOPEDIC SURGERY | Facility: CLINIC | Age: 68
End: 2023-11-17
Payer: MEDICARE

## 2023-11-27 ENCOUNTER — OFFICE VISIT (OUTPATIENT)
Dept: BARIATRICS/WEIGHT MGMT | Facility: CLINIC | Age: 68
End: 2023-11-27
Payer: MEDICARE

## 2023-11-27 VITALS
SYSTOLIC BLOOD PRESSURE: 124 MMHG | HEIGHT: 71 IN | TEMPERATURE: 97.3 F | OXYGEN SATURATION: 98 % | BODY MASS INDEX: 35.56 KG/M2 | DIASTOLIC BLOOD PRESSURE: 72 MMHG | RESPIRATION RATE: 16 BRPM | HEART RATE: 66 BPM | WEIGHT: 254 LBS

## 2023-11-27 DIAGNOSIS — E66.9 OBESITY, CLASS II, BMI 35-39.9: Primary | ICD-10-CM

## 2023-11-27 DIAGNOSIS — Z51.81 THERAPEUTIC DRUG MONITORING: ICD-10-CM

## 2023-11-27 DIAGNOSIS — E55.9 HYPOVITAMINOSIS D: ICD-10-CM

## 2023-11-27 DIAGNOSIS — Z79.899 ENCOUNTER FOR LONG-TERM (CURRENT) USE OF OTHER MEDICATIONS: ICD-10-CM

## 2023-11-27 DIAGNOSIS — G35 MULTIPLE SCLEROSIS: Chronic | ICD-10-CM

## 2023-11-27 DIAGNOSIS — R53.83 FATIGUE, UNSPECIFIED TYPE: ICD-10-CM

## 2023-11-27 DIAGNOSIS — Z13.0 SCREENING, IRON DEFICIENCY ANEMIA: ICD-10-CM

## 2023-11-27 DIAGNOSIS — Z90.3 POSTGASTRECTOMY MALABSORPTION: ICD-10-CM

## 2023-11-27 DIAGNOSIS — Z13.21 MALNUTRITION SCREEN: ICD-10-CM

## 2023-11-27 DIAGNOSIS — K91.2 POSTGASTRECTOMY MALABSORPTION: ICD-10-CM

## 2023-11-27 DIAGNOSIS — E55.9 VITAMIN D DEFICIENCY: ICD-10-CM

## 2023-11-27 PROCEDURE — 3074F SYST BP LT 130 MM HG: CPT | Performed by: SURGERY

## 2023-11-27 PROCEDURE — 1159F MED LIST DOCD IN RCRD: CPT | Performed by: SURGERY

## 2023-11-27 PROCEDURE — 99213 OFFICE O/P EST LOW 20 MIN: CPT | Performed by: SURGERY

## 2023-11-27 PROCEDURE — 1160F RVW MEDS BY RX/DR IN RCRD: CPT | Performed by: SURGERY

## 2023-11-27 PROCEDURE — 3078F DIAST BP <80 MM HG: CPT | Performed by: SURGERY

## 2023-11-27 NOTE — PROGRESS NOTES
[facility]  [address]  [address]  [facility] [id number] [phone]        Patient Name:  Rashad Carrillo.  :  1955      Date of Visit: 2023      Reason for Visit:   1 year postop      HPI: Rashad Carrillo is a 67 y.o. female s/p  LSG/HHR 22 PNQ     Doing well.  No issues/concerns. Denies dysphagia, reflux, nausea, vomiting, and abdominal pain.  Getting 60-90g prot/day.  Drinks 1-2 Premier proteins per day.    Drinking <64 fluid oz/day. Last labs revealed prealbumin 16. Taking Patches: MVI, vit D, B12, iron .        Exercise: 10/10/23 right TKR.  Has not yet started to exercise yet.  To see orthopedic surgeon on 23.  Still not allowed to drive since surgery.  To start swimming at the [facility].  Walks some.    Has baseline forgetfulness and peripheral neuropathy.     Presurgery weight: 333 pounds.  .  Today's weight is 115 kg (254 lb) pounds, today's  Body mass index is 35.93 kg/m²., andHIS@ weight loss since surgery is 79 pounds.      Past Medical History:   Diagnosis Date    Chronic knee pain     NSAIDS + steroid injections (last 3/2022)    Dyspepsia     Dyspnea on exertion     Fatigue     GERD (gastroesophageal reflux disease)     History of transfusion     PATIENT DENIES REACTION    Hyperlipidemia     Hypertension     Morbid obesity     Multiple sclerosis     follows w/ Dr. Gilbert     Past Surgical History:   Procedure Laterality Date    COLONOSCOPY      GASTRIC SLEEVE LAPAROSCOPIC N/A 2022    Procedure: GASTRIC SLEEVE WITH HIATAL HERNIA LAPAROSCOPIC WITH DAVINCI ROBOT, ESOPHAGOGASTRODUODENOSCOPY;  Surgeon: Maribell Anderson MD;  Location: [facility] OR;  Service: Robotics - DaVinci;  Laterality: N/A;    LAPAROSCOPIC CHOLECYSTECTOMY  2012    gallstone pancreatitis    TOTAL KNEE ARTHROPLASTY Right 10/10/2023    Procedure: TOTAL KNEE ARTHROPLASTY WITH CORI ROBOT - RIGHT;  Surgeon: Lalo Choe MD;  Location:  [facility] OR;   Service: Robotics - Ortho;  Laterality: Right;     Outpatient Medications Marked as Taking for the 11/27/23 encounter (Office Visit) with Maribell Anderson MD   Medication Sig Dispense Refill    acyclovir (ZOVIRAX) 400 MG tablet Take 1 tablet by mouth 2 (Two) Times a Day. (Patient taking differently: Take 1 tablet by mouth Daily As Needed.) 60 tablet 11    albuterol sulfate  (90 Base) MCG/ACT inhaler Daily As Needed.      apixaban (ELIQUIS) 2.5 MG tablet tablet Take 1 tablet twice a day by oral route.      atorvastatin (LIPITOR) 40 MG tablet Take 1 tablet by mouth Daily.      azelastine (ASTEPRO) 0.15 % solution nasal spray Daily As Needed.  3    B Complex-C-E-Zn (Z-BEC PO) Take 1 tablet by mouth daily.      chlorthalidone (HYGROTON) 25 MG tablet Take 1 tablet by mouth Daily.      cyclobenzaprine (FLEXERIL) 5 MG tablet Take 1 tablet by mouth 3 (Three) Times a Day As Needed for Muscle Spasms.      Dalfampridine ER 10 MG tablet sustained-release 12 hour Take 10 mg by mouth 2 (Two) Times a Day. 180 tablet 3    Dalfampridine ER 10 MG tablet sustained-release 12 hour Take 10 mg by mouth Every 12 (Twelve) Hours.      docusate sodium (Colace) 100 MG capsule Take 1 capsule by mouth 2 (Two) Times a Day.      FLUoxetine (PROzac) 20 MG capsule Take 1 capsule by mouth Daily.      gabapentin (NEURONTIN) 100 MG capsule Take 1 capsule by mouth 2 (Two) Times a Day.      hydrALAZINE (APRESOLINE) 10 MG tablet 1 tablet Daily.      HYDROcodone-acetaminophen (NORCO) 5-325 MG per tablet Take 1 tablet by mouth Every 4 (Four) Hours As Needed.      meclizine (ANTIVERT) 25 MG tablet 1 tablet 3 (Three) Times a Day As Needed.      Multiple Vitamin (MULTI-VITAMIN DAILY) tablet Take 1 tablet by mouth Daily.      omeprazole (priLOSEC) 20 MG capsule 1 capsule Daily As Needed.      oxybutynin XL (DITROPAN-XL) 10 MG 24 hr tablet Take 1 tablet by mouth Daily.      polyethylene glycol (MIRALAX) 17 g packet Take 17 g by mouth Daily.       "potassium chloride (MICRO-K) 10 MEQ CR capsule Take 1 capsule by mouth Daily.      ropivacaine (NAROPIN) 0.2 % infusion (INFUSYSTEM) 2 mg/hr by Peripheral Nerve route Continuous.      vitamin D (ERGOCALCIFEROL) 65742 UNITS capsule capsule Take 1 capsule by mouth Every 7 (Seven) Days.         Allergies   Allergen Reactions    Isoniazid Other (See Comments)     aplastic anemia    Rifampin Anaphylaxis    Sulfa Antibiotics Anaphylaxis    Acetaminophen Headache    Amlodipine Other (See Comments)    Pregabalin Other (See Comments)    Tetanus Antitoxin Swelling       Social History     Socioeconomic History    Marital status:    Tobacco Use    Smoking status: Former     Packs/day: 1.00     Years: 5.00     Additional pack years: 0.00     Total pack years: 5.00     Types: Cigarettes     Quit date:      Years since quittin.     Passive exposure: Never    Smokeless tobacco: Never    Tobacco comments:     social   Vaping Use    Vaping Use: Never used   Substance and Sexual Activity    Alcohol use: Yes     Comment: 3 drinks/month    Drug use: No    Sexual activity: Defer       /72 (BP Location: Left arm, Patient Position: Sitting)   Pulse 66   Temp 97.3 °F (36.3 °C)   Resp 16   Ht 179.1 cm (70.5\")   Wt 115 kg (254 lb)   LMP  (LMP Unknown)   SpO2 98%   BMI 35.93 kg/m²     Physical Exam  Constitutional:       General: She is not in acute distress.     Appearance: She is well-developed. She is not diaphoretic.   HENT:      Head: Normocephalic and atraumatic.      Mouth/Throat:      Pharynx: No oropharyngeal exudate.   Eyes:      Conjunctiva/sclera: Conjunctivae normal.      Pupils: Pupils are equal, round, and reactive to light.   Pulmonary:      Effort: Pulmonary effort is normal. No respiratory distress.   Abdominal:      General: There is no distension.      Palpations: Abdomen is soft.   Skin:     General: Skin is warm and dry.      Coloration: Skin is not pale.   Neurological:      Mental Status: " She is alert and oriented to person, place, and time.      Cranial Nerves: No cranial nerve deficit.   Psychiatric:         Behavior: Behavior normal.         Thought Content: Thought content normal.       Assessment:  1 years s/p  LSG/HHR 11/8/22 PNQ     ICD-10-CM ICD-9-CM   1. Obesity, Class II, BMI 35-39.9  E66.9 278.00   2. Vitamin D deficiency  E55.9 268.9   3. Encounter for long-term (current) use of other medications  Z79.899 V58.69   4. Therapeutic drug monitoring  Z51.81 V58.83   5. Screening, iron deficiency anemia  Z13.0 V78.0   6. Postgastrectomy malabsorption  K91.2 579.3    Z90.3    7. Malnutrition screen  Z13.21 V77.2   8. Hypovitaminosis D  E55.9 268.9   9. Fatigue, unspecified type  R53.83 780.79   10. Multiple sclerosis  G35 340              Plan:  Doing well. Continue w/ good food choices and healthy habits.  Continue protein >70g/day.  Continue routine exercise.  Routine bariatric labs ordered.  Continue vitamins w/ adjustments pending lab results.  Call w/ problems/concerns.     Encourage track on Baritastic to get enough protein.    The patient was instructed to follow up in 6 months, sooner if needed.    note: approx 15 of the 25 minute visit was spent counseling on nutrition and necessary dietary/lifestyle modifications face to face.    Maribell Anderson MD

## 2023-12-01 LAB
25(OH)D3+25(OH)D2 SERPL-MCNC: 37.9 NG/ML (ref 30–100)
ALBUMIN SERPL-MCNC: 4.1 G/DL (ref 3.9–4.9)
ALBUMIN/GLOB SERPL: 1.4 {RATIO} (ref 1.2–2.2)
ALP SERPL-CCNC: 140 IU/L (ref 44–121)
ALT SERPL-CCNC: 11 IU/L (ref 0–32)
AST SERPL-CCNC: 15 IU/L (ref 0–40)
BASOPHILS # BLD AUTO: 0 X10E3/UL (ref 0–0.2)
BASOPHILS NFR BLD AUTO: 1 %
BILIRUB SERPL-MCNC: 0.6 MG/DL (ref 0–1.2)
BUN SERPL-MCNC: 18 MG/DL (ref 8–27)
BUN/CREAT SERPL: 28 (ref 12–28)
CALCIUM SERPL-MCNC: 9.5 MG/DL (ref 8.7–10.3)
CHLORIDE SERPL-SCNC: 108 MMOL/L (ref 96–106)
CO2 SERPL-SCNC: 23 MMOL/L (ref 20–29)
CREAT SERPL-MCNC: 0.65 MG/DL (ref 0.57–1)
EGFRCR SERPLBLD CKD-EPI 2021: 98 ML/MIN/1.73
EOSINOPHIL # BLD AUTO: 0.1 X10E3/UL (ref 0–0.4)
EOSINOPHIL NFR BLD AUTO: 3 %
ERYTHROCYTE [DISTWIDTH] IN BLOOD BY AUTOMATED COUNT: 12.5 % (ref 11.7–15.4)
FERRITIN SERPL-MCNC: 179 NG/ML (ref 15–150)
FOLATE SERPL-MCNC: 11.7 NG/ML
GLOBULIN SER CALC-MCNC: 2.9 G/DL (ref 1.5–4.5)
GLUCOSE SERPL-MCNC: 91 MG/DL (ref 70–99)
HCT VFR BLD AUTO: 41 % (ref 34–46.6)
HGB BLD-MCNC: 13.5 G/DL (ref 11.1–15.9)
IMM GRANULOCYTES # BLD AUTO: 0 X10E3/UL (ref 0–0.1)
IMM GRANULOCYTES NFR BLD AUTO: 0 %
IRON SERPL-MCNC: 41 UG/DL (ref 27–139)
LYMPHOCYTES # BLD AUTO: 1.4 X10E3/UL (ref 0.7–3.1)
LYMPHOCYTES NFR BLD AUTO: 28 %
MCH RBC QN AUTO: 29 PG (ref 26.6–33)
MCHC RBC AUTO-ENTMCNC: 32.9 G/DL (ref 31.5–35.7)
MCV RBC AUTO: 88 FL (ref 79–97)
METHYLMALONATE SERPL-SCNC: 241 NMOL/L (ref 0–378)
MONOCYTES # BLD AUTO: 0.4 X10E3/UL (ref 0.1–0.9)
MONOCYTES NFR BLD AUTO: 8 %
NEUTROPHILS # BLD AUTO: 3.1 X10E3/UL (ref 1.4–7)
NEUTROPHILS NFR BLD AUTO: 60 %
PLATELET # BLD AUTO: 189 X10E3/UL (ref 150–450)
POTASSIUM SERPL-SCNC: 4.2 MMOL/L (ref 3.5–5.2)
PREALB SERPL-MCNC: 17 MG/DL (ref 10–36)
PROT SERPL-MCNC: 7 G/DL (ref 6–8.5)
RBC # BLD AUTO: 4.65 X10E6/UL (ref 3.77–5.28)
SODIUM SERPL-SCNC: 146 MMOL/L (ref 134–144)
VIT B1 BLD-SCNC: 109.6 NMOL/L (ref 66.5–200)
WBC # BLD AUTO: 5.2 X10E3/UL (ref 3.4–10.8)

## 2023-12-04 ENCOUNTER — SPECIALTY PHARMACY (OUTPATIENT)
Dept: ONCOLOGY | Facility: HOSPITAL | Age: 68
End: 2023-12-04
Payer: MEDICARE

## 2023-12-04 NOTE — PROGRESS NOTES
Specialty Pharmacy Patient Management Program  Neurology Reassessment     Rashad Carrillo is a 67 y.o. female with multiple sclerosis seen by a Neurology provider and enrolled in the Neurology Patient Management program offered by [facility].  A follow-up outreach was conducted, including assessment of continued therapy appropriateness, medication adherence, and side effect incidence and management for dalfampridine.  (Filling at [facility])     Changes to Insurance Coverage or Financial Support  Medicare     Relevant Past Medical History and Comorbidities  Relevant medical history and concomitant health conditions were discussed with the patient. The patient's chart has been reviewed for relevant past medical history and comorbid health conditions and updated as necessary.   Past Medical History:   Diagnosis Date    Chronic knee pain     NSAIDS + steroid injections (last 3/2022)    Dyspepsia     Dyspnea on exertion     Fatigue     GERD (gastroesophageal reflux disease)     History of transfusion     PATIENT DENIES REACTION    Hyperlipidemia     Hypertension     Morbid obesity     Multiple sclerosis     follows w/ Dr. Gilbert     Social History     Socioeconomic History    Marital status:    Tobacco Use    Smoking status: Former     Packs/day: 1.00     Years: 5.00     Additional pack years: 0.00     Total pack years: 5.00     Types: Cigarettes     Quit date:      Years since quittin.9     Passive exposure: Never    Smokeless tobacco: Never    Tobacco comments:     social   Vaping Use    Vaping Use: Never used   Substance and Sexual Activity    Alcohol use: Yes     Comment: 3 drinks/month    Drug use: No    Sexual activity: Defer     Problem list reviewed by Courtney Medina, PharmD on 2023 at 12:32 PM    Hospitalizations and Urgent Care Since Last Assessment  ED Visits, Admissions, or Hospitalizations: none   Urgent Office Visits: none     Allergies  Known allergies and  reactions were discussed with the patient. The patient's chart has been reviewed for allergy information and updated as necessary.   Allergies   Allergen Reactions    Isoniazid Other (See Comments)     aplastic anemia    Rifampin Anaphylaxis    Sulfa Antibiotics Anaphylaxis    Acetaminophen Headache    Amlodipine Other (See Comments)    Pregabalin Other (See Comments)    Tetanus Antitoxin Swelling     Allergies reviewed by Courtney Medina, PharmD on 12/4/2023 at 12:32 PM    Relevant Laboratory Values  Common labs          10/11/2023    06:01 10/12/2023    05:15 11/27/2023    14:33   Common Labs   Glucose  92  91    BUN  15  18    Creatinine  0.60  0.65    Sodium  138  146    Potassium  4.3  4.2    Chloride  106  108    Calcium  8.1  9.5    Total Protein   7.0    Albumin   4.1    Total Bilirubin   0.6    Alkaline Phosphatase   140    AST (SGOT)   15    ALT (SGPT)   11    WBC 8.14  7.34  5.2    Hemoglobin 10.7  10.2  13.5    Hematocrit 33.3  31.7  41.0    Platelets 167  150  189        Lab Assessment  The above labs have been reviewed. No dose adjustments are needed for the specialty medication(s) based on the labs.     Current Medication List  This medication list has been reviewed with the patient and evaluated for any interactions or necessary modifications/recommendations, and updated to include all prescription medications, OTC medications, and supplements the patient is currently taking.  This list reflects what is contained in the patient's profile, which has also been marked as reviewed to communicate to other providers it is the most up to date version of the patient's current medication therapy.     Current Outpatient Medications:     acyclovir (ZOVIRAX) 400 MG tablet, Take 1 tablet by mouth 2 (Two) Times a Day. (Patient taking differently: Take 1 tablet by mouth Daily As Needed.), Disp: 60 tablet, Rfl: 11    albuterol sulfate  (90 Base) MCG/ACT inhaler, Daily As Needed., Disp: , Rfl:     apixaban  (ELIQUIS) 2.5 MG tablet tablet, Take 1 tablet twice a day by oral route., Disp: , Rfl:     atorvastatin (LIPITOR) 40 MG tablet, Take 1 tablet by mouth Daily., Disp: , Rfl:     azelastine (ASTEPRO) 0.15 % solution nasal spray, Daily As Needed., Disp: , Rfl: 3    B Complex-C-E-Zn (Z-BEC PO), Take 1 tablet by mouth daily., Disp: , Rfl:     chlorthalidone (HYGROTON) 25 MG tablet, Take 1 tablet by mouth Daily., Disp: , Rfl:     cyclobenzaprine (FLEXERIL) 5 MG tablet, Take 1 tablet by mouth 3 (Three) Times a Day As Needed for Muscle Spasms., Disp: , Rfl:     Dalfampridine ER 10 MG tablet sustained-release 12 hour, Take 10 mg by mouth 2 (Two) Times a Day., Disp: 180 tablet, Rfl: 3    docusate sodium (Colace) 100 MG capsule, Take 1 capsule by mouth 2 (Two) Times a Day., Disp: , Rfl:     FLUoxetine (PROzac) 20 MG capsule, Take 1 capsule by mouth Daily., Disp: , Rfl:     gabapentin (NEURONTIN) 100 MG capsule, Take 1 capsule by mouth 2 (Two) Times a Day., Disp: , Rfl:     gabapentin (NEURONTIN) 300 MG capsule, Take 2 capsules by mouth Every Night. (Patient taking differently: Take 1 capsule by mouth 2 (Two) Times a Day.), Disp: 60 capsule, Rfl: 5    hydrALAZINE (APRESOLINE) 10 MG tablet, 1 tablet Daily., Disp: , Rfl:     HYDROcodone-acetaminophen (NORCO) 5-325 MG per tablet, Take 1 tablet by mouth Every 4 (Four) Hours As Needed., Disp: , Rfl:     meclizine (ANTIVERT) 25 MG tablet, 1 tablet 3 (Three) Times a Day As Needed., Disp: , Rfl:     Multiple Vitamin (MULTI-VITAMIN DAILY) tablet, Take 1 tablet by mouth Daily., Disp: , Rfl:     omeprazole (priLOSEC) 20 MG capsule, 1 capsule Daily As Needed., Disp: , Rfl:     oxybutynin XL (DITROPAN-XL) 10 MG 24 hr tablet, Take 1 tablet by mouth Daily., Disp: , Rfl:     polyethylene glycol (MIRALAX) 17 g packet, Take 17 g by mouth Daily., Disp: , Rfl:     potassium chloride (MICRO-K) 10 MEQ CR capsule, Take 1 capsule by mouth Daily., Disp: , Rfl:     ropivacaine (NAROPIN) 0.2 % infusion  (INFUSYSTEM), 2 mg/hr by Peripheral Nerve route Continuous., Disp: , Rfl:     vitamin D (ERGOCALCIFEROL) 87000 UNITS capsule capsule, Take 1 capsule by mouth Every 7 (Seven) Days., Disp: , Rfl:     Medicines reviewed by Courtney Medina, PharmD on 12/4/2023 at 12:32 PM    Drug Interactions  No relevant drug/drug interactions noted     Adverse Drug Reactions  Medication tolerability: Tolerating with no to minimal ADRs          Medication plan: Continue therapy with normal follow-up  Plan for ADR Management: not required      Adherence, Self-Administration, and Current Therapy Problems  Adherence related patient's specialty therapy was discussed with the patient. The Adherence segment of this outreach has been reviewed and updated.   Adherence Questions  Linked Medication(s) Assessed: Dalfampridine (Multiple Sclerosis Agents) ([facility] fills at [facility] (internal co-pay assistance))  On average, how many doses/injections does the patient miss per month?: 0  What are the identified reasons for non-adherence or missed doses? : no problems identified  What is the estimated medication adherence level?: %  Based on the patient/caregiver response and refill history, does this patient require an MTP to track adherence improvements?: no    Additional Barriers to Patient Self-Administration: none  Methods for Supporting Patient Self-Administration: not required    Recently Close Medication Therapy Problems  No medication therapy recommendations to display  Open Medication Therapy Problems  No medication therapy recommendations to display     Goals of Therapy  Goals related to the patient's specialty therapy was discussed with the patient. The Patient Goals segment of this outreach has been reviewed and updated.    Goals Addressed Today        Specialty Pharmacy General Goal      Improved walking speed               Quality of Life Assessment   Quality of Life related to the patient's enrollment in the patient  management program and services provided was discussed with the patient. The QOL segment of this outreach has been reviewed and updated.   Quality of Life Improvement Scale: Moderately better    Reassessment Plan & Follow-Up  Medication Therapy Changes: Pt to continue filled Dalfampridine 10mg po bid at [facility].  Related Plans, Therapy Recommendations, or Issues to Be Addressed: none  Pharmacist to perform regular reassessments no more than (6) months from the previous assessment.  Care Coordinator to set up future refill outreaches, coordinate prescription delivery, and escalate clinical questions to pharmacist.     Attestation  Therapeutic appropriateness: Appropriate  I attest the patient was actively involved in and has agreed to the above plan of care. If the prescribed therapy is at any point deemed not appropriate based on the current or future assessments, a consultation will be initiated with the patient's specialty care provider to determine the best course of action. The revised plan of therapy will be documented along with any additional patient education provided. Discussed aforementioned material with patient via telemedicine.    Courtney Medina PharmD, [facility]  Clinic Specialty Pharmacist, Neurology  12/4/2023  12:34 EST

## 2023-12-11 ENCOUNTER — OFFICE VISIT (OUTPATIENT)
Dept: ORTHOPEDIC SURGERY | Facility: CLINIC | Age: 68
End: 2023-12-11
Payer: COMMERCIAL

## 2023-12-11 DIAGNOSIS — Z96.651 S/P TOTAL KNEE ARTHROPLASTY, RIGHT: Primary | ICD-10-CM

## 2023-12-11 DIAGNOSIS — Z47.89 ORTHOPEDIC AFTERCARE: ICD-10-CM

## 2023-12-11 PROCEDURE — 99024 POSTOP FOLLOW-UP VISIT: CPT | Performed by: ORTHOPAEDIC SURGERY

## 2023-12-11 NOTE — PROGRESS NOTES
[facility] Clinic Note    Subjective     Chief Complaint   Patient presents with    Post-op     6 week follow up -- 9 weeks status post right total knee arthroplasty with Cori Robot (DOS 10/10/23)          HPI    It has been 6  week(s) since Ms. Carrillo's last visit. She returns to clinic today for postoperative follow-up of right knee arthroplasty. The issue has been ongoing for 9 week(s). She rates her pain a 6/10 on the pain scale. Previous/current treatments: physical therapy, NSAIDs, cane/walker. Current symptoms: pain and stiffness. The pain is worse with walking, standing, sitting, climbing stairs, and rising from seated position; ice and assistive device (cane/walker) improve the pain. Overall, she is doing better.  50% improvement compared to preoperative symptoms.  Ambulating with the aid of a cane, primarily secondary to her MS.    I have reviewed the following portions of the patient's history and agree with: History of Present Illness and Review of Systems    Patient Active Problem List   Diagnosis    Vitamin D deficiency    Depression    Multiple sclerosis    Restless legs syndrome    Muscle spasticity    Chronic migraine without aura without status migrainosus, not intractable    Essential hypertension    Frequent falls    Status post balloon dilatation of esophageal stricture    T2DM (type 2 diabetes mellitus)    TB lung, latent    Aplastic anemia likely due to isoniazide    Chest pain    MCGRAW (dyspnea on exertion)    Hyperlipidemia LDL goal <70    Class 3 severe obesity due to excess calories with serious comorbidity and body mass index (BMI) of 50.0 to 59.9 in adult    Fatigue    Dyspepsia    Morbid obesity    Degenerative arthritis of right knee    Primary osteoarthritis of right knee    S/P total knee arthroplasty, right    Acute postoperative pain     Past Medical History:   Diagnosis Date    Chronic knee pain     NSAIDS + steroid injections (last 3/2022)    Dyspepsia     Dyspnea  on exertion     Fatigue     GERD (gastroesophageal reflux disease)     History of transfusion     PATIENT DENIES REACTION    Hyperlipidemia     Hypertension     Morbid obesity     Multiple sclerosis     follows w/ Dr. Gilbert      Past Surgical History:   Procedure Laterality Date    COLONOSCOPY      GASTRIC SLEEVE LAPAROSCOPIC N/A 2022    Procedure: GASTRIC SLEEVE WITH HIATAL HERNIA LAPAROSCOPIC WITH DAVINCI ROBOT, ESOPHAGOGASTRODUODENOSCOPY;  Surgeon: Maribell Anderson MD;  Location:  [facility];  Service: Robotics - DaVinci;  Laterality: N/A;    LAPAROSCOPIC CHOLECYSTECTOMY  2012    gallstone pancreatitis    TOTAL KNEE ARTHROPLASTY Right 10/10/2023    Procedure: TOTAL KNEE ARTHROPLASTY WITH CORI ROBOT - RIGHT;  Surgeon: Lalo Choe MD;  Location:  [facility];  Service: Robotics - Ortho;  Laterality: Right;      Family History   Problem Relation Age of Onset    Cancer Mother     Hypertension Mother     Heart disease Mother     Alzheimer's disease Other     Seizures Other         Epilepsy and recurrent seizures    Heart disease Other     Breast cancer Neg Hx     Ovarian cancer Neg Hx      Social History     Socioeconomic History    Marital status:    Tobacco Use    Smoking status: Former     Packs/day: 1.00     Years: 5.00     Additional pack years: 0.00     Total pack years: 5.00     Types: Cigarettes     Quit date:      Years since quittin.9     Passive exposure: Never    Smokeless tobacco: Never    Tobacco comments:     social   Vaping Use    Vaping Use: Never used   Substance and Sexual Activity    Alcohol use: Yes     Comment: 3 drinks/month    Drug use: No    Sexual activity: Defer      Current Outpatient Medications on File Prior to Visit   Medication Sig Dispense Refill    acyclovir (ZOVIRAX) 400 MG tablet Take 1 tablet by mouth 2 (Two) Times a Day. (Patient taking differently: Take 1 tablet by mouth Daily As Needed.) 60 tablet 11    albuterol sulfate  (90 Base) MCG/ACT  inhaler Daily As Needed.      apixaban (ELIQUIS) 2.5 MG tablet tablet Take 1 tablet twice a day by oral route.      atorvastatin (LIPITOR) 40 MG tablet Take 1 tablet by mouth Daily.      azelastine (ASTEPRO) 0.15 % solution nasal spray Daily As Needed.  3    B Complex-C-E-Zn (Z-BEC PO) Take 1 tablet by mouth daily.      chlorthalidone (HYGROTON) 25 MG tablet Take 1 tablet by mouth Daily.      cyclobenzaprine (FLEXERIL) 5 MG tablet Take 1 tablet by mouth 3 (Three) Times a Day As Needed for Muscle Spasms.      Dalfampridine ER 10 MG tablet sustained-release 12 hour Take 10 mg by mouth 2 (Two) Times a Day. 180 tablet 3    docusate sodium (Colace) 100 MG capsule Take 1 capsule by mouth 2 (Two) Times a Day.      FLUoxetine (PROzac) 20 MG capsule Take 1 capsule by mouth Daily.      gabapentin (NEURONTIN) 100 MG capsule Take 1 capsule by mouth 2 (Two) Times a Day.      hydrALAZINE (APRESOLINE) 10 MG tablet 1 tablet Daily.      HYDROcodone-acetaminophen (NORCO) 5-325 MG per tablet Take 1 tablet by mouth Every 4 (Four) Hours As Needed.      meclizine (ANTIVERT) 25 MG tablet 1 tablet 3 (Three) Times a Day As Needed.      Multiple Vitamin (MULTI-VITAMIN DAILY) tablet Take 1 tablet by mouth Daily.      omeprazole (priLOSEC) 20 MG capsule 1 capsule Daily As Needed.      oxybutynin XL (DITROPAN-XL) 10 MG 24 hr tablet Take 1 tablet by mouth Daily.      polyethylene glycol (MIRALAX) 17 g packet Take 17 g by mouth Daily.      potassium chloride (MICRO-K) 10 MEQ CR capsule Take 1 capsule by mouth Daily.      vitamin D (ERGOCALCIFEROL) 80552 UNITS capsule capsule Take 1 capsule by mouth Every 7 (Seven) Days.      gabapentin (NEURONTIN) 300 MG capsule Take 2 capsules by mouth Every Night. (Patient taking differently: Take 1 capsule by mouth 2 (Two) Times a Day.) 60 capsule 5    [DISCONTINUED] ropivacaine (NAROPIN) 0.2 % infusion (INFUSYSTEM) 2 mg/hr by Peripheral Nerve route Continuous.       No current facility-administered  medications on file prior to visit.      Allergies   Allergen Reactions    Isoniazid Other (See Comments)     aplastic anemia    Rifampin Anaphylaxis    Sulfa Antibiotics Anaphylaxis    Acetaminophen Headache    Amlodipine Other (See Comments)    Pregabalin Other (See Comments)    Tetanus Antitoxin Swelling        Review of Systems   Constitutional:  Negative for activity change, appetite change, chills, diaphoresis, fatigue, fever and unexpected weight change.   HENT:  Negative for congestion, dental problem, drooling, ear discharge, ear pain, facial swelling, hearing loss, mouth sores, nosebleeds, postnasal drip, rhinorrhea, sinus pressure, sneezing, sore throat, tinnitus, trouble swallowing and voice change.    Eyes:  Negative for photophobia, pain, discharge, redness, itching and visual disturbance.   Respiratory:  Negative for apnea, cough, choking, chest tightness, shortness of breath, wheezing and stridor.    Cardiovascular:  Negative for chest pain, palpitations and leg swelling.   Gastrointestinal:  Negative for abdominal distention, abdominal pain, anal bleeding, blood in stool, constipation, diarrhea, nausea, rectal pain and vomiting.   Endocrine: Negative for cold intolerance, heat intolerance, polydipsia, polyphagia and polyuria.   Genitourinary:  Negative for decreased urine volume, difficulty urinating, dysuria, enuresis, flank pain, frequency, genital sores, hematuria and urgency.   Musculoskeletal:  Positive for arthralgias. Negative for back pain, gait problem, joint swelling, myalgias, neck pain and neck stiffness.   Skin:  Negative for color change, pallor, rash and wound.   Allergic/Immunologic: Negative for environmental allergies, food allergies and immunocompromised state.   Neurological:  Negative for dizziness, tremors, seizures, syncope, facial asymmetry, speech difficulty, weakness, light-headedness, numbness and headaches.   Hematological:  Negative for adenopathy. Does not bruise/bleed  easily.   Psychiatric/Behavioral:  Negative for agitation, behavioral problems, confusion, decreased concentration, dysphoric mood, hallucinations, self-injury, sleep disturbance and suicidal ideas. The patient is not nervous/anxious and is not hyperactive.         Objective      Physical Exam  LMP  (LMP Unknown)     There is no height or weight on file to calculate BMI.    General:   Mental Status:  Alert   Appearance: Cooperative, in no acute distress   Build and Nutrition: Obese by BMI female   Orientation: Alert and oriented to person, place and time   Posture: Normal   Gait: Nonantalgic    Integument:   Right knee: Wound is well-healed with no signs of infection    Lower Extremities:   Right Knee:    Tenderness:  None    Effusion:  None    Swelling: None    Crepitus:  None    Range of motion:  Extension: 5°       Flexion: 120°  Instability:  No varus laxity, no valgus laxity, negative anterior drawer  Deformities:  None      Imaging/Studies  Imaging Results (Last 24 Hours)       ** No results found for the last 24 hours. **          No new imaging today.    Assessment and Plan     Diagnoses and all orders for this visit:    1. S/P total knee arthroplasty, right (Primary)    2. Orthopedic aftercare        1. S/P total knee arthroplasty, right    2. Orthopedic aftercare        I reviewed my findings with the patient.  Her right total knee arthroplasty is functioning well.  She will continue with rehabilitation, and transition to a home exercise program when appropriate.  I will see her back in 10 months, which will be a 1 year checkup with x-rays.  I will see her back sooner for any problems.    Return in about 10 months (around 10/11/2024) for Recheck with X-Rays.      Lalo Choe MD  12/11/23  08:43 EST

## 2024-03-18 ENCOUNTER — SPECIALTY PHARMACY (OUTPATIENT)
Dept: ONCOLOGY | Facility: HOSPITAL | Age: 69
End: 2024-03-18
Payer: MEDICARE

## 2024-03-18 RX ORDER — DALFAMPRIDINE 10 MG/1
10 TABLET, FILM COATED, EXTENDED RELEASE ORAL 2 TIMES DAILY
Qty: 180 TABLET | Refills: 1 | Status: SHIPPED | OUTPATIENT
Start: 2024-03-18

## 2024-03-18 RX ORDER — DALFAMPRIDINE 10 MG/1
10 TABLET, FILM COATED, EXTENDED RELEASE ORAL 2 TIMES DAILY
Qty: 180 TABLET | Refills: 3 | Status: SHIPPED | OUTPATIENT
Start: 2024-03-18 | End: 2024-03-18 | Stop reason: SDUPTHER

## 2024-03-18 NOTE — TELEPHONE ENCOUNTER
Caller: GAYATRI Bahena call back number: [phone]     Requested Prescriptions: DALFAMPRIDINE ER 10 MG  (60 TAB )  Requested Prescriptions      No prescriptions requested or ordered in this encounter        Pharmacy where request should be sent:  Pharmacy:   [facility] [address] [phone] [facility] [phone] [facility]     Last office visit with prescribing clinician: 2/16/2023   Last telemedicine visit with prescribing clinician: Visit date not found   Next office visit with prescribing clinician: Visit date not found     Additional details provided by patient: PT IS OUT OF RX  SHE HAD 5 REFILLS BUT WAS IN [facility] SO MISSED REFILL DATES     Does the patient have less than a 3 day supply:  [x] Yes  [] No    Would you like a call back once the refill request has been completed: [] Yes [] No    If the office needs to give you a call back, can they leave a voicemail: [] Yes [] No    Janusz Ojeda Rep   03/18/24 12:17 EDT

## 2024-04-03 ENCOUNTER — TRANSCRIBE ORDERS (OUTPATIENT)
Dept: ADMINISTRATIVE | Facility: HOSPITAL | Age: 69
End: 2024-04-03
Payer: MEDICARE

## 2024-04-03 DIAGNOSIS — Z12.39 ENCOUNTER FOR OTHER SCREENING FOR MALIGNANT NEOPLASM OF BREAST: Primary | ICD-10-CM

## 2024-04-04 ENCOUNTER — TELEPHONE (OUTPATIENT)
Dept: NEUROLOGY | Facility: CLINIC | Age: 69
End: 2024-04-04

## 2024-04-04 NOTE — TELEPHONE ENCOUNTER
The [facility] received a fax that requires your attention. The document has been indexed to the patient’s chart for your review.      Reason for sending: SO PROVIDER IS AWARE OF DOCUMENT IN PATIENT'S CHART    Documents Description: [facility] CQ REPORT 4/2/24    Name of Sender: KHADIJAH ESPARZA &     Date Indexed: 4/4/24

## 2024-04-04 NOTE — TELEPHONE ENCOUNTER
The [facility] received a fax that requires your attention. The document has been indexed to the patient’s chart for your review.      Reason for sending: NOTIFY OF NEW RECORD IN CHART    Documents Description: PROGRESS NOTE 04/02/24    Name of Sender: DRS. BORDERS LUCIO AND      Date Indexed: 04/04/24    Notes (if needed): FOR REVIEW

## 2024-05-03 ENCOUNTER — TELEPHONE (OUTPATIENT)
Dept: ORTHOPEDIC SURGERY | Facility: CLINIC | Age: 69
End: 2024-05-03
Payer: MEDICARE

## 2024-05-03 NOTE — TELEPHONE ENCOUNTER
Called patient and let her know that she did not need antibiotics if it has been more than three months out from her TKA for an eye surgery.     - Isabella CONTRERAS(R)

## 2024-05-03 NOTE — TELEPHONE ENCOUNTER
Provider: JAJA    Caller: GAYATRI    Relationship to Patient: SELF    Pharmacy:     Phone Number: [phone]    Reason for Call: PT CALLING TO SEE IF SHE NEEDS ANTIBIOTICS FOR HAVING CATARACT SX DONE - SHE KNOW THAT AFTER HAVING SX WITH DR WILKINSON THAT SHE WILL NEED THEM FOR ANY DENTAL WORK BUT WANTED TO MAKE SURE IF SHE DID OR DIDN'T NEED THEM FOR EYE SX COMING UP

## 2024-05-14 ENCOUNTER — SPECIALTY PHARMACY (OUTPATIENT)
Dept: ONCOLOGY | Facility: HOSPITAL | Age: 69
End: 2024-05-14
Payer: MEDICARE

## 2024-05-14 RX ORDER — EZETIMIBE 10 MG/1
10 TABLET ORAL DAILY
COMMUNITY
Start: 2024-04-02 | End: 2024-07-01

## 2024-05-14 RX ORDER — KETOROLAC TROMETHAMINE 4 MG/ML
SOLUTION/ DROPS OPHTHALMIC
COMMUNITY
Start: 2024-05-01

## 2024-05-14 RX ORDER — OFLOXACIN 3 MG/ML
SOLUTION/ DROPS OPHTHALMIC
COMMUNITY
Start: 2024-05-01

## 2024-05-14 RX ORDER — ROSUVASTATIN CALCIUM 40 MG/1
40 TABLET, COATED ORAL DAILY
COMMUNITY
Start: 2024-04-02

## 2024-05-14 RX ORDER — OLMESARTAN MEDOXOMIL 40 MG/1
40 TABLET ORAL DAILY
COMMUNITY
Start: 2024-04-02 | End: 2024-07-01

## 2024-05-14 RX ORDER — PREDNISOLONE ACETATE 10 MG/ML
SUSPENSION/ DROPS OPHTHALMIC
COMMUNITY
Start: 2024-05-01

## 2024-05-14 RX ORDER — BACLOFEN 10 MG/1
10 TABLET ORAL 3 TIMES DAILY PRN
COMMUNITY
Start: 2024-04-02

## 2024-05-14 RX ORDER — CYANOCOBALAMIN 1000 UG/ML
INJECTION, SOLUTION INTRAMUSCULAR; SUBCUTANEOUS
COMMUNITY
Start: 2024-04-02

## 2024-05-14 NOTE — PROGRESS NOTES
Specialty Pharmacy Patient Management Program  Neurology Reassessment     Rashad Carrillo is a 68 y.o. female with    multiple sclerosis seen by a Neurology provider and enrolled in the Neurology Patient Management program offered by [facility].  A follow-up outreach was conducted, including assessment of continued therapy appropriateness, medication adherence, and side effect incidence and management for dalfampridine 10 mg po bid.. (BIOPLUS)    Changes to Insurance Coverage or Financial Support  No changes noted, still Medicare, filling with [employer]     Relevant Past Medical History and Comorbidities  Relevant medical history and concomitant health conditions were discussed with the patient. The patient's chart has been reviewed for relevant past medical history and comorbid health conditions and updated as necessary.   Past Medical History:   Diagnosis Date    Chronic knee pain     NSAIDS + steroid injections (last 3/2022)    Dyspepsia     Dyspnea on exertion     Fatigue     GERD (gastroesophageal reflux disease)     History of transfusion     PATIENT DENIES REACTION    Hyperlipidemia     Hypertension     Morbid obesity     Multiple sclerosis     follows w/ Dr. Gilbert     Social History     Socioeconomic History    Marital status:    Tobacco Use    Smoking status: Former     Current packs/day: 0.00     Average packs/day: 1 pack/day for 5.0 years (5.0 ttl pk-yrs)     Types: Cigarettes     Start date:      Quit date:      Years since quittin.3     Passive exposure: Never    Smokeless tobacco: Never    Tobacco comments:     social   Vaping Use    Vaping status: Never Used   Substance and Sexual Activity    Alcohol use: Yes     Comment: 3 drinks/month    Drug use: No    Sexual activity: Defer     Problem list reviewed by Lalo Thibodeaux, PharmD on 2024 at 11:00 AM    Hospitalizations and Urgent Care Since Last Assessment  ED Visits, Admissions, or  Hospitalizations: no   Urgent Office Visits: none     Allergies  Known allergies and reactions were discussed with the patient. The patient's chart has been reviewed for allergy information and updated as necessary.   Allergies   Allergen Reactions    Isoniazid Other (See Comments)     aplastic anemia    Rifampin Anaphylaxis    Sulfa Antibiotics Anaphylaxis    Acetaminophen Headache    Amlodipine Other (See Comments)    Pregabalin Other (See Comments)     Gained weight    Tetanus Antitoxin Swelling     Allergies reviewed by Lalo Thibodeaux, PharmD on 5/14/2024 at 10:51 AM    Relevant Laboratory Values  Common labs          10/11/2023    06:01 10/12/2023    05:15 11/27/2023    14:33   Common Labs   Glucose  92  91    BUN  15  18    Creatinine  0.60  0.65    Sodium  138  146    Potassium  4.3  4.2    Chloride  106  108    Calcium  8.1  9.5    Total Protein   7.0    Albumin   4.1    Total Bilirubin   0.6    Alkaline Phosphatase   140    AST (SGOT)   15    ALT (SGPT)   11    WBC 8.14  7.34  5.2    Hemoglobin 10.7  10.2  13.5    Hematocrit 33.3  31.7  41.0    Platelets 167  150  189        Lab Assessment   The above labs have been reviewed. No dose adjustments are needed for the specialty medication(s) based on the labs.     Current Medication List  This medication list has been reviewed with the patient and evaluated for any interactions or necessary modifications/recommendations, and updated to include all prescription medications, OTC medications, and supplements the patient is currently taking.  This list reflects what is contained in the patient's profile, which has also been marked as reviewed to communicate to other providers it is the most up to date version of the patient's current medication therapy.     Current Outpatient Medications:     acyclovir (ZOVIRAX) 400 MG tablet, Take 1 tablet by mouth 2 (Two) Times a Day. (Patient taking differently: Take 1 tablet by mouth Daily As Needed.), Disp: 60 tablet,  Rfl: 11    albuterol sulfate  (90 Base) MCG/ACT inhaler, Daily As Needed., Disp: , Rfl:     azelastine (ASTEPRO) 0.15 % solution nasal spray, Daily As Needed., Disp: , Rfl: 3    B Complex-C-E-Zn (Z-BEC PO), Take 1 tablet by mouth daily., Disp: , Rfl:     baclofen (LIORESAL) 10 MG tablet, Take 1 tablet by mouth 3 (Three) Times a Day As Needed for Muscle Spasms., Disp: , Rfl:     chlorthalidone (HYGROTON) 25 MG tablet, Take 1 tablet by mouth Daily., Disp: , Rfl:     cyanocobalamin 1000 MCG/ML injection, 1  mL every other week for 3 months, then monthly, Disp: , Rfl:     cyclobenzaprine (FLEXERIL) 5 MG tablet, Take 1 tablet by mouth 3 (Three) Times a Day As Needed for Muscle Spasms., Disp: , Rfl:     Dalfampridine ER 10 MG tablet sustained-release 12 hour, Take 10 mg by mouth 2 (Two) Times a Day., Disp: 180 tablet, Rfl: 1    docusate sodium (Colace) 100 MG capsule, Take 1 capsule by mouth 2 (Two) Times a Day. (Patient taking differently: Take 1 capsule by mouth 2 (Two) Times a Day As Needed.), Disp: , Rfl:     ezetimibe (ZETIA) 10 MG tablet, Take 1 tablet by mouth Daily., Disp: , Rfl:     FLUoxetine (PROzac) 20 MG capsule, Take 1 capsule by mouth Daily., Disp: , Rfl:     gabapentin (NEURONTIN) 100 MG capsule, Take 1 capsule by mouth 2 (Two) Times a Day. (Patient taking differently: Take 1 capsule by mouth 2 (Two) Times a Day. Takes 100 mg daily and then 300-600 mg HS), Disp: , Rfl:     hydrALAZINE (APRESOLINE) 10 MG tablet, 1 tablet Daily., Disp: , Rfl:     HYDROcodone-acetaminophen (NORCO) 5-325 MG per tablet, Take 1 tablet by mouth Every 4 (Four) Hours As Needed., Disp: , Rfl:     ketorolac (ACULAR) 0.4 % solution, place 1 DROP 4 TIMES A DAY IN OPERATIVE EYE BEGINNING 3 DAYS PRIOR TO SURGERY FOR 10 DAYS THEN TAPER TO TWICE DAILY FOR 14 ADDITIONAL DAYS, Disp: , Rfl:     meclizine (ANTIVERT) 25 MG tablet, 1 tablet 3 (Three) Times a Day As Needed., Disp: , Rfl:     Multiple Vitamin (MULTI-VITAMIN DAILY) tablet,  Take 1 tablet by mouth Daily., Disp: , Rfl:     ofloxacin (OCUFLOX) 0.3 % ophthalmic solution, place 1 DROP 4 TIMES A DAY IN OPERATIVE EYE BEGINNING IMMEDIATELY AFTER SURGERY FOR 7 DAYS THEN STOP, Disp: , Rfl:     olmesartan (BENICAR) 40 MG tablet, Take 1 tablet by mouth Daily., Disp: , Rfl:     omeprazole (priLOSEC) 20 MG capsule, 1 capsule Daily As Needed., Disp: , Rfl:     oxybutynin XL (DITROPAN-XL) 10 MG 24 hr tablet, Take 1 tablet by mouth Daily., Disp: , Rfl:     polyethylene glycol (MIRALAX) 17 g packet, Take 17 g by mouth Daily., Disp: , Rfl:     potassium chloride (MICRO-K) 10 MEQ CR capsule, Take 1 capsule by mouth Daily., Disp: , Rfl:     prednisoLONE acetate (PRED FORTE) 1 % ophthalmic suspension, place 1 DROP 4 TIMES A DAY IN OPERATIVE EYE BEGINNING IMMEDIATELY AFTER SURGERY FOR 7 DAYS THEN TAPER TO TWICE DAILY FOR 14 ADDITIONAL DAYS, Disp: , Rfl:     rosuvastatin (CRESTOR) 40 MG tablet, Take 1 tablet by mouth Daily., Disp: , Rfl:     vitamin D (ERGOCALCIFEROL) 29804 UNITS capsule capsule, Take 1 capsule by mouth Every 7 (Seven) Days., Disp: , Rfl:     apixaban (ELIQUIS) 2.5 MG tablet tablet, Take 1 tablet twice a day by oral route. (Patient not taking: Reported on 5/14/2024), Disp: , Rfl:     atorvastatin (LIPITOR) 40 MG tablet, Take 1 tablet by mouth Daily. (Patient not taking: Reported on 5/14/2024), Disp: , Rfl:     gabapentin (NEURONTIN) 300 MG capsule, Take 2 capsules by mouth Every Night. (Patient taking differently: Take 1 capsule by mouth 2 (Two) Times a Day.), Disp: 60 capsule, Rfl: 5    Medicines reviewed by Lalo Thibodeaux, PharmD on 5/14/2024 at 10:59 AM    Drug Interactions  No relevant drug drug interactions with specialty medication.       Adverse Drug Reactions  Medication tolerability: Tolerating with no to minimal ADRs          Medication plan: Continue therapy with normal follow-up  Plan for ADR Management: n/a      Adherence, Self-Administration, and Current Therapy  Problems  Adherence related patient's specialty therapy was discussed with the patient. The Adherence segment of this outreach has been reviewed and updated.   Adherence Questions  Linked Medication(s) Assessed: Dalfampridine (Multiple Sclerosis Agents)  On average, how many doses/injections does the patient miss per month?: 0 (she usually pays 40$/month, but one month it was 120-140$ and she cant sustain that. she fills with [facility])  What are the identified reasons for non-adherence or missed doses? : financial  What is the estimated medication adherence level?: %  Based on the patient/caregiver response and refill history, does this patient require an MTP to track adherence improvements?: no    Additional Barriers to Patient Self-Administration: none  Methods for Supporting Patient Self-Administration: n/a    Recently Close Medication Therapy Problems  No medication therapy recommendations to display  Open Medication Therapy Problems  No medication therapy recommendations to display     Goals of Therapy  Goals related to the patient's specialty therapy was discussed with the patient. The Patient Goals segment of this outreach has been reviewed and updated.    Goals Addressed Today        Specialty Pharmacy General Goal      Improved walking speed               Quality of Life Assessment   Quality of Life related to the patient's enrollment in the patient management program and services provided was discussed with the patient. The QOL segment of this outreach has been reviewed and updated.   Quality of Life Improvement Scale: 8-Moderately better    Reassessment Plan & Follow-Up  Medication Therapy Changes: continue dalfampridine 10 mg bid  Related Plans, Therapy Recommendations, or Issues to Be Addressed:  may need new RX sent around August  Pharmacist to perform regular reassessments no more than (6) months from the previous assessment.  Care Coordinator to set up future refill outreaches, coordinate  prescription delivery, and escalate clinical questions to pharmacist.     Attestation  Therapeutic appropriateness: Appropriate  I attest the patient was actively involved in and has agreed to the above plan of care. If the prescribed therapy is at any point deemed not appropriate based on the current or future assessments, a consultation will be initiated with the patient's specialty care provider to determine the best course of action. The revised plan of therapy will be documented along with any additional patient education provided. Discussed aforementioned material with patient by phone.    Lalo Thibodeaux, PharmD, [facility]  Clinic Specialty Pharmacist, Neurology  5/14/2024  11:12 EDT

## 2024-05-17 RX ORDER — DALFAMPRIDINE 10 MG/1
10 TABLET, FILM COATED, EXTENDED RELEASE ORAL 2 TIMES DAILY
Qty: 60 TABLET | Refills: 11 | Status: SHIPPED | OUTPATIENT
Start: 2024-05-17

## 2024-06-05 ENCOUNTER — HOSPITAL ENCOUNTER (OUTPATIENT)
Facility: HOSPITAL | Age: 69
Discharge: HOME | End: 2024-06-05
Admitting: INTERNAL MEDICINE
Payer: MEDICARE

## 2024-06-05 DIAGNOSIS — Z12.39 ENCOUNTER FOR OTHER SCREENING FOR MALIGNANT NEOPLASM OF BREAST: ICD-10-CM

## 2024-06-05 PROCEDURE — 77063 BREAST TOMOSYNTHESIS BI: CPT

## 2024-06-05 PROCEDURE — 77067 SCR MAMMO BI INCL CAD: CPT

## 2024-06-13 ENCOUNTER — OFFICE VISIT (OUTPATIENT)
Dept: NEUROLOGY | Facility: CLINIC | Age: 69
End: 2024-06-13
Payer: MEDICARE

## 2024-06-13 VITALS
DIASTOLIC BLOOD PRESSURE: 76 MMHG | WEIGHT: 251 LBS | SYSTOLIC BLOOD PRESSURE: 132 MMHG | HEART RATE: 58 BPM | HEIGHT: 71 IN | OXYGEN SATURATION: 97 % | BODY MASS INDEX: 35.14 KG/M2

## 2024-06-13 DIAGNOSIS — G35 MULTIPLE SCLEROSIS: Primary | Chronic | ICD-10-CM

## 2024-06-13 DIAGNOSIS — G89.18 ACUTE POSTOPERATIVE PAIN: ICD-10-CM

## 2024-06-13 DIAGNOSIS — F33.1 MODERATE EPISODE OF RECURRENT MAJOR DEPRESSIVE DISORDER: Chronic | ICD-10-CM

## 2024-06-13 DIAGNOSIS — G43.709 CHRONIC MIGRAINE WITHOUT AURA WITHOUT STATUS MIGRAINOSUS, NOT INTRACTABLE: Chronic | ICD-10-CM

## 2024-06-13 DIAGNOSIS — R26.9 GAIT ABNORMALITY: ICD-10-CM

## 2024-06-13 DIAGNOSIS — G25.81 RESTLESS LEGS SYNDROME: Chronic | ICD-10-CM

## 2024-06-13 PROCEDURE — 1160F RVW MEDS BY RX/DR IN RCRD: CPT | Performed by: PSYCHIATRY & NEUROLOGY

## 2024-06-13 PROCEDURE — 3078F DIAST BP <80 MM HG: CPT | Performed by: PSYCHIATRY & NEUROLOGY

## 2024-06-13 PROCEDURE — 1159F MED LIST DOCD IN RCRD: CPT | Performed by: PSYCHIATRY & NEUROLOGY

## 2024-06-13 PROCEDURE — 99214 OFFICE O/P EST MOD 30 MIN: CPT | Performed by: PSYCHIATRY & NEUROLOGY

## 2024-06-13 PROCEDURE — G2211 COMPLEX E/M VISIT ADD ON: HCPCS | Performed by: PSYCHIATRY & NEUROLOGY

## 2024-06-13 PROCEDURE — 3075F SYST BP GE 130 - 139MM HG: CPT | Performed by: PSYCHIATRY & NEUROLOGY

## 2024-06-13 RX ORDER — GABAPENTIN 100 MG/1
100 CAPSULE ORAL 2 TIMES DAILY
Qty: 180 CAPSULE | Refills: 1 | Status: SHIPPED | OUTPATIENT
Start: 2024-06-13

## 2024-06-13 RX ORDER — GABAPENTIN 300 MG/1
600 CAPSULE ORAL NIGHTLY
Qty: 180 CAPSULE | Refills: 1 | Status: SHIPPED | OUTPATIENT
Start: 2024-06-13 | End: 2025-06-13

## 2024-06-13 RX ORDER — OMEGA-3-ACID ETHYL ESTERS 1 G/1
CAPSULE, LIQUID FILLED ORAL
COMMUNITY
Start: 2024-04-02

## 2024-06-13 NOTE — PROGRESS NOTES
"Chief Complaint  Multiple Sclerosis    Subjective        Rashad Carrillo presents to [facility] NEUROLOGY  History of Present Illness    68 y.o.  female returns in follow up.  Laste visit on 7/17/23 performed BTX, continued monthly labs, renewed GBP, Cymbalta, Acyclovir and Ampyra.      November 2023 R TKA     11/8/2022 robotic sleeve gastrectomy and hiatal hernia repair      MRI Brain 11/10/22 as compared to 3/11/22, no change in T2 lesion load, moderate to severe T2 lesion burden and moderate atrophy.     JCV ab - 6/11/18 - 0.45       11/27/23: Plt 189  Cr 0.65;      RRMS     S/P Lemtrada 2/2, 10/30/18 - 11/2/18, 11/4/19-11/6/19      Multiple falls.        Ampyra improves walking speed. .        Fatigue is moderate.       Continued pain and stiffness in R LE and limiting walking. .       Problem history:     25 ft timed walk increaesed 15.99 from 9.94.  Left hand 9 hole peg worsened.   Increasing swallowing difficulty.        Immediate and working memory are declining with SDMT 27/110..        Referred to ID and dx with latent TB.  Recommended isoniazid and pyridoxine for 9 months.  Subsequently developed aplastic anemia secondary to INH and followed by Dr Valentin.  Received multiple transfusions.  3/29/18 able to resume Tysabri and has had 3 infusions  Treated with rifampin.       MDD     Mood is stable on Prozac 20 mg PO daily      RLS     No new or worsening sx.      Controlled on GBP.   Baclofen helping with spasticity in LE.       Migraines     Headache frequency one - two per week.  Intensity is mild.    Off BTX      Problem History:     HA frequency is daily.  Moderate to severe intensity.   Location moves around head.  Quality Last for up to a day.  Tx with OTC meds.      Greater than 15 HA a month, moderate to severe intensity, lasting over 6 hours.       Preventatives:  TPM, GBP, Cymbalta,         Objective   Vital Signs:  /76   Pulse 58   Ht 179.1 cm (70.51\")   Wt 114 kg (251 " "lb)   SpO2 97%   BMI 35.49 kg/m²   Estimated body mass index is 35.49 kg/m² as calculated from the following:    Height as of this encounter: 179.1 cm (70.51\").    Weight as of this encounter: 114 kg (251 lb).       Neurologic Exam     Mental Status   Oriented to person, place, and time.   Follows 3 step commands.   Attention: normal. Concentration: normal.   Speech: speech is normal   Level of consciousness: alert  Knowledge: good and consistent with education.   Able to name object. Able to read. Able to repeat. Able to write. Normal comprehension.   Mixes up words intermittently      Cranial Nerves     CN III, IV, VI   Pupils are equal, round, and reactive to light.  Extraocular motions are normal.   Right pupil: Shape: regular. Reactivity: brisk. Consensual response: intact.   Left pupil: Shape: regular. Reactivity: brisk. Consensual response: intact.     CN VII   Right facial weakness: none  Left facial weakness: none    CN VIII   Hearing: intact    Motor Exam   Muscle bulk: normal  Overall muscle tone: normal    Gait, Coordination, and Reflexes     Gait  Gait: (antalgic )    Tremor   Resting tremor: absent  Intention tremor: absent  Action tremor: absent          Physical Exam  Eyes:      Extraocular Movements: EOM normal.      Pupils: Pupils are equal, round, and reactive to light.   Neurological:      Mental Status: She is oriented to person, place, and time.   Psychiatric:         Speech: Speech normal.        Result Review :    The following data was reviewed by: Adonay Gilbert MD on 06/13/2024:  Common labs          10/11/2023    06:01 10/12/2023    05:15 11/27/2023    14:33   Common Labs   Glucose  92  91    BUN  15  18    Creatinine  0.60  0.65    Sodium  138  146    Potassium  4.3  4.2    Chloride  106  108    Calcium  8.1  9.5    Total Protein   7.0    Albumin   4.1    Total Bilirubin   0.6    Alkaline Phosphatase   140    AST (SGOT)   15    ALT (SGPT)   11    WBC 8.14  7.34  5.2    Hemoglobin " 10.7  10.2  13.5    Hematocrit 33.3  31.7  41.0    Platelets 167  150  189                   Assessment and Plan     Diagnoses and all orders for this visit:    1. Multiple sclerosis (Primary)  Assessment & Plan:  Stable s/p Lemtrada       2. Chronic migraine without aura without status migrainosus, not intractable    3. Restless legs syndrome  Assessment & Plan:  Stable on GBP     Orders:  -     gabapentin (NEURONTIN) 300 MG capsule; Take 2 capsules by mouth Every Night.  Dispense: 180 capsule; Refill: 1    4. Moderate episode of recurrent major depressive disorder    5. Acute postoperative pain  -     gabapentin (NEURONTIN) 100 MG capsule; Take 1 capsule by mouth 2 (Two) Times a Day.  Dispense: 180 capsule; Refill: 1    6. Gait abnormality  Assessment & Plan:  Stable on Amprya                Follow Up     No follow-ups on file.  Patient was given instructions and counseling regarding her condition or for health maintenance advice. Please see specific information pulled into the AVS if appropriate.

## 2024-07-11 ENCOUNTER — OFFICE VISIT (OUTPATIENT)
Dept: BARIATRICS/WEIGHT MGMT | Facility: CLINIC | Age: 69
End: 2024-07-11
Payer: MEDICARE

## 2024-07-11 ENCOUNTER — DOCUMENTATION (OUTPATIENT)
Dept: BARIATRICS/WEIGHT MGMT | Facility: CLINIC | Age: 69
End: 2024-07-11
Payer: MEDICARE

## 2024-07-11 VITALS
TEMPERATURE: 97.8 F | HEART RATE: 77 BPM | SYSTOLIC BLOOD PRESSURE: 126 MMHG | HEIGHT: 71 IN | BODY MASS INDEX: 33.77 KG/M2 | DIASTOLIC BLOOD PRESSURE: 68 MMHG | WEIGHT: 241.2 LBS

## 2024-07-11 DIAGNOSIS — R53.83 FATIGUE, UNSPECIFIED TYPE: Primary | ICD-10-CM

## 2024-07-11 DIAGNOSIS — K91.2 POSTGASTRECTOMY MALABSORPTION: ICD-10-CM

## 2024-07-11 DIAGNOSIS — E55.9 HYPOVITAMINOSIS D: ICD-10-CM

## 2024-07-11 DIAGNOSIS — E66.9 OBESITY, CLASS I, BMI 30-34.9: ICD-10-CM

## 2024-07-11 DIAGNOSIS — Z13.21 MALNUTRITION SCREEN: ICD-10-CM

## 2024-07-11 DIAGNOSIS — Z90.3 POSTGASTRECTOMY MALABSORPTION: ICD-10-CM

## 2024-07-11 DIAGNOSIS — Z13.0 SCREENING, IRON DEFICIENCY ANEMIA: ICD-10-CM

## 2024-07-11 PROCEDURE — 3078F DIAST BP <80 MM HG: CPT | Performed by: SURGERY

## 2024-07-11 PROCEDURE — 99214 OFFICE O/P EST MOD 30 MIN: CPT | Performed by: SURGERY

## 2024-07-11 PROCEDURE — 1160F RVW MEDS BY RX/DR IN RCRD: CPT | Performed by: SURGERY

## 2024-07-11 PROCEDURE — 1159F MED LIST DOCD IN RCRD: CPT | Performed by: SURGERY

## 2024-07-11 PROCEDURE — 3074F SYST BP LT 130 MM HG: CPT | Performed by: SURGERY

## 2024-07-11 NOTE — PROGRESS NOTES
[facility]  [address]  [address]  [facility] [address]  [phone]        Patient Name:  Rashad Carrillo.  :  1955      Date of Visit: 2024      Reason for Visit:   18 months postop      HPI: Rashad Carrillo is a 68 y.o. female s/p  LSG/HHR 22 PNQ     Doing well.  No issues/concerns. Denies dysphagia, reflux, nausea, vomiting, and abdominal pain.  Intermittent constipation then explosive bowel movement.  Getting 100 g prot/day.  Drinking 64 fluid oz/day. Last labs revealed prealbumin 17, low iron. Taking MVI and B12 shot monthly, 50,000 IU Vit D.   .  She takes weekly Vit D empirically as a recommendation from neurologist for MS.  Exercise: s/p R knee replacement 2023.  Swimming.    Breakfast: Premier  Snack: 2-3 Premier protein water throughout the day  Lunch: 1 chicken taco or chicken/lettuce  Supper: Premier shake  Tries not to snack on anything else.  Beverage: 16 oz water in addition to above fluids, 1-2 alcoholic drinks per week.     She is very complimentary and very grateful.    Presurgery weight: 333 pounds.  Today's weight is 109 kg (241 lb 3.2 oz) pounds, today's  Body mass index is 34.12 kg/m²., and   weight loss since surgery is 92 pounds.      Past Medical History:   Diagnosis Date    Chronic knee pain     NSAIDS + steroid injections (last 3/2022)    Dyspepsia     Dyspnea on exertion     Fatigue     GERD (gastroesophageal reflux disease)     History of transfusion     PATIENT DENIES REACTION    Hyperlipidemia     Hypertension     Morbid obesity     Multiple sclerosis     follows w/ Dr. Gilbert     Past Surgical History:   Procedure Laterality Date    COLONOSCOPY      GASTRIC SLEEVE LAPAROSCOPIC N/A 2022    Procedure: GASTRIC SLEEVE WITH HIATAL HERNIA LAPAROSCOPIC WITH DAVINCI ROBOT, ESOPHAGOGASTRODUODENOSCOPY;  Surgeon: Maribell Anderson MD;  Location: [facility];  Service: Robotics - DaVinci;  Laterality: N/A;     LAPAROSCOPIC CHOLECYSTECTOMY  2012    gallstone pancreatitis    TOTAL KNEE ARTHROPLASTY Right 10/10/2023    Procedure: TOTAL KNEE ARTHROPLASTY WITH CORI ROBOT - RIGHT;  Surgeon: Lalo Choe MD;  Location: [facility];  Service: Robotics - Ortho;  Laterality: Right;     Outpatient Medications Marked as Taking for the 7/11/24 encounter (Office Visit) with Maribell Anderson MD   Medication Sig Dispense Refill    acyclovir (ZOVIRAX) 400 MG tablet Take 1 tablet by mouth 2 (Two) Times a Day. (Patient taking differently: Take 1 tablet by mouth Daily As Needed.) 60 tablet 11    albuterol sulfate  (90 Base) MCG/ACT inhaler Daily As Needed.      azelastine (ASTEPRO) 0.15 % solution nasal spray Daily As Needed.  3    B Complex-C-E-Zn (Z-BEC PO) Take 1 tablet by mouth daily.      baclofen (LIORESAL) 10 MG tablet Take 1 tablet by mouth 3 (Three) Times a Day As Needed for Muscle Spasms.      chlorthalidone (HYGROTON) 25 MG tablet Take 1 tablet by mouth Daily.      cyanocobalamin 1000 MCG/ML injection 1  mL every other week for 3 months, then monthly      cyclobenzaprine (FLEXERIL) 5 MG tablet Take 1 tablet by mouth 3 (Three) Times a Day As Needed for Muscle Spasms.      Dalfampridine ER 10 MG tablet sustained-release 12 hour Take 10 mg by mouth 2 (Two) Times a Day. 60 tablet 11    docusate sodium (Colace) 100 MG capsule Take 1 capsule by mouth 2 (Two) Times a Day. (Patient taking differently: Take 1 capsule by mouth 2 (Two) Times a Day As Needed.)      FLUoxetine (PROzac) 20 MG capsule Take 1 capsule by mouth Daily.      gabapentin (NEURONTIN) 100 MG capsule Take 1 capsule by mouth 2 (Two) Times a Day. 180 capsule 1    gabapentin (NEURONTIN) 300 MG capsule Take 2 capsules by mouth Every Night. 180 capsule 1    hydrALAZINE (APRESOLINE) 10 MG tablet 1 tablet Daily.      HYDROcodone-acetaminophen (NORCO) 5-325 MG per tablet Take 1 tablet by mouth Every 4 (Four) Hours As Needed.      ketorolac (ACULAR) 0.4 % solution  place 1 DROP 4 TIMES A DAY IN OPERATIVE EYE BEGINNING 3 DAYS PRIOR TO SURGERY FOR 10 DAYS THEN TAPER TO TWICE DAILY FOR 14 ADDITIONAL DAYS      meclizine (ANTIVERT) 25 MG tablet 1 tablet 3 (Three) Times a Day As Needed.      Multiple Vitamin (MULTI-VITAMIN DAILY) tablet Take 1 tablet by mouth Daily.      ofloxacin (OCUFLOX) 0.3 % ophthalmic solution place 1 DROP 4 TIMES A DAY IN OPERATIVE EYE BEGINNING IMMEDIATELY AFTER SURGERY FOR 7 DAYS THEN STOP      omega-3 acid ethyl esters (Lovaza) 1 g capsule Take 2 capsules every day by oral route.      omeprazole (priLOSEC) 20 MG capsule 1 capsule Daily As Needed.      polyethylene glycol (MIRALAX) 17 g packet Take 17 g by mouth Daily.      potassium chloride (MICRO-K) 10 MEQ CR capsule Take 1 capsule by mouth Daily.      prednisoLONE acetate (PRED FORTE) 1 % ophthalmic suspension place 1 DROP 4 TIMES A DAY IN OPERATIVE EYE BEGINNING IMMEDIATELY AFTER SURGERY FOR 7 DAYS THEN TAPER TO TWICE DAILY FOR 14 ADDITIONAL DAYS      rosuvastatin (CRESTOR) 40 MG tablet Take 1 tablet by mouth Daily.      vitamin D (ERGOCALCIFEROL) 97805 UNITS capsule capsule Take 1 capsule by mouth Every 7 (Seven) Days.         Allergies   Allergen Reactions    Isoniazid Other (See Comments)     aplastic anemia    Rifampin Anaphylaxis    Sulfa Antibiotics Anaphylaxis    Acetaminophen Headache    Amlodipine Other (See Comments)    Oxybutynin Other (See Comments)    Pregabalin Other (See Comments)     Gained weight    Tetanus Antitoxin Swelling       Social History     Socioeconomic History    Marital status:    Tobacco Use    Smoking status: Former     Current packs/day: 0.00     Average packs/day: 1 pack/day for 5.0 years (5.0 ttl pk-yrs)     Types: Cigarettes     Start date:      Quit date:      Years since quittin.5     Passive exposure: Never    Smokeless tobacco: Never    Tobacco comments:     social   Vaping Use    Vaping status: Never Used   Substance and Sexual Activity     "Alcohol use: Yes     Comment: 3 drinks/month    Drug use: No    Sexual activity: Defer       /68 (BP Location: Left arm, Patient Position: Sitting)   Pulse 77   Temp 97.8 °F (36.6 °C)   Ht 179.1 cm (70.5\")   Wt 109 kg (241 lb 3.2 oz)   LMP  (LMP Unknown)   BMI 34.12 kg/m²     Physical Exam  Constitutional:       General: She is not in acute distress.     Appearance: She is well-developed. She is not diaphoretic.   HENT:      Head: Normocephalic and atraumatic.      Mouth/Throat:      Pharynx: No oropharyngeal exudate.   Eyes:      Conjunctiva/sclera: Conjunctivae normal.      Pupils: Pupils are equal, round, and reactive to light.   Pulmonary:      Effort: Pulmonary effort is normal. No respiratory distress.   Abdominal:      General: There is no distension.      Palpations: Abdomen is soft.   Skin:     General: Skin is warm and dry.      Coloration: Skin is not pale.   Neurological:      Mental Status: She is alert and oriented to person, place, and time.      Cranial Nerves: No cranial nerve deficit.   Psychiatric:         Behavior: Behavior normal.         Thought Content: Thought content normal.         Assessment: 18 months s/p LSG/HHR 11/8/22 PNQ     ICD-10-CM ICD-9-CM   1. Fatigue, unspecified type  R53.83 780.79   2. Hypovitaminosis D  E55.9 268.9   3. Screening, iron deficiency anemia  Z13.0 V78.0   4. Postgastrectomy malabsorption  K91.2 579.3    Z90.3    5. Malnutrition screen  Z13.21 V77.2   6. Obesity, Class I, BMI 30-34.9  E66.9 278.00              Plan:  Doing well. Continue w/ good food choices and healthy habits.  Continue protein >70g/day.  Continue routine exercise.  Routine bariatric labs ordered.  Continue vitamins w/ adjustments pending lab results.  Call w/ problems/concerns.     Doing very well!   Advise track on Baritastic.  I think may be hypocaloric, advise more real foods (meat, veg).  Constipation: increase fiber.  Will have her speak to PARKER Fernandez today.    The patient was " instructed to follow up in 6 months, sooner if needed.    I spent 30 minutes caring for Rashad on this date of service. This time includes time spent by me in the following activities: preparing for the visit, reviewing tests, performing a medically appropriate examination and/or evaluation, counseling and educating the patient/family/caregiver, documenting information in the medical record, independently interpreting results and communicating that information with the patient/family/caregiver, care coordination, ordering medications, ordering test(s), ordering procedure(s), obtaining a separately obtained history, and reviewing a separately obtained history      Maribell Anderson MD

## 2024-07-11 NOTE — PROGRESS NOTES
Bariatric Nutrition Consult     Name: Rashad Carrillo   YOB: 1955   Age: 68 y.o.   MRN: [id number]    Consult Date:    7/11/24    Surgery:               sleeve                    Date of surgery:11/8/22    Patient is 18 months post op.     Height: 179.1cm            Weight: 241lbs     Pre Op weight: 333lbs              Current BMI: 34.12    Weight change:  lost 92lbs    Diet history reveals:  Not eating enough. Only eats/snacks on 1 meal a day-protein, green veg and fruit, average 3oz protein. Tries to snack occasionally on edameme  Drinks 2-3 protein drinks daily. Estimating 70g protein max daily.        Protein sources:   Meat, chicken, cheese, eggs, protein drinks, beans, edameme       Main bariatric nutrition principles discussed and explained and queries answered. Encouraged patient to focus on       Pt has not made any changes suggested at previous appt  Reports she is 'good', doesn't want to eat more, 'too hot to eat '  Pt is very grateful and knows which foods are high in protein and fiber  Still advised to eat more during the day even if it is higher protein snacks such as cheese, cottage cheese and greek yogurt  Pt has my contact details for any q/further appts          Rebecca Tucker RD, LD

## 2024-07-19 LAB
25(OH)D3+25(OH)D2 SERPL-MCNC: 51.7 NG/ML (ref 30–100)
ALBUMIN SERPL-MCNC: 3.9 G/DL (ref 3.9–4.9)
ALP SERPL-CCNC: 119 IU/L (ref 44–121)
ALT SERPL-CCNC: 19 IU/L (ref 0–32)
AST SERPL-CCNC: 18 IU/L (ref 0–40)
BASOPHILS # BLD AUTO: 0 X10E3/UL (ref 0–0.2)
BASOPHILS NFR BLD AUTO: 1 %
BILIRUB SERPL-MCNC: 0.5 MG/DL (ref 0–1.2)
BUN SERPL-MCNC: 28 MG/DL (ref 8–27)
BUN/CREAT SERPL: 39 (ref 12–28)
CALCIUM SERPL-MCNC: 9.6 MG/DL (ref 8.7–10.3)
CHLORIDE SERPL-SCNC: 104 MMOL/L (ref 96–106)
CO2 SERPL-SCNC: 23 MMOL/L (ref 20–29)
CREAT SERPL-MCNC: 0.71 MG/DL (ref 0.57–1)
EGFRCR SERPLBLD CKD-EPI 2021: 94 ML/MIN/1.73
EOSINOPHIL # BLD AUTO: 0.1 X10E3/UL (ref 0–0.4)
EOSINOPHIL NFR BLD AUTO: 2 %
ERYTHROCYTE [DISTWIDTH] IN BLOOD BY AUTOMATED COUNT: 12.9 % (ref 11.7–15.4)
FERRITIN SERPL-MCNC: 145 NG/ML (ref 15–150)
FOLATE SERPL-MCNC: 6.3 NG/ML
GLOBULIN SER CALC-MCNC: 2.8 G/DL (ref 1.5–4.5)
GLUCOSE SERPL-MCNC: 86 MG/DL (ref 70–99)
HCT VFR BLD AUTO: 44.9 % (ref 34–46.6)
HGB BLD-MCNC: 14.5 G/DL (ref 11.1–15.9)
IMM GRANULOCYTES # BLD AUTO: 0 X10E3/UL (ref 0–0.1)
IMM GRANULOCYTES NFR BLD AUTO: 0 %
IRON SERPL-MCNC: 84 UG/DL (ref 27–139)
LYMPHOCYTES # BLD AUTO: 1.1 X10E3/UL (ref 0.7–3.1)
LYMPHOCYTES NFR BLD AUTO: 18 %
MCH RBC QN AUTO: 29.5 PG (ref 26.6–33)
MCHC RBC AUTO-ENTMCNC: 32.3 G/DL (ref 31.5–35.7)
MCV RBC AUTO: 91 FL (ref 79–97)
METHYLMALONATE SERPL-SCNC: 228 NMOL/L (ref 0–378)
MONOCYTES # BLD AUTO: 0.4 X10E3/UL (ref 0.1–0.9)
MONOCYTES NFR BLD AUTO: 8 %
NEUTROPHILS # BLD AUTO: 4.2 X10E3/UL (ref 1.4–7)
NEUTROPHILS NFR BLD AUTO: 71 %
PLATELET # BLD AUTO: 206 X10E3/UL (ref 150–450)
POTASSIUM SERPL-SCNC: 4.4 MMOL/L (ref 3.5–5.2)
PREALB SERPL-MCNC: 19 MG/DL (ref 10–36)
PROT SERPL-MCNC: 6.7 G/DL (ref 6–8.5)
RBC # BLD AUTO: 4.92 X10E6/UL (ref 3.77–5.28)
SODIUM SERPL-SCNC: 140 MMOL/L (ref 134–144)
VIT B1 BLD-SCNC: 110.6 NMOL/L (ref 66.5–200)
WBC # BLD AUTO: 5.8 X10E3/UL (ref 3.4–10.8)

## 2024-08-09 RX ORDER — METHYLPREDNISOLONE 4 MG/1
TABLET ORAL
Qty: 21 TABLET | Refills: 0 | Status: SHIPPED | OUTPATIENT
Start: 2024-08-09

## 2024-08-09 NOTE — TELEPHONE ENCOUNTER
Called patient to let her know that we do not prescribe this medication for her. She was like its whatever I just need something to help me. I asked what was going on. Patient stated last night while she was watering the flowers at her  grave she became weak, difficulty walking and balance issues. She is in a symptom flare and requesting a medrol dose pack not eye drops.    Patient states she is still having some nausea and some side / pelvic pain; patient states it comes and goes; is this normal?

## 2024-08-09 NOTE — TELEPHONE ENCOUNTER
URGENT    Medication requested (name and dose):       prednisoLONE acetate (PRED FORTE) 1 % ophthalmic suspension    place 1 DROP 4 TIMES A DAY IN OPERATIVE EYE BEGINNING IMMEDIATELY AFTER SURGERY FOR 7 DAYS THEN TAPER TO TWICE DAILY FOR 14 ADDITIONAL DAYS, Historical Med       Pharmacy where request should be sent:      [email or web address] [address] [facility] [address] [phone] [facility] [phone]       Additional details provided by patient:        Best call back number:  [phone]    Does the patient have less than a 3 day supply:  [] Yes  [] No    Janusz Ames Rep  08/09/24, 11:31 EDT

## 2024-08-13 ENCOUNTER — APPOINTMENT (OUTPATIENT)
Dept: CT IMAGING | Facility: HOSPITAL | Age: 69
End: 2024-08-13
Payer: MEDICARE

## 2024-08-13 ENCOUNTER — HOSPITAL ENCOUNTER (EMERGENCY)
Facility: HOSPITAL | Age: 69
Discharge: HOME | End: 2024-08-13
Attending: EMERGENCY MEDICINE
Payer: MEDICARE

## 2024-08-13 ENCOUNTER — TELEPHONE (OUTPATIENT)
Dept: NEUROLOGY | Facility: CLINIC | Age: 69
End: 2024-08-13
Payer: MEDICARE

## 2024-08-13 VITALS
DIASTOLIC BLOOD PRESSURE: 78 MMHG | HEART RATE: 50 BPM | TEMPERATURE: 97.9 F | SYSTOLIC BLOOD PRESSURE: 156 MMHG | RESPIRATION RATE: 14 BRPM | OXYGEN SATURATION: 99 % | HEIGHT: 72 IN | BODY MASS INDEX: 33.05 KG/M2 | WEIGHT: 244 LBS

## 2024-08-13 DIAGNOSIS — R53.1 GENERALIZED WEAKNESS: ICD-10-CM

## 2024-08-13 DIAGNOSIS — G35 MULTIPLE SCLEROSIS: Primary | ICD-10-CM

## 2024-08-13 LAB
ALBUMIN SERPL-MCNC: 3.9 G/DL (ref 3.5–5.2)
ALBUMIN/GLOB SERPL: 1.2 G/DL
ALP SERPL-CCNC: 114 U/L (ref 39–117)
ALT SERPL W P-5'-P-CCNC: 12 U/L (ref 1–33)
ANION GAP SERPL CALCULATED.3IONS-SCNC: 11 MMOL/L (ref 5–15)
AST SERPL-CCNC: 17 U/L (ref 1–32)
BACTERIA UR QL AUTO: ABNORMAL /HPF
BASOPHILS # BLD AUTO: 0.04 10*3/MM3 (ref 0–0.2)
BASOPHILS NFR BLD AUTO: 0.6 % (ref 0–1.5)
BILIRUB SERPL-MCNC: 0.5 MG/DL (ref 0–1.2)
BILIRUB UR QL STRIP: NEGATIVE
BUN SERPL-MCNC: 24 MG/DL (ref 8–23)
BUN/CREAT SERPL: 31.2 (ref 7–25)
CALCIUM SPEC-SCNC: 9.4 MG/DL (ref 8.6–10.5)
CHLORIDE SERPL-SCNC: 105 MMOL/L (ref 98–107)
CLARITY UR: ABNORMAL
CO2 SERPL-SCNC: 22 MMOL/L (ref 22–29)
COLOR UR: YELLOW
CREAT SERPL-MCNC: 0.77 MG/DL (ref 0.57–1)
D-LACTATE SERPL-SCNC: 0.6 MMOL/L (ref 0.5–2)
DEPRECATED RDW RBC AUTO: 46.2 FL (ref 37–54)
EGFRCR SERPLBLD CKD-EPI 2021: 85.2 ML/MIN/1.73
EOSINOPHIL # BLD AUTO: 0.1 10*3/MM3 (ref 0–0.4)
EOSINOPHIL NFR BLD AUTO: 1.5 % (ref 0.3–6.2)
ERYTHROCYTE [DISTWIDTH] IN BLOOD BY AUTOMATED COUNT: 13.4 % (ref 12.3–15.4)
GLOBULIN UR ELPH-MCNC: 3.2 GM/DL
GLUCOSE SERPL-MCNC: 99 MG/DL (ref 65–99)
GLUCOSE UR STRIP-MCNC: NEGATIVE MG/DL
HCT VFR BLD AUTO: 43.8 % (ref 34–46.6)
HGB BLD-MCNC: 14.3 G/DL (ref 12–15.9)
HGB UR QL STRIP.AUTO: NEGATIVE
HOLD SPECIMEN: NORMAL
HYALINE CASTS UR QL AUTO: ABNORMAL /LPF
IMM GRANULOCYTES # BLD AUTO: 0.03 10*3/MM3 (ref 0–0.05)
IMM GRANULOCYTES NFR BLD AUTO: 0.4 % (ref 0–0.5)
KETONES UR QL STRIP: ABNORMAL
LEUKOCYTE ESTERASE UR QL STRIP.AUTO: ABNORMAL
LIPASE SERPL-CCNC: 17 U/L (ref 13–60)
LYMPHOCYTES # BLD AUTO: 0.9 10*3/MM3 (ref 0.7–3.1)
LYMPHOCYTES NFR BLD AUTO: 13.3 % (ref 19.6–45.3)
MCH RBC QN AUTO: 30.1 PG (ref 26.6–33)
MCHC RBC AUTO-ENTMCNC: 32.6 G/DL (ref 31.5–35.7)
MCV RBC AUTO: 92.2 FL (ref 79–97)
MONOCYTES # BLD AUTO: 0.35 10*3/MM3 (ref 0.1–0.9)
MONOCYTES NFR BLD AUTO: 5.2 % (ref 5–12)
NEUTROPHILS NFR BLD AUTO: 5.36 10*3/MM3 (ref 1.7–7)
NEUTROPHILS NFR BLD AUTO: 79 % (ref 42.7–76)
NITRITE UR QL STRIP: NEGATIVE
NRBC BLD AUTO-RTO: 0 /100 WBC (ref 0–0.2)
PH UR STRIP.AUTO: 5.5 [PH] (ref 5–8)
PLATELET # BLD AUTO: 203 10*3/MM3 (ref 140–450)
PMV BLD AUTO: 10.4 FL (ref 6–12)
POTASSIUM SERPL-SCNC: 4.1 MMOL/L (ref 3.5–5.2)
PROT SERPL-MCNC: 7.1 G/DL (ref 6–8.5)
PROT UR QL STRIP: NEGATIVE
RBC # BLD AUTO: 4.75 10*6/MM3 (ref 3.77–5.28)
RBC # UR STRIP: ABNORMAL /HPF
REF LAB TEST METHOD: ABNORMAL
SODIUM SERPL-SCNC: 138 MMOL/L (ref 136–145)
SP GR UR STRIP: 1.02 (ref 1–1.03)
SQUAMOUS #/AREA URNS HPF: ABNORMAL /HPF
TSH SERPL DL<=0.05 MIU/L-ACNC: 1.41 UIU/ML (ref 0.27–4.2)
UROBILINOGEN UR QL STRIP: ABNORMAL
WBC # UR STRIP: ABNORMAL /HPF
WBC NRBC COR # BLD AUTO: 6.78 10*3/MM3 (ref 3.4–10.8)
WHOLE BLOOD HOLD COAG: NORMAL
WHOLE BLOOD HOLD SPECIMEN: NORMAL

## 2024-08-13 PROCEDURE — 70450 CT HEAD/BRAIN W/O DYE: CPT

## 2024-08-13 PROCEDURE — 36415 COLL VENOUS BLD VENIPUNCTURE: CPT

## 2024-08-13 PROCEDURE — 85025 COMPLETE CBC W/AUTO DIFF WBC: CPT | Performed by: EMERGENCY MEDICINE

## 2024-08-13 PROCEDURE — 99284 EMERGENCY DEPT VISIT MOD MDM: CPT

## 2024-08-13 PROCEDURE — 84443 ASSAY THYROID STIM HORMONE: CPT | Performed by: EMERGENCY MEDICINE

## 2024-08-13 PROCEDURE — 80053 COMPREHEN METABOLIC PANEL: CPT | Performed by: EMERGENCY MEDICINE

## 2024-08-13 PROCEDURE — 83690 ASSAY OF LIPASE: CPT | Performed by: EMERGENCY MEDICINE

## 2024-08-13 PROCEDURE — 83605 ASSAY OF LACTIC ACID: CPT | Performed by: EMERGENCY MEDICINE

## 2024-08-13 PROCEDURE — 96374 THER/PROPH/DIAG INJ IV PUSH: CPT

## 2024-08-13 PROCEDURE — 25010000002 METHYLPREDNISOLONE PER 40 MG: Performed by: EMERGENCY MEDICINE

## 2024-08-13 PROCEDURE — 96361 HYDRATE IV INFUSION ADD-ON: CPT

## 2024-08-13 PROCEDURE — 81001 URINALYSIS AUTO W/SCOPE: CPT | Performed by: EMERGENCY MEDICINE

## 2024-08-13 PROCEDURE — 25810000003 SODIUM CHLORIDE 0.9 % SOLUTION: Performed by: EMERGENCY MEDICINE

## 2024-08-13 RX ORDER — METHYLPREDNISOLONE SODIUM SUCCINATE 40 MG/ML
40 INJECTION, POWDER, LYOPHILIZED, FOR SOLUTION INTRAMUSCULAR; INTRAVENOUS ONCE
Status: COMPLETED | OUTPATIENT
Start: 2024-08-13 | End: 2024-08-13

## 2024-08-13 RX ORDER — SODIUM CHLORIDE 9 MG/ML
10 INJECTION, SOLUTION INTRAMUSCULAR; INTRAVENOUS; SUBCUTANEOUS AS NEEDED
Status: DISCONTINUED | OUTPATIENT
Start: 2024-08-13 | End: 2024-08-13 | Stop reason: HOSPADM

## 2024-08-13 RX ADMIN — METHYLPREDNISOLONE SODIUM SUCCINATE 40 MG: 40 INJECTION, POWDER, FOR SOLUTION INTRAMUSCULAR; INTRAVENOUS at 20:19

## 2024-08-13 RX ADMIN — SODIUM CHLORIDE 1000 ML: 9 INJECTION, SOLUTION INTRAVENOUS at 19:18

## 2024-08-13 NOTE — TELEPHONE ENCOUNTER
Caller: MARYBELZULEYKA    Relationship to patient: Emergency Contact    Best call back number: [phone]    Chief complaint: PATIENT HAS MORE CONFUSION THAN NORMAL AND DOESN'T UNDERSTAND BANKING EVEN WHEN EXPLAINED MULTI TIMES IN A WAY THAT SHE USUALLY WOULD UNDERSTAND. WEAKNESS, SPEECH DIFFICULTY, AND TROUBLE URINATING, AND DOESN'T SEEM HERSELF. I RECOMMENDED THEY TAKE HER TO THE HOSPITAL FOR IMMEDIATE EVALUATION.    Patient directed to call 911 or go to their nearest emergency room.     Patient verbalized understanding: [x] Yes  [] No  If no, why?    Additional notes: MS NO SAID THIS IS THE MOST SHE'S EVER BEEN CONCERNED ABOUT MS ROONEY AND SHE'S VERY WORRIED. SHE WILL TAKE HER TO THE HOSPITAL NOW.

## 2024-08-13 NOTE — ED PROVIDER NOTES
[place]    EMERGENCY DEPARTMENT ENCOUNTER      Pt Name: Rashad Carrillo  MRN: [id number]  YOB: 1955  Date of evaluation: 8/13/2024  Provider: Justice Nye DO    CHIEF COMPLAINT       Chief Complaint   Patient presents with    DEHYDRATION     HPI  Stated Reason for Visit: PT CAME FROM DR ROD OFFICE. SHE IS A BARIATRIC PT AND HAS NOT BEEN EATING AND DRINKING. THEY THINK SHE IS DEHYDRATED TO POSSIBLE MALNOURISHMENT. PT FAMILY REPORTS PT HAS BEEN HAVING MEMORY ISSUES AND MORE WEAK. History Obtained From: family;patient Precipitating Event(s): unknown     HISTORY OF PRESENT ILLNESS  (Location/Symptom, Timing/Onset, Context/Setting, Quality, Duration, Modifying Factors, Severity.)   Rashad Carrillo is a 68 y.o. female who presents to the emergency department for evaluation from her PCP office secondary to a concern for weakness, dehydration, just overall not feeling well.  Has a history of bariatric surgery and follows with Dr. Anderson, and has been following over the last month or so she has not been eating or drinking well, not much of an appetite, just overall just feeling weak.  She does follow with Dr. Gilbert, with neurology as she has a history of MS.  There was a discussion last week with her neurology team and was started on a Medrol Dosepak but had been taking the lower doses recently.  She has had some falls from her weakness which she notes is been a chronic issue with her MS.  Denies any severe headache, no unilateral weakness, numbness or tingling, she denies any fever or chills, denies any nausea vomiting or diarrhea.  Family concerned about possible UTI or dehydration.  Patient denies any other acute systemic complaints at this time.  No chest pain, difficulty breathing.    Nursing notes were reviewed.      PAST MEDICAL HISTORY     Past Medical History:   Diagnosis Date    Chronic knee pain     NSAIDS + steroid injections (last 3/2022)    Dyspepsia     Dyspnea on exertion      Fatigue     GERD (gastroesophageal reflux disease)     History of transfusion     PATIENT DENIES REACTION    Hyperlipidemia     Hypertension     Morbid obesity     Multiple sclerosis     follows w/ Dr. Gilbert         SURGICAL HISTORY       Past Surgical History:   Procedure Laterality Date    COLONOSCOPY      GASTRIC SLEEVE LAPAROSCOPIC N/A 11/08/2022    Procedure: GASTRIC SLEEVE WITH HIATAL HERNIA LAPAROSCOPIC WITH DAVINCI ROBOT, ESOPHAGOGASTRODUODENOSCOPY;  Surgeon: Maribell Anderson MD;  Location:  [facility];  Service: Robotics - DaVinci;  Laterality: N/A;    LAPAROSCOPIC CHOLECYSTECTOMY  2012    gallstone pancreatitis    TOTAL KNEE ARTHROPLASTY Right 10/10/2023    Procedure: TOTAL KNEE ARTHROPLASTY WITH CORI ROBOT - RIGHT;  Surgeon: Lalo Choe MD;  Location:  [facility];  Service: Robotics - Ortho;  Laterality: Right;         CURRENT MEDICATIONS       Current Facility-Administered Medications:     methylPREDNISolone sodium succinate (SOLU-Medrol) injection 40 mg, 40 mg, Intravenous, Once, Justice Ney DO    Sodium Chloride (PF) 0.9 % 10 mL, 10 mL, Intravenous, PRN, Justice Nye DO    sodium chloride 0.9 % bolus 1,000 mL, 1,000 mL, Intravenous, Once, Justice Nye DO, Last Rate: 1,000 mL/hr at 08/13/24 1918, 1,000 mL at 08/13/24 1918    Current Outpatient Medications:     acyclovir (ZOVIRAX) 400 MG tablet, Take 1 tablet by mouth 2 (Two) Times a Day. (Patient taking differently: Take 1 tablet by mouth Daily As Needed.), Disp: 60 tablet, Rfl: 11    albuterol sulfate  (90 Base) MCG/ACT inhaler, Daily As Needed., Disp: , Rfl:     azelastine (ASTEPRO) 0.15 % solution nasal spray, Daily As Needed., Disp: , Rfl: 3    B Complex-C-E-Zn (Z-BEC PO), Take 1 tablet by mouth daily., Disp: , Rfl:     baclofen (LIORESAL) 10 MG tablet, Take 1 tablet by mouth 3 (Three) Times a Day As Needed for Muscle Spasms., Disp: , Rfl:     chlorthalidone (HYGROTON) 25 MG tablet, Take 1 tablet by  mouth Daily., Disp: , Rfl:     cyanocobalamin 1000 MCG/ML injection, 1  mL every other week for 3 months, then monthly, Disp: , Rfl:     cyclobenzaprine (FLEXERIL) 5 MG tablet, Take 1 tablet by mouth 3 (Three) Times a Day As Needed for Muscle Spasms., Disp: , Rfl:     Dalfampridine ER 10 MG tablet sustained-release 12 hour, Take 10 mg by mouth 2 (Two) Times a Day., Disp: 60 tablet, Rfl: 11    docusate sodium (Colace) 100 MG capsule, Take 1 capsule by mouth 2 (Two) Times a Day. (Patient taking differently: Take 1 capsule by mouth 2 (Two) Times a Day As Needed.), Disp: , Rfl:     ezetimibe (ZETIA) 10 MG tablet, Take 1 tablet by mouth Daily., Disp: , Rfl:     FLUoxetine (PROzac) 20 MG capsule, Take 1 capsule by mouth Daily., Disp: , Rfl:     gabapentin (NEURONTIN) 100 MG capsule, Take 1 capsule by mouth 2 (Two) Times a Day., Disp: 180 capsule, Rfl: 1    gabapentin (NEURONTIN) 300 MG capsule, Take 2 capsules by mouth Every Night., Disp: 180 capsule, Rfl: 1    hydrALAZINE (APRESOLINE) 10 MG tablet, 1 tablet Daily., Disp: , Rfl:     HYDROcodone-acetaminophen (NORCO) 5-325 MG per tablet, Take 1 tablet by mouth Every 4 (Four) Hours As Needed., Disp: , Rfl:     ketorolac (ACULAR) 0.4 % solution, place 1 DROP 4 TIMES A DAY IN OPERATIVE EYE BEGINNING 3 DAYS PRIOR TO SURGERY FOR 10 DAYS THEN TAPER TO TWICE DAILY FOR 14 ADDITIONAL DAYS, Disp: , Rfl:     meclizine (ANTIVERT) 25 MG tablet, 1 tablet 3 (Three) Times a Day As Needed., Disp: , Rfl:     methylPREDNISolone (MEDROL) 4 MG dose pack, Take as directed on package instructions., Disp: 21 tablet, Rfl: 0    Multiple Vitamin (MULTI-VITAMIN DAILY) tablet, Take 1 tablet by mouth Daily., Disp: , Rfl:     ofloxacin (OCUFLOX) 0.3 % ophthalmic solution, place 1 DROP 4 TIMES A DAY IN OPERATIVE EYE BEGINNING IMMEDIATELY AFTER SURGERY FOR 7 DAYS THEN STOP, Disp: , Rfl:     omega-3 acid ethyl esters (Lovaza) 1 g capsule, Take 2 capsules every day by oral route., Disp: , Rfl:      omeprazole (priLOSEC) 20 MG capsule, 1 capsule Daily As Needed., Disp: , Rfl:     polyethylene glycol (MIRALAX) 17 g packet, Take 17 g by mouth Daily., Disp: , Rfl:     potassium chloride (MICRO-K) 10 MEQ CR capsule, Take 1 capsule by mouth Daily., Disp: , Rfl:     prednisoLONE acetate (PRED FORTE) 1 % ophthalmic suspension, place 1 DROP 4 TIMES A DAY IN OPERATIVE EYE BEGINNING IMMEDIATELY AFTER SURGERY FOR 7 DAYS THEN TAPER TO TWICE DAILY FOR 14 ADDITIONAL DAYS, Disp: , Rfl:     rosuvastatin (CRESTOR) 40 MG tablet, Take 1 tablet by mouth Daily., Disp: , Rfl:     vitamin D (ERGOCALCIFEROL) 96890 UNITS capsule capsule, Take 1 capsule by mouth Every 7 (Seven) Days., Disp: , Rfl:     ALLERGIES     Isoniazid, Rifampin, Sulfa antibiotics, Acetaminophen, Amlodipine, Oxybutynin, Pregabalin, and Tetanus antitoxin    FAMILY HISTORY       Family History   Problem Relation Age of Onset    Cancer Mother     Hypertension Mother     Heart disease Mother     Alzheimer's disease Other     Seizures Other         Epilepsy and recurrent seizures    Heart disease Other     Breast cancer Neg Hx     Ovarian cancer Neg Hx           SOCIAL HISTORY       Social History     Socioeconomic History    Marital status:    Tobacco Use    Smoking status: Former     Current packs/day: 0.00     Average packs/day: 1 pack/day for 5.0 years (5.0 ttl pk-yrs)     Types: Cigarettes     Start date:      Quit date:      Years since quittin.6     Passive exposure: Never    Smokeless tobacco: Never    Tobacco comments:     social   Vaping Use    Vaping status: Never Used   Substance and Sexual Activity    Alcohol use: Yes     Comment: 3 drinks/month    Drug use: No    Sexual activity: Defer         PHYSICAL EXAM    (up to 7 for level 4, 8 or more for level 5)     Vitals:    24 1729 24 1843 24 1918 24 1928   BP: 144/89 135/76 153/73 156/78   Patient Position: Sitting      Pulse: 58 53 51 50   Resp: 14      Temp: 97.9  "°F (36.6 °C)      TempSrc: Oral      SpO2: 100% 100% 100% 99%   Weight: 111 kg (244 lb)      Height: 182.9 cm (72\")          Physical Exam  General : Patient is awake, alert, oriented, in no acute distress, nontoxic appearing  HEENT: Pupils are equally round, EOMI, conjunctivae clear, there is no injection no icterus.  Oral mucosa is moist, uvula midline  Neck: Neck is supple, full range of motion, trachea midline  Cardiac: Heart regular rate, rhythm, no murmurs, rubs, or gallops  Lungs: Lungs are clear to auscultation, there is no wheezing, rhonchi, or rales. There is no use of accessory muscles  Abdomen: Abdomen is soft, nontender, nondistended. There are no firm or pulsatile masses, no rebound rigidity or guarding  Musculoskeletal: 5 out of 5 strength in all 4 extremities.  No focal muscle deficits are appreciated  Neuro: GCS 15, patient fully awake and alert, motor intact, sensory intact, level of consciousness is normal, cerebellar function is normal, no focal neurological deficit examination  Dermatology: Skin is warm and dry  Psych: Mentation is grossly normal, cognition is grossly normal. Affect is appropriate      DIAGNOSTIC RESULTS     EKG:  All EKGs are interpreted by the Emergency Department Physician who either signs or Co-signs this chart in the absence of a cardiologist.    No orders to display       RADIOLOGY:     [x] Radiologist's Report Reviewed:  CT Head Without Contrast   Final Result   Impression:   1.Moderate to severe white matter disease bilaterally consistent with the patient's known history of demyelinating disease. No specific CT evidence of acute infarction.   2.No calvarial fracture or intracranial hemorrhage.            Electronically Signed: Grant Flowers MD     8/13/2024 6:40 PM EDT     Workstation ID: [id number]          I ordered and independently reviewed the above noted radiographic studies.      I viewed images of CT head which showed changes consistent with multiple sclerosis per " my independent interpretation.    See radiologist's dictation for official interpretation.      ED BEDSIDE ULTRASOUND:   Performed by ED Physician - none    LABS:    I have reviewed and interpreted all of the currently available lab results from this visit (if applicable):  Results for orders placed or performed during the hospital encounter of 08/13/24   Comprehensive Metabolic Panel    Specimen: Blood   Result Value Ref Range    Glucose 99 65 - 99 mg/dL    BUN 24 (H) 8 - 23 mg/dL    Creatinine 0.77 0.57 - 1.00 mg/dL    Sodium 138 136 - 145 mmol/L    Potassium 4.1 3.5 - 5.2 mmol/L    Chloride 105 98 - 107 mmol/L    CO2 22.0 22.0 - 29.0 mmol/L    Calcium 9.4 8.6 - 10.5 mg/dL    Total Protein 7.1 6.0 - 8.5 g/dL    Albumin 3.9 3.5 - 5.2 g/dL    ALT (SGPT) 12 1 - 33 U/L    AST (SGOT) 17 1 - 32 U/L    Alkaline Phosphatase 114 39 - 117 U/L    Total Bilirubin 0.5 0.0 - 1.2 mg/dL    Globulin 3.2 gm/dL    A/G Ratio 1.2 g/dL    BUN/Creatinine Ratio 31.2 (H) 7.0 - 25.0    Anion Gap 11.0 5.0 - 15.0 mmol/L    eGFR 85.2 >60.0 mL/min/1.73   Lipase    Specimen: Blood   Result Value Ref Range    Lipase 17 13 - 60 U/L   Urinalysis With Microscopic If Indicated (No Culture) - Urine, Clean Catch    Specimen: Urine, Clean Catch   Result Value Ref Range    Color, UA Yellow Yellow, Straw    Appearance, UA Cloudy (A) Clear    pH, UA 5.5 5.0 - 8.0    Specific Gravity, UA 1.022 1.001 - 1.030    Glucose, UA Negative Negative    Ketones, UA Trace (A) Negative    Bilirubin, UA Negative Negative    Blood, UA Negative Negative    Protein, UA Negative Negative    Leuk Esterase, UA Small (1+) (A) Negative    Nitrite, UA Negative Negative    Urobilinogen, UA 0.2 E.U./dL 0.2 - 1.0 E.U./dL   Lactic Acid, Plasma    Specimen: Blood   Result Value Ref Range    Lactate 0.6 0.5 - 2.0 mmol/L   CBC Auto Differential    Specimen: Blood   Result Value Ref Range    WBC 6.78 3.40 - 10.80 10*3/mm3    RBC 4.75 3.77 - 5.28 10*6/mm3    Hemoglobin 14.3 12.0 - 15.9  g/dL    Hematocrit 43.8 34.0 - 46.6 %    MCV 92.2 79.0 - 97.0 fL    MCH 30.1 26.6 - 33.0 pg    MCHC 32.6 31.5 - 35.7 g/dL    RDW 13.4 12.3 - 15.4 %    RDW-SD 46.2 37.0 - 54.0 fl    MPV 10.4 6.0 - 12.0 fL    Platelets 203 140 - 450 10*3/mm3    Neutrophil % 79.0 (H) 42.7 - 76.0 %    Lymphocyte % 13.3 (L) 19.6 - 45.3 %    Monocyte % 5.2 5.0 - 12.0 %    Eosinophil % 1.5 0.3 - 6.2 %    Basophil % 0.6 0.0 - 1.5 %    Immature Grans % 0.4 0.0 - 0.5 %    Neutrophils, Absolute 5.36 1.70 - 7.00 10*3/mm3    Lymphocytes, Absolute 0.90 0.70 - 3.10 10*3/mm3    Monocytes, Absolute 0.35 0.10 - 0.90 10*3/mm3    Eosinophils, Absolute 0.10 0.00 - 0.40 10*3/mm3    Basophils, Absolute 0.04 0.00 - 0.20 10*3/mm3    Immature Grans, Absolute 0.03 0.00 - 0.05 10*3/mm3    nRBC 0.0 0.0 - 0.2 /100 WBC   TSH    Specimen: Blood   Result Value Ref Range    TSH 1.410 0.270 - 4.200 uIU/mL   Urinalysis, Microscopic Only - Urine, Clean Catch    Specimen: Urine, Clean Catch   Result Value Ref Range    RBC, UA 0-2 None Seen, 0-2 /HPF    WBC, UA 21-50 (A) None Seen, 0-2 /HPF    Bacteria, UA None Seen None Seen, Trace /HPF    Squamous Epithelial Cells, UA 3-6 (A) None Seen, 0-2 /HPF    Hyaline Casts, UA 0-6 0 - 6 /LPF    Methodology Automated Microscopy    Green Top (Gel)   Result Value Ref Range    Extra Tube Hold for add-ons.    Lavender Top   Result Value Ref Range    Extra Tube hold for add-on    Gold Top - SST   Result Value Ref Range    Extra Tube Hold for add-ons.    Gray Top   Result Value Ref Range    Extra Tube Hold for add-ons.    Light Blue Top   Result Value Ref Range    Extra Tube Hold for add-ons.         If labs were ordered, I independently reviewed the results and considered them in treating the patient.      EMERGENCY DEPARTMENT COURSE and DIFFERENTIAL DIAGNOSIS/MDM:   Vitals:  AS OF 19:56 EDT    BP - 156/78  HR - 50  TEMP - 97.9 °F (36.6 °C) (Oral)  O2 SATS - 99%      Orders placed during this visit:  Orders Placed This Encounter    Procedures    CT Head Without Contrast    [place] Draw    Comprehensive Metabolic Panel    Lipase    Urinalysis With Microscopic If Indicated (No Culture) - Urine, Clean Catch    Lactic Acid, Plasma    CBC Auto Differential    TSH    Urinalysis, Microscopic Only - Urine, Clean Catch    NPO Diet NPO Type: Strict NPO    Undress & Gown    Insert Peripheral IV    CBC & Differential    Green Top (Gel)    Lavender Top    Gold Top - SST    Gray Top    Light Blue Top       All labs have been independently reviewed by me.  All radiology studies have been reviewed by me and the radiologist dictating the report.  All EKG's have been independently viewed and interpreted by me.      Discussion below represents my analysis of pertinent findings related to patient's condition, differential diagnosis, treatment plan and final disposition.    Differential diagnosis:  The differential diagnosis associated with the patient's presentation includes: Weakness, dehydration, electrolyte abnormalities, MS flare, urinary tract infection    Additional sources  Discussed/ obtained information from independent historians:   [] Spouse  [] Parent  [x] Family member, daughter  [] Friend  [] EMS   [] Other:    External (non-ED) record review:   [] Inpatient record:   [] Office record:   [] Outpatient record:   [] Prior Outpatient labs:   [] Prior Outpatient radiology:   [] Primary Care record:   [] Outside ED record:   [] Other:     Patient's care impacted by:   [] Diabetes  [] Hypertension  [] CHF  [] Hyperlipidemia  [] Coronary Artery Disease   [] COPD   [] Cancer   [] Tobacco Abuse   [] Substance Abuse    [x] Other: MS    Care significantly affected by Social Determinants of Health (housing and economic circumstances, unemployment)    [] Yes     [x] No   If yes, Patient's care significantly limited by Social Determinants of Health including:   [] Inadequate housing   [] Low income   [] Alcoholism and drug addiction in family   [] Problems  related to primary support group   [] Unemployment   [] Problems related to employment   [] Other Social Determinants of Health:       MEDICATIONS ADMINISTERED IN ED:  Medications   Sodium Chloride (PF) 0.9 % 10 mL (has no administration in time range)   sodium chloride 0.9 % bolus 1,000 mL (1,000 mL Intravenous New Bag 8/13/24 1918)   methylPREDNISolone sodium succinate (SOLU-Medrol) injection 40 mg (has no administration in time range)              This is a very pleasant 68-year-old female who was had increasing weakness over the last 1 month.  She has a underlying history of multiple sclerosis, she notes she has had some weakness and falls which has not been uncommon for her given her MS diagnosis.  She does admit to not drinking well, feels that she may be dehydrated, family concerned about possible UTI.  She is nontoxic-appearing, afebrile, blood pressure 144/89.  We did an IV, labs and imaging for further assessment.  Patient given IV fluids.  Results as above.  CT head with changes consistent with multiple sclerosis, no acute abnormalities, remainder blood work labs and urinalysis is unremarkable.  On reassessment the patient is resting comfortably, she is very happy to hear the results of today's workup, she would feel more comfortable being at home, steroid Dosepak, given a dose of Solu-Medrol 40 mg while in the ED.  She follows with her PCP and neurologist pretty closely and feels comfortable following up as an outpatient.  Return to the ED with any worsening symptoms or further concerns in the meantime.    I had a discussion with the patient/family regarding diagnosis, diagnostic results, treatment plan, and medications.  The patient/family indicated understanding of these instructions.  I spent adequate time at the bedside preceding discharge necessary to personally discuss the aftercare instructions, giving patient education, providing explanations of the results of our evaluations/findings, and my  decision making to assure that the patient/family understand the plan of care.  Time was allotted to answer questions at that time and throughout the ED course.  Emphasis was placed on timely follow-up after discharge.  I also discussed the potential for the development of an acute emergent condition requiring further evaluation, admission, or even surgical intervention. I discussed that we found nothing during the visit today indicating the need for further workup, admission, or the presence of an unstable medical condition.  I encouraged the patient to return to the emergency department immediately for ANY concerns, worsening, new complaints, or if symptoms persist and unable to seek follow-up in a timely fashion.  The patient/family expressed understanding and agreement with this plan.  The patient will follow-up with their PCP in 1-2 days for reevaluation.     PROCEDURES:  Procedures    CRITICAL CARE TIME    Total Critical Care time was 0 minutes, excluding separately reportable procedures.   There was a high probability of clinically significant/life threatening deterioration in the patient's condition which required my urgent intervention.      FINAL IMPRESSION      1. Multiple sclerosis    2. Generalized weakness          DISPOSITION/PLAN     ED Disposition       ED Disposition   Discharge    Condition   Stable    Comment   --               PATIENT REFERRED TO:  Adonay Gilbert MD  [address] [facility] [address]  [place] [address]  [phone]    Schedule an appointment as soon as possible for a visit   Your neurologist    Ye Newman MD  [address] [facility] [address]  [place] [address]  [phone]    In 2 days      [facility] EMERGENCY DEPARTMENT  [address] [facility] [id number] [phone]    If symptoms worsen      DISCHARGE MEDICATIONS:     Medication List        CHANGE how you take these medications      acyclovir 400 MG tablet  Commonly known as:  ZOVIRAX  Take 1 tablet by mouth 2 (Two) Times a Day.  What changed:   when to take this  reasons to take this     docusate sodium 100 MG capsule  Commonly known as: Colace  Take 1 capsule by mouth 2 (Two) Times a Day.  What changed:   when to take this  reasons to take this            CONTINUE taking these medications      albuterol sulfate  (90 Base) MCG/ACT inhaler  Commonly known as: PROVENTIL HFA;VENTOLIN HFA;PROAIR HFA     azelastine 0.15 % solution nasal spray  Commonly known as: ASTEPRO     baclofen 10 MG tablet  Commonly known as: LIORESAL     chlorthalidone 25 MG tablet  Commonly known as: HYGROTON     cyanocobalamin 1000 MCG/ML injection     cyclobenzaprine 5 MG tablet  Commonly known as: FLEXERIL  Take 1 tablet by mouth 3 (Three) Times a Day As Needed for Muscle Spasms.     Dalfampridine ER 10 MG tablet sustained-release 12 hour  Take 10 mg by mouth 2 (Two) Times a Day.     ezetimibe 10 MG tablet  Commonly known as: ZETIA     FLUoxetine 20 MG capsule  Commonly known as: PROzac     * gabapentin 300 MG capsule  Commonly known as: NEURONTIN  Take 2 capsules by mouth Every Night.     * gabapentin 100 MG capsule  Commonly known as: NEURONTIN  Take 1 capsule by mouth 2 (Two) Times a Day.     hydrALAZINE 10 MG tablet  Commonly known as: APRESOLINE     HYDROcodone-acetaminophen 5-325 MG per tablet  Commonly known as: NORCO     ketorolac 0.4 % solution  Commonly known as: ACULAR     Lovaza 1 g capsule  Generic drug: omega-3 acid ethyl esters     meclizine 25 MG tablet  Commonly known as: ANTIVERT     methylPREDNISolone 4 MG dose pack  Commonly known as: MEDROL  Take as directed on package instructions.     Multi-Vitamin Daily tablet tablet  Generic drug: multivitamin     ofloxacin 0.3 % ophthalmic solution  Commonly known as: OCUFLOX     omeprazole 20 MG capsule  Commonly known as: priLOSEC     polyethylene glycol 17 g packet  Commonly known as: MIRALAX  Take 17 g by mouth Daily.     potassium chloride 10  MEQ CR capsule  Commonly known as: MICRO-K     prednisoLONE acetate 1 % ophthalmic suspension  Commonly known as: PRED FORTE     rosuvastatin 40 MG tablet  Commonly known as: CRESTOR     vitamin D 1.25 MG (96156 UT) capsule capsule  Commonly known as: ERGOCALCIFEROL     Z-BEC PO           * This list has 2 medication(s) that are the same as other medications prescribed for you. Read the directions carefully, and ask your doctor or other care provider to review them with you.                      Comment: Please note this report has been produced using speech recognition software.      Justice Nye DO  Attending Emergency Physician         Justice Nye DO  08/13/24 1958

## 2024-08-13 NOTE — TELEPHONE ENCOUNTER
Called Jenise back to let her know that I spoke with Dr. Gilbert and he advises she go to the ER to be evaluated. He states confusion is not normal for Rashad and he believes she needs to be assessed at the ER. Jenise placed me on speaker phone so that Rashad could here me as well. They verbalized understanding.

## 2024-08-14 ENCOUNTER — TELEPHONE (OUTPATIENT)
Dept: BARIATRICS/WEIGHT MGMT | Facility: CLINIC | Age: 69
End: 2024-08-14
Payer: MEDICARE

## 2024-08-14 NOTE — TELEPHONE ENCOUNTER
Pt called after ER visit and per pt was informed to increase her nutritional intake  Pt is aware I had asked her to do this at prior consults  Pt currently eating 1 meal a day and drinking protein drinks  Advised to increase to 3 meals daily, balanced meals, protein focused, variety of food  Ideas discussed and info mailed to pt  Pt to call with hilario

## 2024-08-16 NOTE — PROGRESS NOTES
Physical Therapy Initial Evaluation and Plan of Care      Patient: Rashad Carrillo   : 1955  Diagnosis/ICD-10 Code:  Multiple sclerosis (CMS/[facility]) [G35]  Referring practitioner: Adonay Gilbert MD  Date of Initial Visit: 2020  Today's Date: 2020  Patient seen for 1 sessions      Subjective:     Subjective Evaluation    History of Present Illness  Mechanism of injury: Patient relates when she rolls over in bed she get extremely dizzy. The most intense is when she rolls to the left. She states that when she lays down is when it begins or when she is sitting up. The dizziness never happens when she is walking. Denies pain. She has chronic tinnitus and HA's. Denies visual changes. The vertigo is intense and would rate it an 8/10. It only lasts seconds and then resolves.         Objective     PT Neuro     20 0800   Symptom Behavior   Type of Dizziness World moves   Frequency of Dizziness Several Times a Day   Duration of Dizziness Seconds   VAS Intensity (0-10) 8   Aggravating Factors Rolling to left;Rolling to right   Relieving Factors No known relieving factors   Occulomotor Exam Fixation Present   Occular ROM Abnormal   Smooth Pursuit Bilateral:;Saccadic   Saccades Bilateral:;Undershoots   Convergence Abnormal   Positional Testing   Yelena-Hallpike Right No nystagmus   Oelwein-Hallpike Left No nystagmus   Horizontal Roll Test Right No nystagmus   Horizontal Roll Test Left No nystagmus          Assessment & Plan     Assessment  Impairments: lacks appropriate home exercise program  Assessment details: Patient is a 65 YOF that presents with symptoms of dizziness. Symptoms are provoked when rolling in bed. Per patient's subjective reports a BPPV was suspected, however underlying MS could be a causative factor. Patient demonstrated negative yelena hallpike and horizontal test. These positions also did not cause her dizziness today which has not been her normal. Patient was administered Epley and BBQ  roll for performance at home should the dizziness return. No appointment will be made currently. Patient will call to schedule a follow up should she need it.   Prognosis: fair  Functional Limitations: moving in bed and stooping  Goals  Plan Goals: 1. Patient will be I with HEP.   2. Patient will report no dizziness when rolling in bed.     Plan  Therapy options: will be seen for skilled physical therapy services  Planned therapy interventions: balance/weight-bearing training, home exercise program and neuromuscular re-education  Frequency: 1x week  Duration in visits: 4  Treatment plan discussed with: patient  Plan details: Patient will be seen 1x/wk for 4 visits with treatment to include canalith repositioning, neuromuscular re-education, balance and gait training, along with therapeutic exercise as indicated.         Timed:  Manual Therapy:    0     mins  69213;  Therapeutic Exercise:    0     mins  49151;     Neuromuscular Gabriela:    0    mins  45607;    Therapeutic Activity:     0     mins  47811;     Gait Trainin     mins  47295;     Electrical Stimulation:    0     mins  54321 ( );    Untimed:  Canalith Repositioning    0     mins 71829    Timed Treatment:   0   mins   Total Treatment:     45   mins    PT SIGNATURE: Kat Aguilar PT, DPT, MSCS, CDP  [place] License #[id number]  DATE TREATMENT INITIATED: 2020    Initial Certification Certification Period: 3/17/2021  I certify that the therapy services are furnished while this patient is under my care.  The services outlined above are required by this patient, and will be reviewed every 90 days.     PHYSICIAN: Adonay Gilbert MD      DATE:     Please sign and return via fax to [phone].   Thank you,   [facility].     [place]

## 2024-08-31 NOTE — PROGRESS NOTES
"PROBLEM LIST:    1.  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  2.  Bone marrow biopsy shows no erythroid precursors.  3.  Multiple sclerosis undertreatment with Dr. Gilbert  4.  Latent TB treated with isoniazid by Dr. Lora    REASON FOR VISIT: Anemia    HISTORY OF PRESENT ILLNESS:   62 y.o.  female presents today for follow-up of her transfusion requiring anemia.  In the last 3 weeks she is has not received any transfusion.  Clinically doing reasonably well.  No worsening of her symptoms.  She does have MS that has always a fair bit of difficulty.    Past medical history, social history and family history was reviewed and unchanged from prior visit.    Review of Systems:    Review of Systems   Constitutional: Positive for fatigue.   HENT:  Negative.    Eyes: Negative.    Respiratory: Positive for shortness of breath.    Cardiovascular: Negative.    Gastrointestinal: Negative.    Endocrine: Negative.    Genitourinary: Negative.     Neurological: Positive for extremity weakness and numbness.   Hematological: Negative.    Psychiatric/Behavioral: Negative.       A comprehensive 14 point review of systems was performed and was negative except as mentioned.      Medications:  The current medication list was reviewed in the EMR    ALLERGIES:    Allergies   Allergen Reactions   • Sulfa Antibiotics    • Tetanus Toxoid          Physical Exam    VITAL SIGNS:  /73 Comment: Right Wrist  Pulse 87   Temp 97.6 °F (36.4 °C) (Oral)   Resp 18   Ht 182.9 cm (72\")   Wt (!) 151 kg (332 lb)   BMI 45.03 kg/m²     Wt Readings from Last 3 Encounters:   03/19/18 (!) 151 kg (332 lb)   03/01/18 (!) 152 kg (334 lb)   02/23/18 (!) 152 kg (334 lb)        Performance Status: 1    General: well appearing, in no acute distress  HEENT: sclera anicteric, oropharynx clear, neck is supple  Lymphatics: no cervical, supraclavicular, or axillary adenopathy  Cardiovascular: regular rate and rhythm, no murmurs, rubs or " gallops  Lungs: clear to auscultation bilaterally  Abdomen: soft, nontender, nondistended.  No palpable organomegaly  Extremities: no lower extremity edema  Skin: no rashes, lesions, bruising, or petechiae  Msk:  Weakness in her legs  Psych: Mood is stable        RECENT LABS:    Lab Results   Component Value Date    HGB 11.4 (L) 03/19/2018    HCT 37.9 03/19/2018    MCV 92.8 03/19/2018     03/19/2018    WBC 7.00 03/19/2018    NEUTROABS 3.60 03/19/2018    LYMPHSABS 3.00 03/19/2018    MONOSABS 0.40 03/19/2018    EOSABS 0.31 (H) 01/24/2018    BASOSABS 0.10 01/24/2018       Lab Results   Component Value Date    GLUCOSE 128 (H) 03/01/2018    BUN 16 03/01/2018    CREATININE 0.90 03/01/2018     03/01/2018    K 3.8 03/01/2018     03/01/2018    CO2 28.0 03/01/2018    CALCIUM 9.2 03/01/2018    PROTEINTOT 6.2 01/24/2018    ALBUMIN 3.50 01/24/2018    BILITOT 0.9 01/24/2018    ALKPHOS 118 (H) 01/24/2018    AST 27 01/24/2018    ALT 30 01/24/2018         Final Diagnosis   PERIPHERAL BLOOD SMEAR, BONE MARROW ASPIRATE, CLOT SECTION BIOPSY WITH FLOW CYTOMETRY,     IMMUNOHISTOCHEMISTRY, AND IN-SITU HYBRIDIZATION STUDIES:     PERIPHERAL BLOOD SMEAR AND CBC DATA:  Moderate/severe anemia.     BONE MARROW ASPIRATE, CLOT SECTION AND BIOPSY:  Normocellular marrow with numerous lymphoid aggregates appearing reactive.  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  Stainable iron present.  Negative for tumor and granulomata.  PCC/dlb      Electronically signed by Jimmy Olivia MD on 1/31/2018 at 1644   Clinical Information See result below   The working history is anemia. Procedure performed by Dr. Mcgarry.      Bone Marrow Procedure Note  Date: 1/25/18.               No known allergy to Betadine or Lidocaine     The patient was identified by wrist band, verbally and by the nurse. The bone marrow procedure was explained and the potential complications of bleeding, bruising  and infection were discussed. The patient agrees to proceed. The patient is rolled onto the right side with knees tucked toward their chin and the left posterior iliac crest is palpated and the bone prominence located for the puncture site. The area was cleaned 3x with betadine then draped with sterile towels, anesthetized with Lidocaine by infiltration to the periosteal surface. A small incision made through which the marrow needle is introduced. Marrow aspiration was collected and a marrow core was obtained. The needle was withdrawn and pressure applied to the site. The area was cleaned with alcohol after drapes were removed and a sterile bandage placed over the site. The patient was rolled onto their back with a towel roll placed to provide pressure to the site for hemostasis. The patient tolerated the procedure well and no complications were encountered.       The marrow was submitted for:  Routine exam                                                     Flow cytometry                                                     Cytogenetics      Dr. Olivia for Dr. Mcgarry, Department of Pathology    Comment See result below   There is a marked decrease in erythropoiesis.  Findings are consistent with red cell aplasia.  Correlation with clinical history including medication history would be essential.  If this has been a longstanding process, a diagnosis of pure red cell aplasia may be warranted.  There is no evidence of a lymphoid or myeloid neoplasm.     These results were discussed with Dr. Kate by Dr. Olivia on 1/30/18.   Gross Description See result below   Received are bone aspirate smears.      Received in formalin labeled as bone marrow is a portion of blood clot and possible embedded marrow particles.  The specimen is filtered and submitted entirely in cassette 1.     Received in formalin labeled as bone marrow biopsy is an apparent core biopsy of bone and associated blood which is submitted in toto in  cassette 2, following decalcification. [facility]   CBC and Differential See result below   Santizo bone marrow aspirate smear and accompanying CBC data is available for review.  There is a normal total white blood cell count with differential as indicated.  No abnormal/immature white blood cells are noted.  Red blood cells show mild anisopoikilocytosis including occasional elliptocytes.  There is moderate/severe anemia.  Platelets are present in adequate numbers with normal morphologically.   Aspirate Smear See result below   Santizo stained bone marrow aspirate smears containing adequate cellularity and particles are available for review.    Myelopoiesis appears overall unremarkable.  Erythropoiesis is markedly suppressed with at most rare erythroid  precursors identified.  Myeloid to erythroid ratio is therefore markedly increased estimated at greater than 100:1.   Megakaryocytes showing normal cytologic features are present in adequate numbers.  No cellular elements foreign to the  marrow are identified.  No increase in histiocytes or evidence of hemaphagocytosis is noted.  No increase in blasts, lymphocytes or plasma cells is noted.  Iron stain, with appropriate control, shows stainable iron to  be present with no ringed sideroblasts identified.        Core Biopsy  See result below   Bone marrow core biopsy containing approximately 5mm of marrow space is available for review.  The marrow is approximately 33% cellular (roughly normocellular for patient's age).  Cellular composition is similar to that seen in the clot section including a small lymphoid aggregate consisting of small cytologically unremarkable lymphocytes.  Again, essentially no erythroid precursors are noted on routine stain.  Iron stain, with appropriate control, shows stainable iron to be present with no ring sideroblasts identified.      Clot Section See result below   Bone marrow clot section containing numerous particles is available for review.   Particles are overall 33% cellular   (roughly normocellular for patient's age).  There are several medium sized lymphoid aggregates present consisting of small   cytologically unremarkable lymphocytes.  The aggregates appear well circumscribed on routine stains.  No definitive   erythroid precursors are seen on routine stains.  To further evaluate the marrow composition, including the lymphoid   aggregates, immunohistochemical staining was undertaken.  Stains for CD3 and CD20 show the lymphoid aggregates to  be a mixture of CD3+ T-cells and CD20+ B-cells, although the largest aggregate seen on routine stains are  not present on the immunostain section.   stain highlights plasma cells which are not significantly increased  (approximately 2% of marrow cellularity) and show a normal distribution.  In-situ hybridization studies for  kappa and lambda light chain show the plasma cells present to polytypic.  Glycophorin A stain shows essentially no  identifiable erythroid precursors.  Iron stain, with appropriate control, shows stainable iron to be present with no ring  sideroblasts identified.     Flow Cytometry Summary See result below   Flow Cytometry Summary   INTERPRETATION:  No increase in blasts.  No specific immunophenotypic abnormalities identified.  No clonal B-cell population or aberrant T-cell population identified.       The following antibodies were evaluated by flow cytometry: CD2, CD3, CD4, CD5, CD7, CD8, CD10, CD11b, CD13, CD14, CD16, CD19, CD20, CD33, CD34, CD38, CD45, CD56, CD57, CD64, , HLA-DR, kappa, lambda     Gaiting on CD45 versus side scatter as well as incorporation other immunophenotypic data shows a distribution of cells of 6.4% lymphocytes, 0.4% hematogones, 80.2% neutrophils and neutrophil precursors, 4.9% eosinophils, 0.5% basophils, 0.6% blasts, 3.0% monocytes and monocytic precursors, 0.1% plasma cells and 1.6% CD45 negative events including nucleated erythroid precursors and  debris.  No specific immunophenotypic abnormalities are identified in the maturing myeloid or monocytic elements.  There is no increase in blasts.  A population of CD10+/CD19+ cells is identified showing a spectrum of CD20 expression compatible with maturing hematogones.  In the lymphocyte population, T-cells are 75% of cellularity, B-cells 15% of cellularity and natural killer cells 12% of cellularity.  The T-cells show a CD4:CD8 ratio of 1.6:1.  No aberrant T-cell antigen expression is detected.  There is no evidence of clonality in the B-cell population.     Technical component performed at [facility], [address] [facility], [address] [address].      These tests use analyte specific reagents. The performance of these analyte specific reagents was determined by [employer]. These tests have not been cleared or approved by the U.S. Food & Drug Administration (FDA). The FDA has determined that such approval is not necessary. These tests are used for clinical purposes and should not be considered experimental or research.     Professional interpretation of flow cytometric results performed by Dr. Jimmy Olivia.       Resulting Agency [facility]      Specimen Collected: 01/25/18  1:57 PM Last Resulted: 01/31/18  4:44 PM                         Related Result Highlights           Assessment/Plan    62-year-old lady with pure red cell aplasia. I suspect this was caused by isoniazid.  This seems to be resolving on its own.  I like to see how she does over the next month or so.  She can reinitiate her Tysambri for her MS.  She does not need any transfusions.    I spent 15 minutes on the patient's plan and care with more than 50% spent on counseling the patient.    Return to clinic in 6 weeks.        Aj Kate MD  [facility]    3/19/2018         Please note that portions of this note may have been completed with a voice  recognition program. Efforts were made to edit the dictations, but occasionally words are mistranscribed.            Opt out

## 2024-10-14 ENCOUNTER — OFFICE VISIT (OUTPATIENT)
Dept: ORTHOPEDIC SURGERY | Facility: CLINIC | Age: 69
End: 2024-10-14
Payer: MEDICARE

## 2024-10-14 VITALS
HEIGHT: 72 IN | BODY MASS INDEX: 33.02 KG/M2 | DIASTOLIC BLOOD PRESSURE: 72 MMHG | WEIGHT: 243.8 LBS | SYSTOLIC BLOOD PRESSURE: 114 MMHG

## 2024-10-14 DIAGNOSIS — Z96.651 S/P TOTAL KNEE ARTHROPLASTY, RIGHT: Primary | ICD-10-CM

## 2024-10-14 DIAGNOSIS — Z09 POSTOPERATIVE EXAMINATION: ICD-10-CM

## 2024-10-14 PROCEDURE — 99212 OFFICE O/P EST SF 10 MIN: CPT | Performed by: ORTHOPAEDIC SURGERY

## 2024-10-14 PROCEDURE — 1159F MED LIST DOCD IN RCRD: CPT | Performed by: ORTHOPAEDIC SURGERY

## 2024-10-14 PROCEDURE — 3074F SYST BP LT 130 MM HG: CPT | Performed by: ORTHOPAEDIC SURGERY

## 2024-10-14 PROCEDURE — 3078F DIAST BP <80 MM HG: CPT | Performed by: ORTHOPAEDIC SURGERY

## 2024-10-14 PROCEDURE — 1160F RVW MEDS BY RX/DR IN RCRD: CPT | Performed by: ORTHOPAEDIC SURGERY

## 2024-10-14 RX ORDER — AZELASTINE HYDROCHLORIDE 137 UG/1
SPRAY, METERED NASAL
COMMUNITY
Start: 2024-07-10

## 2024-10-14 RX ORDER — OXYBUTYNIN CHLORIDE 10 MG/1
1 TABLET, EXTENDED RELEASE ORAL DAILY
COMMUNITY
Start: 2024-07-10

## 2024-10-14 RX ORDER — TOBRAMYCIN AND DEXAMETHASONE 3; 1 MG/ML; MG/ML
SUSPENSION/ DROPS OPHTHALMIC
COMMUNITY
Start: 2024-09-23

## 2024-10-14 RX ORDER — OLMESARTAN MEDOXOMIL 40 MG/1
40 TABLET ORAL DAILY
COMMUNITY
Start: 2024-07-10 | End: 2024-10-08

## 2024-10-14 NOTE — PROGRESS NOTES
[facility] Note    Subjective     Chief Complaint   Patient presents with    Follow-up     10 month follow up - 1 year S/P Total Knee Arthroplasty With Cori Robot - Right (DO: 10/10/23)        HPI    It has been 10  month(s) since Ms. Carrillo's last visit. She returns to clinic today for follow-up of right knee arthroplasty. The issue has been ongoing for 1 year(s). She rates her pain a 0/10 on the pain scale. Previous/current treatments: physical therapy. Current symptoms:  none . The pain is worse with  nothing ; does not need treatment improve the pain. Overall, she is doing better.  100% improvement compared to her preoperative symptoms.  Fully ambulatory without external aids.      I have reviewed the following portions of the patient's history and agree with: History of Present Illness and Review of Systems    Patient Active Problem List   Diagnosis    Vitamin D deficiency    Depression    Multiple sclerosis    Restless legs syndrome    Muscle spasticity    Chronic migraine without aura without status migrainosus, not intractable    Essential hypertension    Frequent falls    Status post balloon dilatation of esophageal stricture    T2DM (type 2 diabetes mellitus)    TB lung, latent    Aplastic anemia likely due to isoniazide    Chest pain    MCGRAW (dyspnea on exertion)    Hyperlipidemia LDL goal <70    Class 3 severe obesity due to excess calories with serious comorbidity and body mass index (BMI) of 50.0 to 59.9 in adult    Fatigue    Dyspepsia    Morbid obesity    Degenerative arthritis of right knee    Primary osteoarthritis of right knee    S/P total knee arthroplasty, right    Acute postoperative pain    Gait abnormality     Past Medical History:   Diagnosis Date    Chronic knee pain     NSAIDS + steroid injections (last 3/2022)    Dyspepsia     Dyspnea on exertion     Fatigue     GERD (gastroesophageal reflux disease)     History of transfusion     PATIENT DENIES REACTION     Hyperlipidemia     Hypertension     Morbid obesity     Multiple sclerosis     follows w/ Dr. Gilbert      Past Surgical History:   Procedure Laterality Date    COLONOSCOPY      GASTRIC SLEEVE LAPAROSCOPIC N/A 2022    Procedure: GASTRIC SLEEVE WITH HIATAL HERNIA LAPAROSCOPIC WITH DAVINCI ROBOT, ESOPHAGOGASTRODUODENOSCOPY;  Surgeon: Maribell Anderson MD;  Location:  [facility];  Service: Robotics - DaVinci;  Laterality: N/A;    LAPAROSCOPIC CHOLECYSTECTOMY  2012    gallstone pancreatitis    TOTAL KNEE ARTHROPLASTY Right 10/10/2023    Procedure: TOTAL KNEE ARTHROPLASTY WITH CORI ROBOT - RIGHT;  Surgeon: Lalo Choe MD;  Location:  [facility];  Service: Robotics - Ortho;  Laterality: Right;      Family History   Problem Relation Age of Onset    Cancer Mother     Hypertension Mother     Heart disease Mother     Alzheimer's disease Other     Seizures Other         Epilepsy and recurrent seizures    Heart disease Other     Breast cancer Neg Hx     Ovarian cancer Neg Hx      Social History     Socioeconomic History    Marital status:    Tobacco Use    Smoking status: Former     Current packs/day: 0.00     Average packs/day: 1 pack/day for 5.0 years (5.0 ttl pk-yrs)     Types: Cigarettes     Start date:      Quit date:      Years since quittin.8     Passive exposure: Never    Smokeless tobacco: Never    Tobacco comments:     social   Vaping Use    Vaping status: Never Used   Substance and Sexual Activity    Alcohol use: Yes     Comment: 3 drinks/month    Drug use: No    Sexual activity: Defer      Current Outpatient Medications on File Prior to Visit   Medication Sig Dispense Refill    acyclovir (ZOVIRAX) 400 MG tablet Take 1 tablet by mouth 2 (Two) Times a Day. (Patient taking differently: Take 1 tablet by mouth Daily As Needed.) 60 tablet 11    albuterol sulfate  (90 Base) MCG/ACT inhaler Daily As Needed.      Azelastine HCl 137 MCG/SPRAY solution SPRAY 1-2 SPRAYS IN EACH NOSTRIL BY  INTRANASAL ROUTE 2 TIMES PER DAY as needed      B Complex-C-E-Zn (Z-BEC PO) Take 1 tablet by mouth daily.      baclofen (LIORESAL) 10 MG tablet Take 1 tablet by mouth 3 (Three) Times a Day As Needed for Muscle Spasms.      chlorthalidone (HYGROTON) 25 MG tablet Take 1 tablet by mouth Daily.      cyanocobalamin 1000 MCG/ML injection 1  mL every other week for 3 months, then monthly      cyclobenzaprine (FLEXERIL) 5 MG tablet Take 1 tablet by mouth 3 (Three) Times a Day As Needed for Muscle Spasms.      Dalfampridine ER 10 MG tablet sustained-release 12 hour Take 10 mg by mouth 2 (Two) Times a Day. 60 tablet 11    docusate sodium (Colace) 100 MG capsule Take 1 capsule by mouth 2 (Two) Times a Day. (Patient taking differently: Take 1 capsule by mouth 2 (Two) Times a Day As Needed.)      FLUoxetine (PROzac) 20 MG capsule Take 1 capsule by mouth Daily.      gabapentin (NEURONTIN) 100 MG capsule Take 1 capsule by mouth 2 (Two) Times a Day. 180 capsule 1    gabapentin (NEURONTIN) 300 MG capsule Take 2 capsules by mouth Every Night. 180 capsule 1    hydrALAZINE (APRESOLINE) 10 MG tablet 1 tablet Daily.      HYDROcodone-acetaminophen (NORCO) 5-325 MG per tablet Take 1 tablet by mouth Every 4 (Four) Hours As Needed.      ketorolac (ACULAR) 0.4 % solution place 1 DROP 4 TIMES A DAY IN OPERATIVE EYE BEGINNING 3 DAYS PRIOR TO SURGERY FOR 10 DAYS THEN TAPER TO TWICE DAILY FOR 14 ADDITIONAL DAYS      meclizine (ANTIVERT) 25 MG tablet 1 tablet 3 (Three) Times a Day As Needed.      methylPREDNISolone (MEDROL) 4 MG dose pack Take as directed on package instructions. 21 tablet 0    Multiple Vitamin (MULTI-VITAMIN DAILY) tablet Take 1 tablet by mouth Daily.      ofloxacin (OCUFLOX) 0.3 % ophthalmic solution place 1 DROP 4 TIMES A DAY IN OPERATIVE EYE BEGINNING IMMEDIATELY AFTER SURGERY FOR 7 DAYS THEN STOP      omega-3 acid ethyl esters (Lovaza) 1 g capsule Take 2 capsules every day by oral route.      omeprazole (priLOSEC) 20 MG  capsule 1 capsule Daily As Needed.      oxybutynin XL (DITROPAN-XL) 10 MG 24 hr tablet Take 1 tablet by mouth Daily.      polyethylene glycol (MIRALAX) 17 g packet Take 17 g by mouth Daily.      potassium chloride (MICRO-K) 10 MEQ CR capsule Take 1 capsule by mouth Daily.      prednisoLONE acetate (PRED FORTE) 1 % ophthalmic suspension place 1 DROP 4 TIMES A DAY IN OPERATIVE EYE BEGINNING IMMEDIATELY AFTER SURGERY FOR 7 DAYS THEN TAPER TO TWICE DAILY FOR 14 ADDITIONAL DAYS      rosuvastatin (CRESTOR) 40 MG tablet Take 1 tablet by mouth Daily.      tobramycin-dexAMETHasone (TOBRADEX) 0.3-0.1 % ophthalmic suspension place ONE drop IN EACH EYE FOUR TIMES DAILY FOR SEVEN days(location BOTH eyes)      vitamin D (ERGOCALCIFEROL) 69766 UNITS capsule capsule Take 1 capsule by mouth Every 7 (Seven) Days.      ezetimibe (ZETIA) 10 MG tablet Take 1 tablet by mouth Daily.      [DISCONTINUED] azelastine (ASTEPRO) 0.15 % solution nasal spray Daily As Needed.  3     No current facility-administered medications on file prior to visit.      Allergies   Allergen Reactions    Isoniazid Other (See Comments)     aplastic anemia    Rifampin Anaphylaxis    Sulfa Antibiotics Anaphylaxis    Acetaminophen Headache    Amlodipine Other (See Comments)    Oxybutynin Other (See Comments)    Pregabalin Other (See Comments)     Gained weight    Tetanus Antitoxin Swelling        Review of Systems   Constitutional:  Negative for activity change, appetite change, chills, diaphoresis, fatigue, fever and unexpected weight change.   HENT:  Negative for congestion, dental problem, drooling, ear discharge, ear pain, facial swelling, hearing loss, mouth sores, nosebleeds, postnasal drip, rhinorrhea, sinus pressure, sneezing, sore throat, tinnitus, trouble swallowing and voice change.    Eyes:  Negative for photophobia, pain, discharge, redness, itching and visual disturbance.   Respiratory:  Negative for apnea, cough, choking, chest tightness, shortness of  "breath, wheezing and stridor.    Cardiovascular:  Negative for chest pain, palpitations and leg swelling.   Gastrointestinal:  Negative for abdominal distention, abdominal pain, anal bleeding, blood in stool, constipation, diarrhea, nausea, rectal pain and vomiting.   Endocrine: Negative for cold intolerance, heat intolerance, polydipsia, polyphagia and polyuria.   Genitourinary:  Negative for decreased urine volume, difficulty urinating, dysuria, enuresis, flank pain, frequency, genital sores, hematuria and urgency.   Musculoskeletal:  Positive for arthralgias. Negative for back pain, gait problem, joint swelling, myalgias, neck pain and neck stiffness.   Skin:  Negative for color change, pallor, rash and wound.   Allergic/Immunologic: Negative for environmental allergies, food allergies and immunocompromised state.   Neurological:  Negative for dizziness, tremors, seizures, syncope, facial asymmetry, speech difficulty, weakness, light-headedness, numbness and headaches.   Hematological:  Negative for adenopathy. Does not bruise/bleed easily.   Psychiatric/Behavioral:  Negative for agitation, behavioral problems, confusion, decreased concentration, dysphoric mood, hallucinations, self-injury, sleep disturbance and suicidal ideas. The patient is not nervous/anxious and is not hyperactive.         Objective      Physical Exam  /72   Ht 182.9 cm (72.01\")   Wt 111 kg (243 lb 12.8 oz)   LMP  (LMP Unknown)   BMI 33.06 kg/m²     Body mass index is 33.06 kg/m².         General:   Mental Status:  Alert   Appearance: Cooperative, in no acute distress   Build and Nutrition: Overweight by BMI female   Orientation: Alert and oriented to person, place and time   Posture: Normal   Gait: Nonantalgic    Integument:              Right knee: Incision is well-healed with no signs of infection     Lower Extremities:              Right Knee:                          Tenderness:    None                          Effusion:        "   None                          Swelling:          None                          Crepitus:          None                          Range of motion:        Extension:       0°                                                              Flexion:           125°  Instability:        No varus laxity, no valgus laxity, negative anterior drawer  Deformities:     None    Imaging/Studies  Imaging Results (Last 24 Hours)       Procedure Component Value Units Date/Time    XR Knee 3+ View With [place] Right [[id number]] Resulted: 10/14/24 0824     Updated: 10/14/24 0824    Narrative:      Right Knee Radiographs  Indication: status-post right total knee arthroplasty  Views: AP, lateral, and sunrise views of the right knee    Comparison: no change compared to prior study, 10/30/2023    Findings:   The components are well aligned, with no signs of loosening or failure.                 Assessment and Plan     Diagnoses and all orders for this visit:    1. S/P total knee arthroplasty, right (Primary)  -     XR Knee 3+ View With [place] Right    2. Postoperative examination        1. S/P total knee arthroplasty, right    2. Postoperative examination          I reviewed my findings with the patient.  Her right total knee arthroplasty is functioning well and she is pleased with results.  I will see her back in 4 years for what will be a 5-year checkup with x-rays.  I will see her back sooner for any problems.    Return in about 4 years (around 10/14/2028) for recheck with x-rays.      Lalo Choe MD  10/14/24  08:28 EDT    Dictated Utilizing Dragon Dictation

## 2024-11-07 DIAGNOSIS — G25.81 RESTLESS LEGS SYNDROME: Chronic | ICD-10-CM

## 2024-11-07 DIAGNOSIS — G89.18 ACUTE POSTOPERATIVE PAIN: ICD-10-CM

## 2024-11-07 RX ORDER — GABAPENTIN 100 MG/1
100 CAPSULE ORAL 2 TIMES DAILY
Qty: 180 CAPSULE | Refills: 1 | Status: SHIPPED | OUTPATIENT
Start: 2024-11-07

## 2024-11-07 RX ORDER — GABAPENTIN 300 MG/1
600 CAPSULE ORAL NIGHTLY
Qty: 180 CAPSULE | Refills: 1 | Status: SHIPPED | OUTPATIENT
Start: 2024-11-07 | End: 2025-11-07

## 2024-11-07 NOTE — TELEPHONE ENCOUNTER
Rx Refill Note  Requested Prescriptions     Pending Prescriptions Disp Refills    gabapentin (NEURONTIN) 300 MG capsule [Pharmacy Med Name: gabapentin 300 mg capsule] 180 capsule 1     Sig: Take 2 capsules by mouth Every Night.    gabapentin (NEURONTIN) 100 MG capsule [Pharmacy Med Name: gabapentin 100 mg capsule] 180 capsule 1     Sig: Take 1 capsule by mouth 2 (Two) Times a Day.      Last filled:  06/13/24 w/1  Last office visit with prescribing clinician: 6/13/2024      Next office visit with prescribing clinician: 11/25/2024     Cherelle Bains MA  11/07/24, 11:24 EST

## 2024-11-11 ENCOUNTER — SPECIALTY PHARMACY (OUTPATIENT)
Dept: ONCOLOGY | Facility: HOSPITAL | Age: 69
End: 2024-11-11
Payer: MEDICARE

## 2024-11-11 NOTE — PROGRESS NOTES
Specialty Pharmacy Patient Management Program  Neurology Reassessment     Rashad Carrillo is a 68 y.o. female with multiple sclerosis seen by a Neurology provider and enrolled in the Neurology Patient Management program offered by [facility].  A follow-up outreach was conducted, including assessment of continued therapy appropriateness, medication adherence, and side effect incidence and management for dalfampridine.     Changes to Insurance Coverage or Financial Support  Medicare, Filling at [facility]     Relevant Past Medical History and Comorbidities  Relevant medical history and concomitant health conditions were discussed with the patient. The patient's chart has been reviewed for relevant past medical history and comorbid health conditions and updated as necessary.   Past Medical History:   Diagnosis Date    Chronic knee pain     NSAIDS + steroid injections (last 3/2022)    Dyspepsia     Dyspnea on exertion     Fatigue     GERD (gastroesophageal reflux disease)     History of transfusion     PATIENT DENIES REACTION    Hyperlipidemia     Hypertension     Morbid obesity     Multiple sclerosis     follows w/ Dr. Gilbert     Social History     Socioeconomic History    Marital status:    Tobacco Use    Smoking status: Former     Current packs/day: 0.00     Average packs/day: 1 pack/day for 5.0 years (5.0 ttl pk-yrs)     Types: Cigarettes     Start date:      Quit date:      Years since quittin.8     Passive exposure: Never    Smokeless tobacco: Never    Tobacco comments:     social   Vaping Use    Vaping status: Never Used   Substance and Sexual Activity    Alcohol use: Yes     Comment: 3 drinks/month    Drug use: No    Sexual activity: Defer     Problem list reviewed by Courtney Medina, PharmD on 2024 at  9:52 AM    Hospitalizations and Urgent Care Since Last Assessment  ED Visits, Admissions, or Hospitalizations: none   Urgent Office Visits: none      Allergies  Known allergies and reactions were discussed with the patient. The patient's chart has been reviewed for allergy information and updated as necessary.   Allergies   Allergen Reactions    Isoniazid Other (See Comments)     aplastic anemia    Rifampin Anaphylaxis    Sulfa Antibiotics Anaphylaxis    Acetaminophen Headache    Amlodipine Other (See Comments)    Oxybutynin Other (See Comments)    Pregabalin Other (See Comments)     Gained weight    Tetanus Antitoxin Swelling     Allergies reviewed by Courtney Medina, PharmD on 11/11/2024 at  9:52 AM    Relevant Laboratory Values  Common labs          11/27/2023    14:33 7/11/2024    10:03 8/13/2024    17:39   Common Labs   Glucose 91  86  99    BUN 18  28  24    Creatinine 0.65  0.71  0.77    Sodium 146  140  138    Potassium 4.2  4.4  4.1    Chloride 108  104  105    Calcium 9.5  9.6  9.4    Total Protein 7.0  6.7     Albumin 4.1  3.9  3.9    Total Bilirubin 0.6  0.5  0.5    Alkaline Phosphatase 140  119  114    AST (SGOT) 15  18  17    ALT (SGPT) 11  19  12    WBC 5.2  5.8  6.78    Hemoglobin 13.5  14.5  14.3    Hematocrit 41.0  44.9  43.8    Platelets 189  206  203        Lab Assessment  The above labs have been reviewed. No dose adjustments are needed for the specialty medication(s) based on the labs.     Current Medication List  This medication list has been reviewed with the patient and evaluated for any interactions or necessary modifications/recommendations, and updated to include all prescription medications, OTC medications, and supplements the patient is currently taking.  This list reflects what is contained in the patient's profile, which has also been marked as reviewed to communicate to other providers it is the most up to date version of the patient's current medication therapy.     Current Outpatient Medications:     acyclovir (ZOVIRAX) 400 MG tablet, Take 1 tablet by mouth 2 (Two) Times a Day. (Patient taking differently: Take 1 tablet by  mouth Daily As Needed.), Disp: 60 tablet, Rfl: 11    albuterol sulfate  (90 Base) MCG/ACT inhaler, Daily As Needed., Disp: , Rfl:     Azelastine HCl 137 MCG/SPRAY solution, SPRAY 1-2 SPRAYS IN EACH NOSTRIL BY INTRANASAL ROUTE 2 TIMES PER DAY as needed, Disp: , Rfl:     B Complex-C-E-Zn (Z-BEC PO), Take 1 tablet by mouth daily., Disp: , Rfl:     baclofen (LIORESAL) 10 MG tablet, Take 1 tablet by mouth 3 (Three) Times a Day As Needed for Muscle Spasms., Disp: , Rfl:     chlorthalidone (HYGROTON) 25 MG tablet, Take 1 tablet by mouth Daily., Disp: , Rfl:     cyanocobalamin 1000 MCG/ML injection, 1  mL every other week for 3 months, then monthly, Disp: , Rfl:     cyclobenzaprine (FLEXERIL) 5 MG tablet, Take 1 tablet by mouth 3 (Three) Times a Day As Needed for Muscle Spasms., Disp: , Rfl:     Dalfampridine ER 10 MG tablet sustained-release 12 hour, Take 10 mg by mouth 2 (Two) Times a Day., Disp: 60 tablet, Rfl: 11    docusate sodium (Colace) 100 MG capsule, Take 1 capsule by mouth 2 (Two) Times a Day. (Patient taking differently: Take 1 capsule by mouth 2 (Two) Times a Day As Needed.), Disp: , Rfl:     ezetimibe (ZETIA) 10 MG tablet, Take 1 tablet by mouth Daily., Disp: , Rfl:     FLUoxetine (PROzac) 20 MG capsule, Take 1 capsule by mouth Daily., Disp: , Rfl:     gabapentin (NEURONTIN) 100 MG capsule, Take 1 capsule by mouth 2 (Two) Times a Day., Disp: 180 capsule, Rfl: 1    gabapentin (NEURONTIN) 300 MG capsule, Take 2 capsules by mouth Every Night., Disp: 180 capsule, Rfl: 1    hydrALAZINE (APRESOLINE) 10 MG tablet, 1 tablet Daily., Disp: , Rfl:     HYDROcodone-acetaminophen (NORCO) 5-325 MG per tablet, Take 1 tablet by mouth Every 4 (Four) Hours As Needed., Disp: , Rfl:     ketorolac (ACULAR) 0.4 % solution, place 1 DROP 4 TIMES A DAY IN OPERATIVE EYE BEGINNING 3 DAYS PRIOR TO SURGERY FOR 10 DAYS THEN TAPER TO TWICE DAILY FOR 14 ADDITIONAL DAYS, Disp: , Rfl:     meclizine (ANTIVERT) 25 MG tablet, 1 tablet 3  (Three) Times a Day As Needed., Disp: , Rfl:     methylPREDNISolone (MEDROL) 4 MG dose pack, Take as directed on package instructions., Disp: 21 tablet, Rfl: 0    Multiple Vitamin (MULTI-VITAMIN DAILY) tablet, Take 1 tablet by mouth Daily., Disp: , Rfl:     ofloxacin (OCUFLOX) 0.3 % ophthalmic solution, place 1 DROP 4 TIMES A DAY IN OPERATIVE EYE BEGINNING IMMEDIATELY AFTER SURGERY FOR 7 DAYS THEN STOP, Disp: , Rfl:     omega-3 acid ethyl esters (Lovaza) 1 g capsule, Take 2 capsules every day by oral route., Disp: , Rfl:     omeprazole (priLOSEC) 20 MG capsule, 1 capsule Daily As Needed., Disp: , Rfl:     oxybutynin XL (DITROPAN-XL) 10 MG 24 hr tablet, Take 1 tablet by mouth Daily., Disp: , Rfl:     polyethylene glycol (MIRALAX) 17 g packet, Take 17 g by mouth Daily., Disp: , Rfl:     potassium chloride (MICRO-K) 10 MEQ CR capsule, Take 1 capsule by mouth Daily., Disp: , Rfl:     prednisoLONE acetate (PRED FORTE) 1 % ophthalmic suspension, place 1 DROP 4 TIMES A DAY IN OPERATIVE EYE BEGINNING IMMEDIATELY AFTER SURGERY FOR 7 DAYS THEN TAPER TO TWICE DAILY FOR 14 ADDITIONAL DAYS, Disp: , Rfl:     rosuvastatin (CRESTOR) 40 MG tablet, Take 1 tablet by mouth Daily., Disp: , Rfl:     tobramycin-dexAMETHasone (TOBRADEX) 0.3-0.1 % ophthalmic suspension, place ONE drop IN EACH EYE FOUR TIMES DAILY FOR SEVEN days(location BOTH eyes), Disp: , Rfl:     vitamin D (ERGOCALCIFEROL) 46426 UNITS capsule capsule, Take 1 capsule by mouth Every 7 (Seven) Days., Disp: , Rfl:     Medicines reviewed by Courtney Medina, PharmD on 11/11/2024 at  9:52 AM    Drug Interactions  No relevant drug-drug interactions with specialty medication(s):  dalfampridine.        Adverse Drug Reactions  Medication tolerability: Tolerating with no to minimal ADRs          Medication plan: Continue therapy with normal follow-up  Plan for ADR Management: not required      Adherence, Self-Administration, and Current Therapy Problems  Adherence related patient's  specialty therapy was discussed with the patient. The Adherence segment of this outreach has been reviewed and updated.   Adherence Questions  Linked Medication(s) Assessed: Dalfampridine  On average, how many doses/injections does the patient miss per month?: 0  What are the identified reasons for non-adherence or missed doses? : no problems identified  What is the estimated medication adherence level?: %  Based on the patient/caregiver response and refill history, does this patient require an MTP to track adherence improvements?: no    Additional Barriers to Patient Self-Administration: none  Methods for Supporting Patient Self-Administration: n/a    Recently Close Medication Therapy Problems  No medication therapy recommendations to display  Open Medication Therapy Problems  No medication therapy recommendations to display     Goals of Therapy  Goals related to the patient's specialty therapy was discussed with the patient. The Patient Goals segment of this outreach has been reviewed and updated.    Goals Addressed Today        Specialty Pharmacy General Goal      Improved walking speed  11/11/24 dw - gait abnormality stable, walking speed improved               Quality of Life Assessment   Quality of Life related to the patient's enrollment in the patient management program and services provided was discussed with the patient. The QOL segment of this outreach has been reviewed and updated.   Quality of Life Improvement Scale: 8-Moderately better    Reassessment Plan & Follow-Up  Medication Therapy Changes: continue dalfampridine 10mg po bid  Related Plans, Therapy Recommendations, or Issues to Be Addressed: none  Pharmacist to perform regular reassessments no more than (6) months from the previous assessment.  Care Coordinator to set up future refill outreaches, coordinate prescription delivery, and escalate clinical questions to pharmacist.     Attestation  Therapeutic appropriateness: Appropriate  I  attest the patient was actively involved in and has agreed to the above plan of care. If the prescribed therapy is at any point deemed not appropriate based on the current or future assessments, a consultation will be initiated with the patient's specialty care provider to determine the best course of action. The revised plan of therapy will be documented along with any additional patient education provided. Discussed aforementioned material with patient via telemedicine.    Courtney Medina PharmD, [facility]  Clinic Specialty Pharmacist, Neurology  11/11/2024  09:56 EST

## 2024-11-25 ENCOUNTER — OFFICE VISIT (OUTPATIENT)
Dept: NEUROLOGY | Facility: CLINIC | Age: 69
End: 2024-11-25
Payer: MEDICARE

## 2024-11-25 VITALS
OXYGEN SATURATION: 98 % | HEIGHT: 72 IN | HEART RATE: 62 BPM | DIASTOLIC BLOOD PRESSURE: 68 MMHG | BODY MASS INDEX: 33.05 KG/M2 | WEIGHT: 244 LBS | SYSTOLIC BLOOD PRESSURE: 126 MMHG

## 2024-11-25 DIAGNOSIS — R26.9 GAIT ABNORMALITY: Chronic | ICD-10-CM

## 2024-11-25 DIAGNOSIS — G89.18 ACUTE POSTOPERATIVE PAIN: ICD-10-CM

## 2024-11-25 DIAGNOSIS — G25.81 RESTLESS LEGS SYNDROME: Chronic | ICD-10-CM

## 2024-11-25 DIAGNOSIS — G35 MULTIPLE SCLEROSIS: Primary | Chronic | ICD-10-CM

## 2024-11-25 DIAGNOSIS — F33.1 MODERATE EPISODE OF RECURRENT MAJOR DEPRESSIVE DISORDER: Chronic | ICD-10-CM

## 2024-11-25 DIAGNOSIS — G43.709 CHRONIC MIGRAINE WITHOUT AURA WITHOUT STATUS MIGRAINOSUS, NOT INTRACTABLE: Chronic | ICD-10-CM

## 2024-11-25 RX ORDER — GABAPENTIN 300 MG/1
600 CAPSULE ORAL NIGHTLY
Qty: 180 CAPSULE | Refills: 1 | Status: SHIPPED | OUTPATIENT
Start: 2024-11-25 | End: 2025-11-25

## 2024-11-25 RX ORDER — OLMESARTAN MEDOXOMIL 40 MG/1
40 TABLET ORAL DAILY
COMMUNITY
Start: 2024-11-07 | End: 2025-02-05

## 2024-11-25 RX ORDER — GABAPENTIN 100 MG/1
100 CAPSULE ORAL 2 TIMES DAILY
Qty: 180 CAPSULE | Refills: 1 | Status: SHIPPED | OUTPATIENT
Start: 2024-11-25

## 2024-11-25 RX ORDER — BACLOFEN 10 MG/1
10 TABLET ORAL 3 TIMES DAILY PRN
Qty: 270 TABLET | Refills: 3 | Status: SHIPPED | OUTPATIENT
Start: 2024-11-25 | End: 2025-11-25

## 2024-11-25 NOTE — PROGRESS NOTES
"Chief Complaint  Multiple Sclerosis    Subjective        Rashad Carrillo presents to [facility] NEUROLOGY  History of Present Illness    68 y.o. female returns in follow up.  Laste visit on 6/13/24 continued GBP, Cymbalta, Acyclovir and Ampyra.      November 2023 R TKA      11/8/2022 robotic sleeve gastrectomy and hiatal hernia repair      MRI Brain 11/10/22 as compared to 3/11/22, no change in T2 lesion load, moderate to severe T2 lesion burden and moderate atrophy.     JCV ab - 6/11/18 - 0.45       11/27/23: Plt 189  Cr 0.65;      RRMS     S/P Lemtrada 2/2, 10/30/18 - 11/2/18, 11/4/19-11/6/19      Multiple falls.        Ampyra improves walking speed. .        Fatigue is moderate.       Continued pain and stiffness in R LE and limiting walking. .       Problem history:     25 ft timed walk increaesed 15.99 from 9.94.  Left hand 9 hole peg worsened.   Increasing swallowing difficulty.        Immediate and working memory are declining with SDMT 27/110..        Referred to ID and dx with latent TB.  Recommended isoniazid and pyridoxine for 9 months.  Subsequently developed aplastic anemia secondary to INH and followed by Dr Valentin.  Received multiple transfusions.  3/29/18 able to resume Tysabri and has had 3 infusions  Treated with rifampin.       MDD     Mood is stable on Prozac 20 mg PO daily      RLS     No new or worsening sx.      Controlled on GBP.   Baclofen helping with spasticity in LE.       Migraines     Headache frequency one - two per week.  Intensity is mild.     Off BTX      Problem History:     HA frequency is daily.  Moderate to severe intensity.   Location moves around head.  Quality Last for up to a day.  Tx with OTC meds.      Greater than 15 HA a month, moderate to severe intensity, lasting over 6 hours.       Preventatives:  TPM, GBP, Cymbalta,      Objective   Vital Signs:  /68   Pulse 62   Ht 182.9 cm (72.01\")   Wt 111 kg (244 lb)   SpO2 98%   BMI 33.08 kg/m² " "  Estimated body mass index is 33.08 kg/m² as calculated from the following:    Height as of this encounter: 182.9 cm (72.01\").    Weight as of this encounter: 111 kg (244 lb).        Neurological Exam  Mental Status  Awake, alert and oriented to person, place and time. Oriented to person, place and time. Speech is normal. Language is fluent with no aphasia. Attention and concentration are normal. Fund of knowledge is appropriate for level of education.    Cranial Nerves  CN III, IV, VI: Extraocular movements intact bilaterally. Pupils equal round and reactive to light bilaterally.  CN V: Facial sensation is normal.  CN VII: Full and symmetric facial movement.  CN IX, X: Palate elevates symmetrically  CN XI: Shoulder shrug strength is normal.  CN XII: Tongue midline without atrophy or fasciculations.    Motor   Strength is 5/5 throughout all four extremities.    Sensory  Sensation is intact to light touch, pinprick, vibration and proprioception in all four extremities.    Reflexes  Deep tendon reflexes are 2+ and symmetric in all four extremities.    Coordination    Finger-to-nose, rapid alternating movements and heel-to-shin normal bilaterally without dysmetria.    Gait  Casual gait: Wide stance. Reduced stride length. Spastic gait.       Physical Exam  Eyes:      Extraocular Movements: Extraocular movements intact.      Pupils: Pupils are equal, round, and reactive to light.   Neurological:      Motor: Motor strength is normal.     Coordination: Coordination is intact.      Deep Tendon Reflexes: Reflexes are normal and symmetric.   Psychiatric:         Speech: Speech normal.        Result Review :  The following data was reviewed by: Adonay Gilbert MD on 11/25/2024:  Common labs          7/11/2024    10:03 8/13/2024    17:39   Common Labs   Glucose 86  99    BUN 28  24    Creatinine 0.71  0.77    Sodium 140  138    Potassium 4.4  4.1    Chloride 104  105    Calcium 9.6  9.4    Total Protein 6.7     Albumin 3.9  " 3.9    Total Bilirubin 0.5  0.5    Alkaline Phosphatase 119  114    AST (SGOT) 18  17    ALT (SGPT) 19  12    WBC 5.8  6.78    Hemoglobin 14.5  14.3    Hematocrit 44.9  43.8    Platelets 206  203                Assessment and Plan   Diagnoses and all orders for this visit:    1. Multiple sclerosis (Primary)  Assessment & Plan:  Stable s/p Lemtrada        2. Restless legs syndrome  -     gabapentin (NEURONTIN) 300 MG capsule; Take 2 capsules by mouth Every Night.  Dispense: 180 capsule; Refill: 1    3. Chronic migraine without aura without status migrainosus, not intractable  Assessment & Plan:  Rare               4. Gait abnormality    5. Moderate episode of recurrent major depressive disorder    6. Acute postoperative pain  -     gabapentin (NEURONTIN) 100 MG capsule; Take 1 capsule by mouth 2 (Two) Times a Day.  Dispense: 180 capsule; Refill: 1    Other orders  -     baclofen (LIORESAL) 10 MG tablet; Take 1 tablet by mouth 3 (Three) Times a Day As Needed for Muscle Spasms.  Dispense: 270 tablet; Refill: 3             Follow Up   No follow-ups on file.  Patient was given instructions and counseling regarding her condition or for health maintenance advice. Please see specific information pulled into the AVS if appropriate.

## 2024-12-18 ENCOUNTER — OFFICE VISIT (OUTPATIENT)
Dept: BARIATRICS/WEIGHT MGMT | Facility: CLINIC | Age: 69
End: 2024-12-18
Payer: MEDICARE

## 2024-12-18 VITALS
TEMPERATURE: 98.4 F | DIASTOLIC BLOOD PRESSURE: 72 MMHG | HEIGHT: 71 IN | RESPIRATION RATE: 16 BRPM | SYSTOLIC BLOOD PRESSURE: 106 MMHG | OXYGEN SATURATION: 98 % | HEART RATE: 58 BPM | WEIGHT: 245.8 LBS | BODY MASS INDEX: 34.41 KG/M2

## 2024-12-18 DIAGNOSIS — G35 MULTIPLE SCLEROSIS: Chronic | ICD-10-CM

## 2024-12-18 DIAGNOSIS — E66.811 OBESITY, CLASS I, BMI 30-34.9: Primary | ICD-10-CM

## 2024-12-18 DIAGNOSIS — K91.2 POSTGASTRECTOMY MALABSORPTION: ICD-10-CM

## 2024-12-18 DIAGNOSIS — R10.13 DYSPEPSIA: ICD-10-CM

## 2024-12-18 DIAGNOSIS — R79.0 ABNORMAL BLOOD LEVEL OF IRON: ICD-10-CM

## 2024-12-18 DIAGNOSIS — Z90.3 POSTGASTRECTOMY MALABSORPTION: ICD-10-CM

## 2024-12-18 DIAGNOSIS — E55.9 VITAMIN D DEFICIENCY: ICD-10-CM

## 2024-12-18 DIAGNOSIS — R53.83 FATIGUE, UNSPECIFIED TYPE: ICD-10-CM

## 2024-12-18 PROCEDURE — 3078F DIAST BP <80 MM HG: CPT | Performed by: PHYSICIAN ASSISTANT

## 2024-12-18 PROCEDURE — 3074F SYST BP LT 130 MM HG: CPT | Performed by: PHYSICIAN ASSISTANT

## 2024-12-18 PROCEDURE — 1159F MED LIST DOCD IN RCRD: CPT | Performed by: PHYSICIAN ASSISTANT

## 2024-12-18 PROCEDURE — 1160F RVW MEDS BY RX/DR IN RCRD: CPT | Performed by: PHYSICIAN ASSISTANT

## 2024-12-18 PROCEDURE — 99214 OFFICE O/P EST MOD 30 MIN: CPT | Performed by: PHYSICIAN ASSISTANT

## 2024-12-18 RX ORDER — OMEPRAZOLE 40 MG/1
40 CAPSULE, DELAYED RELEASE ORAL DAILY
Qty: 90 CAPSULE | Refills: 0 | Status: SHIPPED | OUTPATIENT
Start: 2024-12-18

## 2024-12-18 RX ORDER — SPIRONOLACTONE 50 MG/1
50 TABLET, FILM COATED ORAL DAILY
COMMUNITY

## 2024-12-18 RX ORDER — MINOXIDIL 10 MG/1
10 TABLET ORAL 2 TIMES DAILY
COMMUNITY

## 2024-12-18 NOTE — PROGRESS NOTES
"[facility] Bariatric Surgery  [address]  [address]  [facility] [address]  [phone]        Patient Name:  Rashad Carrillo.  :  1955      Date of Visit: 2024        Reason for Visit:   Annual Eval - 2 years postop      HPI: Rashad Carrillo is a 69 y.o. female s/p LSG/HHR 22 w/ Dr. Anderson    Overall she is feeling really good.  Says life changed - very grateful and complimentary.  Wishes she had had surgery years before.  Wanting to lose another 40 lbs.     w/in the last 3-4 months says having chest pains, \"feels like I'm going to have a heart attack\", but PCP says it is not cardiac.  Taking GasX helps only a little bit, Pepto typically resolves issues.  No longer taking PPI.  Denies dysphagia/acid reflux/regurgitation.  Denies N/V/AP.      No recent UGI evals.      Exercising 3-4x/week, [facility], [facility], swims when it's not too cold.      Labs 24 - WNL.  Taking MVI + wkly Vit D + monthly B12 injections.        Presurgery weight: 333 pounds.  Today's weight is 111 kg (245 lb 12.8 oz) pounds, today's  Body mass index is 34.77 kg/m²., and weight loss since surgery is 88 pounds.      Past Medical History:   Diagnosis Date    Chronic knee pain     NSAIDS + steroid injections (last 3/2022)    Dyspepsia     Dyspnea on exertion     Fatigue     GERD (gastroesophageal reflux disease)     History of transfusion     PATIENT DENIES REACTION    Hyperlipidemia     Hypertension     Morbid obesity     Multiple sclerosis     follows w/ Dr. Gilbert     Past Surgical History:   Procedure Laterality Date    COLONOSCOPY      GASTRIC SLEEVE LAPAROSCOPIC N/A 2022    Procedure: GASTRIC SLEEVE WITH HIATAL HERNIA LAPAROSCOPIC WITH DAVINCI ROBOT, ESOPHAGOGASTRODUODENOSCOPY;  Surgeon: Maribell Anderson MD;  [facility]: [facility];  Service: Robotics - DaVinci;  Laterality: N/A;    LAPAROSCOPIC CHOLECYSTECTOMY      gallstone pancreatitis    TOTAL KNEE " ARTHROPLASTY Right 10/10/2023    Procedure: TOTAL KNEE ARTHROPLASTY WITH CORI ROBOT - RIGHT;  Surgeon: Lalo Choe MD;  Location: [facility];  Service: Robotics - Ortho;  Laterality: Right;     Outpatient Medications Marked as Taking for the 12/18/24 encounter (Office Visit) with Renata Smith PA   Medication Sig Dispense Refill    acyclovir (ZOVIRAX) 400 MG tablet Take 1 tablet by mouth 2 (Two) Times a Day. (Patient taking differently: Take 1 tablet by mouth Daily As Needed.) 60 tablet 11    albuterol sulfate  (90 Base) MCG/ACT inhaler Daily As Needed.      Azelastine HCl 137 MCG/SPRAY solution SPRAY 1-2 SPRAYS IN EACH NOSTRIL BY INTRANASAL ROUTE 2 TIMES PER DAY as needed      B Complex-C-E-Zn (Z-BEC PO) Take 1 tablet by mouth daily.      baclofen (LIORESAL) 10 MG tablet Take 1 tablet by mouth 3 (Three) Times a Day As Needed for Muscle Spasms. 270 tablet 3    chlorthalidone (HYGROTON) 25 MG tablet Take 1 tablet by mouth Daily.      cyanocobalamin 1000 MCG/ML injection 1  mL every other week for 3 months, then monthly      cyclobenzaprine (FLEXERIL) 5 MG tablet Take 1 tablet by mouth 3 (Three) Times a Day As Needed for Muscle Spasms.      Dalfampridine ER 10 MG tablet sustained-release 12 hour Take 10 mg by mouth 2 (Two) Times a Day. 60 tablet 11    docusate sodium (Colace) 100 MG capsule Take 1 capsule by mouth 2 (Two) Times a Day. (Patient taking differently: Take 1 capsule by mouth 2 (Two) Times a Day As Needed.)      FLUoxetine (PROzac) 20 MG capsule Take 1 capsule by mouth Daily.      gabapentin (NEURONTIN) 100 MG capsule Take 1 capsule by mouth 2 (Two) Times a Day. 180 capsule 1    gabapentin (NEURONTIN) 300 MG capsule Take 2 capsules by mouth Every Night. 180 capsule 1    hydrALAZINE (APRESOLINE) 10 MG tablet 1 tablet Daily.      HYDROcodone-acetaminophen (NORCO) 5-325 MG per tablet Take 1 tablet by mouth Every 4 (Four) Hours As Needed.      meclizine (ANTIVERT) 25 MG tablet 1 tablet 3  "(Three) Times a Day As Needed.      minoxidil (LONITEN) 10 MG tablet Take 1 tablet by mouth 2 (Two) Times a Day.      Multiple Vitamin (MULTI-VITAMIN DAILY) tablet Take 1 tablet by mouth Daily.      omega-3 acid ethyl esters (Lovaza) 1 g capsule Take 2 capsules every day by oral route.      omeprazole (priLOSEC) 20 MG capsule 1 capsule Daily As Needed.      oxybutynin XL (DITROPAN-XL) 10 MG 24 hr tablet Take 1 tablet by mouth Daily.      polyethylene glycol (MIRALAX) 17 g packet Take 17 g by mouth Daily.      potassium chloride (MICRO-K) 10 MEQ CR capsule Take 1 capsule by mouth Daily.      rosuvastatin (CRESTOR) 40 MG tablet Take 1 tablet by mouth Daily.      spironolactone (ALDACTONE) 50 MG tablet Take 1 tablet by mouth Daily.      vitamin D (ERGOCALCIFEROL) 84490 UNITS capsule capsule Take 1 capsule by mouth Every 7 (Seven) Days.         Allergies   Allergen Reactions    Isoniazid Other (See Comments)     aplastic anemia    Rifampin Anaphylaxis    Sulfa Antibiotics Anaphylaxis    Acetaminophen Headache    Amlodipine Other (See Comments)    Oxybutynin Other (See Comments)    Pregabalin Other (See Comments)     Gained weight    Tetanus Antitoxin Swelling       Social History     Socioeconomic History    Marital status:    Tobacco Use    Smoking status: Former     Current packs/day: 0.00     Average packs/day: 1 pack/day for 5.0 years (5.0 ttl pk-yrs)     Types: Cigarettes     Start date:      Quit date:      Years since quittin.9     Passive exposure: Never    Smokeless tobacco: Never    Tobacco comments:     social   Vaping Use    Vaping status: Never Used   Substance and Sexual Activity    Alcohol use: Yes     Comment: 3 drinks/month    Drug use: No    Sexual activity: Defer       /72 (BP Location: Left arm, Patient Position: Sitting)   Pulse 58   Temp 98.4 °F (36.9 °C)   Resp 16   Ht 179.1 cm (70.5\")   Wt 111 kg (245 lb 12.8 oz)   LMP  (LMP Unknown)   SpO2 98%   BMI 34.77 kg/m² "     Physical Exam  Constitutional:       Appearance: She is well-developed.   Cardiovascular:      Rate and Rhythm: Normal rate.   Pulmonary:      Effort: Pulmonary effort is normal.   Musculoskeletal:         General: Normal range of motion.   Neurological:      Mental Status: She is alert.   Psychiatric:         Thought Content: Thought content normal.         Judgment: Judgment normal.         Assessment:  2 years s/p LSG/HHR 11/8/22 w/ Dr. Anderson      ICD-10-CM ICD-9-CM   1. Obesity, Class I, BMI 30-34.9  E66.811 278.00   2. Dyspepsia  R10.13 536.8   3. Postgastrectomy malabsorption  K91.2 579.3    Z90.3    4. Fatigue, unspecified type  R53.83 780.79   5. Abnormal blood level of iron  R79.0 790.6   6. Vitamin D deficiency  E55.9 268.9   7. Multiple sclerosis  G35 340       BMI is >= 30 and <35. (Class 1 Obesity). The following options were offered after discussion;: see plan.        Plan:  Discussed UGI for further eval.  Patient prefers conservative treatment for now.  Will RX Omeprazole 40mg daily.  Call if issues persist/worsen.      Continue w/ good food choices and healthy habits.  Continue protein >70g/day.  Continue routine exercise.  Routine bariatric labs ordered.  Continue vitamins w/ adjustments pending lab results.      The patient was instructed to follow up in 6 months, sooner if needed.

## 2024-12-23 LAB
25(OH)D3+25(OH)D2 SERPL-MCNC: 48 NG/ML (ref 30–100)
ALBUMIN SERPL-MCNC: 4.2 G/DL (ref 3.9–4.9)
ALP SERPL-CCNC: 135 IU/L (ref 44–121)
ALT SERPL-CCNC: 12 IU/L (ref 0–32)
AST SERPL-CCNC: 19 IU/L (ref 0–40)
BASOPHILS # BLD AUTO: 0 X10E3/UL (ref 0–0.2)
BASOPHILS NFR BLD AUTO: 1 %
BILIRUB SERPL-MCNC: 0.4 MG/DL (ref 0–1.2)
BUN SERPL-MCNC: 18 MG/DL (ref 8–27)
BUN/CREAT SERPL: 25 (ref 12–28)
CALCIUM SERPL-MCNC: 9.8 MG/DL (ref 8.7–10.3)
CHLORIDE SERPL-SCNC: 101 MMOL/L (ref 96–106)
CO2 SERPL-SCNC: 26 MMOL/L (ref 20–29)
CREAT SERPL-MCNC: 0.72 MG/DL (ref 0.57–1)
EGFRCR SERPLBLD CKD-EPI 2021: 92 ML/MIN/1.73
EOSINOPHIL # BLD AUTO: 0.2 X10E3/UL (ref 0–0.4)
EOSINOPHIL NFR BLD AUTO: 4 %
ERYTHROCYTE [DISTWIDTH] IN BLOOD BY AUTOMATED COUNT: 12.2 % (ref 11.7–15.4)
FERRITIN SERPL-MCNC: 86 NG/ML (ref 15–150)
FOLATE SERPL-MCNC: 13.2 NG/ML
GLOBULIN SER CALC-MCNC: 3 G/DL (ref 1.5–4.5)
GLUCOSE SERPL-MCNC: 87 MG/DL (ref 70–99)
HCT VFR BLD AUTO: 45.3 % (ref 34–46.6)
HGB BLD-MCNC: 14.5 G/DL (ref 11.1–15.9)
IMM GRANULOCYTES # BLD AUTO: 0 X10E3/UL (ref 0–0.1)
IMM GRANULOCYTES NFR BLD AUTO: 0 %
IRON SERPL-MCNC: 44 UG/DL (ref 27–139)
LYMPHOCYTES # BLD AUTO: 1.5 X10E3/UL (ref 0.7–3.1)
LYMPHOCYTES NFR BLD AUTO: 24 %
MCH RBC QN AUTO: 29.4 PG (ref 26.6–33)
MCHC RBC AUTO-ENTMCNC: 32 G/DL (ref 31.5–35.7)
MCV RBC AUTO: 92 FL (ref 79–97)
METHYLMALONATE SERPL-SCNC: 216 NMOL/L (ref 0–378)
MONOCYTES # BLD AUTO: 0.5 X10E3/UL (ref 0.1–0.9)
MONOCYTES NFR BLD AUTO: 8 %
NEUTROPHILS # BLD AUTO: 3.9 X10E3/UL (ref 1.4–7)
NEUTROPHILS NFR BLD AUTO: 63 %
PLATELET # BLD AUTO: 218 X10E3/UL (ref 150–450)
POTASSIUM SERPL-SCNC: 5 MMOL/L (ref 3.5–5.2)
PREALB SERPL-MCNC: 23 MG/DL (ref 10–36)
PROT SERPL-MCNC: 7.2 G/DL (ref 6–8.5)
RBC # BLD AUTO: 4.93 X10E6/UL (ref 3.77–5.28)
SODIUM SERPL-SCNC: 141 MMOL/L (ref 134–144)
VIT B1 BLD-SCNC: 150.8 NMOL/L (ref 66.5–200)
WBC # BLD AUTO: 6.1 X10E3/UL (ref 3.4–10.8)

## 2025-02-27 ENCOUNTER — SPECIALTY PHARMACY (OUTPATIENT)
Dept: ONCOLOGY | Facility: HOSPITAL | Age: 70
End: 2025-02-27
Payer: MEDICARE

## 2025-02-27 RX ORDER — DALFAMPRIDINE 10 MG/1
10 TABLET, FILM COATED, EXTENDED RELEASE ORAL 2 TIMES DAILY
Qty: 60 TABLET | Refills: 11 | Status: SHIPPED | OUTPATIENT
Start: 2025-02-27

## 2025-03-06 ENCOUNTER — HOSPITAL ENCOUNTER (EMERGENCY)
Facility: HOSPITAL | Age: 70
Discharge: HOME | End: 2025-03-06
Attending: EMERGENCY MEDICINE
Payer: MEDICARE

## 2025-03-06 ENCOUNTER — APPOINTMENT (OUTPATIENT)
Facility: HOSPITAL | Age: 70
End: 2025-03-06
Payer: MEDICARE

## 2025-03-06 ENCOUNTER — TELEPHONE (OUTPATIENT)
Dept: NEUROLOGY | Facility: CLINIC | Age: 70
End: 2025-03-06
Payer: MEDICARE

## 2025-03-06 VITALS
TEMPERATURE: 98.1 F | HEART RATE: 60 BPM | DIASTOLIC BLOOD PRESSURE: 74 MMHG | OXYGEN SATURATION: 100 % | RESPIRATION RATE: 18 BRPM | WEIGHT: 246.91 LBS | BODY MASS INDEX: 34.57 KG/M2 | SYSTOLIC BLOOD PRESSURE: 161 MMHG | HEIGHT: 71 IN

## 2025-03-06 DIAGNOSIS — R53.1 WEAKNESS: Primary | ICD-10-CM

## 2025-03-06 DIAGNOSIS — G35 HX OF MULTIPLE SCLEROSIS: ICD-10-CM

## 2025-03-06 LAB
ALBUMIN SERPL-MCNC: 4.2 G/DL (ref 3.5–5.2)
ALBUMIN/GLOB SERPL: 1.6 G/DL
ALP SERPL-CCNC: 114 U/L (ref 39–117)
ALT SERPL W P-5'-P-CCNC: 10 U/L (ref 1–33)
ANION GAP SERPL CALCULATED.3IONS-SCNC: 9.7 MMOL/L (ref 5–15)
AST SERPL-CCNC: 20 U/L (ref 1–32)
BASOPHILS # BLD AUTO: 0.02 10*3/MM3 (ref 0–0.2)
BASOPHILS NFR BLD AUTO: 0.4 % (ref 0–1.5)
BILIRUB SERPL-MCNC: 0.4 MG/DL (ref 0–1.2)
BILIRUB UR QL STRIP: NEGATIVE
BUN SERPL-MCNC: 18 MG/DL (ref 8–23)
BUN/CREAT SERPL: 28.1 (ref 7–25)
CALCIUM SPEC-SCNC: 9.7 MG/DL (ref 8.6–10.5)
CHLORIDE SERPL-SCNC: 107 MMOL/L (ref 98–107)
CLARITY UR: CLEAR
CO2 SERPL-SCNC: 27.3 MMOL/L (ref 22–29)
COLOR UR: YELLOW
CREAT SERPL-MCNC: 0.64 MG/DL (ref 0.57–1)
DEPRECATED RDW RBC AUTO: 46 FL (ref 37–54)
EGFRCR SERPLBLD CKD-EPI 2021: 97 ML/MIN/1.73
EOSINOPHIL # BLD AUTO: 0.24 10*3/MM3 (ref 0–0.4)
EOSINOPHIL NFR BLD AUTO: 5 % (ref 0.3–6.2)
ERYTHROCYTE [DISTWIDTH] IN BLOOD BY AUTOMATED COUNT: 13.7 % (ref 12.3–15.4)
GLOBULIN UR ELPH-MCNC: 2.7 GM/DL
GLUCOSE SERPL-MCNC: 90 MG/DL (ref 65–99)
GLUCOSE UR STRIP-MCNC: NEGATIVE MG/DL
HCT VFR BLD AUTO: 41.5 % (ref 34–46.6)
HGB BLD-MCNC: 13.2 G/DL (ref 12–15.9)
HGB UR QL STRIP.AUTO: NEGATIVE
IMM GRANULOCYTES # BLD AUTO: 0 10*3/MM3 (ref 0–0.05)
IMM GRANULOCYTES NFR BLD AUTO: 0 % (ref 0–0.5)
KETONES UR QL STRIP: NEGATIVE
LEUKOCYTE ESTERASE UR QL STRIP.AUTO: NEGATIVE
LYMPHOCYTES # BLD AUTO: 1.32 10*3/MM3 (ref 0.7–3.1)
LYMPHOCYTES NFR BLD AUTO: 27.3 % (ref 19.6–45.3)
MCH RBC QN AUTO: 28.9 PG (ref 26.6–33)
MCHC RBC AUTO-ENTMCNC: 31.8 G/DL (ref 31.5–35.7)
MCV RBC AUTO: 91 FL (ref 79–97)
MONOCYTES # BLD AUTO: 0.45 10*3/MM3 (ref 0.1–0.9)
MONOCYTES NFR BLD AUTO: 9.3 % (ref 5–12)
NEUTROPHILS NFR BLD AUTO: 2.81 10*3/MM3 (ref 1.7–7)
NEUTROPHILS NFR BLD AUTO: 58 % (ref 42.7–76)
NITRITE UR QL STRIP: NEGATIVE
PH UR STRIP.AUTO: 6 [PH] (ref 5–8)
PLATELET # BLD AUTO: 190 10*3/MM3 (ref 140–450)
PMV BLD AUTO: 10.4 FL (ref 6–12)
POTASSIUM SERPL-SCNC: 4.2 MMOL/L (ref 3.5–5.2)
PROT SERPL-MCNC: 6.9 G/DL (ref 6–8.5)
PROT UR QL STRIP: NEGATIVE
RBC # BLD AUTO: 4.56 10*6/MM3 (ref 3.77–5.28)
SODIUM SERPL-SCNC: 144 MMOL/L (ref 136–145)
SP GR UR STRIP: 1.02 (ref 1–1.03)
UROBILINOGEN UR QL STRIP: NORMAL
WBC NRBC COR # BLD AUTO: 4.84 10*3/MM3 (ref 3.4–10.8)

## 2025-03-06 PROCEDURE — 81003 URINALYSIS AUTO W/O SCOPE: CPT

## 2025-03-06 PROCEDURE — 25810000003 SODIUM CHLORIDE 0.9 % SOLUTION 250 ML FLEX CONT

## 2025-03-06 PROCEDURE — A9577 INJ MULTIHANCE: HCPCS | Performed by: EMERGENCY MEDICINE

## 2025-03-06 PROCEDURE — 85025 COMPLETE CBC W/AUTO DIFF WBC: CPT

## 2025-03-06 PROCEDURE — 99285 EMERGENCY DEPT VISIT HI MDM: CPT

## 2025-03-06 PROCEDURE — 25010000002 METHYLPREDNISOLONE PER 125 MG

## 2025-03-06 PROCEDURE — 80053 COMPREHEN METABOLIC PANEL: CPT

## 2025-03-06 PROCEDURE — 25810000003 SODIUM CHLORIDE 0.9 % SOLUTION

## 2025-03-06 PROCEDURE — 96365 THER/PROPH/DIAG IV INF INIT: CPT

## 2025-03-06 PROCEDURE — 70553 MRI BRAIN STEM W/O & W/DYE: CPT

## 2025-03-06 PROCEDURE — 25510000002 GADOBENATE DIMEGLUMINE 529 MG/ML SOLUTION: Performed by: EMERGENCY MEDICINE

## 2025-03-06 RX ORDER — SODIUM CHLORIDE 0.9 % (FLUSH) 0.9 %
10 SYRINGE (ML) INJECTION AS NEEDED
Status: DISCONTINUED | OUTPATIENT
Start: 2025-03-06 | End: 2025-03-06 | Stop reason: HOSPADM

## 2025-03-06 RX ORDER — METHYLPREDNISOLONE SODIUM SUCCINATE 125 MG/2ML
1000 INJECTION INTRAMUSCULAR; INTRAVENOUS ONCE
Status: DISCONTINUED | OUTPATIENT
Start: 2025-03-06 | End: 2025-03-06

## 2025-03-06 RX ADMIN — GADOBENATE DIMEGLUMINE 20 ML: 529 INJECTION, SOLUTION INTRAVENOUS at 18:41

## 2025-03-06 RX ADMIN — METHYLPREDNISOLONE SODIUM SUCCINATE 1000 MG: 1 INJECTION INTRAMUSCULAR; INTRAVENOUS at 20:06

## 2025-03-06 RX ADMIN — SODIUM CHLORIDE 1000 ML: 9 INJECTION, SOLUTION INTRAVENOUS at 20:06

## 2025-03-06 NOTE — TELEPHONE ENCOUNTER
Spoke to Rashad to inform per Dr Gilbert that he advised she go to the ER to be evaluated.  She stated that she was currently in her PCP office and would let us know when she arrived at the ER.  She expressed appreciation.

## 2025-03-06 NOTE — TELEPHONE ENCOUNTER
Provider: DR RAY    Caller: Rashad Carrillo    Relationship to Patient: Self    Phone Number: [phone]    Reason for Call: STATES SHE HAS BEEN HAVING AN MS FLARE UP OVER THE LAST WEEK AND IT SEEMS TO BE WORSENING. WOULD LIKE TO SPEAK WITH A NURSE OR MA. PLEASE ADVISE, THANK YOU.

## 2025-03-06 NOTE — TELEPHONE ENCOUNTER
Spoke to Rashad.  She states that she has been unable to control her bodily functions (urine and fecal).  Her eyes feel like they are going inward to where she is looking at her nose. This has been happening for the last couple of weeks.  Informed that I would forward to Dr Gilbert and get back with her on his recommendation.  She expressed appreciation.

## 2025-03-06 NOTE — FSED PROVIDER NOTE
"CHIEF COMPLAINT  Weakness - Generalized     HISTORY OF PRESENT ILLNESS:  Rashad Carrillo is a 69 y.o. female who presents with concerns of an MS flare.  States has been diagnosed for greater than 20 years.  She sees Dr. Gilbert and had a good checkup in November.  She states that over the past 2 weeks she has noticed that she is having some urinary and bowel incontinence.  States that she has the urge to both micturate but is unable to make it to the bathroom in time and has incontinence as well with her bowel movements as a similar fashion, she is unable to make it to the bathroom in time.  She also reports that this week she has noticed that she is having \"double vision\".  She states she is looking at something and it feels like her eyes are crossing.  She called her neurology team and they recommended her come to the emergency department for evaluation.  She denies any recent fevers or chills      PAST MEDICAL HISTORY  Past Medical History:   Diagnosis Date    Chronic knee pain     NSAIDS + steroid injections (last 3/2022)    Dyspepsia     Dyspnea on exertion     Fatigue     GERD (gastroesophageal reflux disease)     History of transfusion     PATIENT DENIES REACTION    Hyperlipidemia     Hypertension     Morbid obesity     Multiple sclerosis     follows w/ Dr. Gilbert     Reviewed the imported nursing notes    PAST SURGICAL HISTORY  Past Surgical History:   Procedure Laterality Date    COLONOSCOPY      GASTRIC SLEEVE LAPAROSCOPIC N/A 11/08/2022    Procedure: GASTRIC SLEEVE WITH HIATAL HERNIA LAPAROSCOPIC WITH DAVINCI ROBOT, ESOPHAGOGASTRODUODENOSCOPY;  Surgeon: Maribell Anderson MD;  Location: [facility] OR;  Service: Robotics - DaVinci;  Laterality: N/A;    LAPAROSCOPIC CHOLECYSTECTOMY  2012    gallstone pancreatitis    TOTAL KNEE ARTHROPLASTY Right 10/10/2023    Procedure: TOTAL KNEE ARTHROPLASTY WITH CORI ROBOT - RIGHT;  Surgeon: Lalo Choe MD;  Location:  [facility] OR;  Service: Robotics - Ortho;  " Laterality: Right;     Reviewed the imported nursing notes    ALLERGIES  Allergies   Allergen Reactions    Isoniazid Other (See Comments)     aplastic anemia    Rifampin Anaphylaxis    Sulfa Antibiotics Anaphylaxis    Acetaminophen Headache    Amlodipine Other (See Comments)    Oxybutynin Other (See Comments)    Pregabalin Other (See Comments)     Gained weight    Tetanus Antitoxin Swelling       MEDICATIONS       Medication List        CHANGE how you take these medications      acyclovir 400 MG tablet  Commonly known as: ZOVIRAX  Take 1 tablet by mouth 2 (Two) Times a Day.  What changed:   when to take this  reasons to take this     docusate sodium 100 MG capsule  Commonly known as: Colace  Take 1 capsule by mouth 2 (Two) Times a Day.  What changed:   when to take this  reasons to take this            CONTINUE taking these medications      albuterol sulfate  (90 Base) MCG/ACT inhaler  Commonly known as: PROVENTIL HFA;VENTOLIN HFA;PROAIR HFA     Azelastine HCl 137 MCG/SPRAY solution     baclofen 10 MG tablet  Commonly known as: LIORESAL  Take 1 tablet by mouth 3 (Three) Times a Day As Needed for Muscle Spasms.     chlorthalidone 25 MG tablet  Commonly known as: HYGROTON     cyanocobalamin 1000 MCG/ML injection     cyclobenzaprine 5 MG tablet  Commonly known as: FLEXERIL  Take 1 tablet by mouth 3 (Three) Times a Day As Needed for Muscle Spasms.     Dalfampridine ER 10 MG tablet sustained-release 12 hour  Take 10 mg by mouth 2 (Two) Times a Day.     ezetimibe 10 MG tablet  Commonly known as: ZETIA     FLUoxetine 20 MG capsule  Commonly known as: PROzac     * gabapentin 300 MG capsule  Commonly known as: NEURONTIN  Take 2 capsules by mouth Every Night.     * gabapentin 100 MG capsule  Commonly known as: NEURONTIN  Take 1 capsule by mouth 2 (Two) Times a Day.     hydrALAZINE 10 MG tablet  Commonly known as: APRESOLINE     HYDROcodone-acetaminophen 5-325 MG per tablet  Commonly known as: NORCO     Lovaza 1 g  capsule  Generic drug: omega-3 acid ethyl esters     meclizine 25 MG tablet  Commonly known as: ANTIVERT     minoxidil 10 MG tablet  Commonly known as: LONITEN     Multi-Vitamin Daily tablet tablet  Generic drug: multivitamin     * omeprazole 20 MG capsule  Commonly known as: priLOSEC     * omeprazole 40 MG capsule  Commonly known as: priLOSEC  Take 1 capsule by mouth Daily.     oxybutynin XL 10 MG 24 hr tablet  Commonly known as: DITROPAN-XL     polyethylene glycol 17 g packet  Commonly known as: MIRALAX  Take 17 g by mouth Daily.     potassium chloride 10 MEQ CR capsule  Commonly known as: MICRO-K     rosuvastatin 40 MG tablet  Commonly known as: CRESTOR     spironolactone 50 MG tablet  Commonly known as: ALDACTONE     vitamin D 1.25 MG (48384 UT) capsule capsule  Commonly known as: ERGOCALCIFEROL     Z-BEC PO           * This list has 4 medication(s) that are the same as other medications prescribed for you. Read the directions carefully, and ask your doctor or other care provider to review them with you.                SOCIAL HISTORY    Social History     Tobacco Use    Smoking status: Former     Current packs/day: 0.00     Average packs/day: 1 pack/day for 5.0 years (5.0 ttl pk-yrs)     Types: Cigarettes     Start date:      Quit date:      Years since quittin.1     Passive exposure: Never    Smokeless tobacco: Never    Tobacco comments:     social   Vaping Use    Vaping status: Never Used   Substance Use Topics    Alcohol use: Yes     Comment: 3 drinks/month    Drug use: No     The patient otherwise denies any further social history.  Reviewed the imported nursing notes      FAMILY HISTORY  Family History   Problem Relation Age of Onset    Cancer Mother     Hypertension Mother     Heart disease Mother     Alzheimer's disease Other     Seizures Other         Epilepsy and recurrent seizures    Heart disease Other     Breast cancer Neg Hx     Ovarian cancer Neg Hx      The patient otherwise patient  "denies any further family history.  Reviewed the imported nursing notes      REVIEW OF SYSTEMS  Reviewed and negative/not pertinent unless mentioned in the HPI        PHYSICAL EXAM  Vitals: /74   Pulse 60   Temp 98.1 °F (36.7 °C) (Oral)   Resp 18   Ht 180 cm (70.87\")   Wt 112 kg (246 lb 14.6 oz)   LMP  (LMP Unknown)   SpO2 100%   BMI 34.57 kg/m²      General Appearance: Awake and Alert, cooperative, no distress   Head:  Normocephalic, atraumatic   Eyes: Conjunctiva clear, corneas clear   Ears:  Normal external ears,   Nose: Nares normal and clear   Throat: Lips, mucosa moist   Neck:  Trachea midline, no stridor   Lungs:  Clear to auscultation bilaterally, respirations unlabored   Heart: Regular, No rub   Abdomen: Nondistended, non tender   Genitourinary:  Pelvic:  Rectal: Not Performed  Not Performed  Not Performed   Extremities: Extremities Atraumatic   Vascular: Present in all extremities   Skin:  no rashes or lesions   Neurologic: Non Focal , CN 2-12 grossly intact, GCS 15   Psyc: Not Performed         MEDICAL DECISION MAKING - ED COURSE/PROCEDURE/DDX:    PULSE OX: Interpretation by me on room air Is normal  SpO2 Readings from Last 1 Encounters:   03/06/25 100%          CARDIAC MONITOR: Normal sinus rhythm  Pulse Readings from Last 1 Encounters:   03/06/25 60            I reviewed the nursing notes: YES  I reviewed and discussed with EMS:   External documents reviewed:   Additional history taken from: Previous notes     Discussed with consulting physician neurology additionally, the admitting / accepting provider was contacted with handoff      LAB REVIEWED: Interpretation of all unique test ordered  Labs Reviewed   COMPREHENSIVE METABOLIC PANEL - Abnormal; Notable for the following components:       Result Value    BUN/Creatinine Ratio 28.1 (*)     All other components within normal limits    Narrative:     GFR Categories in Chronic Kidney Disease (CKD)      GFR Category          GFR (mL/min/1.73)  "   Interpretation  G1                     90 or greater         Normal or high (1)  G2                      60-89                Mild decrease (1)  G3a                   45-59                Mild to moderate decrease  G3b                   30-44                Moderate to severe decrease  G4                    15-29                Severe decrease  G5                    14 or less           Kidney failure          (1)In the absence of evidence of kidney disease, neither GFR category G1 or G2 fulfill the criteria for CKD.    eGFR calculation 2021 CKD-EPI creatinine equation, which does not include race as a factor   CBC WITH AUTO DIFFERENTIAL - Normal   URINALYSIS W/ MICROSCOPIC IF INDICATED (NO CULTURE) - Normal    Narrative:     Urine microscopic not indicated.   CBC AND DIFFERENTIAL    Narrative:     The following orders were created for panel order CBC & Differential.  Procedure                               Abnormality         Status                     ---------                               -----------         ------                     CBC Auto Differential[[id number]]        Normal              Final result                 Please view results for these tests on the individual orders.       IMAGING REVIEWED  My X-ray interpretation:   My CT interpretation:   My Ultrasound interpretation:     MRI Brain With & Without Contrast   Final Result   Stable white matter changes without abnormal enhancement to suggest active demyelination.         Electronically Signed: Adonay Hansen MD     3/6/2025 7:06 PM EST     Workstation ID: [id number]          Procedures:    Decision rules/scores:        Drug therapy provided in the ED and monitored as needed given IV/PO :   Medications   sodium chloride 0.9 % flush 10 mL (has no administration in time range)   gadobenate dimeglumine (MULTIHANCE) injection 20 mL (20 mL Intravenous Given 3/6/25 1841)   sodium chloride 0.9 % bolus 1,000 mL (0 mL Intravenous Stopped 3/6/25 2046)    methylPREDNISolone sodium succinate (SOLU-Medrol) 1,000 mg in sodium chloride 0.9 % 250 mL IVPB (VTB) (0 mg Intravenous Stopped 3/6/25 2046)       Vitals Trend:  Vitals:    03/06/25 1727 03/06/25 1731 03/06/25 1800 03/06/25 1809   BP: 165/75  161/74    Pulse:  59 60 60   Resp:       Temp:       TempSrc:       SpO2:  100% 100% 100%   Weight:       Height:           Rashad Carrillo is a 69 y.o. female who presents to the emergency department with the acute illness as stated within HPI  Shared medical decision making regarding a detailed discussion with the patient concerning this ED encounter, the differential diagnosis considered multiple sclerosis flare, infectious process, palsy, CVA, overactive bladder, stress incontinence, and the emergency room imaging and lab testing done today The patient and/or family have been given an opportunity to ask questions and exhibit an understanding of what happened today in the emergency room.        ED Course as of 03/06/25 2104   Thu Mar 06, 2025   1926 Urinalysis With Microscopic If Indicated (No Culture) - Urine, Clean Catch [NC]   1926 Comprehensive Metabolic Panel(!) [NC]   1926 CBC & Differential  No acute abnormalities on laboratory studies [NC]   1926 MRI Brain With & Without Contrast  Stable MRI.  Consulting neurology [NC]   1944 I spoke with Dr. Lopez, neurology on-call for Dr. Gilbert.  He states with the negative workup today he is not concerned about an MS flare at this time.  States that we can provide the patient with a 1 g IV dose of methylprednisolone as she is specifically asking for steroids.  States that we can reassure the patient that this does not seem to be an MS flare and probably more overflow incontinence.  Does advise her that she can be discharged to follow-up with weekly by giving him a call tomorrow for a repeat clinic evaluation either tomorrow or Monday. [NC]      ED Course User Index  [NC] Garth Chen PA-C           Condition considered  emergent due to:  1) Acuity  2) Failure or unavailable treatment in the outpatient setting  3) Risk of deterioration and decompensation given the multiple differential diagnoses that  need to be ruled out      Amount and/or Complexity of Data Reviewed  Clinical lab tests: as above noted in MDM  Tests in the radiology section of CPT®: as above noted in MDM  Tests in the medicine section of CPT®: as above noted in MDM  Independent visualization of images, tracings, or specimens: as above noted in MDM        CLINICAL IMPRESSION:  Final diagnoses:   Weakness   Hx of multiple sclerosis      DISPOSITION:  Discharge      As with my usual standard practice patient was advised to return for any reason for any  complaint at any time whatsoever. Please refer to the discharge instructions, AVS paperwork  and prescriptions written for treatment plan.        Electronically signed by Garth Chen PA-C, 03/06/25, 4:42 PM EST.      This report was dictated utilizing a voice recognition system which has a propensity to miss  small words such as a, an, the, no, he,she,him, her,his and not. Please read this report carefully,  and if any discrepancy or uncertainty is present, please contact the author directly for  clarification

## 2025-03-21 ENCOUNTER — TELEPHONE (OUTPATIENT)
Dept: NEUROLOGY | Facility: CLINIC | Age: 70
End: 2025-03-21
Payer: MEDICARE

## 2025-03-21 NOTE — TELEPHONE ENCOUNTER
Dr Boucher office called to speak to Dr Gilbert about this patient.  States patient called yesterday for urgent appointment but was told no appointments available.   Advised that Dr Gilbert was out today and did not speak to her nor get a message but would get the message to Dr Gilbert that Dr Newman would like to speak to him.  Informed that I have not gotten a message about scheduling her sooner but would obtain the records from the ophthalmologist that she has seen since her ER visit on 03.07.25 for a fall with LOC.  She went to Dr Kevin Tucker and I am calling that office for the records.  Informed that I will call the patient to get her an earlier appointment.

## 2025-03-25 RX ORDER — OMEPRAZOLE 40 MG/1
40 CAPSULE, DELAYED RELEASE ORAL DAILY
Qty: 90 CAPSULE | Refills: 0 | Status: SHIPPED | OUTPATIENT
Start: 2025-03-25

## 2025-03-25 NOTE — TELEPHONE ENCOUNTER
Rx Refill Note  Requested Prescriptions     Pending Prescriptions Disp Refills    omeprazole (priLOSEC) 40 MG capsule 90 capsule 0     Sig: Take 1 capsule by mouth Daily.      Last office visit with prescribing clinician: 12/18/2024   Last telemedicine visit with prescribing clinician: Visit date not found   Next office visit with prescribing clinician: Visit date not found                         Would you like a call back once the refill request has been completed: [] Yes [x] No    If the office needs to give you a call back, can they leave a voicemail: [] Yes [x] No    Demetrice Love MA  03/25/25, 09:05 EDT

## 2025-03-26 ENCOUNTER — OFFICE VISIT (OUTPATIENT)
Dept: NEUROLOGY | Facility: CLINIC | Age: 70
End: 2025-03-26
Payer: MEDICARE

## 2025-03-26 VITALS
OXYGEN SATURATION: 99 % | WEIGHT: 246.91 LBS | HEART RATE: 57 BPM | DIASTOLIC BLOOD PRESSURE: 94 MMHG | HEIGHT: 71 IN | SYSTOLIC BLOOD PRESSURE: 142 MMHG | BODY MASS INDEX: 34.57 KG/M2

## 2025-03-26 DIAGNOSIS — G35 MULTIPLE SCLEROSIS: Primary | Chronic | ICD-10-CM

## 2025-03-26 DIAGNOSIS — F33.1 MODERATE EPISODE OF RECURRENT MAJOR DEPRESSIVE DISORDER: Chronic | ICD-10-CM

## 2025-03-26 DIAGNOSIS — G43.709 CHRONIC MIGRAINE WITHOUT AURA WITHOUT STATUS MIGRAINOSUS, NOT INTRACTABLE: Chronic | ICD-10-CM

## 2025-03-26 DIAGNOSIS — G25.81 RESTLESS LEGS SYNDROME: Chronic | ICD-10-CM

## 2025-03-26 DIAGNOSIS — G89.18 ACUTE POSTOPERATIVE PAIN: ICD-10-CM

## 2025-03-26 RX ORDER — OLMESARTAN MEDOXOMIL 20 MG/1
TABLET ORAL
COMMUNITY
Start: 2025-02-26

## 2025-03-26 RX ORDER — OXYBUTYNIN CHLORIDE 15 MG/1
1 TABLET, EXTENDED RELEASE ORAL DAILY
COMMUNITY
Start: 2025-03-18

## 2025-03-26 RX ORDER — GABAPENTIN 300 MG/1
600 CAPSULE ORAL NIGHTLY
Qty: 180 CAPSULE | Refills: 1 | Status: SHIPPED | OUTPATIENT
Start: 2025-03-26 | End: 2026-03-26

## 2025-03-26 RX ORDER — MELOXICAM 7.5 MG/1
1 TABLET ORAL DAILY
COMMUNITY

## 2025-03-26 RX ORDER — GABAPENTIN 100 MG/1
100 CAPSULE ORAL 3 TIMES DAILY
Qty: 270 CAPSULE | Refills: 1 | Status: SHIPPED | OUTPATIENT
Start: 2025-03-26

## 2025-03-26 NOTE — PROGRESS NOTES
Chief Complaint    Multiple Sclerosis    Subjective        Rashad Carrillo presents to [facility] NEUROLOGY  History of Present Illness    69 y.o. female returns in follow up.  [facility]aste visit on 11/25/24 continued GBP, Prozac, baclofen, Acyclovir and Ampyra.      [facility] ED 3/6/25 diplopia, fecal and urinary urgency Tx 1 gram IVMP and IVF improved sx.     Hit head with LOC 3/13/25 then developed vertical diplopia.  Intermittent since onset.      November 2023 R TKA      11/8/2022 robotic sleeve gastrectomy and hiatal hernia repair      MRI Brain, my review of films, 3/6/25 as compared to 11/10/22, no change in T2 lesion load, moderate to severe T2 lesion burden and moderate atrophy.     JCV ab - 6/11/18 - 0.45       11/27/23: Plt 189  Cr 0.65;      RRMS     S/P [place] 2/2, 10/30/18 - 11/2/18, 11/4/19-11/6/19      Multiple falls.        Ampyra improves walking speed. .        Fatigue is moderate.       Continued pain and stiffness in R LE and limiting walking. .       Problem history:     25 ft timed walk increaesed 15.99 from 9.94.  Left hand 9 hole peg worsened.   Increasing swallowing difficulty.        Immediate and working memory are declining with SDMT 27/110..        Referred to ID and dx with latent TB.  Recommended isoniazid and pyridoxine for 9 months.  Subsequently developed aplastic anemia secondary to INH and followed by Dr Valentin.  Received multiple transfusions.  3/29/18 able to resume Tysabri and has had 3 infusions  Treated with rifampin.       MDD     Mood is stable on Prozac 20 mg PO daily      RLS     No new or worsening sx.      Controlled on GBP.   Baclofen helping with spasticity in LE.       Migraines     Headache frequency rare.     Off BTX      Problem History:     HA frequency is daily.  Moderate to severe intensity.   Location moves around head.  Quality Last for up to a day.  Tx with OTC meds.      Greater than 15 HA a month, moderate to severe intensity, lasting over 6 hours.  "      Preventatives:  TPM, GBP, Cymbalta,      Objective   Vital Signs:  /94   Pulse 57   Ht 180 cm (70.87\")   Wt 112 kg (246 lb 14.6 oz)   SpO2 99%   BMI 34.57 kg/m²   Estimated body mass index is 34.57 kg/m² as calculated from the following:    Height as of this encounter: 180 cm (70.87\").    Weight as of this encounter: 112 kg (246 lb 14.6 oz).    Neurological Exam  Mental Status  Awake, alert and oriented to person, place and time. Oriented to person, place and time. Speech is normal. Language is fluent with no aphasia. Attention and concentration are normal. Fund of knowledge is appropriate for level of education.    Cranial Nerves  CN III, IV, VI: Extraocular movements intact bilaterally. Pupils equal round and reactive to light bilaterally.  CN V: Facial sensation is normal.  CN VII: Full and symmetric facial movement.  CN IX, X: Palate elevates symmetrically  CN XI: Shoulder shrug strength is normal.  CN XII: Tongue midline without atrophy or fasciculations.    Motor   Strength is 5/5 throughout all four extremities.    Sensory  Sensation is intact to light touch, pinprick, vibration and proprioception in all four extremities.    Reflexes  Deep tendon reflexes are 2+ and symmetric in all four extremities.    Coordination    Finger-to-nose, rapid alternating movements and heel-to-shin normal bilaterally without dysmetria.    Gait  Casual gait: Wide stance. Reduced stride length. Ataxic gait.         Physical Exam  Eyes:      Extraocular Movements: Extraocular movements intact.      Pupils: Pupils are equal, round, and reactive to light.   Neurological:      Motor: Motor strength is normal.     Coordination: Coordination is intact.      Deep Tendon Reflexes: Reflexes are normal and symmetric.   Psychiatric:         Speech: Speech normal.        Result Review :  The following data was reviewed by: Adonay Gilbert MD on 03/26/2025:  Common labs          8/13/2024    17:39 12/18/2024    10:58 " 3/6/2025    17:25   Common Labs   Glucose 99  87  90    BUN 24  18  18    Creatinine 0.77  0.72  0.64    Sodium 138  141  144    Potassium 4.1  5.0  4.2    Chloride 105  101  107    Calcium 9.4  9.8  9.7    Albumin 3.9  4.2  4.2    Total Bilirubin 0.5  0.4  0.4    Alkaline Phosphatase 114  135  114    AST (SGOT) 17  19  20    ALT (SGPT) 12  12  10    WBC 6.78  6.1  4.84    Hemoglobin 14.3  14.5  13.2    Hematocrit 43.8  45.3  41.5    Platelets 203  218  190                Assessment and Plan   Diagnoses and all orders for this visit:    1. Multiple sclerosis (Primary)  Assessment & Plan:  Recent worsening of sx with over exertion    Continue Ampyra 10 mg BID       2. Chronic migraine without aura without status migrainosus, not intractable  Assessment & Plan:  Increase gabapentin 100 mg TID, 600 mg qhs              3. Restless legs syndrome  -     gabapentin (NEURONTIN) 300 MG capsule; Take 2 capsules by mouth Every Night.  Dispense: 180 capsule; Refill: 1    4. Moderate episode of recurrent major depressive disorder  Assessment & Plan:  Stable on Prozac 20 mg daily       5. Acute postoperative pain  -     gabapentin (NEURONTIN) 100 MG capsule; Take 1 capsule by mouth 3 (Three) Times a Day.  Dispense: 270 capsule; Refill: 1             Follow Up   No follow-ups on file.  Patient was given instructions and counseling regarding her condition or for health maintenance advice. Please see specific information pulled into the AVS if appropriate.

## 2025-04-08 ENCOUNTER — TELEPHONE (OUTPATIENT)
Dept: NEUROLOGY | Facility: CLINIC | Age: 70
End: 2025-04-08
Payer: MEDICARE

## 2025-04-08 NOTE — TELEPHONE ENCOUNTER
Provider: DR RAY    Caller: Rashad Carrillo    Relationship to Patient: Self    Phone Number: [phone]    Reason for Call: STATES SHE IS NO LONGER HAVING DOUBLE VISION. WOULD LIKE TO SEE IF SHE CAN RETURN TO DRIVING. PLEASE ADVISE, THANK YOU.

## 2025-04-09 NOTE — TELEPHONE ENCOUNTER
Spoke to Rashad and she states that she has had no double vision for 3 days.  Informed that as long as she is not having the symptoms and feels safe to drive that she can but if the symptoms return or she feels unsafe at any point to stop again.  She expressed understanding and appreciation.

## 2025-05-07 ENCOUNTER — SPECIALTY PHARMACY (OUTPATIENT)
Dept: ONCOLOGY | Facility: HOSPITAL | Age: 70
End: 2025-05-07
Payer: MEDICARE

## 2025-05-07 NOTE — PROGRESS NOTES
Specialty Pharmacy Patient Management Program  Neurology Reassessment     Rashad Carrillo is a 69 y.o. female with multiple sclerosis seen by a Neurology provider and enrolled in the Neurology Patient Management program offered by [facility].  A follow-up outreach was conducted, including assessment of continued therapy appropriateness, medication adherence, and side effect incidence and management for dalfampridine.     Changes to Insurance Coverage or Financial Support  [employer]     Relevant Past Medical History and Comorbidities  Relevant medical history and concomitant health conditions were discussed with the patient. The patient's chart has been reviewed for relevant past medical history and comorbid health conditions and updated as necessary.   Past Medical History:   Diagnosis Date    Chronic knee pain     NSAIDS + steroid injections (last 3/2022)    Dyspepsia     Dyspnea on exertion     Fatigue     GERD (gastroesophageal reflux disease)     History of transfusion     PATIENT DENIES REACTION    Hyperlipidemia     Hypertension     Morbid obesity     Multiple sclerosis     follows w/ Dr. Gilbert     Social History     Socioeconomic History    Marital status:    Tobacco Use    Smoking status: Former     Current packs/day: 0.00     Average packs/day: 1 pack/day for 5.0 years (5.0 ttl pk-yrs)     Types: Cigarettes     Start date:      Quit date:      Years since quittin.3     Passive exposure: Never    Smokeless tobacco: Never    Tobacco comments:     social   Vaping Use    Vaping status: Never Used   Substance and Sexual Activity    Alcohol use: Yes     Comment: 3 drinks/month    Drug use: No    Sexual activity: Defer     Problem list reviewed by Courtney Medina, PharmD on 2025 at 11:43 AM    Hospitalizations and Urgent Care Since Last Assessment  ED Visits, Admissions, or Hospitalizations: none   Urgent Office Visits: none     Allergies  Known allergies  and reactions were discussed with the patient. The patient's chart has been reviewed for allergy information and updated as necessary.   Allergies   Allergen Reactions    Isoniazid Other (See Comments)     aplastic anemia    Rifampin Anaphylaxis    Sulfa Antibiotics Anaphylaxis    Acetaminophen Headache    Amlodipine Other (See Comments)    Pregabalin Other (See Comments)     Gained weight    Tetanus Antitoxin Swelling     Allergies reviewed by Courtney Medina, PharmD on 5/7/2025 at 11:42 AM    Relevant Laboratory Values  Common labs          8/13/2024    17:39 12/18/2024    10:58 3/6/2025    17:25   Common Labs   Glucose 99  87  90    BUN 24  18  18    Creatinine 0.77  0.72  0.64    Sodium 138  141  144    Potassium 4.1  5.0  4.2    Chloride 105  101  107    Calcium 9.4  9.8  9.7    Albumin 3.9  4.2  4.2    Total Bilirubin 0.5  0.4  0.4    Alkaline Phosphatase 114  135  114    AST (SGOT) 17  19  20    ALT (SGPT) 12  12  10    WBC 6.78  6.1  4.84    Hemoglobin 14.3  14.5  13.2    Hematocrit 43.8  45.3  41.5    Platelets 203  218  190        Lab Assessment  The above labs have been reviewed. No dose adjustments are needed for the specialty medication(s) based on the labs.     Current Medication List  This medication list has been reviewed with the patient and evaluated for any interactions or necessary modifications/recommendations, and updated to include all prescription medications, OTC medications, and supplements the patient is currently taking.  This list reflects what is contained in the patient's profile, which has also been marked as reviewed to communicate to other providers it is the most up to date version of the patient's current medication therapy.     Current Outpatient Medications:     acyclovir (ZOVIRAX) 400 MG tablet, Take 1 tablet by mouth 2 (Two) Times a Day. (Patient taking differently: Take 1 tablet by mouth Daily As Needed.), Disp: 60 tablet, Rfl: 11    albuterol sulfate  (90 Base)  MCG/ACT inhaler, Daily As Needed., Disp: , Rfl:     Azelastine HCl 137 MCG/SPRAY solution, SPRAY 1-2 SPRAYS IN EACH NOSTRIL BY INTRANASAL ROUTE 2 TIMES PER DAY as needed, Disp: , Rfl:     B Complex-C-E-Zn (Z-BEC PO), Take 1 tablet by mouth daily., Disp: , Rfl:     baclofen (LIORESAL) 10 MG tablet, Take 1 tablet by mouth 3 (Three) Times a Day As Needed for Muscle Spasms., Disp: 270 tablet, Rfl: 3    chlorthalidone (HYGROTON) 25 MG tablet, Take 1 tablet by mouth Daily., Disp: , Rfl:     cyanocobalamin 1000 MCG/ML injection, 1  mL every other week for 3 months, then monthly, Disp: , Rfl:     cyclobenzaprine (FLEXERIL) 5 MG tablet, Take 1 tablet by mouth 3 (Three) Times a Day As Needed for Muscle Spasms., Disp: , Rfl:     Dalfampridine ER 10 MG tablet sustained-release 12 hour, Take 10 mg by mouth 2 (Two) Times a Day., Disp: 60 tablet, Rfl: 11    docusate sodium (Colace) 100 MG capsule, Take 1 capsule by mouth 2 (Two) Times a Day. (Patient taking differently: Take 1 capsule by mouth 2 (Two) Times a Day As Needed.), Disp: , Rfl:     ezetimibe (ZETIA) 10 MG tablet, Take 1 tablet by mouth Daily., Disp: , Rfl:     FLUoxetine (PROzac) 20 MG capsule, Take 1 capsule by mouth Daily., Disp: , Rfl:     gabapentin (NEURONTIN) 100 MG capsule, Take 1 capsule by mouth 3 (Three) Times a Day., Disp: 270 capsule, Rfl: 1    gabapentin (NEURONTIN) 300 MG capsule, Take 2 capsules by mouth Every Night., Disp: 180 capsule, Rfl: 1    hydrALAZINE (APRESOLINE) 10 MG tablet, 1 tablet Daily., Disp: , Rfl:     HYDROcodone-acetaminophen (NORCO) 5-325 MG per tablet, Take 1 tablet by mouth Every 4 (Four) Hours As Needed., Disp: , Rfl:     meclizine (ANTIVERT) 25 MG tablet, 1 tablet 3 (Three) Times a Day As Needed., Disp: , Rfl:     meloxicam (MOBIC) 7.5 MG tablet, Take 1 tablet by mouth Daily., Disp: , Rfl:     minoxidil (LONITEN) 10 MG tablet, Take 1 tablet by mouth 2 (Two) Times a Day., Disp: , Rfl:     Multiple Vitamin (MULTI-VITAMIN DAILY)  tablet, Take 1 tablet by mouth Daily., Disp: , Rfl:     olmesartan (BENICAR) 20 MG tablet, Take 0.5 tablets every day by oral route., Disp: , Rfl:     omega-3 acid ethyl esters (Lovaza) 1 g capsule, Take 2 capsules every day by oral route., Disp: , Rfl:     omeprazole (priLOSEC) 40 MG capsule, Take 1 capsule by mouth Daily., Disp: 90 capsule, Rfl: 0    oxybutynin XL (DITROPAN XL) 15 MG 24 hr tablet, Take 1 tablet by mouth Daily., Disp: , Rfl:     polyethylene glycol (MIRALAX) 17 g packet, Take 17 g by mouth Daily., Disp: , Rfl:     potassium chloride (MICRO-K) 10 MEQ CR capsule, Take 1 capsule by mouth Daily., Disp: , Rfl:     rosuvastatin (CRESTOR) 40 MG tablet, Take 1 tablet by mouth Daily., Disp: , Rfl:     spironolactone (ALDACTONE) 50 MG tablet, Take 1 tablet by mouth Daily., Disp: , Rfl:     vitamin D (ERGOCALCIFEROL) 89810 UNITS capsule capsule, Take 1 capsule by mouth Every 7 (Seven) Days., Disp: , Rfl:     Medicines reviewed by Courtney Medina, PharmD on 5/7/2025 at 11:43 AM    Drug Interactions  No relevant drug-drug interactions with specialty medication(s):  dalfampridine.        Adverse Drug Reactions  Medication tolerability: Tolerating with no to minimal ADRs          Medication plan: Continue therapy with normal follow-up  Plan for ADR Management: not requird      Adherence, Self-Administration, and Current Therapy Problems  Adherence related patient's specialty therapy was discussed with the patient. The Adherence segment of this outreach has been reviewed and updated.   Adherence Questions  Linked Medication(s) Assessed: Dalfampridine  On average, how many doses/injections does the patient miss per month?: 0  What are the identified reasons for non-adherence or missed doses? : no problems identified  What is the estimated medication adherence level?: %  Based on the patient/caregiver response and refill history, does this patient require an MTP to track adherence improvements?:  no    Additional Barriers to Patient Self-Administration: none  Methods for Supporting Patient Self-Administration: n/a    Recently Close Medication Therapy Problems  No medication therapy recommendations to display  Open Medication Therapy Problems  No medication therapy recommendations to display     Goals of Therapy  Goals related to the patient's specialty therapy was discussed with the patient. The Patient Goals segment of this outreach has been reviewed and updated.    Goals Addressed Today        Specialty Pharmacy General Goal      Improved walking speed  11/11/24 dw - gait abnormality stable, walking speed improved  5/7/25 dw - pt reports stable gait.               Quality of Life Assessment   Quality of Life related to the patient's enrollment in the patient management program and services provided was discussed with the patient. The QOL segment of this outreach has been reviewed and updated.   Quality of Life Improvement Scale: 8-Moderately better    Reassessment Plan & Follow-Up  Medication Therapy Changes: continue dalfampridine 10 mg po bid  Related Plans, Therapy Recommendations, or Issues to Be Addressed: none  Pharmacist to perform regular reassessments no more than (6) months from the previous assessment.  Care Coordinator to set up future refill outreaches, coordinate prescription delivery, and escalate clinical questions to pharmacist.     Attestation  Therapeutic appropriateness: Appropriate  I attest the patient was actively involved in and has agreed to the above plan of care. If the prescribed therapy is at any point deemed not appropriate based on the current or future assessments, a consultation will be initiated with the patient's specialty care provider to determine the best course of action. The revised plan of therapy will be documented along with any additional patient education provided. Discussed aforementioned material with patient by phone.    Courtney Medina, FavianD, [facility]  Clinic  Specialty Pharmacist, Neurology  5/7/2025  11:45 EDT

## 2025-05-12 ENCOUNTER — TRANSCRIBE ORDERS (OUTPATIENT)
Dept: ADMINISTRATIVE | Facility: HOSPITAL | Age: 70
End: 2025-05-12
Payer: MEDICARE

## 2025-05-12 DIAGNOSIS — G35 MULTIPLE SCLEROSIS: Primary | ICD-10-CM

## 2025-05-16 ENCOUNTER — TELEPHONE (OUTPATIENT)
Dept: NEUROLOGY | Facility: CLINIC | Age: 70
End: 2025-05-16
Payer: MEDICARE

## 2025-05-16 NOTE — TELEPHONE ENCOUNTER
Per patient her left eye is blurry. She states Dr. Tucker prescribed prednisone and her eye improved. However, she thinks another round would resolve the issue. Her ophthalmologist believes this is MS related and advised patient to call our office and request prednisone. Patient would like medication sent to [facility] in [facility]

## 2025-06-20 NOTE — ASSESSMENT & PLAN NOTE
Gait is worsening and requesting steroids    IVMP 1000 mg daily for 3 days    Continue monthly labs.         External [place]/External [facility]

## 2025-08-07 ENCOUNTER — SPECIALTY PHARMACY (OUTPATIENT)
Facility: HOSPITAL | Age: 70
End: 2025-08-07
Payer: MEDICARE

## 2025-08-13 ENCOUNTER — OFFICE VISIT (OUTPATIENT)
Dept: BARIATRICS/WEIGHT MGMT | Facility: CLINIC | Age: 70
End: 2025-08-13
Payer: MEDICARE

## 2025-08-13 VITALS
HEIGHT: 71 IN | TEMPERATURE: 97.8 F | RESPIRATION RATE: 18 BRPM | WEIGHT: 256.6 LBS | HEART RATE: 72 BPM | SYSTOLIC BLOOD PRESSURE: 106 MMHG | DIASTOLIC BLOOD PRESSURE: 70 MMHG | OXYGEN SATURATION: 98 % | BODY MASS INDEX: 35.92 KG/M2

## 2025-08-13 DIAGNOSIS — E66.812 OBESITY, CLASS II, BMI 35-39.9: Primary | ICD-10-CM

## 2025-08-13 DIAGNOSIS — K21.9 GASTROESOPHAGEAL REFLUX DISEASE, UNSPECIFIED WHETHER ESOPHAGITIS PRESENT: ICD-10-CM

## 2025-08-13 PROCEDURE — 3078F DIAST BP <80 MM HG: CPT | Performed by: NURSE PRACTITIONER

## 2025-08-13 PROCEDURE — 3074F SYST BP LT 130 MM HG: CPT | Performed by: NURSE PRACTITIONER

## 2025-08-13 PROCEDURE — 1160F RVW MEDS BY RX/DR IN RCRD: CPT | Performed by: NURSE PRACTITIONER

## 2025-08-13 PROCEDURE — 1159F MED LIST DOCD IN RCRD: CPT | Performed by: NURSE PRACTITIONER

## 2025-08-13 PROCEDURE — 99214 OFFICE O/P EST MOD 30 MIN: CPT | Performed by: NURSE PRACTITIONER

## 2025-08-13 RX ORDER — OMEPRAZOLE 40 MG/1
40 CAPSULE, DELAYED RELEASE ORAL DAILY
Qty: 90 CAPSULE | Refills: 1 | Status: SHIPPED | OUTPATIENT
Start: 2025-08-13

## (undated) DEVICE — HYPODERMIC SAFETY NEEDLE: Brand: MONOJECT

## (undated) DEVICE — PK KN TOTL 10

## (undated) DEVICE — KT PUMP INFUBLOCK MDL 2100 PMKITSOLIS

## (undated) DEVICE — Device

## (undated) DEVICE — VESSEL SEALER EXTEND: Brand: ENDOWRIST

## (undated) DEVICE — BNDG ELAS W/CLIP 6IN 10YD LF STRL

## (undated) DEVICE — PATIENT RETURN ELECTRODE, SINGLE-USE, CONTACT QUALITY MONITORING, ADULT, WITH 9FT CORD, FOR PATIENTS WEIGING OVER 33LBS. (15KG): Brand: MEGADYNE

## (undated) DEVICE — CEMENT MIXING SYSTEM WITH MIS FEMORAL BREAKAWAY NOZZLE: Brand: REVOLUTION

## (undated) DEVICE — GLV SURG PREMIERPRO MIC LTX PF SZ8.5 BRN

## (undated) DEVICE — TRY EPID SFTY 18G 3.5IN 1T7680

## (undated) DEVICE — PAD ARMBRD SURG CONVOL 7.5X20X2IN

## (undated) DEVICE — GLV SURG SENSICARE W/ALOE PF LF 9 STRL

## (undated) DEVICE — SHT AIR TRANSFR COMFRT GLIDE LAT 40X80IN

## (undated) DEVICE — GOWN,NON-REINFORCED,SIRUS,SET IN SLV,XL: Brand: MEDLINE

## (undated) DEVICE — ST TBG CONN PNEUMOCLEAR EVAC SMOKE HEAT/HUMID

## (undated) DEVICE — APL DUPLOSPRAYER MIS 40CM

## (undated) DEVICE — TROCARS: Brand: KII® OPTICAL ACCESS SYSTEM

## (undated) DEVICE — GLV SURG SENSICARE PI MIC PF SZ6.5 LF STRL

## (undated) DEVICE — REDUCER: Brand: ENDOWRIST

## (undated) DEVICE — ANTIBACTERIAL UNDYED BRAIDED (POLYGLACTIN 910), SYNTHETIC ABSORBABLE SUTURE: Brand: COATED VICRYL

## (undated) DEVICE — PK BARIATRIC 10

## (undated) DEVICE — BLADELESS OBTURATOR, LONG: Brand: WECK VISTA

## (undated) DEVICE — UNDRPD COMFRT GLD DRYPAD 36X57IN

## (undated) DEVICE — TRAP FLD MINIVAC MEGADYNE 100ML

## (undated) DEVICE — STAPLER 60: Brand: SUREFORM

## (undated) DEVICE — GLV SURG DERMASSURE GRN LF PF 7.0

## (undated) DEVICE — SYS CLS SKIN PREMIERPRO EXOFINFUSION 22CM

## (undated) DEVICE — CVR FTSWITCH UNIV

## (undated) DEVICE — DRSNG PAD ABD 8X10IN STRL

## (undated) DEVICE — SEAL

## (undated) DEVICE — ARM DRAPE

## (undated) DEVICE — DISPOSABLE TOURNIQUET CUFF SINGLE BLADDER, DUAL PORT AND QUICK CONNECT CONNECTOR: Brand: COLOR CUFF

## (undated) DEVICE — STRYKER PERFORMANCE SERIES SAGITTAL BLADE: Brand: STRYKER PERFORMANCE SERIES

## (undated) DEVICE — SUT MONOCRYL PLS ANTIB UND 3/0  PS1 27IN

## (undated) DEVICE — ADHS SKIN PREMIERPRO EXOFIN TOPICAL HI/VISC .5ML

## (undated) DEVICE — TBG PENCL TELESCP MEGADYNE SMOKE EVAC 10FT

## (undated) DEVICE — COLUMN DRAPE

## (undated) DEVICE — UNDERCAST PADDING: Brand: DEROYAL

## (undated) DEVICE — CANNULA SEAL

## (undated) DEVICE — BLANKT WARM UPPR/BDY ARM/OUT 57X196CM

## (undated) DEVICE — SHEET,DRAPE,40X58,STERILE: Brand: MEDLINE

## (undated) DEVICE — APPL HEMOS FOR DELIVERY FLOSEAL